# Patient Record
Sex: FEMALE | Race: BLACK OR AFRICAN AMERICAN | NOT HISPANIC OR LATINO | Employment: FULL TIME | URBAN - METROPOLITAN AREA
[De-identification: names, ages, dates, MRNs, and addresses within clinical notes are randomized per-mention and may not be internally consistent; named-entity substitution may affect disease eponyms.]

---

## 2017-01-06 ENCOUNTER — OFFICE VISIT (OUTPATIENT)
Dept: INTERNAL MEDICINE | Facility: CLINIC | Age: 47
End: 2017-01-06

## 2017-01-06 VITALS
OXYGEN SATURATION: 97 % | HEART RATE: 85 BPM | WEIGHT: 288.25 LBS | TEMPERATURE: 98.5 F | RESPIRATION RATE: 18 BRPM | BODY MASS INDEX: 42.57 KG/M2 | SYSTOLIC BLOOD PRESSURE: 162 MMHG | DIASTOLIC BLOOD PRESSURE: 100 MMHG

## 2017-01-06 DIAGNOSIS — I10 ESSENTIAL HYPERTENSION: ICD-10-CM

## 2017-01-06 DIAGNOSIS — R81 GLUCOSURIA: Primary | ICD-10-CM

## 2017-01-06 DIAGNOSIS — B37.31 VAGINA, CANDIDIASIS: ICD-10-CM

## 2017-01-06 DIAGNOSIS — E11.9 TYPE 2 DIABETES MELLITUS WITHOUT COMPLICATION, WITHOUT LONG-TERM CURRENT USE OF INSULIN (HCC): ICD-10-CM

## 2017-01-06 LAB
BILIRUB BLD-MCNC: NEGATIVE MG/DL
CLARITY, POC: ABNORMAL
COLOR UR: YELLOW
EXPIRATION DATE: ABNORMAL
EXPIRATION DATE: ABNORMAL
EXPIRATION DATE: NORMAL
GLUCOSE BLDC GLUCOMTR-MCNC: 261 MG/DL (ref 70–130)
GLUCOSE UR STRIP-MCNC: ABNORMAL MG/DL
HBA1C MFR BLD: 11.7 %
KETONES UR QL: NEGATIVE
LEUKOCYTE EST, POC: NEGATIVE
Lab: ABNORMAL
Lab: ABNORMAL
Lab: NORMAL
NITRITE UR-MCNC: NEGATIVE MG/ML
PH UR: 5 [PH] (ref 5–8)
PROT UR STRIP-MCNC: NEGATIVE MG/DL
RBC # UR STRIP: NEGATIVE /UL
SP GR UR: 1.02 (ref 1–1.03)
UROBILINOGEN UR QL: NORMAL

## 2017-01-06 PROCEDURE — 82962 GLUCOSE BLOOD TEST: CPT | Performed by: PHYSICIAN ASSISTANT

## 2017-01-06 PROCEDURE — 81003 URINALYSIS AUTO W/O SCOPE: CPT | Performed by: PHYSICIAN ASSISTANT

## 2017-01-06 PROCEDURE — 83036 HEMOGLOBIN GLYCOSYLATED A1C: CPT | Performed by: PHYSICIAN ASSISTANT

## 2017-01-06 PROCEDURE — 87086 URINE CULTURE/COLONY COUNT: CPT | Performed by: PHYSICIAN ASSISTANT

## 2017-01-06 PROCEDURE — 99214 OFFICE O/P EST MOD 30 MIN: CPT | Performed by: PHYSICIAN ASSISTANT

## 2017-01-06 RX ORDER — BLOOD-GLUCOSE METER
1 KIT MISCELLANEOUS DAILY
Qty: 1 EACH | Refills: 0 | Status: SHIPPED | OUTPATIENT
Start: 2017-01-06 | End: 2018-01-02

## 2017-01-06 RX ORDER — BLOOD SUGAR DIAGNOSTIC
1 STRIP MISCELLANEOUS DAILY
Qty: 50 EACH | Refills: 11 | Status: SHIPPED | OUTPATIENT
Start: 2017-01-06 | End: 2017-10-17

## 2017-01-06 RX ORDER — LISINOPRIL AND HYDROCHLOROTHIAZIDE 20; 12.5 MG/1; MG/1
1 TABLET ORAL DAILY
Qty: 30 TABLET | Refills: 5 | Status: SHIPPED | OUTPATIENT
Start: 2017-01-06 | End: 2017-05-22 | Stop reason: ALTCHOICE

## 2017-01-06 RX ORDER — FLUCONAZOLE 150 MG/1
150 TABLET ORAL ONCE
Qty: 2 TABLET | Refills: 1 | Status: SHIPPED | OUTPATIENT
Start: 2017-01-06 | End: 2017-01-06

## 2017-01-06 NOTE — PATIENT INSTRUCTIONS
Start walking 30 min most days of the week.    Make sure to take your med regularly.  Call us if you have any side effects.    Avoid a lot of grain, potato and corn products.    Bring logbook of glucose before next appt.

## 2017-01-06 NOTE — MR AVS SNAPSHOT
Ernestina Garrido   1/6/2017 1:45 PM   Office Visit    Provider:  YANG Trimble   Department:  [facility] INTERNAL MEDICINE AND PEDIATRICS   Dept Phone:  [phone]                Your Full Care Plan              Today's Medication Changes          These changes are accurate as of: 1/6/17  2:49 PM.  If you have any questions, ask your nurse or doctor.               New Medication(s)Ordered:     Alcohol Pads 70 % pads   1 Units Daily.       fluconazole 150 MG tablet   Commonly known as:  DIFLUCAN   Take 1 tablet by mouth 1 (One) Time for 1 dose.       glucose blood test strip   Test daily       glucose monitor monitoring kit   1 each Daily.       Lancets 30G misc   1 Units Daily.       lisinopril-hydrochlorothiazide 20-12.5 MG per tablet   Commonly known as:  ZESTORETIC   Take 1 tablet by mouth Daily.       metFORMIN 500 MG tablet   Commonly known as:  GLUCOPHAGE   Take 2 tablets by mouth 2 (Two) Times a Day With Meals.         Stop taking medication(s)listed here:     hydrochlorothiazide 25 MG tablet   Commonly known as:  HYDRODIURIL           lisinopril 10 MG tablet   Commonly known as:  PRINIVIL,ZESTRIL                Where to Get Your Medications      These medications were sent to [address] [place] [address] - [phone]  - [phone] [place] [address]     Phone:  [phone]     Alcohol Pads 70 % pads    fluconazole 150 MG tablet    glucose blood test strip    glucose monitor monitoring kit    Lancets 30G misc    lisinopril-hydrochlorothiazide 20-12.5 MG per tablet    metFORMIN 500 MG tablet                  Your Updated Medication List          This list is accurate as of: 1/6/17  2:49 PM.  Always use your most recent med list.                Alcohol Pads 70 % pads   1 Units Daily.       cyclobenzaprine 10 MG tablet   Commonly known as:  FLEXERIL       fluconazole 150 MG tablet      Commonly known as:  DIFLUCAN   Take 1 tablet by mouth 1 (One) Time for 1 dose.       glucose blood test strip   Test daily       glucose monitor monitoring kit   1 each Daily.       Lancets 30G misc   1 Units Daily.       levothyroxine 100 MCG tablet   Commonly known as:  SYNTHROID, LEVOTHROID   TAKE ONE TABLET BY MOUTH DAILY       lisinopril-hydrochlorothiazide 20-12.5 MG per tablet   Commonly known as:  ZESTORETIC   Take 1 tablet by mouth Daily.       metFORMIN 500 MG tablet   Commonly known as:  GLUCOPHAGE   Take 2 tablets by mouth 2 (Two) Times a Day With Meals.               We Performed the Following     POC Glucose Fingerstick     POC Glycosylated Hemoglobin (Hb A1C)     POC Urinalysis Dipstick, Automated     Urine Culture       You Were Diagnosed With        Codes Comments    Glucosuria    -  Primary ICD-10-CM: R81  ICD-9-CM: 791.5     Type 2 diabetes mellitus without complication, without long-term current use of insulin     ICD-10-CM: E11.9  ICD-9-CM: 250.00     Vagina, candidiasis     ICD-10-CM: B37.3  ICD-9-CM: 112.1     Essential hypertension     ICD-10-CM: I10  ICD-9-CM: 401.9       Instructions    Start walking 30 min most days of the week.    Make sure to take your med regularly.  Call us if you have any side effects.    Avoid a lot of grain, potato and corn products.       Patient Instructions History      MyChart Signup     Our records indicate that you have declined [facility] [employer] signup. If you would like to sign up for [employer], please email [email or web address] or call [phone] to obtain an activation code.             Other Info from Your Visit           Your Appointments     Jan 09, 2017  2:00 PM EST   Psychotherapy with Ivy Do, PhD   [facility] BEHAVIORAL HEALTH (--)    [facility], [address] [address]               Jan 11, 2017 11:45 AM EST   Outside Facility with Blayne Pandya MD   [facility] PAIN  MANAGEMENT (--)    [address]  [facility] [address]   [phone]            Feb 06, 2017  2:00 PM EST   Follow Up with Stephanie Tanner MD   [facility] INTERNAL MEDICINE AND PEDIATRICS (--)    [address]  [facility] [id number] [phone]           Arrive 15 minutes prior to appointment.              Allergies     Glimepiride  Diarrhea      Reason for Visit     Diabetes     Vaginal Itching           Vital Signs     Blood Pressure Pulse Temperature Respirations Weight Oxygen Saturation    162/100 (BP Location: Left arm, Patient Position: Sitting) 85 98.5 °F (36.9 °C) 18 288 lb 4 oz (131 kg) 97%    Body Mass Index Smoking Status                42.57 kg/m2 Former Smoker          Problems and Diagnoses Noted     High blood pressure    Glucosuria    -  Primary    Type 2 diabetes mellitus without complication, without long-term current use of insulin        Vaginal yeast infection

## 2017-01-06 NOTE — PROGRESS NOTES
Subjective   Ernestina Garrido is a 46 y.o. female.   Chief Complaint   Patient presents with   • Diabetes   • Vaginal Itching     History of Present Illness   PT says that about 1 month ago she had vaginal itching and was treated with diflucan and it resolved.  Vag itching and rawness has resolved.    Pt used to be on metformin and was tried on something else that insurance wouldn't cover.  She complains of weight gain, fatigue, polyuria and dipsia.  Continues to drink reg soda.  Doesn't test glucose at home.    She quit smoking 2 months agol.    Hx of noncompliance.    The following portions of the patient's history were reviewed and updated as appropriate: allergies, current medications, past family history and problem list.    Review of Systems   Constitutional: Positive for fatigue. Negative for fever.   Endocrine: Positive for polyuria.   Genitourinary: Positive for vaginal discharge.       Objective   Physical Exam   Constitutional: She appears well-developed and well-nourished.   HENT:   Head: Normocephalic and atraumatic.   Right Ear: External ear normal.   Left Ear: External ear normal.   Eyes: Conjunctivae are normal.   Cardiovascular: Normal rate, regular rhythm and normal heart sounds.  Exam reveals no gallop and no friction rub.    No murmur heard.  Pulmonary/Chest: Effort normal and breath sounds normal.   Musculoskeletal: She exhibits no edema.   Psychiatric: She has a normal mood and affect.   Vitals reviewed.      Assessment/Plan   Ernestina was seen today for diabetes and vaginal itching.    Diagnoses and all orders for this visit:    Glucosuria  -     POC Urinalysis Dipstick, Automated  -     Urine Culture    Type 2 diabetes mellitus without complication, without long-term current use of insulin  -     POC Glucose Fingerstick  -     POC Glycosylated Hemoglobin (Hb A1C)  -     metFORMIN (GLUCOPHAGE) 500 MG tablet; Take 2 tablets by mouth 2 (Two) Times a Day With Meals.  -     glucose blood test  strip; Test daily  -     glucose monitor monitoring kit; 1 each Daily.  -     Lancets 30G misc; 1 Units Daily.  -     Alcohol Swabs (ALCOHOL PADS) 70 % pads; 1 Units Daily.    Vagina, candidiasis  -     fluconazole (DIFLUCAN) 150 MG tablet; Take 1 tablet by mouth 1 (One) Time for 1 dose.    Essential hypertension  -     lisinopril-hydrochlorothiazide (ZESTORETIC) 20-12.5 MG per tablet; Take 1 tablet by mouth Daily.

## 2017-01-08 LAB — BACTERIA SPEC AEROBE CULT: NORMAL

## 2017-01-10 RX ORDER — GABAPENTIN 100 MG/1
100 CAPSULE ORAL 4 TIMES DAILY
Qty: 120 CAPSULE | Refills: 6 | Status: SHIPPED | OUTPATIENT
Start: 2017-01-10 | End: 2017-09-15

## 2017-01-12 PROBLEM — R14.0 ABDOMINAL BLOATING: Status: ACTIVE | Noted: 2017-01-12

## 2017-02-02 ENCOUNTER — PATIENT OUTREACH (OUTPATIENT)
Dept: INTERNAL MEDICINE | Facility: CLINIC | Age: 47
End: 2017-02-02

## 2017-02-06 ENCOUNTER — OFFICE VISIT (OUTPATIENT)
Dept: INTERNAL MEDICINE | Facility: CLINIC | Age: 47
End: 2017-02-06

## 2017-02-06 VITALS
WEIGHT: 281 LBS | BODY MASS INDEX: 41.5 KG/M2 | TEMPERATURE: 97.8 F | RESPIRATION RATE: 20 BRPM | DIASTOLIC BLOOD PRESSURE: 80 MMHG | HEART RATE: 80 BPM | SYSTOLIC BLOOD PRESSURE: 130 MMHG

## 2017-02-06 DIAGNOSIS — F32.89 OTHER DEPRESSION: ICD-10-CM

## 2017-02-06 DIAGNOSIS — Z12.31 ENCOUNTER FOR SCREENING MAMMOGRAM FOR BREAST CANCER: ICD-10-CM

## 2017-02-06 DIAGNOSIS — G89.4 CHRONIC PAIN SYNDROME: ICD-10-CM

## 2017-02-06 DIAGNOSIS — E11.43 TYPE 2 DIABETES MELLITUS WITH DIABETIC AUTONOMIC NEUROPATHY, WITHOUT LONG-TERM CURRENT USE OF INSULIN (HCC): Primary | ICD-10-CM

## 2017-02-06 DIAGNOSIS — E03.9 ACQUIRED HYPOTHYROIDISM: ICD-10-CM

## 2017-02-06 DIAGNOSIS — I10 ESSENTIAL HYPERTENSION: ICD-10-CM

## 2017-02-06 LAB
A/C: NORMAL
CLARITY, POC: CLEAR
COLOR UR: YELLOW
EXPIRATION DATE: NORMAL
EXPIRATION DATE: NORMAL
GLUCOSE UR STRIP-MCNC: NEGATIVE MG/DL
KETONES UR QL: NEGATIVE
LEUKOCYTE EST, POC: NEGATIVE
Lab: NORMAL
Lab: NORMAL
NITRITE UR-MCNC: NEGATIVE MG/ML
PH UR: 5.5 [PH] (ref 5–8)
POC CREATININE URINE: 200
POC MICROALBUMIN URINE: 30
PROT UR STRIP-MCNC: NEGATIVE MG/DL
PROT/CREAT UR: 200 MG/G CREA
RBC # UR STRIP: NEGATIVE /UL
SP GR UR: 1.02 (ref 1–1.03)

## 2017-02-06 PROCEDURE — 99214 OFFICE O/P EST MOD 30 MIN: CPT | Performed by: INTERNAL MEDICINE

## 2017-02-06 PROCEDURE — 82044 UR ALBUMIN SEMIQUANTITATIVE: CPT | Performed by: INTERNAL MEDICINE

## 2017-02-06 PROCEDURE — 81002 URINALYSIS NONAUTO W/O SCOPE: CPT | Performed by: INTERNAL MEDICINE

## 2017-02-06 NOTE — PROGRESS NOTES
Subjective       Ernestina Garrido is a 46 y.o. female.     Chief Complaint   Patient presents with   • Diabetes     6 month follow up  non fasting       History of Present Illness     History of Present Illness      HPI: The patient is here for a six-month follow-up, not fasting today.     Primary [facility]: The patient is here today for a follow-up visit, last seen by me 6/27/16.  She saw Cherri Ho 4 weeks ago.     Her Diabetes Mellitus Type 2 is unstable.  The patient is now  adherent with her medication regimen. She has mild diarrhea from the Metformin, but denies other medication side effects.   Medication(s): Metformin HCl.   Her Hypertension is primary, but stable. The patient is not adherent with her medication regimen. The patient complains of medication side effects. Medication(s): Lisinopril and HCTZ.      Interval Events: The patient states her blood sugar fasting has been in the 220-240 fasting and two hours post prandial.   Last HgA1C was 11.7 on 1/6/17 (off Metformin).  Metformin was re-started.  She denies episodes of hypoglycemia.   She states she checks her feet regularly and has had an ophthalmology appointment approxiimately Jan 2016, which did not show retinopathy.     Symptoms: Reports fatigue, Denies chest pain, denies intermittent leg claudication, denies dyspnea, denies lower extremity edema, denies exercise intolerance,, denies numbness of the feet, denies foot pain, denies a foot ulcer, denies visual impairment, denies muscle pain, and denies muscle weakness.   Associated symptoms include headache, polydipsia, polyuria,  and a recent 7 pound weight loss, but no memory issues, no palpitations, no syncope,  no orthopnea, no PND, and no focal neurologic deficits.      Lifestyle and Disease Management: Diet: She consumes a diverse and healthy diet.  She is eating more since she quit smoking.   Weight Issues: She has weight concerns. Exercise: She does exercise  regularly. Exercise includes going to the gym 2-3 times per week.     Smoking: She does not use tobacco. She quit smoking 11/6/16.     Chronic Pain (Brief): The patient is being seen for a routine clinic follow-up of chronic pain.   The patient presents with complaints of neck and back pain (stable).   She sees Dr. Pandya.  Current treatment includes Meloxicam and Flexeril. By report, there is fair symptom control.      Depression (Follow-Up): The patient states her Depression has been stable since the last visit.   She has no comorbid illnesses.   Interval Events: She has had no significant interval events.   Interval Symptoms:Stable depression,  denies loss of interest or pleasure in activities, denies insomnia, denies excessive sleepiness, denies inability to perform normal activities, denies loss of energy, denies feelings of worthlessness, denies feelings of guilt,  and denies trouble concentrating. The patient denies thoughts of suicide. She complains of stable Insomnia from her pain, no medication.   Social Support: the patient has good social support.   Medications: None.      Hypothyroidism (Follow-Up): The patient is being seen for follow-up of Hypothyroidism. The patient reports doing well.   Interval Events:  She has had no significant interval events.   Interval symptoms: has stable fatigu and cold intolerance, but  denies heat intolerance, denies weakness, denies constipation, denies dyspnea on exertion,  and denies dry skin.   Associated symptoms: no paresthesias, no myalgias, and no arthralgias.    Medications include levothyroxine (Synthroid).   Medications: the patient is adherent to her medication regimen, but she denies medication side effects.            Current Outpatient Prescriptions on File Prior to Visit   Medication Sig Dispense Refill   • Alcohol Swabs (ALCOHOL PADS) 70 % pads 1 Units Daily. 50 each 11   • cyclobenzaprine (FLEXERIL) 10 MG tablet Take 10 mg by mouth 3 (three) times a day  as needed for muscle spasms.     • gabapentin (NEURONTIN) 100 MG capsule Take 1 capsule by mouth 4 (Four) Times a Day. 120 capsule 6   • glucose blood test strip Test daily 50 each 12   • glucose monitor monitoring kit 1 each Daily. 1 each 0   • Lancets 30G misc 1 Units Daily. 50 each 11   • levothyroxine (SYNTHROID, LEVOTHROID) 100 MCG tablet TAKE ONE TABLET BY MOUTH DAILY 30 tablet 4   • lisinopril-hydrochlorothiazide (ZESTORETIC) 20-12.5 MG per tablet Take 1 tablet by mouth Daily. 30 tablet 5   • metFORMIN (GLUCOPHAGE) 500 MG tablet Take 2 tablets by mouth 2 (Two) Times a Day With Meals. 120 tablet 2     No current facility-administered medications on file prior to visit.          The following portions of the patient's history were reviewed and updated as appropriate: allergies, current medications, past family history, past medical history, past social history, past surgical history and problem list.    Review of Systems   Constitutional: Positive for fatigue. Negative for unexpected weight change.   Eyes: Negative for visual disturbance.   Respiratory: Negative for cough, shortness of breath and wheezing.    Cardiovascular: Negative for chest pain, palpitations and leg swelling.        No MAIN, orthopnea, or claudication.   Gastrointestinal: Positive for diarrhea (since starting Metformin). Negative for abdominal pain, blood in stool, constipation, nausea and vomiting.   Endocrine: Positive for cold intolerance. Negative for heat intolerance, polydipsia and polyuria.   Musculoskeletal: Positive for back pain and neck pain. Negative for arthralgias and myalgias.   Neurological: Positive for headaches. Negative for dizziness, syncope and light-headedness.        No memory issues. Denies paresthesias.   Psychiatric/Behavioral: Positive for sleep disturbance (stable). Negative for decreased concentration and suicidal ideas.        Stable depression.         Objective       Blood pressure 130/80, pulse 80,  temperature 97.8 °F (36.6 °C), temperature source Temporal Artery , resp. rate 20, weight 281 lb (127 kg), not currently breastfeeding.      Physical Exam   Constitutional:   Morbidly obese, exam very difficult.   Neck: Normal range of motion. Neck supple. Carotid bruit is not present. No thyromegaly present.   Cardiovascular: Normal rate, regular rhythm, normal heart sounds and intact distal pulses.  Exam reveals no gallop and no friction rub.    No murmur heard.  No peripheral edema.   Pulmonary/Chest: Effort normal and breath sounds normal.   Abdominal: Soft. Bowel sounds are normal. She exhibits no distension, no abdominal bruit and no mass. There is no hepatosplenomegaly. There is no tenderness.   Psychiatric: She has a normal mood and affect.   Nursing note and vitals reviewed.       Results for orders placed or performed in visit on 02/06/17   POC Microalbumin   Result Value Ref Range    Microalbumin, Urine 30     Creatinine, Urine 200     A/C <30mg/g NORMAL     Lot Number [id number]     Expiration Date 11-17    POC Urinalysis Dipstick, Multipro   Result Value Ref Range    Color Yellow Yellow, Straw, Dark Yellow, [place]    Clarity, UA Clear Clear    Glucose, UA Negative Negative, 1000 mg/dL (3+) mg/dL    Ketones, UA Negative Negative    Specific Gravity  1.020 1.005 - 1.030    Blood, UA Negative Negative    pH, Urine 5.5 5.0 - 8.0    Protein, POC Negative Negative mg/dL    Leukocytes Negative Negative    Nitrite, UA Negative Negative    Protein/Creatinine Ratio, Urine 200.0 mg/G Crea    Lot Number [id number]     Expiration Date 5-17        Assessment / Plan:    Diagnoses and all orders for this visit:    Type 2 diabetes mellitus with diabetic autonomic neuropathy, without long-term current use of insulin  -     POC Microalbumin  -     POC Urinalysis Dipstick, Multipro  -     Ambulatory Referral to Ophthalmology  -     Ambulatory Referral to Diabetic Education  -     Comprehensive Metabolic Panel; Future  -     Lipid  Panel; Future  -     CBC & Differential; Future    Essential hypertension    Acquired hypothyroidism  -     T4, Free; Future  -     TSH; Future    Chronic pain syndrome    Other depression    Encounter for screening mammogram for breast cancer  -     Mammo Screening Bilateral With CAD; Future      The patient agrees to return for fasting labs.    Ophthalmology form given to the patient today.    The patient agrees to schedule her annual exam with Dr. Garnica.    The patient declined an Influenza vaccine today, despite my recommendation and discussion on risks of Influenza including death.    Return in about 2 months (around 4/6/2017) for Recheck-Diabetes, fasting.

## 2017-02-06 NOTE — PATIENT INSTRUCTIONS
The patient agrees to return for fasting labs.    Ophthalmology form given to the patient today.    The patient agrees to schedule her annual exam with Dr. Garnica.    The patient declined an Influenza vaccine today, despite my recommendation and discussion on risks of Influenza including death.

## 2017-02-08 ENCOUNTER — LAB (OUTPATIENT)
Dept: INTERNAL MEDICINE | Facility: CLINIC | Age: 47
End: 2017-02-08

## 2017-02-08 ENCOUNTER — OUTSIDE FACILITY SERVICE (OUTPATIENT)
Dept: PAIN MEDICINE | Facility: CLINIC | Age: 47
End: 2017-02-08

## 2017-02-08 ENCOUNTER — TELEPHONE (OUTPATIENT)
Dept: INTERNAL MEDICINE | Facility: CLINIC | Age: 47
End: 2017-02-08

## 2017-02-08 DIAGNOSIS — E11.43 TYPE 2 DIABETES MELLITUS WITH DIABETIC AUTONOMIC NEUROPATHY, WITHOUT LONG-TERM CURRENT USE OF INSULIN (HCC): ICD-10-CM

## 2017-02-08 DIAGNOSIS — E03.9 ACQUIRED HYPOTHYROIDISM: ICD-10-CM

## 2017-02-08 DIAGNOSIS — Z12.31 ENCOUNTER FOR SCREENING MAMMOGRAM FOR MALIGNANT NEOPLASM OF BREAST: Primary | ICD-10-CM

## 2017-02-08 LAB
ALBUMIN SERPL-MCNC: 4.1 G/DL (ref 3.2–4.8)
ALBUMIN/GLOB SERPL: 1.3 G/DL (ref 1.5–2.5)
ALP SERPL-CCNC: 71 U/L (ref 25–100)
ALT SERPL W P-5'-P-CCNC: 17 U/L (ref 7–40)
ANION GAP SERPL CALCULATED.3IONS-SCNC: 3 MMOL/L (ref 3–11)
ARTICHOKE IGE QN: 116 MG/DL (ref 0–130)
AST SERPL-CCNC: 16 U/L (ref 0–33)
BASOPHILS # BLD AUTO: 0.01 10*3/MM3 (ref 0–0.2)
BASOPHILS NFR BLD AUTO: 0.1 % (ref 0–1)
BILIRUB SERPL-MCNC: 1.1 MG/DL (ref 0.3–1.2)
BUN BLD-MCNC: 9 MG/DL (ref 9–23)
BUN/CREAT SERPL: 12.9 (ref 7–25)
CALCIUM SPEC-SCNC: 9.7 MG/DL (ref 8.7–10.4)
CHLORIDE SERPL-SCNC: 100 MMOL/L (ref 99–109)
CHOLEST SERPL-MCNC: 196 MG/DL (ref 0–200)
CO2 SERPL-SCNC: 35 MMOL/L (ref 20–31)
CREAT BLD-MCNC: 0.7 MG/DL (ref 0.6–1.3)
DEPRECATED RDW RBC AUTO: 42.2 FL (ref 37–54)
EOSINOPHIL # BLD AUTO: 0.18 10*3/MM3 (ref 0.1–0.3)
EOSINOPHIL NFR BLD AUTO: 1.9 % (ref 0–3)
ERYTHROCYTE [DISTWIDTH] IN BLOOD BY AUTOMATED COUNT: 12.5 % (ref 11.3–14.5)
GFR SERPL CREATININE-BSD FRML MDRD: 110 ML/MIN/1.73
GLOBULIN UR ELPH-MCNC: 3.1 GM/DL
GLUCOSE BLD-MCNC: 144 MG/DL (ref 70–100)
HCT VFR BLD AUTO: 41.3 % (ref 34.5–44)
HDLC SERPL-MCNC: 66 MG/DL (ref 40–60)
HGB BLD-MCNC: 13.6 G/DL (ref 11.5–15.5)
IMM GRANULOCYTES # BLD: 0.03 10*3/MM3 (ref 0–0.03)
IMM GRANULOCYTES NFR BLD: 0.3 % (ref 0–0.6)
LYMPHOCYTES # BLD AUTO: 3.62 10*3/MM3 (ref 0.6–4.8)
LYMPHOCYTES NFR BLD AUTO: 38 % (ref 24–44)
MCH RBC QN AUTO: 30.2 PG (ref 27–31)
MCHC RBC AUTO-ENTMCNC: 32.9 G/DL (ref 32–36)
MCV RBC AUTO: 91.6 FL (ref 80–99)
MONOCYTES # BLD AUTO: 0.64 10*3/MM3 (ref 0–1)
MONOCYTES NFR BLD AUTO: 6.7 % (ref 0–12)
NEUTROPHILS # BLD AUTO: 5.05 10*3/MM3 (ref 1.5–8.3)
NEUTROPHILS NFR BLD AUTO: 53 % (ref 41–71)
PLATELET # BLD AUTO: 184 10*3/MM3 (ref 150–450)
PMV BLD AUTO: 12.3 FL (ref 6–12)
POTASSIUM BLD-SCNC: 3.6 MMOL/L (ref 3.5–5.5)
PROT SERPL-MCNC: 7.2 G/DL (ref 5.7–8.2)
RBC # BLD AUTO: 4.51 10*6/MM3 (ref 3.89–5.14)
SODIUM BLD-SCNC: 138 MMOL/L (ref 132–146)
T4 FREE SERPL-MCNC: 1 NG/DL (ref 0.89–1.76)
TRIGL SERPL-MCNC: 86 MG/DL (ref 0–150)
TSH SERPL DL<=0.05 MIU/L-ACNC: 2.09 MIU/ML (ref 0.35–5.35)
WBC NRBC COR # BLD: 9.53 10*3/MM3 (ref 3.5–10.8)

## 2017-02-08 PROCEDURE — 85025 COMPLETE CBC W/AUTO DIFF WBC: CPT | Performed by: INTERNAL MEDICINE

## 2017-02-08 PROCEDURE — 84439 ASSAY OF FREE THYROXINE: CPT | Performed by: INTERNAL MEDICINE

## 2017-02-08 PROCEDURE — 80053 COMPREHEN METABOLIC PANEL: CPT | Performed by: INTERNAL MEDICINE

## 2017-02-08 PROCEDURE — 20526 THER INJECTION CARP TUNNEL: CPT | Performed by: ANESTHESIOLOGY

## 2017-02-08 PROCEDURE — 99152 MOD SED SAME PHYS/QHP 5/>YRS: CPT | Performed by: ANESTHESIOLOGY

## 2017-02-08 PROCEDURE — 80061 LIPID PANEL: CPT | Performed by: INTERNAL MEDICINE

## 2017-02-08 PROCEDURE — 20550 NJX 1 TENDON SHEATH/LIGAMENT: CPT | Performed by: ANESTHESIOLOGY

## 2017-02-08 PROCEDURE — 76942 ECHO GUIDE FOR BIOPSY: CPT | Performed by: ANESTHESIOLOGY

## 2017-02-08 PROCEDURE — 36415 COLL VENOUS BLD VENIPUNCTURE: CPT | Performed by: INTERNAL MEDICINE

## 2017-02-08 PROCEDURE — 84443 ASSAY THYROID STIM HORMONE: CPT | Performed by: INTERNAL MEDICINE

## 2017-02-08 NOTE — TELEPHONE ENCOUNTER
"----- Message from Aurora Cage sent at 2/8/2017  8:31 AM EST -----  Concerning pts mammogram, they are needing the order to say \"screening mammogram with tomosynthesis with CAD. Can you put in new order  "

## 2017-02-13 DIAGNOSIS — Z12.31 ENCOUNTER FOR SCREENING MAMMOGRAM FOR BREAST CANCER: ICD-10-CM

## 2017-02-24 ENCOUNTER — TELEPHONE (OUTPATIENT)
Dept: INTERNAL MEDICINE | Facility: CLINIC | Age: 47
End: 2017-02-24
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
## 2017-02-27 ENCOUNTER — OFFICE VISIT (OUTPATIENT)
Dept: INTERNAL MEDICINE | Facility: CLINIC | Age: 47
End: 2017-02-27

## 2017-02-27 VITALS
SYSTOLIC BLOOD PRESSURE: 150 MMHG | HEART RATE: 100 BPM | DIASTOLIC BLOOD PRESSURE: 98 MMHG | RESPIRATION RATE: 20 BRPM | TEMPERATURE: 97.8 F

## 2017-02-27 DIAGNOSIS — M79.672 LEFT FOOT PAIN: Primary | ICD-10-CM

## 2017-02-27 PROCEDURE — 99213 OFFICE O/P EST LOW 20 MIN: CPT | Performed by: INTERNAL MEDICINE

## 2017-02-27 RX ORDER — HYDROCODONE BITARTRATE AND ACETAMINOPHEN 5; 325 MG/1; MG/1
1 TABLET ORAL EVERY 6 HOURS PRN
Qty: 15 TABLET | Refills: 0 | Status: SHIPPED | OUTPATIENT
Start: 2017-02-27 | End: 2017-03-21

## 2017-02-27 NOTE — PATIENT INSTRUCTIONS
Continue ice, elevation, rest and NSAIDS.  Wear boot.   Appointment scheduled with ortho on 3/6/17.
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
## 2017-03-01 ENCOUNTER — TELEPHONE (OUTPATIENT)
Dept: URGENT CARE | Facility: CLINIC | Age: 47
End: 2017-03-01

## 2017-03-01 NOTE — TELEPHONE ENCOUNTER
Attempted to call patient regarding referral.  Could not leave a message because the mailbox is full.

## 2017-03-21 ENCOUNTER — HOSPITAL ENCOUNTER (OUTPATIENT)
Dept: GENERAL RADIOLOGY | Facility: HOSPITAL | Age: 47
Discharge: HOME | End: 2017-03-21
Admitting: NURSE PRACTITIONER

## 2017-03-21 ENCOUNTER — TELEPHONE (OUTPATIENT)
Dept: INTERNAL MEDICINE | Facility: CLINIC | Age: 47
End: 2017-03-21

## 2017-03-21 ENCOUNTER — OFFICE VISIT (OUTPATIENT)
Dept: INTERNAL MEDICINE | Facility: CLINIC | Age: 47
End: 2017-03-21

## 2017-03-21 VITALS
BODY MASS INDEX: 41.05 KG/M2 | WEIGHT: 270 LBS | TEMPERATURE: 98.6 F | RESPIRATION RATE: 20 BRPM | SYSTOLIC BLOOD PRESSURE: 148 MMHG | DIASTOLIC BLOOD PRESSURE: 96 MMHG

## 2017-03-21 DIAGNOSIS — M25.561 ACUTE PAIN OF RIGHT KNEE: Primary | ICD-10-CM

## 2017-03-21 DIAGNOSIS — M25.561 ACUTE PAIN OF RIGHT KNEE: ICD-10-CM

## 2017-03-21 PROCEDURE — 73560 X-RAY EXAM OF KNEE 1 OR 2: CPT

## 2017-03-21 PROCEDURE — 99213 OFFICE O/P EST LOW 20 MIN: CPT | Performed by: NURSE PRACTITIONER

## 2017-03-21 NOTE — TELEPHONE ENCOUNTER
----- Message from Mimi Chauhan sent at 3/21/2017  9:51 AM EDT -----  [phone]    PTS LEG AND KNEE ARE PAINFUL AND SWOLLEN.  HAVE BEEN FOR ALMOST 2 WEEKS.  NEEDS APPT AT 1:30 OR LATER ANY DAY THIS WEEK.  TODAY PREFERABLY.  CAN YOU DO THAT OR IS A PA OK?

## 2017-03-21 NOTE — TELEPHONE ENCOUNTER
Tried contacting Pt however VM is full, unable to leave message. If Pt calls back, please schedule her with PA
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
## 2017-03-21 NOTE — TELEPHONE ENCOUNTER
Ok to work in with a PA if needs to be seen today.  I cannot see her until tomorrow and not after 1:30 PM.

## 2017-03-22 DIAGNOSIS — R10.2 PELVIC PAIN: ICD-10-CM

## 2017-03-23 RX ORDER — MELOXICAM 15 MG/1
TABLET ORAL
Qty: 30 TABLET | Refills: 2 | Status: SHIPPED | OUTPATIENT
Start: 2017-03-23 | End: 2017-04-19

## 2017-03-26 RX ORDER — METHYLPREDNISOLONE 4 MG/1
TABLET ORAL
Qty: 21 TABLET | Refills: 0 | Status: SHIPPED | OUTPATIENT
Start: 2017-03-26 | End: 2017-05-22

## 2017-03-31 ENCOUNTER — TELEPHONE (OUTPATIENT)
Dept: INTERNAL MEDICINE | Facility: CLINIC | Age: 47
End: 2017-03-31

## 2017-03-31 NOTE — TELEPHONE ENCOUNTER
Please call pt and see if she is taking any med/mobic? Did the medrol pack help?  When is she going to see PT?? This can be helpful to her.

## 2017-03-31 NOTE — TELEPHONE ENCOUNTER
Pt. informed to go to ER for the amount of pain she is in or she can try to take Tylenol 3 with Mobic. Verbal understanding and great appreciation received.

## 2017-03-31 NOTE — TELEPHONE ENCOUNTER
----- Message from Katharine Woods sent at 3/31/2017 11:52 AM EDT -----  PATIENT WAS SEEN RECENTLY FOR KNEE PAIN.     PATIENT IS REQUESTING SOME PAIN MEDICATION TO BE CALLED IN. OTC MEDS ARE NOT HELPING AND HASN'T STARTED PHYSICAL THERAPY    PLEASE SEND TO [facility] AT [facility]    IF ANY QUESTIONS CALL PATIENT -[phone]

## 2017-03-31 NOTE — TELEPHONE ENCOUNTER
"Spoke with pt. States she has been taking Mobic, Tylenol 3 and Flexiril. Medrol pack has \"never\" worked for pt. States nothing is working. Goes to PT on Monday 04/03/2017. States that she needs to have something for pain.  "

## 2017-04-19 ENCOUNTER — OFFICE VISIT (OUTPATIENT)
Dept: INTERNAL MEDICINE | Facility: CLINIC | Age: 47
End: 2017-04-19

## 2017-04-19 VITALS
BODY MASS INDEX: 42.22 KG/M2 | HEIGHT: 67 IN | WEIGHT: 269 LBS | HEART RATE: 74 BPM | DIASTOLIC BLOOD PRESSURE: 96 MMHG | TEMPERATURE: 97.4 F | RESPIRATION RATE: 21 BRPM | SYSTOLIC BLOOD PRESSURE: 146 MMHG

## 2017-04-19 DIAGNOSIS — M25.561 ACUTE PAIN OF RIGHT KNEE: ICD-10-CM

## 2017-04-19 DIAGNOSIS — I10 ESSENTIAL HYPERTENSION: ICD-10-CM

## 2017-04-19 DIAGNOSIS — Z23 NEED FOR PROPHYLACTIC VACCINATION AGAINST STREPTOCOCCUS PNEUMONIAE (PNEUMOCOCCUS): ICD-10-CM

## 2017-04-19 DIAGNOSIS — F32.89 OTHER DEPRESSION: ICD-10-CM

## 2017-04-19 DIAGNOSIS — E11.43 TYPE 2 DIABETES MELLITUS WITH DIABETIC AUTONOMIC NEUROPATHY, WITHOUT LONG-TERM CURRENT USE OF INSULIN (HCC): Primary | ICD-10-CM

## 2017-04-19 DIAGNOSIS — E11.9 TYPE 2 DIABETES MELLITUS WITHOUT COMPLICATION, WITHOUT LONG-TERM CURRENT USE OF INSULIN (HCC): ICD-10-CM

## 2017-04-19 LAB
ANION GAP SERPL CALCULATED.3IONS-SCNC: 13 MMOL/L (ref 3–11)
BUN BLD-MCNC: 15 MG/DL (ref 9–23)
BUN/CREAT SERPL: 18.8 (ref 7–25)
CALCIUM SPEC-SCNC: 9.9 MG/DL (ref 8.7–10.4)
CHLORIDE SERPL-SCNC: 100 MMOL/L (ref 99–109)
CO2 SERPL-SCNC: 24 MMOL/L (ref 20–31)
CREAT BLD-MCNC: 0.8 MG/DL (ref 0.6–1.3)
EXPIRATION DATE: NORMAL
GFR SERPL CREATININE-BSD FRML MDRD: 94 ML/MIN/1.73
GLUCOSE BLD-MCNC: 200 MG/DL (ref 70–100)
HBA1C MFR BLD: 11.1 %
Lab: NORMAL
POTASSIUM BLD-SCNC: 3.8 MMOL/L (ref 3.5–5.5)
SODIUM BLD-SCNC: 137 MMOL/L (ref 132–146)

## 2017-04-19 PROCEDURE — 90471 IMMUNIZATION ADMIN: CPT | Performed by: INTERNAL MEDICINE

## 2017-04-19 PROCEDURE — 90732 PPSV23 VACC 2 YRS+ SUBQ/IM: CPT | Performed by: INTERNAL MEDICINE

## 2017-04-19 PROCEDURE — 83036 HEMOGLOBIN GLYCOSYLATED A1C: CPT | Performed by: INTERNAL MEDICINE

## 2017-04-19 PROCEDURE — 80048 BASIC METABOLIC PNL TOTAL CA: CPT | Performed by: INTERNAL MEDICINE

## 2017-04-19 PROCEDURE — 99214 OFFICE O/P EST MOD 30 MIN: CPT | Performed by: INTERNAL MEDICINE

## 2017-04-19 RX ORDER — LEVOTHYROXINE SODIUM 0.1 MG/1
TABLET ORAL
Qty: 30 TABLET | Refills: 3 | Status: SHIPPED | OUTPATIENT
Start: 2017-04-19 | End: 2018-01-02

## 2017-04-19 RX ORDER — FLUOXETINE HYDROCHLORIDE 20 MG/1
20 CAPSULE ORAL DAILY
Qty: 30 CAPSULE | Refills: 5 | Status: SHIPPED | OUTPATIENT
Start: 2017-04-19 | End: 2017-10-17

## 2017-04-19 RX ORDER — LISINOPRIL 10 MG/1
TABLET ORAL
Qty: 30 TABLET | Refills: 3 | Status: SHIPPED | OUTPATIENT
Start: 2017-04-19 | End: 2017-10-31 | Stop reason: SDUPTHER

## 2017-04-19 NOTE — PROGRESS NOTES
Subjective       Ernestina Garrido is a 47 y.o. female.     Chief Complaint   Patient presents with   • Pain in both legs       History obtained from the patient.      History of Present Illness     Primary Care Cardiac Diagnostic Constellation: The patient is here today for a follow-up visit.      Her Diabetes Mellitus Type 2 is unstable.  The patient is adherent with her medication regimen. She has mild diarrhea from the Metformin, but denies other medication side effects.   Medication(s): Metformin HCl.   Her Hypertension is unstable. The patient is not adherent with her medication regimen. The patient complains of medication side effects. Medication(s): Lisinopril and HCTZ.       Interval Events: The patient states her blood sugar fasting has been in the <200 post prandial, 180's  fasting and two hours post prandial.  Last HgA1C was 11.7 on 1/6/17 (off Metformin). Metformin was re-started.  The patient was on glimepiride in the past, but that cause diarrhea.  She denies episodes of hypoglycemia. She states she checks her feet regularly and has had an ophthalmology appointment approxiimately Jan 2016, which did not show retinopathy.      Symptoms: Reports improved fatigue. Has left foot pain.   Denies chest pain, denies intermittent leg claudication, denies dyspnea, denies lower extremity edema, denies exercise intolerance, denies numbness of the feet,  denies a foot ulcer, denies visual impairment, denies muscle pain, and denies muscle weakness.   Associated symptoms include no weight changes, no headache, no polydipsia, no polyuria,  no memory issues, no palpitations, no syncope,  no orthopnea, no PND, and no focal neurologic deficits.       Lifestyle and Disease Management: Diet: She consumes a diverse and healthy diet.   Weight Issues: She has weight concerns. Exercise: She does exercise regularly. Exercise includes going to the gym 2-3 times per week.    Smoking: She does not use tobacco. She quit smoking  11/6/16.    Depression (Follow-Up): The patient states her Depression has been worse x 2 months.  She has no comorbid illnesses.   Interval Events: She is under a lot of stress.  She has a lot of chronic pain issues.  Interval Symptoms: worsened depression and insomnia,  denies loss of interest or pleasure in activities,  denies excessive sleepiness, denies inability to perform normal activities, denies loss of energy, denies feelings of worthlessness, denies feelings of guilt,  and denies trouble concentrating. The patient denies thoughts of suicide.   Social Support: the patient has good social support.   Medications: None.     Right Knee Pain Follow-up: The patient saw Obdulia Ly on 3/21/17 for right knee pain.  X-ray showed moderate degenerative changes, otherwise negative.  The patient is scheduled for PT.  She is still  experiencing swelling and pain, and would like a referral to orthopedics.        Current Outpatient Prescriptions on File Prior to Visit   Medication Sig Dispense Refill   • Alcohol Swabs (ALCOHOL PADS) 70 % pads 1 Units Daily. 50 each 11   • cyclobenzaprine (FLEXERIL) 10 MG tablet Take 10 mg by mouth 3 (three) times a day as needed for muscle spasms.     • gabapentin (NEURONTIN) 100 MG capsule Take 1 capsule by mouth 4 (Four) Times a Day. 120 capsule 6   • glucose blood test strip Test daily 50 each 12   • glucose monitor monitoring kit 1 each Daily. 1 each 0   • Lancets 30G misc 1 Units Daily. 50 each 11   • levothyroxine (SYNTHROID, LEVOTHROID) 100 MCG tablet TAKE ONE TABLET BY MOUTH DAILY 30 tablet 4   • lisinopril-hydrochlorothiazide (ZESTORETIC) 20-12.5 MG per tablet Take 1 tablet by mouth Daily. 30 tablet 5   • metFORMIN (GLUCOPHAGE) 500 MG tablet Take 2 tablets by mouth 2 (Two) Times a Day With Meals. 120 tablet 2   • meloxicam (MOBIC) 15 MG tablet TAKE ONE TABLET BY MOUTH DAILY AS NEEDED FOR MODERATE PAIN ( 4-6 ) 30 tablet 2   • MethylPREDNISolone (MEDROL, JUDAH,) 4 MG tablet Take as  directed on package instructions. 21 tablet 0     No current facility-administered medications on file prior to visit.          The following portions of the patient's history were reviewed and updated as appropriate: allergies, current medications, past family history, past medical history, past social history, past surgical history and problem list.    Review of Systems   Constitutional: Positive for fatigue. Negative for unexpected weight change.   Eyes: Negative for visual disturbance.   Respiratory: Negative for cough, shortness of breath and wheezing.    Cardiovascular: Negative for chest pain, palpitations and leg swelling.        No MAIN, orthopnea, or claudication.   Endocrine: Negative for polydipsia and polyuria.   Musculoskeletal: Negative for arthralgias and myalgias.        Has right knee pain as per history of present illness.   Neurological: Negative for dizziness, syncope, light-headedness and headaches.        No memory issues.   Psychiatric/Behavioral: Positive for sleep disturbance. Negative for decreased concentration and suicidal ideas. The patient is not nervous/anxious.         Worsened depression.         Objective       Blood pressure 146/96, pulse 74, temperature 97.4 °F (36.3 °C), temperature source Temporal Artery , resp. rate 21, weight 269 lb (122 kg), not currently breastfeeding.      Physical Exam   Constitutional:   Morbidly obese.   Neck: Normal range of motion. Neck supple. Carotid bruit is not present. No thyromegaly present.   Cardiovascular: Normal rate, regular rhythm, normal heart sounds and intact distal pulses.  Exam reveals no gallop and no friction rub.    No murmur heard.  No peripheral edema.   Pulmonary/Chest: Effort normal and breath sounds normal.   Abdominal: Soft. Bowel sounds are normal. She exhibits no distension, no abdominal bruit and no mass. There is no hepatosplenomegaly. There is no tenderness.   Musculoskeletal:   There is mild edema over the right anterior  knee, with diffuse tenderness to palpation anteriorly.  There is no erythema or warmth.  The patient has pain with flexion and extension of the right knee. There is no crepitus.  There is no instability.   Neurological: She has normal strength and normal reflexes.   Only LE examined.   Psychiatric: She has a normal mood and affect.   The patient is intermittently emotional.   Nursing note and vitals reviewed.     Counseling was given to patient for the following topics: appropriate exercise, discussed labs and diagnostic tests performed last visit or since last visit, discussed labs and diagnostic tests performed this visit, disease prevention, healthy eating habits, importance of daily blood sugar checks, to include fasting/2 hours postprandial/bedtime, importance of daily foot exam for patients with diabetes, importance of medication compliance, importance of yearly ophthalmology exam for patients with diabetes, side effects of medications, stress increase in the patient's life, symptoms of anxiety and symptoms of depression . Total time of the encounter was 25 minutes and 15 minutes was spent counseling.      Results for orders placed or performed in visit on 04/19/17   POC Glycosylated Hemoglobin (Hb A1C)   Result Value Ref Range    Hemoglobin A1C 11.1 %    Lot Number [id number]     Expiration Date 10-18          Assessment / Plan:    Ernestina was seen today for pain in both legs.    Diagnoses and all orders for this visit:    Type 2 diabetes mellitus with diabetic autonomic neuropathy, without long-term current use of insulin  -     Basic Metabolic Panel  -     POC Glycosylated Hemoglobin (Hb A1C)  -     Empagliflozin (JARDIANCE) 10 MG tablet; Take 10 mg by mouth Daily.    Essential hypertension    Acute pain of right knee  -     Ambulatory Referral to Orthopedic Surgery    Other depression  -     FLUoxetine (PROZAC) 20 MG capsule; Take 1 capsule by mouth Daily.    Need for prophylactic vaccination against  Streptococcus pneumoniae (pneumococcus)  -     Pneumococcal Polysaccharide Vaccine 23-Valent Greater Than or Equal To 3yo Subcutaneous / IM        Return in about 1 month (around 5/19/2017) for Recheck-Diabetes and Depression.

## 2017-04-24 ENCOUNTER — HOSPITAL ENCOUNTER (OUTPATIENT)
Dept: PHYSICAL THERAPY | Facility: HOSPITAL | Age: 47
Setting detail: THERAPIES SERIES
Discharge: HOME | End: 2017-04-24

## 2017-04-24 DIAGNOSIS — M25.561 ACUTE PAIN OF RIGHT KNEE: Primary | ICD-10-CM

## 2017-04-24 PROCEDURE — 97161 PT EVAL LOW COMPLEX 20 MIN: CPT
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
## 2017-05-15 ENCOUNTER — OFFICE VISIT (OUTPATIENT)
Dept: INTERNAL MEDICINE | Facility: CLINIC | Age: 47
End: 2017-05-15

## 2017-05-15 VITALS
HEART RATE: 68 BPM | DIASTOLIC BLOOD PRESSURE: 86 MMHG | TEMPERATURE: 97.6 F | BODY MASS INDEX: 41.5 KG/M2 | WEIGHT: 265 LBS | RESPIRATION RATE: 18 BRPM | SYSTOLIC BLOOD PRESSURE: 142 MMHG

## 2017-05-15 DIAGNOSIS — L73.9 FOLLICULITIS: ICD-10-CM

## 2017-05-15 DIAGNOSIS — Z20.2 POSSIBLE EXPOSURE TO STD: Primary | ICD-10-CM

## 2017-05-15 LAB
HAV IGM SERPL QL IA: NORMAL
HBV CORE IGM SERPL QL IA: NORMAL
HBV SURFACE AG SERPL QL IA: NORMAL
HCV AB SER DONR QL: NORMAL
HIV1+2 AB SER QL: NORMAL

## 2017-05-15 PROCEDURE — 80074 ACUTE HEPATITIS PANEL: CPT | Performed by: INTERNAL MEDICINE

## 2017-05-15 PROCEDURE — 87591 N.GONORRHOEAE DNA AMP PROB: CPT | Performed by: INTERNAL MEDICINE

## 2017-05-15 PROCEDURE — 99214 OFFICE O/P EST MOD 30 MIN: CPT | Performed by: INTERNAL MEDICINE

## 2017-05-15 PROCEDURE — 86592 SYPHILIS TEST NON-TREP QUAL: CPT | Performed by: INTERNAL MEDICINE

## 2017-05-15 PROCEDURE — G0432 EIA HIV-1/HIV-2 SCREEN: HCPCS | Performed by: INTERNAL MEDICINE

## 2017-05-15 PROCEDURE — 87491 CHLMYD TRACH DNA AMP PROBE: CPT | Performed by: INTERNAL MEDICINE

## 2017-05-15 RX ORDER — SULFAMETHOXAZOLE AND TRIMETHOPRIM 800; 160 MG/1; MG/1
1 TABLET ORAL 2 TIMES DAILY
Qty: 20 TABLET | Refills: 0 | Status: SHIPPED | OUTPATIENT
Start: 2017-05-15 | End: 2017-05-25

## 2017-05-17 LAB — RPR SER QL: NORMAL

## 2017-05-18 LAB
C TRACH RRNA SPEC DONR QL NAA+PROBE: NEGATIVE
N GONORRHOEA DNA SPEC QL NAA+PROBE: NEGATIVE

## 2017-05-19 ENCOUNTER — DOCUMENTATION (OUTPATIENT)
Dept: PHYSICAL THERAPY | Facility: HOSPITAL | Age: 47
End: 2017-05-19

## 2017-05-19 DIAGNOSIS — M25.561 ACUTE PAIN OF RIGHT KNEE: Primary | ICD-10-CM

## 2017-05-22 ENCOUNTER — OFFICE VISIT (OUTPATIENT)
Dept: INTERNAL MEDICINE | Facility: CLINIC | Age: 47
End: 2017-05-22

## 2017-05-22 VITALS
HEART RATE: 78 BPM | BODY MASS INDEX: 39.94 KG/M2 | SYSTOLIC BLOOD PRESSURE: 124 MMHG | DIASTOLIC BLOOD PRESSURE: 68 MMHG | WEIGHT: 255 LBS | TEMPERATURE: 97.9 F | RESPIRATION RATE: 16 BRPM

## 2017-05-22 DIAGNOSIS — R53.83 OTHER FATIGUE: ICD-10-CM

## 2017-05-22 DIAGNOSIS — R07.89 OTHER CHEST PAIN: ICD-10-CM

## 2017-05-22 DIAGNOSIS — F32.89 OTHER DEPRESSION: ICD-10-CM

## 2017-05-22 DIAGNOSIS — Z12.31 ENCOUNTER FOR SCREENING MAMMOGRAM FOR BREAST CANCER: ICD-10-CM

## 2017-05-22 DIAGNOSIS — R26.89 BALANCE PROBLEM: ICD-10-CM

## 2017-05-22 DIAGNOSIS — E11.43 TYPE 2 DIABETES MELLITUS WITH DIABETIC AUTONOMIC NEUROPATHY, WITHOUT LONG-TERM CURRENT USE OF INSULIN (HCC): Primary | ICD-10-CM

## 2017-05-22 LAB
ALBUMIN SERPL-MCNC: 4.6 G/DL (ref 3.2–4.8)
ALBUMIN/GLOB SERPL: 1.4 G/DL (ref 1.5–2.5)
ALP SERPL-CCNC: 73 U/L (ref 25–100)
ALT SERPL W P-5'-P-CCNC: 19 U/L (ref 7–40)
ANION GAP SERPL CALCULATED.3IONS-SCNC: 3 MMOL/L (ref 3–11)
ARTICHOKE IGE QN: 141 MG/DL (ref 0–130)
AST SERPL-CCNC: 18 U/L (ref 0–33)
BILIRUB SERPL-MCNC: 0.8 MG/DL (ref 0.3–1.2)
BUN BLD-MCNC: 16 MG/DL (ref 9–23)
BUN/CREAT SERPL: 14.5 (ref 7–25)
CALCIUM SPEC-SCNC: 10 MG/DL (ref 8.7–10.4)
CHLORIDE SERPL-SCNC: 97 MMOL/L (ref 99–109)
CHOLEST SERPL-MCNC: 223 MG/DL (ref 0–200)
CO2 SERPL-SCNC: 39 MMOL/L (ref 20–31)
CREAT BLD-MCNC: 1.1 MG/DL (ref 0.6–1.3)
GFR SERPL CREATININE-BSD FRML MDRD: 65 ML/MIN/1.73
GLOBULIN UR ELPH-MCNC: 3.2 GM/DL
GLUCOSE BLD-MCNC: 157 MG/DL (ref 70–100)
HDLC SERPL-MCNC: 66 MG/DL (ref 40–60)
POTASSIUM BLD-SCNC: 3.5 MMOL/L (ref 3.5–5.5)
PROT SERPL-MCNC: 7.8 G/DL (ref 5.7–8.2)
SODIUM BLD-SCNC: 139 MMOL/L (ref 132–146)
TRIGL SERPL-MCNC: 93 MG/DL (ref 0–150)

## 2017-05-22 PROCEDURE — 93000 ELECTROCARDIOGRAM COMPLETE: CPT | Performed by: INTERNAL MEDICINE

## 2017-05-22 PROCEDURE — 80061 LIPID PANEL: CPT | Performed by: INTERNAL MEDICINE

## 2017-05-22 PROCEDURE — 80053 COMPREHEN METABOLIC PANEL: CPT | Performed by: INTERNAL MEDICINE

## 2017-05-22 PROCEDURE — 99214 OFFICE O/P EST MOD 30 MIN: CPT | Performed by: INTERNAL MEDICINE

## 2017-05-30 ENCOUNTER — HOSPITAL ENCOUNTER (OUTPATIENT)
Dept: MAMMOGRAPHY | Facility: HOSPITAL | Age: 47
Discharge: HOME | End: 2017-05-30
Attending: INTERNAL MEDICINE | Admitting: INTERNAL MEDICINE

## 2017-05-30 DIAGNOSIS — Z12.31 ENCOUNTER FOR SCREENING MAMMOGRAM FOR BREAST CANCER: ICD-10-CM

## 2017-05-30 PROCEDURE — 77067 SCR MAMMO BI INCL CAD: CPT | Performed by: RADIOLOGY

## 2017-05-30 PROCEDURE — 77063 BREAST TOMOSYNTHESIS BI: CPT | Performed by: RADIOLOGY

## 2017-05-30 PROCEDURE — 77063 BREAST TOMOSYNTHESIS BI: CPT

## 2017-05-30 PROCEDURE — G0202 SCR MAMMO BI INCL CAD: HCPCS

## 2017-06-02 DIAGNOSIS — E78.49 OTHER HYPERLIPIDEMIA: Primary | ICD-10-CM

## 2017-06-02 RX ORDER — ATORVASTATIN CALCIUM 20 MG/1
20 TABLET, FILM COATED ORAL NIGHTLY
Qty: 30 TABLET | Refills: 5 | Status: SHIPPED | OUTPATIENT
Start: 2017-06-02 | End: 2017-10-17

## 2017-06-06 ENCOUNTER — APPOINTMENT (OUTPATIENT)
Dept: CARDIOLOGY | Facility: HOSPITAL | Age: 47
End: 2017-06-06
Attending: INTERNAL MEDICINE

## 2017-07-25 DIAGNOSIS — E11.9 TYPE 2 DIABETES MELLITUS WITHOUT COMPLICATION, WITHOUT LONG-TERM CURRENT USE OF INSULIN (HCC): ICD-10-CM

## 2017-07-25 DIAGNOSIS — G47.00 INSOMNIA: ICD-10-CM

## 2017-07-25 RX ORDER — TRAZODONE HYDROCHLORIDE 50 MG/1
TABLET ORAL
Qty: 60 TABLET | Refills: 4 | Status: SHIPPED | OUTPATIENT
Start: 2017-07-25 | End: 2017-09-15

## 2017-08-28 ENCOUNTER — TELEPHONE (OUTPATIENT)
Dept: INTERNAL MEDICINE | Facility: CLINIC | Age: 47
End: 2017-08-28

## 2017-08-28 ENCOUNTER — OFFICE VISIT (OUTPATIENT)
Dept: INTERNAL MEDICINE | Facility: CLINIC | Age: 47
End: 2017-08-28

## 2017-08-28 VITALS
DIASTOLIC BLOOD PRESSURE: 92 MMHG | WEIGHT: 261 LBS | SYSTOLIC BLOOD PRESSURE: 132 MMHG | RESPIRATION RATE: 20 BRPM | TEMPERATURE: 97 F | BODY MASS INDEX: 40.88 KG/M2 | HEART RATE: 72 BPM

## 2017-08-28 DIAGNOSIS — M25.561 ACUTE PAIN OF RIGHT KNEE: Primary | ICD-10-CM

## 2017-08-28 PROCEDURE — 99214 OFFICE O/P EST MOD 30 MIN: CPT | Performed by: INTERNAL MEDICINE

## 2017-08-28 RX ORDER — ETODOLAC 200 MG/1
200 CAPSULE ORAL EVERY 8 HOURS PRN
Qty: 50 CAPSULE | Refills: 1 | Status: SHIPPED | OUTPATIENT
Start: 2017-08-28 | End: 2017-10-31 | Stop reason: SDUPTHER

## 2017-08-28 NOTE — TELEPHONE ENCOUNTER
Pt called back and stated it was for her knee. She stated she is off work now and is in a lot of pain. She is asking to be seen today

## 2017-08-28 NOTE — TELEPHONE ENCOUNTER
Patient informed, verb good understanding. Patient states she will be here in 15-20 minutes, added onto schedule

## 2017-08-28 NOTE — PROGRESS NOTES
Subjective       Ernestina Garrido is a 47 y.o. female.     Chief Complaint   Patient presents with   • Knee Pain     x 4 months        History obtained from the patient.      Knee Pain    Incident onset: pain startred 4 months ago. There was no injury mechanism. The pain is present in the right knee. The quality of the pain is described as burning and stabbing. The pain is severe. The pain has been worsening since onset. Associated symptoms include a loss of motion and muscle weakness. Pertinent negatives include no inability to bear weight (limps and knee sometimes gives out), numbness or tingling. The symptoms are aggravated by movement, weight bearing and palpation. She has tried NSAIDs and ice (and PT and cortisone shots) for the symptoms. The treatment provided mild relief.      She had an x-ray done 3/21/17, which showed moderate degenerative arthritis, but no fracture or effusion.She has been seeing an orthopedic surgeon at , but would like a second opinion.  The patient has had no relief with Aleve, ibuprofen, and meloxicam.  She is also on Voltaren gel, which may help some.       The following portions of the patient's history were reviewed and updated as appropriate: allergies, current medications, past family history, past medical history, past social history, past surgical history and problem list.      Review of Systems   Constitutional: Negative for chills and fever.   Respiratory: Negative for shortness of breath.    Cardiovascular: Negative for chest pain.   Musculoskeletal: Positive for back pain (chronic stable) and joint swelling. Negative for arthralgias and myalgias (but has pain in right thigh).   Skin: Negative for rash.   Neurological: Positive for weakness. Negative for tingling and numbness.         Blood pressure 132/92, pulse 72, temperature 97 °F (36.1 °C), temperature source Temporal Artery , resp. rate 20, weight 261 lb (118 kg), not currently breastfeeding.      Objective      Physical Exam   Constitutional:   Morbidly obese.   Cardiovascular: Normal rate, regular rhythm and normal heart sounds.    No murmur heard.  Pulmonary/Chest: Effort normal and breath sounds normal.   Musculoskeletal:   There is diffuse tenderness to palpation over the right knee anteriorly and posteriorly.  There is mild edema, but no erythema or warmth.  There is pain with flexion and extension of the right knee.  Range of motion is decreased due to pain.  There is no instability of the knee.   Neurological: She has normal strength. She displays normal reflexes (only lower extremity examined). She exhibits normal muscle tone.   Skin: No rash noted.   Psychiatric: She has a normal mood and affect.   Nursing note and vitals reviewed.        Assessment/Plan   Ernestina was seen today for knee pain.    Diagnoses and all orders for this visit:    Acute pain of right knee  -     Ambulatory Referral to Orthopedic Surgery  -     etodolac (LODINE) 200 MG capsule; Take 1 capsule by mouth Every 8 (Eight) Hours As Needed (pain).         Discontinue Aleve.  Continue ice.      Return in about 2 weeks (around 9/11/2017) for Recheck-Diabetes fasting.

## 2017-08-28 NOTE — TELEPHONE ENCOUNTER
----- Message from Valencia Tang sent at 8/28/2017  9:21 AM EDT -----  PT IS NOT HAPPY WITH ORTHO  AT - WOULD LIKE TO HAVE A REFERRAL FOR ANOTHER PROVIDER- THEY ARE NOT HELPING HER AT ALL. [facility] . SHOTS AREN'T HELPING, CORTIZONE SHOT ISN'T HELPING, HER KNEE IS DOUBLE THE SIZED. SHE WANTS TO GO BACK TO DR. PRESTON.     PATIENT; [phone]

## 2017-09-15 ENCOUNTER — OFFICE VISIT (OUTPATIENT)
Dept: INTERNAL MEDICINE | Facility: CLINIC | Age: 47
End: 2017-09-15

## 2017-09-15 VITALS
RESPIRATION RATE: 22 BRPM | WEIGHT: 255 LBS | SYSTOLIC BLOOD PRESSURE: 130 MMHG | HEART RATE: 72 BPM | BODY MASS INDEX: 39.94 KG/M2 | TEMPERATURE: 97.7 F | DIASTOLIC BLOOD PRESSURE: 100 MMHG

## 2017-09-15 DIAGNOSIS — Z79.899 HIGH RISK MEDICATION USE: ICD-10-CM

## 2017-09-15 DIAGNOSIS — I10 ESSENTIAL HYPERTENSION: ICD-10-CM

## 2017-09-15 DIAGNOSIS — G89.4 CHRONIC PAIN SYNDROME: ICD-10-CM

## 2017-09-15 DIAGNOSIS — K64.8 OTHER HEMORRHOIDS: ICD-10-CM

## 2017-09-15 DIAGNOSIS — E03.9 ACQUIRED HYPOTHYROIDISM: ICD-10-CM

## 2017-09-15 DIAGNOSIS — F32.89 OTHER DEPRESSION: ICD-10-CM

## 2017-09-15 DIAGNOSIS — E11.43 TYPE 2 DIABETES MELLITUS WITH DIABETIC AUTONOMIC NEUROPATHY, WITHOUT LONG-TERM CURRENT USE OF INSULIN (HCC): Primary | ICD-10-CM

## 2017-09-15 DIAGNOSIS — Z00.00 ROUTINE ADULT HEALTH MAINTENANCE: ICD-10-CM

## 2017-09-15 LAB
ALBUMIN SERPL-MCNC: 3.9 G/DL (ref 3.2–4.8)
ALBUMIN/GLOB SERPL: 1.3 G/DL (ref 1.5–2.5)
ALP SERPL-CCNC: 73 U/L (ref 25–100)
ALT SERPL W P-5'-P-CCNC: 13 U/L (ref 7–40)
ANION GAP SERPL CALCULATED.3IONS-SCNC: 4 MMOL/L (ref 3–11)
ARTICHOKE IGE QN: 136 MG/DL (ref 0–130)
AST SERPL-CCNC: 12 U/L (ref 0–33)
BILIRUB SERPL-MCNC: 0.7 MG/DL (ref 0.3–1.2)
BUN BLD-MCNC: 11 MG/DL (ref 9–23)
BUN/CREAT SERPL: 18.3 (ref 7–25)
CALCIUM SPEC-SCNC: 9.4 MG/DL (ref 8.7–10.4)
CHLORIDE SERPL-SCNC: 109 MMOL/L (ref 99–109)
CHOLEST SERPL-MCNC: 200 MG/DL (ref 0–200)
CO2 SERPL-SCNC: 31 MMOL/L (ref 20–31)
CREAT BLD-MCNC: 0.6 MG/DL (ref 0.6–1.3)
DEPRECATED RDW RBC AUTO: 43 FL (ref 37–54)
ERYTHROCYTE [DISTWIDTH] IN BLOOD BY AUTOMATED COUNT: 12.7 % (ref 11.3–14.5)
EXPIRATION DATE: NORMAL
GFR SERPL CREATININE-BSD FRML MDRD: 130 ML/MIN/1.73
GLOBULIN UR ELPH-MCNC: 3.1 GM/DL
GLUCOSE BLD-MCNC: 143 MG/DL (ref 70–100)
HBA1C MFR BLD: 10 %
HCT VFR BLD AUTO: 41.4 % (ref 34.5–44)
HDLC SERPL-MCNC: 57 MG/DL (ref 40–60)
HGB BLD-MCNC: 13.5 G/DL (ref 11.5–15.5)
Lab: NORMAL
MCH RBC QN AUTO: 30.3 PG (ref 27–31)
MCHC RBC AUTO-ENTMCNC: 32.6 G/DL (ref 32–36)
MCV RBC AUTO: 92.8 FL (ref 80–99)
PLATELET # BLD AUTO: 171 10*3/MM3 (ref 150–450)
PMV BLD AUTO: 12.6 FL (ref 6–12)
POTASSIUM BLD-SCNC: 3.7 MMOL/L (ref 3.5–5.5)
PROT SERPL-MCNC: 7 G/DL (ref 5.7–8.2)
RBC # BLD AUTO: 4.46 10*6/MM3 (ref 3.89–5.14)
SODIUM BLD-SCNC: 144 MMOL/L (ref 132–146)
T4 FREE SERPL-MCNC: 1.15 NG/DL (ref 0.89–1.76)
TRIGL SERPL-MCNC: 82 MG/DL (ref 0–150)
TSH SERPL DL<=0.05 MIU/L-ACNC: 1.88 MIU/ML (ref 0.35–5.35)
WBC NRBC COR # BLD: 10.44 10*3/MM3 (ref 3.5–10.8)

## 2017-09-15 PROCEDURE — 80061 LIPID PANEL: CPT | Performed by: INTERNAL MEDICINE

## 2017-09-15 PROCEDURE — 99214 OFFICE O/P EST MOD 30 MIN: CPT | Performed by: INTERNAL MEDICINE

## 2017-09-15 PROCEDURE — 80053 COMPREHEN METABOLIC PANEL: CPT | Performed by: INTERNAL MEDICINE

## 2017-09-15 PROCEDURE — 84439 ASSAY OF FREE THYROXINE: CPT | Performed by: INTERNAL MEDICINE

## 2017-09-15 PROCEDURE — 84443 ASSAY THYROID STIM HORMONE: CPT | Performed by: INTERNAL MEDICINE

## 2017-09-15 PROCEDURE — 83036 HEMOGLOBIN GLYCOSYLATED A1C: CPT | Performed by: INTERNAL MEDICINE

## 2017-09-15 PROCEDURE — 85027 COMPLETE CBC AUTOMATED: CPT | Performed by: INTERNAL MEDICINE

## 2017-09-15 RX ORDER — GABAPENTIN 100 MG/1
300 CAPSULE ORAL 3 TIMES DAILY
Qty: 90 CAPSULE | Refills: 1 | Status: SHIPPED | OUTPATIENT
Start: 2017-09-15 | End: 2017-10-31

## 2017-09-15 NOTE — PROGRESS NOTES
Subjective       Ernestina Garrido is a 47 y.o. female.     Chief Complaint   Patient presents with   • Diabetes     4 month follow up  fasting        History obtained from the patient.      History of Present Illness     Primary Care Cardiac Diagnostic [facility]: The patient is here today for a follow-up visit.    The patient states she has an appointment with an orthopedist on 9/22/17 for her knee pain.  She states the Lodine that was prescribed has been helping.  She was on Neurontin for foot pain, but ran out of that and has not had it refilled.      Her Diabetes Mellitus Type 2 is unstable.  Medication: Jardiance, Lisinopril, and Atorvastatin  Her Hypertension is unstable.    Medication(s): Lisinopril.  The patient is adherent with her medication regimen. She denies other medication side effects.       Interval Events: The patient states her blood sugar fasting has been 160-225 and <200 post prandial.  Last HgA1C was 11.1 on 4/19/17.  She is off  Metformin.  Jardiance was added 4/17/17.  She denies episodes of hypoglycemia. She states she checks her feet regularly.  She states she  had an ophthalmology appointment approxiimately Jan 2016, which did not show retinopathy.  Referral to the ophthalmologist was placed after the visit on 5/22/17.  The patient states someone called her with an appointment, but she was unable to make it.  She has not re-scheduled.      Symptoms:  Stable fatigue. Has foot pain, but no ulcers or paresthesias.  It is improved with Neurontin.   She does have some numbness and tingling in her left middle finger.  Denies chest pain, denies shortness of breath, denies MAIN, denies intermittent leg claudication,  denies lower extremity edema, denies exercise intolerance,  denies visual impairment, denies muscle pain, and denies muscle weakness.   Associated symptoms include a 6 pound intentional weight loss, intentionally.  Has had occasional headaches.   No polydipsia, no polyuria, no  memory issues, no palpitations, no syncope,  no orthopnea, no PND, and no focal neurologic deficits.       Lifestyle and Disease Management: Diet: She consumes a diverse and healthy diet. Weight Issues: She has weight concerns. Exercise: She does exercise regularly. Exercise includes water aerobics 3 times per week.   Smoking: She does not use tobacco. She quit smoking 11/6/16.        Hypothyroidism (Follow-Up): The patient is being seen for follow-up of Hypothyroidism. The patient reports doing well.   Interval Events:  She has had no significant interval events.   Interval symptoms: has stable fatigu and cold intolerance, but  denies heat intolerance, denies weakness, denies constipation, denies dyspnea on exertion,  and denies dry skin.   Associated symptoms: has  Paresthesias left middle finger, no myalgias, and no arthralgias.    Medications include levothyroxine (Synthroid).    The patient is adherent to her medication regimen,  And she denies medication side effects.     Chronic Pain Follow-up: The patient is being seen for a routine clinic follow-up of Chronic Pain Syndrome   The patient presents with complaints of neck and back pain (stable).   Current treatment includes Lodine.  By report, there is fair symptom control.       Depression (Follow-Up): The patient states her Depression has been stable.  She has no comorbid illnesses.   Interval Events: She is under a lot of stress. She has a lot of chronic pain issues.  Prozac was re-started on 4/19/17.  Interval Symptoms: stable depression and insomnia.    Denies loss of interest or pleasure in activities,  denies excessive sleepiness, denies inability to perform normal activities, denies loss of energy, denies feelings of worthlessness, denies feelings of guilt,  and denies trouble concentrating.   The patient denies thoughts of suicide.   Social Support: the patient has good social support.   Medications: Prozac.       Current Outpatient Prescriptions on  File Prior to Visit   Medication Sig Dispense Refill   • Alcohol Swabs (ALCOHOL PADS) 70 % pads 1 Units Daily. 50 each 11   • atorvastatin (LIPITOR) 20 MG tablet Take 1 tablet by mouth Every Night. 30 tablet 5   • Empagliflozin (JARDIANCE) 10 MG tablet Take 10 mg by mouth Daily. 30 tablet 5   • etodolac (LODINE) 200 MG capsule Take 1 capsule by mouth Every 8 (Eight) Hours As Needed (pain). 50 capsule 1   • FLUoxetine (PROZAC) 20 MG capsule Take 1 capsule by mouth Daily. 30 capsule 5   • glucose blood test strip Test daily 50 each 12   • glucose monitor monitoring kit 1 each Daily. 1 each 0   • Lancets 30G misc 1 Units Daily. 50 each 11   • levothyroxine (SYNTHROID, LEVOTHROID) 100 MCG tablet TAKE ONE TABLET BY MOUTH DAILY 30 tablet 3   • lisinopril (PRINIVIL,ZESTRIL) 10 MG tablet TAKE ONE TABLET BY MOUTH EVERY NIGHT AT BEDTIME 30 tablet 3   • [DISCONTINUED] gabapentin (NEURONTIN) 100 MG capsule Take 1 capsule by mouth 4 (Four) Times a Day. 120 capsule 6   • [DISCONTINUED] traZODone (DESYREL) 50 MG tablet TAKE 1-2 TABLETS BY MOUTH EVERY NIGHT AT BEDTIME AS NEEDED FOR SLEEP 60 tablet 4     No current facility-administered medications on file prior to visit.          The following portions of the patient's history were reviewed and updated as appropriate: allergies, current medications, past family history, past medical history, past social history, past surgical history and problem list.    Review of Systems   Constitutional: Positive for fatigue. Negative for unexpected weight change.   Eyes: Negative for visual disturbance.   Respiratory: Negative for cough, shortness of breath and wheezing.    Cardiovascular: Negative for chest pain, palpitations and leg swelling.        No MAIN, orthopnea, or claudication.   Gastrointestinal: Positive for blood in stool (hemorrhoids sore and bleed occasionally). Negative for abdominal pain, constipation, diarrhea, nausea and vomiting.        Denies melena.   Endocrine: Positive for  cold intolerance. Negative for heat intolerance, polydipsia and polyuria.   Musculoskeletal: Positive for back pain and neck pain. Negative for arthralgias and myalgias.   Neurological: Positive for numbness (and tingling of left middle finger). Negative for dizziness, syncope, light-headedness and headaches.        No memory issues.   Psychiatric/Behavioral: Positive for sleep disturbance. Negative for decreased concentration and suicidal ideas. The patient is not nervous/anxious.         Stable depression.         Objective       Blood pressure 130/100, pulse 72, temperature 97.7 °F (36.5 °C), temperature source Temporal Artery , resp. rate 22, weight 255 lb (116 kg), not currently breastfeeding.      Physical Exam   Constitutional:   Obese.     Neck: Normal range of motion. Neck supple. Carotid bruit is not present. No thyromegaly present.   Cardiovascular: Normal rate, regular rhythm, normal heart sounds and intact distal pulses.  Exam reveals no gallop and no friction rub.    No murmur heard.  No peripheral edema.   Pulmonary/Chest: Effort normal and breath sounds normal.   Abdominal: Soft. Bowel sounds are normal. She exhibits no distension, no abdominal bruit and no mass. There is no hepatosplenomegaly. There is no tenderness.    Ernestina had a diabetic foot exam performed today.   During the foot exam she had a monofilament test performed (see form).   Skin Integrity  -  Her right foot skin is not intact (dry).   Ernestina's left foot skin is not intact (dry). .  Psychiatric: She has a normal mood and affect.   Nursing note and vitals reviewed.       Results for orders placed or performed in visit on 09/15/17   POC Glycosylated Hemoglobin (Hb A1C)   Result Value Ref Range    Hemoglobin A1C 10.0 %    Lot Number [id number]     Expiration Date 5-19          Assessment / Plan:      Ernestina was seen today for diabetes.    Diagnoses and all orders for this visit:    Type 2 diabetes mellitus with diabetic autonomic  neuropathy, without long-term current use of insulin  -     Lipid Panel  -     Comprehensive Metabolic Panel  -     POC Glycosylated Hemoglobin (Hb A1C)    Essential hypertension    Acquired hypothyroidism  -     TSH  -     T4, Free    Chronic pain syndrome  -     gabapentin (NEURONTIN) 100 MG capsule; Take 3 capsules by mouth 3 (Three) Times a Day.    Other hemorrhoids  -     hydrocortisone (PROCTOSOL HC) 2.5 % rectal cream; Insert  into the rectum 2 (Two) Times a Day for 7 days.    Other depression    High risk medication use  -     CBC (No Diff)    Routine adult health maintenance  -     Ambulatory Referral to Gynecology    The patient was told she can add Tylenol to use the Lodine and Neurontin for her chronic pain.    The patient agrees to reschedule her eye exam appointment with  Ophthalmology.She was given an ophthalmology form today.      The patient was instructed in the side effects of the medication.  Risks of the potential for tolerance, dependence, and addiction were discussed.  The patient was instructed to take the lowest dosage of the medication, at the lowest frequency, and for the shortest period of time possible.  The patient was instructed not to receive controlled substances or narcotics from other doctors, and not to giveaway or sell the medication.    The patient was instructed to abstain from illicit drug use.  The patient was instructed to avoid alcohol while taking these medications.      Narcotics/controlled substance agreement, Jose Guadalupe report, and Urine Drug Screen were updated today if needed.      Return in about 3 months (around 12/15/2017) for Recheck-Diabetes fasting.

## 2017-09-18 RX ORDER — HYDROCHLOROTHIAZIDE 25 MG/1
25 TABLET ORAL DAILY
Qty: 30 TABLET | Refills: 6 | Status: SHIPPED | OUTPATIENT
Start: 2017-09-18 | End: 2017-11-13 | Stop reason: SDUPTHER

## 2017-10-10 ENCOUNTER — TELEPHONE (OUTPATIENT)
Dept: INTERNAL MEDICINE | Facility: CLINIC | Age: 47
End: 2017-10-10

## 2017-10-10 NOTE — TELEPHONE ENCOUNTER
Spoke to Ernestina.  She states she does not need anything now as she got in touch with the Ortho pedic doctor to make an appt with them.

## 2017-10-10 NOTE — TELEPHONE ENCOUNTER
----- Message from Merly Moya sent at 10/10/2017  9:27 AM EDT -----  Contact: SELF  JAGJIT DEAL SAID HER ORTHOPEDIC DR WAS GOING TO REQUEST A REFERRAL FOR AN INCREASED DOSAGE FOR GABAPENTIN AND TO GO STRAIGHT TO THE SURGEON FOR INJECTIONS TO HELP WITH THE PAIN. SHE ALSO WANTS TO KNOW IF SHE CAN GET AN INCREASE FOR THE ANTI-INFLAMMATORY AT LEAST UNTIL SHE CAN GET TO THE SURGEON.   SHE USES THE [employer] [facility] KASYE CULPJAGJIT CAN BE REACHED [phone]

## 2017-10-17 ENCOUNTER — OFFICE VISIT (OUTPATIENT)
Dept: ORTHOPEDIC SURGERY | Facility: CLINIC | Age: 47
End: 2017-10-17

## 2017-10-17 VITALS
BODY MASS INDEX: 40.18 KG/M2 | DIASTOLIC BLOOD PRESSURE: 99 MMHG | HEART RATE: 68 BPM | SYSTOLIC BLOOD PRESSURE: 188 MMHG | WEIGHT: 256 LBS | HEIGHT: 67 IN

## 2017-10-17 DIAGNOSIS — M17.11 PRIMARY OSTEOARTHRITIS OF RIGHT KNEE: Primary | ICD-10-CM

## 2017-10-17 PROCEDURE — 99214 OFFICE O/P EST MOD 30 MIN: CPT | Performed by: PHYSICIAN ASSISTANT

## 2017-10-17 NOTE — PROGRESS NOTES
Patient: Ernestina Garrido  : 1970    Primary Care Provider: Stephanie Tanner MD    Requesting Provider: As above    Pain of the Right Knee      History    Chief Complaint: Right knee pain    History of Present Illness: This is a pleasant 47-year-old female presenting today discuss her 6 month history of right knee pain.  She reports that it all began after injuring her left foot and having to compensate was putting more weight on her right side.  There is no direct trauma or injury to the right knee.  She complains of generalized knee pain.  She complains of constant toothache-type pain with sharp pain with weightbearing and walking.  She rates it 8-10/10. She has throbbing pain at rest as well as night pain.  The only thing that makes it feel better is being off of it for a long period of time and when she falls asleep.  She denies any radiating pain or mechanical symptoms.  She feels that it stays swollen all the time.  She's been treated with multiple anti-inflammatories including Aleve, Mobic and most recently etodolac which seems to help her pain the most.  She has used a cane.  She did a few visits of physical therapy but discontinued because it made her pain worse.  She reports that she has lost weight secondary to the pain in the stress that the pain has caused.  She has had 2 steroid injections from  which is given her 3 weeks relief.  She also had an HA with increased pain.  She is here today for another opinion given that she is still having pain.        Allergies   Allergen Reactions   • Glimepiride Diarrhea     Current Outpatient Prescriptions on File Prior to Visit   Medication Sig Dispense Refill   • Empagliflozin (JARDIANCE) 10 MG tablet Take 10 mg by mouth Daily. 30 tablet 5   • etodolac (LODINE) 200 MG capsule Take 1 capsule by mouth Every 8 (Eight) Hours As Needed (pain). 50 capsule 1   • gabapentin (NEURONTIN) 100 MG capsule Take 3 capsules by mouth 3 (Three) Times a Day. 90 capsule 1    • glucose blood test strip Test daily 50 each 12   • glucose monitor monitoring kit 1 each Daily. 1 each 0   • hydrochlorothiazide (HYDRODIURIL) 25 MG tablet Take 1 tablet by mouth Daily. 30 tablet 6   • Lancets 30G misc 1 Units Daily. 50 each 11   • levothyroxine (SYNTHROID, LEVOTHROID) 100 MCG tablet TAKE ONE TABLET BY MOUTH DAILY 30 tablet 3   • lisinopril (PRINIVIL,ZESTRIL) 10 MG tablet TAKE ONE TABLET BY MOUTH EVERY NIGHT AT BEDTIME 30 tablet 3   • metFORMIN (GLUCOPHAGE) 500 MG tablet Take 1 tablet by mouth 2 (Two) Times a Day With Meals. 60 tablet 5   • [DISCONTINUED] Alcohol Swabs (ALCOHOL PADS) 70 % pads 1 Units Daily. 50 each 11   • [DISCONTINUED] atorvastatin (LIPITOR) 20 MG tablet Take 1 tablet by mouth Every Night. 30 tablet 5   • [DISCONTINUED] FLUoxetine (PROZAC) 20 MG capsule Take 1 capsule by mouth Daily. 30 capsule 5     No current facility-administered medications on file prior to visit.      Social History     Social History   • Marital status:      Spouse name: N/A   • Number of children: N/A   • Years of education: N/A     Occupational History   • Not on file.     Social History Main Topics   • Smoking status: Former Smoker     Packs/day: 0.50     Years: 28.00     Types: Cigarettes     Start date:      Quit date: 2016   • Smokeless tobacco: Never Used   • Alcohol use Yes      Comment: occasional 1 a week   • Drug use: No   • Sexual activity: Defer     Other Topics Concern   • Not on file     Social History Narrative     Past Surgical History:   Procedure Laterality Date   • ABDOMINAL SURGERY     • ANTERIOR CERVICAL DISCECTOMY  2016    C4-6 DISC (Dr. Pena)   • BACK SURGERY     •  SECTION      , ,    • HYSTERECTOMY  2015   • LUMBAR DISCECTOMY     • NECK SURGERY       Family History   Problem Relation Age of Onset   • Diabetes Mother    • Hypertension Mother    • Colon cancer Maternal Grandmother    • Diabetes Maternal Grandmother    •  "Hypertension Maternal Grandmother    • Diabetes Daughter    • Hypothyroidism Daughter    • Diabetes Maternal Uncle    • Hypertension Maternal Uncle    • Hypertension Other    • Breast cancer Neg Hx    • Ovarian cancer Neg Hx      Past Medical History:   Diagnosis Date   • Atypical mole    • Cervical disc disorder    • Diabetes mellitus    • Encounter for Papanicolaou smear of cervix 2013    normal per patient   • Extremity pain    • Headache    • History of colonoscopy 2013    normal per patient   • History of mammogram 2013    normal per patient   • History of uterine leiomyoma    • History of varicella    • Hypertension    • Hypothyroidism    • Joint pain    • Low back pain    • Lumbosacral disc disease    • Neck pain    • Osteoarthritis    • Tattoos     HIV neg 2012 per patient   • Tobacco abuse    • Uterine fibromyoma          Review of Systems   Constitutional: Positive for activity change, appetite change and chills.   HENT: Negative.    Eyes: Negative.    Respiratory: Negative.    Cardiovascular: Positive for palpitations.   Gastrointestinal: Negative.    Endocrine: Negative.    Genitourinary: Negative.    Musculoskeletal: Positive for arthralgias, back pain, neck pain and neck stiffness.   Skin: Negative.    Allergic/Immunologic: Negative.    Neurological: Positive for numbness.   Hematological: Negative.    Psychiatric/Behavioral: Negative.        The following portions of the patient's history were reviewed and updated as appropriate: allergies, current medications, past family history, past medical history, past social history, past surgical history and problem list.    Objective   BP (!) 188/99  Pulse 68  Ht 67\" (170.2 cm)  Wt 256 lb (116 kg)  BMI 40.1 kg/m2    Physical Exam:   GENERAL: Body habitus: morbidly obese    Lower extremity edema: Left: 1+ pitting; Right: 1+ pitting    Varicose veins:  Left: mild; Right: mild    Gait: antalgic     Mental Status:  awake and alert; oriented to person, place, " and time    Voice:  clear  SKIN:  Normal    Hair Growth:  Right:normal; Left:  normal  HEENT: Head: Normocephalic, no lesions, without obvious abnormality.     Eyes: sclera anicteric  PULM:  Repiratory effort normal    Ortho Exam  Peripheral Vascular:    Upper Extremity:   Inspection:  Left--no cyanotic nail beds Right--no cyanotic nail beds   Bilateral:  Pink nail beds with brisk capillary refill   Palpation:  Bilateral radial pulse normal    Musculoskeletal:  Global Assessment:  Overall assessment of Lower Extremity Muscle Strength and Tone:  Left quadriceps--5/5   Left hamstrings--5/5       Left tibialis anterior--5/5  Left gastroc-soleus--5/5  Left EHL--5/5    Right quadriceps--5/5  Right hamstrings--5/5  Right tibialis anterior--5/5  Right gastroc soleus--5/5  Right EHL--5/5    Lower Extremity:  Knee/Patella:  No digital clubbing or cyanosis.    Examination of left and right knees reveals:  Normal deep tendon reflexes, coordination, strength, tone, sensation.  No known fractures or deformities.    Inspection and Palpation:    Left knee:  Tenderness: none  Effusion:  none  Crepitus:  positive  Pulses:  2+  Ecchymosis:  None  Warmth:  None     Right knee:  Tenderness:  Diffuse generalized tenderness  Effusion:  none  Crepitus:  positive  Pulses:  2+  Ecchymosis:  None  Warmth:  None     ROM:  Right:  Extension:0    Flexion:120  Left:  Extension:5     Flexion:120    Instability:    Left:  Lachman Test:  Negative, Varus stress test negative, Valgus stress test negative   Anterior Drawer Test:  Negative, Posterior Drawer Test:  Negative    Right:  Lachman Test:  Negative, Varus stress test negative, Valgus stress test negative   Anterior Drawer Test:  Negative, Posterior Drawer Test:  Negative    Deformities/Malalignments/Discrepancies:    Left:  none  Right:  none    Functional Testing:  Right:  Karen's test:  Negative  Patella grind test:  Negative  Q-angle:  Normal  Apprehension Sign:   Negative      Left:  Karen's test:  Negative  Patella grind test:  Negative  Q-angle:  Normal  Apprehension Sign:  Negative    SENSATION TO LIGHT TOUCH:  DEEP PERONEAL/SUPERFICIAL PERONEAL/SURAL/SAPHENOUS/TIBIAL:   Left intact; Right intact          Medical Decision Making    Data Review:   ordered and reviewed x-rays today    Assessment and Plan/ Diagnosis/Treatment options:   Right knee arthritis.  I reviewed x-rays, clinical findings, past and current treatment with the patient.  On exam, she has diffuse tenderness about the right knee with no effusion, warmth or evidence of ligament or meniscal pathology.  X-rays show moderate valgus arthritis and patellofemoral arthritis.  I reassured her there is nothing more worrisome on x-ray with no fracture.  I think her pain and functional limitations are secondary to knee arthritis.  I explained to her that arthritis can cause it to slight constant aching pain, it is not uncommon.  The pain can wax and wane as well.  We discussed treatment options including good shoe wear, ice, NSAIDs, low impact exercise, knee/hip strengthening, weight loss and the 4 fold multiplier on the joint, steroid injection, viscosupplementation and eventually knee replacement.  She's been treated by  with both steroid injection and as well as HA injections.  She reports steroid injection only gave her about 3 weeks relief.  The last injection was 2 months ago.  She has used a cane as well as tried multiple anti-inflammatories and gabapentin.   also gave her brace which she did not think helped her pain.  I explained to her that it is too early for repeat steroid injection.  I encouraged her to continue her anti-inflammatory and weight loss.  I encouraged her to ice the knee and start using a cane.  We discussed that if her pain persists despite all these conservative treatments, she may do need to consider knee replacement which is what  had told her as well.  Recommendation today is  that she continue with the above regimen and I will see her back in approximately 1 month for repeat evaluation and repeat steroid injection if indicated.

## 2017-10-31 ENCOUNTER — OFFICE VISIT (OUTPATIENT)
Dept: INTERNAL MEDICINE | Facility: CLINIC | Age: 47
End: 2017-10-31

## 2017-10-31 VITALS
DIASTOLIC BLOOD PRESSURE: 110 MMHG | SYSTOLIC BLOOD PRESSURE: 158 MMHG | HEART RATE: 80 BPM | TEMPERATURE: 98.6 F | WEIGHT: 257 LBS | BODY MASS INDEX: 40.25 KG/M2 | RESPIRATION RATE: 20 BRPM

## 2017-10-31 DIAGNOSIS — F32.89 OTHER DEPRESSION: ICD-10-CM

## 2017-10-31 DIAGNOSIS — M25.561 ACUTE PAIN OF RIGHT KNEE: ICD-10-CM

## 2017-10-31 DIAGNOSIS — J06.9 UPPER RESPIRATORY TRACT INFECTION, UNSPECIFIED TYPE: ICD-10-CM

## 2017-10-31 DIAGNOSIS — I10 ESSENTIAL HYPERTENSION: ICD-10-CM

## 2017-10-31 DIAGNOSIS — G89.4 CHRONIC PAIN SYNDROME: Primary | ICD-10-CM

## 2017-10-31 PROCEDURE — 99214 OFFICE O/P EST MOD 30 MIN: CPT | Performed by: INTERNAL MEDICINE

## 2017-10-31 RX ORDER — DULOXETIN HYDROCHLORIDE 60 MG/1
60 CAPSULE, DELAYED RELEASE ORAL NIGHTLY
Qty: 30 CAPSULE | Refills: 5 | Status: SHIPPED | OUTPATIENT
Start: 2017-10-31 | End: 2018-10-15

## 2017-10-31 RX ORDER — AMOXICILLIN AND CLAVULANATE POTASSIUM 875; 125 MG/1; MG/1
1 TABLET, FILM COATED ORAL 2 TIMES DAILY
Qty: 20 TABLET | Refills: 0 | Status: SHIPPED | OUTPATIENT
Start: 2017-10-31 | End: 2017-11-10

## 2017-10-31 RX ORDER — LISINOPRIL 20 MG/1
20 TABLET ORAL NIGHTLY
Qty: 30 TABLET | Refills: 5 | Status: SHIPPED | OUTPATIENT
Start: 2017-10-31 | End: 2018-08-14 | Stop reason: SDUPTHER

## 2017-10-31 RX ORDER — GABAPENTIN 300 MG/1
300 CAPSULE ORAL 3 TIMES DAILY
COMMUNITY
End: 2017-11-13 | Stop reason: SDUPTHER

## 2017-10-31 RX ORDER — ETODOLAC 200 MG/1
200 CAPSULE ORAL EVERY 8 HOURS PRN
Qty: 50 CAPSULE | Refills: 1 | Status: SHIPPED | OUTPATIENT
Start: 2017-10-31 | End: 2017-12-16

## 2017-10-31 NOTE — PROGRESS NOTES
Subjective       Ernestina Garrido is a 47 y.o. female.     Chief Complaint   Patient presents with   • URI     X 5 DAYS    • Back Pain     LOW BACK  X 2 WEEKS        History obtained from the patient.    Her Hypertension is unstable.    Medication(s): Lisinopril.  The patient is adherent with her medication regimen. She denies other medication side effects.       Interval Events:   She is on Sudafed.      Symptoms:  Stable fatigue and lower extremity edema.   Has myalgias with her URI.    Denies chronic chest pain,  shortness of breath, and MAIN.  Denies intermittent leg claudication,  denies visual impairment, and denies muscle weakness.   Associated symptoms include no weight changes.    Has had occasional headaches.   No polydipsia, no polyuria, no memory issues, no palpitations, no syncope,  no orthopnea, no PND, and no focal neurologic deficits.       Lifestyle and Disease Management: Diet: She consumes a diverse and healthy diet. Weight Issues: She has weight concerns. Exercise: She has not been exercising regularly.  Smoking: She does not use tobacco. She quit smoking 11/6/16.         URI    This is a new problem. Episode onset: 5-6 days ago. Associated symptoms include congestion, coughing (wet, productive oy yellow sputum, no hemoptysis), diarrhea, headaches, rhinorrhea (clear) and sinus pain. Pertinent negatives include no abdominal pain, chest pain, ear pain, joint pain, joint swelling, nausea, rash, sneezing, sore throat, swollen glands, vomiting or wheezing. She has tried NSAIDs (Sudafed) for the symptoms. The treatment provided mild relief.   Back Pain   This is a chronic problem. Episode onset: worse x 2 weeks. The problem occurs constantly. The problem has been gradually worsening since onset. The pain is present in the lumbar spine. The quality of the pain is described as aching. Radiates to: to both legs. The pain is severe. The pain is worse during the night. The symptoms are aggravated by lying  down. Stiffness is present: No. Associated symptoms include headaches. Pertinent negatives include no abdominal pain, chest pain or fever. Risk factors include lack of exercise. Treatments tried: on Lodine and Neurontin. The treatment provided moderate relief.      She was seen at [facility] and her Neurontin dose was increased.      The following portions of the patient's history were reviewed and updated as appropriate: allergies, current medications, past family history, past medical history, past social history, past surgical history and problem list.      Review of Systems   Constitutional: Positive for chills and fatigue. Negative for fever and unexpected weight change.   HENT: Positive for congestion, rhinorrhea (clear), sinus pain and sinus pressure. Negative for ear pain, postnasal drip, sneezing, sore throat and voice change.    Eyes: Positive for discharge (watery). Negative for pain, redness, itching and visual disturbance.   Respiratory: Positive for cough (wet, productive oy yellow sputum, no hemoptysis) and chest tightness. Negative for shortness of breath and wheezing.    Cardiovascular: Positive for leg swelling. Negative for chest pain and palpitations.        No MAIN, orthopnea, or claudication.   Gastrointestinal: Positive for diarrhea. Negative for abdominal pain, nausea and vomiting.   Endocrine: Negative for polydipsia and polyuria.   Musculoskeletal: Positive for back pain. Negative for arthralgias, joint pain and myalgias.   Skin: Negative for rash.   Neurological: Positive for light-headedness and headaches. Negative for dizziness and syncope.        No memory issues.   Hematological: Negative for adenopathy.   Psychiatric/Behavioral: Positive for sleep disturbance. Negative for decreased concentration. The patient is nervous/anxious (and depression).          Blood pressure (!) 158/110, pulse 80, temperature 98.6 °F (37 °C), temperature source Temporal Artery , resp. rate 20, weight  257 lb (117 kg), not currently breastfeeding.      Objective     Physical Exam   Constitutional:   Morbidly obese.   HENT:   Head: Normocephalic and atraumatic.   Right Ear: Tympanic membrane, external ear and ear canal normal.   Left Ear: Tympanic membrane, external ear and ear canal normal.   Mouth/Throat: Oropharynx is clear and moist and mucous membranes are normal. No oral lesions.   Tonsils normal.  There is bilateral frontal and maxillary sinus tenderness to palpation.   Eyes: Conjunctivae are normal.   Neck: Normal range of motion. Neck supple.   Cardiovascular: Normal rate and regular rhythm.    No murmur heard.  Pulmonary/Chest: Effort normal and breath sounds normal.   Lymphadenopathy:     She has no cervical adenopathy (there is tenderness to palpation in the anterior cervical area).   Skin: No rash noted.   Psychiatric: She has a normal mood and affect.   Nursing note and vitals reviewed.    Counseling was given to patient for the following topics: appropriate exercise, discussed labs and diagnostic tests performed last visit or since last visit, healthy eating habits, importance of medication compliance, side effects of medications, stress increase in the patient's life, symptoms of anxiety and symptoms of depression . Total time of the encounter was 25 minutes and 20 minutes was spent counseling.      Assessment/Plan   Ernestina was seen today for uri and back pain.    Diagnoses and all orders for this visit:    Chronic pain syndrome  -     etodolac (LODINE) 200 MG capsule; Take 1 capsule by mouth Every 8 (Eight) Hours As Needed (pain).  -     DULoxetine (CYMBALTA) 60 MG capsule; Take 1 capsule by mouth Every Night.    Essential hypertension  -     lisinopril (PRINIVIL,ZESTRIL) 20 MG tablet; Take 1 tablet by mouth Every Night.    Upper respiratory tract infection, unspecified type  -     amoxicillin-clavulanate (AUGMENTIN) 875-125 MG per tablet; Take 1 tablet by mouth 2 (Two) Times a Day for 10  days.    Other depression  -     DULoxetine (CYMBALTA) 60 MG capsule; Take 1 capsule by mouth Every Night.    Acute pain of right knee  -     etodolac (LODINE) 200 MG capsule; Take 1 capsule by mouth Every 8 (Eight) Hours As Needed (pain).         No Aleve or Ibuprofen while on the Etodalac.  May take Tylenol.      Return in about 2 weeks (around 11/14/2017) for Recheck-BP and back pain, no-fasting.

## 2017-11-01 ENCOUNTER — APPOINTMENT (OUTPATIENT)
Dept: CT IMAGING | Facility: HOSPITAL | Age: 47
End: 2017-11-01

## 2017-11-01 ENCOUNTER — HOSPITAL ENCOUNTER (EMERGENCY)
Facility: HOSPITAL | Age: 47
Discharge: HOME | End: 2017-11-01
Attending: EMERGENCY MEDICINE | Admitting: EMERGENCY MEDICINE

## 2017-11-01 VITALS
WEIGHT: 257 LBS | OXYGEN SATURATION: 100 % | TEMPERATURE: 97.9 F | DIASTOLIC BLOOD PRESSURE: 112 MMHG | SYSTOLIC BLOOD PRESSURE: 182 MMHG | BODY MASS INDEX: 40.34 KG/M2 | HEART RATE: 59 BPM | HEIGHT: 67 IN | RESPIRATION RATE: 20 BRPM

## 2017-11-01 DIAGNOSIS — K52.9 GASTROENTERITIS: Primary | ICD-10-CM

## 2017-11-01 DIAGNOSIS — R11.2 NON-INTRACTABLE VOMITING WITH NAUSEA, UNSPECIFIED VOMITING TYPE: ICD-10-CM

## 2017-11-01 DIAGNOSIS — R19.7 DIARRHEA, UNSPECIFIED TYPE: ICD-10-CM

## 2017-11-01 DIAGNOSIS — K80.20 GALLSTONES: ICD-10-CM

## 2017-11-01 LAB
ALBUMIN SERPL-MCNC: 4.3 G/DL (ref 3.2–4.8)
ALBUMIN/GLOB SERPL: 1.2 G/DL (ref 1.5–2.5)
ALP SERPL-CCNC: 77 U/L (ref 25–100)
ALT SERPL W P-5'-P-CCNC: 16 U/L (ref 7–40)
ANION GAP SERPL CALCULATED.3IONS-SCNC: 6 MMOL/L (ref 3–11)
AST SERPL-CCNC: 15 U/L (ref 0–33)
BASOPHILS # BLD AUTO: 0.01 10*3/MM3 (ref 0–0.2)
BASOPHILS NFR BLD AUTO: 0.1 % (ref 0–1)
BILIRUB SERPL-MCNC: 0.8 MG/DL (ref 0.3–1.2)
BILIRUB UR QL STRIP: ABNORMAL
BUN BLD-MCNC: 9 MG/DL (ref 9–23)
BUN/CREAT SERPL: 12.9 (ref 7–25)
CALCIUM SPEC-SCNC: 9.2 MG/DL (ref 8.7–10.4)
CHLORIDE SERPL-SCNC: 106 MMOL/L (ref 99–109)
CLARITY UR: CLEAR
CO2 SERPL-SCNC: 28 MMOL/L (ref 20–31)
COLOR UR: YELLOW
CREAT BLD-MCNC: 0.7 MG/DL (ref 0.6–1.3)
DEPRECATED RDW RBC AUTO: 43.7 FL (ref 37–54)
EOSINOPHIL # BLD AUTO: 0.07 10*3/MM3 (ref 0–0.3)
EOSINOPHIL NFR BLD AUTO: 0.7 % (ref 0–3)
ERYTHROCYTE [DISTWIDTH] IN BLOOD BY AUTOMATED COUNT: 13 % (ref 11.3–14.5)
GFR SERPL CREATININE-BSD FRML MDRD: 109 ML/MIN/1.73
GLOBULIN UR ELPH-MCNC: 3.5 GM/DL
GLUCOSE BLD-MCNC: 216 MG/DL (ref 70–100)
GLUCOSE UR STRIP-MCNC: ABNORMAL MG/DL
HCT VFR BLD AUTO: 43.3 % (ref 34.5–44)
HGB BLD-MCNC: 14.4 G/DL (ref 11.5–15.5)
HGB UR QL STRIP.AUTO: NEGATIVE
IMM GRANULOCYTES # BLD: 0.02 10*3/MM3 (ref 0–0.03)
IMM GRANULOCYTES NFR BLD: 0.2 % (ref 0–0.6)
KETONES UR QL STRIP: ABNORMAL
LEUKOCYTE ESTERASE UR QL STRIP.AUTO: NEGATIVE
LIPASE SERPL-CCNC: 24 U/L (ref 6–51)
LYMPHOCYTES # BLD AUTO: 1.95 10*3/MM3 (ref 0.6–4.8)
LYMPHOCYTES NFR BLD AUTO: 19.7 % (ref 24–44)
MCH RBC QN AUTO: 30.6 PG (ref 27–31)
MCHC RBC AUTO-ENTMCNC: 33.3 G/DL (ref 32–36)
MCV RBC AUTO: 91.9 FL (ref 80–99)
MONOCYTES # BLD AUTO: 0.44 10*3/MM3 (ref 0–1)
MONOCYTES NFR BLD AUTO: 4.5 % (ref 0–12)
NEUTROPHILS # BLD AUTO: 7.39 10*3/MM3 (ref 1.5–8.3)
NEUTROPHILS NFR BLD AUTO: 74.8 % (ref 41–71)
NITRITE UR QL STRIP: NEGATIVE
PH UR STRIP.AUTO: 6.5 [PH] (ref 5–8)
PLATELET # BLD AUTO: 164 10*3/MM3 (ref 150–450)
PMV BLD AUTO: 11.2 FL (ref 6–12)
POTASSIUM BLD-SCNC: 3.6 MMOL/L (ref 3.5–5.5)
PROT SERPL-MCNC: 7.8 G/DL (ref 5.7–8.2)
PROT UR QL STRIP: NEGATIVE
RBC # BLD AUTO: 4.71 10*6/MM3 (ref 3.89–5.14)
SODIUM BLD-SCNC: 140 MMOL/L (ref 132–146)
SP GR UR STRIP: 1.01 (ref 1–1.03)
UROBILINOGEN UR QL STRIP: ABNORMAL
WBC NRBC COR # BLD: 9.88 10*3/MM3 (ref 3.5–10.8)

## 2017-11-01 PROCEDURE — 99284 EMERGENCY DEPT VISIT MOD MDM: CPT

## 2017-11-01 PROCEDURE — 25010000002 ONDANSETRON PER 1 MG: Performed by: EMERGENCY MEDICINE

## 2017-11-01 PROCEDURE — 96375 TX/PRO/DX INJ NEW DRUG ADDON: CPT

## 2017-11-01 PROCEDURE — 96374 THER/PROPH/DIAG INJ IV PUSH: CPT

## 2017-11-01 PROCEDURE — 25010000002 ONDANSETRON PER 1 MG

## 2017-11-01 PROCEDURE — 74176 CT ABD & PELVIS W/O CONTRAST: CPT

## 2017-11-01 PROCEDURE — 96361 HYDRATE IV INFUSION ADD-ON: CPT

## 2017-11-01 PROCEDURE — 81003 URINALYSIS AUTO W/O SCOPE: CPT | Performed by: EMERGENCY MEDICINE

## 2017-11-01 PROCEDURE — 80053 COMPREHEN METABOLIC PANEL: CPT | Performed by: EMERGENCY MEDICINE

## 2017-11-01 PROCEDURE — 83690 ASSAY OF LIPASE: CPT | Performed by: EMERGENCY MEDICINE

## 2017-11-01 PROCEDURE — 96376 TX/PRO/DX INJ SAME DRUG ADON: CPT

## 2017-11-01 PROCEDURE — 85025 COMPLETE CBC W/AUTO DIFF WBC: CPT | Performed by: EMERGENCY MEDICINE

## 2017-11-01 PROCEDURE — 25010000002 MORPHINE PER 10 MG: Performed by: EMERGENCY MEDICINE

## 2017-11-01 PROCEDURE — 93005 ELECTROCARDIOGRAM TRACING: CPT

## 2017-11-01 RX ORDER — ONDANSETRON 2 MG/ML
4 INJECTION INTRAMUSCULAR; INTRAVENOUS ONCE
Status: COMPLETED | OUTPATIENT
Start: 2017-11-01 | End: 2017-11-01

## 2017-11-01 RX ORDER — MORPHINE SULFATE 4 MG/ML
4 INJECTION, SOLUTION INTRAMUSCULAR; INTRAVENOUS ONCE
Status: COMPLETED | OUTPATIENT
Start: 2017-11-01 | End: 2017-11-01

## 2017-11-01 RX ORDER — SODIUM CHLORIDE 0.9 % (FLUSH) 0.9 %
10 SYRINGE (ML) INJECTION AS NEEDED
Status: DISCONTINUED | OUTPATIENT
Start: 2017-11-01 | End: 2017-11-01 | Stop reason: HOSPADM

## 2017-11-01 RX ORDER — HYDROCODONE BITARTRATE AND ACETAMINOPHEN 5; 325 MG/1; MG/1
1 TABLET ORAL EVERY 6 HOURS PRN
Qty: 12 TABLET | Refills: 0 | Status: SHIPPED | OUTPATIENT
Start: 2017-11-01 | End: 2017-11-13

## 2017-11-01 RX ORDER — ONDANSETRON 4 MG/1
4 TABLET, ORALLY DISINTEGRATING ORAL EVERY 6 HOURS PRN
Qty: 20 TABLET | Refills: 0 | Status: SHIPPED | OUTPATIENT
Start: 2017-11-01 | End: 2017-11-06

## 2017-11-01 RX ORDER — PROMETHAZINE HYDROCHLORIDE 25 MG/1
25 SUPPOSITORY RECTAL EVERY 6 HOURS PRN
Qty: 10 SUPPOSITORY | Refills: 0 | Status: SHIPPED | OUTPATIENT
Start: 2017-11-01 | End: 2017-12-28

## 2017-11-01 RX ADMIN — ONDANSETRON 4 MG: 2 INJECTION INTRAMUSCULAR; INTRAVENOUS at 08:38

## 2017-11-01 RX ADMIN — SODIUM CHLORIDE 1000 ML: 9 INJECTION, SOLUTION INTRAVENOUS at 08:50

## 2017-11-01 RX ADMIN — MORPHINE SULFATE 4 MG: 4 INJECTION, SOLUTION INTRAMUSCULAR; INTRAVENOUS at 12:37

## 2017-11-01 RX ADMIN — MORPHINE SULFATE 4 MG: 4 INJECTION, SOLUTION INTRAMUSCULAR; INTRAVENOUS at 08:49

## 2017-11-01 RX ADMIN — ONDANSETRON 4 MG: 2 INJECTION INTRAMUSCULAR; INTRAVENOUS at 07:05

## 2017-11-01 RX ADMIN — MORPHINE SULFATE 4 MG: 4 INJECTION, SOLUTION INTRAMUSCULAR; INTRAVENOUS at 10:25

## 2017-11-01 NOTE — ED PROVIDER NOTES
Subjective   HPI Comments: Ernestina Garrido is a 47 y.o.female with history of gallstones, hysterectomy, and  sections who presents to the ED with c/o abdominal pain which began at 0200. She reports being prescribed Augmentin yesterday for an URI. At 2230 last night she took the medication (without food) and went to sleep. She awakened at 0200 with severe epigastric pain radiating around to her mid back. Her pain worsens with deep breathing. She also c/o associated nausea, vomiting, and loose diarrhea but denies fevers or any other complaints at this time. She has no history of pancreatitis, cholecystectomy, appendectomy, alcohol use, or recent drug use. She does smoke marijuana occasionally but has not in several weeks.       Patient is a 47 y.o. female presenting with abdominal pain.   History provided by:  Patient  Abdominal Pain   Pain location:  Epigastric  Pain radiates to:  Back  Pain severity:  Severe  Onset quality:  Sudden  Timing:  Constant  Progression:  Unchanged  Chronicity:  New  Context: awakening from sleep and recent illness (Prescribed antibiotics for an URI yesterday )    Relieved by:  None tried  Worsened by:  Deep breathing  Ineffective treatments:  Movement  Associated symptoms: diarrhea, nausea and vomiting    Associated symptoms: no fever    Risk factors: obesity        Review of Systems   Constitutional: Negative for fever.   Gastrointestinal: Positive for abdominal pain, diarrhea, nausea and vomiting.   Musculoskeletal: Positive for back pain.   All other systems reviewed and are negative.      Past Medical History:   Diagnosis Date   • Atypical mole    • Cervical disc disorder    • Diabetes mellitus    • Encounter for Papanicolaou smear of cervix 2013    normal per patient   • Extremity pain    • Headache    • History of colonoscopy 2013    normal per patient   • History of mammogram 2013    normal per patient   • History of uterine leiomyoma    • History of varicella    •  Hypertension    • Hypothyroidism    • Joint pain    • Low back pain    • Lumbosacral disc disease    • Neck pain    • Osteoarthritis    • Tattoos     HIV neg  per patient   • Tobacco abuse    • Uterine fibromyoma        Allergies   Allergen Reactions   • Glimepiride Diarrhea       Past Surgical History:   Procedure Laterality Date   • ABDOMINAL SURGERY     • ANTERIOR CERVICAL DISCECTOMY  2016    C4-6 DISC (Dr. Pena)   • BACK SURGERY     •  SECTION      , ,    • HYSTERECTOMY  2015   • LUMBAR DISCECTOMY     • NECK SURGERY         Family History   Problem Relation Age of Onset   • Diabetes Mother    • Hypertension Mother    • Colon cancer Maternal Grandmother    • Diabetes Maternal Grandmother    • Hypertension Maternal Grandmother    • Diabetes Daughter    • Hypothyroidism Daughter    • Diabetes Maternal Uncle    • Hypertension Maternal Uncle    • Hypertension Other    • Breast cancer Neg Hx    • Ovarian cancer Neg Hx        Social History     Social History   • Marital status:      Spouse name: N/A   • Number of children: N/A   • Years of education: N/A     Social History Main Topics   • Smoking status: Former Smoker     Packs/day: 0.50     Years: 28.00     Types: Cigarettes     Start date:      Quit date: 2016   • Smokeless tobacco: Never Used   • Alcohol use Yes      Comment: occasional 1 a week   • Drug use: No   • Sexual activity: Defer     Other Topics Concern   • None     Social History Narrative         Objective   Physical Exam   Constitutional: She is oriented to person, place, and time. She appears well-developed and well-nourished. No distress.   Able to answer questions appropriately but is riving in pain and has difficulty answering questions due to moaning in pain.   HENT:   Head: Normocephalic and atraumatic.   Nose: Nose normal.   Eyes: Conjunctivae are normal. No scleral icterus.   Neck: Normal range of motion. Neck supple.   Cardiovascular:  Normal rate, regular rhythm and normal heart sounds.    No murmur heard.  Pulmonary/Chest: Effort normal and breath sounds normal. No respiratory distress.   Abdominal:   Due to patient riving in pain, unable to complete abdominal examination.    Musculoskeletal: Normal range of motion. She exhibits tenderness.   Mid back and bilateral flank tenderness to percussion.    Neurological: She is alert and oriented to person, place, and time.   Moves all four extremities normally and without difficulty.    Skin: Skin is warm and dry.   Nursing note and vitals reviewed.      Procedures         ED Course  ED Course   Comment By Time   Dr. Simpson updated the patient and her family on current findings and plan for discharge home. Discussed need of follow up with Gastroenterology for potential gallbladder removal once the patient is feeling well again. The patient and her family are understanding and agreeable with this plan. Cris Vasquez 11/01 1213     Recent Results (from the past 24 hour(s))   Comprehensive Metabolic Panel    Collection Time: 11/01/17  8:52 AM   Result Value Ref Range    Glucose 216 (H) 70 - 100 mg/dL    BUN 9 9 - 23 mg/dL    Creatinine 0.70 0.60 - 1.30 mg/dL    Sodium 140 132 - 146 mmol/L    Potassium 3.6 3.5 - 5.5 mmol/L    Chloride 106 99 - 109 mmol/L    CO2 28.0 20.0 - 31.0 mmol/L    Calcium 9.2 8.7 - 10.4 mg/dL    Total Protein 7.8 5.7 - 8.2 g/dL    Albumin 4.30 3.20 - 4.80 g/dL    ALT (SGPT) 16 7 - 40 U/L    AST (SGOT) 15 0 - 33 U/L    Alkaline Phosphatase 77 25 - 100 U/L    Total Bilirubin 0.8 0.3 - 1.2 mg/dL    eGFR  African Amer 109 >60 mL/min/1.73    Globulin 3.5 gm/dL    A/G Ratio 1.2 (L) 1.5 - 2.5 g/dL    BUN/Creatinine Ratio 12.9 7.0 - 25.0    Anion Gap 6.0 3.0 - 11.0 mmol/L   Lipase    Collection Time: 11/01/17  8:52 AM   Result Value Ref Range    Lipase 24 6 - 51 U/L   CBC Auto Differential    Collection Time: 11/01/17  8:52 AM   Result Value Ref Range    WBC 9.88 3.50 - 10.80 10*3/mm3     RBC 4.71 3.89 - 5.14 10*6/mm3    Hemoglobin 14.4 11.5 - 15.5 g/dL    Hematocrit 43.3 34.5 - 44.0 %    MCV 91.9 80.0 - 99.0 fL    MCH 30.6 27.0 - 31.0 pg    MCHC 33.3 32.0 - 36.0 g/dL    RDW 13.0 11.3 - 14.5 %    RDW-SD 43.7 37.0 - 54.0 fl    MPV 11.2 6.0 - 12.0 fL    Platelets 164 150 - 450 10*3/mm3    Neutrophil % 74.8 (H) 41.0 - 71.0 %    Lymphocyte % 19.7 (L) 24.0 - 44.0 %    Monocyte % 4.5 0.0 - 12.0 %    Eosinophil % 0.7 0.0 - 3.0 %    Basophil % 0.1 0.0 - 1.0 %    Immature Grans % 0.2 0.0 - 0.6 %    Neutrophils, Absolute 7.39 1.50 - 8.30 10*3/mm3    Lymphocytes, Absolute 1.95 0.60 - 4.80 10*3/mm3    Monocytes, Absolute 0.44 0.00 - 1.00 10*3/mm3    Eosinophils, Absolute 0.07 0.00 - 0.30 10*3/mm3    Basophils, Absolute 0.01 0.00 - 0.20 10*3/mm3    Immature Grans, Absolute 0.02 0.00 - 0.03 10*3/mm3   Urinalysis With / Culture If Indicated - Urine, Clean Catch    Collection Time: 11/01/17 10:03 AM   Result Value Ref Range    Color, UA Yellow Yellow, Straw    Appearance, UA Clear Clear    pH, UA 6.5 5.0 - 8.0    Specific Gravity, UA 1.014 1.001 - 1.030    Glucose,  mg/dL (1+) (A) Negative    Ketones, UA 15 mg/dL (1+) (A) Negative    Bilirubin, UA Small (1+) (A) Negative    Blood, UA Negative Negative    Protein, UA Negative Negative    Leuk Esterase, UA Negative Negative    Nitrite, UA Negative Negative    Urobilinogen, UA 0.2 E.U./dL 0.2 - 1.0 E.U./dL     Note: In addition to lab results from this visit, the labs listed above may include labs taken at another facility or during a different encounter within the last 24 hours. Please correlate lab times with ED admission and discharge times for further clarification of the services performed during this visit.    CT Abdomen Pelvis Without Contrast    (Results Pending)     Vitals:    11/01/17 0625   BP: (!) 182/112   BP Location: Right arm   Patient Position: Lying   Pulse: 59   Resp: 20   Temp: 97.9 °F (36.6 °C)   TempSrc: Oral   SpO2: 100%   Weight: 257 lb  "(117 kg)   Height: 67\" (170.2 cm)     Medications   sodium chloride 0.9 % flush 10 mL (not administered)   Morphine sulfate (PF) injection 4 mg (not administered)   ondansetron (ZOFRAN) injection 4 mg (4 mg Intravenous Given 11/1/17 0705)   sodium chloride 0.9 % bolus 1,000 mL (1,000 mL Intravenous New Bag 11/1/17 0850)   ondansetron (ZOFRAN) injection 4 mg (4 mg Intravenous Given 11/1/17 0838)   Morphine sulfate (PF) injection 4 mg (4 mg Intravenous Given 11/1/17 0849)   Morphine sulfate (PF) injection 4 mg (4 mg Intravenous Given 11/1/17 1025)     ECG/EMG Results (last 24 hours)     Procedure Component Value Units Date/Time    ECG 12 Lead [[id number]] Collected:  11/01/17 0629     Updated:  11/01/17 0739    Narrative:       Test Reason : epigastric pain  Blood Pressure : **/** mmHG  Vent. Rate : 058 BPM     Atrial Rate : 058 BPM     P-R Int : 196 ms          QRS Dur : 082 ms      QT Int : 434 ms       P-R-T Axes : 025 -03 040 degrees     QTc Int : 426 ms    Sinus bradycardia  Otherwise normal ECG  When compared with ECG of 21-JAN-2016 10:09,  No significant change was found  Confirmed by ADELINE HALL ([id number]) on 11/1/2017 7:38:50 AM    Referred By:  ISABEL           Confirmed By:ADELINE HALL                        MDM  Number of Diagnoses or Management Options  Diarrhea, unspecified type: new and requires workup  Gallstones: new and requires workup  Gastroenteritis: new and requires workup  Non-intractable vomiting with nausea, unspecified vomiting type: new and requires workup  Diagnosis management comments: CT scan shows the arty known gallstones, but there is no signs of acute cholecystitis.  Liver enzymes, and alkaline phosphatase are normal.  Patient's white blood cell count is normal.    There is no significant lab abnormalities.  Patient has a known history of diabetes, blood sugar is elevated consistent with patient's known baseline.    Patient's symptoms are felt to be secondary to a viral " gastroenteritis.  Her nausea and vomiting, and diarrhea were both improved while here in ER.    Patient was given IV fluids.     Will DC with advise to rest and drink plenty of fluids. Will give lortab for pain,  zofran PO and phenergan AL to help control nausea/vomiting.     Advised to stop recently prescribed augmentin.     Followup with PCP for repeat evaluation in 2-3 days.        Amount and/or Complexity of Data Reviewed  Clinical lab tests: ordered and reviewed  Tests in the radiology section of CPT®: ordered and reviewed  Decide to obtain previous medical records or to obtain history from someone other than the patient: yes  Obtain history from someone other than the patient: yes  Review and summarize past medical records: yes  Independent visualization of images, tracings, or specimens: yes    Patient Progress  Patient progress: stable      Final diagnoses:   Gastroenteritis   Non-intractable vomiting with nausea, unspecified vomiting type   Diarrhea, unspecified type   Gallstones       Documentation assistance provided by melodie Vasquez.  Information recorded by the scribe was done at my direction and has been verified and validated by me.     Cris Vasquez  11/01/17 0818       Cris Vasquez  11/01/17 1210       John Simpson MD  11/01/17 1221

## 2017-11-01 NOTE — DISCHARGE INSTRUCTIONS
Push fluids to stay hydrated.    Stop taking Augmentin.    Follow up with Dr. Chang general surgery once you are feeling well in order to evaluate getting your gallbladder removed. Call for appointment.     Follow up with Dr. Tanner next available. Call for appointment.     Immediately return to the ED for any concerns or worsening symptoms.

## 2017-11-13 ENCOUNTER — OFFICE VISIT (OUTPATIENT)
Dept: INTERNAL MEDICINE | Facility: CLINIC | Age: 47
End: 2017-11-13

## 2017-11-13 VITALS
DIASTOLIC BLOOD PRESSURE: 102 MMHG | RESPIRATION RATE: 20 BRPM | HEART RATE: 78 BPM | WEIGHT: 256.5 LBS | SYSTOLIC BLOOD PRESSURE: 150 MMHG | TEMPERATURE: 97.7 F | BODY MASS INDEX: 40.17 KG/M2

## 2017-11-13 DIAGNOSIS — G89.4 CHRONIC PAIN SYNDROME: ICD-10-CM

## 2017-11-13 DIAGNOSIS — K21.9 GASTROESOPHAGEAL REFLUX DISEASE WITHOUT ESOPHAGITIS: ICD-10-CM

## 2017-11-13 DIAGNOSIS — K80.20 GALLSTONES: ICD-10-CM

## 2017-11-13 DIAGNOSIS — I10 ESSENTIAL HYPERTENSION: Primary | ICD-10-CM

## 2017-11-13 DIAGNOSIS — R10.33 PERIUMBILICAL ABDOMINAL PAIN: ICD-10-CM

## 2017-11-13 PROCEDURE — 99214 OFFICE O/P EST MOD 30 MIN: CPT | Performed by: INTERNAL MEDICINE

## 2017-11-13 RX ORDER — HYDROCHLOROTHIAZIDE 50 MG/1
50 TABLET ORAL DAILY
Qty: 30 TABLET | Refills: 5 | Status: SHIPPED | OUTPATIENT
Start: 2017-11-13 | End: 2018-08-14 | Stop reason: SDUPTHER

## 2017-11-13 RX ORDER — GABAPENTIN 400 MG/1
400 CAPSULE ORAL 3 TIMES DAILY
Qty: 90 CAPSULE | Refills: 0 | Status: SHIPPED | OUTPATIENT
Start: 2017-11-13 | End: 2017-12-28

## 2017-11-13 RX ORDER — OMEPRAZOLE 20 MG/1
20 CAPSULE, DELAYED RELEASE ORAL DAILY
Qty: 30 CAPSULE | Refills: 2 | Status: SHIPPED | OUTPATIENT
Start: 2017-11-13 | End: 2017-11-20 | Stop reason: SDUPTHER

## 2017-11-13 NOTE — PROGRESS NOTES
Subjective       Ernestina Garrido is a 47 y.o. female.     Chief Complaint   Patient presents with   • Hypertension     2 week follow up    • Back Pain     2 week follow up    • GI Problem     [facility] follow up 11/1/2017       History obtained from the patient.      Her Hypertension is unstable.    Medication(s): Lisinopril.  The patient is adherent with her medication regimen. She denies other medication side effects.       Interval Events:   Lisinopril was increased to 20 mg daily on 11/1/17.    Symptoms:  Stable fatigue, lower extremity edema, myalgias.   Denies chronic chest pain,  shortness of breath, and MAIN.  Denies intermittent leg claudication,  denies visual impairment, and denies muscle weakness.   Associated symptoms include no weight changes.  Has had occasional headaches.   No polydipsia, no polyuria, no memory issues, no palpitations, no syncope,  no orthopnea, no PND, and no focal neurologic deficits.       Lifestyle and Disease Management: Diet: She consumes a diverse and healthy diet, but has not followed it for a few weeks due to illness.. Weight Issues: She has weight concerns. Exercise: She has not been exercising regularly.  Smoking: She does not use tobacco. She quit smoking 11/6/16.        Back Pain Follow-up:  This is a chronic problem, which is stable. The problem occurs constantly.  The pain is present in the lumbar spine. The quality of the pain is described as aching. Radiates to: to both legs. The pain is severe. The pain is worse during the night. The symptoms are aggravated by lying down. Stiffness is present: No. Associated symptoms include headaches. Pertinent negatives include no abdominal pain, chest pain,  or fever. Risk factors include lack of exercise.   Treatments tried: on Cymbalta, Lodine,  and Neurontin. The treatment provided moderate relief.      She sees Pain Management.    GI Problem   The primary symptoms include fatigue, abdominal pain (periumbilical),  "diarrhea (mild) and myalgias. Primary symptoms do not include fever, weight loss, nausea (resolved), vomiting (resolved), melena, hematemesis, hematochezia, dysuria, arthralgias or rash. Episode onset: 11/2/17. The onset was sudden. The problem has been gradually improving.   The illness is also significant for chills, bloating (\"gassy\") and back pain. The illness does not include dysphagia, odynophagia or constipation. Associated symptoms comments: Complains of heartburn.  . Significant associated medical issues include GERD and gallstones. Associated medical issues do not include inflammatory bowel disease, liver disease or irritable bowel syndrome. Associated medical issues comments: has been taking Gas-X and  leftover Omeprazole, which helped. Risk factors: no recent travel.      She states she did not finish the Augmentin due to the onset of vomiting and diarrhea.  She went to [facility] ER on 11/1/17.  Records have been received and reviewed.  She was diagnosed with a viral abdominal illness, and was put on Hydrocodone and Phenergan.  U/A was negative with the exception of glucose.  CMP , CBC, and Lipase were normal, with the exception of elevated glucose.    CT Abdomen / Pelvis (non-contrast):  IMPRESSION:  1. Completely decompressed appearance of the colon and decompressed  small bowel, consistent with history of diarrhea. No visible  inflammatory changes.  2. Numerous gallstones. No visible inflammatory change to suggest  cholecystitis.  3. Trace intrapelvic free fluid.    Current Outpatient Prescriptions on File Prior to Visit   Medication Sig Dispense Refill   • DULoxetine (CYMBALTA) 60 MG capsule Take 1 capsule by mouth Every Night. 30 capsule 5   • Empagliflozin (JARDIANCE) 10 MG tablet Take 10 mg by mouth Daily. 30 tablet 5   • etodolac (LODINE) 200 MG capsule Take 1 capsule by mouth Every 8 (Eight) Hours As Needed (pain). 50 capsule 1   • gabapentin (NEURONTIN) 300 MG capsule Take 300 mg by mouth 3 " "(Three) Times a Day.     • glucose blood test strip Test daily 50 each 12   • glucose monitor monitoring kit 1 each Daily. 1 each 0   • hydrochlorothiazide (HYDRODIURIL) 25 MG tablet Take 1 tablet by mouth Daily. 30 tablet 6   • Lancets 30G misc 1 Units Daily. 50 each 11   • levothyroxine (SYNTHROID, LEVOTHROID) 100 MCG tablet TAKE ONE TABLET BY MOUTH DAILY 30 tablet 3   • lisinopril (PRINIVIL,ZESTRIL) 20 MG tablet Take 1 tablet by mouth Every Night. 30 tablet 5   • metFORMIN (GLUCOPHAGE) 500 MG tablet Take 1 tablet by mouth 2 (Two) Times a Day With Meals. 60 tablet 5   • promethazine (PHENERGAN) 25 MG suppository Insert 1 suppository into the rectum Every 6 (Six) Hours As Needed for Nausea or Vomiting for up to 10 doses. 10 suppository 0   • [DISCONTINUED] HYDROcodone-acetaminophen (NORCO) 5-325 MG per tablet Take 1 tablet by mouth Every 6 (Six) Hours As Needed for Severe Pain  for up to 12 doses. 12 tablet 0     No current facility-administered medications on file prior to visit.          The following portions of the patient's history were reviewed and updated as appropriate: allergies, current medications, past family history, past medical history, past social history, past surgical history and problem list.    Review of Systems   Constitutional: Positive for appetite change (decreased), chills and fatigue. Negative for fever, unexpected weight change and weight loss.   Eyes: Negative for visual disturbance.   Respiratory: Positive for cough (residual from URI). Negative for shortness of breath and wheezing.    Cardiovascular: Positive for leg swelling. Negative for chest pain and palpitations.        No MAIN, orthopnea, or claudication.   Gastrointestinal: Positive for abdominal pain (periumbilical), bloating (\"gassy\") and diarrhea (mild). Negative for constipation, dysphagia, hematemesis, hematochezia, melena, nausea (resolved) and vomiting (resolved).   Endocrine: Negative for polydipsia and polyuria. "   Genitourinary: Negative for dysuria, frequency, hematuria, pelvic pain and urgency.   Musculoskeletal: Positive for back pain and myalgias. Negative for arthralgias.   Skin: Negative for rash.   Neurological: Positive for headaches. Negative for dizziness, syncope and light-headedness.        No memory issues.   Hematological: Negative for adenopathy.   Psychiatric/Behavioral: Negative for decreased concentration.         Objective       Blood pressure (!) 150/102, pulse 78, temperature 97.7 °F (36.5 °C), temperature source Temporal Artery , resp. rate 20, weight 256 lb 8 oz (116 kg), not currently breastfeeding.      Physical Exam   Constitutional:   Morbidly obese.   Neck: Normal range of motion. Neck supple. Carotid bruit is not present. No thyromegaly present.   Cardiovascular: Normal rate, regular rhythm, normal heart sounds and intact distal pulses.  Exam reveals no gallop and no friction rub.    No murmur heard.  No peripheral edema.   Pulmonary/Chest: Effort normal and breath sounds normal.   Abdominal: Soft. Bowel sounds are normal. She exhibits no distension, no abdominal bruit and no mass. There is no hepatosplenomegaly. There is tenderness (periumbilical). There is no rebound, no guarding and no CVA tenderness.   Psychiatric: She has a normal mood and affect.   Nursing note and vitals reviewed.      Assessment / Plan:    Ernestina was seen today for hypertension, back pain and gi problem.    Diagnoses and all orders for this visit:    Essential hypertension  -     hydrochlorothiazide (HYDRODIURIL) 50 MG tablet; Take 1 tablet by mouth Daily.    Chronic pain syndrome  -     gabapentin (NEURONTIN) 400 MG capsule; Take 1 capsule by mouth 3 (Three) Times a Day.    Periumbilical abdominal pain  -     US abdomen limited; Future    Gallstones  -     US abdomen limited; Future    Gastroesophageal reflux disease without esophagitis  -     omeprazole (PRILOSEC) 20 MG capsule; Take 1 capsule by mouth  Daily.      Return in about 2 weeks (around 11/27/2017) for Recheck-HTN fasting.

## 2017-11-20 ENCOUNTER — HOSPITAL ENCOUNTER (OUTPATIENT)
Dept: ULTRASOUND IMAGING | Facility: HOSPITAL | Age: 47
Discharge: HOME | End: 2017-11-20
Attending: INTERNAL MEDICINE | Admitting: INTERNAL MEDICINE

## 2017-11-20 ENCOUNTER — TELEPHONE (OUTPATIENT)
Dept: INTERNAL MEDICINE | Facility: CLINIC | Age: 47
End: 2017-11-20

## 2017-11-20 DIAGNOSIS — K80.20 GALLSTONES: ICD-10-CM

## 2017-11-20 DIAGNOSIS — R10.33 PERIUMBILICAL ABDOMINAL PAIN: ICD-10-CM

## 2017-11-20 DIAGNOSIS — K21.9 GASTROESOPHAGEAL REFLUX DISEASE WITHOUT ESOPHAGITIS: ICD-10-CM

## 2017-11-20 PROCEDURE — 76705 ECHO EXAM OF ABDOMEN: CPT

## 2017-11-20 RX ORDER — OMEPRAZOLE 20 MG/1
20 CAPSULE, DELAYED RELEASE ORAL 2 TIMES DAILY
Qty: 30 CAPSULE | Refills: 2
Start: 2017-11-20 | End: 2018-02-13 | Stop reason: SDUPTHER

## 2017-11-20 NOTE — TELEPHONE ENCOUNTER
----- Message from Merly Moya sent at 11/20/2017  2:16 PM EST -----  Contact: SELF  JAGJIT SAY CALLING, SHE SAID SHE IS STILL HAVING A LO OF PAIN IN HER CHEST AND STOMACH. SHE SAID SHE IS PASSING A LOT OF GAS AND BELCHING. SHE JUST HAD THE ULTRASOUND DONE BUT WANTS TO KNOW WHAT SHE SHOULD DO NOW, IF THERE IS SOMETHING SHE COULD TAKE OR IF SHE SHOULD GO TO THE ER. JAGJIT CAN BE REACHED [phone]

## 2017-11-24 ENCOUNTER — ANESTHESIA (OUTPATIENT)
Dept: PERIOP | Facility: HOSPITAL | Age: 47
End: 2017-11-24

## 2017-11-24 ENCOUNTER — ANESTHESIA EVENT (OUTPATIENT)
Dept: PERIOP | Facility: HOSPITAL | Age: 47
End: 2017-11-24

## 2017-11-24 ENCOUNTER — APPOINTMENT (OUTPATIENT)
Dept: GENERAL RADIOLOGY | Facility: HOSPITAL | Age: 47
End: 2017-11-24

## 2017-11-24 ENCOUNTER — HOSPITAL ENCOUNTER (OUTPATIENT)
Facility: HOSPITAL | Age: 47
Setting detail: OBSERVATION
Discharge: HOME | End: 2017-11-25
Attending: EMERGENCY MEDICINE | Admitting: SURGERY

## 2017-11-24 ENCOUNTER — APPOINTMENT (OUTPATIENT)
Dept: ULTRASOUND IMAGING | Facility: HOSPITAL | Age: 47
End: 2017-11-24

## 2017-11-24 DIAGNOSIS — K80.00 CALCULUS OF GALLBLADDER WITH ACUTE CHOLECYSTITIS WITHOUT OBSTRUCTION: ICD-10-CM

## 2017-11-24 DIAGNOSIS — K81.0 ACUTE CHOLECYSTITIS: Primary | ICD-10-CM

## 2017-11-24 LAB
ALBUMIN SERPL-MCNC: 4.6 G/DL (ref 3.2–4.8)
ALBUMIN/GLOB SERPL: 1.3 G/DL (ref 1.5–2.5)
ALP SERPL-CCNC: 78 U/L (ref 25–100)
ALT SERPL W P-5'-P-CCNC: 14 U/L (ref 7–40)
ANION GAP SERPL CALCULATED.3IONS-SCNC: 8 MMOL/L (ref 3–11)
AST SERPL-CCNC: 12 U/L (ref 0–33)
BASOPHILS # BLD AUTO: 0.01 10*3/MM3 (ref 0–0.2)
BASOPHILS NFR BLD AUTO: 0.1 % (ref 0–1)
BILIRUB SERPL-MCNC: 0.9 MG/DL (ref 0.3–1.2)
BNP SERPL-MCNC: 119 PG/ML (ref 0–100)
BUN BLD-MCNC: 9 MG/DL (ref 9–23)
BUN/CREAT SERPL: 12.9 (ref 7–25)
CALCIUM SPEC-SCNC: 9.3 MG/DL (ref 8.7–10.4)
CHLORIDE SERPL-SCNC: 97 MMOL/L (ref 99–109)
CO2 SERPL-SCNC: 30 MMOL/L (ref 20–31)
CREAT BLD-MCNC: 0.7 MG/DL (ref 0.6–1.3)
DEPRECATED RDW RBC AUTO: 43.9 FL (ref 37–54)
EOSINOPHIL # BLD AUTO: 0.05 10*3/MM3 (ref 0–0.3)
EOSINOPHIL NFR BLD AUTO: 0.4 % (ref 0–3)
ERYTHROCYTE [DISTWIDTH] IN BLOOD BY AUTOMATED COUNT: 13.1 % (ref 11.3–14.5)
GFR SERPL CREATININE-BSD FRML MDRD: 109 ML/MIN/1.73
GLOBULIN UR ELPH-MCNC: 3.6 GM/DL
GLUCOSE BLD-MCNC: 196 MG/DL (ref 70–100)
GLUCOSE BLDC GLUCOMTR-MCNC: 168 MG/DL (ref 70–130)
GLUCOSE BLDC GLUCOMTR-MCNC: 231 MG/DL (ref 70–130)
GLUCOSE BLDC GLUCOMTR-MCNC: 234 MG/DL (ref 70–130)
GLUCOSE BLDC GLUCOMTR-MCNC: 242 MG/DL (ref 70–130)
HCT VFR BLD AUTO: 46.4 % (ref 34.5–44)
HGB BLD-MCNC: 15.4 G/DL (ref 11.5–15.5)
HOLD SPECIMEN: NORMAL
HOLD SPECIMEN: NORMAL
IMM GRANULOCYTES # BLD: 0.03 10*3/MM3 (ref 0–0.03)
IMM GRANULOCYTES NFR BLD: 0.3 % (ref 0–0.6)
LIPASE SERPL-CCNC: 26 U/L (ref 6–51)
LYMPHOCYTES # BLD AUTO: 1.86 10*3/MM3 (ref 0.6–4.8)
LYMPHOCYTES NFR BLD AUTO: 16 % (ref 24–44)
MCH RBC QN AUTO: 30.4 PG (ref 27–31)
MCHC RBC AUTO-ENTMCNC: 33.2 G/DL (ref 32–36)
MCV RBC AUTO: 91.5 FL (ref 80–99)
MONOCYTES # BLD AUTO: 0.39 10*3/MM3 (ref 0–1)
MONOCYTES NFR BLD AUTO: 3.4 % (ref 0–12)
NEUTROPHILS # BLD AUTO: 9.25 10*3/MM3 (ref 1.5–8.3)
NEUTROPHILS NFR BLD AUTO: 79.8 % (ref 41–71)
PLATELET # BLD AUTO: 183 10*3/MM3 (ref 150–450)
PMV BLD AUTO: 11.2 FL (ref 6–12)
POTASSIUM BLD-SCNC: 3.5 MMOL/L (ref 3.5–5.5)
PROT SERPL-MCNC: 8.2 G/DL (ref 5.7–8.2)
RBC # BLD AUTO: 5.07 10*6/MM3 (ref 3.89–5.14)
SODIUM BLD-SCNC: 135 MMOL/L (ref 132–146)
TROPONIN I SERPL-MCNC: 0 NG/ML (ref 0–0.07)
WBC NRBC COR # BLD: 11.59 10*3/MM3 (ref 3.5–10.8)
WHOLE BLOOD HOLD SPECIMEN: NORMAL
WHOLE BLOOD HOLD SPECIMEN: NORMAL

## 2017-11-24 PROCEDURE — 84484 ASSAY OF TROPONIN QUANT: CPT

## 2017-11-24 PROCEDURE — 88304 TISSUE EXAM BY PATHOLOGIST: CPT | Performed by: SURGERY

## 2017-11-24 PROCEDURE — 83690 ASSAY OF LIPASE: CPT

## 2017-11-24 PROCEDURE — G0378 HOSPITAL OBSERVATION PER HR: HCPCS

## 2017-11-24 PROCEDURE — 25010000003 CEFAZOLIN IN DEXTROSE 2-4 GM/100ML-% SOLUTION: Performed by: SURGERY

## 2017-11-24 PROCEDURE — 82962 GLUCOSE BLOOD TEST: CPT

## 2017-11-24 PROCEDURE — 25010000002 HYDROMORPHONE PER 4 MG: Performed by: NURSE ANESTHETIST, CERTIFIED REGISTERED

## 2017-11-24 PROCEDURE — 25010000002 PROPOFOL 10 MG/ML EMULSION: Performed by: NURSE ANESTHETIST, CERTIFIED REGISTERED

## 2017-11-24 PROCEDURE — 76705 ECHO EXAM OF ABDOMEN: CPT

## 2017-11-24 PROCEDURE — 25010000002 ONDANSETRON PER 1 MG: Performed by: SURGERY

## 2017-11-24 PROCEDURE — 83880 ASSAY OF NATRIURETIC PEPTIDE: CPT

## 2017-11-24 PROCEDURE — 96374 THER/PROPH/DIAG INJ IV PUSH: CPT

## 2017-11-24 PROCEDURE — 80053 COMPREHEN METABOLIC PANEL: CPT

## 2017-11-24 PROCEDURE — 25010000002 FENTANYL CITRATE (PF) 100 MCG/2ML SOLUTION: Performed by: NURSE ANESTHETIST, CERTIFIED REGISTERED

## 2017-11-24 PROCEDURE — 63710000001 INSULIN LISPRO (HUMAN) PER 5 UNITS: Performed by: SURGERY

## 2017-11-24 PROCEDURE — 25010000002 ONDANSETRON PER 1 MG: Performed by: NURSE ANESTHETIST, CERTIFIED REGISTERED

## 2017-11-24 PROCEDURE — 25010000002 HYDROMORPHONE PER 4 MG: Performed by: EMERGENCY MEDICINE

## 2017-11-24 PROCEDURE — 96361 HYDRATE IV INFUSION ADD-ON: CPT

## 2017-11-24 PROCEDURE — 25010000002 HYDRALAZINE PER 20 MG: Performed by: NURSE ANESTHETIST, CERTIFIED REGISTERED

## 2017-11-24 PROCEDURE — 71010 HC CHEST PA OR AP: CPT

## 2017-11-24 PROCEDURE — 93005 ELECTROCARDIOGRAM TRACING: CPT | Performed by: EMERGENCY MEDICINE

## 2017-11-24 PROCEDURE — 25010000002 DEXAMETHASONE PER 1 MG: Performed by: NURSE ANESTHETIST, CERTIFIED REGISTERED

## 2017-11-24 PROCEDURE — 76000 FLUOROSCOPY <1 HR PHYS/QHP: CPT

## 2017-11-24 PROCEDURE — 0 IOPAMIDOL 61 % SOLUTION: Performed by: SURGERY

## 2017-11-24 PROCEDURE — 25010000002 NEOSTIGMINE 10 MG/10ML SOLUTION: Performed by: ANESTHESIOLOGY

## 2017-11-24 PROCEDURE — 85025 COMPLETE CBC W/AUTO DIFF WBC: CPT

## 2017-11-24 PROCEDURE — 25010000002 LORAZEPAM PER 2 MG: Performed by: EMERGENCY MEDICINE

## 2017-11-24 PROCEDURE — 25010000002 ONDANSETRON PER 1 MG: Performed by: EMERGENCY MEDICINE

## 2017-11-24 PROCEDURE — 99284 EMERGENCY DEPT VISIT MOD MDM: CPT

## 2017-11-24 PROCEDURE — 96372 THER/PROPH/DIAG INJ SC/IM: CPT

## 2017-11-24 RX ORDER — DEXAMETHASONE SODIUM PHOSPHATE 4 MG/ML
INJECTION, SOLUTION INTRA-ARTICULAR; INTRALESIONAL; INTRAMUSCULAR; INTRAVENOUS; SOFT TISSUE AS NEEDED
Status: DISCONTINUED | OUTPATIENT
Start: 2017-11-24 | End: 2017-11-24 | Stop reason: SURG

## 2017-11-24 RX ORDER — LORAZEPAM 2 MG/ML
0.5 INJECTION INTRAMUSCULAR ONCE
Status: COMPLETED | OUTPATIENT
Start: 2017-11-24 | End: 2017-11-24

## 2017-11-24 RX ORDER — LISINOPRIL 20 MG/1
20 TABLET ORAL NIGHTLY
Status: DISCONTINUED | OUTPATIENT
Start: 2017-11-24 | End: 2017-11-25 | Stop reason: HOSPADM

## 2017-11-24 RX ORDER — FAMOTIDINE 10 MG/ML
20 INJECTION, SOLUTION INTRAVENOUS ONCE
Status: COMPLETED | OUTPATIENT
Start: 2017-11-24 | End: 2017-11-24

## 2017-11-24 RX ORDER — ONDANSETRON 2 MG/ML
4 INJECTION INTRAMUSCULAR; INTRAVENOUS EVERY 6 HOURS PRN
Status: DISCONTINUED | OUTPATIENT
Start: 2017-11-24 | End: 2017-11-25 | Stop reason: HOSPADM

## 2017-11-24 RX ORDER — HYDROMORPHONE HYDROCHLORIDE 1 MG/ML
0.5 INJECTION, SOLUTION INTRAMUSCULAR; INTRAVENOUS; SUBCUTANEOUS ONCE
Status: DISCONTINUED | OUTPATIENT
Start: 2017-11-24 | End: 2017-11-24

## 2017-11-24 RX ORDER — SODIUM CHLORIDE 9 MG/ML
INJECTION, SOLUTION INTRAVENOUS AS NEEDED
Status: DISCONTINUED | OUTPATIENT
Start: 2017-11-24 | End: 2017-11-24 | Stop reason: HOSPADM

## 2017-11-24 RX ORDER — HYDROMORPHONE HYDROCHLORIDE 1 MG/ML
0.5 INJECTION, SOLUTION INTRAMUSCULAR; INTRAVENOUS; SUBCUTANEOUS ONCE
Status: COMPLETED | OUTPATIENT
Start: 2017-11-24 | End: 2017-11-24

## 2017-11-24 RX ORDER — ACETAMINOPHEN 325 MG/1
650 TABLET ORAL EVERY 4 HOURS PRN
Status: DISCONTINUED | OUTPATIENT
Start: 2017-11-24 | End: 2017-11-25 | Stop reason: HOSPADM

## 2017-11-24 RX ORDER — NICOTINE POLACRILEX 4 MG
15 LOZENGE BUCCAL
Status: DISCONTINUED | OUTPATIENT
Start: 2017-11-24 | End: 2017-11-25 | Stop reason: HOSPADM

## 2017-11-24 RX ORDER — SODIUM CHLORIDE 0.9 % (FLUSH) 0.9 %
1-10 SYRINGE (ML) INJECTION AS NEEDED
Status: DISCONTINUED | OUTPATIENT
Start: 2017-11-24 | End: 2017-11-24

## 2017-11-24 RX ORDER — PROMETHAZINE HYDROCHLORIDE 25 MG/1
25 SUPPOSITORY RECTAL ONCE AS NEEDED
Status: DISCONTINUED | OUTPATIENT
Start: 2017-11-24 | End: 2017-11-24

## 2017-11-24 RX ORDER — HYDRALAZINE HYDROCHLORIDE 20 MG/ML
5 INJECTION INTRAMUSCULAR; INTRAVENOUS
Status: DISCONTINUED | OUTPATIENT
Start: 2017-11-24 | End: 2017-11-24

## 2017-11-24 RX ORDER — OXYCODONE HYDROCHLORIDE AND ACETAMINOPHEN 5; 325 MG/1; MG/1
2 TABLET ORAL EVERY 4 HOURS PRN
Status: DISCONTINUED | OUTPATIENT
Start: 2017-11-24 | End: 2017-11-25 | Stop reason: HOSPADM

## 2017-11-24 RX ORDER — ONDANSETRON 2 MG/ML
4 INJECTION INTRAMUSCULAR; INTRAVENOUS ONCE AS NEEDED
Status: DISCONTINUED | OUTPATIENT
Start: 2017-11-24 | End: 2017-11-24

## 2017-11-24 RX ORDER — PROMETHAZINE HYDROCHLORIDE 25 MG/ML
6.25 INJECTION, SOLUTION INTRAMUSCULAR; INTRAVENOUS ONCE AS NEEDED
Status: DISCONTINUED | OUTPATIENT
Start: 2017-11-24 | End: 2017-11-24

## 2017-11-24 RX ORDER — CEFAZOLIN SODIUM 2 G/100ML
2 INJECTION, SOLUTION INTRAVENOUS ONCE
Status: COMPLETED | OUTPATIENT
Start: 2017-11-24 | End: 2017-11-24

## 2017-11-24 RX ORDER — ONDANSETRON 4 MG/1
4 TABLET, FILM COATED ORAL EVERY 6 HOURS PRN
Status: DISCONTINUED | OUTPATIENT
Start: 2017-11-24 | End: 2017-11-25 | Stop reason: HOSPADM

## 2017-11-24 RX ORDER — FENTANYL CITRATE 50 UG/ML
INJECTION, SOLUTION INTRAMUSCULAR; INTRAVENOUS AS NEEDED
Status: DISCONTINUED | OUTPATIENT
Start: 2017-11-24 | End: 2017-11-24 | Stop reason: SURG

## 2017-11-24 RX ORDER — ONDANSETRON 2 MG/ML
4 INJECTION INTRAMUSCULAR; INTRAVENOUS ONCE
Status: DISCONTINUED | OUTPATIENT
Start: 2017-11-24 | End: 2017-11-24

## 2017-11-24 RX ORDER — MORPHINE SULFATE 2 MG/ML
2 INJECTION, SOLUTION INTRAMUSCULAR; INTRAVENOUS
Status: DISCONTINUED | OUTPATIENT
Start: 2017-11-24 | End: 2017-11-25 | Stop reason: HOSPADM

## 2017-11-24 RX ORDER — HYDROMORPHONE HYDROCHLORIDE 1 MG/ML
0.5 INJECTION, SOLUTION INTRAMUSCULAR; INTRAVENOUS; SUBCUTANEOUS
Status: DISCONTINUED | OUTPATIENT
Start: 2017-11-24 | End: 2017-11-24

## 2017-11-24 RX ORDER — SODIUM CHLORIDE, SODIUM LACTATE, POTASSIUM CHLORIDE, CALCIUM CHLORIDE 600; 310; 30; 20 MG/100ML; MG/100ML; MG/100ML; MG/100ML
9 INJECTION, SOLUTION INTRAVENOUS CONTINUOUS
Status: DISCONTINUED | OUTPATIENT
Start: 2017-11-24 | End: 2017-11-24

## 2017-11-24 RX ORDER — FAMOTIDINE 20 MG/1
20 TABLET, FILM COATED ORAL ONCE
Status: DISCONTINUED | OUTPATIENT
Start: 2017-11-24 | End: 2017-11-24

## 2017-11-24 RX ORDER — LIDOCAINE HYDROCHLORIDE 10 MG/ML
INJECTION, SOLUTION EPIDURAL; INFILTRATION; INTRACAUDAL; PERINEURAL AS NEEDED
Status: DISCONTINUED | OUTPATIENT
Start: 2017-11-24 | End: 2017-11-24 | Stop reason: SURG

## 2017-11-24 RX ORDER — PROPOFOL 10 MG/ML
VIAL (ML) INTRAVENOUS AS NEEDED
Status: DISCONTINUED | OUTPATIENT
Start: 2017-11-24 | End: 2017-11-24 | Stop reason: SURG

## 2017-11-24 RX ORDER — ROCURONIUM BROMIDE 10 MG/ML
INJECTION, SOLUTION INTRAVENOUS AS NEEDED
Status: DISCONTINUED | OUTPATIENT
Start: 2017-11-24 | End: 2017-11-24 | Stop reason: SURG

## 2017-11-24 RX ORDER — ONDANSETRON 2 MG/ML
4 INJECTION INTRAMUSCULAR; INTRAVENOUS ONCE
Status: COMPLETED | OUTPATIENT
Start: 2017-11-24 | End: 2017-11-24

## 2017-11-24 RX ORDER — GABAPENTIN 400 MG/1
400 CAPSULE ORAL EVERY 8 HOURS SCHEDULED
Status: DISCONTINUED | OUTPATIENT
Start: 2017-11-24 | End: 2017-11-25 | Stop reason: HOSPADM

## 2017-11-24 RX ORDER — ASPIRIN 81 MG/1
324 TABLET, CHEWABLE ORAL ONCE
Status: DISCONTINUED | OUTPATIENT
Start: 2017-11-24 | End: 2017-11-24

## 2017-11-24 RX ORDER — MEPERIDINE HYDROCHLORIDE 25 MG/ML
12.5 INJECTION INTRAMUSCULAR; INTRAVENOUS; SUBCUTANEOUS
Status: COMPLETED | OUTPATIENT
Start: 2017-11-24 | End: 2017-11-24

## 2017-11-24 RX ORDER — HYDRALAZINE HYDROCHLORIDE 20 MG/ML
INJECTION INTRAMUSCULAR; INTRAVENOUS AS NEEDED
Status: DISCONTINUED | OUTPATIENT
Start: 2017-11-24 | End: 2017-11-24 | Stop reason: SURG

## 2017-11-24 RX ORDER — PROMETHAZINE HYDROCHLORIDE 25 MG/1
25 TABLET ORAL ONCE AS NEEDED
Status: DISCONTINUED | OUTPATIENT
Start: 2017-11-24 | End: 2017-11-24

## 2017-11-24 RX ORDER — HEPARIN SODIUM 5000 [USP'U]/ML
5000 INJECTION, SOLUTION INTRAVENOUS; SUBCUTANEOUS EVERY 8 HOURS SCHEDULED
Status: DISCONTINUED | OUTPATIENT
Start: 2017-11-25 | End: 2017-11-25 | Stop reason: HOSPADM

## 2017-11-24 RX ORDER — GLYCOPYRROLATE 0.2 MG/ML
INJECTION INTRAMUSCULAR; INTRAVENOUS AS NEEDED
Status: DISCONTINUED | OUTPATIENT
Start: 2017-11-24 | End: 2017-11-24 | Stop reason: SURG

## 2017-11-24 RX ORDER — SODIUM CHLORIDE, SODIUM LACTATE, POTASSIUM CHLORIDE, CALCIUM CHLORIDE 600; 310; 30; 20 MG/100ML; MG/100ML; MG/100ML; MG/100ML
150 INJECTION, SOLUTION INTRAVENOUS CONTINUOUS
Status: DISCONTINUED | OUTPATIENT
Start: 2017-11-24 | End: 2017-11-25 | Stop reason: HOSPADM

## 2017-11-24 RX ORDER — NEOSTIGMINE METHYLSULFATE 1 MG/ML
INJECTION, SOLUTION INTRAVENOUS AS NEEDED
Status: DISCONTINUED | OUTPATIENT
Start: 2017-11-24 | End: 2017-11-24 | Stop reason: SURG

## 2017-11-24 RX ORDER — LABETALOL HYDROCHLORIDE 5 MG/ML
5 INJECTION, SOLUTION INTRAVENOUS
Status: DISCONTINUED | OUTPATIENT
Start: 2017-11-24 | End: 2017-11-24

## 2017-11-24 RX ORDER — PANTOPRAZOLE SODIUM 40 MG/1
40 TABLET, DELAYED RELEASE ORAL EVERY MORNING
Status: DISCONTINUED | OUTPATIENT
Start: 2017-11-25 | End: 2017-11-25 | Stop reason: HOSPADM

## 2017-11-24 RX ORDER — LEVOTHYROXINE SODIUM 0.1 MG/1
100 TABLET ORAL
Status: DISCONTINUED | OUTPATIENT
Start: 2017-11-25 | End: 2017-11-25 | Stop reason: HOSPADM

## 2017-11-24 RX ORDER — IPRATROPIUM BROMIDE AND ALBUTEROL SULFATE 2.5; .5 MG/3ML; MG/3ML
3 SOLUTION RESPIRATORY (INHALATION) ONCE AS NEEDED
Status: DISCONTINUED | OUTPATIENT
Start: 2017-11-24 | End: 2017-11-24

## 2017-11-24 RX ORDER — DOCUSATE SODIUM 100 MG/1
100 CAPSULE, LIQUID FILLED ORAL 2 TIMES DAILY
Status: DISCONTINUED | OUTPATIENT
Start: 2017-11-24 | End: 2017-11-25 | Stop reason: HOSPADM

## 2017-11-24 RX ORDER — OXYCODONE AND ACETAMINOPHEN 7.5; 325 MG/1; MG/1
1 TABLET ORAL ONCE AS NEEDED
Status: DISCONTINUED | OUTPATIENT
Start: 2017-11-24 | End: 2017-11-24

## 2017-11-24 RX ORDER — MORPHINE SULFATE 10 MG/ML
2 INJECTION INTRAMUSCULAR; INTRAVENOUS; SUBCUTANEOUS
Status: DISCONTINUED | OUTPATIENT
Start: 2017-11-24 | End: 2017-11-24 | Stop reason: RX

## 2017-11-24 RX ORDER — HYDROCHLOROTHIAZIDE 25 MG/1
50 TABLET ORAL DAILY
Status: DISCONTINUED | OUTPATIENT
Start: 2017-11-24 | End: 2017-11-25 | Stop reason: HOSPADM

## 2017-11-24 RX ORDER — SODIUM CHLORIDE 0.9 % (FLUSH) 0.9 %
10 SYRINGE (ML) INJECTION AS NEEDED
Status: DISCONTINUED | OUTPATIENT
Start: 2017-11-24 | End: 2017-11-25 | Stop reason: HOSPADM

## 2017-11-24 RX ORDER — BUPIVACAINE HYDROCHLORIDE AND EPINEPHRINE 2.5; 5 MG/ML; UG/ML
INJECTION, SOLUTION EPIDURAL; INFILTRATION; INTRACAUDAL; PERINEURAL AS NEEDED
Status: DISCONTINUED | OUTPATIENT
Start: 2017-11-24 | End: 2017-11-24 | Stop reason: HOSPADM

## 2017-11-24 RX ORDER — DEXTROSE MONOHYDRATE 25 G/50ML
25 INJECTION, SOLUTION INTRAVENOUS
Status: DISCONTINUED | OUTPATIENT
Start: 2017-11-24 | End: 2017-11-25 | Stop reason: HOSPADM

## 2017-11-24 RX ORDER — NALOXONE HCL 0.4 MG/ML
0.4 VIAL (ML) INJECTION AS NEEDED
Status: DISCONTINUED | OUTPATIENT
Start: 2017-11-24 | End: 2017-11-24

## 2017-11-24 RX ORDER — FENTANYL CITRATE 50 UG/ML
50 INJECTION, SOLUTION INTRAMUSCULAR; INTRAVENOUS
Status: DISCONTINUED | OUTPATIENT
Start: 2017-11-24 | End: 2017-11-24

## 2017-11-24 RX ORDER — ONDANSETRON 2 MG/ML
INJECTION INTRAMUSCULAR; INTRAVENOUS AS NEEDED
Status: DISCONTINUED | OUTPATIENT
Start: 2017-11-24 | End: 2017-11-24 | Stop reason: SURG

## 2017-11-24 RX ORDER — LIDOCAINE HYDROCHLORIDE 10 MG/ML
0.5 INJECTION, SOLUTION EPIDURAL; INFILTRATION; INTRACAUDAL; PERINEURAL ONCE AS NEEDED
Status: DISCONTINUED | OUTPATIENT
Start: 2017-11-24 | End: 2017-11-24

## 2017-11-24 RX ORDER — OXYCODONE HYDROCHLORIDE AND ACETAMINOPHEN 5; 325 MG/1; MG/1
1 TABLET ORAL ONCE AS NEEDED
Status: DISCONTINUED | OUTPATIENT
Start: 2017-11-24 | End: 2017-11-24

## 2017-11-24 RX ORDER — NALOXONE HCL 0.4 MG/ML
0.4 VIAL (ML) INJECTION
Status: DISCONTINUED | OUTPATIENT
Start: 2017-11-24 | End: 2017-11-25 | Stop reason: HOSPADM

## 2017-11-24 RX ORDER — DULOXETIN HYDROCHLORIDE 60 MG/1
60 CAPSULE, DELAYED RELEASE ORAL NIGHTLY
Status: DISCONTINUED | OUTPATIENT
Start: 2017-11-24 | End: 2017-11-25 | Stop reason: HOSPADM

## 2017-11-24 RX ADMIN — NEOSTIGMINE METHYLSULFATE 2 MG: 1 INJECTION, SOLUTION INTRAVENOUS at 17:12

## 2017-11-24 RX ADMIN — FAMOTIDINE 20 MG: 10 INJECTION, SOLUTION INTRAVENOUS at 15:50

## 2017-11-24 RX ADMIN — METFORMIN HYDROCHLORIDE 500 MG: 500 TABLET, FILM COATED ORAL at 18:34

## 2017-11-24 RX ADMIN — ROCURONIUM BROMIDE 10 MG: 10 INJECTION INTRAVENOUS at 16:19

## 2017-11-24 RX ADMIN — SODIUM CHLORIDE, POTASSIUM CHLORIDE, SODIUM LACTATE AND CALCIUM CHLORIDE 150 ML/HR: 600; 310; 30; 20 INJECTION, SOLUTION INTRAVENOUS at 19:52

## 2017-11-24 RX ADMIN — SODIUM CHLORIDE, POTASSIUM CHLORIDE, SODIUM LACTATE AND CALCIUM CHLORIDE 9 ML/HR: 600; 310; 30; 20 INJECTION, SOLUTION INTRAVENOUS at 15:45

## 2017-11-24 RX ADMIN — FENTANYL CITRATE 50 MCG: 50 INJECTION, SOLUTION INTRAMUSCULAR; INTRAVENOUS at 16:31

## 2017-11-24 RX ADMIN — MEPERIDINE HYDROCHLORIDE 12.5 MG: 25 INJECTION, SOLUTION INTRAMUSCULAR; INTRAVENOUS; SUBCUTANEOUS at 17:36

## 2017-11-24 RX ADMIN — CEFAZOLIN SODIUM 2 G: 2 INJECTION, SOLUTION INTRAVENOUS at 16:09

## 2017-11-24 RX ADMIN — HYDROMORPHONE HYDROCHLORIDE 0.5 MG: 1 INJECTION, SOLUTION INTRAMUSCULAR; INTRAVENOUS; SUBCUTANEOUS at 17:29

## 2017-11-24 RX ADMIN — HYDRALAZINE HYDROCHLORIDE 10 MG: 20 INJECTION INTRAMUSCULAR; INTRAVENOUS at 16:52

## 2017-11-24 RX ADMIN — GLYCOPYRROLATE 0.4 MG: 0.2 INJECTION, SOLUTION INTRAMUSCULAR; INTRAVENOUS at 17:12

## 2017-11-24 RX ADMIN — ROCURONIUM BROMIDE 30 MG: 10 INJECTION INTRAVENOUS at 16:05

## 2017-11-24 RX ADMIN — HYDRALAZINE HYDROCHLORIDE 10 MG: 20 INJECTION INTRAMUSCULAR; INTRAVENOUS at 16:30

## 2017-11-24 RX ADMIN — LISINOPRIL 20 MG: 20 TABLET ORAL at 21:44

## 2017-11-24 RX ADMIN — LIDOCAINE HYDROCHLORIDE 50 MG: 10 INJECTION, SOLUTION EPIDURAL; INFILTRATION; INTRACAUDAL; PERINEURAL at 16:05

## 2017-11-24 RX ADMIN — FENTANYL CITRATE 50 MCG: 50 INJECTION, SOLUTION INTRAMUSCULAR; INTRAVENOUS at 16:25

## 2017-11-24 RX ADMIN — HYDROMORPHONE HYDROCHLORIDE 0.5 MG: 1 INJECTION, SOLUTION INTRAMUSCULAR; INTRAVENOUS; SUBCUTANEOUS at 10:39

## 2017-11-24 RX ADMIN — DOCUSATE SODIUM 100 MG: 100 CAPSULE, LIQUID FILLED ORAL at 18:34

## 2017-11-24 RX ADMIN — ONDANSETRON 4 MG: 2 INJECTION INTRAMUSCULAR; INTRAVENOUS at 10:39

## 2017-11-24 RX ADMIN — MEPERIDINE HYDROCHLORIDE 12.5 MG: 25 INJECTION, SOLUTION INTRAMUSCULAR; INTRAVENOUS; SUBCUTANEOUS at 17:30

## 2017-11-24 RX ADMIN — ONDANSETRON 4 MG: 2 INJECTION INTRAMUSCULAR; INTRAVENOUS at 17:09

## 2017-11-24 RX ADMIN — INSULIN LISPRO 4 UNITS: 100 INJECTION, SOLUTION INTRAVENOUS; SUBCUTANEOUS at 23:33

## 2017-11-24 RX ADMIN — HYDROCHLOROTHIAZIDE 50 MG: 25 TABLET ORAL at 18:33

## 2017-11-24 RX ADMIN — ONDANSETRON 4 MG: 2 INJECTION INTRAMUSCULAR; INTRAVENOUS at 22:42

## 2017-11-24 RX ADMIN — LORAZEPAM 0.5 MG: 2 INJECTION INTRAMUSCULAR; INTRAVENOUS at 12:11

## 2017-11-24 RX ADMIN — GABAPENTIN 400 MG: 400 CAPSULE ORAL at 21:43

## 2017-11-24 RX ADMIN — PROPOFOL 50 MG: 10 INJECTION, EMULSION INTRAVENOUS at 16:33

## 2017-11-24 RX ADMIN — LABETALOL HYDROCHLORIDE 5 MG: 5 INJECTION, SOLUTION INTRAVENOUS at 17:36

## 2017-11-24 RX ADMIN — SODIUM CHLORIDE 1000 ML: 9 INJECTION, SOLUTION INTRAVENOUS at 12:12

## 2017-11-24 RX ADMIN — PROPOFOL 200 MG: 10 INJECTION, EMULSION INTRAVENOUS at 16:05

## 2017-11-24 RX ADMIN — DEXAMETHASONE SODIUM PHOSPHATE 8 MG: 4 INJECTION, SOLUTION INTRAMUSCULAR; INTRAVENOUS at 16:12

## 2017-11-24 RX ADMIN — OXYCODONE AND ACETAMINOPHEN 2 TABLET: 5; 325 TABLET ORAL at 18:33

## 2017-11-24 RX ADMIN — FENTANYL CITRATE 100 MCG: 50 INJECTION, SOLUTION INTRAMUSCULAR; INTRAVENOUS at 16:05

## 2017-11-24 RX ADMIN — OXYCODONE AND ACETAMINOPHEN 2 TABLET: 5; 325 TABLET ORAL at 21:59

## 2017-11-24 NOTE — ANESTHESIA PROCEDURE NOTES
Airway  Urgency: elective    Date/Time: 11/24/2017 4:14 PM  End Time:11/24/2017 4:14 PM  Airway not difficult    General Information and Staff    Patient location during procedure: OR  CRNA: JESSICA SANTIAGO    Indications and Patient Condition  MILS maintained throughout  Mask difficulty assessment: 1 - vent by mask    Final Airway Details  Final airway type: endotracheal airway      Successful airway: ETT  Cuffed: yes   Successful intubation technique: direct laryngoscopy  Facilitating devices/methods: intubating stylet  Endotracheal tube insertion site: oral  Blade: Qian  Blade size: #3  ETT size: 7.0 mm  Cormack-Lehane Classification: grade I - full view of glottis  Placement verified by: chest auscultation and capnometry   Measured from: lips  ETT to lips (cm): 21  Number of attempts at approach: 1

## 2017-11-24 NOTE — BRIEF OP NOTE
CHOLECYSTECTOMY LAPAROSCOPIC INTRAOPERATIVE CHOLANGIOGRAM  Progress Note    Ernestina Garrido  11/24/2017    Pre-op Diagnosis:   Acute cholecystitis       Post-Op Diagnosis Codes:   same    Procedure/CPT® Codes:      Procedure(s):  CHOLECYSTECTOMY LAPAROSCOPIC INTRAOPERATIVE CHOLANGIOGRAM    Surgeon(s):  Clifford Freed MD    Anesthesia: General    Staff:   Circulator: Samantha Bowers RN  Scrub Person: Connie Mcfadden  Nursing Assistant: Emil Lyons    Estimated Blood Loss: minimal    Urine Voided: * No values recorded between 11/24/2017  3:59 PM and 11/24/2017  5:13 PM *    Specimens:                  ID Type Source Tests Collected by Time Destination   A :  Tissue Gallbladder TISSUE EXAM Clifford Freed MD 11/24/2017 1644          Drains:           Findings: Early acute cholecystitis. IOC (-)    Complications: None      Clifford Freed MD     Date: 11/24/2017  Time: 5:16 PM

## 2017-11-24 NOTE — ED PROVIDER NOTES
Subjective   HPI Comments: 47-year-old black female with history of gallstones complaining of severe and constant right upper quadrant abdominal pain for the last 3 weeks.  She was told that she had gallstones but did not have any other inflammatory changes.  She states that she is been administered for the last 3 weeks since her diagnosis.  She denies any documented fever but has had nausea and vomiting.  She has no other complaints.    Patient is a 47 y.o. female presenting with abdominal pain.   History provided by:  Patient and relative  Abdominal Pain   Pain location:  RUQ  Pain quality: cramping    Pain radiates to:  Back  Pain severity:  Moderate  Onset quality:  Gradual  Timing:  Constant  Progression:  Worsening  Chronicity:  New  Context: not awakening from sleep, not retching and not trauma    Relieved by:  Nothing  Worsened by:  Nothing  Ineffective treatments:  None tried  Associated symptoms: anorexia, nausea and vomiting    Associated symptoms: no chest pain, no cough, no fever and no shortness of breath        Review of Systems   Constitutional: Negative for fever.   Respiratory: Negative for cough and shortness of breath.    Cardiovascular: Negative for chest pain.   Gastrointestinal: Positive for abdominal pain, anorexia, nausea and vomiting.   All other systems reviewed and are negative.      Past Medical History:   Diagnosis Date   • Atypical mole    • Cervical disc disorder    • Diabetes mellitus    • Encounter for Papanicolaou smear of cervix 2013    normal per patient   • Extremity pain    • Headache    • History of colonoscopy 2013    normal per patient   • History of mammogram 2013    normal per patient   • History of uterine leiomyoma    • History of varicella    • Hypertension    • Hypothyroidism    • Joint pain    • Low back pain    • Lumbosacral disc disease    • Neck pain    • Osteoarthritis    • Tattoos     HIV neg 2012 per patient   • Tobacco abuse    • Uterine fibromyoma         Allergies   Allergen Reactions   • Glimepiride Diarrhea       Past Surgical History:   Procedure Laterality Date   • ABDOMINAL SURGERY     • ANTERIOR CERVICAL DISCECTOMY  2016    C4-6 DISC (Dr. Pena)   • BACK SURGERY     •  SECTION      , ,    • HYSTERECTOMY  2015   • LUMBAR DISCECTOMY     • NECK SURGERY         Family History   Problem Relation Age of Onset   • Diabetes Mother    • Hypertension Mother    • Colon cancer Maternal Grandmother    • Diabetes Maternal Grandmother    • Hypertension Maternal Grandmother    • Diabetes Daughter    • Hypothyroidism Daughter    • Diabetes Maternal Uncle    • Hypertension Maternal Uncle    • Hypertension Other    • Breast cancer Neg Hx    • Ovarian cancer Neg Hx        Social History     Social History   • Marital status:      Spouse name: N/A   • Number of children: N/A   • Years of education: N/A     Social History Main Topics   • Smoking status: Former Smoker     Packs/day: 0.50     Years: 28.00     Types: Cigarettes     Start date:      Quit date: 2016   • Smokeless tobacco: Never Used   • Alcohol use Yes      Comment: occasional 1 a week   • Drug use: No   • Sexual activity: Defer     Other Topics Concern   • None     Social History Narrative           Objective   Physical Exam   Constitutional: She appears well-developed and well-nourished.   HENT:   Head: Normocephalic and atraumatic.   Eyes: Conjunctivae are normal.   Neck: Normal range of motion. Neck supple.   Cardiovascular: Normal rate, regular rhythm and normal heart sounds.  Exam reveals no friction rub.    No murmur heard.  Pulmonary/Chest: Effort normal and breath sounds normal. No respiratory distress. She has no wheezes.   Abdominal: Soft. Bowel sounds are normal. She exhibits no distension. There is tenderness. There is guarding. There is no rebound.   Tender right upper quadrant.  Positive Zimmerman sign.   Musculoskeletal: Normal range of motion.    Neurological: She is alert.   Skin: Skin is warm and dry.   Psychiatric: She has a normal mood and affect. Her behavior is normal. Judgment and thought content normal.   Nursing note and vitals reviewed.      Procedures         ED Course  ED Course   Comment By Time   I spoke with Dr. Freed he states he will take the patient to the operating room. YANG Tilley 11/24 1522          Recent Results (from the past 24 hour(s))   Comprehensive Metabolic Panel    Collection Time: 11/24/17  9:34 AM   Result Value Ref Range    Glucose 196 (H) 70 - 100 mg/dL    BUN 9 9 - 23 mg/dL    Creatinine 0.70 0.60 - 1.30 mg/dL    Sodium 135 132 - 146 mmol/L    Potassium 3.5 3.5 - 5.5 mmol/L    Chloride 97 (L) 99 - 109 mmol/L    CO2 30.0 20.0 - 31.0 mmol/L    Calcium 9.3 8.7 - 10.4 mg/dL    Total Protein 8.2 5.7 - 8.2 g/dL    Albumin 4.60 3.20 - 4.80 g/dL    ALT (SGPT) 14 7 - 40 U/L    AST (SGOT) 12 0 - 33 U/L    Alkaline Phosphatase 78 25 - 100 U/L    Total Bilirubin 0.9 0.3 - 1.2 mg/dL    eGFR  African Amer 109 >60 mL/min/1.73    Globulin 3.6 gm/dL    A/G Ratio 1.3 (L) 1.5 - 2.5 g/dL    BUN/Creatinine Ratio 12.9 7.0 - 25.0    Anion Gap 8.0 3.0 - 11.0 mmol/L   Lipase    Collection Time: 11/24/17  9:34 AM   Result Value Ref Range    Lipase 26 6 - 51 U/L   BNP    Collection Time: 11/24/17  9:34 AM   Result Value Ref Range    .0 (H) 0.0 - 100.0 pg/mL   Light Blue Top    Collection Time: 11/24/17  9:34 AM   Result Value Ref Range    Extra Tube hold for add-on    Green Top (Gel)    Collection Time: 11/24/17  9:34 AM   Result Value Ref Range    Extra Tube Hold for add-ons.    Lavender Top    Collection Time: 11/24/17  9:34 AM   Result Value Ref Range    Extra Tube hold for add-on    Gold Top - SST    Collection Time: 11/24/17  9:34 AM   Result Value Ref Range    Extra Tube Hold for add-ons.    CBC Auto Differential    Collection Time: 11/24/17  9:34 AM   Result Value Ref Range    WBC 11.59 (H) 3.50 - 10.80 10*3/mm3    RBC 5.07 3.89  - 5.14 10*6/mm3    Hemoglobin 15.4 11.5 - 15.5 g/dL    Hematocrit 46.4 (H) 34.5 - 44.0 %    MCV 91.5 80.0 - 99.0 fL    MCH 30.4 27.0 - 31.0 pg    MCHC 33.2 32.0 - 36.0 g/dL    RDW 13.1 11.3 - 14.5 %    RDW-SD 43.9 37.0 - 54.0 fl    MPV 11.2 6.0 - 12.0 fL    Platelets 183 150 - 450 10*3/mm3    Neutrophil % 79.8 (H) 41.0 - 71.0 %    Lymphocyte % 16.0 (L) 24.0 - 44.0 %    Monocyte % 3.4 0.0 - 12.0 %    Eosinophil % 0.4 0.0 - 3.0 %    Basophil % 0.1 0.0 - 1.0 %    Immature Grans % 0.3 0.0 - 0.6 %    Neutrophils, Absolute 9.25 (H) 1.50 - 8.30 10*3/mm3    Lymphocytes, Absolute 1.86 0.60 - 4.80 10*3/mm3    Monocytes, Absolute 0.39 0.00 - 1.00 10*3/mm3    Eosinophils, Absolute 0.05 0.00 - 0.30 10*3/mm3    Basophils, Absolute 0.01 0.00 - 0.20 10*3/mm3    Immature Grans, Absolute 0.03 0.00 - 0.03 10*3/mm3   POC Troponin, Rapid    Collection Time: 11/24/17  9:36 AM   Result Value Ref Range    Troponin I 0.00 0.00 - 0.07 ng/mL     Note: In addition to lab results from this visit, the labs listed above may include labs taken at another facility or during a different encounter within the last 24 hours. Please correlate lab times with ED admission and discharge times for further clarification of the services performed during this visit.    US Gallbladder   Final Result   Cholelithiasis without discrete evidence of cholecystitis.   Gallbladder is partially contracted with wall thickness upper limits of   normal however no pericholecystic fluid.       D:  11/24/2017   E:  11/24/2017       This report was finalized on 11/24/2017 2:08 PM by Dr. Omar Cespedes.          XR Chest 1 View   Final Result   No acute cardiopulmonary findings. Lucency underneath the   left hemidiaphragm likely contained within the gastric lumen and/or   bowel however recommend dedicated follow-up abdominal radiographs to   exclude free air.       D:  11/24/2017   E:  11/24/2017       This report was finalized on 11/24/2017 11:58 AM by Dr. Omar Cespedes.         "    Vitals:    11/24/17 0930 11/24/17 0932 11/24/17 1209 11/24/17 1230   BP: (!) 198/111  167/100 169/90   BP Location: Right arm      Patient Position: Lying      Pulse: 61      Resp: 24      Temp:  98.1 °F (36.7 °C)     TempSrc:  Oral     SpO2: 100%  100% 96%   Weight: 250 lb (113 kg)      Height: 68\" (172.7 cm)        Medications   sodium chloride 0.9 % flush 10 mL (not administered)   sodium chloride 0.9 % bolus 1,000 mL (0 mL Intravenous Stopped 11/24/17 1407)   HYDROmorphone (DILAUDID) injection 0.5 mg (0.5 mg Intramuscular Given 11/24/17 1039)   ondansetron (ZOFRAN) injection 4 mg (4 mg Intramuscular Given 11/24/17 1039)   LORazepam (ATIVAN) injection 0.5 mg (0.5 mg Intravenous Given 11/24/17 1211)     ECG/EMG Results (last 24 hours)     Procedure Component Value Units Date/Time    ECG 12 Lead [[id number]] Collected:  11/24/17 0929     Updated:  11/24/17 1208    ECG 12 Lead [[id number]] Collected:  11/24/17 1206     Updated:  11/24/17 1211              MDM    Final diagnoses:   Acute cholecystitis   Calculus of gallbladder with acute cholecystitis without obstruction            YANG Tilley  11/24/17 1534    "

## 2017-11-24 NOTE — PLAN OF CARE
Problem: Perioperative Period (Adult)  Goal: Signs and Symptoms of Listed Potential Problems Will be Absent or Manageable (Perioperative Period)  Outcome: Ongoing (interventions implemented as appropriate)    11/24/17 0160   Perioperative Period   Problems Assessed (Perioperative Period) all   Problems Present (Perioperative Period) pain

## 2017-11-24 NOTE — ANESTHESIA PREPROCEDURE EVALUATION
Anesthesia Evaluation     Patient summary reviewed and Nursing notes reviewed   NPO Solid Status: > 8 hours  NPO Liquid Status: > 8 hours     Airway   Mallampati: II  TM distance: >3 FB  Neck ROM: full  no difficulty expected  Dental      Pulmonary     breath sounds clear to auscultation  Cardiovascular     ECG reviewed  Rhythm: regular  Rate: normal    (+) hypertension,       Neuro/Psych  (+) headaches,    GI/Hepatic/Renal/Endo    (+) obesity,  diabetes mellitus, hypothyroidism,     Musculoskeletal     (+) neck pain,   Abdominal    Substance History      OB/GYN          Other   (+) arthritis         Phys Exam Other: Extensive gold capping incisors                                Anesthesia Plan    ASA 3     general     intravenous induction   Anesthetic plan and risks discussed with patient and mother.    Plan discussed with CRNA.

## 2017-11-24 NOTE — CONSULTS
Patient Name:  Ernestina Garrido  YOB: 1970  [id number]       Patient Care Team:  Stephanie Tanner MD as PCP - General  Nicolás Pena MD as Surgeon (Neurosurgery)  Ivy Do, PhD as Consulting Physician (Psychology)  Blayne Pandya MD as Consulting Physician (Anesthesiology)      General Surgery Consult Note     Date of Consultation: 11/24/17    Consulting Physician - Erlin Veloz MD    Reason for Consult - Abdominal pain    Subjective     I have been asked to see  Ernestina Garrido , a 47 y.o. female in consultation for abdominal pain.  Ms. Garrido reports a 3 week history of abdominal pain.  This is primarily in the epigastrium and right upper quadrant.  It is worsened after meals.  The pain itself is described as sharp and crampy again radiating sometimes to the back.  Associated with nausea and vomiting of nonbilious nonbloody materials.  She reports no bright blood per rectum or melena, however does report a change in her stools being more light and loose.  She also reports some associated chills but no fevers.  Previous ultrasound showing gallstones.  She presented the ER with a more acute exacerbation of these complaints and subsequent evaluation was worrisome for possible acute cholecystitis.  We've been asked to see her for possible surgical management.      Allergy:   Allergies   Allergen Reactions   • Glimepiride Diarrhea       Medications:        No current facility-administered medications on file prior to encounter.      Current Outpatient Prescriptions on File Prior to Encounter   Medication Sig   • DULoxetine (CYMBALTA) 60 MG capsule Take 1 capsule by mouth Every Night.   • Empagliflozin (JARDIANCE) 10 MG tablet Take 10 mg by mouth Daily.   • etodolac (LODINE) 200 MG capsule Take 1 capsule by mouth Every 8 (Eight) Hours As Needed (pain).   • gabapentin (NEURONTIN) 400 MG capsule Take 1 capsule by mouth 3 (Three) Times a Day.   • glucose blood test strip Test daily   •  glucose monitor monitoring kit 1 each Daily.   • hydrochlorothiazide (HYDRODIURIL) 50 MG tablet Take 1 tablet by mouth Daily.   • Lancets 30G misc 1 Units Daily.   • levothyroxine (SYNTHROID, LEVOTHROID) 100 MCG tablet TAKE ONE TABLET BY MOUTH DAILY   • lisinopril (PRINIVIL,ZESTRIL) 20 MG tablet Take 1 tablet by mouth Every Night.   • metFORMIN (GLUCOPHAGE) 500 MG tablet Take 1 tablet by mouth 2 (Two) Times a Day With Meals.   • omeprazole (PRILOSEC) 20 MG capsule Take 1 capsule by mouth 2 (Two) Times a Day.   • promethazine (PHENERGAN) 25 MG suppository Insert 1 suppository into the rectum Every 6 (Six) Hours As Needed for Nausea or Vomiting for up to 10 doses.       PMHx:   Patient Active Problem List   Diagnosis   • Atypical mole   • Chronic pain syndrome   • Depression   • Hemorrhoid   • Essential hypertension   • Hypothyroidism   • Insomnia   • Arthralgia of hip   • Arthralgia of shoulder   • Degenerative disc disease, cervical   • Cervical facet arthropathy/cervical spondylosis without myelopathy   • Myofascial pain   • Diabetic autonomic neuropathy   • Left adductor tendonitis   • Hx of fusion of cervical spine   • Physical deconditioning   • Morbid obesity due to excess calories   • Neuroforaminal stenosis of spine   • Lateral epicondylitis, right elbow   • Carpal tunnel syndrome of right wrist   • Type 2 diabetes mellitus, without long-term current use of insulin   • Abdominal bloating       Past Surgical History:  1. Partial hysterectomy  2. C-sx x 3  3. Cervical spine operation  4. Back surgery      Family History: (+) DM, HTN    Social History: Pt lives in [place] .    Tobacco use: Quit 3 weeks ago    EtOH use : Occasional   Illicit drug use: None      Review of Systems        Constitutional: No fevers, (+) chills and malaise   Eyes: Denies visual changes    Cardiovascular: Denies chest pain, palpitations   Pulmonary: Denies cough or shortness of breath   Abdominal/ GI: See HPI    Genitourinary: Denies  "dysuria or hematuria   Musculoskeletal: Denies any but chronic joint aches, pains or deformities   Psychiatric: No recent mood changes   Neurologic: No paresthesias or loss of function          Objective     Physical Exam:      Vital Signs  /90  Pulse 61  Temp 98.1 °F (36.7 °C) (Oral)   Resp 24  Ht 68\" (172.7 cm)  Wt 250 lb (113 kg)  SpO2 96%  BMI 38.01 kg/m2  No intake or output data in the 24 hours ending 11/24/17 1536      Physical Exam:    Head: Normocephalic, atraumatic.   Eyes: Pupils equal, round, react to light and accomodation.   Mouth: Oral mucosa without lesions,   Neck: No masses, lymphadenopathy or carotid bruits bilaterally   CV: Rhythm  and rate regular , no  murmurs, rubs or gallops  Lungs: Clear  to auscultation bilaterally   Abdomen: Bowel sounds positive  , soft, tender RUQ  Groin : No obvious hernias bilaterally   Extremities:  No cyanosis, clubbing or edema bilaterally   Lymphatics: No abnormal lymphadenopathy appreciated   Neurologic: No gross deficits       Results Review: I have personally reviewed all of the lab and imaging results available at this time.     White count mildly elevated at 11.6 with a slight left shift.  Liver function tests are normal, however the glucose is 196.  Lipase normal.    Ultrasound personally reviewed by me as well as the final dictated and edited report.  The stem straits evidence of gallstones with questionable gallbladder wall thickening.  The gallbladder is contracted.  There is no pericholecystic fluid and the common bile duct appears normal caliber.  There does appear to be a large stone within the gallbladder and the gallbladder neck.     Assessment/Plan     Assessment and Plan:    Acute cholecystitis - I suspect given her recent worsening that she has early acute cholecystitis.  We'll proceed urgently to the operating room for laparoscopic cholecystectomy with intraoperative cholangiography.  I discussed the risks and benefits at length the " patient and her mother and they agreed to proceed.      HTN  DM  Obesity                I discussed the patients findings and my recommendations with the patient and/or family, as well as the primary team     Clifford Freed MD  11/24/17  3:36 PM        Please note that portions of this note were completed with a voice recognition program. Efforts were made to edit the dictations, but occasionally words are mistranscribed.

## 2017-11-24 NOTE — OP NOTE
Operative Report    Patient Name:  Ernestina Garrido  YOB: 1970  [id number]  11/24/2017      PREOPERATIVE DIAGNOSIS: Acute  cholecystitis       POSTOPERATIVE DIAGNOSIS: Same        PROCEDURE PERFORMED:     Laparoscopic cholecystectomy with intra-operative cholangiography        SURGEON: Clifford Freed MD      ASSISTANT: None        SPECIMENS: Gallbladder and contents        ANESTHESIA: General.        FINDINGS:     1) Gallbladder in standard positioning with early acute and chronic inflammation    2) Intra-operative cholangiogram demonstrated excellent filling of the cystic, common, right and left hepatic ducts with good flow of contrast into the duodenum without retained stone or filling defect        INDICATIONS:      The patient is a 47 y.o. female with a history of abdominal pain, concerning for acute cholecystitis. Pre-operative imaging including U/S confirmed the diagnosis. The risks and benefits of Laparoscopic cholecystectomy with cholangiography were discussed with the patient and their family and they agree to proceed.        DESCRIPTION OF PROCEDURE:     After obtaining informed consent, the patient was taken to the operating room and placed in the supine position. After appropriate DVT and antibiotic prophylaxis, general anesthesia was induced. The abdomen was prepped and draped in standard sterile fashion, and after infiltrating the skin with local anesthetic, a transverse 12mm skin incision was made superior to the umbilicus . Blunt dissection was carried down to the base of the umbilicus, which was grasped with a Kocher clamp and elevated anteriorly. A vertical midline incision was made in the fascia, and blunt dissection was carried down into the peritoneal cavity. A stay suture of 0 Vicryl was then placed in figure-of-eight fashion around the defect, and a blunt trocar advanced without difficulty into the abdominal cavity.  The abdomen was insufflated with carbon dioxide gas to a  "pressure of 15 mm Hg, and a laparoscope advanced through the trocar and the abdominal contents were inspected. There was no evidence of bowel, bladder, or visceral entry with entrance of the trocar . At this point, after infiltrating the skin with local anesthetic, a standard laparoscopic cholecystectomy trocar placement schema was followed.     The gallbladder was grasped and elevated superiorly. Using meticulous blunt dissection , the cystic duct and cystic artery were bluntly dissected free of other structures and clearly identified using the \"Critical View\" technique. The cystic artery was then clipped twice proximally and once distally, and divided between the clips.     The cystic duct was then clipped at its junction with the infundibulum of the gallbladder, and transected across 50% of its circumference. A cholangiogram catheter was then placed within the duct, and on-table cholangiography under fluoroscopy was obtained. There was excellent filling of the cystic, common, right and left hepatic ducts with good flow of contrast into the duodenum without retained stone or filling defect  . The cholangiogram catheter was then removed, and the cystic duct was ligated using a 2-0 silk suture tied laparoscopically,  reinforced with hemoclips, and divided.     The gallbladder was then dissected free of the gallbladder fossa using a combination of electrocautery and blunt dissection . There was a small posterior branch of the artery that was clipped and divided . The gallbladder was then placed in an Endocatch bag, and removed from the inferiormost trocar site. It was inspected on the back table, correlated with intra-operative findings, and passed off as specimen.     The right upper quadrant was then inspected. The cystic duct and cystic artery stumps were intact without bleeding or biliary leak. The right upper quadrant was irrigated with saline until clear. The abdomen was deflated and reinsufflated to make sure " pneumoperitoneum was not tamponading any bleeding and there was none. The abdomen was again irrigated with saline until clear, and all trocars removed under direct and laparoscopic visualization. The fascia at the inferiormost incision  was closed using the previously placed 0 Vicryl suture, as well as an additional 0 Vicryl suture placed in figure-of-eight fashion using a laparoscopic suture passer. The wounds were irrigated with normal saline, and closed in each area using absorbable subcuticular suture. The incisions were dressed in standard sterile fashion and covered with dry dressings. The patient recovered from anesthesia, was extubated in the operating room, and transferred to the PACU in stable condition.  All sponge and needle counts were correct times two at the completion of the procedure. There were no immediate complications.     Clifford Freed MD  11/24/2017  5:17 PM

## 2017-11-24 NOTE — ANESTHESIA POSTPROCEDURE EVALUATION
Patient: Ernestina Garrido    Procedure Summary     Date Anesthesia Start Anesthesia Stop Room / Location    11/24/17 1550 1727  TD OR 02 / [facility] OR       Procedure Diagnosis Surgeon Provider    CHOLECYSTECTOMY LAPAROSCOPIC INTRAOPERATIVE CHOLANGIOGRAM (N/A Abdomen) No diagnosis on file. MD Rishi Newell MD          Anesthesia Type: general  Last vitals  BP   142/76 (11/24/17 1545)   Temp   98 °F (36.7 °C) (11/24/17 1545)   Pulse   71 (11/24/17 1545)   Resp   18 (11/24/17 1545)     SpO2   100 % (11/24/17 1545)     Post Anesthesia Care and Evaluation    Patient location during evaluation: PACU  Patient participation: complete - patient participated  Level of consciousness: awake  Pain score: 2  Pain management: adequate  Airway patency: patent  Anesthetic complications: No anesthetic complications  PONV Status: none  Cardiovascular status: hemodynamically stable and acceptable  Respiratory status: nonlabored ventilation, acceptable and nasal cannula  Hydration status: acceptable

## 2017-11-25 VITALS
OXYGEN SATURATION: 97 % | DIASTOLIC BLOOD PRESSURE: 69 MMHG | RESPIRATION RATE: 20 BRPM | TEMPERATURE: 98.4 F | HEART RATE: 74 BPM | HEIGHT: 68 IN | SYSTOLIC BLOOD PRESSURE: 119 MMHG | BODY MASS INDEX: 37.89 KG/M2 | WEIGHT: 250 LBS

## 2017-11-25 LAB
ALBUMIN SERPL-MCNC: 3.7 G/DL (ref 3.2–4.8)
ALBUMIN/GLOB SERPL: 1.3 G/DL (ref 1.5–2.5)
ALP SERPL-CCNC: 63 U/L (ref 25–100)
ALT SERPL W P-5'-P-CCNC: 29 U/L (ref 7–40)
ANION GAP SERPL CALCULATED.3IONS-SCNC: 9 MMOL/L (ref 3–11)
AST SERPL-CCNC: 32 U/L (ref 0–33)
BASOPHILS # BLD AUTO: 0 10*3/MM3 (ref 0–0.2)
BASOPHILS NFR BLD AUTO: 0 % (ref 0–1)
BILIRUB SERPL-MCNC: 0.8 MG/DL (ref 0.3–1.2)
BUN BLD-MCNC: 8 MG/DL (ref 9–23)
BUN/CREAT SERPL: 11.4 (ref 7–25)
CALCIUM SPEC-SCNC: 8.5 MG/DL (ref 8.7–10.4)
CHLORIDE SERPL-SCNC: 98 MMOL/L (ref 99–109)
CO2 SERPL-SCNC: 25 MMOL/L (ref 20–31)
CREAT BLD-MCNC: 0.7 MG/DL (ref 0.6–1.3)
DEPRECATED RDW RBC AUTO: 43.4 FL (ref 37–54)
EOSINOPHIL # BLD AUTO: 0 10*3/MM3 (ref 0–0.3)
EOSINOPHIL NFR BLD AUTO: 0 % (ref 0–3)
ERYTHROCYTE [DISTWIDTH] IN BLOOD BY AUTOMATED COUNT: 13.1 % (ref 11.3–14.5)
GFR SERPL CREATININE-BSD FRML MDRD: 109 ML/MIN/1.73
GLOBULIN UR ELPH-MCNC: 2.8 GM/DL
GLUCOSE BLD-MCNC: 238 MG/DL (ref 70–100)
GLUCOSE BLDC GLUCOMTR-MCNC: 183 MG/DL (ref 70–130)
GLUCOSE BLDC GLUCOMTR-MCNC: 217 MG/DL (ref 70–130)
HCT VFR BLD AUTO: 40.2 % (ref 34.5–44)
HGB BLD-MCNC: 13 G/DL (ref 11.5–15.5)
IMM GRANULOCYTES # BLD: 0.04 10*3/MM3 (ref 0–0.03)
IMM GRANULOCYTES NFR BLD: 0.3 % (ref 0–0.6)
LIPASE SERPL-CCNC: 17 U/L (ref 6–51)
LYMPHOCYTES # BLD AUTO: 1.76 10*3/MM3 (ref 0.6–4.8)
LYMPHOCYTES NFR BLD AUTO: 13.4 % (ref 24–44)
MCH RBC QN AUTO: 29.3 PG (ref 27–31)
MCHC RBC AUTO-ENTMCNC: 32.3 G/DL (ref 32–36)
MCV RBC AUTO: 90.5 FL (ref 80–99)
MONOCYTES # BLD AUTO: 0.6 10*3/MM3 (ref 0–1)
MONOCYTES NFR BLD AUTO: 4.6 % (ref 0–12)
NEUTROPHILS # BLD AUTO: 10.72 10*3/MM3 (ref 1.5–8.3)
NEUTROPHILS NFR BLD AUTO: 81.7 % (ref 41–71)
PLATELET # BLD AUTO: 193 10*3/MM3 (ref 150–450)
PMV BLD AUTO: 11.5 FL (ref 6–12)
POTASSIUM BLD-SCNC: 3.1 MMOL/L (ref 3.5–5.5)
PROT SERPL-MCNC: 6.5 G/DL (ref 5.7–8.2)
RBC # BLD AUTO: 4.44 10*6/MM3 (ref 3.89–5.14)
SODIUM BLD-SCNC: 132 MMOL/L (ref 132–146)
WBC NRBC COR # BLD: 13.12 10*3/MM3 (ref 3.5–10.8)

## 2017-11-25 PROCEDURE — 82962 GLUCOSE BLOOD TEST: CPT

## 2017-11-25 PROCEDURE — 85025 COMPLETE CBC W/AUTO DIFF WBC: CPT | Performed by: SURGERY

## 2017-11-25 PROCEDURE — 80053 COMPREHEN METABOLIC PANEL: CPT | Performed by: SURGERY

## 2017-11-25 PROCEDURE — 83690 ASSAY OF LIPASE: CPT | Performed by: SURGERY

## 2017-11-25 PROCEDURE — 25010000002 PIPERACILLIN SOD-TAZOBACTAM PER 1 G: Performed by: SURGERY

## 2017-11-25 PROCEDURE — 25010000002 HEPARIN (PORCINE) PER 1000 UNITS: Performed by: SURGERY

## 2017-11-25 PROCEDURE — G0378 HOSPITAL OBSERVATION PER HR: HCPCS

## 2017-11-25 RX ORDER — OXYCODONE HYDROCHLORIDE AND ACETAMINOPHEN 5; 325 MG/1; MG/1
2 TABLET ORAL EVERY 4 HOURS PRN
Qty: 31 TABLET | Refills: 0 | Status: SHIPPED | OUTPATIENT
Start: 2017-11-25 | End: 2017-12-02

## 2017-11-25 RX ORDER — PSEUDOEPHEDRINE HCL 30 MG
100 TABLET ORAL 2 TIMES DAILY
Qty: 20 CAPSULE | Refills: 0 | OUTPATIENT
Start: 2017-11-25 | End: 2017-12-16

## 2017-11-25 RX ADMIN — OXYCODONE AND ACETAMINOPHEN 2 TABLET: 5; 325 TABLET ORAL at 11:59

## 2017-11-25 RX ADMIN — TAZOBACTAM SODIUM AND PIPERACILLIN SODIUM 4.5 G: 500; 4 INJECTION, SOLUTION INTRAVENOUS at 00:32

## 2017-11-25 RX ADMIN — DOCUSATE SODIUM 100 MG: 100 CAPSULE, LIQUID FILLED ORAL at 08:00

## 2017-11-25 RX ADMIN — METFORMIN HYDROCHLORIDE 500 MG: 500 TABLET, FILM COATED ORAL at 08:00

## 2017-11-25 RX ADMIN — OXYCODONE AND ACETAMINOPHEN 2 TABLET: 5; 325 TABLET ORAL at 04:05

## 2017-11-25 RX ADMIN — PANTOPRAZOLE SODIUM 40 MG: 40 TABLET, DELAYED RELEASE ORAL at 08:00

## 2017-11-25 RX ADMIN — HEPARIN SODIUM 5000 UNITS: 5000 INJECTION, SOLUTION INTRAVENOUS; SUBCUTANEOUS at 05:36

## 2017-11-25 RX ADMIN — INSULIN LISPRO 4 UNITS: 100 INJECTION, SOLUTION INTRAVENOUS; SUBCUTANEOUS at 08:00

## 2017-11-25 RX ADMIN — OXYCODONE AND ACETAMINOPHEN 2 TABLET: 5; 325 TABLET ORAL at 08:07

## 2017-11-25 RX ADMIN — TAZOBACTAM SODIUM AND PIPERACILLIN SODIUM 4.5 G: 500; 4 INJECTION, SOLUTION INTRAVENOUS at 08:00

## 2017-11-25 RX ADMIN — GABAPENTIN 400 MG: 400 CAPSULE ORAL at 05:36

## 2017-11-25 RX ADMIN — INSULIN LISPRO 2 UNITS: 100 INJECTION, SOLUTION INTRAVENOUS; SUBCUTANEOUS at 12:00

## 2017-11-25 RX ADMIN — HYDROCHLOROTHIAZIDE 50 MG: 25 TABLET ORAL at 08:00

## 2017-11-25 RX ADMIN — SODIUM CHLORIDE, POTASSIUM CHLORIDE, SODIUM LACTATE AND CALCIUM CHLORIDE 150 ML/HR: 600; 310; 30; 20 INJECTION, SOLUTION INTRAVENOUS at 03:26

## 2017-11-25 RX ADMIN — LEVOTHYROXINE SODIUM 100 MCG: 100 TABLET ORAL at 05:36

## 2017-11-25 NOTE — PROGRESS NOTES
"Patient Name:  Ernestina Garrido  YOB: 1970  [id number]    Surgery Progress Note    Date of visit: 11/25/2017    Subjective   Subjective: Feeling better this AM. Pain controlled. Wants to go home.         Objective     Objective:     /79  Pulse 86  Temp 98.9 °F (37.2 °C) (Oral)   Resp 20  Ht 68\" (172.7 cm)  Wt 250 lb (113 kg)  SpO2 97%  Breastfeeding? No  BMI 38.01 kg/m2    Intake/Output Summary (Last 24 hours) at 11/25/17 0650  Last data filed at 11/25/17 0326   Gross per 24 hour   Intake          2134.51 ml   Output                0 ml   Net          2134.51 ml       CV:  Rhythm  regular and rate regular   L:  Clear  to auscultation bilaterally   Abd:  Bowel sounds positive , soft, minimally tender. Incisions c/d/i  Ext:  No cyanosis, clubbing, edema    Labs that are back at this time have been reviewed.        Assessment/Plan     Assessment/ Plan:    Hospital Problem List     Acute cholecystitis - Doing well after Lap CCY. If tolerates PO, D/C home. RTC 4 weeks. May remove dressings in 24 hours, may shower at that time. No lifting > 30 lbs until RTC.              Clifford Freed MD  11/25/2017  6:50 AM      "

## 2017-11-27 ENCOUNTER — TRANSITIONAL CARE MANAGEMENT TELEPHONE ENCOUNTER (OUTPATIENT)
Dept: INTERNAL MEDICINE | Facility: CLINIC | Age: 47
End: 2017-11-27

## 2017-11-27 LAB
CYTO UR: NORMAL
LAB AP CASE REPORT: NORMAL
LAB AP CLINICAL INFORMATION: NORMAL
Lab: NORMAL
PATH REPORT.FINAL DX SPEC: NORMAL
PATH REPORT.GROSS SPEC: NORMAL

## 2017-11-27 NOTE — OUTREACH NOTE
I spoke directly to the patient to obtain the information for the [facility] discharge call note.The patient was admitted to [facility] 11/24/2017. Dx of acute cholecystitis. Dicharges to home. The patient was advised to notify PCP and seel UTC or ER eval if sx's reoccur, worsen, or condition changes unexpectedly. The patient verbalized understanding.

## 2017-12-02 ENCOUNTER — HOSPITAL ENCOUNTER (EMERGENCY)
Facility: HOSPITAL | Age: 47
Discharge: HOME | End: 2017-12-02
Attending: EMERGENCY MEDICINE | Admitting: EMERGENCY MEDICINE

## 2017-12-02 ENCOUNTER — APPOINTMENT (OUTPATIENT)
Dept: CT IMAGING | Facility: HOSPITAL | Age: 47
End: 2017-12-02

## 2017-12-02 VITALS
DIASTOLIC BLOOD PRESSURE: 90 MMHG | WEIGHT: 250 LBS | BODY MASS INDEX: 37.89 KG/M2 | RESPIRATION RATE: 22 BRPM | SYSTOLIC BLOOD PRESSURE: 134 MMHG | HEIGHT: 68 IN | HEART RATE: 67 BPM | TEMPERATURE: 98.7 F | OXYGEN SATURATION: 96 %

## 2017-12-02 DIAGNOSIS — Z90.49 S/P CHOLECYSTECTOMY: ICD-10-CM

## 2017-12-02 DIAGNOSIS — R07.89 CHEST WALL PAIN FOLLOWING SURGERY: Primary | ICD-10-CM

## 2017-12-02 DIAGNOSIS — G89.18 CHEST WALL PAIN FOLLOWING SURGERY: Primary | ICD-10-CM

## 2017-12-02 DIAGNOSIS — R10.10 UPPER ABDOMINAL PAIN: ICD-10-CM

## 2017-12-02 LAB
ALBUMIN SERPL-MCNC: 4.2 G/DL (ref 3.2–4.8)
ALBUMIN/GLOB SERPL: 1.3 G/DL (ref 1.5–2.5)
ALP SERPL-CCNC: 81 U/L (ref 25–100)
ALT SERPL W P-5'-P-CCNC: 16 U/L (ref 7–40)
ANION GAP SERPL CALCULATED.3IONS-SCNC: 7 MMOL/L (ref 3–11)
AST SERPL-CCNC: 13 U/L (ref 0–33)
BASOPHILS # BLD AUTO: 0.03 10*3/MM3 (ref 0–0.2)
BASOPHILS NFR BLD AUTO: 0.2 % (ref 0–1)
BILIRUB SERPL-MCNC: 0.5 MG/DL (ref 0.3–1.2)
BILIRUB UR QL STRIP: ABNORMAL
BNP SERPL-MCNC: 14 PG/ML (ref 0–100)
BUN BLD-MCNC: 20 MG/DL (ref 9–23)
BUN/CREAT SERPL: 20 (ref 7–25)
CALCIUM SPEC-SCNC: 9.6 MG/DL (ref 8.7–10.4)
CHLORIDE SERPL-SCNC: 98 MMOL/L (ref 99–109)
CLARITY UR: CLEAR
CO2 SERPL-SCNC: 34 MMOL/L (ref 20–31)
COLOR UR: YELLOW
CREAT BLD-MCNC: 1 MG/DL (ref 0.6–1.3)
DEPRECATED RDW RBC AUTO: 44.1 FL (ref 37–54)
EOSINOPHIL # BLD AUTO: 0.83 10*3/MM3 (ref 0–0.3)
EOSINOPHIL NFR BLD AUTO: 6.8 % (ref 0–3)
ERYTHROCYTE [DISTWIDTH] IN BLOOD BY AUTOMATED COUNT: 12.9 % (ref 11.3–14.5)
GFR SERPL CREATININE-BSD FRML MDRD: 72 ML/MIN/1.73
GLOBULIN UR ELPH-MCNC: 3.3 GM/DL
GLUCOSE BLD-MCNC: 234 MG/DL (ref 70–100)
GLUCOSE UR STRIP-MCNC: NEGATIVE MG/DL
HCT VFR BLD AUTO: 42.1 % (ref 34.5–44)
HGB BLD-MCNC: 13.9 G/DL (ref 11.5–15.5)
HGB UR QL STRIP.AUTO: NEGATIVE
HOLD SPECIMEN: NORMAL
HOLD SPECIMEN: NORMAL
IMM GRANULOCYTES # BLD: 0.06 10*3/MM3 (ref 0–0.03)
IMM GRANULOCYTES NFR BLD: 0.5 % (ref 0–0.6)
KETONES UR QL STRIP: NEGATIVE
LEUKOCYTE ESTERASE UR QL STRIP.AUTO: NEGATIVE
LIPASE SERPL-CCNC: 24 U/L (ref 6–51)
LYMPHOCYTES # BLD AUTO: 4.17 10*3/MM3 (ref 0.6–4.8)
LYMPHOCYTES NFR BLD AUTO: 34.1 % (ref 24–44)
MCH RBC QN AUTO: 30.7 PG (ref 27–31)
MCHC RBC AUTO-ENTMCNC: 33 G/DL (ref 32–36)
MCV RBC AUTO: 92.9 FL (ref 80–99)
MONOCYTES # BLD AUTO: 0.76 10*3/MM3 (ref 0–1)
MONOCYTES NFR BLD AUTO: 6.2 % (ref 0–12)
NEUTROPHILS # BLD AUTO: 6.37 10*3/MM3 (ref 1.5–8.3)
NEUTROPHILS NFR BLD AUTO: 52.2 % (ref 41–71)
NITRITE UR QL STRIP: NEGATIVE
PH UR STRIP.AUTO: 6 [PH] (ref 5–8)
PLATELET # BLD AUTO: 197 10*3/MM3 (ref 150–450)
PMV BLD AUTO: 11.4 FL (ref 6–12)
POTASSIUM BLD-SCNC: 3.2 MMOL/L (ref 3.5–5.5)
PROT SERPL-MCNC: 7.5 G/DL (ref 5.7–8.2)
PROT UR QL STRIP: NEGATIVE
RBC # BLD AUTO: 4.53 10*6/MM3 (ref 3.89–5.14)
SODIUM BLD-SCNC: 139 MMOL/L (ref 132–146)
SP GR UR STRIP: 1.02 (ref 1–1.03)
TROPONIN I SERPL-MCNC: 0 NG/ML (ref 0–0.07)
UROBILINOGEN UR QL STRIP: ABNORMAL
WBC NRBC COR # BLD: 12.22 10*3/MM3 (ref 3.5–10.8)
WHOLE BLOOD HOLD SPECIMEN: NORMAL
WHOLE BLOOD HOLD SPECIMEN: NORMAL

## 2017-12-02 PROCEDURE — 80053 COMPREHEN METABOLIC PANEL: CPT

## 2017-12-02 PROCEDURE — 25010000002 ONDANSETRON PER 1 MG: Performed by: EMERGENCY MEDICINE

## 2017-12-02 PROCEDURE — 93005 ELECTROCARDIOGRAM TRACING: CPT

## 2017-12-02 PROCEDURE — 99284 EMERGENCY DEPT VISIT MOD MDM: CPT

## 2017-12-02 PROCEDURE — 25010000002 FENTANYL CITRATE (PF) 100 MCG/2ML SOLUTION: Performed by: EMERGENCY MEDICINE

## 2017-12-02 PROCEDURE — 74177 CT ABD & PELVIS W/CONTRAST: CPT

## 2017-12-02 PROCEDURE — 81003 URINALYSIS AUTO W/O SCOPE: CPT | Performed by: EMERGENCY MEDICINE

## 2017-12-02 PROCEDURE — 0 IOPAMIDOL PER 1 ML: Performed by: EMERGENCY MEDICINE

## 2017-12-02 PROCEDURE — 83690 ASSAY OF LIPASE: CPT

## 2017-12-02 PROCEDURE — 96374 THER/PROPH/DIAG INJ IV PUSH: CPT

## 2017-12-02 PROCEDURE — 84484 ASSAY OF TROPONIN QUANT: CPT

## 2017-12-02 PROCEDURE — 71275 CT ANGIOGRAPHY CHEST: CPT

## 2017-12-02 PROCEDURE — 83880 ASSAY OF NATRIURETIC PEPTIDE: CPT

## 2017-12-02 PROCEDURE — 96375 TX/PRO/DX INJ NEW DRUG ADDON: CPT

## 2017-12-02 PROCEDURE — 85025 COMPLETE CBC W/AUTO DIFF WBC: CPT

## 2017-12-02 RX ORDER — SODIUM CHLORIDE 0.9 % (FLUSH) 0.9 %
10 SYRINGE (ML) INJECTION AS NEEDED
Status: DISCONTINUED | OUTPATIENT
Start: 2017-12-02 | End: 2017-12-02 | Stop reason: HOSPADM

## 2017-12-02 RX ORDER — FENTANYL CITRATE 50 UG/ML
50 INJECTION, SOLUTION INTRAMUSCULAR; INTRAVENOUS ONCE
Status: COMPLETED | OUTPATIENT
Start: 2017-12-02 | End: 2017-12-02

## 2017-12-02 RX ORDER — ONDANSETRON 2 MG/ML
4 INJECTION INTRAMUSCULAR; INTRAVENOUS ONCE
Status: COMPLETED | OUTPATIENT
Start: 2017-12-02 | End: 2017-12-02

## 2017-12-02 RX ORDER — ASPIRIN 81 MG/1
324 TABLET, CHEWABLE ORAL ONCE
Status: DISCONTINUED | OUTPATIENT
Start: 2017-12-02 | End: 2017-12-02

## 2017-12-02 RX ORDER — OXYCODONE HYDROCHLORIDE AND ACETAMINOPHEN 5; 325 MG/1; MG/1
2 TABLET ORAL EVERY 4 HOURS PRN
Qty: 12 TABLET | Refills: 0 | Status: SHIPPED | OUTPATIENT
Start: 2017-12-02 | End: 2017-12-11

## 2017-12-02 RX ADMIN — IOPAMIDOL 85 ML: 755 INJECTION, SOLUTION INTRAVENOUS at 17:25

## 2017-12-02 RX ADMIN — ONDANSETRON 4 MG: 2 INJECTION INTRAMUSCULAR; INTRAVENOUS at 16:20

## 2017-12-02 RX ADMIN — FENTANYL CITRATE 50 MCG: 50 INJECTION INTRAMUSCULAR; INTRAVENOUS at 16:21

## 2017-12-02 NOTE — ED PROVIDER NOTES
Subjective   HPI Comments: Ernestina Garrido is a 47 y.o.female who presents to the ED with complaints of left-sided chest pain with onset 4 days ago. The patient recently had abdominal surgery performed by Dr. Freed, and since then she began to experience bloating and a constant sharp pain in her chest, that worsens upon deep breaths. She also reports loss of appetite and leg pain but denies vomiting. There are no other known complaints at this time. She notes that she has been taking pain medicine for this pain, but she has run out of her medications.      Patient is a 47 y.o. female presenting with chest pain.   History provided by:  Patient  Chest Pain   Pain location:  L chest  Pain quality: sharp    Pain radiates to:  L arm and upper back  Pain severity:  Moderate  Onset quality:  Sudden  Duration:  4 days  Timing:  Constant  Progression:  Worsening  Chronicity:  Recurrent  Relieved by:  None tried  Worsened by:  Nothing  Ineffective treatments:  None tried  Associated symptoms: abdominal pain (Bloating)    Associated symptoms: no vomiting        Review of Systems   Constitutional: Positive for appetite change (Decreased).   Cardiovascular: Positive for chest pain.   Gastrointestinal: Positive for abdominal pain (Bloating). Negative for vomiting.   Musculoskeletal: Positive for arthralgias (leg pain).   All other systems reviewed and are negative.      Past Medical History:   Diagnosis Date   • Atypical mole    • Cervical disc disorder    • Diabetes mellitus    • Encounter for Papanicolaou smear of cervix 2013    normal per patient   • Extremity pain    • Headache    • History of colonoscopy 2013    normal per patient   • History of mammogram 2013    normal per patient   • History of uterine leiomyoma    • History of varicella    • Hypertension    • Hypothyroidism    • Joint pain    • Low back pain    • Lumbosacral disc disease    • Neck pain    • Osteoarthritis    • Tattoos     HIV neg 2012 per patient   •  Tobacco abuse    • Uterine fibromyoma        Allergies   Allergen Reactions   • Glimepiride Diarrhea       Past Surgical History:   Procedure Laterality Date   • ABDOMINAL SURGERY     • ANTERIOR CERVICAL DISCECTOMY  2016    C4-6 DISC (Dr. Pena)   • BACK SURGERY     •  SECTION      , ,    • CHOLECYSTECTOMY WITH INTRAOPERATIVE CHOLANGIOGRAM N/A 2017    Procedure: CHOLECYSTECTOMY LAPAROSCOPIC INTRAOPERATIVE CHOLANGIOGRAM;  Surgeon: Clifford Freed MD;  Location: [facility];  Service:    • HYSTERECTOMY  2015   • LUMBAR DISCECTOMY     • NECK SURGERY         Family History   Problem Relation Age of Onset   • Diabetes Mother    • Hypertension Mother    • Colon cancer Maternal Grandmother    • Diabetes Maternal Grandmother    • Hypertension Maternal Grandmother    • Diabetes Daughter    • Hypothyroidism Daughter    • Diabetes Maternal Uncle    • Hypertension Maternal Uncle    • Hypertension Other    • Breast cancer Neg Hx    • Ovarian cancer Neg Hx        Social History     Social History   • Marital status:      Spouse name: N/A   • Number of children: N/A   • Years of education: N/A     Social History Main Topics   • Smoking status: Former Smoker     Packs/day: 0.50     Years: 28.00     Types: Cigarettes     Start date:      Quit date: 2016   • Smokeless tobacco: Never Used   • Alcohol use Yes      Comment: occasional 1 a week   • Drug use: No   • Sexual activity: Defer     Other Topics Concern   • None     Social History Narrative         Objective   Physical Exam   Constitutional: She is oriented to person, place, and time. She appears well-developed and well-nourished.   HENT:   Head: Normocephalic and atraumatic.   Nose: Nose normal.   Eyes: Conjunctivae are normal. No scleral icterus.   Neck: Normal range of motion. Neck supple.   Cardiovascular: Normal rate and regular rhythm.    Pulmonary/Chest: Effort normal and breath sounds normal. No respiratory  distress. She has no wheezes. She has no rhonchi. She has no rales. She exhibits tenderness (Tenderness upon palpation along the left chest wall).   Abdominal: Soft. Bowel sounds are normal. There is tenderness (Mild tenderness upon palpation along the left abdomen).   Musculoskeletal: Normal range of motion. She exhibits tenderness (Tenderness upon palpation on her legs). She exhibits no edema.   Neurological: She is alert and oriented to person, place, and time.   Skin: Skin is warm and dry.   Psychiatric: She has a normal mood and affect. Her behavior is normal.   Nursing note and vitals reviewed.      Procedures         ED Course  ED Course   Comment By Time   Mrs. aGrrido was sleeping.  I woke her up and advised her of negative CT findings.  Her chest pain appears to be a chest wall process.  Her abdomen appears to be healing up well.  I have agreed to write a prescription for a few more of her pain pills.  She tells me she has someone to pick her up.  She is planning on seeing Dr. Tanner her primary care provider on Monday which is day after tomorrow Александр Fountain MD 12/02 6147                     MDM  Number of Diagnoses or Management Options  new and requires workup     Amount and/or Complexity of Data Reviewed  Clinical lab tests: ordered and reviewed  Tests in the radiology section of CPT®: ordered and reviewed  Review and summarize past medical records: yes  Independent visualization of images, tracings, or specimens: yes    Patient Progress  Patient progress: improved      Final diagnoses:   Chest wall pain following surgery   Upper abdominal pain   S/P cholecystectomy       Documentation assistance provided by melodie Best.  Information recorded by the melodie was done at my direction and has been verified and validated by me.     José Miguel Best  12/02/17 6286       Александр Fountain MD  12/05/17 4262

## 2017-12-02 NOTE — DISCHARGE INSTRUCTIONS
You may take ibuprofen for your pain.  Take that with food.  If you take the medication that I have prescribed you may not drive while doing so.  Call Dr. Tanner Monday and asked to be seen to follow-up your chest pain and your abdominal pain.  Return here if fevers, vomiting, or any other concerns.

## 2017-12-03 ENCOUNTER — APPOINTMENT (OUTPATIENT)
Dept: MRI IMAGING | Facility: HOSPITAL | Age: 47
End: 2017-12-03

## 2017-12-03 ENCOUNTER — HOSPITAL ENCOUNTER (EMERGENCY)
Facility: HOSPITAL | Age: 47
Discharge: HOME | End: 2017-12-03
Attending: EMERGENCY MEDICINE | Admitting: EMERGENCY MEDICINE

## 2017-12-03 VITALS
OXYGEN SATURATION: 99 % | HEIGHT: 68 IN | WEIGHT: 250 LBS | BODY MASS INDEX: 37.89 KG/M2 | RESPIRATION RATE: 18 BRPM | HEART RATE: 86 BPM | TEMPERATURE: 98.1 F | DIASTOLIC BLOOD PRESSURE: 75 MMHG | SYSTOLIC BLOOD PRESSURE: 150 MMHG

## 2017-12-03 DIAGNOSIS — M50.30 DDD (DEGENERATIVE DISC DISEASE), CERVICAL: ICD-10-CM

## 2017-12-03 DIAGNOSIS — E87.6 HYPOKALEMIA: ICD-10-CM

## 2017-12-03 DIAGNOSIS — G54.5 PARSONAGE-TURNER SYNDROME: ICD-10-CM

## 2017-12-03 DIAGNOSIS — M25.512 NONTRAUMATIC SHOULDER PAIN, LEFT: Primary | ICD-10-CM

## 2017-12-03 LAB
AMPHET+METHAMPHET UR QL: NEGATIVE
AMPHETAMINES UR QL: NEGATIVE
ANION GAP SERPL CALCULATED.3IONS-SCNC: 9 MMOL/L (ref 3–11)
BACTERIA UR QL AUTO: ABNORMAL /HPF
BARBITURATES UR QL SCN: NEGATIVE
BASOPHILS # BLD AUTO: 0.03 10*3/MM3 (ref 0–0.2)
BASOPHILS NFR BLD AUTO: 0.3 % (ref 0–1)
BENZODIAZ UR QL SCN: NEGATIVE
BILIRUB UR QL STRIP: ABNORMAL
BUN BLD-MCNC: 18 MG/DL (ref 9–23)
BUN/CREAT SERPL: 22.5 (ref 7–25)
BUPRENORPHINE SERPL-MCNC: NEGATIVE NG/ML
CALCIUM SPEC-SCNC: 10.6 MG/DL (ref 8.7–10.4)
CANNABINOIDS SERPL QL: POSITIVE
CHLORIDE SERPL-SCNC: 98 MMOL/L (ref 99–109)
CLARITY UR: ABNORMAL
CO2 SERPL-SCNC: 32 MMOL/L (ref 20–31)
COCAINE UR QL: NEGATIVE
COLOR UR: ABNORMAL
CREAT BLD-MCNC: 0.8 MG/DL (ref 0.6–1.3)
CRP SERPL-MCNC: 1.39 MG/DL (ref 0–1)
DEPRECATED RDW RBC AUTO: 42.5 FL (ref 37–54)
EOSINOPHIL # BLD AUTO: 0.7 10*3/MM3 (ref 0–0.3)
EOSINOPHIL NFR BLD AUTO: 6.2 % (ref 0–3)
ERYTHROCYTE [DISTWIDTH] IN BLOOD BY AUTOMATED COUNT: 12.6 % (ref 11.3–14.5)
ERYTHROCYTE [SEDIMENTATION RATE] IN BLOOD: 101 MM/HR (ref 0–20)
GFR SERPL CREATININE-BSD FRML MDRD: 94 ML/MIN/1.73
GLUCOSE BLD-MCNC: 201 MG/DL (ref 70–100)
GLUCOSE UR STRIP-MCNC: NEGATIVE MG/DL
HCT VFR BLD AUTO: 47.4 % (ref 34.5–44)
HGB BLD-MCNC: 15.5 G/DL (ref 11.5–15.5)
HGB UR QL STRIP.AUTO: NEGATIVE
HOLD SPECIMEN: NORMAL
HOLD SPECIMEN: NORMAL
HYALINE CASTS UR QL AUTO: ABNORMAL /LPF
IMM GRANULOCYTES # BLD: 0.05 10*3/MM3 (ref 0–0.03)
IMM GRANULOCYTES NFR BLD: 0.4 % (ref 0–0.6)
KETONES UR QL STRIP: ABNORMAL
LEUKOCYTE ESTERASE UR QL STRIP.AUTO: NEGATIVE
LYMPHOCYTES # BLD AUTO: 3.82 10*3/MM3 (ref 0.6–4.8)
LYMPHOCYTES NFR BLD AUTO: 34 % (ref 24–44)
MCH RBC QN AUTO: 29.9 PG (ref 27–31)
MCHC RBC AUTO-ENTMCNC: 32.7 G/DL (ref 32–36)
MCV RBC AUTO: 91.3 FL (ref 80–99)
METHADONE UR QL SCN: NEGATIVE
MONOCYTES # BLD AUTO: 0.69 10*3/MM3 (ref 0–1)
MONOCYTES NFR BLD AUTO: 6.1 % (ref 0–12)
MUCOUS THREADS URNS QL MICRO: ABNORMAL /HPF
NEUTROPHILS # BLD AUTO: 5.93 10*3/MM3 (ref 1.5–8.3)
NEUTROPHILS NFR BLD AUTO: 53 % (ref 41–71)
NITRITE UR QL STRIP: NEGATIVE
OPIATES UR QL: POSITIVE
OXYCODONE UR QL SCN: POSITIVE
PCP UR QL SCN: NEGATIVE
PH UR STRIP.AUTO: 6 [PH] (ref 5–8)
PLATELET # BLD AUTO: 215 10*3/MM3 (ref 150–450)
PMV BLD AUTO: 12.2 FL (ref 6–12)
POTASSIUM BLD-SCNC: 3.4 MMOL/L (ref 3.5–5.5)
PROPOXYPH UR QL: NEGATIVE
PROT UR QL STRIP: ABNORMAL
RBC # BLD AUTO: 5.19 10*6/MM3 (ref 3.89–5.14)
RBC # UR: ABNORMAL /HPF
REF LAB TEST METHOD: ABNORMAL
SODIUM BLD-SCNC: 139 MMOL/L (ref 132–146)
SP GR UR STRIP: 1.04 (ref 1–1.03)
SQUAMOUS #/AREA URNS HPF: ABNORMAL /HPF
TRICYCLICS UR QL SCN: NEGATIVE
UROBILINOGEN UR QL STRIP: ABNORMAL
WBC NRBC COR # BLD: 11.22 10*3/MM3 (ref 3.5–10.8)
WBC UR QL AUTO: ABNORMAL /HPF
WHOLE BLOOD HOLD SPECIMEN: NORMAL
WHOLE BLOOD HOLD SPECIMEN: NORMAL

## 2017-12-03 PROCEDURE — 25010000002 HYDROMORPHONE PER 4 MG: Performed by: EMERGENCY MEDICINE

## 2017-12-03 PROCEDURE — 25010000002 POTASSIUM CHLORIDE PER 2 MEQ OF POTASSIUM: Performed by: EMERGENCY MEDICINE

## 2017-12-03 PROCEDURE — 96366 THER/PROPH/DIAG IV INF ADDON: CPT

## 2017-12-03 PROCEDURE — 0 GADOBENATE DIMEGLUMINE 529 MG/ML SOLUTION: Performed by: EMERGENCY MEDICINE

## 2017-12-03 PROCEDURE — 80048 BASIC METABOLIC PNL TOTAL CA: CPT | Performed by: EMERGENCY MEDICINE

## 2017-12-03 PROCEDURE — 85652 RBC SED RATE AUTOMATED: CPT | Performed by: EMERGENCY MEDICINE

## 2017-12-03 PROCEDURE — 25010000002 KETOROLAC TROMETHAMINE PER 15 MG: Performed by: EMERGENCY MEDICINE

## 2017-12-03 PROCEDURE — 96365 THER/PROPH/DIAG IV INF INIT: CPT

## 2017-12-03 PROCEDURE — 85025 COMPLETE CBC W/AUTO DIFF WBC: CPT | Performed by: EMERGENCY MEDICINE

## 2017-12-03 PROCEDURE — 86140 C-REACTIVE PROTEIN: CPT | Performed by: EMERGENCY MEDICINE

## 2017-12-03 PROCEDURE — 25010000002 ONDANSETRON PER 1 MG

## 2017-12-03 PROCEDURE — 93005 ELECTROCARDIOGRAM TRACING: CPT | Performed by: EMERGENCY MEDICINE

## 2017-12-03 PROCEDURE — 99284 EMERGENCY DEPT VISIT MOD MDM: CPT

## 2017-12-03 PROCEDURE — 72156 MRI NECK SPINE W/O & W/DYE: CPT

## 2017-12-03 PROCEDURE — 25010000002 MAGNESIUM SULFATE PER 500 MG OF MAGNESIUM: Performed by: EMERGENCY MEDICINE

## 2017-12-03 PROCEDURE — A9577 INJ MULTIHANCE: HCPCS | Performed by: EMERGENCY MEDICINE

## 2017-12-03 PROCEDURE — 25010000002 DEXAMETHASONE PER 1 MG: Performed by: EMERGENCY MEDICINE

## 2017-12-03 PROCEDURE — 81001 URINALYSIS AUTO W/SCOPE: CPT | Performed by: EMERGENCY MEDICINE

## 2017-12-03 PROCEDURE — 96375 TX/PRO/DX INJ NEW DRUG ADDON: CPT

## 2017-12-03 PROCEDURE — 80306 DRUG TEST PRSMV INSTRMNT: CPT | Performed by: EMERGENCY MEDICINE

## 2017-12-03 PROCEDURE — 96376 TX/PRO/DX INJ SAME DRUG ADON: CPT

## 2017-12-03 PROCEDURE — 73223 MRI JOINT UPR EXTR W/O&W/DYE: CPT

## 2017-12-03 RX ORDER — SODIUM CHLORIDE 0.9 % (FLUSH) 0.9 %
10 SYRINGE (ML) INJECTION AS NEEDED
Status: DISCONTINUED | OUTPATIENT
Start: 2017-12-03 | End: 2017-12-03 | Stop reason: HOSPADM

## 2017-12-03 RX ORDER — HYDROMORPHONE HYDROCHLORIDE 1 MG/ML
0.5 INJECTION, SOLUTION INTRAMUSCULAR; INTRAVENOUS; SUBCUTANEOUS ONCE
Status: COMPLETED | OUTPATIENT
Start: 2017-12-03 | End: 2017-12-03

## 2017-12-03 RX ORDER — DEXAMETHASONE SODIUM PHOSPHATE 4 MG/ML
8 INJECTION, SOLUTION INTRA-ARTICULAR; INTRALESIONAL; INTRAMUSCULAR; INTRAVENOUS; SOFT TISSUE ONCE
Status: COMPLETED | OUTPATIENT
Start: 2017-12-03 | End: 2017-12-03

## 2017-12-03 RX ORDER — POTASSIUM CHLORIDE 750 MG/1
20 CAPSULE, EXTENDED RELEASE ORAL ONCE
Status: COMPLETED | OUTPATIENT
Start: 2017-12-03 | End: 2017-12-03

## 2017-12-03 RX ORDER — GABAPENTIN 400 MG/1
CAPSULE ORAL
Qty: 7 CAPSULE | Refills: 0 | Status: SHIPPED | OUTPATIENT
Start: 2017-12-03 | End: 2018-03-22 | Stop reason: SDUPTHER

## 2017-12-03 RX ORDER — ONDANSETRON 2 MG/ML
4 INJECTION INTRAMUSCULAR; INTRAVENOUS ONCE
Status: COMPLETED | OUTPATIENT
Start: 2017-12-03 | End: 2017-12-03

## 2017-12-03 RX ORDER — ONDANSETRON 2 MG/ML
INJECTION INTRAMUSCULAR; INTRAVENOUS
Status: COMPLETED
Start: 2017-12-03 | End: 2017-12-03

## 2017-12-03 RX ORDER — LIDOCAINE 50 MG/G
2 PATCH TOPICAL
Status: DISCONTINUED | OUTPATIENT
Start: 2017-12-03 | End: 2017-12-03 | Stop reason: HOSPADM

## 2017-12-03 RX ORDER — FENTANYL 25 UG/H
1 PATCH TRANSDERMAL ONCE
Status: DISCONTINUED | OUTPATIENT
Start: 2017-12-03 | End: 2017-12-03 | Stop reason: HOSPADM

## 2017-12-03 RX ORDER — KETOROLAC TROMETHAMINE 15 MG/ML
10 INJECTION, SOLUTION INTRAMUSCULAR; INTRAVENOUS ONCE
Status: COMPLETED | OUTPATIENT
Start: 2017-12-03 | End: 2017-12-03

## 2017-12-03 RX ORDER — METHYLPREDNISOLONE 4 MG/1
TABLET ORAL
Qty: 21 TABLET | Refills: 0 | Status: SHIPPED | OUTPATIENT
Start: 2017-12-03 | End: 2017-12-28

## 2017-12-03 RX ORDER — FENTANYL 25 UG/H
1 PATCH TRANSDERMAL
Qty: 1 PATCH | Refills: 0 | Status: SHIPPED | OUTPATIENT
Start: 2017-12-03 | End: 2017-12-04

## 2017-12-03 RX ORDER — DIAZEPAM 5 MG/1
10 TABLET ORAL ONCE
Status: COMPLETED | OUTPATIENT
Start: 2017-12-03 | End: 2017-12-03

## 2017-12-03 RX ADMIN — ONDANSETRON 4 MG: 2 INJECTION INTRAMUSCULAR; INTRAVENOUS at 07:08

## 2017-12-03 RX ADMIN — KETOROLAC TROMETHAMINE 10 MG: 15 INJECTION, SOLUTION INTRAMUSCULAR; INTRAVENOUS at 10:42

## 2017-12-03 RX ADMIN — HYDROMORPHONE HYDROCHLORIDE 0.5 MG: 1 INJECTION, SOLUTION INTRAMUSCULAR; INTRAVENOUS; SUBCUTANEOUS at 12:57

## 2017-12-03 RX ADMIN — HYDROMORPHONE HYDROCHLORIDE 1 MG: 1 INJECTION, SOLUTION INTRAMUSCULAR; INTRAVENOUS; SUBCUTANEOUS at 06:58

## 2017-12-03 RX ADMIN — GADOBENATE DIMEGLUMINE 18 ML: 529 INJECTION, SOLUTION INTRAVENOUS at 10:10

## 2017-12-03 RX ADMIN — DEXAMETHASONE SODIUM PHOSPHATE 8 MG: 4 INJECTION, SOLUTION INTRAMUSCULAR; INTRAVENOUS at 07:04

## 2017-12-03 RX ADMIN — POTASSIUM CHLORIDE 500 ML/HR: 2 INJECTION, SOLUTION, CONCENTRATE INTRAVENOUS at 07:45

## 2017-12-03 RX ADMIN — HYDROMORPHONE HYDROCHLORIDE 0.5 MG: 1 INJECTION, SOLUTION INTRAMUSCULAR; INTRAVENOUS; SUBCUTANEOUS at 12:58

## 2017-12-03 RX ADMIN — POTASSIUM CHLORIDE 20 MEQ: 750 CAPSULE, EXTENDED RELEASE ORAL at 08:26

## 2017-12-03 RX ADMIN — FENTANYL 1 PATCH: 25 PATCH, EXTENDED RELEASE TRANSDERMAL at 12:59

## 2017-12-03 RX ADMIN — KETOROLAC TROMETHAMINE 10 MG: 15 INJECTION, SOLUTION INTRAMUSCULAR; INTRAVENOUS at 07:03

## 2017-12-03 RX ADMIN — HYDROMORPHONE HYDROCHLORIDE 0.5 MG: 1 INJECTION, SOLUTION INTRAMUSCULAR; INTRAVENOUS; SUBCUTANEOUS at 08:22

## 2017-12-03 RX ADMIN — DIAZEPAM 10 MG: 5 TABLET ORAL at 07:51

## 2017-12-03 RX ADMIN — LIDOCAINE 2 PATCH: 50 PATCH CUTANEOUS at 13:02

## 2017-12-03 NOTE — ED PROVIDER NOTES
Subjective   HPI Comments: This is a pleasant, unfortunate 47-year-old right-handed female who sees Dr. Pena, Dr. Cooney, and Dr. Tanner.  She also is a patient of Dr. Freed.      She is postop day 9 cholecystectomy for acute cholecystitis by Dr. Freed as well but no knee chronic pain medicine she was on was Neurontin.  This pain has progressed and gotten much worse in any movement of her left arm causes lancing pain into her neck and shoulder area and she is just absolutely miserable with this and in tears and could not sleep.  She was seen yesterday for that pain here in our ER and had a complete workup including blood work which showed an elevated glucose.  Her potassium was slightly low.  A CTA of the chest and CT of the abdomen were unrevealing.  She continues to have pain today and is just miserable and beside herself.    She has had cervical fusion.  I've reviewed her MRIs of the cervical and thoracic spine.    She has had no fevers or chills, runny nose, sore throat, or cough.  Bowel movements and urine have been normal.  She has had no fevers or chills.  She has had no trauma.        All other systems reviewed and are negative except as noted above.        History provided by:  Patient and relative      Review of Systems   All other systems reviewed and are negative.      Past Medical History:   Diagnosis Date   • Atypical mole    • Cervical disc disorder    • Diabetes mellitus    • Encounter for Papanicolaou smear of cervix 2013    normal per patient   • Extremity pain    • Headache    • History of colonoscopy 2013    normal per patient   • History of mammogram 2013    normal per patient   • History of uterine leiomyoma    • History of varicella    • Hypertension    • Hypothyroidism    • Joint pain    • Low back pain    • Lumbosacral disc disease    • Neck pain    • Osteoarthritis    • Tattoos     HIV neg 2012 per patient   • Tobacco abuse    • Uterine fibromyoma        Allergies   Allergen Reactions    • Glimepiride Diarrhea       Past Surgical History:   Procedure Laterality Date   • ABDOMINAL SURGERY     • ANTERIOR CERVICAL DISCECTOMY  2016    C4-6 DISC (Dr. Pena)   • BACK SURGERY     •  SECTION      , ,    • CHOLECYSTECTOMY     • CHOLECYSTECTOMY WITH INTRAOPERATIVE CHOLANGIOGRAM N/A 2017    Procedure: CHOLECYSTECTOMY LAPAROSCOPIC INTRAOPERATIVE CHOLANGIOGRAM;  Surgeon: Clifford Freed MD;  Location: [facility];  Service:    • HYSTERECTOMY  2015   • LUMBAR DISCECTOMY     • NECK SURGERY         Family History   Problem Relation Age of Onset   • Diabetes Mother    • Hypertension Mother    • Colon cancer Maternal Grandmother    • Diabetes Maternal Grandmother    • Hypertension Maternal Grandmother    • Diabetes Daughter    • Hypothyroidism Daughter    • Diabetes Maternal Uncle    • Hypertension Maternal Uncle    • Hypertension Other    • Breast cancer Neg Hx    • Ovarian cancer Neg Hx        Social History     Social History   • Marital status:      Spouse name: N/A   • Number of children: N/A   • Years of education: N/A     Social History Main Topics   • Smoking status: Former Smoker     Packs/day: 0.50     Years: 28.00     Types: Cigarettes     Start date:      Quit date: 2016   • Smokeless tobacco: Never Used   • Alcohol use Yes      Comment: occasional 1 a week   • Drug use: No   • Sexual activity: Defer     Other Topics Concern   • None     Social History Narrative           Objective   Physical Exam   Constitutional: She is oriented to person, place, and time. She appears well-developed.   She is alert and oriented and crying and just seems to be in severe pain.   HENT:   Head: Normocephalic and atraumatic.   Right Ear: External ear normal.   Left Ear: External ear normal.   Nose: Nose normal.   Mouth/Throat: Oropharynx is clear and moist.   Eyes: Conjunctivae and EOM are normal. Pupils are equal, round, and reactive to light.   Neck:   Her neck is  normal to inspection but is exquisitely tender into the upper back area especially on the left side with her rhomboids and trapezius muscle that they appear normal are exceptionally tender to palpation and range of motion of her scapula and left shoulder is limited by pain.  I cannot feel any swelling in her shoulder and the joint is not hot.  The axilla is without mass.  Her left arm shows multiple tattoos but there is no inflammation the left elbow is nontender and without swelling left wrist and hand are nontender she has a 3 over 4 left radial pulse she can squeeze her left hand and all digits appear to move that she said her hand feels a bit weak to her compared to normal.   Cardiovascular: Normal rate, regular rhythm, normal heart sounds and intact distal pulses.    Pulmonary/Chest: Effort normal and breath sounds normal. No respiratory distress. She has no wheezes.   Abdominal:   Her port incisions are clean dry and intact she is soft with the appropriate amount of tenderness in the right upper quadrant considering her postoperative days.  There is no organomegaly or masses.   Musculoskeletal: She exhibits no edema.   Her left shoulder and arm exam noted above.  On the right she has good range of motion of her right arm without any impingements.  Her lower extremities are unremarkable with good pulses and no edema.   Neurological: She is alert and oriented to person, place, and time. No cranial nerve deficit. Coordination normal.   Skin: Skin is warm and dry.   Psychiatric: Judgment and thought content normal.   She is tearful and distraught   Nursing note and vitals reviewed.      Procedures         ED Course  ED Course   Comment By Time   Re-evaluated the pt and discussed results and findings thus far.-JETT Middleton 12/03 6003   Re-evaluated the pt-JETT Middleton 12/03 1214      Recent Results (from the past 24 hour(s))   Green Top (Gel)    Collection Time: 12/03/17  6:20 AM   Result Value  Ref Range    Extra Tube Hold for add-ons.    Lavender Top    Collection Time: 12/03/17  6:20 AM   Result Value Ref Range    Extra Tube hold for add-on    Gold Top - SST    Collection Time: 12/03/17  6:20 AM   Result Value Ref Range    Extra Tube Hold for add-ons.    Basic Metabolic Panel    Collection Time: 12/03/17  6:20 AM   Result Value Ref Range    Glucose 201 (H) 70 - 100 mg/dL    BUN 18 9 - 23 mg/dL    Creatinine 0.80 0.60 - 1.30 mg/dL    Sodium 139 132 - 146 mmol/L    Potassium 3.4 (L) 3.5 - 5.5 mmol/L    Chloride 98 (L) 99 - 109 mmol/L    CO2 32.0 (H) 20.0 - 31.0 mmol/L    Calcium 10.6 (H) 8.7 - 10.4 mg/dL    eGFR  African Amer 94 >60 mL/min/1.73    BUN/Creatinine Ratio 22.5 7.0 - 25.0    Anion Gap 9.0 3.0 - 11.0 mmol/L   Sedimentation Rate    Collection Time: 12/03/17  6:20 AM   Result Value Ref Range    Sed Rate 101 (H) 0 - 20 mm/hr   CBC Auto Differential    Collection Time: 12/03/17  6:20 AM   Result Value Ref Range    WBC 11.22 (H) 3.50 - 10.80 10*3/mm3    RBC 5.19 (H) 3.89 - 5.14 10*6/mm3    Hemoglobin 15.5 11.5 - 15.5 g/dL    Hematocrit 47.4 (H) 34.5 - 44.0 %    MCV 91.3 80.0 - 99.0 fL    MCH 29.9 27.0 - 31.0 pg    MCHC 32.7 32.0 - 36.0 g/dL    RDW 12.6 11.3 - 14.5 %    RDW-SD 42.5 37.0 - 54.0 fl    MPV 12.2 (H) 6.0 - 12.0 fL    Platelets 215 150 - 450 10*3/mm3    Neutrophil % 53.0 41.0 - 71.0 %    Lymphocyte % 34.0 24.0 - 44.0 %    Monocyte % 6.1 0.0 - 12.0 %    Eosinophil % 6.2 (H) 0.0 - 3.0 %    Basophil % 0.3 0.0 - 1.0 %    Immature Grans % 0.4 0.0 - 0.6 %    Neutrophils, Absolute 5.93 1.50 - 8.30 10*3/mm3    Lymphocytes, Absolute 3.82 0.60 - 4.80 10*3/mm3    Monocytes, Absolute 0.69 0.00 - 1.00 10*3/mm3    Eosinophils, Absolute 0.70 (H) 0.00 - 0.30 10*3/mm3    Basophils, Absolute 0.03 0.00 - 0.20 10*3/mm3    Immature Grans, Absolute 0.05 (H) 0.00 - 0.03 10*3/mm3   Light Blue Top    Collection Time: 12/03/17  6:21 AM   Result Value Ref Range    Extra Tube hold for add-on    C-reactive Protein     Collection Time: 12/03/17  7:51 AM   Result Value Ref Range    C-Reactive Protein 1.39 (H) 0.00 - 1.00 mg/dL   Urinalysis With / Culture If Indicated - Urine, Clean Catch    Collection Time: 12/03/17  8:09 AM   Result Value Ref Range    Color, UA Dark Yellow (A) Yellow, Straw    Appearance, UA Cloudy (A) Clear    pH, UA 6.0 5.0 - 8.0    Specific Gravity, UA 1.042 (H) 1.001 - 1.030    Glucose, UA Negative Negative    Ketones, UA Trace (A) Negative    Bilirubin, UA Large (3+) (A) Negative    Blood, UA Negative Negative    Protein, UA Trace (A) Negative    Leuk Esterase, UA Negative Negative    Nitrite, UA Negative Negative    Urobilinogen, UA 1.0 E.U./dL 0.2 - 1.0 E.U./dL   Urine Drug Screen - Urine, Clean Catch    Collection Time: 12/03/17  8:09 AM   Result Value Ref Range    THC, Screen, Urine Positive (A) Negative    Phencyclidine (PCP), Urine Negative Negative    Cocaine Screen, Urine Negative Negative    Methamphetamine, Urine Negative Negative    Opiate Screen Positive (A) Negative    Amphetamine Screen, Urine Negative Negative    Benzodiazepine Screen, Urine Negative Negative    Tricyclic Antidepressants Screen Negative Negative    Methadone Screen, Urine Negative Negative    Barbiturates Screen, Urine Negative Negative    Oxycodone Screen, Urine Positive (A) Negative    Propoxyphene Screen Negative Negative    Buprenorphine, Screen, Urine Negative Negative   Urinalysis, Microscopic Only - Urine, Clean Catch    Collection Time: 12/03/17  8:09 AM   Result Value Ref Range    RBC, UA 0-2 None Seen, 0-2 /HPF    WBC, UA 0-2 (A) None Seen /HPF    Bacteria, UA None Seen None Seen, Trace /HPF    Squamous Epithelial Cells, UA 3-6 (A) None Seen, 0-2 /HPF    Hyaline Casts, UA 0-6 0 - 6 /LPF    Mucus, UA Trace None Seen, Trace /HPF    Methodology Manual Light Microscopy      Note: In addition to lab results from this visit, the labs listed above may include labs taken at another facility or during a different encounter  within the last 24 hours. Please correlate lab times with ED admission and discharge times for further clarification of the services performed during this visit.    MRI Cervical Spine With & Without Contrast   Preliminary Result   Left posterior disc osteophyte complex at the C6/C7 level   creating mass effect on the leftward aspect of the spinal cord and   thecal sac and severe narrowing of the left neuroforamina and contact   and compromise of the left nerve root. [Fusion] is intact and   unremarkable with no significant abnormal contrast enhancement   identified.       DICTATED:     12/03/2017   EDITED:          12/03/2017                           MRI Shoulder Left With & Without Contrast   Preliminary Result   Severe motion artifact degrades image quality. There is no   large joint effusion. No definite abnormal contrast enhancement. Mild   tendinopathy of the supraspinatus and subscapularis tendons with   possible tear seen of the superior and inferior labrum. Anterior and   posterior labrum cannot be evaluated due to the motion artifact.       DICTATED:     12/03/2017   EDITED:          12/03/2017                             Vitals:    12/03/17 0615 12/03/17 0807 12/03/17 0809 12/03/17 1313   BP:  143/86  150/75   BP Location:       Patient Position:       Pulse:    86   Resp:    18   Temp: 98 °F (36.7 °C)   98.1 °F (36.7 °C)   TempSrc: Oral   Oral   SpO2:   100% 99%   Weight:       Height:         Medications   HYDROmorphone (DILAUDID) injection 1 mg (1 mg Intravenous Given 12/3/17 0658)   magnesium sulfate 1 g, potassium chloride 20 mEq in sodium chloride 0.9 % 1,000 mL infusion (0 mL/hr Intravenous Stopped 12/3/17 1207)   diazePAM (VALIUM) tablet 10 mg (10 mg Oral Given 12/3/17 0751)   dexamethasone (DECADRON) injection 8 mg (8 mg Intravenous Given 12/3/17 0704)   ketorolac (TORADOL) injection 10 mg (10 mg Intravenous Given 12/3/17 0703)   ondansetron (ZOFRAN) injection 4 mg (4 mg Intravenous Given 12/3/17  0708)   potassium chloride (MICRO-K) CR capsule 20 mEq (20 mEq Oral Given 12/3/17 0826)   HYDROmorphone (DILAUDID) injection 0.5 mg (0.5 mg Intravenous Given 12/3/17 0822)   ketorolac (TORADOL) injection 10 mg (10 mg Intravenous Given 12/3/17 1042)   gadobenate dimeglumine (MULTIHANCE) injection 18 mL (18 mL Intravenous Given 12/3/17 1010)   HYDROmorphone (DILAUDID) injection 0.5 mg (0.5 mg Intravenous Given 12/3/17 1257)   HYDROmorphone (DILAUDID) injection 0.5 mg (0.5 mg Intravenous Given 12/3/17 1258)     ECG/EMG Results (last 24 hours)     Procedure Component Value Units Date/Time    ECG 12 Lead [[id number]] Collected:  12/03/17 0613     Updated:  12/03/17 0639    Narrative:       Test Reason : SHOULDER PAIN  Blood Pressure : **/** mmHG  Vent. Rate : 081 BPM     Atrial Rate : 081 BPM     P-R Int : 178 ms          QRS Dur : 072 ms      QT Int : 382 ms       P-R-T Axes : 060 -13 065 degrees     QTc Int : 443 ms    Normal sinus rhythm  Possible Left atrial enlargement  Borderline ECG  When compared with ECG of 02-DEC-2017 15:38, (Unconfirmed)  No significant change was found  Confirmed by GERALD AMADOR MD ([id number]) on 12/3/2017 6:39:11 AM    Referred By:  edmd           Confirmed By:GERALD AMADOR MD                    MDM  Number of Diagnoses or Management Options  DDD (degenerative disc disease), cervical:   Hypokalemia:   Nontraumatic shoulder pain, left:   Parsonage-Chang syndrome:   Diagnosis management comments:         I reviewed all available studies at the bedside with the patient and her family.  Her pain is better now than when she came in but certainly she is not pain-free.    I undertook an extensive diagnostic evaluation of the patient I've reviewed her CTs from yesterday.  MRI of the left shoulder though somewhat limited due to motion artifact did not show an acute process such as a pyomyositis but there is some degenerative changes.    MRI of the cervical spine showed a left disc bulge and degenerative  changes and postsurgical changes.    I reviewed her MRIs personally with Dr. Pena, the patient's neurosurgeon.  He feels that her MRI done today and compared to her postoperative MRI from last year shows he stable with minimal disk changes.  He feels more likely the patient has something like Parsonage Chang syndrome which is quite reasonable given her dramatic presentation of this intermittent pain.  He would like to try a conservative approach with the use of steroids and pain medicine increased dose of Neurontin he will see the patient in the office this week for follow-up.  The patient is agreeable with this.  She has Percocet at home and since her pain is unremitting we'll apply a fentanyl patch year 25 µg per hour.  The patient is not opiate naïve is had opiates in the past for pain management and is been on opiates since her recent surgery.  She is agreeable with this.    Also use a Lidoderm patch on her back as well.  I'll increase her nightly Neurontin dose by 400 mg to see if this may offer some additional benefit to her.    She'll call Dr. Pena's office on Monday for follow-up.  Also put her in a sling to see if this will help take some attention off her brachial plexus.  Also encouraged follow-up with Dr. Tanner, her primary doctor to monitor her sugars and potassium and with her pain management doctor, Ya, you may be able to assist with some sort of nerve block.    All are agreeable with the plan       Amount and/or Complexity of Data Reviewed  Clinical lab tests: reviewed  Tests in the radiology section of CPT®: reviewed  Tests in the medicine section of CPT®: reviewed  Decide to obtain previous medical records or to obtain history from someone other than the patient: yes      EMR Dragon/Transcription disclaimer:  Much of this encounter note is an electronic transcription/translation of spoken language to printed text. The electronic translation of spoken language may permit erroneous, or at  times, nonsensical words or phrases to be inadvertently transcribed; Although I have reviewed the note for such errors, some may still exist.    Final diagnoses:   Nontraumatic shoulder pain, left   DDD (degenerative disc disease), cervical   Parsonage-Chang syndrome   Hypokalemia            Eagle Middleton  12/03/17 1255       Eagle Middleton  12/03/17 1255       Erlin Veloz MD  12/03/17 2511

## 2017-12-04 ENCOUNTER — OFFICE VISIT (OUTPATIENT)
Dept: INTERNAL MEDICINE | Facility: CLINIC | Age: 47
End: 2017-12-04

## 2017-12-04 VITALS
SYSTOLIC BLOOD PRESSURE: 136 MMHG | HEART RATE: 80 BPM | DIASTOLIC BLOOD PRESSURE: 82 MMHG | BODY MASS INDEX: 36.55 KG/M2 | TEMPERATURE: 97.1 F | WEIGHT: 240.4 LBS | RESPIRATION RATE: 18 BRPM

## 2017-12-04 DIAGNOSIS — E87.6 HYPOKALEMIA: ICD-10-CM

## 2017-12-04 DIAGNOSIS — M25.512 ACUTE PAIN OF LEFT SHOULDER: ICD-10-CM

## 2017-12-04 DIAGNOSIS — F51.01 PRIMARY INSOMNIA: ICD-10-CM

## 2017-12-04 DIAGNOSIS — K80.20 GALLSTONES: Primary | ICD-10-CM

## 2017-12-04 DIAGNOSIS — F32.89 OTHER DEPRESSION: ICD-10-CM

## 2017-12-04 DIAGNOSIS — D72.828 OTHER ELEVATED WHITE BLOOD CELL (WBC) COUNT: ICD-10-CM

## 2017-12-04 DIAGNOSIS — M54.2 NECK PAIN: ICD-10-CM

## 2017-12-04 DIAGNOSIS — R07.89 CHEST WALL PAIN: ICD-10-CM

## 2017-12-04 LAB
ANION GAP SERPL CALCULATED.3IONS-SCNC: 7 MMOL/L (ref 3–11)
BUN BLD-MCNC: 19 MG/DL (ref 9–23)
BUN/CREAT SERPL: 21.1 (ref 7–25)
CALCIUM SPEC-SCNC: 9.8 MG/DL (ref 8.7–10.4)
CHLORIDE SERPL-SCNC: 101 MMOL/L (ref 99–109)
CO2 SERPL-SCNC: 31 MMOL/L (ref 20–31)
CREAT BLD-MCNC: 0.9 MG/DL (ref 0.6–1.3)
DEPRECATED RDW RBC AUTO: 42.7 FL (ref 37–54)
ERYTHROCYTE [DISTWIDTH] IN BLOOD BY AUTOMATED COUNT: 12.8 % (ref 11.3–14.5)
GFR SERPL CREATININE-BSD FRML MDRD: 82 ML/MIN/1.73
GLUCOSE BLD-MCNC: 277 MG/DL (ref 70–100)
HCT VFR BLD AUTO: 42.2 % (ref 34.5–44)
HGB BLD-MCNC: 13.8 G/DL (ref 11.5–15.5)
MCH RBC QN AUTO: 29.8 PG (ref 27–31)
MCHC RBC AUTO-ENTMCNC: 32.7 G/DL (ref 32–36)
MCV RBC AUTO: 91.1 FL (ref 80–99)
PLATELET # BLD AUTO: 232 10*3/MM3 (ref 150–450)
PMV BLD AUTO: 12.7 FL (ref 6–12)
POTASSIUM BLD-SCNC: 3.4 MMOL/L (ref 3.5–5.5)
RBC # BLD AUTO: 4.63 10*6/MM3 (ref 3.89–5.14)
SODIUM BLD-SCNC: 139 MMOL/L (ref 132–146)
WBC NRBC COR # BLD: 15.53 10*3/MM3 (ref 3.5–10.8)

## 2017-12-04 PROCEDURE — 99495 TRANSJ CARE MGMT MOD F2F 14D: CPT | Performed by: INTERNAL MEDICINE

## 2017-12-04 PROCEDURE — 36415 COLL VENOUS BLD VENIPUNCTURE: CPT | Performed by: INTERNAL MEDICINE

## 2017-12-04 PROCEDURE — 80048 BASIC METABOLIC PNL TOTAL CA: CPT | Performed by: INTERNAL MEDICINE

## 2017-12-04 PROCEDURE — 85027 COMPLETE CBC AUTOMATED: CPT | Performed by: INTERNAL MEDICINE

## 2017-12-04 RX ORDER — TEMAZEPAM 30 MG/1
30 CAPSULE ORAL NIGHTLY PRN
Qty: 30 CAPSULE | Refills: 5 | Status: SHIPPED | OUTPATIENT
Start: 2017-12-04 | End: 2017-12-28

## 2017-12-04 RX ORDER — BUPROPION HYDROCHLORIDE 150 MG/1
150 TABLET ORAL DAILY
Qty: 30 TABLET | Refills: 5 | Status: SHIPPED | OUTPATIENT
Start: 2017-12-04 | End: 2017-12-28

## 2017-12-04 NOTE — PROGRESS NOTES
Transitional Care Follow Up Visit  Subjective     Ernestina Garrido is a 47 y.o. female who presents for a transitional care management visit.  Chief Complaint   Patient presents with   • Post-op Gallbladder     [facility] hospital follow up discharge 11/25/2017   • Shoulder Pain     left shoulder, ED-follow up 12/03/2017       Within 48 business hours after discharge our office contacted her via telephone to coordinate her care and needs.      I reviewed and discussed the details of that call along with the discharge summary, hospital problems, inpatient lab results, inpatient diagnostic studies, and consultation reports with Ernestina.     Current outpatient and discharge medications have been reconciled for the patient.    Date of TCM Phone Call 11/27/2017   Hospital [facility]LEX   Date of Admission 11/24/2017   Date of Discharge 11/25/2017   Discharge Disposition Home or Self Care     Risk for Readmission (LACE) Score: 5    History of Present Illness     Course During Hospital Stay:  Records have been received and reviewed.  The patient was admitted to [facility] on 11/24/17 with Acute Cholecystitis.  Labs were significant for an elevated white count (11.59), glucose 196, and elevated BNP (119).  Troponin and lipase were negative.  Ultrasound of the gallbladder showed cholelithiasis without discrete evidence of cholecystitis.  However the gallbladder was partially contracted with wall thickness at the upper limits of normal.  The patient was taken to the OR for emergent laparoscopic cholecystectomy with intraoperative cholangiography.  Pathology was consistent with chronic active cholecystitis with cholelithiasis.  White cell count went up to 13.12 on 11/25/17.  The patient did have Hypokalemia while hospitalized.  The patient was discharged on 11/25/17.  The patient denies right upper quadrant pain.  She was discharged on Percocet and Tramadol.    Since discharge, the patient has been to the ED on 12/2/17 for chest pain.   Labs were significant for blood glucose 234 and potassium 3.2.  Lipase was normal.  BNP and Troponin were normal.  Urinalysis was negative with the exception of large bilirubin.  CT of the chest, abdomen, pelvis was negative for PE or other intrathoracic abnormality.  There was a small amount of fluid in the gallbladder fossa suggesting possible postoperative hematoma, seroma, or biloma.  There was no loculated fluid and no evidence of abscess.  The patient was put on a Fentanyl patch in the ED, and sent home with a prescription for these, but the insurance does not cover it.    The patient also went to the ER on 12/3/17 with neck and left shoulder pain.  The patient stated this pain started after her surgery.  It travels down the left arm and is not relieved with pain medication.  White blood cell count was 11.22.  BMP was significant for potassium 3.4.  Sedimentation rate and CRP were elevated.  Urinalysis showed no sign of infection.  Of note, urine drug screen was positive for THC, Opiates, and Oxycodone.   MRI of the cervical spine showed left posterior disc osteophyte complex at the C6/C7 level creating a mass effect on leftward aspect of the spinal cord and thecal sac, and severe narrowing of the left neuroforamina and compromise of left nerve root.  Her hardware from previous cervical fusion was intact.  MRI of the left shoulder showed mild tendinopathy of the supraspinatus and subscapularis tendons with possible tear seen at the superior and inferior labrum.  There was motion artifact.  The anterior and posterior labrum could not be evaluated due to this.     The following portions of the patient's history were reviewed and updated as appropriate: allergies, current medications, past family history, past medical history, past social history, past surgical history and problem list.    Review of Systems   Constitutional: Negative for chills, fever and unexpected weight change.   Respiratory: Positive for  cough and shortness of breath (mild). Negative for wheezing.    Cardiovascular: Positive for chest pain. Negative for palpitations and leg swelling.   Gastrointestinal: Negative for abdominal pain, blood in stool, constipation, diarrhea, nausea and vomiting.        No melena.   Genitourinary: Negative for dysuria, frequency, hematuria and urgency.   Musculoskeletal: Positive for neck pain. Negative for back pain, joint swelling and neck stiffness.        On Cymbalta and Neurontin.   Skin: Negative for rash.   Neurological: Positive for numbness (left arm and fingers).   Psychiatric/Behavioral: Positive for sleep disturbance (tried otc sleep aids and Melatonin). Negative for suicidal ideas. The patient is nervous/anxious.         Stable depression.  On Cymbalta.       Objective   Physical Exam   Constitutional: She appears well-developed and well-nourished.   Obese.   Neck: Normal range of motion. Neck supple. Carotid bruit is not present. No thyromegaly present.   There is no tenderness to palpation over the cervical spine or paraspinal muscles.   Cardiovascular: Normal rate, regular rhythm, normal heart sounds and intact distal pulses.  Exam reveals no gallop and no friction rub.    No murmur heard.  No peripheral edema.   Pulmonary/Chest: Effort normal and breath sounds normal.   Abdominal: Soft. Bowel sounds are normal. She exhibits no distension, no abdominal bruit and no mass. There is no hepatosplenomegaly. There is tenderness (mild diffuse).   Musculoskeletal: Normal range of motion.   There is diffuse  tenderness to palpation of the affected shoulder.  There is no erythema, edema, or warmth.  There is pain with abduction and adduction of the left shoulder, but not with external rotation and internal rotation.  The patient is able to put both hands behind the head and do the crossover maneuver without pain. The patient can place  both hands behind the back, but has pain with that maneuver.     Neurological:  She has normal strength and normal reflexes.   Skin: No rash noted.   Psychiatric: She has a normal mood and affect.   Nursing note and vitals reviewed.      Assessment/Plan   Ernestina was seen today for post-op gallbladder and shoulder pain.    Diagnoses and all orders for this visit:    Gallstones    Chest wall pain  -     diclofenac (FLECTOR) 1.3 % patch patch; Apply 1 patch topically 2 (Two) Times a Day.    Neck pain  -     Ambulatory Referral to Neurosurgery    Acute pain of left shoulder  -     Ambulatory Referral to Orthopedic Surgery    Primary insomnia  -     temazepam (RESTORIL) 30 MG capsule; Take 1 capsule by mouth At Night As Needed for Sleep.    Other depression  -     buPROPion XL (WELLBUTRIN XL) 150 MG 24 hr tablet; Take 1 tablet by mouth Daily.    Hypokalemia  -     Basic Metabolic Panel    Other elevated white blood cell (WBC) count  -     CBC (No Diff)           Re-start Lodine (Etodolac), continue Tramdol,  and continue Cymbalta.    Transitional Care Management Certification  I certify that the following are true:  1. Communication was made within 2 business days of discharge.  2. Complexity of Medical Decision Making is high.  3. Face to face visit occurred within 8 days.    *Note: 15615 is for high complexity patients with a face to face visit within 7 days of discharge.  52112 is for high complexity patients with a face to face on days 8-14 post discharge or medium complexity with face to face visit within 14 days post discharge.           Return in about 1 month (around 1/4/2018) for Recheck-Depression.

## 2017-12-05 ENCOUNTER — OFFICE VISIT (OUTPATIENT)
Dept: NEUROSURGERY | Facility: CLINIC | Age: 47
End: 2017-12-05

## 2017-12-05 VITALS
WEIGHT: 239.8 LBS | SYSTOLIC BLOOD PRESSURE: 128 MMHG | DIASTOLIC BLOOD PRESSURE: 80 MMHG | TEMPERATURE: 99 F | HEIGHT: 68 IN | BODY MASS INDEX: 36.34 KG/M2

## 2017-12-05 DIAGNOSIS — M50.30 DEGENERATIVE DISC DISEASE, CERVICAL: ICD-10-CM

## 2017-12-05 DIAGNOSIS — F32.89 OTHER DEPRESSION: ICD-10-CM

## 2017-12-05 DIAGNOSIS — R53.81 PHYSICAL DECONDITIONING: ICD-10-CM

## 2017-12-05 DIAGNOSIS — I10 ESSENTIAL HYPERTENSION: ICD-10-CM

## 2017-12-05 DIAGNOSIS — M47.812 FACET ARTHROPATHY, CERVICAL: ICD-10-CM

## 2017-12-05 DIAGNOSIS — R14.0 ABDOMINAL BLOATING: ICD-10-CM

## 2017-12-05 DIAGNOSIS — E11.43 TYPE 2 DIABETES MELLITUS WITH DIABETIC AUTONOMIC NEUROPATHY, WITHOUT LONG-TERM CURRENT USE OF INSULIN (HCC): Primary | ICD-10-CM

## 2017-12-05 DIAGNOSIS — E66.01 MORBID OBESITY DUE TO EXCESS CALORIES (HCC): ICD-10-CM

## 2017-12-05 DIAGNOSIS — E11.43 DIABETIC AUTONOMIC NEUROPATHY ASSOCIATED WITH TYPE 2 DIABETES MELLITUS (HCC): ICD-10-CM

## 2017-12-05 DIAGNOSIS — G89.4 CHRONIC PAIN SYNDROME: ICD-10-CM

## 2017-12-05 DIAGNOSIS — Z98.1 HX OF FUSION OF CERVICAL SPINE: ICD-10-CM

## 2017-12-05 PROCEDURE — 99214 OFFICE O/P EST MOD 30 MIN: CPT | Performed by: PHYSICIAN ASSISTANT

## 2017-12-05 NOTE — PROGRESS NOTES
Subjective   Patient ID: Ernestina Garrido is a 47 y.o. female  [id number]    Chief Complaint   Patient presents with   • Neck Pain   left shoulder and arm pain    Patient Active Problem List   Diagnosis   • Atypical mole   • Chronic pain syndrome   • Depression   • Hemorrhoid   • Essential hypertension   • Hypothyroidism   • Insomnia   • Arthralgia of hip   • Arthralgia of shoulder   • Degenerative disc disease, cervical   • Cervical facet arthropathy/cervical spondylosis without myelopathy   • Myofascial pain   • Diabetic autonomic neuropathy   • Left adductor tendonitis   • Hx of fusion of cervical spine   • Physical deconditioning   • Morbid obesity due to excess calories   • Neuroforaminal stenosis of spine   • Lateral epicondylitis, right elbow   • Carpal tunnel syndrome of right wrist   • Type 2 diabetes mellitus, without long-term current use of insulin   • Abdominal bloating   • Acute cholecystitis       History of Present Illness  Patient presents today after being seen in the ED x2 for left shoulder (anterior and posterior) pain that she describes as stabbing, constant and with numbness, the pain runs down the back of the arm, she has numbness in the thumb, index finger and tip of the long finger. The pain and stabbing sensation is constant now for 8 days. Meds do take the edge off but not completely gone. She has severe pain with movement of the shoulder and left arm.     Past Medical History:   Diagnosis Date   • Atypical mole    • Cervical disc disorder    • Diabetes mellitus    • Encounter for Papanicolaou smear of cervix 2013    normal per patient   • Extremity pain    • Headache    • History of colonoscopy 2013    normal per patient   • History of mammogram 2013    normal per patient   • History of uterine leiomyoma    • History of varicella    • Hypertension    • Hypothyroidism    • Joint pain    • Low back pain    • Lumbosacral disc disease    • Neck pain    • Osteoarthritis    • Tattoos     HIV  neg 2012 per patient   • Tobacco abuse    • Uterine fibromyoma      Allergies   Allergen Reactions   • Glimepiride Diarrhea       Current Outpatient Prescriptions:   •  buPROPion XL (WELLBUTRIN XL) 150 MG 24 hr tablet, Take 1 tablet by mouth Daily., Disp: 30 tablet, Rfl: 5  •  diclofenac (FLECTOR) 1.3 % patch patch, Apply 1 patch topically 2 (Two) Times a Day., Disp: 60 patch, Rfl: 2  •  docusate sodium 100 MG capsule, Take 100 mg by mouth 2 (Two) Times a Day., Disp: 20 capsule, Rfl: 0  •  DULoxetine (CYMBALTA) 60 MG capsule, Take 1 capsule by mouth Every Night., Disp: 30 capsule, Rfl: 5  •  Empagliflozin (JARDIANCE) 10 MG tablet, Take 10 mg by mouth Daily., Disp: 30 tablet, Rfl: 5  •  etodolac (LODINE) 200 MG capsule, Take 1 capsule by mouth Every 8 (Eight) Hours As Needed (pain)., Disp: 50 capsule, Rfl: 1  •  gabapentin (NEURONTIN) 400 MG capsule, Take 1 capsule by mouth 3 (Three) Times a Day., Disp: 90 capsule, Rfl: 0  •  gabapentin (NEURONTIN) 400 MG capsule, Take this next her pill at night in addition to the medication that you're already taking, Disp: 7 capsule, Rfl: 0  •  glucose blood test strip, Test daily, Disp: 50 each, Rfl: 12  •  glucose monitor monitoring kit, 1 each Daily., Disp: 1 each, Rfl: 0  •  hydrochlorothiazide (HYDRODIURIL) 50 MG tablet, Take 1 tablet by mouth Daily., Disp: 30 tablet, Rfl: 5  •  Lancets 30G misc, 1 Units Daily., Disp: 50 each, Rfl: 11  •  levothyroxine (SYNTHROID, LEVOTHROID) 100 MCG tablet, TAKE ONE TABLET BY MOUTH DAILY, Disp: 30 tablet, Rfl: 3  •  lisinopril (PRINIVIL,ZESTRIL) 20 MG tablet, Take 1 tablet by mouth Every Night., Disp: 30 tablet, Rfl: 5  •  metFORMIN (GLUCOPHAGE) 500 MG tablet, Take 1 tablet by mouth 2 (Two) Times a Day With Meals., Disp: 60 tablet, Rfl: 5  •  MethylPREDNISolone (MEDROL, JUDAH,) 4 MG tablet, Take as directed on package instructions., Disp: 21 tablet, Rfl: 0  •  omeprazole (PRILOSEC) 20 MG capsule, Take 1 capsule by mouth 2 (Two) Times a Day.,  Disp: 30 capsule, Rfl: 2  •  oxyCODONE-acetaminophen (PERCOCET) 5-325 MG per tablet, Take 2 tablets by mouth Every 4 (Four) Hours As Needed for Moderate Pain  for up to 9 days., Disp: 12 tablet, Rfl: 0  •  promethazine (PHENERGAN) 25 MG suppository, Insert 1 suppository into the rectum Every 6 (Six) Hours As Needed for Nausea or Vomiting for up to 10 doses., Disp: 10 suppository, Rfl: 0  •  temazepam (RESTORIL) 30 MG capsule, Take 1 capsule by mouth At Night As Needed for Sleep., Disp: 30 capsule, Rfl: 5    Review of Systems   Constitutional: Positive for activity change. Negative for appetite change, chills, diaphoresis, fatigue, fever and unexpected weight change.   HENT: Negative for congestion, dental problem, drooling, ear discharge, ear pain, facial swelling, hearing loss, mouth sores, nosebleeds, postnasal drip, rhinorrhea, sinus pressure, sneezing, sore throat, tinnitus, trouble swallowing and voice change.    Eyes: Negative for photophobia, pain, discharge, redness, itching and visual disturbance.   Respiratory: Negative for apnea, cough, choking, chest tightness, shortness of breath, wheezing and stridor.    Cardiovascular: Positive for chest pain. Negative for palpitations and leg swelling.   Gastrointestinal: Negative for abdominal distention, abdominal pain, anal bleeding, blood in stool, constipation, diarrhea, nausea, rectal pain and vomiting.   Endocrine: Negative for cold intolerance, heat intolerance, polydipsia, polyphagia and polyuria.   Genitourinary: Negative for decreased urine volume, difficulty urinating, dysuria, enuresis, flank pain, frequency, genital sores, hematuria and urgency.   Musculoskeletal: Positive for arthralgias, myalgias and neck pain. Negative for back pain, gait problem, joint swelling and neck stiffness.   Skin: Negative for color change, pallor, rash and wound.   Allergic/Immunologic: Negative for environmental allergies, food allergies and immunocompromised state.  "  Neurological: Positive for weakness, numbness and headaches. Negative for dizziness, tremors, seizures, syncope, facial asymmetry, speech difficulty and light-headedness.   Hematological: Negative for adenopathy. Does not bruise/bleed easily.   Psychiatric/Behavioral: Positive for agitation, confusion, dysphoric mood and sleep disturbance. Negative for behavioral problems, decreased concentration, hallucinations, self-injury and suicidal ideas. The patient is not nervous/anxious and is not hyperactive.        Physical Exam  /80 (BP Location: Right arm, Patient Position: Sitting)  Temp 99 °F (37.2 °C) (Temporal Artery )   Ht 172.7 cm (68\")  Wt 109 kg (239 lb 12.8 oz)  BMI 36.46 kg/m2    Neurologic Exam  Severe pain to touch, very little ROM in the shoulder and arm due to pain. Cannot test reflexes.    Independent Review of Radiographic Studies:   I have reviewed the MRI of the cervical spine which shows a left disc complex at C6 7 level with severe narrowing of the left neuroforamen.  I have reviewed the new MRI scan with the previous scan of 7/7/2016 with Dr. Pena and it appears there has been some slight progression of the herniated disc and worsening nerve root compression.    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  Recently the patient has been having neck pain,  left shoulder pain, pain in the left shoulder blade and pain in the left arm.  Her pain has not been controlled with therapy or medications. Her symptoms are real and if she does not improve with PT then she will require surgery.  Dr. Pena has discussed all this with the patient and even though she is emotional she does understand.  She had significant improvement after her first surgery and we feel that she will do the same if she requires further " surgery.    Latia Fabian PA-C

## 2017-12-07 PROBLEM — K81.0 ACUTE CHOLECYSTITIS: Status: RESOLVED | Noted: 2017-11-24 | Resolved: 2017-12-07

## 2017-12-07 PROBLEM — R14.0 ABDOMINAL BLOATING: Status: RESOLVED | Noted: 2017-01-12 | Resolved: 2017-12-07

## 2017-12-08 ENCOUNTER — TELEPHONE (OUTPATIENT)
Dept: INTERNAL MEDICINE | Facility: CLINIC | Age: 47
End: 2017-12-08

## 2017-12-08 NOTE — TELEPHONE ENCOUNTER
Spoke with Ernestina and explained that I will do a PA for both Temazepam and Flector patch today.  She states she is still having a lot of pain   Spoke with Dr Tanner and she advised that she call the neurosurgeon to see if they can perscribe other medication.  Verb understanding given.     Told her I would contact her when I got a answer regarding the PA on medication

## 2017-12-08 NOTE — TELEPHONE ENCOUNTER
----- Message from Tarsha De sent at 12/8/2017 10:27 AM EST -----  Patient called to let Dr. Tanner that prescription she gave patient could not be filled yet because of needing approval from insurance.  She thinks it was Temazepam and some sort of patch.  Patient was moaning and crying on the phone stating she is in so much pain and has to have something.  She uses [facility] and can be reached at [phone].

## 2017-12-12 NOTE — TELEPHONE ENCOUNTER
.Incoming call:  CHECKING STATUS OF PA. NOTIFIED PT SHE WILL GET A CALL WHEN IT GOES THROUGH   Per Roxanna Moay     Notified  Ernestina the PA was faxed to insurance   Waiting on approval . Verb understanding given

## 2017-12-13 ENCOUNTER — HOSPITAL ENCOUNTER (OUTPATIENT)
Dept: CT IMAGING | Facility: HOSPITAL | Age: 47
End: 2017-12-13

## 2017-12-15 ENCOUNTER — HOSPITAL ENCOUNTER (OUTPATIENT)
Dept: PHYSICAL THERAPY | Facility: HOSPITAL | Age: 47
Setting detail: THERAPIES SERIES
Discharge: HOME | End: 2017-12-15

## 2017-12-15 DIAGNOSIS — M54.2 NECK PAIN ON LEFT SIDE: Primary | ICD-10-CM

## 2017-12-15 DIAGNOSIS — M79.2 RADICULAR PAIN IN LEFT ARM: ICD-10-CM

## 2017-12-15 PROCEDURE — 97161 PT EVAL LOW COMPLEX 20 MIN: CPT

## 2017-12-15 NOTE — THERAPY EVALUATION
Outpatient Physical Therapy Ortho Initial Evaluation   [place]     Patient Name: Ernestina Garrido  : 1970  MRN: [id number]  Today's Date: 12/15/2017      Visit Date: 12/15/2017    Patient Active Problem List   Diagnosis   • Chronic pain syndrome   • Depression   • Hemorrhoid   • Essential hypertension   • Hypothyroidism   • Insomnia   • Arthralgia of hip   • Arthralgia of shoulder   • Degenerative disc disease, cervical   • Cervical facet arthropathy/cervical spondylosis without myelopathy   • Diabetic autonomic neuropathy   • Left adductor tendonitis   • Hx of fusion of cervical spine   • Obesity   • Neuroforaminal stenosis of spine   • Lateral epicondylitis, right elbow   • Carpal tunnel syndrome of right wrist   • Type 2 diabetes mellitus, without long-term current use of insulin        Past Medical History:   Diagnosis Date   • Arthralgia of hip    • Cervical facet arthropathy/cervical spondylosis without myelopathy    • Chronic pain syndrome     Description: hx of epidural injections.   • Degenerative disc disease, cervical    • Depression     Description: dx .   • Diabetes mellitus    • Encounter for Papanicolaou smear of cervix 2013    normal per patient   • Essential hypertension     Description: dx .   • Hemorrhoid 2016   • History of colonoscopy 2013    normal per patient   • History of mammogram 2013    normal per patient   • History of uterine leiomyoma    • History of varicella    • Hypertension    • Hypothyroidism     Description: dx .   • Insomnia 2016   • Low back pain    • Lumbosacral disc disease    • Neck pain    • Obesity    • Osteoarthritis    • Tattoos     HIV neg  per patient   • Tobacco abuse         Past Surgical History:   Procedure Laterality Date   • ABDOMINAL SURGERY     • ANTERIOR CERVICAL DISCECTOMY  2016    C4-6 DISC (Dr. Pena)   • BACK SURGERY     •  SECTION      , ,    • CHOLECYSTECTOMY WITH INTRAOPERATIVE  CHOLANGIOGRAM N/A 11/24/2017    Procedure: CHOLECYSTECTOMY LAPAROSCOPIC INTRAOPERATIVE CHOLANGIOGRAM;  Surgeon: Clifford Freed MD;  Location: [facility];  Service:    • HYSTERECTOMY  05/2015   • LUMBAR DISCECTOMY  2000   • NECK SURGERY         Visit Dx:     ICD-10-CM ICD-9-CM   1. Neck pain on left side M54.2 723.1   2. Radicular pain in left arm M79.2 729.2             Patient History       12/15/17 1200          History    Chief Complaint Pain;Numbness  -GB      Type of Pain Neck pain;Upper Extremity / Arm  -GB      Date Current Problem(s) Began 11/25/17  -GB      Brief Description of Current Complaint Patient had a surgery for Gall Bladder on 11/25/17 and within a week after that surgery she began to have pain as is still present.  She has pain from neck down left arm into left 1st-3rd digits with numbness in same fingers.  Pain is constant with increasing pain with any movement of head/neck and with raising left arm.  -GB      Patient's Rating of General Health Good  -GB      Hand Dominance right-handed  -GB      Occupation/sports/leisure activities She had been working prior to surgery.   -GB      What clinical tests have you had for this problem? --   CT Scan is scheduled for 12/20/17.  -GB      Pain     Pain Location Neck;Arm  -GB      Pain at Present 8  -GB      Pain at Best 7  -GB      Pain at Worst 9  -GB      Fall Risk Assessment    Any falls in the past year: Yes  -GB      Number of falls reported in the last 12 months 1  -GB      Factors that contributed to the fall: --   Unsteadiness due to pain  -GB      Daily Activities    Primary Language English  -GB      Barriers to learning Visual  -GB      Pt Participated in POC and Goals Yes  -GB      Safety    Are you being hurt, hit, or frightened by anyone at home or in your life? No  -GB        User Key  (r) = Recorded By, (t) = Taken By, (c) = Cosigned By    Initials Name Provider Type    KYLE Boucher, PT Physical Therapist                PT  Ortho       12/15/17 1300    Precautions and Contraindications    Precautions Cervical fusion  -GB    Posture/Observations    Alignment Options Forward head;Rounded shoulders  -GB    Posture/Observations Comments Head leans to right  -GB    Quarter Clearing    Quarter Clearing Upper Quarter Clearing  -GB    Neural Tension Signs- Upper Quarter Clearing    ULNTT 1 Left:;Postive  -GB    Sensory Screen for Light Touch- Upper Quarter Clearing    C6 (tip of thumb) Left:;Absent  -GB    C7 (tip of 3rd finger) Left:;Diminished  -GB    Myotomal Screen- Upper Quarter Clearing    Shoulder flexion (C5) Right:;5 (Normal);Left:;4+ (Good +)   pain with left  -GB    Elbow flexion/wrist extension (C6) Bilateral:;5 (Normal)  -GB    Elbow extension/wrist flexion (C7) Right:;5 (Normal);Left:;4+ (Good +)   pain with resistance to left elbow extension  -GB    Finger flexion/ (C8) Right:;WNL;Left:;4- (Good -)  -GB    Special Tests/Palpation    Special Tests/Palpation Cervical/Thoracic  -GB    Cervical Palpation    Cervical Palpation- Location? Cervical facets;Upper traps  -GB    Cervical Facets Left:;Guarded/taut  -GB    Upper Traps Left:;Guarded/taut;Trigger point  -GB    ROM (Range of Motion)    General ROM head/neck/trunk range of motion deficits identified;upper extremity range of motion deficits identified  -GB    Additional Documentation --  -GB    General Head/Neck/Trunk Assessment    ROM neck ROM deficit  -GB    Neck    Flexion AROM Deficit 15  -GB    Extension AROM Deficit 35  -GB    Lt Lat Flexion AROM Deficit 22  -GB    Rt Lateral Flexion AROM Deficit 40  -GB    Lt Rotation AROM Deficit 45  -GB    Rt Rotation AROM Deficit 70  -GB    General UE Assessment    ROM Detail Left shoulder flexion to 105 with left UE pain  -GB    MMT (Manual Muscle Testing)    General MMT Assessment --  -GB      User Key  (r) = Recorded By, (t) = Taken By, (c) = Cosigned By    Initials Name Provider Type    KYLE Boucher, PT Physical  Therapist           Hand Therapy (last 24 hours)      Hand Eval       12/15/17 1300           Strength Right    # Reps 3  -GB      Right Rung 2  -GB      Right  Test 1 45  -GB      Right  Test 2 48  -GB      Right  Test 3 51  -GB       Strength Average Right 48  -GB       Strength Left    # Reps 3  -GB      Left Rung 2  -GB      Left  Test 1 24  -GB      Left  Test 2 26  -GB      Left  Test 3 26  -GB       Strength Average Left 25.33  -GB        User Key  (r) = Recorded By, (t) = Taken By, (c) = Cosigned By    Initials Name Provider Type    KYLE Boucher, PT Physical Therapist                    Therapy Education  Education Details: Use of ice, improved postural awareness  Given: Symptoms/condition management           PT OP Goals       12/15/17 1700       PT Short Term Goals    STG Date to Achieve 12/29/17  -GB     STG 1 Patient is independent with a HEP for ROM and postural awareness.  -GB     STG 1 Progress New  -GB     STG 2 Left arm symptoms dhave decreased by 25% with frequency or intensity of symptoms.  -GB     STG 2 Progress New  -GB     Long Term Goals    LTG Date to Achieve 03/12/18  -GB     LTG 1 Patient is independent with strengthening for stabilization.  -GB     LTG 1 Progress New  -GB     LTG 2 Cervical AROM is at least 80% WNL for improved tolerance to sleep postioning and driving.  -GB     LTG 2 Progress New  -GB     LTG 3 NDI score is improved by at least 40%.  -GB     LTG 3 Progress New  -GB     LTG 4 Patient pain is decreased by at least 50%.  -GB     LTG 4 Progress New  -GB     Time Calculation    PT Goal Re-Cert Due Date 03/12/18  -GB       User Key  (r) = Recorded By, (t) = Taken By, (c) = Cosigned By    Initials Name Provider Type    KYLE Boucher, PT Physical Therapist                PT Assessment/Plan       12/15/17 1705       PT Assessment    Functional Limitations Performance in leisure activities;Performance in self-care  ADL;Limitation in home management;Limitations in community activities;Performance in work activities;Limitations in functional capacity and performance;Other (comment)   ROM deficits  -GB     Impairments Posture;Poor body mechanics;Pain;Muscle strength;Sensation;Range of motion;Joint integrity;Joint mobility  -GB     Assessment Comments Patient presents with findings consistent with left C6,7 nerve irritation.  She has markedly limitied cervical AROM, with altered sensations and pain in left UE also following pattern that would suggest C6 involvement.  -GB     Please refer to paper survey for additional self-reported information Yes  -GB     Rehab Potential Fair  -GB     Patient/caregiver participated in establishment of treatment plan and goals Yes  -GB     Patient would benefit from skilled therapy intervention Yes  -GB     PT Plan    PT Frequency 2x/week  -GB     Predicted Duration of Therapy Intervention (days/wks) 8 weeks  -GB     Planned CPT's? PT EVAL LOW COMPLEXITY: 70627;PT RE-EVAL: 49469;PT THER PROC EA 15 MIN: 90314;PT NEUROMUSC RE-EDUCATION EA 15 MIN: 89168;PT MANUAL THERAPY EA 15 MIN: 77291;PT HOT/COLD PACK WC NONMCARE: 49563;PT ELECTRICAL STIM UNATTEND:   -GB     PT Plan Comments Progress with ther ex, incorporate TENS trial, KT and other manual therapies.  -GB       User Key  (r) = Recorded By, (t) = Taken By, (c) = Cosigned By    Initials Name Provider Type    KYLE Boucher, PT Physical Therapist                              Outcome Measure Options: Neck Disability Index (NDI)  Neck Disability Index  Section 1 - Pain Intensity: The pain is very severe at the moment.  Section 2 - Personal Care: I need some help but manage most of my personal care.  Section 3 - Lifting: I cannot lift or carry anything at all.  Section 4 - Work: I can't do any work at all.  Section 5 - Headaches: I have severe headaches that come frequently.  Section 6 - Concentration: I have a great deal of difficulty  concentrating  Section 7 - Sleeping: My sleep is completely disturbed for up to 5-7 hours.  Section 8 - Driving: I can hardly drive at all because of severe neck pain.  Section 9 - Reading: I can't read as much as I want because of severe neck pain.  Section 10 - Recreation: I can't do any recreational activities due to neck pain.  Neck Disability Index Score: 43  Neck Disability Index Comments: 86%      Time Calculation:   Start Time: 1245     Therapy Charges for Today     Code Description Service Date Service Provider Modifiers Qty    70259155730 HC PT EVAL LOW COMPLEXITY 3 12/15/2017 Terence Boucher, PT GP 1          PT G-Codes  Outcome Measure Options: Neck Disability Index (NDI)         Terence Boucher, PT  12/15/2017

## 2017-12-18 ENCOUNTER — HOSPITAL ENCOUNTER (OUTPATIENT)
Dept: PHYSICAL THERAPY | Facility: HOSPITAL | Age: 47
Setting detail: THERAPIES SERIES
Discharge: HOME | End: 2017-12-18

## 2017-12-18 DIAGNOSIS — M54.2 NECK PAIN ON LEFT SIDE: Primary | ICD-10-CM

## 2017-12-18 DIAGNOSIS — M79.2 RADICULAR PAIN IN LEFT ARM: ICD-10-CM

## 2017-12-18 PROCEDURE — G0283 ELEC STIM OTHER THAN WOUND: HCPCS

## 2017-12-18 PROCEDURE — 97110 THERAPEUTIC EXERCISES: CPT

## 2017-12-19 RX ORDER — OXYCODONE AND ACETAMINOPHEN 7.5; 325 MG/1; MG/1
1 TABLET ORAL EVERY 8 HOURS PRN
Qty: 60 TABLET | Refills: 0 | Status: SHIPPED | OUTPATIENT
Start: 2017-12-19 | End: 2018-01-15 | Stop reason: SDUPTHER

## 2017-12-19 NOTE — TELEPHONE ENCOUNTER
Provider: Jean   Caller: self  Time of call: 9:05    Phone #: [phone]   Surgery:  Anterior cervical discectomy C4-C5, C5-C6.       Anterior arthrodesis C4-C5, C5-C6.    Surgery Date: 1/20/16   Last visit: 12/5/17    Next visit: 1/4/18    YOUNG:   11/25/2017 Oxycodone/Acetaminophen 325MG/5MG 1970 31 2 Clifford Freed [facility] [id number] [place]  1  12/03/2017 Oxycodone/Acetaminophen 325MG/5MG 1970 12 2 Александр Fountain [facility] [address] [place] 1  12/06/2017 Oxycodone/Acetaminophen  325MG/7.5MG  1970 60 [address] Nicolás Pena [facility] [address] [place] 34 1       Reason for call:       Refill request

## 2017-12-20 ENCOUNTER — HOSPITAL ENCOUNTER (OUTPATIENT)
Dept: CT IMAGING | Facility: HOSPITAL | Age: 47
Discharge: HOME | End: 2017-12-20
Admitting: PHYSICIAN ASSISTANT

## 2017-12-20 PROCEDURE — 72125 CT NECK SPINE W/O DYE: CPT

## 2017-12-21 NOTE — TELEPHONE ENCOUNTER
Zeina from [facility] pharmacy called, states that there was an issue with the date on the pt Rx today, states that the pt was going to bring it back to our office to have it fixed, but returned to the pharmacy very quickly, rasing concern, wanted to verify that we did fix the date situation. They state that they believe the pt wrote in the written date herself. They want to know if they are to fill 5mg today, or wait until the 26th when she is at the end of the 7.5 mg Rx

## 2017-12-21 NOTE — TELEPHONE ENCOUNTER
Spoke with Dr. Pena.  He decreased pt's oxycodone to 5/325mg and states she needs to come in and see him next Thursday the 28th so they can discuss moving forward with surgery.  Pt states PT is not helping and she is ready for sx. Pt is aware that rx is ready and will  in office today.  Rx is up front.

## 2017-12-28 ENCOUNTER — DOCUMENTATION (OUTPATIENT)
Dept: NEUROSURGERY | Facility: CLINIC | Age: 47
End: 2017-12-28

## 2017-12-28 ENCOUNTER — PREP FOR SURGERY (OUTPATIENT)
Dept: OTHER | Facility: HOSPITAL | Age: 47
End: 2017-12-28

## 2017-12-28 ENCOUNTER — OFFICE VISIT (OUTPATIENT)
Dept: NEUROSURGERY | Facility: CLINIC | Age: 47
End: 2017-12-28

## 2017-12-28 VITALS — BODY MASS INDEX: 36.07 KG/M2 | TEMPERATURE: 97.8 F | HEIGHT: 68 IN | WEIGHT: 238 LBS

## 2017-12-28 DIAGNOSIS — M47.812 FACET ARTHROPATHY, CERVICAL: ICD-10-CM

## 2017-12-28 DIAGNOSIS — M50.00 INTERVERTEBRAL CERVICAL DISC DISORDER WITH MYELOPATHY, CERVICAL REGION: Primary | ICD-10-CM

## 2017-12-28 DIAGNOSIS — Z98.1 HX OF FUSION OF CERVICAL SPINE: Primary | ICD-10-CM

## 2017-12-28 PROCEDURE — 99214 OFFICE O/P EST MOD 30 MIN: CPT | Performed by: NEUROLOGICAL SURGERY

## 2017-12-28 RX ORDER — ACETAMINOPHEN 325 MG/1
650 TABLET ORAL ONCE
Status: CANCELLED | OUTPATIENT
Start: 2017-12-28 | End: 2017-12-28

## 2017-12-28 RX ORDER — SODIUM CHLORIDE 0.9 % (FLUSH) 0.9 %
1-10 SYRINGE (ML) INJECTION AS NEEDED
Status: CANCELLED | OUTPATIENT
Start: 2017-12-28

## 2017-12-28 RX ORDER — FAMOTIDINE 20 MG/1
20 TABLET, FILM COATED ORAL
Status: CANCELLED | OUTPATIENT
Start: 2017-12-28

## 2017-12-28 RX ORDER — HYDROCODONE BITARTRATE AND ACETAMINOPHEN 7.5; 325 MG/1; MG/1
1 TABLET ORAL ONCE
Status: CANCELLED | OUTPATIENT
Start: 2017-12-28 | End: 2017-12-28

## 2017-12-28 RX ORDER — SODIUM CHLORIDE AND POTASSIUM CHLORIDE 150; 900 MG/100ML; MG/100ML
100 INJECTION, SOLUTION INTRAVENOUS CONTINUOUS
Status: CANCELLED | OUTPATIENT
Start: 2017-12-28

## 2017-12-28 RX ORDER — CEFAZOLIN SODIUM 2 G/100ML
2 INJECTION, SOLUTION INTRAVENOUS ONCE
Status: CANCELLED | OUTPATIENT
Start: 2017-12-28 | End: 2017-12-28

## 2017-12-28 RX ORDER — CHLORHEXIDINE GLUCONATE 4 G/100ML
SOLUTION TOPICAL
Qty: 120 ML | Refills: 0 | Status: SHIPPED | OUTPATIENT
Start: 2017-12-28 | End: 2018-01-02

## 2017-12-28 RX ORDER — IBUPROFEN 800 MG/1
800 TABLET ORAL ONCE
Status: CANCELLED | OUTPATIENT
Start: 2017-12-28 | End: 2017-12-28

## 2017-12-28 NOTE — PROGRESS NOTES
NAME: JAGJIT DEAL   DOS: 2017  : 1970  PCP: Stephanie Tanner MD    Chief Complaint:  Follow-up neck and left arm pain  Chief Complaint   Patient presents with   • Neck Pain     f/u CT   • Left arm/shoulder/chest pain       History of Present Illness:  47 y.o. female   Is a 47-year-old with a history of a prior ACDF from C4 to C6.  She underwent an uncomplicated surgery for the presence of myelopathy and repeat presents with left-sided C7 radiculitis.  She is undergone appropriate workup with MRIs of her neck and shoulder that showed recurrent left C7 disc disease from a C6 7 disc herniation.  CT scan today demonstrates no evidence of changes he's here to discuss care plan  Allergies   Allergen Reactions   • Glimepiride Diarrhea     Medications    Current Outpatient Prescriptions:   •  DULoxetine (CYMBALTA) 60 MG capsule, Take 1 capsule by mouth Every Night., Disp: 30 capsule, Rfl: 5  •  gabapentin (NEURONTIN) 400 MG capsule, Take this next her pill at night in addition to the medication that you're already taking, Disp: 7 capsule, Rfl: 0  •  glucose blood test strip, Test daily, Disp: 50 each, Rfl: 12  •  glucose monitor monitoring kit, 1 each Daily., Disp: 1 each, Rfl: 0  •  hydrochlorothiazide (HYDRODIURIL) 50 MG tablet, Take 1 tablet by mouth Daily., Disp: 30 tablet, Rfl: 5  •  Lancets 30G misc, 1 Units Daily., Disp: 50 each, Rfl: 11  •  levothyroxine (SYNTHROID, LEVOTHROID) 100 MCG tablet, TAKE ONE TABLET BY MOUTH DAILY, Disp: 30 tablet, Rfl: 3  •  lisinopril (PRINIVIL,ZESTRIL) 20 MG tablet, Take 1 tablet by mouth Every Night., Disp: 30 tablet, Rfl: 5  •  metFORMIN (GLUCOPHAGE) 500 MG tablet, Take 1 tablet by mouth 2 (Two) Times a Day With Meals., Disp: 60 tablet, Rfl: 5  •  omeprazole (PRILOSEC) 20 MG capsule, Take 1 capsule by mouth 2 (Two) Times a Day., Disp: 30 capsule, Rfl: 2  •  oxyCODONE-acetaminophen (PERCOCET) 7.5-325 MG per tablet, Take 1 tablet by mouth Every 8 (Eight) Hours As  Needed for Moderate Pain ., Disp: 60 tablet, Rfl: 0  Past Medical History:  Past Medical History:   Diagnosis Date   • Arthralgia of hip    • Cervical facet arthropathy/cervical spondylosis without myelopathy    • Chronic pain syndrome     Description: hx of epidural injections.   • Degenerative disc disease, cervical    • Depression     Description: dx .   • Diabetes mellitus    • Encounter for Papanicolaou smear of cervix     normal per patient   • Essential hypertension     Description: dx .   • Hemorrhoid 2016   • History of colonoscopy 2013    normal per patient   • History of mammogram 2013    normal per patient   • History of uterine leiomyoma    • History of varicella    • Hypertension    • Hypothyroidism     Description: dx .   • Insomnia 2016   • Low back pain    • Lumbosacral disc disease    • Neck pain    • Obesity    • Osteoarthritis    • Tattoos     HIV neg  per patient   • Tobacco abuse      Past Surgical History:  Past Surgical History:   Procedure Laterality Date   • ABDOMINAL SURGERY     • ANTERIOR CERVICAL DISCECTOMY  2016    C4-6 DISC (Dr. Pena)   • BACK SURGERY     •  SECTION      , ,    • CHOLECYSTECTOMY WITH INTRAOPERATIVE CHOLANGIOGRAM N/A 2017    Procedure: CHOLECYSTECTOMY LAPAROSCOPIC INTRAOPERATIVE CHOLANGIOGRAM;  Surgeon: Clifford Freed MD;  Location: [facility];  Service:    • HYSTERECTOMY  2015   • LUMBAR DISCECTOMY     • NECK SURGERY       Social Hx:  Social History   Substance Use Topics   • Smoking status: Former Smoker     Packs/day: 0.50     Years: 28.00     Types: Cigarettes     Start date:      Quit date: 2016   • Smokeless tobacco: Never Used   • Alcohol use Yes      Comment: occasional 1 a week     Family Hx:  Family History   Problem Relation Age of Onset   • Diabetes Mother    • Hypertension Mother    • Colon cancer Maternal Grandmother    • Diabetes Maternal Grandmother    • Hypertension  Maternal Grandmother    • Diabetes Daughter    • Hypothyroidism Daughter    • Diabetes Maternal Uncle    • Hypertension Maternal Uncle    • Hypertension Other    • Breast cancer Neg Hx    • Ovarian cancer Neg Hx      Review of Systems:        Review of Systems   Constitutional: Positive for activity change and appetite change. Negative for chills, diaphoresis, fatigue, fever and unexpected weight change.   HENT: Negative for congestion, dental problem, drooling, ear discharge, ear pain, facial swelling, hearing loss, mouth sores, nosebleeds, postnasal drip, rhinorrhea, sinus pressure, sneezing, sore throat, tinnitus, trouble swallowing and voice change.    Eyes: Negative for photophobia, pain, discharge, redness, itching and visual disturbance.   Respiratory: Negative for apnea, cough, choking, chest tightness, shortness of breath, wheezing and stridor.    Cardiovascular: Positive for chest pain. Negative for palpitations and leg swelling.   Gastrointestinal: Negative for abdominal distention, abdominal pain, anal bleeding, blood in stool, constipation, diarrhea, nausea, rectal pain and vomiting.   Endocrine: Positive for polydipsia. Negative for cold intolerance, heat intolerance, polyphagia and polyuria.   Genitourinary: Negative for decreased urine volume, difficulty urinating, dysuria, enuresis, flank pain, frequency, genital sores, hematuria and urgency.   Musculoskeletal: Positive for back pain, myalgias, neck pain and neck stiffness. Negative for arthralgias, gait problem and joint swelling.   Skin: Negative for color change, pallor, rash and wound.   Allergic/Immunologic: Negative for environmental allergies, food allergies and immunocompromised state.   Neurological: Positive for weakness, light-headedness, numbness and headaches. Negative for dizziness, tremors, seizures, syncope, facial asymmetry and speech difficulty.   Hematological: Negative for adenopathy. Does not bruise/bleed easily.    Psychiatric/Behavioral: Positive for agitation, decreased concentration and sleep disturbance. Negative for behavioral problems, confusion, dysphoric mood, hallucinations, self-injury and suicidal ideas. The patient is not nervous/anxious and is not hyperactive.    All other systems reviewed and are negative.       I have reviewed this note template and all pertinent parts of the review of systems social, family history, surgical history and medication list she is tearful     Physical Examination:  Vitals:    12/28/17 1258   Temp: 97.8 °F (36.6 °C)      General Appearance:   Well developed, well nourished, well groomed, alert, and cooperative.  Neurological examination:  Neurologic Exam  She has no pain with range of motion in her left arm or at least mild there is no reproduction    She has an absent tricep reflex on the left effort dependent weakness and a very intact right tricep reflex for gait wide based but stable she has no Teena's  strength is 4-5     Review of Imaging/DATA:   her MRI demonstrates the presence of adjacent level disc disease at C6 7   Diagnoses/Plan:    Ms. Garrido is a 47 y.o. female   She has recurrence adjacent level CVI 7 disc herniation it's compressing the left intraforaminal area.  I spent 20-30 minutes again today in addition to a prior visit I was at with our PAs explaining the options she's clearly failed conservative management is taking high Dose opioid medicines I counseled her about the risk of that.  I explained the risk of the surgery issues with esophagus etc. and she needs a ACDF at the C6 7 level for LEFT arm pain we will likely use a Tay spacer but I may have to remove the plate depending on the anatomy and I explained that to her.  I explained the risks of recurrent surgery to her as well

## 2017-12-28 NOTE — PROGRESS NOTES
Pt seen in office today with .   RX hand written for Percocet 7.5mg # 45 NR 1-2 tabs QH6 PRN  Pt was advised to take this sparingly, as she will need to use it after surgery as well.  Pt verbalized understanding.  Yvonne Shaw CMA 1:46PM

## 2017-12-29 PROBLEM — M50.00 INTERVERTEBRAL CERVICAL DISC DISORDER WITH MYELOPATHY, CERVICAL REGION: Status: ACTIVE | Noted: 2017-12-29

## 2018-01-02 ENCOUNTER — APPOINTMENT (OUTPATIENT)
Dept: PREADMISSION TESTING | Facility: HOSPITAL | Age: 48
End: 2018-01-02

## 2018-01-02 ENCOUNTER — ANESTHESIA EVENT (OUTPATIENT)
Dept: PERIOP | Facility: HOSPITAL | Age: 48
End: 2018-01-02

## 2018-01-02 VITALS — BODY MASS INDEX: 35.22 KG/M2 | WEIGHT: 232.37 LBS | HEIGHT: 68 IN

## 2018-01-02 DIAGNOSIS — M50.00 INTERVERTEBRAL CERVICAL DISC DISORDER WITH MYELOPATHY, CERVICAL REGION: ICD-10-CM

## 2018-01-02 LAB
ANION GAP SERPL CALCULATED.3IONS-SCNC: 12 MMOL/L (ref 3–11)
BASOPHILS # BLD AUTO: 0.02 10*3/MM3 (ref 0–0.2)
BASOPHILS NFR BLD AUTO: 0.2 % (ref 0–1)
BUN BLD-MCNC: 17 MG/DL (ref 9–23)
BUN/CREAT SERPL: 17 (ref 7–25)
CALCIUM SPEC-SCNC: 9.3 MG/DL (ref 8.7–10.4)
CHLORIDE SERPL-SCNC: 101 MMOL/L (ref 99–109)
CO2 SERPL-SCNC: 27 MMOL/L (ref 20–31)
CREAT BLD-MCNC: 1 MG/DL (ref 0.6–1.3)
DEPRECATED RDW RBC AUTO: 43.6 FL (ref 37–54)
EOSINOPHIL # BLD AUTO: 0.58 10*3/MM3 (ref 0–0.3)
EOSINOPHIL NFR BLD AUTO: 6 % (ref 0–3)
ERYTHROCYTE [DISTWIDTH] IN BLOOD BY AUTOMATED COUNT: 12.9 % (ref 11.3–14.5)
GFR SERPL CREATININE-BSD FRML MDRD: 72 ML/MIN/1.73
GLUCOSE BLD-MCNC: 258 MG/DL (ref 70–100)
HBA1C MFR BLD: 10.4 % (ref 4.8–5.6)
HCT VFR BLD AUTO: 40.1 % (ref 34.5–44)
HGB BLD-MCNC: 12.9 G/DL (ref 11.5–15.5)
IMM GRANULOCYTES # BLD: 0.03 10*3/MM3 (ref 0–0.03)
IMM GRANULOCYTES NFR BLD: 0.3 % (ref 0–0.6)
LYMPHOCYTES # BLD AUTO: 4.02 10*3/MM3 (ref 0.6–4.8)
LYMPHOCYTES NFR BLD AUTO: 41.9 % (ref 24–44)
MCH RBC QN AUTO: 29.8 PG (ref 27–31)
MCHC RBC AUTO-ENTMCNC: 32.2 G/DL (ref 32–36)
MCV RBC AUTO: 92.6 FL (ref 80–99)
MONOCYTES # BLD AUTO: 0.57 10*3/MM3 (ref 0–1)
MONOCYTES NFR BLD AUTO: 5.9 % (ref 0–12)
MRSA DNA SPEC QL NAA+PROBE: NEGATIVE
NEUTROPHILS # BLD AUTO: 4.37 10*3/MM3 (ref 1.5–8.3)
NEUTROPHILS NFR BLD AUTO: 45.7 % (ref 41–71)
PLATELET # BLD AUTO: 203 10*3/MM3 (ref 150–450)
PMV BLD AUTO: 11.5 FL (ref 6–12)
POTASSIUM BLD-SCNC: 3.9 MMOL/L (ref 3.5–5.5)
RBC # BLD AUTO: 4.33 10*6/MM3 (ref 3.89–5.14)
SODIUM BLD-SCNC: 140 MMOL/L (ref 132–146)
WBC NRBC COR # BLD: 9.59 10*3/MM3 (ref 3.5–10.8)

## 2018-01-02 PROCEDURE — 87641 MR-STAPH DNA AMP PROBE: CPT | Performed by: ANESTHESIOLOGY

## 2018-01-02 PROCEDURE — 83036 HEMOGLOBIN GLYCOSYLATED A1C: CPT | Performed by: NEUROLOGICAL SURGERY

## 2018-01-02 PROCEDURE — 85025 COMPLETE CBC W/AUTO DIFF WBC: CPT | Performed by: NEUROLOGICAL SURGERY

## 2018-01-02 PROCEDURE — 80048 BASIC METABOLIC PNL TOTAL CA: CPT | Performed by: NEUROLOGICAL SURGERY

## 2018-01-02 PROCEDURE — 36415 COLL VENOUS BLD VENIPUNCTURE: CPT

## 2018-01-02 RX ORDER — FAMOTIDINE 10 MG/ML
20 INJECTION, SOLUTION INTRAVENOUS ONCE
Status: CANCELLED | OUTPATIENT
Start: 2018-01-02 | End: 2018-01-02

## 2018-01-02 RX ORDER — SODIUM CHLORIDE 0.9 % (FLUSH) 0.9 %
1-10 SYRINGE (ML) INJECTION AS NEEDED
Status: CANCELLED | OUTPATIENT
Start: 2018-01-02

## 2018-01-02 RX ORDER — LEVOTHYROXINE SODIUM 0.1 MG/1
100 TABLET ORAL DAILY
COMMUNITY
End: 2018-02-01 | Stop reason: SDUPTHER

## 2018-01-03 ENCOUNTER — ANESTHESIA (OUTPATIENT)
Dept: PERIOP | Facility: HOSPITAL | Age: 48
End: 2018-01-03

## 2018-01-03 ENCOUNTER — APPOINTMENT (OUTPATIENT)
Dept: GENERAL RADIOLOGY | Facility: HOSPITAL | Age: 48
End: 2018-01-03

## 2018-01-03 ENCOUNTER — HOSPITAL ENCOUNTER (OUTPATIENT)
Facility: HOSPITAL | Age: 48
Discharge: HOME | End: 2018-01-04
Attending: NEUROLOGICAL SURGERY | Admitting: NEUROLOGICAL SURGERY

## 2018-01-03 DIAGNOSIS — M50.00 INTERVERTEBRAL CERVICAL DISC DISORDER WITH MYELOPATHY, CERVICAL REGION: ICD-10-CM

## 2018-01-03 LAB
GLUCOSE BLDC GLUCOMTR-MCNC: 164 MG/DL (ref 70–130)
GLUCOSE BLDC GLUCOMTR-MCNC: 178 MG/DL (ref 70–130)
GLUCOSE BLDC GLUCOMTR-MCNC: 218 MG/DL (ref 70–130)
GLUCOSE BLDC GLUCOMTR-MCNC: 248 MG/DL (ref 70–130)

## 2018-01-03 PROCEDURE — 25010000002 PHENYLEPHRINE PER 1 ML: Performed by: NURSE ANESTHETIST, CERTIFIED REGISTERED

## 2018-01-03 PROCEDURE — 82962 GLUCOSE BLOOD TEST: CPT

## 2018-01-03 PROCEDURE — C1713 ANCHOR/SCREW BN/BN,TIS/BN: HCPCS | Performed by: NEUROLOGICAL SURGERY

## 2018-01-03 PROCEDURE — 22551 ARTHRD ANT NTRBDY CERVICAL: CPT | Performed by: NEUROLOGICAL SURGERY

## 2018-01-03 PROCEDURE — 25010000003 CEFAZOLIN IN DEXTROSE 2-4 GM/100ML-% SOLUTION: Performed by: NEUROLOGICAL SURGERY

## 2018-01-03 PROCEDURE — 25010000002 MIDAZOLAM PER 1 MG: Performed by: NURSE ANESTHETIST, CERTIFIED REGISTERED

## 2018-01-03 PROCEDURE — 22845 INSERT SPINE FIXATION DEVICE: CPT | Performed by: NEUROLOGICAL SURGERY

## 2018-01-03 PROCEDURE — 76000 FLUOROSCOPY <1 HR PHYS/QHP: CPT

## 2018-01-03 PROCEDURE — 25010000002 HYDROMORPHONE PER 4 MG: Performed by: NEUROLOGICAL SURGERY

## 2018-01-03 PROCEDURE — 25010000002 DEXAMETHASONE PER 1 MG: Performed by: NURSE ANESTHETIST, CERTIFIED REGISTERED

## 2018-01-03 PROCEDURE — 25010000002 PROPOFOL 1000 MG/ML EMULSION: Performed by: NURSE ANESTHETIST, CERTIFIED REGISTERED

## 2018-01-03 PROCEDURE — 20931 SP BONE ALGRFT STRUCT ADD-ON: CPT | Performed by: NEUROLOGICAL SURGERY

## 2018-01-03 PROCEDURE — 25010000002 NEOSTIGMINE PER 0.5 MG: Performed by: NURSE ANESTHETIST, CERTIFIED REGISTERED

## 2018-01-03 PROCEDURE — 25010000002 ONDANSETRON PER 1 MG: Performed by: NURSE ANESTHETIST, CERTIFIED REGISTERED

## 2018-01-03 PROCEDURE — 25010000002 FENTANYL CITRATE (PF) 100 MCG/2ML SOLUTION: Performed by: NURSE ANESTHETIST, CERTIFIED REGISTERED

## 2018-01-03 PROCEDURE — 25010000002 PROPOFOL 10 MG/ML EMULSION: Performed by: NURSE ANESTHETIST, CERTIFIED REGISTERED

## 2018-01-03 DEVICE — PLT SKYLINE 1LEVEL 16MM: Type: IMPLANTABLE DEVICE | Site: SPINE CERVICAL | Status: FUNCTIONAL

## 2018-01-03 DEVICE — SCRW SKYLINE VAR SD 16MM: Type: IMPLANTABLE DEVICE | Site: SPINE CERVICAL | Status: FUNCTIONAL

## 2018-01-03 DEVICE — SCRW SKYLINE VAR LG DIAM 16MM: Type: IMPLANTABLE DEVICE | Site: SPINE CERVICAL | Status: FUNCTIONAL

## 2018-01-03 DEVICE — IMPLANTABLE DEVICE: Type: IMPLANTABLE DEVICE | Site: SPINE CERVICAL | Status: FUNCTIONAL

## 2018-01-03 RX ORDER — PROMETHAZINE HYDROCHLORIDE 25 MG/1
25 SUPPOSITORY RECTAL ONCE AS NEEDED
Status: DISCONTINUED | OUTPATIENT
Start: 2018-01-03 | End: 2018-01-03 | Stop reason: HOSPADM

## 2018-01-03 RX ORDER — DEXTROSE MONOHYDRATE 25 G/50ML
25 INJECTION, SOLUTION INTRAVENOUS
Status: DISCONTINUED | OUTPATIENT
Start: 2018-01-03 | End: 2018-01-04 | Stop reason: HOSPADM

## 2018-01-03 RX ORDER — LIDOCAINE HYDROCHLORIDE 10 MG/ML
INJECTION, SOLUTION INFILTRATION; PERINEURAL AS NEEDED
Status: DISCONTINUED | OUTPATIENT
Start: 2018-01-03 | End: 2018-01-03 | Stop reason: SURG

## 2018-01-03 RX ORDER — ACETAMINOPHEN 325 MG/1
650 TABLET ORAL ONCE
Status: COMPLETED | OUTPATIENT
Start: 2018-01-03 | End: 2018-01-03

## 2018-01-03 RX ORDER — PROMETHAZINE HYDROCHLORIDE 25 MG/1
25 TABLET ORAL ONCE AS NEEDED
Status: DISCONTINUED | OUTPATIENT
Start: 2018-01-03 | End: 2018-01-03 | Stop reason: HOSPADM

## 2018-01-03 RX ORDER — FAMOTIDINE 20 MG/1
20 TABLET, FILM COATED ORAL ONCE
Status: COMPLETED | OUTPATIENT
Start: 2018-01-03 | End: 2018-01-03

## 2018-01-03 RX ORDER — DEXAMETHASONE SODIUM PHOSPHATE 4 MG/ML
INJECTION, SOLUTION INTRA-ARTICULAR; INTRALESIONAL; INTRAMUSCULAR; INTRAVENOUS; SOFT TISSUE AS NEEDED
Status: DISCONTINUED | OUTPATIENT
Start: 2018-01-03 | End: 2018-01-03 | Stop reason: SURG

## 2018-01-03 RX ORDER — MIDAZOLAM HYDROCHLORIDE 1 MG/ML
INJECTION INTRAMUSCULAR; INTRAVENOUS AS NEEDED
Status: DISCONTINUED | OUTPATIENT
Start: 2018-01-03 | End: 2018-01-03 | Stop reason: SURG

## 2018-01-03 RX ORDER — ACETAMINOPHEN 325 MG/1
650 TABLET ORAL EVERY 4 HOURS PRN
Status: DISCONTINUED | OUTPATIENT
Start: 2018-01-03 | End: 2018-01-04 | Stop reason: HOSPADM

## 2018-01-03 RX ORDER — CEFAZOLIN SODIUM 2 G/100ML
2 INJECTION, SOLUTION INTRAVENOUS ONCE
Status: COMPLETED | OUTPATIENT
Start: 2018-01-03 | End: 2018-01-03

## 2018-01-03 RX ORDER — PROMETHAZINE HYDROCHLORIDE 25 MG/ML
6.25 INJECTION, SOLUTION INTRAMUSCULAR; INTRAVENOUS ONCE AS NEEDED
Status: DISCONTINUED | OUTPATIENT
Start: 2018-01-03 | End: 2018-01-03 | Stop reason: HOSPADM

## 2018-01-03 RX ORDER — ONDANSETRON 2 MG/ML
INJECTION INTRAMUSCULAR; INTRAVENOUS AS NEEDED
Status: DISCONTINUED | OUTPATIENT
Start: 2018-01-03 | End: 2018-01-03 | Stop reason: SURG

## 2018-01-03 RX ORDER — ONDANSETRON 2 MG/ML
4 INJECTION INTRAMUSCULAR; INTRAVENOUS ONCE AS NEEDED
Status: DISCONTINUED | OUTPATIENT
Start: 2018-01-03 | End: 2018-01-03 | Stop reason: HOSPADM

## 2018-01-03 RX ORDER — OXYCODONE AND ACETAMINOPHEN 7.5; 325 MG/1; MG/1
1 TABLET ORAL EVERY 8 HOURS PRN
Status: DISCONTINUED | OUTPATIENT
Start: 2018-01-03 | End: 2018-01-04 | Stop reason: HOSPADM

## 2018-01-03 RX ORDER — MAGNESIUM HYDROXIDE 1200 MG/15ML
LIQUID ORAL AS NEEDED
Status: DISCONTINUED | OUTPATIENT
Start: 2018-01-03 | End: 2018-01-03 | Stop reason: HOSPADM

## 2018-01-03 RX ORDER — LIDOCAINE HYDROCHLORIDE AND EPINEPHRINE 5; 5 MG/ML; UG/ML
INJECTION, SOLUTION INFILTRATION; PERINEURAL AS NEEDED
Status: DISCONTINUED | OUTPATIENT
Start: 2018-01-03 | End: 2018-01-03 | Stop reason: HOSPADM

## 2018-01-03 RX ORDER — FENTANYL CITRATE 50 UG/ML
INJECTION, SOLUTION INTRAMUSCULAR; INTRAVENOUS AS NEEDED
Status: DISCONTINUED | OUTPATIENT
Start: 2018-01-03 | End: 2018-01-03 | Stop reason: SURG

## 2018-01-03 RX ORDER — FENTANYL CITRATE 50 UG/ML
50 INJECTION, SOLUTION INTRAMUSCULAR; INTRAVENOUS
Status: DISCONTINUED | OUTPATIENT
Start: 2018-01-03 | End: 2018-01-03 | Stop reason: HOSPADM

## 2018-01-03 RX ORDER — SODIUM CHLORIDE 0.9 % (FLUSH) 0.9 %
1-10 SYRINGE (ML) INJECTION AS NEEDED
Status: DISCONTINUED | OUTPATIENT
Start: 2018-01-03 | End: 2018-01-04 | Stop reason: HOSPADM

## 2018-01-03 RX ORDER — HYDROCHLOROTHIAZIDE 25 MG/1
50 TABLET ORAL DAILY
Status: DISCONTINUED | OUTPATIENT
Start: 2018-01-04 | End: 2018-01-04 | Stop reason: HOSPADM

## 2018-01-03 RX ORDER — LIDOCAINE HYDROCHLORIDE 10 MG/ML
0.5 INJECTION, SOLUTION EPIDURAL; INFILTRATION; INTRACAUDAL; PERINEURAL ONCE AS NEEDED
Status: COMPLETED | OUTPATIENT
Start: 2018-01-03 | End: 2018-01-03

## 2018-01-03 RX ORDER — LEVOTHYROXINE SODIUM 0.1 MG/1
100 TABLET ORAL NIGHTLY
Status: DISCONTINUED | OUTPATIENT
Start: 2018-01-03 | End: 2018-01-04 | Stop reason: HOSPADM

## 2018-01-03 RX ORDER — LISINOPRIL 20 MG/1
20 TABLET ORAL NIGHTLY
Status: DISCONTINUED | OUTPATIENT
Start: 2018-01-03 | End: 2018-01-04 | Stop reason: HOSPADM

## 2018-01-03 RX ORDER — DULOXETIN HYDROCHLORIDE 60 MG/1
60 CAPSULE, DELAYED RELEASE ORAL NIGHTLY
Status: DISCONTINUED | OUTPATIENT
Start: 2018-01-03 | End: 2018-01-04 | Stop reason: HOSPADM

## 2018-01-03 RX ORDER — SODIUM CHLORIDE, SODIUM LACTATE, POTASSIUM CHLORIDE, CALCIUM CHLORIDE 600; 310; 30; 20 MG/100ML; MG/100ML; MG/100ML; MG/100ML
9 INJECTION, SOLUTION INTRAVENOUS CONTINUOUS
Status: DISCONTINUED | OUTPATIENT
Start: 2018-01-03 | End: 2018-01-04 | Stop reason: HOSPADM

## 2018-01-03 RX ORDER — CEFAZOLIN SODIUM 2 G/100ML
2 INJECTION, SOLUTION INTRAVENOUS EVERY 8 HOURS
Status: COMPLETED | OUTPATIENT
Start: 2018-01-03 | End: 2018-01-04

## 2018-01-03 RX ORDER — ATRACURIUM BESYLATE 10 MG/ML
INJECTION, SOLUTION INTRAVENOUS AS NEEDED
Status: DISCONTINUED | OUTPATIENT
Start: 2018-01-03 | End: 2018-01-03 | Stop reason: SURG

## 2018-01-03 RX ORDER — IBUPROFEN 800 MG/1
800 TABLET ORAL ONCE
Status: COMPLETED | OUTPATIENT
Start: 2018-01-03 | End: 2018-01-03

## 2018-01-03 RX ORDER — GABAPENTIN 300 MG/1
300 CAPSULE ORAL EVERY 8 HOURS SCHEDULED
Status: DISCONTINUED | OUTPATIENT
Start: 2018-01-03 | End: 2018-01-04 | Stop reason: HOSPADM

## 2018-01-03 RX ORDER — METHOCARBAMOL 500 MG/1
1000 TABLET, FILM COATED ORAL 4 TIMES DAILY PRN
Status: DISCONTINUED | OUTPATIENT
Start: 2018-01-03 | End: 2018-01-04 | Stop reason: HOSPADM

## 2018-01-03 RX ORDER — PANTOPRAZOLE SODIUM 40 MG/1
40 TABLET, DELAYED RELEASE ORAL EVERY MORNING
Status: DISCONTINUED | OUTPATIENT
Start: 2018-01-04 | End: 2018-01-04 | Stop reason: HOSPADM

## 2018-01-03 RX ORDER — PROPOFOL 10 MG/ML
VIAL (ML) INTRAVENOUS AS NEEDED
Status: DISCONTINUED | OUTPATIENT
Start: 2018-01-03 | End: 2018-01-03 | Stop reason: SURG

## 2018-01-03 RX ORDER — HYDROCODONE BITARTRATE AND ACETAMINOPHEN 7.5; 325 MG/1; MG/1
1 TABLET ORAL ONCE
Status: COMPLETED | OUTPATIENT
Start: 2018-01-03 | End: 2018-01-03

## 2018-01-03 RX ORDER — HYDROMORPHONE HYDROCHLORIDE 1 MG/ML
0.5 INJECTION, SOLUTION INTRAMUSCULAR; INTRAVENOUS; SUBCUTANEOUS
Status: DISCONTINUED | OUTPATIENT
Start: 2018-01-03 | End: 2018-01-03 | Stop reason: HOSPADM

## 2018-01-03 RX ORDER — NICOTINE POLACRILEX 4 MG
15 LOZENGE BUCCAL
Status: DISCONTINUED | OUTPATIENT
Start: 2018-01-03 | End: 2018-01-04 | Stop reason: HOSPADM

## 2018-01-03 RX ORDER — GLYCOPYRROLATE 0.2 MG/ML
INJECTION INTRAMUSCULAR; INTRAVENOUS AS NEEDED
Status: DISCONTINUED | OUTPATIENT
Start: 2018-01-03 | End: 2018-01-03 | Stop reason: SURG

## 2018-01-03 RX ORDER — NALOXONE HCL 0.4 MG/ML
0.4 VIAL (ML) INJECTION
Status: DISCONTINUED | OUTPATIENT
Start: 2018-01-03 | End: 2018-01-04 | Stop reason: HOSPADM

## 2018-01-03 RX ORDER — HYDROMORPHONE HYDROCHLORIDE 1 MG/ML
0.5 INJECTION, SOLUTION INTRAMUSCULAR; INTRAVENOUS; SUBCUTANEOUS
Status: DISCONTINUED | OUTPATIENT
Start: 2018-01-03 | End: 2018-01-04 | Stop reason: HOSPADM

## 2018-01-03 RX ORDER — ONDANSETRON 2 MG/ML
4 INJECTION INTRAMUSCULAR; INTRAVENOUS EVERY 6 HOURS PRN
Status: DISCONTINUED | OUTPATIENT
Start: 2018-01-03 | End: 2018-01-04 | Stop reason: HOSPADM

## 2018-01-03 RX ADMIN — PHENYLEPHRINE HYDROCHLORIDE 100 MCG: 10 INJECTION INTRAVENOUS at 10:54

## 2018-01-03 RX ADMIN — ACETAMINOPHEN 650 MG: 325 TABLET, FILM COATED ORAL at 20:55

## 2018-01-03 RX ADMIN — DULOXETINE HYDROCHLORIDE 60 MG: 60 CAPSULE, DELAYED RELEASE ORAL at 21:55

## 2018-01-03 RX ADMIN — METHOCARBAMOL 1000 MG: 500 TABLET ORAL at 20:55

## 2018-01-03 RX ADMIN — HYDROMORPHONE HYDROCHLORIDE 0.5 MG: 1 INJECTION, SOLUTION INTRAMUSCULAR; INTRAVENOUS; SUBCUTANEOUS at 20:29

## 2018-01-03 RX ADMIN — SODIUM CHLORIDE, POTASSIUM CHLORIDE, SODIUM LACTATE AND CALCIUM CHLORIDE: 600; 310; 30; 20 INJECTION, SOLUTION INTRAVENOUS at 09:46

## 2018-01-03 RX ADMIN — SODIUM CHLORIDE, POTASSIUM CHLORIDE, SODIUM LACTATE AND CALCIUM CHLORIDE 9 ML/HR: 600; 310; 30; 20 INJECTION, SOLUTION INTRAVENOUS at 09:05

## 2018-01-03 RX ADMIN — PHENYLEPHRINE HYDROCHLORIDE 100 MCG: 10 INJECTION INTRAVENOUS at 10:57

## 2018-01-03 RX ADMIN — GABAPENTIN 300 MG: 300 CAPSULE ORAL at 16:40

## 2018-01-03 RX ADMIN — IBUPROFEN 800 MG: 800 TABLET ORAL at 09:21

## 2018-01-03 RX ADMIN — ATRACURIUM BESYLATE 10 MG: 10 INJECTION, SOLUTION INTRAVENOUS at 10:51

## 2018-01-03 RX ADMIN — PHENYLEPHRINE HYDROCHLORIDE 100 MCG: 10 INJECTION INTRAVENOUS at 11:12

## 2018-01-03 RX ADMIN — PROPOFOL 200 MG: 10 INJECTION, EMULSION INTRAVENOUS at 09:51

## 2018-01-03 RX ADMIN — METHOCARBAMOL 1000 MG: 500 TABLET ORAL at 16:40

## 2018-01-03 RX ADMIN — Medication 2 MG: at 11:30

## 2018-01-03 RX ADMIN — FENTANYL CITRATE 50 MCG: 50 INJECTION, SOLUTION INTRAMUSCULAR; INTRAVENOUS at 09:51

## 2018-01-03 RX ADMIN — LIDOCAINE HYDROCHLORIDE 3 ML: 20 JELLY TOPICAL at 09:53

## 2018-01-03 RX ADMIN — ATRACURIUM BESYLATE 10 MG: 10 INJECTION, SOLUTION INTRAVENOUS at 10:22

## 2018-01-03 RX ADMIN — MIDAZOLAM HYDROCHLORIDE 2 MG: 1 INJECTION, SOLUTION INTRAMUSCULAR; INTRAVENOUS at 09:46

## 2018-01-03 RX ADMIN — ATRACURIUM BESYLATE 50 MG: 10 INJECTION, SOLUTION INTRAVENOUS at 09:51

## 2018-01-03 RX ADMIN — CEFAZOLIN SODIUM 2 G: 2 INJECTION, SOLUTION INTRAVENOUS at 09:46

## 2018-01-03 RX ADMIN — FENTANYL CITRATE 50 MCG: 50 INJECTION, SOLUTION INTRAMUSCULAR; INTRAVENOUS at 10:28

## 2018-01-03 RX ADMIN — SODIUM CHLORIDE, POTASSIUM CHLORIDE, SODIUM LACTATE AND CALCIUM CHLORIDE 9 ML/HR: 600; 310; 30; 20 INJECTION, SOLUTION INTRAVENOUS at 16:40

## 2018-01-03 RX ADMIN — GLYCOPYRROLATE 0.4 MG: 0.2 INJECTION, SOLUTION INTRAMUSCULAR; INTRAVENOUS at 11:30

## 2018-01-03 RX ADMIN — PROPOFOL 25 MCG/KG/MIN: 10 INJECTION, EMULSION INTRAVENOUS at 09:56

## 2018-01-03 RX ADMIN — LISINOPRIL 20 MG: 20 TABLET ORAL at 21:55

## 2018-01-03 RX ADMIN — OXYCODONE HYDROCHLORIDE AND ACETAMINOPHEN 1 TABLET: 7.5; 325 TABLET ORAL at 18:03

## 2018-01-03 RX ADMIN — LEVOTHYROXINE SODIUM 100 MCG: 100 TABLET ORAL at 21:55

## 2018-01-03 RX ADMIN — FENTANYL CITRATE 50 MCG: 50 INJECTION, SOLUTION INTRAMUSCULAR; INTRAVENOUS at 10:31

## 2018-01-03 RX ADMIN — PHENYLEPHRINE HYDROCHLORIDE 50 MCG: 10 INJECTION INTRAVENOUS at 11:40

## 2018-01-03 RX ADMIN — FAMOTIDINE 20 MG: 20 TABLET, FILM COATED ORAL at 09:21

## 2018-01-03 RX ADMIN — EPHEDRINE SULFATE 5 MG: 50 INJECTION INTRAMUSCULAR; INTRAVENOUS; SUBCUTANEOUS at 09:58

## 2018-01-03 RX ADMIN — GABAPENTIN 300 MG: 300 CAPSULE ORAL at 20:55

## 2018-01-03 RX ADMIN — HYDROCODONE BITARTRATE AND ACETAMINOPHEN 1 TABLET: 7.5; 325 TABLET ORAL at 09:21

## 2018-01-03 RX ADMIN — ONDANSETRON 4 MG: 2 INJECTION INTRAMUSCULAR; INTRAVENOUS at 11:21

## 2018-01-03 RX ADMIN — HYDROMORPHONE HYDROCHLORIDE 0.5 MG: 1 INJECTION, SOLUTION INTRAMUSCULAR; INTRAVENOUS; SUBCUTANEOUS at 22:50

## 2018-01-03 RX ADMIN — LIDOCAINE HYDROCHLORIDE 0.2 ML: 10 INJECTION, SOLUTION EPIDURAL; INFILTRATION; INTRACAUDAL; PERINEURAL at 09:05

## 2018-01-03 RX ADMIN — PHENYLEPHRINE HYDROCHLORIDE 100 MCG: 10 INJECTION INTRAVENOUS at 10:39

## 2018-01-03 RX ADMIN — LIDOCAINE HYDROCHLORIDE 50 MG: 10 INJECTION, SOLUTION INFILTRATION; PERINEURAL at 09:51

## 2018-01-03 RX ADMIN — EPHEDRINE SULFATE 10 MG: 50 INJECTION INTRAMUSCULAR; INTRAVENOUS; SUBCUTANEOUS at 10:12

## 2018-01-03 RX ADMIN — PHENYLEPHRINE HYDROCHLORIDE 100 MCG: 10 INJECTION INTRAVENOUS at 10:20

## 2018-01-03 RX ADMIN — EPHEDRINE SULFATE 10 MG: 50 INJECTION INTRAMUSCULAR; INTRAVENOUS; SUBCUTANEOUS at 10:16

## 2018-01-03 RX ADMIN — CEFAZOLIN SODIUM 2 G: 2 INJECTION, SOLUTION INTRAVENOUS at 18:04

## 2018-01-03 RX ADMIN — DEXAMETHASONE SODIUM PHOSPHATE 8 MG: 4 INJECTION, SOLUTION INTRAMUSCULAR; INTRAVENOUS at 10:03

## 2018-01-03 RX ADMIN — ACETAMINOPHEN 650 MG: 325 TABLET ORAL at 09:21

## 2018-01-03 NOTE — NURSING NOTE
Neck symmetric, no edema noted. Denies numbness or tingling in bella arms.  Denies pain , sore throat.

## 2018-01-03 NOTE — H&P (VIEW-ONLY)
NAME: JAGJIT DEAL   DOS: 2017  : 1970  PCP: Stephanie Tanner MD    Chief Complaint:  Follow-up neck and left arm pain  Chief Complaint   Patient presents with   • Neck Pain     f/u CT   • Left arm/shoulder/chest pain       History of Present Illness:  47 y.o. female   Is a 47-year-old with a history of a prior ACDF from C4 to C6.  She underwent an uncomplicated surgery for the presence of myelopathy and repeat presents with left-sided C7 radiculitis.  She is undergone appropriate workup with MRIs of her neck and shoulder that showed recurrent left C7 disc disease from a C6 7 disc herniation.  CT scan today demonstrates no evidence of changes he's here to discuss care plan  Allergies   Allergen Reactions   • Glimepiride Diarrhea     Medications    Current Outpatient Prescriptions:   •  DULoxetine (CYMBALTA) 60 MG capsule, Take 1 capsule by mouth Every Night., Disp: 30 capsule, Rfl: 5  •  gabapentin (NEURONTIN) 400 MG capsule, Take this next her pill at night in addition to the medication that you're already taking, Disp: 7 capsule, Rfl: 0  •  glucose blood test strip, Test daily, Disp: 50 each, Rfl: 12  •  glucose monitor monitoring kit, 1 each Daily., Disp: 1 each, Rfl: 0  •  hydrochlorothiazide (HYDRODIURIL) 50 MG tablet, Take 1 tablet by mouth Daily., Disp: 30 tablet, Rfl: 5  •  Lancets 30G misc, 1 Units Daily., Disp: 50 each, Rfl: 11  •  levothyroxine (SYNTHROID, LEVOTHROID) 100 MCG tablet, TAKE ONE TABLET BY MOUTH DAILY, Disp: 30 tablet, Rfl: 3  •  lisinopril (PRINIVIL,ZESTRIL) 20 MG tablet, Take 1 tablet by mouth Every Night., Disp: 30 tablet, Rfl: 5  •  metFORMIN (GLUCOPHAGE) 500 MG tablet, Take 1 tablet by mouth 2 (Two) Times a Day With Meals., Disp: 60 tablet, Rfl: 5  •  omeprazole (PRILOSEC) 20 MG capsule, Take 1 capsule by mouth 2 (Two) Times a Day., Disp: 30 capsule, Rfl: 2  •  oxyCODONE-acetaminophen (PERCOCET) 7.5-325 MG per tablet, Take 1 tablet by mouth Every 8 (Eight) Hours As  Needed for Moderate Pain ., Disp: 60 tablet, Rfl: 0  Past Medical History:  Past Medical History:   Diagnosis Date   • Arthralgia of hip    • Cervical facet arthropathy/cervical spondylosis without myelopathy    • Chronic pain syndrome     Description: hx of epidural injections.   • Degenerative disc disease, cervical    • Depression     Description: dx .   • Diabetes mellitus    • Encounter for Papanicolaou smear of cervix     normal per patient   • Essential hypertension     Description: dx .   • Hemorrhoid 2016   • History of colonoscopy 2013    normal per patient   • History of mammogram 2013    normal per patient   • History of uterine leiomyoma    • History of varicella    • Hypertension    • Hypothyroidism     Description: dx .   • Insomnia 2016   • Low back pain    • Lumbosacral disc disease    • Neck pain    • Obesity    • Osteoarthritis    • Tattoos     HIV neg  per patient   • Tobacco abuse      Past Surgical History:  Past Surgical History:   Procedure Laterality Date   • ABDOMINAL SURGERY     • ANTERIOR CERVICAL DISCECTOMY  2016    C4-6 DISC (Dr. Pena)   • BACK SURGERY     •  SECTION      , ,    • CHOLECYSTECTOMY WITH INTRAOPERATIVE CHOLANGIOGRAM N/A 2017    Procedure: CHOLECYSTECTOMY LAPAROSCOPIC INTRAOPERATIVE CHOLANGIOGRAM;  Surgeon: Clifford Freed MD;  Location: [facility];  Service:    • HYSTERECTOMY  2015   • LUMBAR DISCECTOMY     • NECK SURGERY       Social Hx:  Social History   Substance Use Topics   • Smoking status: Former Smoker     Packs/day: 0.50     Years: 28.00     Types: Cigarettes     Start date:      Quit date: 2016   • Smokeless tobacco: Never Used   • Alcohol use Yes      Comment: occasional 1 a week     Family Hx:  Family History   Problem Relation Age of Onset   • Diabetes Mother    • Hypertension Mother    • Colon cancer Maternal Grandmother    • Diabetes Maternal Grandmother    • Hypertension  Maternal Grandmother    • Diabetes Daughter    • Hypothyroidism Daughter    • Diabetes Maternal Uncle    • Hypertension Maternal Uncle    • Hypertension Other    • Breast cancer Neg Hx    • Ovarian cancer Neg Hx      Review of Systems:        Review of Systems   Constitutional: Positive for activity change and appetite change. Negative for chills, diaphoresis, fatigue, fever and unexpected weight change.   HENT: Negative for congestion, dental problem, drooling, ear discharge, ear pain, facial swelling, hearing loss, mouth sores, nosebleeds, postnasal drip, rhinorrhea, sinus pressure, sneezing, sore throat, tinnitus, trouble swallowing and voice change.    Eyes: Negative for photophobia, pain, discharge, redness, itching and visual disturbance.   Respiratory: Negative for apnea, cough, choking, chest tightness, shortness of breath, wheezing and stridor.    Cardiovascular: Positive for chest pain. Negative for palpitations and leg swelling.   Gastrointestinal: Negative for abdominal distention, abdominal pain, anal bleeding, blood in stool, constipation, diarrhea, nausea, rectal pain and vomiting.   Endocrine: Positive for polydipsia. Negative for cold intolerance, heat intolerance, polyphagia and polyuria.   Genitourinary: Negative for decreased urine volume, difficulty urinating, dysuria, enuresis, flank pain, frequency, genital sores, hematuria and urgency.   Musculoskeletal: Positive for back pain, myalgias, neck pain and neck stiffness. Negative for arthralgias, gait problem and joint swelling.   Skin: Negative for color change, pallor, rash and wound.   Allergic/Immunologic: Negative for environmental allergies, food allergies and immunocompromised state.   Neurological: Positive for weakness, light-headedness, numbness and headaches. Negative for dizziness, tremors, seizures, syncope, facial asymmetry and speech difficulty.   Hematological: Negative for adenopathy. Does not bruise/bleed easily.    Psychiatric/Behavioral: Positive for agitation, decreased concentration and sleep disturbance. Negative for behavioral problems, confusion, dysphoric mood, hallucinations, self-injury and suicidal ideas. The patient is not nervous/anxious and is not hyperactive.    All other systems reviewed and are negative.       I have reviewed this note template and all pertinent parts of the review of systems social, family history, surgical history and medication list she is tearful     Physical Examination:  Vitals:    12/28/17 1258   Temp: 97.8 °F (36.6 °C)      General Appearance:   Well developed, well nourished, well groomed, alert, and cooperative.  Neurological examination:  Neurologic Exam  She has no pain with range of motion in her left arm or at least mild there is no reproduction    She has an absent tricep reflex on the left effort dependent weakness and a very intact right tricep reflex for gait wide based but stable she has no Teena's  strength is 4-5     Review of Imaging/DATA:   her MRI demonstrates the presence of adjacent level disc disease at C6 7   Diagnoses/Plan:    Ms. Garrido is a 47 y.o. female   She has recurrence adjacent level CVI 7 disc herniation it's compressing the left intraforaminal area.  I spent 20-30 minutes again today in addition to a prior visit I was at with our PAs explaining the options she's clearly failed conservative management is taking high Dose opioid medicines I counseled her about the risk of that.  I explained the risk of the surgery issues with esophagus etc. and she needs a ACDF at the C6 7 level for LEFT arm pain we will likely use a Tay spacer but I may have to remove the plate depending on the anatomy and I explained that to her.  I explained the risks of recurrent surgery to her as well

## 2018-01-03 NOTE — ANESTHESIA POSTPROCEDURE EVALUATION
Patient: Ernestina Garrido    Procedure Summary     Date Anesthesia Start Anesthesia Stop Room / Location    01/03/18 0946 1159  TD OR 11 / [facility] OR       Procedure Diagnosis Surgeon Provider    CERVICAL DISCECTOMY ANTERIOR WITH FUSION C6-7 (Left Spine Cervical) Intervertebral cervical disc disorder with myelopathy, cervical region  (Intervertebral cervical disc disorder with myelopathy, cervical region [M50.00]) MD Christopher Burns MD          Anesthesia Type: general  Last vitals  BP   126/77 (01/03/18 1155)   Temp   97 °F (36.1 °C) (01/03/18 1155)   Pulse   89 (01/03/18 1155)   Resp   16 (01/03/18 1155)     SpO2   100 % (01/03/18 1155)     Post Anesthesia Care and Evaluation    Patient location during evaluation: PACU  Patient participation: complete - patient participated  Level of consciousness: awake and alert  Pain score: 0  Pain management: adequate  Airway patency: patent  Anesthetic complications: No anesthetic complications  PONV Status: none  Cardiovascular status: hemodynamically stable and acceptable  Respiratory status: nonlabored ventilation, acceptable and nasal cannula  Hydration status: acceptable

## 2018-01-03 NOTE — INTERVAL H&P NOTE
"Pre-Op H&P (See Recent Office Note Attached for Full H&P)    Chief complaint: Neck pain into LUE    Review of Systems:  General ROS:  no fever, chills, rashes, No change since last office visit  Cardiovascular ROS: no chest pain or dyspnea on exertion  Respiratory ROS: no cough, shortness of breath, or wheezing  Endo:  Bs in 180's at home.  Steroid lucila approx 3 weeks ago.  BS in preop 164.  Advised to follow up with PCP post operatively for A1C recheck    Immunization History:   Influenza:  Yes 2017  Pneumococcal:  no  Tetanus:  unknown    Meds:    No current facility-administered medications on file prior to encounter.      Current Outpatient Prescriptions on File Prior to Encounter   Medication Sig Dispense Refill   • gabapentin (NEURONTIN) 400 MG capsule Take this next her pill at night in addition to the medication that you're already taking (Patient taking differently: TAKES 800MG FOR THIRD DOSE) 7 capsule 0   • hydrochlorothiazide (HYDRODIURIL) 50 MG tablet Take 1 tablet by mouth Daily. 30 tablet 5   • lisinopril (PRINIVIL,ZESTRIL) 20 MG tablet Take 1 tablet by mouth Every Night. 30 tablet 5   • oxyCODONE-acetaminophen (PERCOCET) 7.5-325 MG per tablet Take 1 tablet by mouth Every 8 (Eight) Hours As Needed for Moderate Pain . 60 tablet 0   • DULoxetine (CYMBALTA) 60 MG capsule Take 1 capsule by mouth Every Night. 30 capsule 5   • omeprazole (PRILOSEC) 20 MG capsule Take 1 capsule by mouth 2 (Two) Times a Day. 30 capsule 2       Vital Signs:  /71 (BP Location: Right arm, Patient Position: Sitting)  Pulse 81  Temp 97.2 °F (36.2 °C) (Temporal Artery )   Resp 18  Ht 172.7 cm (68\")  Wt 105 kg (232 lb)  SpO2 99%  BMI 35.28 kg/m2    Physical Exam:    CV:  S1S2 regular rate and rhythm, no murmur               Resp:  Clear to auscultation; respirations regular, even and unlabored    Results Review:    I reviewed the patient's new clinical results.    Cancer Staging (if applicable)  Cancer Patient: __ yes " _x_no __unknown; If yes, clinical stage T:__ N:__M:__, stage group or __N/A    Assessment/Plan: [facility] C6-7 - ACDF C6-7, left    Rach Manning, APRN  1/3/2018   9:33 AM

## 2018-01-03 NOTE — ANESTHESIA PROCEDURE NOTES
Airway  Urgency: elective    Airway not difficult    General Information and Staff    Patient location during procedure: OR  CRNA: LUCIEN DAMICO    Indications and Patient Condition  Indications for airway management: airway protection    Preoxygenated: yes  MILS not maintained throughout  Mask difficulty assessment: 1 - vent by mask    Final Airway Details  Final airway type: endotracheal airway      Successful airway: ETT and reinforced tube  Cuffed: yes   Successful intubation technique: video laryngoscopy  Facilitating devices/methods: intubating stylet  Endotracheal tube insertion site: oral  Blade: Qian  Blade size: #3  ETT size: 7.0 mm  Cormack-Lehane Classification: grade I - full view of glottis  Placement verified by: chest auscultation and capnometry   Measured from: lips  ETT to lips (cm): 21  Number of attempts at approach: 1    Additional Comments  Glidescope used r/t previous ACDF and limited neck extension without pain.   Negative epigastric sounds, Breath sound equal bilaterally with symmetric chest rise and fall

## 2018-01-03 NOTE — PLAN OF CARE
Problem: Patient Care Overview (Adult)  Goal: Plan of Care Review  Outcome: Ongoing (interventions implemented as appropriate)   01/03/18 8562   Coping/Psychosocial Response Interventions   Plan Of Care Reviewed With patient   Patient Care Overview   Progress improving   Outcome Evaluation   Outcome Summary/Follow up Plan Pt had C6-C7 discectomy with fusion done today. She has not felt like eating yet but is tolerating ice chips. Ice applied to incision site. Will continue to monitor.

## 2018-01-03 NOTE — ANESTHESIA PREPROCEDURE EVALUATION
Anesthesia Evaluation            Airway   Mallampati: II  Dental      Pulmonary    Cardiovascular     (+) hypertension,       Neuro/Psych  GI/Hepatic/Renal/Endo    (+) obesity,  hypothyroidism,     Musculoskeletal     (+) neck pain,   Abdominal    Substance History      OB/GYN          Other                                              Anesthesia Plan    ASA 3     general     intravenous induction   Anesthetic plan and risks discussed with patient.    Plan discussed with CRNA.

## 2018-01-03 NOTE — OP NOTE
Preoperative diagnosis cervical disc disease degenerative disc disease history of cervical myelopathy C4 5 C5 6, new disc herniation C6 7 prior ACDF C4 to 6    Postoperative diagnosis same    Procedures  1.  Anterior cervical discectomy C67  2.  Anterior arthrodesis C6 7  3.  Placement of the vG2 bone graft 7 mm  4.  Removal of prior hardware C4 5 C5 6, placement of new Anterior segmental instrumentation using Depuy Slimline plate and screws C6 7  5.  Fluoroscopy to confirm level intraoperative    Gen. endotracheal anesthesia    Surgeon Nicolás Pena M.D.  Assistant Jorge Kraus    Minimal blood loss  Applications none  Procedure in detail    After formal written consent was obtained explaining risks and benefits of procedure patient was taken to operating room.  All bony prominences and genitalia were padded to prevent neurologic injury.  Preoperative antimicrobial prophylaxis was given per protocol.  Patient was placed in neutral C-spine and prepped and draped in usual sterile manner.  Anterior incision was made after local infiltration per record.  This was made below the previous incision in the neck crease    Dissection was carried down to skin and subcutaneous tissues.  The carotid artery and tracheoesophageal bundle were gently dissected and mobilized respectively to allow access to the anterior longitudinal spine and ligament.  Meticulous attention was made to the esophageal tracheal bundle this was easily dissected bluntly off of the anterior scar tissue adjacent to the carotid and anterior plating system.  At this point in time soft tissues were cleared using blunt dissection fluoroscopic guidance identified the correct levels.  The the prior screws were removed and the plate was easily removed there is a bit of scar tissue but it was pried off the anterior spine and the fusion appeared solid.  The screw holes were waxed and plugged with FloSeal and then self retraining retractors and distraction pins  were placed and attention was turned to the C6 7 disc space.     At this point in time a thorough discectomy was performed after placement of distraction pins.  The posterior ligament was identified after thorough discectomy and drilled down using high-speed bur the posterior longitudinal ligament was removed and the disc space was removed and explored to ensure adequate decompression of the nerves and nerve roots respectively.  A very large disc herniation was noted subligamentous and this was removed and careful probing of the nerve demonstrated that it was decompressed there was ventral displacement of the nerve root which was interesting but it was adequately decompressed well Fluoroscopic guidance confirmed correct levels and decompression    At this point time the anterior vg2 bone graft was placed after being milled to the appropriate size, and the anterior plating system was placed and torqued to appropriate tightness.  Fluoroscopic imaging identified again the correct level meticulous hemostasis maintained and the skin was closed in layers.  A TREVOR drain was placed I performed the entire surgery till the closure of the skin.

## 2018-01-04 VITALS
DIASTOLIC BLOOD PRESSURE: 68 MMHG | WEIGHT: 232 LBS | SYSTOLIC BLOOD PRESSURE: 114 MMHG | HEART RATE: 75 BPM | RESPIRATION RATE: 16 BRPM | OXYGEN SATURATION: 97 % | BODY MASS INDEX: 35.16 KG/M2 | HEIGHT: 68 IN | TEMPERATURE: 97.8 F

## 2018-01-04 LAB
GLUCOSE BLDC GLUCOMTR-MCNC: 158 MG/DL (ref 70–130)
GLUCOSE BLDC GLUCOMTR-MCNC: 195 MG/DL (ref 70–130)

## 2018-01-04 PROCEDURE — 25010000003 CEFAZOLIN IN DEXTROSE 2-4 GM/100ML-% SOLUTION: Performed by: NEUROLOGICAL SURGERY

## 2018-01-04 PROCEDURE — 99024 POSTOP FOLLOW-UP VISIT: CPT | Performed by: PHYSICIAN ASSISTANT

## 2018-01-04 PROCEDURE — 82962 GLUCOSE BLOOD TEST: CPT

## 2018-01-04 PROCEDURE — 25010000002 HYDROMORPHONE PER 4 MG: Performed by: NEUROLOGICAL SURGERY

## 2018-01-04 RX ORDER — METHOCARBAMOL 750 MG/1
750 TABLET, FILM COATED ORAL 4 TIMES DAILY PRN
Qty: 60 TABLET | Refills: 1 | Status: SHIPPED | OUTPATIENT
Start: 2018-01-04 | End: 2018-01-22

## 2018-01-04 RX ADMIN — OXYCODONE HYDROCHLORIDE AND ACETAMINOPHEN 1 TABLET: 7.5; 325 TABLET ORAL at 02:04

## 2018-01-04 RX ADMIN — GABAPENTIN 300 MG: 300 CAPSULE ORAL at 07:10

## 2018-01-04 RX ADMIN — HYDROMORPHONE HYDROCHLORIDE 0.5 MG: 1 INJECTION, SOLUTION INTRAMUSCULAR; INTRAVENOUS; SUBCUTANEOUS at 12:24

## 2018-01-04 RX ADMIN — OXYCODONE HYDROCHLORIDE AND ACETAMINOPHEN 1 TABLET: 7.5; 325 TABLET ORAL at 09:52

## 2018-01-04 RX ADMIN — METHOCARBAMOL 1000 MG: 500 TABLET ORAL at 12:31

## 2018-01-04 RX ADMIN — HYDROCHLOROTHIAZIDE 50 MG: 25 TABLET ORAL at 09:51

## 2018-01-04 RX ADMIN — PANTOPRAZOLE SODIUM 40 MG: 40 TABLET, DELAYED RELEASE ORAL at 07:10

## 2018-01-04 RX ADMIN — CEFAZOLIN SODIUM 2 G: 2 INJECTION, SOLUTION INTRAVENOUS at 02:04

## 2018-01-04 RX ADMIN — HYDROMORPHONE HYDROCHLORIDE 0.5 MG: 1 INJECTION, SOLUTION INTRAMUSCULAR; INTRAVENOUS; SUBCUTANEOUS at 00:47

## 2018-01-04 NOTE — NURSING NOTE
IV removed. Patient received medications from in-house retail pharmacy. She was transported by wheelchair with belongings to personal vehicle to be driven home by mother.

## 2018-01-04 NOTE — DISCHARGE SUMMARY
Date of Discharge:  1/4/2018    Discharge Diagnosis: Adjacent level disc herniation/status post C6 7 anterior cervical discectomy and fusion    Procedures Performed  Procedure(s):  CERVICAL DISCECTOMY ANTERIOR WITH FUSION C6-7       Consults:   Consults     No orders found for last 30 day(s).          Presenting Problem/History of Present Illness  Intervertebral cervical disc disorder with myelopathy, cervical region [M50.00]  Intervertebral cervical disc disorder with myelopathy, cervical region [M50.00]     Hospital Course  Patient is a 47 y.o. female presented with Patient is well-known to the neurosurgical practice for undergoing a C4 to C6 anterior cervical discectomy and fusion a few years back.  Patient presented with no pain that was in her left arm and was deemed surgical candidate by Dr. Nicolás Pena.  Patient had that cervical disc herniation at C6 7 leftward.  Patient underwent an anterior cervical discectomy and fusion on 1/3/2018.  Patient is admitted to the hospital for glucose management and pain control.  Patient has a history of diabetes and last A1c check was over 10.  We have strictly told patient that she needs to get her sugars under control and she is going to follow up with primary care physician after her discharge.  We will see the patient in 10 days ensure wound is doing well.    Following the procedure.  Patient noted that she is much better with the left arm pain and is very pleased with postsurgical outcome.  Patient is ambulating, taking food by mouth, voiding probably.      Condition on Discharge:  Patient is in stable and satisfactory condition at discharge.  Patient is ambulating, taking food by mouth, voiding appropriately.  Patient's incision is clean, dry.  No signs of infection, bleeding, or erythema.  Drain was discharged with minimal output.    Discharge Disposition  Home or Self Care   Patient is to be discharged home today  Discharge Medications   Ernestina Garrido    Home Medication Instructions MELLISA:[id number]    Printed on:01/04/18 6841   Medication Information                      DULoxetine (CYMBALTA) 60 MG capsule  Take 1 capsule by mouth Every Night.             Empagliflozin (JARDIANCE PO)  Take 10 mg by mouth Daily.             gabapentin (NEURONTIN) 400 MG capsule  Take this next her pill at night in addition to the medication that you're already taking             hydrochlorothiazide (HYDRODIURIL) 50 MG tablet  Take 1 tablet by mouth Daily.             levothyroxine (SYNTHROID, LEVOTHROID) 100 MCG tablet  Take 100 mcg by mouth Daily.             lisinopril (PRINIVIL,ZESTRIL) 20 MG tablet  Take 1 tablet by mouth Every Night.             methocarbamol (ROBAXIN) 750 MG tablet  Take 1 tablet by mouth 4 (Four) Times a Day As Needed for Muscle Spasms.             omeprazole (PRILOSEC) 20 MG capsule  Take 1 capsule by mouth 2 (Two) Times a Day.             oxyCODONE-acetaminophen (PERCOCET) 7.5-325 MG per tablet  Take 1 tablet by mouth Every 8 (Eight) Hours As Needed for Moderate Pain .             oxyCODONE-acetaminophen (PERCOCET) 7.5-325 MG per tablet  Take 1 tablet by mouth Every 8 (Eight) Hours.                 Activity at Discharge:   Patient is avoid heavy lifting, bending and twisting  Follow-up Appointments  Future Appointments  Date Time Provider Department Center   1/8/2018 2:40 PM Jagdish Casanova MD [facility] None   1/15/2018 11:30 AM Jorge Kraus PA-C [facility] None     Additional Instructions for the Follow-ups that You Need to Schedule     XR Spine Cervical 2 or 3 View    Jan 09, 2018    Exam reason:  ACDF    Pregnant?:  No                     Test Results Pending at Discharge       Jorge Kraus PA-C  01/04/18  4:09 PM

## 2018-01-08 ENCOUNTER — TELEPHONE (OUTPATIENT)
Dept: NEUROSURGERY | Facility: CLINIC | Age: 48
End: 2018-01-08

## 2018-01-15 ENCOUNTER — OFFICE VISIT (OUTPATIENT)
Dept: NEUROSURGERY | Facility: CLINIC | Age: 48
End: 2018-01-15

## 2018-01-15 ENCOUNTER — APPOINTMENT (OUTPATIENT)
Dept: GENERAL RADIOLOGY | Facility: HOSPITAL | Age: 48
End: 2018-01-15

## 2018-01-15 ENCOUNTER — HOSPITAL ENCOUNTER (OUTPATIENT)
Dept: GENERAL RADIOLOGY | Facility: HOSPITAL | Age: 48
Discharge: HOME | End: 2018-01-15
Admitting: PHYSICIAN ASSISTANT

## 2018-01-15 ENCOUNTER — TELEPHONE (OUTPATIENT)
Dept: INTERNAL MEDICINE | Facility: CLINIC | Age: 48
End: 2018-01-15

## 2018-01-15 ENCOUNTER — HOSPITAL ENCOUNTER (OUTPATIENT)
Facility: HOSPITAL | Age: 48
Setting detail: OBSERVATION
Discharge: HOME | End: 2018-01-16
Attending: EMERGENCY MEDICINE | Admitting: INTERNAL MEDICINE

## 2018-01-15 VITALS — WEIGHT: 232 LBS | HEIGHT: 68 IN | BODY MASS INDEX: 35.16 KG/M2 | TEMPERATURE: 98 F

## 2018-01-15 DIAGNOSIS — K92.1 HEMATOCHEZIA: ICD-10-CM

## 2018-01-15 DIAGNOSIS — Z98.1 S/P CERVICAL SPINAL FUSION: Primary | ICD-10-CM

## 2018-01-15 DIAGNOSIS — Z74.09 IMPAIRED FUNCTIONAL MOBILITY, BALANCE, GAIT, AND ENDURANCE: ICD-10-CM

## 2018-01-15 DIAGNOSIS — M50.00 INTERVERTEBRAL CERVICAL DISC DISORDER WITH MYELOPATHY, CERVICAL REGION: ICD-10-CM

## 2018-01-15 DIAGNOSIS — E86.9 VOLUME DEPLETION: ICD-10-CM

## 2018-01-15 DIAGNOSIS — I95.9 HYPOTENSION, UNSPECIFIED HYPOTENSION TYPE: ICD-10-CM

## 2018-01-15 DIAGNOSIS — R07.89 CHEST WALL PAIN: Primary | ICD-10-CM

## 2018-01-15 PROBLEM — N17.9 AKI (ACUTE KIDNEY INJURY): Status: ACTIVE | Noted: 2018-01-15

## 2018-01-15 PROBLEM — K21.9 GERD (GASTROESOPHAGEAL REFLUX DISEASE): Status: ACTIVE | Noted: 2018-01-15

## 2018-01-15 LAB
ALBUMIN SERPL-MCNC: 4.3 G/DL (ref 3.2–4.8)
ALBUMIN/GLOB SERPL: 1.2 G/DL (ref 1.5–2.5)
ALP SERPL-CCNC: 85 U/L (ref 25–100)
ALT SERPL W P-5'-P-CCNC: 17 U/L (ref 7–40)
ANION GAP SERPL CALCULATED.3IONS-SCNC: 8 MMOL/L (ref 3–11)
AST SERPL-CCNC: 16 U/L (ref 0–33)
BASOPHILS # BLD AUTO: 0.02 10*3/MM3 (ref 0–0.2)
BASOPHILS NFR BLD AUTO: 0.2 % (ref 0–1)
BILIRUB SERPL-MCNC: 0.4 MG/DL (ref 0.3–1.2)
BILIRUB UR QL STRIP: NEGATIVE
BNP SERPL-MCNC: 8 PG/ML (ref 0–100)
BUN BLD-MCNC: 29 MG/DL (ref 9–23)
BUN/CREAT SERPL: 20.7 (ref 7–25)
CALCIUM SPEC-SCNC: 9.5 MG/DL (ref 8.7–10.4)
CHLORIDE SERPL-SCNC: 98 MMOL/L (ref 99–109)
CLARITY UR: CLEAR
CO2 SERPL-SCNC: 30 MMOL/L (ref 20–31)
COLOR UR: YELLOW
CREAT BLD-MCNC: 1.4 MG/DL (ref 0.6–1.3)
D-LACTATE SERPL-SCNC: 1.2 MMOL/L (ref 0.5–2)
DEPRECATED RDW RBC AUTO: 43.1 FL (ref 37–54)
EOSINOPHIL # BLD AUTO: 0.4 10*3/MM3 (ref 0–0.3)
EOSINOPHIL NFR BLD AUTO: 3.7 % (ref 0–3)
ERYTHROCYTE [DISTWIDTH] IN BLOOD BY AUTOMATED COUNT: 12.8 % (ref 11.3–14.5)
GFR SERPL CREATININE-BSD FRML MDRD: 49 ML/MIN/1.73
GLOBULIN UR ELPH-MCNC: 3.5 GM/DL
GLUCOSE BLD-MCNC: 317 MG/DL (ref 70–100)
GLUCOSE UR STRIP-MCNC: ABNORMAL MG/DL
HCT VFR BLD AUTO: 41.7 % (ref 34.5–44)
HGB BLD-MCNC: 13.3 G/DL (ref 11.5–15.5)
HGB UR QL STRIP.AUTO: NEGATIVE
HOLD SPECIMEN: NORMAL
HOLD SPECIMEN: NORMAL
IMM GRANULOCYTES # BLD: 0.05 10*3/MM3 (ref 0–0.03)
IMM GRANULOCYTES NFR BLD: 0.5 % (ref 0–0.6)
KETONES UR QL STRIP: NEGATIVE
LEUKOCYTE ESTERASE UR QL STRIP.AUTO: NEGATIVE
LIPASE SERPL-CCNC: 35 U/L (ref 6–51)
LYMPHOCYTES # BLD AUTO: 3.9 10*3/MM3 (ref 0.6–4.8)
LYMPHOCYTES NFR BLD AUTO: 36.1 % (ref 24–44)
MCH RBC QN AUTO: 29.4 PG (ref 27–31)
MCHC RBC AUTO-ENTMCNC: 31.9 G/DL (ref 32–36)
MCV RBC AUTO: 92.3 FL (ref 80–99)
MONOCYTES # BLD AUTO: 0.71 10*3/MM3 (ref 0–1)
MONOCYTES NFR BLD AUTO: 6.6 % (ref 0–12)
NEUTROPHILS # BLD AUTO: 5.73 10*3/MM3 (ref 1.5–8.3)
NEUTROPHILS NFR BLD AUTO: 52.9 % (ref 41–71)
NITRITE UR QL STRIP: NEGATIVE
PH UR STRIP.AUTO: <=5 [PH] (ref 5–8)
PLATELET # BLD AUTO: 199 10*3/MM3 (ref 150–450)
PMV BLD AUTO: 12.2 FL (ref 6–12)
POTASSIUM BLD-SCNC: 3.8 MMOL/L (ref 3.5–5.5)
PROCALCITONIN SERPL-MCNC: <0.05 NG/ML
PROT SERPL-MCNC: 7.8 G/DL (ref 5.7–8.2)
PROT UR QL STRIP: NEGATIVE
RBC # BLD AUTO: 4.52 10*6/MM3 (ref 3.89–5.14)
SODIUM BLD-SCNC: 136 MMOL/L (ref 132–146)
SP GR UR STRIP: >=1.03 (ref 1–1.03)
TROPONIN I SERPL-MCNC: 0 NG/ML (ref 0–0.07)
TROPONIN I SERPL-MCNC: 0 NG/ML (ref 0–0.07)
UROBILINOGEN UR QL STRIP: ABNORMAL
WBC NRBC COR # BLD: 10.81 10*3/MM3 (ref 3.5–10.8)
WHOLE BLOOD HOLD SPECIMEN: NORMAL
WHOLE BLOOD HOLD SPECIMEN: NORMAL

## 2018-01-15 PROCEDURE — 84484 ASSAY OF TROPONIN QUANT: CPT

## 2018-01-15 PROCEDURE — 85025 COMPLETE CBC W/AUTO DIFF WBC: CPT | Performed by: EMERGENCY MEDICINE

## 2018-01-15 PROCEDURE — 81003 URINALYSIS AUTO W/O SCOPE: CPT | Performed by: EMERGENCY MEDICINE

## 2018-01-15 PROCEDURE — 96361 HYDRATE IV INFUSION ADD-ON: CPT

## 2018-01-15 PROCEDURE — 99285 EMERGENCY DEPT VISIT HI MDM: CPT

## 2018-01-15 PROCEDURE — 93005 ELECTROCARDIOGRAM TRACING: CPT

## 2018-01-15 PROCEDURE — 99024 POSTOP FOLLOW-UP VISIT: CPT | Performed by: PHYSICIAN ASSISTANT

## 2018-01-15 PROCEDURE — 80053 COMPREHEN METABOLIC PANEL: CPT | Performed by: EMERGENCY MEDICINE

## 2018-01-15 PROCEDURE — 83690 ASSAY OF LIPASE: CPT | Performed by: EMERGENCY MEDICINE

## 2018-01-15 PROCEDURE — 83880 ASSAY OF NATRIURETIC PEPTIDE: CPT | Performed by: EMERGENCY MEDICINE

## 2018-01-15 PROCEDURE — 84145 PROCALCITONIN (PCT): CPT | Performed by: EMERGENCY MEDICINE

## 2018-01-15 PROCEDURE — 87040 BLOOD CULTURE FOR BACTERIA: CPT | Performed by: EMERGENCY MEDICINE

## 2018-01-15 PROCEDURE — 72040 X-RAY EXAM NECK SPINE 2-3 VW: CPT

## 2018-01-15 PROCEDURE — 83605 ASSAY OF LACTIC ACID: CPT | Performed by: EMERGENCY MEDICINE

## 2018-01-15 PROCEDURE — 99220 PR INITIAL OBSERVATION CARE/DAY 70 MINUTES: CPT | Performed by: HOSPITALIST

## 2018-01-15 PROCEDURE — 96360 HYDRATION IV INFUSION INIT: CPT

## 2018-01-15 PROCEDURE — 71045 X-RAY EXAM CHEST 1 VIEW: CPT

## 2018-01-15 RX ORDER — OXYCODONE AND ACETAMINOPHEN 7.5; 325 MG/1; MG/1
1 TABLET ORAL ONCE
Status: COMPLETED | OUTPATIENT
Start: 2018-01-15 | End: 2018-01-15

## 2018-01-15 RX ORDER — ASPIRIN 81 MG/1
324 TABLET, CHEWABLE ORAL ONCE
Status: COMPLETED | OUTPATIENT
Start: 2018-01-15 | End: 2018-01-15

## 2018-01-15 RX ORDER — SODIUM CHLORIDE 0.9 % (FLUSH) 0.9 %
10 SYRINGE (ML) INJECTION AS NEEDED
Status: DISCONTINUED | OUTPATIENT
Start: 2018-01-15 | End: 2018-01-16 | Stop reason: HOSPADM

## 2018-01-15 RX ADMIN — SODIUM CHLORIDE 1000 ML: 9 INJECTION, SOLUTION INTRAVENOUS at 20:21

## 2018-01-15 RX ADMIN — SODIUM CHLORIDE 1000 ML: 9 INJECTION, SOLUTION INTRAVENOUS at 22:33

## 2018-01-15 RX ADMIN — ASPIRIN 81 MG 324 MG: 81 TABLET ORAL at 17:54

## 2018-01-15 RX ADMIN — SODIUM CHLORIDE 1000 ML: 9 INJECTION, SOLUTION INTRAVENOUS at 18:11

## 2018-01-15 RX ADMIN — OXYCODONE HYDROCHLORIDE AND ACETAMINOPHEN 1 TABLET: 7.5; 325 TABLET ORAL at 18:36

## 2018-01-15 NOTE — TELEPHONE ENCOUNTER
----- Message from Candace Lynch sent at 1/15/2018  3:39 PM EST -----  [phone]  PT CALLED WANTING TO MAKE APPT AFTER I ASKED WHY SHE NEEDED AAPPT , SHE SAID FOR CHEST PAINS SO I TRANSFERRED CALL TO LAUR[facility]

## 2018-01-15 NOTE — TELEPHONE ENCOUNTER
Spoke with Ernestina  She is c/o of stabbing chest pain and back pain today   She states she called her surgeon and he told her to call her PCP   . Spoke with Dr Tanner and she advised she go directly to [facility] ER  Ernestina gave verb understanding and said she would go to the ER now.

## 2018-01-15 NOTE — PROGRESS NOTES
Subjective   Patient ID: Ernesitna Garrido is a 47 y.o. female is here today for follow-up for wound check.    HPI:      Patient is here for a 10 day wound check.  Patient is well-known to neurosurgery department for undergoing a revision C6 7 anterior cervical discectomy and fusion.  Patient had a C4 to 6 ACDF done a few years back and has adjacent level disease that required discectomy.  Patient was noted to have an A1c of 10.3.  Preop and thus, we are keeping a close eye on the incision.  Incision looks to be well-healed, well approximated.  No sign of infection, bleeding, or erythema.    Patient does have similar complaint has to prior to surgery.  Patient is having a little bit of chest pain and stabbing pain in the rib area about T4-T5 dermatome.  Patient states that her left upper extremity pain that she had prior surgery is 100% better.  She says that she has intermittent numbness in her left hand, but is very pleased with outcome.  When looking further into the stabbing pain, I checked the patient's troponin that she had drawn and it was within normal limits.  Patient had amylase and lipase drawn which were within normal limits.  The only thing of significant benefit against that I can correlate with that type of pain is the fact that she had a laparoscopic procedure within the last few months and she could have retained an air pocket from the procedure that could be causing some of this pain.  I do not think that this pain is related to the cervical spine.  However, we're going to look into this and further depth with the surgeon's downstairs.    Patient is doing very well from a cervical spine standpoint.  The x-rays were reviewed and showed good placement of the plate and screws.    The following portions of the patient's history were reviewed and updated as appropriate: allergies, current medications, past family history, past medical history, past social history, past surgical history and problem  list.    Review of Systems   Constitutional: Positive for activity change, appetite change and chills. Negative for diaphoresis, fatigue, fever and unexpected weight change.   HENT: Positive for sinus pain and voice change. Negative for congestion, dental problem, drooling, ear discharge, ear pain, facial swelling, hearing loss, mouth sores, nosebleeds, postnasal drip, rhinorrhea, sinus pressure, sneezing, sore throat, tinnitus and trouble swallowing.    Eyes: Negative for photophobia, pain, discharge, redness, itching and visual disturbance.   Respiratory: Positive for chest tightness. Negative for apnea, cough, choking, shortness of breath, wheezing and stridor.    Cardiovascular: Positive for chest pain. Negative for palpitations and leg swelling.   Gastrointestinal: Positive for anal bleeding, blood in stool and diarrhea. Negative for abdominal distention, constipation, nausea, rectal pain and vomiting.   Endocrine: Negative for cold intolerance, heat intolerance, polydipsia, polyphagia and polyuria.   Genitourinary: Negative for decreased urine volume, difficulty urinating, dysuria, enuresis, flank pain, frequency, genital sores, hematuria and urgency.   Musculoskeletal: Positive for arthralgias, back pain and myalgias. Negative for gait problem, joint swelling, neck pain and neck stiffness.   Skin: Negative for color change, pallor, rash and wound.   Allergic/Immunologic: Negative for environmental allergies, food allergies and immunocompromised state.   Neurological: Positive for weakness, light-headedness and numbness. Negative for dizziness, tremors, seizures, syncope, facial asymmetry, speech difficulty and headaches.   Hematological: Negative for adenopathy. Does not bruise/bleed easily.   Psychiatric/Behavioral: Positive for dysphoric mood and sleep disturbance. Negative for agitation, behavioral problems, confusion, decreased concentration, hallucinations, self-injury and suicidal ideas. The patient is  "not nervous/anxious and is not hyperactive.    All other systems reviewed and are negative.        Objective    reports that she quit smoking about 2 months ago. Her smoking use included Cigarettes. She started smoking about 30 years ago. She has a 14.00 pack-year smoking history. She has never used smokeless tobacco. She reports that she drinks alcohol. She reports that she does not use illicit drugs.   SMOKING STATUS: I reiterated the necessity of her not smoking and the risk of pseudoarthrosis if she continues to smoke or restart smoking.  I spent 10 minutes discussing this    Physical Exam:   Vitals:Temp 98 °F (36.7 °C) (Temporal Artery )   Ht 172.7 cm (67.99\")  Wt 105 kg (232 lb)  BMI 35.28 kg/m2   BMI: Body mass index is 35.28 kg/(m^2).     GENEREAL:   The patient is in no acute distress, and is able to answer all questions appropriately.  Skin:  The incision is well-healed and well approximated.  No signs of infection, bleeding, or erythema.  Musculoskeletal: The patient’s strength is intact in upper and lower extremities upon direct testing.  Strength is 5 out of 5.  Shoulder abduction is 5 out of 5.  Finger extension is 5 out of 5.  Dorsiflexion and plantarflexion are equal bilaterally @ 5/5. Hip flexion against resistance.  The patient’s gait is normal without antalgia.  Neurologic: The patient is alert and oriented by 3.  Recent memory, language, attention span, and fund of knowledge are all with within normal limits.   Sensation is equal bilaterally with no deficit.    Reflexes are 2+ at the biceps, triceps, and brachioradialis as well as the patellar and Achilles tendon bilaterally.  There is no sign of Sheehan’s, clonus, or long track signs.      Assessment/Plan   Independent Review of Radiographic Studies:    X-rays of the cervical spine reveal good placement of plate, screws and graft  Medical Decision Making:    Patient will follow up with us in 2 weeks to ensure that the wound continues to well. "  I reiterated the need for her to keep her sugars under control.  She reports that her sugars been running in the 180s.  Patient is going to follow up with the surgeon's office to ensure that there is no evidence of pneumoperitoneum.     It has been a pleasure providing neurosurgical care.    MELODY Walton was seen today for post-op.    Diagnoses and all orders for this visit:    S/P cervical spinal fusion      No Follow-up on file.
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
## 2018-01-16 VITALS
BODY MASS INDEX: 34.89 KG/M2 | RESPIRATION RATE: 20 BRPM | HEIGHT: 68 IN | WEIGHT: 230.2 LBS | OXYGEN SATURATION: 100 % | TEMPERATURE: 98 F | SYSTOLIC BLOOD PRESSURE: 123 MMHG | DIASTOLIC BLOOD PRESSURE: 79 MMHG | HEART RATE: 76 BPM

## 2018-01-16 PROBLEM — R07.89 COSTOCHONDRAL CHEST PAIN: Status: ACTIVE | Noted: 2018-01-16

## 2018-01-16 LAB
ANION GAP SERPL CALCULATED.3IONS-SCNC: 6 MMOL/L (ref 3–11)
BUN BLD-MCNC: 24 MG/DL (ref 9–23)
BUN/CREAT SERPL: 30 (ref 7–25)
CALCIUM SPEC-SCNC: 8.7 MG/DL (ref 8.7–10.4)
CHLORIDE SERPL-SCNC: 103 MMOL/L (ref 99–109)
CO2 SERPL-SCNC: 27 MMOL/L (ref 20–31)
CREAT BLD-MCNC: 0.8 MG/DL (ref 0.6–1.3)
DEPRECATED RDW RBC AUTO: 43.2 FL (ref 37–54)
ERYTHROCYTE [DISTWIDTH] IN BLOOD BY AUTOMATED COUNT: 12.8 % (ref 11.3–14.5)
FLUAV AG NPH QL: NEGATIVE
FLUAV SUBTYP SPEC NAA+PROBE: NOT DETECTED
FLUBV AG NPH QL IA: NEGATIVE
FLUBV RNA ISLT QL NAA+PROBE: NOT DETECTED
GFR SERPL CREATININE-BSD FRML MDRD: 94 ML/MIN/1.73
GLUCOSE BLD-MCNC: 157 MG/DL (ref 70–100)
GLUCOSE BLDC GLUCOMTR-MCNC: 131 MG/DL (ref 70–130)
GLUCOSE BLDC GLUCOMTR-MCNC: 189 MG/DL (ref 70–130)
GLUCOSE BLDC GLUCOMTR-MCNC: 199 MG/DL (ref 70–130)
HCT VFR BLD AUTO: 34.6 % (ref 34.5–44)
HEMOCCULT STL QL: NEGATIVE
HGB BLD-MCNC: 11 G/DL (ref 11.5–15.5)
MCH RBC QN AUTO: 29.4 PG (ref 27–31)
MCHC RBC AUTO-ENTMCNC: 31.8 G/DL (ref 32–36)
MCV RBC AUTO: 92.5 FL (ref 80–99)
PLATELET # BLD AUTO: 162 10*3/MM3 (ref 150–450)
PMV BLD AUTO: 12.3 FL (ref 6–12)
POTASSIUM BLD-SCNC: 3.9 MMOL/L (ref 3.5–5.5)
RBC # BLD AUTO: 3.74 10*6/MM3 (ref 3.89–5.14)
SODIUM BLD-SCNC: 136 MMOL/L (ref 132–146)
TROPONIN I SERPL-MCNC: <0.006 NG/ML
WBC NRBC COR # BLD: 8.78 10*3/MM3 (ref 3.5–10.8)

## 2018-01-16 PROCEDURE — 87804 INFLUENZA ASSAY W/OPTIC: CPT | Performed by: PHYSICIAN ASSISTANT

## 2018-01-16 PROCEDURE — 82272 OCCULT BLD FECES 1-3 TESTS: CPT | Performed by: PHYSICIAN ASSISTANT

## 2018-01-16 PROCEDURE — 80048 BASIC METABOLIC PNL TOTAL CA: CPT | Performed by: PHYSICIAN ASSISTANT

## 2018-01-16 PROCEDURE — 63710000001 INSULIN LISPRO (HUMAN) PER 5 UNITS: Performed by: PHYSICIAN ASSISTANT

## 2018-01-16 PROCEDURE — 85027 COMPLETE CBC AUTOMATED: CPT | Performed by: PHYSICIAN ASSISTANT

## 2018-01-16 PROCEDURE — G0378 HOSPITAL OBSERVATION PER HR: HCPCS

## 2018-01-16 PROCEDURE — 96361 HYDRATE IV INFUSION ADD-ON: CPT

## 2018-01-16 PROCEDURE — 82962 GLUCOSE BLOOD TEST: CPT

## 2018-01-16 PROCEDURE — 97161 PT EVAL LOW COMPLEX 20 MIN: CPT

## 2018-01-16 PROCEDURE — 87502 INFLUENZA DNA AMP PROBE: CPT | Performed by: PHYSICIAN ASSISTANT

## 2018-01-16 PROCEDURE — 93005 ELECTROCARDIOGRAM TRACING: CPT | Performed by: PHYSICIAN ASSISTANT

## 2018-01-16 PROCEDURE — 97110 THERAPEUTIC EXERCISES: CPT

## 2018-01-16 PROCEDURE — 87046 STOOL CULTR AEROBIC BACT EA: CPT | Performed by: EMERGENCY MEDICINE

## 2018-01-16 PROCEDURE — 84484 ASSAY OF TROPONIN QUANT: CPT | Performed by: PHYSICIAN ASSISTANT

## 2018-01-16 PROCEDURE — 99217 PR OBSERVATION CARE DISCHARGE MANAGEMENT: CPT | Performed by: INTERNAL MEDICINE

## 2018-01-16 PROCEDURE — 87045 FECES CULTURE AEROBIC BACT: CPT | Performed by: EMERGENCY MEDICINE

## 2018-01-16 PROCEDURE — G8979 MOBILITY GOAL STATUS: HCPCS

## 2018-01-16 PROCEDURE — G8978 MOBILITY CURRENT STATUS: HCPCS

## 2018-01-16 PROCEDURE — 93010 ELECTROCARDIOGRAM REPORT: CPT | Performed by: INTERNAL MEDICINE

## 2018-01-16 RX ORDER — ACETAMINOPHEN 325 MG/1
650 TABLET ORAL EVERY 4 HOURS PRN
Status: DISCONTINUED | OUTPATIENT
Start: 2018-01-16 | End: 2018-01-16 | Stop reason: HOSPADM

## 2018-01-16 RX ORDER — NAPROXEN 500 MG/1
500 TABLET ORAL 2 TIMES DAILY WITH MEALS
Status: DISCONTINUED | OUTPATIENT
Start: 2018-01-16 | End: 2018-01-16 | Stop reason: HOSPADM

## 2018-01-16 RX ORDER — CYCLOBENZAPRINE HCL 5 MG
5 TABLET ORAL 3 TIMES DAILY PRN
Qty: 20 TABLET | Refills: 0 | Status: SHIPPED | OUTPATIENT
Start: 2018-01-16 | End: 2018-01-16

## 2018-01-16 RX ORDER — CYCLOBENZAPRINE HCL 10 MG
5 TABLET ORAL 3 TIMES DAILY PRN
Status: DISCONTINUED | OUTPATIENT
Start: 2018-01-16 | End: 2018-01-16 | Stop reason: HOSPADM

## 2018-01-16 RX ORDER — NAPROXEN 500 MG/1
500 TABLET ORAL 2 TIMES DAILY WITH MEALS
Qty: 14 TABLET | Refills: 0 | Status: SHIPPED | OUTPATIENT
Start: 2018-01-16 | End: 2018-01-16

## 2018-01-16 RX ORDER — PANTOPRAZOLE SODIUM 40 MG/1
40 TABLET, DELAYED RELEASE ORAL
Status: DISCONTINUED | OUTPATIENT
Start: 2018-01-16 | End: 2018-01-16 | Stop reason: HOSPADM

## 2018-01-16 RX ORDER — NICOTINE POLACRILEX 4 MG
15 LOZENGE BUCCAL
Status: DISCONTINUED | OUTPATIENT
Start: 2018-01-16 | End: 2018-01-16 | Stop reason: HOSPADM

## 2018-01-16 RX ORDER — LEVOTHYROXINE SODIUM 0.1 MG/1
100 TABLET ORAL DAILY
Status: DISCONTINUED | OUTPATIENT
Start: 2018-01-16 | End: 2018-01-16 | Stop reason: HOSPADM

## 2018-01-16 RX ORDER — SODIUM CHLORIDE 9 MG/ML
100 INJECTION, SOLUTION INTRAVENOUS CONTINUOUS
Status: DISCONTINUED | OUTPATIENT
Start: 2018-01-16 | End: 2018-01-16

## 2018-01-16 RX ORDER — SODIUM CHLORIDE 0.9 % (FLUSH) 0.9 %
1-10 SYRINGE (ML) INJECTION AS NEEDED
Status: DISCONTINUED | OUTPATIENT
Start: 2018-01-16 | End: 2018-01-16 | Stop reason: HOSPADM

## 2018-01-16 RX ORDER — DEXTROSE MONOHYDRATE 25 G/50ML
25 INJECTION, SOLUTION INTRAVENOUS
Status: DISCONTINUED | OUTPATIENT
Start: 2018-01-16 | End: 2018-01-16 | Stop reason: HOSPADM

## 2018-01-16 RX ORDER — NAPROXEN 500 MG/1
500 TABLET ORAL 2 TIMES DAILY WITH MEALS
Qty: 14 TABLET | Refills: 0 | Status: SHIPPED | OUTPATIENT
Start: 2018-01-16 | End: 2018-01-23

## 2018-01-16 RX ORDER — CYCLOBENZAPRINE HCL 5 MG
5 TABLET ORAL 3 TIMES DAILY PRN
Qty: 20 TABLET | Refills: 0 | Status: SHIPPED | OUTPATIENT
Start: 2018-01-16 | End: 2018-02-13

## 2018-01-16 RX ADMIN — NAPROXEN 500 MG: 500 TABLET ORAL at 17:09

## 2018-01-16 RX ADMIN — SODIUM CHLORIDE 100 ML/HR: 9 INJECTION, SOLUTION INTRAVENOUS at 08:19

## 2018-01-16 RX ADMIN — ACETAMINOPHEN 650 MG: 325 TABLET, FILM COATED ORAL at 02:27

## 2018-01-16 RX ADMIN — INSULIN LISPRO 2 UNITS: 100 INJECTION, SOLUTION INTRAVENOUS; SUBCUTANEOUS at 17:09

## 2018-01-16 RX ADMIN — LEVOTHYROXINE SODIUM 100 MCG: 100 TABLET ORAL at 05:39

## 2018-01-16 RX ADMIN — INSULIN LISPRO 2 UNITS: 100 INJECTION, SOLUTION INTRAVENOUS; SUBCUTANEOUS at 12:30

## 2018-01-16 RX ADMIN — NAPROXEN 500 MG: 500 TABLET ORAL at 08:19

## 2018-01-16 RX ADMIN — SODIUM CHLORIDE 100 ML/HR: 9 INJECTION, SOLUTION INTRAVENOUS at 03:00

## 2018-01-16 RX ADMIN — CYCLOBENZAPRINE HYDROCHLORIDE 5 MG: 10 TABLET, FILM COATED ORAL at 14:18

## 2018-01-16 RX ADMIN — PANTOPRAZOLE SODIUM 40 MG: 40 TABLET, DELAYED RELEASE ORAL at 05:38
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
## 2018-01-16 NOTE — PROGRESS NOTES
Discharge Planning Assessment  [facility]     Patient Name: Ernestina Garrido  MRN: [id number]  Today's Date: 1/16/2018    Admit Date: 1/15/2018          Discharge Needs Assessment       01/16/18 1324    Living Environment    Lives With child(radha), dependent    Living Arrangements apartment    Home Accessibility stairs to enter home;no concerns    Stair Railings at Home outside, present on right side    Type of Financial/Environmental Concern none    Transportation Available car;family or friend will provide    Living Environment    Provides Primary Care For no one    Quality Of Family Relationships supportive    Able to Return to Prior Living Arrangements yes    Discharge Needs Assessment    Concerns To Be Addressed no discharge needs identified;denies needs/concerns at this time    Readmission Within The Last 30 Days no previous admission in last 30 days    Anticipated Changes Related to Illness none    Equipment Currently Used at Home other (see comments)   Brace     Equipment Needed After Discharge none    Discharge Disposition home or self-care            Discharge Plan       01/16/18 1328    Case Management/Social Work Plan    Plan home at discharge     Patient/Family In Agreement With Plan yes    Additional Comments Patient lives with her children in [facility]. She is independent with her ADL's and has no current needs for DME or home health - Her goal is to return home when medically ready and she denies any concerns regarding discharge at this time -  following - aw [phone]         Discharge Placement     No information found                Demographic Summary       01/16/18 1323    Referral Information    Admission Type observation    Arrived From still a patient    Referral Source admission list    Reason For Consult discharge planning    Record Reviewed history and physical;medical record    Contact Information    Permission Granted to Share Information With     Primary Care  Physician Information    Name Stephanie Tanner             Functional Status       01/16/18 1323    Functional Status Current    Current Functional Level Comment Please see nursing notes     Functional Status Prior    Ambulation 0-->independent    Transferring 0-->independent    Toileting 0-->independent    Bathing 0-->independent    Dressing 0-->independent    Eating 0-->independent    Communication 0-->understands/communicates without difficulty    Swallowing 0-->swallows foods/liquids without difficulty    IADL    Medications independent    Meal Preparation independent    Housekeeping independent    Laundry independent    Shopping independent    Oral Care independent    Activity Tolerance    Current Activity Limitations none    Usual Activity Tolerance good    Current Activity Tolerance moderate    Cognitive/Perceptual/Developmental    Current Mental Status/Cognitive Functioning no deficits noted    Employment/Financial    Employment/Finance Comments [employer]              Psychosocial     None            Abuse/Neglect     None            Legal     None            Substance Abuse     None            Patient Forms     None          April Lucio RN

## 2018-01-16 NOTE — PLAN OF CARE
Problem: Patient Care Overview (Adult)  Goal: Plan of Care Review  Outcome: Ongoing (interventions implemented as appropriate)   01/16/18 1501   Coping/Psychosocial Response Interventions   Plan Of Care Reviewed With patient   Outcome Evaluation   Outcome Summary/Follow up Plan patient able to ambulate 350 ft but has increasing LOB with fatigue patient tending to veer to the left. patient had stable BP with activity       Problem: Inpatient Physical Therapy  Goal: Bed Mobility Goal LTG- PT  Outcome: Ongoing (interventions implemented as appropriate)   01/16/18 1501   Bed Mobility PT LTG   Bed Mobility PT LTG, Date Established 01/16/18   Bed Mobility PT LTG, Time to Achieve 2 wks   Bed Mobility PT LTG, Activity Type supine to sit/sit to supine   Bed Mobility PT LTG, [place] Level independent   Bed Mobility PT LTG, Outcome goal ongoing     Goal: Transfer Training Goal 1 LTG- PT  Outcome: Ongoing (interventions implemented as appropriate)   01/16/18 1501   Transfer Training PT LTG   Transfer Training PT LTG, Date Established 01/16/18   Transfer Training PT LTG, Time to Achieve 2 wks   Transfer Training PT LTG, Activity Type all transfers   Transfer Training PT LTG, [place] Level independent   Transfer Training PT LTG, Outcome goal ongoing     Goal: Gait Training Goal LTG- PT  Outcome: Ongoing (interventions implemented as appropriate)   01/16/18 1501   Gait Training PT LTG   Gait Training Goal PT LTG, Date Established 01/16/18   Gait Training Goal PT LTG, Time to Achieve 2 wks   Gait Training Goal PT LTG, [place] Level independent   Gait Training Goal PT LTG, Distance to Achieve 400   Gait Training Goal PT LTG, Outcome goal ongoing     Goal: Stair Training Goal LTG- PT  Outcome: Ongoing (interventions implemented as appropriate)   01/16/18 1501   Stair Training PT LTG   Stair Training Goal PT LTG, Date Established 01/16/18   Stair Training Goal PT LTG, Time to Achieve 2 wks   Stair Training Goal PT LTG,  Number of Steps 10   Stair Training Goal PT LTG, [place] Level independent   Stair Training Goal PT LTG, Outcome goal ongoing

## 2018-01-16 NOTE — H&P
[facility] Medicine Services  HISTORY AND PHYSICAL    Patient Name: Ernestina Garrido  : 1970  MRN: [id number]  Primary Care Physician: Stephanie Tanner MD    Subjective   Subjective     Chief Complaint:  Chest wall pain    HPI:  Ernestina Garrido is a 47 y.o. female with a past medical history significant for chronic pain syndrome, DM2, hypertension, hypothyroidism, and intervertebral cervical disc disorder who presents with multiple complaints. States she was concerned about some chest wall pain which has been going for several weeks, acutely worsened today. Localized to left upper chest wall and left shoulder. No associated symptoms. Pain characterized as a sharp stabbing sensation. She endorses reproduction of pain with palpation, positional changes, and left arm movement. No history of CAD or cardiac issues. She has never had a stress test. Patient also noting abdominal cramping with loose stool and rectal bleeding. Notes onset of about 1 week ago. She is not on blood thinners and denies history of C. Diff or recent ABX therapy. Reports similar symptoms 6 months ago and underwent subsequent colonoscopy which was reportedly normal. She endorses associated nausea with non bloody vomiting and generalized fatigue. No fever, cough, congestion, or SOB. No peripheral edema. Patient has been evaluated in ED multiple times with similar complaints and recently undergone imaging. Had unremarkable CT of abdomen and CTA of chest.    Records reviewed indicate patient underwent laparoscopic cholecystectomy 2017 without complications. In 2017 patient complained of constant left shoulder and chest wall pain. Was found to have cervical disc herniation at C6- 7. Underwent  anterior cervical discectomy and fusion on 1/3/2018 per Dr. Pena. She followed up with neurosurgery today and reported overall improvement in arm and shoulder pain, but did note unchanged left chest wall pain. Was  subsequently directed to ED for further evaluation and treatment.    Emergency Department Evaluation; hypotensive to 72/61. HR stable at 85. Blood glucose increased to 317. BUN/cr elevated to 29 and 1.4 respectively. Chemistry and hematology otherwise favorable. UA, CXR negative for acute process.  ===================  48 YO F WHO RECENTLY HAD CCY BY DR ERDMOND, HAD BEEN C/O REPRODUCIBLE CHEST PAIN AND L ARM PAIN SINCE CCY. SHE RECENTLY UNDERWENT CERVICAL FUSION BY DR HUSSEIN AND L ARM PAIN HAS RESOLVED. SHE CONT TO C/O CHEST PAIN, SO SHE PRESENTED TO THE ED. SHE WAS GIVEN NARCOTICS FOR PAIN, WHICH ADEQUATELY RELIEVED HER PAIN. SHE WAS GOING TO BE DISCHARGED HOME, BUT WHEN SHE GOT UP, SBP DROPPED INTO THE 70S. PT WAS ASYMPTOMATIC. SHE RECEIVED 3 L NS IN THE ED ALREADY, BUT BP IS STILL LOW. SHE ALSO HAD ELEVATED CREATININE, SO WILL ADMIT FOR OBS. TROP AND EKG NEG SO FAR. NO HISTORY OF CAD. PT TRIED MOBIC FOR PAIN BEFORE, BUT CAUSED DIARRHEA.      Review of Systems   Constitutional: Positive for fatigue. Negative for chills.   HENT: Negative for congestion and trouble swallowing.    Eyes: Negative for photophobia and visual disturbance.   Respiratory: Negative for cough and shortness of breath.    Cardiovascular: Positive for chest pain. Negative for palpitations and leg swelling.   Gastrointestinal: Positive for abdominal pain, blood in stool, diarrhea, nausea and vomiting.   Endocrine: Negative for cold intolerance and heat intolerance.   Genitourinary: Negative for dysuria and flank pain.   Musculoskeletal: Negative for back pain and gait problem.   Skin: Negative for pallor and rash.   Allergic/Immunologic: Negative for immunocompromised state.   Neurological: Negative for dizziness.   Hematological: Negative for adenopathy.   Psychiatric/Behavioral: Negative for agitation and confusion.          Otherwise 10-system ROS reviewed and is negative except as mentioned in the HPI.    Personal History     Past Medical  History:   Diagnosis Date   • Arthralgia of hip    • Cervical facet arthropathy/cervical spondylosis without myelopathy    • Chronic pain syndrome     Description: hx of epidural injections.   • Degenerative disc disease, cervical    • Depression     Description: dx .   • Diabetes mellitus     TYPE 2   • Essential hypertension     Description: dx .   • Hemorrhoid 2016   • History of colonoscopy 2013    normal per patient   • History of uterine leiomyoma    • History of varicella    • Hypertension    • Hypothyroidism     Description: dx .   • Insomnia 2016   • Low back pain    • Lumbosacral disc disease    • Neck pain    • Obesity    • Osteoarthritis    • Tattoos     HIV neg 2012 per patient   • Tobacco abuse        Past Surgical History:   Procedure Laterality Date   • ABDOMINAL SURGERY      cholecystectomy   • ANTERIOR CERVICAL DISCECTOMY  2016    C4-6 DISC (Dr. Pena)   • ANTERIOR CERVICAL DISCECTOMY W/ FUSION Left 1/3/2018    Procedure: CERVICAL DISCECTOMY ANTERIOR WITH FUSION C6-7;  Surgeon: Nicolás Pena MD;  Location:  [facility];  Service:    • BACK SURGERY      lumbar discectomy   •  SECTION      , ,    • CHOLECYSTECTOMY WITH INTRAOPERATIVE CHOLANGIOGRAM N/A 2017    Procedure: CHOLECYSTECTOMY LAPAROSCOPIC INTRAOPERATIVE CHOLANGIOGRAM;  Surgeon: Clifford Freed MD;  Location:  [facility];  Service:    • HYSTERECTOMY  2015       Family History: family history includes Colon cancer in her maternal grandmother; Diabetes in her daughter, maternal grandmother, maternal uncle, and mother; Hypertension in her maternal grandmother, maternal uncle, mother, and other; Hypothyroidism in her daughter. There is no history of Breast cancer or Ovarian cancer.     Social History:  reports that she quit smoking about 2 months ago. Her smoking use included Cigarettes. She started smoking about 30 years ago. She has a 14.00 pack-year smoking history. She has never  used smokeless tobacco. She reports that she drinks alcohol. She reports that she does not use illicit drugs.  Social History     Social History Narrative       Medications:    (Not in a hospital admission)    Allergies   Allergen Reactions   • Glimepiride Diarrhea       Objective   Objective     Vital Signs:   Temp:  [97.5 °F (36.4 °C)-98 °F (36.7 °C)] 97.5 °F (36.4 °C)  Heart Rate:  [60-85] 66  Resp:  [16-18] 16  BP: ()/(32-72) 98/71        Physical Exam   Constitutional: No acute distress, awake, alert  Eyes: PERRLA, sclerae anicteric, no conjunctival injection  HENT: NCAT, mucous membranes moist  Neck: Supple, no thyromegaly, no lymphadenopathy, trachea midline  Respiratory: Clear to auscultation bilaterally, nonlabored respirations   Cardiovascular: RRR, no murmurs, rubs, or gallops, palpable pedal pulses bilaterally  Pain reproducible upon palpation of chest wall  Gastrointestinal: Positive bowel sounds, soft, nontender, nondistended  Tenderness at laparoscopic incisions, no sign of infection at these sites  Musculoskeletal: No bilateral ankle edema, no clubbing or cyanosis to extremities  Psychiatric: Appropriate affect, cooperative  Neurologic: Oriented x 3, strength symmetric in all extremities, Cranial Nerves grossly intact to confrontation, speech clear  Skin: No rashes      Results Reviewed:  I have personally reviewed current lab, radiology, and data and agree.      Results from last 7 days  Lab Units 01/15/18  1728   WBC 10*3/mm3 10.81*   HEMOGLOBIN g/dL 13.3   HEMATOCRIT % 41.7   PLATELETS 10*3/mm3 199       Results from last 7 days  Lab Units 01/15/18  1728   SODIUM mmol/L 136   POTASSIUM mmol/L 3.8   CHLORIDE mmol/L 98*   CO2 mmol/L 30.0   BUN mg/dL 29*   CREATININE mg/dL 1.40*   GLUCOSE mg/dL 317*   CALCIUM mg/dL 9.5   ALT (SGPT) U/L 17   AST (SGOT) U/L 16     Brief Urine Lab Results  (Last result in the past 365 days)      Color   Clarity   Blood   Leuk Est   Nitrite   Protein   CREAT    Urine HCG        01/15/18 2143 Yellow Clear Negative Negative Negative Negative             BNP   Date Value Ref Range Status   01/15/2018 8.0 0.0 - 100.0 pg/mL Final     No results found for: PHART  Imaging Results (last 24 hours)     Procedure Component Value Units Date/Time    XR Chest 1 View [[id number]] Updated:  01/15/18 1730             Assessment/Plan   Assessment / Plan     Hospital Problem List     * (Principal)CARLOS ENRIQUE (acute kidney injury)    Hematochezia    Hypotension    Atypical chest pain    Essential hypertension    Overview Signed 6/13/2016  9:15 AM by Stephanie Tanner MD     Description: dx 2005.         Type 2 diabetes mellitus, without long-term current use of insulin    Overview Signed 1/12/2017  8:20 PM by Jocelyn Spicer     Dx 2005         Intervertebral cervical disc disorder with myelopathy, cervical region    Overview Signed 12/29/2017 10:45 AM by Nicolás Pena MD     Added automatically from request for surgery [id number]         Hypothyroidism    Overview Signed 6/13/2016  9:15 AM by Stephanie Tanner MD     Description: dx 2005.         GERD (gastroesophageal reflux disease)            Assessment & Plan:  1. CARLOS ENRIQUE: BASELINE CR 0.9-1  - no history of renal failure. Suspect pre-renal secondary to hypoperfusion (BP meds and narcotics)  - hold nephrotoxins  - last creatine 1.0 1/2/2018. Increased to 1.4 today  - administered 3L in ED. Continue with saline 100 cc/hr  - am labs    2. Hypotension:  - related to antihypertensives plus narcotics (post-procedural)  - hold lisinopril and narcotics. Monitor  - continue with IVF as above    3. Hematochezia:  - H/H stable and at baseline.   - start with occult blood and monitor hemogram  - initiate PPI and GI consult pending clinical progression. Will monitor for now and confirm actual bleed first.  - on clear liquid diet until bleeding confirmed. Progress diet or implement NPO status pending diagnostic studies    4. Atypical chest wall pain:  - suspect  musculoskeletal/cervical radiculopathy etiology. EKG normal sinus with troponin negative X2  - continue to trend cardiac enzymes with EKG  - administered ASA in ED  - PT/OT treat and eval for chronic pain  WILL START NAPROXEN BID X 5-7 DAYS    5. DM2:  - poorly controlled on PO meds. Hold for now and initiate scheduled accu checks with sliding scale insulin  - last A1c 10. Has been counseled extensively on blood sugar control    DVT prophylaxis: mechanical    CODE STATUS:  Full     HOPEFULLY HOME IN AM      Admission Status:  I believe this patient meets INPATIENT status due to the need for care which can only be reasonably provided in an hospital setting such as aggressive/expedited ancillary services and/or consultation services, the necessity for IV medications, close physician monitoring and/or the possible need for procedures.  In such, I feel patient’s risk for adverse outcomes and need for care warrant INPATIENT evaluation and predict the patient’s care encounter to likely last beyond 2 midnights.    Samara Armas PA-C   01/16/18   12:00 AM

## 2018-01-16 NOTE — DISCHARGE SUMMARY
[facility] Medicine Services  DISCHARGE SUMMARY    Patient Name: Ernestina Garrido  : 1970  MRN: [id number]    Date of Admission: 1/15/2018  Date of Discharge:  2018  Primary Care Physician: Stephanie Tanner MD    Consults     No orders found for last 30 day(s).        Hospital Course     Presenting Problem:   Chest wall pain [R07.89]    Active Hospital Problems (** Indicates Principal Problem)    Diagnosis Date Noted   • **CARLOS ENRIQUE (acute kidney injury) [N17.9] 01/15/2018   • Costochondral chest pain [R07.1] 2018   • GERD (gastroesophageal reflux disease) [K21.9] 01/15/2018   • Hematochezia [K92.1] 01/15/2018   • Hypotension [I95.9] 01/15/2018   • Atypical chest pain [R07.89] 01/15/2018   • Intervertebral cervical disc disorder with myelopathy, cervical region [M50.00] 2017   • Type 2 diabetes mellitus, without long-term current use of insulin [E11.9] 12/15/2016   • Essential hypertension [I10] 2016   • Hypothyroidism [E03.9] 2016      Resolved Hospital Problems    Diagnosis Date Noted Date Resolved   No resolved problems to display.          Hospital Course:  Ernestina Garrido is a 47 y.o. female with PMH significant for chronic pain syndrome, DM2, hypertension, hypothyroidism, and intervertebral cervical disc disorder s/p anterior cervical discectomy and fusion on 1/3/2018 with Dr. Pena. She came in with complaints of pain to left upper chest wall and left shoulder. No associated symptoms. Pain was characterized as a sharp stabbing sensation. She endorses reproduction of pain with palpation, positional changes, and left arm movement. No history of CAD or cardiac issues.  Patient also noting abdominal cramping with loose stool and rectal bleeding. Had unremarkable CT of abdomen and CTA of chest recently. She followed up with neurosurgery and reported overall improvement in arm and shoulder pain, but did note unchanged left chest wall pain. Was  subsequently directed to ED for further evaluation and treatment. In the ED patient received pain medication and subsequently became hypotensive. Other vitals remained stable. Chemistry and hematology favorable other than mild CARLOS ENRIQUE. UA, CXR negative for acute process. Patient was admitted for observation overnight. Her EKG and troponins remained negative. Her CARLOS ENRIQUE resolved with IVF and her blood pressure improved. Regarding reported bloody stools, patient had no episodes here, her hemoglobin did drop, but suspect this was dilutional as she received 3L of IVF and maintenance fluids. Her chest wall pain was easily reproducible with palpation. She may have some costochondritis given the nature of the pain and the failure to improve (pain can last up to 6-12 months). Encouraged her to use scheduled NSAIDs, will d/c with prescription for PRN muscle relaxers and encouraged warm compress to the area. If no improvement in future could consider glucocorticoid injections. She can f/u with her PCP in 1 week. She should also consider course of outpatient physical therapy for pain.        Day of Discharge     HPI:   Patient still having chest wall pain. It worsens with deep breathing and any type of movement. She denies SOB, any hematochezia. Vital signs are stable.    Review of Systems  Gen- No fevers, chills  CV- No chest pain, palpitations  Resp- No cough, dyspnea  GI- No N/V/D, abd pain    Otherwise ROS is negative except as mentioned in the HPI.    Vital Signs:   Temp:  [97.5 °F (36.4 °C)-98.1 °F (36.7 °C)] 98.1 °F (36.7 °C)  Heart Rate:  [60-94] 69  Resp:  [16-20] 20  BP: ()/(32-84) 125/80     Physical Exam:  Constitutional: No acute distress, awake, alert  HENT: NCAT, mucous membranes moist  Respiratory: Clear to auscultation bilaterally, respiratory effort normal   Cardiovascular: RRR, no murmurs, rubs, or gallops, palpable pedal pulses bilaterally. Chest wall pain is reproducible with palpation.  Gastrointestinal:  Positive bowel sounds, soft, nontender, nondistended  Musculoskeletal: No bilateral ankle edema  Psychiatric: Appropriate affect, cooperative  Neurologic: Oriented x 3, strength symmetric in all extremities, Cranial Nerves grossly intact to confrontation, speech clear  Skin: No rashes      Pertinent  and/or Most Recent Results       Results from last 7 days  Lab Units 01/16/18  0716 01/15/18  1728   WBC 10*3/mm3 8.78 10.81*   HEMOGLOBIN g/dL 11.0* 13.3   HEMATOCRIT % 34.6 41.7   PLATELETS 10*3/mm3 162 199   SODIUM mmol/L 136 136   POTASSIUM mmol/L 3.9 3.8   CHLORIDE mmol/L 103 98*   CO2 mmol/L 27.0 30.0   BUN mg/dL 24* 29*   CREATININE mg/dL 0.80 1.40*   GLUCOSE mg/dL 157* 317*   CALCIUM mg/dL 8.7 9.5       Results from last 7 days  Lab Units 01/15/18  1728   BILIRUBIN mg/dL 0.4   ALK PHOS U/L 85   ALT (SGPT) U/L 17   AST (SGOT) U/L 16           Invalid input(s): TG, LDLREALC    Results from last 7 days  Lab Units 01/16/18  0716 01/15/18  1728   BNP pg/mL  --  8.0   TROPONIN I ng/mL <0.006  --      Brief Urine Lab Results  (Last result in the past 365 days)      Color   Clarity   Blood   Leuk Est   Nitrite   Protein   CREAT   Urine HCG        01/15/18 2143 Yellow Clear Negative Negative Negative Negative               Microbiology Results Abnormal     Procedure Component Value - Date/Time    Blood Culture - Blood, [[id number]]  (Normal) Collected:  01/15/18 2135    Lab Status:  Preliminary result Specimen:  Blood from Hand, Left Updated:  01/16/18 1001     Blood Culture No growth at less than 24 hours    Blood Culture - Blood, [[id number]]  (Normal) Collected:  01/15/18 2145    Lab Status:  Preliminary result Specimen:  Blood from Arm, Right Updated:  01/16/18 1001     Blood Culture No growth at less than 24 hours    Influenza A & B, RT PCR - Swab, Nasopharynx [[id number]]  (Normal) Collected:  01/16/18 0234    Lab Status:  Final result Specimen:  Swab from Nasopharynx Updated:  01/16/18 0912     Influenza A PCR Not  Detected     Influenza B PCR Not Detected    Influenza Antigen, Rapid - Swab, Nasopharynx [[id number]]  (Normal) Collected:  01/16/18 0234    Lab Status:  Final result Specimen:  Swab from Nasopharynx Updated:  01/16/18 0253     Influenza A Ag, EIA Negative     Influenza B Ag, EIA Negative          Imaging Results (all)     Procedure Component Value Units Date/Time    XR Chest 1 View [[id number]] Collected:  01/16/18 0842     Updated:  01/16/18 0842    Narrative:       EXAMINATION: XR CHEST 1 VW-01/15/2018:      INDICATION: Chest pain, triage protocol.      COMPARISON: 11/24/2017.     FINDINGS: Portable chest reveals cardiac and mediastinal silhouettes to  be within normal limits. The lung fields are grossly clear. No focal  parenchymal opacification present.  No pleural effusion or pneumothorax.  Degenerative changes seen within the spine. Pulmonary vascularity is  within normal limits.           Impression:       No acute cardiopulmonary disease.     D:  01/15/2018  E:  01/16/2018                         Order Current Status    Occult Blood X 1, Stool - Stool, Per Rectum In process    Stool Culture - Stool, Per Rectum In process    Blood Culture - Blood, Preliminary result    Blood Culture - Blood, Preliminary result        Discharge Details      Ernestina Garrido   Home Medication Instructions MELLISA:[id number]    Printed on:01/16/18 1449   Medication Information                      cyclobenzaprine (FLEXERIL) 5 MG tablet  Take 1 tablet by mouth 3 (Three) Times a Day As Needed for Muscle Spasms (chest pain).             Empagliflozin (JARDIANCE PO)  Take 10 mg by mouth Daily.             gabapentin (NEURONTIN) 400 MG capsule  Take this next her pill at night in addition to the medication that you're already taking             hydrochlorothiazide (HYDRODIURIL) 50 MG tablet  Take 1 tablet by mouth Daily.             levothyroxine (SYNTHROID, LEVOTHROID) 100 MCG tablet  Take 100 mcg by mouth Daily.              lisinopril (PRINIVIL,ZESTRIL) 20 MG tablet  Take 1 tablet by mouth Every Night.             naproxen (NAPROSYN) 500 MG tablet  Take 1 tablet by mouth 2 (Two) Times a Day With Meals for 7 days.             oxyCODONE-acetaminophen (PERCOCET) 7.5-325 MG per tablet  Take 1 tablet by mouth Every 8 (Eight) Hours.                   Discharge Disposition:  Home or Self Care    Discharge Diet:  Diet Instructions     Diet as tolerated               Discharge Activity:   Activity Instructions     Activity as tolerated               Special Instructions:  Warm compress to chest wall    Future Appointments  Date Time Provider Department Center   1/22/2018 8:45 AM Stephanie Tanner MD [facility] None   2/1/2018 10:00 AM Jorge Kraus PA-C [facility] None       Additional Instructions for the Follow-ups that You Need to Schedule     Clostridium Difficile Toxin, PCR - Stool, Per Rectum    Jan 16, 2018 (Approximate)        Stool Culture - Stool, Per Rectum    Jan 16, 2018 (Approximate)    Bloody diarrhea   Order Comments:  Bloody diarrhea                      Time Spent on Discharge: 35 minutes    Susan Cotter DO  01/16/18  2:47 PM

## 2018-01-16 NOTE — PLAN OF CARE
Problem: Patient Care Overview (Adult)  Goal: Plan of Care Review  Outcome: Ongoing (interventions implemented as appropriate)   01/16/18 0553   Coping/Psychosocial Response Interventions   Plan Of Care Reviewed With patient   Patient Care Overview   Progress no change     Goal: Adult Individualization and Mutuality  Outcome: Ongoing (interventions implemented as appropriate)    Goal: Discharge Needs Assessment  Outcome: Ongoing (interventions implemented as appropriate)      Problem: Fluid Volume Deficit (Adult)  Goal: Identify Related Risk Factors and Signs and Symptoms  Outcome: Ongoing (interventions implemented as appropriate)    Goal: Fluid/Electrolyte Balance  Outcome: Ongoing (interventions implemented as appropriate)    Goal: Comfort/Well Being  Outcome: Ongoing (interventions implemented as appropriate)      Problem: Pain, Acute (Adult)  Goal: Identify Related Risk Factors and Signs and Symptoms  Outcome: Ongoing (interventions implemented as appropriate)    Goal: Acceptable Pain Control/Comfort Level  Outcome: Ongoing (interventions implemented as appropriate)

## 2018-01-16 NOTE — THERAPY EVALUATION
Acute Care - Physical Therapy Initial Evaluation  [facility]     Patient Name: Ernestina Garrido  : 1970  MRN: [id number]  Today's Date: 2018   Onset of Illness/Injury or Date of Surgery Date: 01/15/18  Date of Referral to PT: 18  Referring Physician: MD Cotter      Admit Date: 1/15/2018     Visit Dx:    ICD-10-CM ICD-9-CM   1. Chest wall pain R07.89 786.52   2. Volume depletion E86.9 276.50   3. Hematochezia K92.1 578.1   4. Hypotension, unspecified hypotension type I95.9 458.9   5. Impaired functional mobility, balance, gait, and endurance Z74.09 V49.89     Patient Active Problem List   Diagnosis   • Chronic pain syndrome   • Depression   • Hemorrhoid   • Essential hypertension   • Hypothyroidism   • Insomnia   • Arthralgia of hip   • Arthralgia of shoulder   • Degenerative disc disease, cervical   • Cervical facet arthropathy/cervical spondylosis without myelopathy   • Diabetic autonomic neuropathy   • Left adductor tendonitis   • Hx of fusion of cervical spine   • Obesity   • Neuroforaminal stenosis of spine   • Lateral epicondylitis, right elbow   • Carpal tunnel syndrome of right wrist   • Type 2 diabetes mellitus, without long-term current use of insulin   • Intervertebral cervical disc disorder with myelopathy, cervical region   • CARLOS ENRIQUE (acute kidney injury)   • GERD (gastroesophageal reflux disease)   • Hematochezia   • Hypotension   • Atypical chest pain   • Costochondral chest pain     Past Medical History:   Diagnosis Date   • Arthralgia of hip    • Cervical facet arthropathy/cervical spondylosis without myelopathy    • Chronic pain syndrome     Description: hx of epidural injections.   • Degenerative disc disease, cervical    • Depression     Description: dx .   • Diabetes mellitus     TYPE 2   • Essential hypertension     Description: dx .   • Hemorrhoid 2016   • History of colonoscopy 2013    normal per patient   • History of uterine leiomyoma    • History of  varicella    • Hypertension    • Hypothyroidism     Description: dx 2005.   • Insomnia 2016   • Low back pain    • Lumbosacral disc disease    • Neck pain    • Obesity    • Osteoarthritis    • Tattoos     HIV neg  per patient   • Tobacco abuse      Past Surgical History:   Procedure Laterality Date   • ABDOMINAL SURGERY      cholecystectomy   • ANTERIOR CERVICAL DISCECTOMY  2016    C4-6 DISC (Dr. Pena)   • ANTERIOR CERVICAL DISCECTOMY W/ FUSION Left 1/3/2018    Procedure: CERVICAL DISCECTOMY ANTERIOR WITH FUSION C6-7;  Surgeon: Nicolás Pena MD;  Location:  [facility];  Service:    • BACK SURGERY      lumbar discectomy   •  SECTION      , ,    • CHOLECYSTECTOMY WITH INTRAOPERATIVE CHOLANGIOGRAM N/A 2017    Procedure: CHOLECYSTECTOMY LAPAROSCOPIC INTRAOPERATIVE CHOLANGIOGRAM;  Surgeon: Clifford Freed MD;  Location:  [facility];  Service:    • HYSTERECTOMY  2015          PT ASSESSMENT (last 72 hours)      PT Evaluation       18 1400 18 1324    Rehab Evaluation    Document Type evaluation  -PHANI     Subjective Information agree to therapy;complains of;pain  -PHANI     Patient Effort, Rehab Treatment good  -PHANI     Symptoms Noted During/After Treatment fatigue  -PHANI     General Information    Patient Profile Review yes  -PHANI     Onset of Illness/Injury or Date of Surgery Date 01/15/18  -PHANI     Referring Physician MD Cotter  -PHANI     Pertinent History Of Current Problem patient with multiple trips to ED with increased left chest wall and shoulder pain recent CT of abdomen and chest have been unremarkable. patient became hypotensive in ED   -PHANI     Precautions/Limitations no known precautions/limitations  -PHANI     Prior Level of Function independent:;gait;transfer;bed mobility;ADL's  -PHANI     Equipment Currently Used at Home  other (see comments)   Brace   -AW    Plans/Goals Discussed With patient;agreed upon  -PHANI     Risks Reviewed patient:;LOB;increased discomfort   -PHANI     Benefits Reviewed patient:;improve function;increase strength;decrease risk of DVT;increase balance  -PHANI     Barriers to Rehab medically complex  -PHANI     Living Environment    Lives With child(radha), dependent  -PHANI child(radha), dependent  -AW    Living Arrangements apartment  -PHANI apartment  -AW    Home Accessibility stairs to enter home  -PHANI stairs to enter home;no concerns  -AW    Number of Stairs to Enter Home 8  -PHANI     Stair Railings at Home  outside, present on right side  -AW    Type of Financial/Environmental Concern  none  -AW    Transportation Available  car;family or friend will provide  -AW    Clinical Impression    Date of Referral to PT 01/16/18  -PHANI     PT Diagnosis impaired bed mobility transfer and gait impaired balance  -PHANI     Patient/Family Goals Statement patient to go home with family assist  -PHANI     Rehab Potential good, to achieve stated therapy goals  -PHANI     Vital Signs    Pre Systolic BP Rehab 127  -PHANI     Pre Treatment Diastolic BP 70  -PHANI     Pain Assessment    Pain Assessment 0-10  -PHANI     Pain Score 5  -PHANI     Post Pain Score 6  -PHANI     Pain Type Chronic pain  -PHANI     Pain Location Chest  -PHANI     Pain Orientation Left  -PHANI     Pain Intervention(s) Repositioned;Ambulation/increased activity  -PHANI     Cognitive Assessment/Intervention    Current Cognitive/Communication Assessment functional  -PHANI     Orientation Status oriented x 4  -PHANI     Follows Commands/Answers Questions 100% of the time  -PHANI     Personal Safety WNL/WFL  -PHANI     Personal Safety Interventions gait belt;nonskid shoes/slippers when out of bed  -PHANI     Bed Mobility, Assessment/Treatment    Bed Mobility, Roll Left, [place] independent  -PHANI     Bed Mobility, Roll Right, [place] independent  -PHANI     Bed Mobility, Scoot/Bridge, [place] independent  -PHANI     Bed Mob, Supine to Sit, [place] supervision required  -PHANI     Transfer Assessment/Treatment    Transfers, Bed-Chair [place] contact guard  assist  -PHANI     Transfers, Sit-Stand [place] contact guard assist  -PHANI     Transfers, Stand-Sit [place] contact guard assist  -PHANI     Transfer, Safety Issues balance decreased during turns;step length decreased  -PHANI     Gait Assessment/Treatment    Gait, [place] Level contact guard assist  -PHANI     Gait, Distance (Feet) 350  -PHANI     Gait, Gait Pattern Analysis swing-through gait  -PHANI     Gait, Gait Deviations narrow base;step length decreased  -PHANI     Gait, Safety Issues balance decreased during turns;step length decreased  -PHANI     Gait, Impairments strength decreased;impaired balance  -PHANI     Gait, Comment patient veers to the left and has two LOB with ambulation requiring assist to correct  -PHANI     Positioning and Restraints    Pre-Treatment Position in bed  -PHANI     Post Treatment Position chair  -PHANI     In Chair notified nsg;reclined;sitting;call light within reach  -       01/16/18 0200       General Information    Equipment Currently Used at Home other (see comments)   brace  -     Living Environment    Lives With child(radha), dependent  -     Living Arrangements apartment  -     Home Accessibility stairs to enter home  -     Stair Railings at Home outside, present on right side  -     Transportation Available car;family or friend will provide  -       User Key  (r) = Recorded By, (t) = Taken By, (c) = Cosigned By    Initials Name Provider Type    PHANI French, PT Physical Therapist    VALENTÍN Lucio, RN Registered Nurse     Joyce Murguia, RN Registered Nurse          Physical Therapy Education     Title: PT OT SLP Therapies (Active)     Topic: Physical Therapy (Active)     Point: Mobility training (Active)    Learning Progress Summary    Learner Readiness Method Response Comment Documented by Status   Patient Acceptance E NR  PHANI 01/16/18 1500 Active               Point: Home exercise program (Active)    Learning Progress Summary    Learner Readiness Method  Response Comment Documented by Status   Patient Acceptance E NR  PHANI 01/16/18 1500 Active               Point: Body mechanics (Active)    Learning Progress Summary    Learner Readiness Method Response Comment Documented by Status   Patient Acceptance E NR   01/16/18 1500 Active               Point: Precautions (Active)    Learning Progress Summary    Learner Readiness Method Response Comment Documented by Status   Patient Acceptance E NR  PHANI 01/16/18 1500 Active                      User Key     Initials Effective Dates Name Provider Type Discipline     06/19/15 -  Reema French, PT Physical Therapist PT                PT Recommendation and Plan  Anticipated Discharge Disposition: home with assist  PT Frequency: daily, per priority policy  Plan of Care Review  Plan Of Care Reviewed With: patient  Outcome Summary/Follow up Plan: patient able to ambulate 350 ft but has increasing LOB with fatigue patient tending to veer to the left. patient had stable BP with activity          IP PT Goals       01/16/18 1501          Bed Mobility PT LTG    Bed Mobility PT LTG, Date Established 01/16/18  -PHANI      Bed Mobility PT LTG, Time to Achieve 2 wks  -PHANI      Bed Mobility PT LTG, Activity Type supine to sit/sit to supine  -PHANI      Bed Mobility PT LTG, Longs Level independent  -PHANI      Bed Mobility PT LTG, Outcome goal ongoing  -PHANI      Transfer Training PT LTG    Transfer Training PT LTG, Date Established 01/16/18  -PHANI      Transfer Training PT LTG, Time to Achieve 2 wks  -PHANI      Transfer Training PT LTG, Activity Type all transfers  -PHANI      Transfer Training PT LTG, Longs Level independent  -PHANI      Transfer Training PT LTG, Outcome goal ongoing  -PHANI      Gait Training PT LTG    Gait Training Goal PT LTG, Date Established 01/16/18  -PHANI      Gait Training Goal PT LTG, Time to Achieve 2 wks  -PHANI      Gait Training Goal PT LTG, Longs Level independent  -PHANI      Gait Training Goal PT LTG, Distance to  Achieve 400  -PHANI      Gait Training Goal PT LTG, Outcome goal ongoing  -PHANI      Stair Training PT LTG    Stair Training Goal PT LTG, Date Established 01/16/18  -PHANI      Stair Training Goal PT LTG, Time to Achieve 2 wks  -PHANI      Stair Training Goal PT LTG, Number of Steps 10  -PHANI      Stair Training Goal PT LTG, [place] Level independent  -PHANI      Stair Training Goal PT LTG, Outcome goal ongoing  -PHANI        User Key  (r) = Recorded By, (t) = Taken By, (c) = Cosigned By    Initials Name Provider Type    PHANI French PT Physical Therapist                Outcome Measures       01/16/18 1400          How much help from another person do you currently need...    Turning from your back to your side while in flat bed without using bedrails? 4  -PHANI      Moving from lying on back to sitting on the side of a flat bed without bedrails? 4  -PHANI      Moving to and from a bed to a chair (including a wheelchair)? 3  -PHANI      Standing up from a chair using your arms (e.g., wheelchair, bedside chair)? 3  -PHANI      Climbing 3-5 steps with a railing? 3  -PHANI      To walk in hospital room? 3  -PHANI      AM-PAC 6 Clicks Score 20  -PHANI      Functional Assessment    Outcome Measure Options AM-PAC 6 Clicks Basic Mobility (PT)  -PHANI        User Key  (r) = Recorded By, (t) = Taken By, (c) = Cosigned By    Initials Name Provider Type    PHANI French PT Physical Therapist           Time Calculation:         PT Charges       01/16/18 1503          Time Calculation    Start Time 1400  -PHANI      PT Received On 01/16/18  -PHANI      PT Goal Re-Cert Due Date 01/26/18  -PHANI      Time Calculation- PT    Total Timed Code Minutes- PT 15 minute(s)  -PHANI        User Key  (r) = Recorded By, (t) = Taken By, (c) = Cosigned By    Initials Name Provider Type    PHANI French PT Physical Therapist          Therapy Charges for Today     Code Description Service Date Service Provider Modifiers Qty    15179472999 HC PT MOBILITY CURRENT 1/16/2018  Reema French, PT GP, CJ 1    44220184410 HC PT MOBILITY PROJECTED 1/16/2018 Reema French, PT GP, CI 1    61567239170 HC PT EVAL LOW COMPLEXITY 3 1/16/2018 Reema French, PT GP 1    35361675658 HC PT THER PROC EA 15 MIN 1/16/2018 Reema French, PT GP 1    51396583706 HC PT THER SUPP EA 15 MIN 1/16/2018 Reema French, PT GP 1          PT G-Codes  Outcome Measure Options: AM-PAC 6 Clicks Basic Mobility (PT)  Score: 20  Functional Limitation: Mobility: Walking and moving around  Mobility: Walking and Moving Around Current Status (): At least 20 percent but less than 40 percent impaired, limited or restricted  Mobility: Walking and Moving Around Goal Status (): At least 1 percent but less than 20 percent impaired, limited or restricted      Reema French, PT  1/16/2018

## 2018-01-16 NOTE — DISCHARGE INSTRUCTIONS
Don't drive while taking the pain medication.  Return if fever, worsening pain, or any concerns.  If you have further diarrhea then bring a sample back in the kit we have provided return if you feel worse or have any other concerns.  Call Dr Tanner in the morning to follow up w her

## 2018-01-17 ENCOUNTER — TRANSITIONAL CARE MANAGEMENT TELEPHONE ENCOUNTER (OUTPATIENT)
Dept: INTERNAL MEDICINE | Facility: CLINIC | Age: 48
End: 2018-01-17

## 2018-01-17 NOTE — OUTREACH NOTE
MIKE call completed.  Please refer to TCM call flowsheet for call documentation.  Patient reports that her chest pain has not improved with use of compresses or medication.  Her pain is an 8 on the pain scale.

## 2018-01-18 LAB — BACTERIA SPEC AEROBE CULT: NORMAL

## 2018-01-20 LAB
BACTERIA SPEC AEROBE CULT: NORMAL
BACTERIA SPEC AEROBE CULT: NORMAL

## 2018-01-22 ENCOUNTER — OFFICE VISIT (OUTPATIENT)
Dept: INTERNAL MEDICINE | Facility: CLINIC | Age: 48
End: 2018-01-22

## 2018-01-22 VITALS
DIASTOLIC BLOOD PRESSURE: 80 MMHG | BODY MASS INDEX: 35.43 KG/M2 | TEMPERATURE: 97.3 F | WEIGHT: 233 LBS | HEART RATE: 76 BPM | SYSTOLIC BLOOD PRESSURE: 130 MMHG | RESPIRATION RATE: 20 BRPM

## 2018-01-22 DIAGNOSIS — R07.89 ATYPICAL CHEST PAIN: Primary | ICD-10-CM

## 2018-01-22 PROCEDURE — 99495 TRANSJ CARE MGMT MOD F2F 14D: CPT | Performed by: INTERNAL MEDICINE

## 2018-01-22 NOTE — PROGRESS NOTES
Transitional Care Follow Up Visit  Subjective   Chief Complaint   Patient presents with   • Chest Pain     [facility] follow up  discharged 1/18/2018      • Hypotension     Ernestina Garrido is a 47 y.o. female who presents for a transitional care management visit.    Within 48 business hours after discharge our office contacted her via telephone to coordinate her care and needs.      I reviewed and discussed the details of that call along with the discharge summary, hospital problems, inpatient lab results, inpatient diagnostic studies, and consultation reports with Ernestina.     Current outpatient and discharge medications have been reconciled for the patient.    Date of TCM Phone Call 11/27/2017 1/17/2018   Hospital [facility]   Date of Admission 11/24/2017 1/15/2018   Date of Discharge 11/25/2017 1/16/2018   Discharge Disposition Home or Self Care Home or Self Care     Risk for Readmission (LACE) Score: 8    History of Present Illness   Course During Hospital Stay:     Records have been received and reviewed.  The patient was admitted to [facility] on 1/15/18 and discharged on 1/16/18.  She was admitted for costochondral chest wall pain.  She also had some loose stool and rectal bleeding.  She became hypotensive in the ER after  pain medication was given.  Serial Troponin was negative. Initial CBC showed a white blood cell count of 10.81, with a normal hemoglobin and hematocrit.  CMP was significant for creatinine of 1.4 and a glucose of 317.  Lipase was negative.  BNP was normal.  Blood culture was negative.  Influenza swab was negative. Stool culture was negative.  Stool for occult blood was negative.  EKG was normal.  Chest x-ray showed no acute disease.  CBC on 1/16/18 showed hemoglobin of 11.0 and hematocrit 34.6,   BMP showed creatinine of 0.8.   She had mild acute renal insufficiency.  This resolved with IV fluids.  She had no rectal bleeding while in the hospital.  Hemoglobin dropped  slightly after the IV fluids were given.  She was discharged on NSAIDs for her chest wall pain.  She is currently taking Flexeril and Naprosyn with some relief.    Of note, the patient was also admitted to [facility] on 1/3/18 and discharged on 1/4/18.  She had an Anterior Cervical Discectomy with Fusion of C6-7.     The following portions of the patient's history were reviewed and updated as appropriate: allergies, current medications, past family history, past medical history, past social history, past surgical history and problem list.    Review of Systems   Constitutional: Negative for fatigue and unexpected weight change.   Eyes: Negative for visual disturbance.   Respiratory: Negative for cough, shortness of breath and wheezing.    Cardiovascular: Negative for chest pain, palpitations and leg swelling.        No MAIN, orthopnea, or claudication.   Gastrointestinal: Negative for abdominal pain, blood in stool, constipation, diarrhea, nausea and vomiting.        Denies melena.   Endocrine: Negative for polydipsia and polyuria.   Genitourinary: Negative for dysuria, flank pain, frequency, hematuria and urgency.   Musculoskeletal: Positive for arthralgias, myalgias and neck pain (improved).   Neurological: Negative for dizziness, syncope, light-headedness and headaches.        No memory issues.   Psychiatric/Behavioral: Negative for decreased concentration.       Objective   Physical Exam   Constitutional:   Obese.   Neck: Normal range of motion. Neck supple. Carotid bruit is not present. No thyromegaly present.   Cardiovascular: Normal rate, regular rhythm, normal heart sounds and intact distal pulses.  Exam reveals no gallop and no friction rub.    No murmur heard.  No peripheral edema.   Pulmonary/Chest: Effort normal and breath sounds normal.   There is left chest wall pain to palpation.   Abdominal: Soft. Bowel sounds are normal. She exhibits no distension, no abdominal bruit and no mass. There is no  hepatosplenomegaly. There is no tenderness.   Psychiatric: She has a normal mood and affect.   Nursing note and vitals reviewed.      Assessment/Plan   Ernestina was seen today for chest pain and hypotension.    Diagnoses and all orders for this visit:    Atypical chest pain     Continue current medications.      Transitional Care Management Certification  I certify that the following are true:  1. Communication was made within 2 business days of discharge.  2. Complexity of Medical Decision Making is moderate.  3. Face to face visit occurred within 7 days.    *Note: 76296 is for high complexity patients with a face to face visit within 7 days of discharge.  64152 is for high complexity patients with a face to face on days 8-14 post discharge or medium complexity with face to face visit within 14 days post discharge.           Return in about 2 weeks (around 2/5/2018) for Recheck- Diabetes fasting.

## 2018-02-01 ENCOUNTER — OFFICE VISIT (OUTPATIENT)
Dept: NEUROSURGERY | Facility: CLINIC | Age: 48
End: 2018-02-01

## 2018-02-01 VITALS
TEMPERATURE: 97.9 F | WEIGHT: 233 LBS | DIASTOLIC BLOOD PRESSURE: 88 MMHG | SYSTOLIC BLOOD PRESSURE: 144 MMHG | HEIGHT: 68 IN | BODY MASS INDEX: 35.31 KG/M2

## 2018-02-01 DIAGNOSIS — Z98.1 S/P CERVICAL SPINAL FUSION: Primary | ICD-10-CM

## 2018-02-01 PROCEDURE — 99024 POSTOP FOLLOW-UP VISIT: CPT | Performed by: PHYSICIAN ASSISTANT

## 2018-02-01 NOTE — PROGRESS NOTES
Patient: Ernestina Garrido  : 1970    Primary Care Provider: Stephanie Tanner MD      Chief Complaint: Chest pain/residual numbness and first second and third digit of left hand    History of Present Illness:       A she is well-known to the neurosurgical practice for undergoing 2.  Anterior cervical discectomy and fusions.  Most recent of which was on 1/3/2018.  Patient is doing well and recovering from the anterior cervical discectomy and fusion.  Her incision is clean and well healed.  No signs of infection, bleeding, or erythema.  Reiterated the necessity for sugar management.  Patient is still having a left-sided chest pain.  She is seen multiple doctors and they've stated that this is inflammation.  I pulled her shirt off, and she has a noticeable swelling on her left side of her sternum.  This is about at the third or fourth intercostal space.  This is where believed to be costochondritis.  Patient has also some radiating pain in and around her armpit to her back.  I have discussed the case with Dr. Pandya.  He has agreed to take a look at it and it did do intercostal block or a injection for the costochondritis.    Patient is doing very well from the anterior cervical discectomy and fusion.  Patient symptoms are resolving and she is doing very well.    Review of Systems   Constitutional: Negative for activity change, appetite change, chills, diaphoresis, fatigue, fever and unexpected weight change.   HENT: Negative for congestion, dental problem, drooling, ear discharge, ear pain, facial swelling, hearing loss, mouth sores, nosebleeds, postnasal drip, rhinorrhea, sinus pressure, sneezing, sore throat, tinnitus, trouble swallowing and voice change.    Eyes: Negative for photophobia, pain, discharge, redness, itching and visual disturbance.   Respiratory: Positive for chest tightness. Negative for apnea, cough, choking, shortness of breath, wheezing and stridor.    Cardiovascular: Positive for chest  pain. Negative for palpitations and leg swelling.   Gastrointestinal: Negative for abdominal distention, abdominal pain, anal bleeding, blood in stool, constipation, diarrhea, nausea, rectal pain and vomiting.   Endocrine: Negative for cold intolerance, heat intolerance, polydipsia, polyphagia and polyuria.   Genitourinary: Negative for decreased urine volume, difficulty urinating, dysuria, enuresis, flank pain, frequency, genital sores, hematuria and urgency.   Musculoskeletal: Positive for neck pain and neck stiffness. Negative for arthralgias, back pain, gait problem, joint swelling and myalgias.   Skin: Negative for color change, pallor, rash and wound.   Allergic/Immunologic: Negative for environmental allergies, food allergies and immunocompromised state.   Neurological: Positive for weakness and numbness. Negative for dizziness, tremors, seizures, syncope, facial asymmetry, speech difficulty, light-headedness and headaches.   Hematological: Negative for adenopathy. Does not bruise/bleed easily.   Psychiatric/Behavioral: Positive for agitation, dysphoric mood and sleep disturbance. Negative for behavioral problems, confusion, decreased concentration, hallucinations, self-injury and suicidal ideas. The patient is not nervous/anxious and is not hyperactive.        Past Medical History:     Past Medical History:   Diagnosis Date   • Arthralgia of hip    • Cervical facet arthropathy/cervical spondylosis without myelopathy    • Chronic pain syndrome     Description: hx of epidural injections.   • Degenerative disc disease, cervical    • Depression     Description: dx 2012.   • Diabetes mellitus     TYPE 2   • Essential hypertension     Description: dx 2005.   • Hemorrhoid 6/13/2016   • History of colonoscopy 2013    normal per patient   • History of uterine leiomyoma    • History of varicella    • Hypertension    • Hypothyroidism     Description: dx 2005.   • Insomnia 6/13/2016   • Low back pain    • Lumbosacral  "disc disease    • Neck pain    • Obesity    • Osteoarthritis    • Tattoos     HIV neg  per patient   • Tobacco abuse        Family History:     Family History   Problem Relation Age of Onset   • Diabetes Mother    • Hypertension Mother    • Colon cancer Maternal Grandmother    • Diabetes Maternal Grandmother    • Hypertension Maternal Grandmother    • Diabetes Daughter    • Hypothyroidism Daughter    • Diabetes Maternal Uncle    • Hypertension Maternal Uncle    • Hypertension Other    • Breast cancer Neg Hx    • Ovarian cancer Neg Hx        Social History:    reports that she quit smoking about 2 months ago. Her smoking use included Cigarettes. She started smoking about 30 years ago. She has a 14.00 pack-year smoking history. She has never used smokeless tobacco. She reports that she drinks alcohol. She reports that she does not use illicit drugs.   SMOKING STATUS: Nonsmoker    Surgical History:     Past Surgical History:   Procedure Laterality Date   • ABDOMINAL SURGERY      cholecystectomy   • ANTERIOR CERVICAL DISCECTOMY  2016    C4-6 DISC (Dr. Pena)   • ANTERIOR CERVICAL DISCECTOMY W/ FUSION Left 1/3/2018    Procedure: CERVICAL DISCECTOMY ANTERIOR WITH FUSION C6-7;  Surgeon: Nicolás Pena MD;  Location: [facility] OR;  Service:    • BACK SURGERY      lumbar discectomy   •  SECTION      , ,    • CHOLECYSTECTOMY WITH INTRAOPERATIVE CHOLANGIOGRAM N/A 2017    Procedure: CHOLECYSTECTOMY LAPAROSCOPIC INTRAOPERATIVE CHOLANGIOGRAM;  Surgeon: Clifford Freed MD;  Location: [facility] OR;  Service:    • HYSTERECTOMY  2015       Allergies:   Glimepiride    Physical Exam:    Vital Signs:Ht 172.7 cm (68\")   BMI: There is no height or weight on file to calculate BMI.    GENEREAL:   The patient is in no acute distress, and is able to answer all questions appropriately.  Skin:  The incision is well-healed and well approximated.  No signs of infection, bleeding, or " erythema.  Musculoskeletal: The patient’s strength is intact in upper and lower extremities upon direct testing.  Strength is 5 out of 5.  Shoulder abduction is 5 out of 5.  Finger extension is 5 out of 5.  Dorsiflexion and plantarflexion are equal bilaterally @ 5/5. Hip flexion against resistance.  The patient’s gait is normal without antalgia.  Neurologic: The patient is alert and oriented by 3.  Recent memory, language, attention span, and fund of knowledge are all with within normal limits.   Sensation is equal bilaterally with no deficit.    Reflexes are 2+ at the biceps, triceps, and brachioradialis as well as the patellar and Achilles tendon bilaterally.  There is no sign of Sheehan’s, clonus, or long track signs.      Medical Decision Making    Data Review:   No films were reviewed at this visit    Diagnosis:   Costochondritis  Status post anterior cervical discectomy and fusion X 2    Treatment Options:   Patient will follow up with me in 4 weeks following her visit with Dr. Pandya.    Patient will follow-up with AP and lateral x-rays the cervical spine to check on the fusion.    It has been a pleasure providing neurosurgical care.    Jorge Kraus PA-C       Diagnosis Plan   1. S/P cervical spinal fusion

## 2018-02-02 RX ORDER — LEVOTHYROXINE SODIUM 0.1 MG/1
TABLET ORAL
Qty: 30 TABLET | Refills: 2 | Status: SHIPPED | OUTPATIENT
Start: 2018-02-02 | End: 2018-06-20 | Stop reason: SDUPTHER

## 2018-02-02 NOTE — TELEPHONE ENCOUNTER
It looks like we have filled this in the past, but still have other providers.  Please do a Jose Guadalupe.

## 2018-02-02 NOTE — TELEPHONE ENCOUNTER
Dimitri reviewed.  The patient has been getting this refilled by multiple providers.  Since Dr. Pena is treating her back pain, I recommend she get the prescription from him.  I will not be refilling it.

## 2018-02-02 NOTE — TELEPHONE ENCOUNTER
States in the med list that she received  # 7  0 refills from Dr Magdiel Tanner   Are you filling her Gabapentin ?

## 2018-02-06 ENCOUNTER — TELEPHONE (OUTPATIENT)
Dept: PAIN MEDICINE | Facility: CLINIC | Age: 48
End: 2018-02-06

## 2018-02-08 RX ORDER — GABAPENTIN 400 MG/1
CAPSULE ORAL
Qty: 90 CAPSULE | Refills: 0 | OUTPATIENT
Start: 2018-02-08

## 2018-02-13 ENCOUNTER — TELEPHONE (OUTPATIENT)
Dept: INTERNAL MEDICINE | Facility: CLINIC | Age: 48
End: 2018-02-13

## 2018-02-13 ENCOUNTER — OFFICE VISIT (OUTPATIENT)
Dept: INTERNAL MEDICINE | Facility: CLINIC | Age: 48
End: 2018-02-13

## 2018-02-13 VITALS
DIASTOLIC BLOOD PRESSURE: 74 MMHG | HEART RATE: 72 BPM | WEIGHT: 228.5 LBS | RESPIRATION RATE: 20 BRPM | BODY MASS INDEX: 34.74 KG/M2 | SYSTOLIC BLOOD PRESSURE: 104 MMHG | TEMPERATURE: 97 F

## 2018-02-13 DIAGNOSIS — E11.43 TYPE 2 DIABETES MELLITUS WITH DIABETIC AUTONOMIC NEUROPATHY, WITHOUT LONG-TERM CURRENT USE OF INSULIN (HCC): Primary | ICD-10-CM

## 2018-02-13 DIAGNOSIS — F32.89 OTHER DEPRESSION: ICD-10-CM

## 2018-02-13 DIAGNOSIS — Z79.899 HIGH RISK MEDICATION USE: ICD-10-CM

## 2018-02-13 DIAGNOSIS — K21.9 GASTROESOPHAGEAL REFLUX DISEASE WITHOUT ESOPHAGITIS: ICD-10-CM

## 2018-02-13 DIAGNOSIS — E03.9 ACQUIRED HYPOTHYROIDISM: ICD-10-CM

## 2018-02-13 DIAGNOSIS — E11.43 DIABETIC AUTONOMIC NEUROPATHY ASSOCIATED WITH TYPE 2 DIABETES MELLITUS (HCC): ICD-10-CM

## 2018-02-13 DIAGNOSIS — I10 ESSENTIAL HYPERTENSION: ICD-10-CM

## 2018-02-13 DIAGNOSIS — G89.4 CHRONIC PAIN SYNDROME: ICD-10-CM

## 2018-02-13 DIAGNOSIS — N76.0 ACUTE VAGINITIS: ICD-10-CM

## 2018-02-13 DIAGNOSIS — G89.29 CHRONIC PAIN OF RIGHT KNEE: ICD-10-CM

## 2018-02-13 DIAGNOSIS — M25.561 CHRONIC PAIN OF RIGHT KNEE: ICD-10-CM

## 2018-02-13 LAB
A/C: NORMAL
ALBUMIN SERPL-MCNC: 4.2 G/DL (ref 3.2–4.8)
ALBUMIN/GLOB SERPL: 1.4 G/DL (ref 1.5–2.5)
ALP SERPL-CCNC: 104 U/L (ref 25–100)
ALT SERPL W P-5'-P-CCNC: 16 U/L (ref 7–40)
ANION GAP SERPL CALCULATED.3IONS-SCNC: 8 MMOL/L (ref 3–11)
ARTICHOKE IGE QN: 165 MG/DL (ref 0–130)
AST SERPL-CCNC: 12 U/L (ref 0–33)
BILIRUB SERPL-MCNC: 0.8 MG/DL (ref 0.3–1.2)
BUN BLD-MCNC: 14 MG/DL (ref 9–23)
BUN/CREAT SERPL: 15.6 (ref 7–25)
CALCIUM SPEC-SCNC: 10.2 MG/DL (ref 8.7–10.4)
CHLORIDE SERPL-SCNC: 98 MMOL/L (ref 99–109)
CHOLEST SERPL-MCNC: 236 MG/DL (ref 0–200)
CLARITY, POC: CLEAR
CO2 SERPL-SCNC: 32 MMOL/L (ref 20–31)
COLOR UR: YELLOW
CREAT BLD-MCNC: 0.9 MG/DL (ref 0.6–1.3)
EXPIRATION DATE: NORMAL
EXPIRATION DATE: NORMAL
GFR SERPL CREATININE-BSD FRML MDRD: 82 ML/MIN/1.73
GLOBULIN UR ELPH-MCNC: 3.1 GM/DL
GLUCOSE BLD-MCNC: 224 MG/DL (ref 70–100)
GLUCOSE UR STRIP-MCNC: NEGATIVE MG/DL
HDLC SERPL-MCNC: 73 MG/DL (ref 40–60)
KETONES UR QL: NEGATIVE
LEUKOCYTE EST, POC: NEGATIVE
Lab: NORMAL
Lab: NORMAL
MAGNESIUM SERPL-MCNC: 1.7 MG/DL (ref 1.3–2.7)
NITRITE UR-MCNC: NEGATIVE MG/ML
PH UR: 5.5 [PH] (ref 5–8)
POC CREATININE URINE: 50
POC MICROALBUMIN URINE: 10
POTASSIUM BLD-SCNC: 3.8 MMOL/L (ref 3.5–5.5)
PROT SERPL-MCNC: 7.3 G/DL (ref 5.7–8.2)
PROT UR STRIP-MCNC: NEGATIVE MG/DL
PROT/CREAT UR: 50 MG/G CREA
RBC # UR STRIP: NEGATIVE /UL
SODIUM BLD-SCNC: 138 MMOL/L (ref 132–146)
SP GR UR: 1 (ref 1–1.03)
TRIGL SERPL-MCNC: 94 MG/DL (ref 0–150)

## 2018-02-13 PROCEDURE — 99215 OFFICE O/P EST HI 40 MIN: CPT | Performed by: INTERNAL MEDICINE

## 2018-02-13 PROCEDURE — 80053 COMPREHEN METABOLIC PANEL: CPT | Performed by: INTERNAL MEDICINE

## 2018-02-13 PROCEDURE — 80061 LIPID PANEL: CPT | Performed by: INTERNAL MEDICINE

## 2018-02-13 PROCEDURE — 83735 ASSAY OF MAGNESIUM: CPT | Performed by: INTERNAL MEDICINE

## 2018-02-13 PROCEDURE — 82044 UR ALBUMIN SEMIQUANTITATIVE: CPT | Performed by: INTERNAL MEDICINE

## 2018-02-13 RX ORDER — FLUCONAZOLE 150 MG/1
150 TABLET ORAL ONCE
Qty: 1 TABLET | Refills: 0 | Status: SHIPPED | OUTPATIENT
Start: 2018-02-13 | End: 2018-02-13

## 2018-02-13 RX ORDER — BLOOD-GLUCOSE METER
1 KIT MISCELLANEOUS ONCE
Qty: 1 EACH | Refills: 0 | Status: SHIPPED | OUTPATIENT
Start: 2018-02-13 | End: 2018-02-13

## 2018-02-13 RX ORDER — OMEPRAZOLE 40 MG/1
40 CAPSULE, DELAYED RELEASE ORAL DAILY
Qty: 30 CAPSULE | Refills: 5 | Status: SHIPPED | OUTPATIENT
Start: 2018-02-13 | End: 2018-10-15 | Stop reason: SDUPTHER

## 2018-02-13 RX ORDER — LANCETS 28 GAUGE
EACH MISCELLANEOUS
Qty: 100 EACH | Refills: 3 | Status: SHIPPED | OUTPATIENT
Start: 2018-02-13 | End: 2019-03-06 | Stop reason: SDUPTHER

## 2018-02-13 RX ORDER — ETODOLAC 200 MG/1
200 CAPSULE ORAL EVERY 8 HOURS PRN
Qty: 50 CAPSULE | Refills: 1 | Status: SHIPPED | OUTPATIENT
Start: 2018-02-13 | End: 2018-09-26

## 2018-02-13 NOTE — PROGRESS NOTES
Subjective       Ernestina Garrido is a 47 y.o. female.     Chief Complaint   Patient presents with   • Diabetes     follow up         History obtained from the patient.      The patient is here for a three-month follow-up.      She was seen in the ER on 1/16/18.  CBC was normal with the exception of a slightly low hemoglobin (11.0) with a normal hematocrit (34.6).  BMP was normal with the exception of an elevated glucose of 157.    Her Hypertension is unstable.   Medication(s): Lisinopril.  Her Diabetes Mellitus Type 2 is unstable.  Medication: Jardiance and Lisinopril,   The patient is adherent with her medication regimen. She denies  medication side effects.       Interval Events:   Lisinopril was increased to 20 mg daily on 11/1/17.  Blood pressure has improved since then.  Last Hemoglobin A1c on 1/2/18 was 10.4.  On 9/15/17, Hemoglobin A1c was 10.0, LDL cholesterol was 136, and Triglycerides were 82.  The patient is off her Janumet and Atorvastatin, not sure the reason.  She is taking Jardiance, but it will not be covered by her insurance.  She did re-start Metformin 500 mg twice a day 2 months ago.  The patient states her blood sugar at home has been 140-150 fasting and less than 200 postprandial.  She denies episodes of low blood sugar.  The patient's last ophthalmology appointment was 12/18/17.  She does check her feet daily.     Symptoms:  Stable fatigue.  Denies chronic chest pain (other than the chest wall pain),  shortness of breath, lower extremity edema,  and MAIN.  Denies intermittent leg claudication, visual impairment, myalgias, and muscle weakness.   Associated symptoms:  5 pound intentional weight loss.  Has polyuria, but no polydipsia.  No headaches, no memory issues, no palpitations, no syncope,  no orthopnea, no PND, and no focal neurologic deficits.  Has stable numbness of the feet, but no pain or ulcers.      Lifestyle and Disease Management: Diet: She consumes a diverse and healthy diet,  overall.  Weight Issues: She has weight concerns. Exercise: She has not been exercising regularly.  Smoking: She does not use tobacco. She quit smoking 11/6/16    Hypothyroidism Follow-Up: The patient is being seen for follow-up of Hypothyroidism, which is stable.    Interval events: T4 and TSH were normal on 9/15/17.   Symptoms: has stable fatigue and cold intolerance, but denies heat intolerance,  weakness,  constipation, and dry skin.   Associated symptoms: has paresthesias left hand, but no myalgias or arthralgias.    Medications:  Levothyroxine (Synthroid).    The patient is adherent to her medication regimen, and she denies medication side effects.     Epigastric Pain Follow-up: The patient is here for follow-up of Epigastric Pain, which has worsened.    Interval events: None.    Symptoms: Complains of intermittent epigastric abdominal pain and belching, for the past one month.  Denies heartburn, dysphagia, odynophagia, melena, hematochezia, nausea, vomiting, diarrhea, sore throat, cough, and hoarseness.    Medications: Omeprazole..     Chronic Pain Follow-up: The patient is being seen for a routine clinic follow-up of Chronic Pain Syndrome   Interval events: The patient is seen by Pain Management.  She is seeing Dr. Pena for her neck pain and the Orthopedic PA for her right knee pain.  She is in a brace.  She is still having chest wall pain, diagnosed with costochondritis, and is scheduled for an injection by Dr. Lopez on 2/15/18.    Symptoms: neck pain, right knee pain, and back pain (stable).   Current treatment includes Lodine, Cymbalta, and Gabapentin.      Depression Follow-Up: The patient states her Depression has been stable.  Comorbid Illnesses:  Insomnia.   Interval Events: None.  Symptoms: stable depression and insomnia.    Denies loss of interest or pleasure in activities,   excessive sleepiness,  inability to perform normal activities, loss of energy,  feelings of worthlessness,  feelings of  guilt,  and trouble concentrating.   The patient denies thoughts of suicide.   Social Support: the patient has good social support.   Medications: Cymbalta..            Vaginitis   This is a new problem. Episode onset: 5 days ago. The problem occurs constantly (white vaginal discharge and itching). The problem has been unchanged. Associated symptoms include abdominal pain, fatigue, a fever, neck pain and numbness. Pertinent negatives include no arthralgias, chest pain, chills, coughing, headaches, myalgias, nausea, rash, sore throat, urinary symptoms (except external burning with urination and usual frequency) or vomiting. Exacerbated by: No recent antibiotics.  Not sexually active. She has tried nothing for the symptoms.           Current Outpatient Prescriptions on File Prior to Visit   Medication Sig Dispense Refill   • DULoxetine (CYMBALTA) 60 MG capsule Take 1 capsule by mouth Every Night. 30 capsule 5   • gabapentin (NEURONTIN) 400 MG capsule Take this next her pill at night in addition to the medication that you're already taking (Patient taking differently: Take 400 mg by mouth 3 (Three) Times a Day. TAKES 800MG FOR THIRD DOSE) 7 capsule 0   • hydrochlorothiazide (HYDRODIURIL) 50 MG tablet Take 1 tablet by mouth Daily. 30 tablet 5   • levothyroxine (SYNTHROID, LEVOTHROID) 100 MCG tablet TAKE ONE TABLET BY MOUTH DAILY 30 tablet 2   • lisinopril (PRINIVIL,ZESTRIL) 20 MG tablet Take 1 tablet by mouth Every Night. 30 tablet 5   • omeprazole (PRILOSEC) 20 MG capsule Take 1 capsule by mouth 2 (Two) Times a Day. 30 capsule 2   • [DISCONTINUED] Empagliflozin (JARDIANCE PO) Take 10 mg by mouth Daily.     • [DISCONTINUED] cyclobenzaprine (FLEXERIL) 5 MG tablet Take 1 tablet by mouth 3 (Three) Times a Day As Needed for Muscle Spasms (chest pain). 20 tablet 0   • [DISCONTINUED] oxyCODONE-acetaminophen (PERCOCET) 7.5-325 MG per tablet Take 1 tablet by mouth Every 8 (Eight) Hours. 40 tablet 0     No current  facility-administered medications on file prior to visit.          The following portions of the patient's history were reviewed and updated as appropriate: allergies, current medications, past family history, past medical history, past social history, past surgical history and problem list.    Review of Systems   Constitutional: Positive for fatigue and fever. Negative for chills and unexpected weight change.   HENT: Negative for sore throat and voice change.    Eyes: Negative for visual disturbance.   Respiratory: Negative for cough, shortness of breath and wheezing.    Cardiovascular: Negative for chest pain, palpitations and leg swelling.        No MAIN, orthopnea, or claudication.   Gastrointestinal: Positive for abdominal pain. Negative for abdominal distention, blood in stool, constipation, diarrhea, nausea and vomiting.        Denies melena.   Endocrine: Positive for cold intolerance and polyuria. Negative for heat intolerance and polydipsia.   Genitourinary: Positive for frequency and vaginal discharge. Negative for dysuria, flank pain, hematuria, pelvic pain, urgency and vaginal bleeding.   Musculoskeletal: Positive for back pain and neck pain. Negative for arthralgias and myalgias.   Skin: Negative for rash.   Neurological: Positive for numbness. Negative for dizziness, syncope, light-headedness and headaches.        No memory issues.   Psychiatric/Behavioral: Positive for sleep disturbance. Negative for decreased concentration and suicidal ideas. The patient is not nervous/anxious.         Stable depression.         Objective       Blood pressure 104/74, pulse 72, temperature 97 °F (36.1 °C), temperature source Temporal Artery , resp. rate 20, weight 104 kg (228 lb 8 oz), not currently breastfeeding.      Physical Exam   Constitutional:   Obese.   Neck: Normal range of motion. Neck supple. Carotid bruit is not present. No thyromegaly present.   Cardiovascular: Normal rate, regular rhythm, normal heart  sounds and intact distal pulses.  Exam reveals no gallop and no friction rub.    No murmur heard.  No peripheral edema.   Pulmonary/Chest: Effort normal and breath sounds normal.   Abdominal: Soft. Bowel sounds are normal. She exhibits no distension, no abdominal bruit and no mass. There is no hepatosplenomegaly. There is tenderness (mild epigastric). There is no rebound, no guarding and no CVA tenderness.   Exam difficult.   Neurological: She is alert.   Skin: No rash noted.   Psychiatric: She has a normal mood and affect.   Nursing note and vitals reviewed.      Results for orders placed or performed in visit on 02/13/18   POC Microalbumin   Result Value Ref Range    Microalbumin, Urine 10     Creatinine, Urine 50     A/C <30mg/g NORMAL     Lot Number [id number]     Expiration Date 12-31-18    POC Urinalysis Dipstick, Multipro   Result Value Ref Range    Color Yellow Yellow, Straw, Dark Yellow, [place]    Clarity, UA Clear Clear    Glucose, UA Negative Negative, 1000 mg/dL (3+) mg/dL    Ketones, UA Negative Negative    Specific Gravity  1.005 1.005 - 1.030    Blood, UA Negative Negative    pH, Urine 5.5 5.0 - 8.0    Protein, POC Negative Negative mg/dL    Leukocytes Negative Negative    Nitrite, UA Negative Negative    Protein/Creatinine Ratio, Urine 50.0 mg/G Crea    Lot Number [id number]     Expiration Date 8-31-18        Assessment / Plan:    Ernestina was seen today for diabetes.    Diagnoses and all orders for this visit:    Type 2 diabetes mellitus with diabetic autonomic neuropathy, without long-term current use of insulin  -     POC Microalbumin  -     POC Urinalysis Dipstick, Multipro  -     Lipid Panel  -     Comprehensive Metabolic Panel  -     sitaGLIPtin-metFORMIN (JANUMET)  MG per tablet; Take 1 tablet by mouth 2 (Two) Times a Day With Meals.  -     Discontinue: Empagliflozin (JARDIANCE) 10 MG tablet; Take 10 mg by mouth Daily.  -     Discontinue Metformin.  -     Blood Glucose Monitoring Suppl  (FREESTYLE FREEDOM LITE) w/Device kit; 1 each 1 (One) Time for 1 dose.    Essential hypertension    Acquired hypothyroidism    Diabetic autonomic neuropathy associated with type 2 diabetes mellitus    Chronic pain syndrome    Gastroesophageal reflux disease without esophagitis  -     omeprazole (priLOSEC) 40 MG capsule; Take 1 capsule by mouth Daily.    Chronic pain of right knee  -     etodolac (LODINE) 200 MG capsule; Take 1 capsule by mouth Every 8 (Eight) Hours As Needed (pain).    Other depression    Acute vaginitis  -     fluconazole (DIFLUCAN) 150 MG tablet; Take 1 tablet by mouth 1 (One) Time for 1 dose.    High risk medication use  -     Magnesium    Informed the patient she needs to get her Gabapentin refills from Dr. Pena.      Return in about 6 weeks (around 3/27/2018) for Recheck-Diabetes, fasting. schedule after 4/2/18..

## 2018-02-13 NOTE — TELEPHONE ENCOUNTER
----- Message from Mimi Gonzalez sent at 2/13/2018 10:04 AM EST -----  [facility] [phone]    THEY GOT RX FOR FREESTYLE FREEDOM LITE DEVICE KIT.  DO YOU WANT LANCETS AND TEST STRIPS ALSO?  NEEDS MORE INFO IF SO(DOSAGE AND FREQUENCY)

## 2018-02-14 PROBLEM — G58.8 INTERCOSTAL NEURALGIA: Status: ACTIVE | Noted: 2018-02-14

## 2018-02-14 PROBLEM — M94.0 COSTOCHONDRITIS: Status: ACTIVE | Noted: 2018-02-14

## 2018-02-14 RX ORDER — ATORVASTATIN CALCIUM 20 MG/1
20 TABLET, FILM COATED ORAL DAILY
Start: 2018-02-14 | End: 2018-04-09 | Stop reason: SDUPTHER

## 2018-02-14 NOTE — PROGRESS NOTES
"Chief Complaint: \"I am here for pain in the left side of my chest that goes into the back.\"    History of Present Illness:   Patient: Ms. Ernestina Garrido, 47 y.o. female   Referring physician: Jorge kam PA-C  Reason for referral: Consultation for intractable chestwall pain.   Pain history: Patient reports a 4-month history of pain, which began without incident. Pain has progressed in intensity over the past 1 month. Patient underwent lap anisa in November 2017. One week later, she developed anterior chest wall pain. She went to the ER and cardiac work up was negative. Later on, she underwent ACDF C6-C7, with resolution of her neck and left arm pain. She continued experiencing chest wall pain and numbness in the thumb, index and middle finger of her left hand. Patient has a history of left CTS.  Pain description: constant pain, described as sharp, shooting and stabbing sensation.   Radiation of pain: The pain is localized in the center of her chest and radiates along the 3rd, 4th and 5th ribs into her thoracic region.   Pain intensity today: 9/10  Average pain intensity last week: 6/10  Pain intensity ranges from: 6/10 to 10/10  Aggravating factors: Pain increases with coughing, lying down and taking a deep breathe.   Alleviating factors: None  Associated symptoms:   Patient denies pain, numbness or weakness in the upper extremities, except for left CTS.   Patient denies any new bladder or bowel problems.   Patient reports difficulties with her balance or recent falls.      Review of previous therapies and additional medical records:  Ernestina Garrido has already failed the following measures, including:   Conservative measures: oral analgesics, topical analgesics, physical therapy, ice and heat   Interventional measures: None for this condition  Surgical measures: Surgical: ACDF C4-C6 by Dr. Pena on 01/20/2016, with resolution of upper extremity symptoms. ACDF C6-C7 on 01/03/2018  Ernestina Garrido " underwent neurosurgical  consultation with Jorge kam PA-C on 02/01/2018, and was found not to be a surgical candidate.  Ernestina Garrido presents with significant comorbidities including, hypertension and non-insulin dependent diabetes engaged in treatment.  In terms of current analgesics, Ernestina Garrido takes: gabapentin, etodolac, duloxetine  I have reviewed [facility] Report #[id number] consistent to medication reconciliation.    Review of New Diagnostic Studies:    XR CHEST 1 VW-01/15/2018: cardiac and mediastinal silhouettes to be within normal limits. The lung fields are grossly clear. No focal parenchymal opacification present.  No pleural effusion or pneumothorax. Degenerative changes seen within the spine. Pulmonary vascularity is within normal limits.      IMPRESSION: No acute cardiopulmonary disease.   XR SPINE CERVICAL 2 OR 3 VW- 01/15/2018. Previously identified anterior cervical fusion C4-C6 with interbody fusion device has been revised. The hardware has been removed from C4 and C5 and currently, there is an anterior cervical fusion at C6-C7 with interbody fusion device. Previously placed interbody fusion devices from C4 through C6 remain in good position. There is a small osteophyte projecting into the prevertebral soft tissues which can be followed. It is likely a sequela of the previous hardware. The alignment is within normal limits.      IMPRESSION: Interval revision of the anterior cervical fusion is noted converting the fusion from C4 through C6 fusion to a C6-C7 anterior fusion. The alignment remains excellent. Other findings as noted above. There are some arthropathic hypertrophic changes associated with the previous procedure from C4 through C6.  CT CERVICAL SPINE WO CONTRAST-12/20/2017: patient is status post anterior cervical disc fusion at the C4-C6 levels without evidence of hardware loosening or failure. Flattening of the normal lordotic curvature noted. Facets are well aligned. No  evidence for discrete fracture or focal subluxation with preservation of vertebral body heights. Intervertebral disc height space is lost at the disc fusion site as well as in the lower segments consistent with degenerative disease and minimal osteophyte formation. No significant prevertebral soft tissue swelling or paraspinal hematoma  on axial soft tissue evaluation. No critical spinal canal narrowing. Lung apices partially visualized and grossly unremarkable. Lateral masses of C1 are well seated on C2. Dens is normal in appearance.  IMPRESSION:  1. Stable C4-C6 ACDF without evidence of hardware loosening or failure.  2. No evidence for acute fracture, subluxation or dislocation of the cervical spine.  MRI SHOULDER LEFT W WO CONTRAST - 12/03/2017. There is no evidence of joint effusion. There is some mild increased signal seen in the supraspinatus tendon suggesting a mild tendinopathy. Mild increased signal seen within the subscapularis tendon suggesting mild tendinopathy. The infraspinatus and teres minor are normal in signal intensity. Slight elevation identified of the humeral head with respect to the glenoid fossa. There are degenerative changes seen within the humeral head. There is extensive motion artifact degrading image quality on several of the images. No significant joint effusion is identified. There is no definite abnormal contrast enhancement present. The darni are not well evaluated on this study due to the extensive motion artifact however there does appear to be some irregularity of the superior labrum suggesting a tear. Inferior labrum may also be injured with thickening of the capsule. Anterior and posterior labrum are not well visualized due to the motion artifact.  IMPRESSION: Severe motion artifact degrades image quality. There is no large joint effusion. No definite abnormal contrast enhancement. Mild tendinopathy of the supraspinatus and subscapularis tendons with possible tear seen of the  superior and inferior labrum. Anterior and posterior labrum cannot be evaluated due to the motion artifact.  MRI CERVICAL SPINE W WO CONTRAST - 12/03/2017. There is fusion identified of the cervical spine from C4 through C6. The facets are well-aligned. Narrowing identified of the cervical spinal canal which is unchanged when compared to the prior study. No abnormal mass or fluid collection is seen within the paraspinal muscles. Normal signal intensity seen within the spinal cord. No cord edema or contusion. Axial imaging   C4/C5: posterior disc osteophyte complex on the right creating narrowing of the right  neuroforamina and rightward aspect of the thecal sac.  C5/C6: Posterior disc osteophyte complex on the right creating narrowing of the right neuroforamina and rightward aspect of the central spinal canal.  C6/C7: Posterior disc osteophyte complex on the left creating severe narrowing of the left neuroforamina and mass effect on the spinal cord and thecal sac. Compromise of the left nerve root is likely. Degenerative changes with posterior facet hypertrophy at the C6/C7 level creating mass effect anteriorly on the thecal sac and narrowing of the right neuroforamina. No definite nerve root  compromise identified. There is no definite abnormal contrast enhancement identified.   IMPRESSION: Left posterior disc osteophyte complex at the C6/C7 level creating mass effect on the leftward aspect of the spinal cord and thecal sac and severe narrowing of the left neuroforamina and contact and compromise of the left nerve root. Patient's hardware and fusion intact and unremarkable with no significant abnormal contrast  enhancement identified.        Review of Previous Diagnostic Studies:  XR RIGHT ELBOW, 2 VIEWS - 08/12/2016; The osseous structures of the right elbow are intact and well aligned. Fat pad distention or hemarthrosis is not identified. The radial head aligns with the capitulum on all views. Negative right  elbow series. No evidence of acute fracture or hemarthrosis.  MRI CERVICAL SPINE WITHOUT CONTRAST 07/07/2016: Hardware identified anteriorly fusing the C4, C5 and C6 levels. There is metal susceptibility artifact. There is slight abnormal signal intensity identified within the cervical cord posterior to the C4 level, unchanged in appearance when compared to 10/17/2015. No evidence of spinal cord edema. No underlying lesion. There is slight mass effect on the spinal cord at the C3/C4 and C4/C5 levels.  Degenerative changes seen within the posterior facets. Craniovertebral junction is preserved. C3/C4, small posterior disc osteophyte complex creating mild anterior mass effect on the thecal sac. Mild narrowing of the central spinal canal. C3-C4; large right paracentral disc osteophyte complex creating mass effect anteriorly on the thecal sac and narrowing of the right neural foramina. No definite nerve root compromise. C5-C6 posterior disc osteophyte complex creating narrowing of the right neural foramina. No definite nerve root compromise. C6-C7 posterior disc osteophyte complex with slight lateralization to the left creating narrowing of the left neural foramina and mass effect anteriorly on the leftward aspect of the spinal cord. No evidence of cord edema or contusion. Bilateral narrowing of the neural foramina. No significant central spinal canal stenosis. The remainder of the vertebral body levels are unremarkable.  CT ABDOMEN AND PELVIS W WO CONTRAST-06/17/2016: The left ovary is identified in the left upper adnexal region and appears normal. The right ovary is only tentatively identified as an area of thickening along the broad ligament and is not enlarged. The uterus is surgically absent. No intrapelvic inflammatory change, mass, or adenopathy. No hernia. Delayed images show contrast opacification of the normal appearing bladder. The bony structures appear grossly intact. No evidence of left colon  "diverticulitis or even significant diverticulosis is seen. Coronal images reveal a relatively mild levoconvex lumbar scoliosis.  XR SPINE CERVICAL MIN 2 VIEWS, 05/02/2016: Anterior cervical fusion from C4 through C6 with interbody fusion devices. Alignment is excellent in AP and lateral projection. Hardware is intact without evidence of displacement or fracture. Right carotid calcification.    Review of Systems   Respiratory: Positive for chest tightness.    Cardiovascular: Positive for chest pain.   Musculoskeletal: Positive for arthralgias and back pain.   Psychiatric/Behavioral: Positive for sleep disturbance. The patient is nervous/anxious (depression).    All other systems reviewed and are negative.     The following portions of the patient's history were reviewed and updated as appropriate: problem list, past medical history, past surgery history, social history, family history, medications, and allergies     /72 (BP Location: Left arm, Patient Position: Sitting)  Pulse 85  Temp 97 °F (36.1 °C) (Temporal Artery )   Resp 16  Ht 172.7 cm (68\")  Wt 104 kg (229 lb)  SpO2 96%  BMI 34.82 kg/m2     Physical Exam   Neurologic Exam  Constitutional: Patient is oriented to person, place, and time. Vital signs are normal. Patient appears well-developed and well-nourished.   HENT: Head: Normocephalic and atraumatic. Eyes: Conjunctivae and lids are normal. Pupils: Equal, round, reactive to light.   Neck: Trachea normal. Neck supple. No JVD present. Lymphatic: No cervical adenopathy  Pulmonary Respiratory effort: No increased work of breathing or signs of respiratory distress. Auscultation of lungs: Clear to auscultation.   Cardiovascular Auscultation of heart: Normal rate and rhythm, normal S1 and S2, no murmurs. Peripheral vascular exam: Normal. Carotid: right 2+, left 2+, no bruit on the right and no bruit on the left. Femoral: right 2+, left 2+, no bruit on the right and no bruit on the left. Posterior " tibialis: right 2+ and left 2+. Dorsalis pedis: right 2+ and left 2+. No edema.   Abdomen: The abdomen was soft and nontender. Bowel sounds were normal.   Musculoskeletal   Gait and station: Gait evaluation demonstrated a normal gait.   Cervical spine: Active and passive range of motion are improved. Cervical facet joint loading maneuvers are negative at this time.  Shoulders: The range of motion of the glenohumeral joints is full and without pain. Rotator cuff strength is 5/5.   Palpation of the left third, fourth, fifth ribs reproduces pain.  Compression of the chest wall on the left side reproduces pain.  Palpation of the sternal chondral joints reproduces pain.   Thoracic spine: Thoracic facet joint loading maneuvers are negative.   Lumbar spine: The range of motion of the lumbar spine is full and without pain. Lumbar facet joint loading maneuvers are negative.   Jake and Gaenslen's tests are negative.   Piriformis maneuvers are negative.   Palpation of the bilateral greater trochanter and ischial tuberosities, unrevealing.   Examination of the Iliotibial band: unrevealing.  Hip joints: The range of motion of the hip joints is full and without pain. Palpation and provocative maneuvers to the left adductor muscles reproduces pain.  Neurological: Patient is alert and oriented to person, place, and time. Speech: speech is normal. Cortical function: Normal mental status.   Cranial nerves: Cranial nerves 2-12 intact.   Reflex Scores: Trace; Right brachioradialis, Left brachioradialis, Right biceps, Left biceps, Right triceps, Left triceps, Right patellar, Left patellar, Right achilles: Left achilles.  Motor strength: 5/5  Motor Tone: normal tone.   Involuntary movements: none.   Superficial/Primitive Reflexes: primitive reflexes were absent.   Right Sheehan: absent  Left Sheehan: absent  Right ankle clonus: absent  Left ankle clonus: absent   Spurling sign is negative. Lhermitte sign is negative. Negative long  tract signs. Straight leg raising test is negative. Femoral stretch sign is negative.   Sensation: No sensory loss. Sensory exam: intact to light touch, intact pain and temperature sensation, intact vibration sensation and normal proprioception.   Coordination: Normal finger to nose and heel to shin. Normal balance and negative. Romberg's sign negative.   Skin and subcutaneous tissue: Skin is warm and intact. No rash noted. No cyanosis.   Psychiatric: Judgment and insight: Normal. Orientation to person, place and time: Normal. Recent and remote memory: Intact. Mood and affect: Normal.     ASSESSMENT:   1. Intercostal neuralgia    2. Costochondritis    3. Hx of fusion of cervical spine    4. Diabetic autonomic neuropathy associated with type 2 diabetes mellitus    5. Type 2 diabetes mellitus with diabetic autonomic neuropathy, without long-term current use of insulin    6. Class 1 obesity due to excess calories without serious comorbidity with body mass index (BMI) of 34.0 to 34.9 in adult    7. Gastroesophageal reflux disease without esophagitis       PLAN: Patient presents with a history of unrelenting chronic chest wall pain. Patient underwent lap anisa in November 2017. One week later, she developed her current pain.  Pain is consistent with intercostal neuralgia and costochondritis, as revealed by her physical examination.  Cardiac work up has been negative. Patient underwent ACDF C6-C7, with excellent outcome and complete resolution of her previously severe neck and left arm pain. She continued experiencing severe chest wall pain refractory to medical treatment. I have reviewed all available patient's medical records as well as previous therapies as referenced above under history of present illness. Patient has failed to obtain relief with conservative measures. I had a lengthy conversation with Ms. Ernestina Garrido regarding her chronic pain condition and potential therapeutic options. Therefore, I have  proposed the following plan:  1. Interventional pain management measures: Patient will be scheduled for left 3rd, 4th, and 5th intercostal nerve blocks by anterior approach under ultrasound and C-arm fluoroscopic guidance secondary to body habitus. The plan is to start physical therapy immediately after for progressive desensitization.  If necessary, we may repeat the blocks depending on outcome.    2. Long-term rehabilitation efforts:  A. Start a comprehensive physical therapy program for upper body strengthening, posture correction, neurodynamics, ultrasound, ASTYM, E-STIM, myofascial release, desensitization techniques, cupping and dry needling  B. Start an exercise program such as yoga, water therapy and daily walks for fitness  C. Follow-up with Dr. Ivy Do for cognitive behavioral therapy, biofeedback, assistance in the development of effective coping skills and sleep hygiene.  D. Referral to [facility]  E. Start contrast therapy  F. Trial with TENs unit  3.  Pharmacological measures:   A. Continue gabapentin, etodolac, duloxetine, as currently prescribed  B. Trial with lidocaine 10%, prilocaine 2%, capsaicin 0.001%, mannitol 20%, imipramine 3% cream, apply cream to affected areas  4.  The patient has been instructed to contact my office with any questions or difficulties. The patient understands the plan and agrees to proceed accordingly.         Patient Care Team:  Stephanie Tanner MD as PCP - General  Nicolás Pena MD as Consulting Physician (Neurosurgery)  Ivy Do, PhD as Consulting Physician (Psychology)  Blayne Pandya MD as Consulting Physician (Anesthesiology)  Jorge Kraus PA-C as Physician Assistant (Neurosurgery)     No orders of the defined types were placed in this encounter.        Future Appointments  Date Time Provider Department Center   3/1/2018 10:30 AM MELODY [facility] None   4/9/2018 2:00 PM MD THUY [facility] None          MD KISHA Pablo Dragon/Transcription disclaimer:  Much of this encounter note is an electronic transcription of spoken language to printed text. Electronic transcription of spoken language may permit erroneous, or at times, nonsensical words or phrases to be inadvertently transcribed. Although I have reviewed the note for such errors, some may still exist.

## 2018-02-15 ENCOUNTER — OFFICE VISIT (OUTPATIENT)
Dept: PAIN MEDICINE | Facility: CLINIC | Age: 48
End: 2018-02-15

## 2018-02-15 VITALS
SYSTOLIC BLOOD PRESSURE: 110 MMHG | RESPIRATION RATE: 16 BRPM | OXYGEN SATURATION: 96 % | WEIGHT: 229 LBS | HEIGHT: 68 IN | TEMPERATURE: 97 F | HEART RATE: 85 BPM | BODY MASS INDEX: 34.71 KG/M2 | DIASTOLIC BLOOD PRESSURE: 72 MMHG

## 2018-02-15 DIAGNOSIS — E11.43 TYPE 2 DIABETES MELLITUS WITH DIABETIC AUTONOMIC NEUROPATHY, WITHOUT LONG-TERM CURRENT USE OF INSULIN (HCC): Primary | ICD-10-CM

## 2018-02-15 DIAGNOSIS — G58.8 INTERCOSTAL NEURALGIA: ICD-10-CM

## 2018-02-15 DIAGNOSIS — E11.43 DIABETIC AUTONOMIC NEUROPATHY ASSOCIATED WITH TYPE 2 DIABETES MELLITUS (HCC): ICD-10-CM

## 2018-02-15 DIAGNOSIS — Z98.1 HX OF FUSION OF CERVICAL SPINE: ICD-10-CM

## 2018-02-15 DIAGNOSIS — E11.43 TYPE 2 DIABETES MELLITUS WITH DIABETIC AUTONOMIC NEUROPATHY, WITHOUT LONG-TERM CURRENT USE OF INSULIN (HCC): ICD-10-CM

## 2018-02-15 DIAGNOSIS — M94.0 COSTOCHONDRITIS: ICD-10-CM

## 2018-02-15 DIAGNOSIS — E66.09 CLASS 1 OBESITY DUE TO EXCESS CALORIES WITHOUT SERIOUS COMORBIDITY WITH BODY MASS INDEX (BMI) OF 34.0 TO 34.9 IN ADULT: ICD-10-CM

## 2018-02-15 DIAGNOSIS — K21.9 GASTROESOPHAGEAL REFLUX DISEASE WITHOUT ESOPHAGITIS: ICD-10-CM

## 2018-02-15 PROCEDURE — 99214 OFFICE O/P EST MOD 30 MIN: CPT | Performed by: ANESTHESIOLOGY

## 2018-02-15 RX ORDER — EMOLLIENT BASE
CREAM (GRAM) TOPICAL
Qty: 60 G | Status: SHIPPED | OUTPATIENT
Start: 2018-02-15 | End: 2020-01-15

## 2018-02-15 RX ORDER — EMOLLIENT BASE
CREAM (GRAM) TOPICAL
Qty: 35.44 G | Status: SHIPPED | OUTPATIENT
Start: 2018-02-15 | End: 2020-01-15

## 2018-02-21 ENCOUNTER — HOSPITAL ENCOUNTER (OUTPATIENT)
Dept: PHYSICAL THERAPY | Facility: HOSPITAL | Age: 48
Setting detail: THERAPIES SERIES
Discharge: HOME | End: 2018-02-21

## 2018-02-21 DIAGNOSIS — R29.898 SHOULDER WEAKNESS: ICD-10-CM

## 2018-02-21 DIAGNOSIS — M54.2 NECK PAIN: Primary | ICD-10-CM

## 2018-02-21 DIAGNOSIS — M54.12 LEFT CERVICAL RADICULOPATHY: ICD-10-CM

## 2018-02-21 PROCEDURE — 97161 PT EVAL LOW COMPLEX 20 MIN: CPT | Performed by: PHYSICAL THERAPIST

## 2018-02-21 NOTE — THERAPY EVALUATION
Outpatient Physical Therapy Ortho Initial Evaluation   [place]     Patient Name: Ernestina Garrido  : 1970  MRN: [id number]  Today's Date: 2018      Visit Date: 2018    Patient Active Problem List   Diagnosis   • Chronic pain syndrome   • Depression   • Hemorrhoid   • Essential hypertension   • Hypothyroidism   • Insomnia   • Arthralgia of hip   • Arthralgia of shoulder   • Degenerative disc disease, cervical   • Cervical facet arthropathy/cervical spondylosis without myelopathy   • Diabetic autonomic neuropathy   • Left adductor tendonitis   • Hx of fusion of cervical spine   • Obesity   • Neuroforaminal stenosis of spine   • Lateral epicondylitis, right elbow   • Carpal tunnel syndrome of right wrist   • Type 2 diabetes mellitus, without long-term current use of insulin   • Intervertebral cervical disc disorder with myelopathy, cervical region   • CARLOS ENRIQUE (acute kidney injury)   • GERD (gastroesophageal reflux disease)   • Hematochezia   • Hypotension   • Atypical chest pain   • Costochondral chest pain   • Intercostal neuralgia   • Costochondritis        Past Medical History:   Diagnosis Date   • Arthralgia of hip    • Cervical facet arthropathy/cervical spondylosis without myelopathy    • Chronic pain syndrome     Description: hx of epidural injections.   • Degenerative disc disease, cervical    • Depression     Description: dx .   • Diabetes mellitus     TYPE 2   • Essential hypertension     Description: dx .   • Hemorrhoid 2016   • History of colonoscopy 2013    normal per patient   • History of uterine leiomyoma    • History of varicella    • Hypertension    • Hypothyroidism     Description: dx .   • Insomnia 2016   • Low back pain    • Lumbosacral disc disease    • Neck pain    • Obesity    • Osteoarthritis    • Tattoos     HIV neg  per patient   • Tobacco abuse         Past Surgical History:   Procedure Laterality Date   • ABDOMINAL SURGERY       cholecystectomy   • ANTERIOR CERVICAL DISCECTOMY  2016    C4-6 DISC (Dr. Pena)   • ANTERIOR CERVICAL DISCECTOMY W/ FUSION Left 1/3/2018    Procedure: CERVICAL DISCECTOMY ANTERIOR WITH FUSION C6-7;  Surgeon: Nicolás Pena MD;  Location:  [facility] OR;  Service:    • BACK SURGERY      lumbar discectomy   •  SECTION      , ,    • CHOLECYSTECTOMY WITH INTRAOPERATIVE CHOLANGIOGRAM N/A 2017    Procedure: CHOLECYSTECTOMY LAPAROSCOPIC INTRAOPERATIVE CHOLANGIOGRAM;  Surgeon: Clifford Freed MD;  Location:  [facility] OR;  Service:    • HYSTERECTOMY  2015       Visit Dx:     ICD-10-CM ICD-9-CM   1. Neck pain M54.2 723.1   2. Shoulder weakness R29.898 719.61   3. Left cervical radiculopathy M54.12 723.4             Patient History       18 0920          History    Chief Complaint Pain;Tingling;Numbness  -LM      Type of Pain Chest pain;Neck pain  -LM      Date Current Problem(s) Began 17  -LM      Brief Description of Current Complaint Patient had gallbladder removed on 2017.  Right after, pt began having neck pain - states it feels like someone is stabbing her.  Pt had initial cervical fusion in  - second surgery required 2018.  Pt is now having constant pain that radiates down her L arm and first 3 fingers.   -LM      Hand Dominance right-handed  -LM      Occupation/sports/leisure activities Prior to gallbladder sx, pt was a  - has not worked since then.  -LM      History of Previous Related Injuries Had initial cervical fusion in   -LM      Pain     Pain Location Neck;Arm  -LM      Pain at Present 6  -LM      Pain at Best 5  -LM      Pain at Worst 8  -LM      Pain Frequency Constant/continuous  -LM      Pain Description Throbbing;Radiating;Aching  -LM      Is your sleep disturbed? Yes   Thinks this is more due to chest pain  -LM      Fall Risk Assessment    Any falls in the past year: Yes  -LM      Number of falls reported in the last 12 months 2   -LM      Factors that contributed to the fall: Lost balance  -LM      Daily Activities    Primary Language English  -LM      How does patient learn best? Listening;Reading;Demonstration  -LM      Pt Participated in POC and Goals Yes  -LM      Safety    Are you being hurt, hit, or frightened by anyone at home or in your life? No  -LM      Are you being neglected by a caregiver No  -LM        User Key  (r) = Recorded By, (t) = Taken By, (c) = Cosigned By    Initials Name Provider Type    LM Julia Gooden, KANDI Physical Therapist                PT Ortho       02/21/18 0920    Posture/Observations    Alignment Options Forward head;Rounded shoulders  -LM    Posture/Observations Comments Head leans to the right  -LM    Sensory Screen for Light Touch- Upper Quarter Clearing    C6 (tip of thumb) Left:;Diminished  -LM    C7 (tip of 3rd finger) Left:;Diminished  -LM    Myotomal Screen- Upper Quarter Clearing    Shoulder flexion (C5) Right:;5 (Normal);Left:;3+ (Fair +)  -LM    Elbow flexion/wrist extension (C6) Right:;5 (Normal);Left:;3+ (Fair +)  -LM    Elbow extension/wrist flexion (C7) Right:;5 (Normal);Left:;3+ (Fair +)  -LM    Special Tests/Palpation    Special Tests/Palpation Cervical/Thoracic  -LM    Cervical Palpation    Upper Traps Left:;Guarded/taut  -LM    Cervical/Thoracic Special Tests    Spurlings (Foraminal Compression) Left:;Positive  -LM    Cervical Compression (Forarminal Compression vs. Facet Pain) Left:;Positive  -LM    Cervical Distraction (Foraminal Compression vs. Facet Pain) Left:;Negative  -LM    General Head/Neck/Trunk Assessment    ROM neck ROM deficit  -LM    Neck    Flexion AROM Deficit 42  -LM    Extension AROM Deficit 28  -LM    Lt Lat Flexion AROM Deficit 16  -LM    Rt Lateral Flexion AROM Deficit 18  -LM    Lt Rotation AROM Deficit 40  -LM    Rt Rotation AROM Deficit 45  -LM    General UE Assessment    ROM Detail L UE AROM WFL (Shldr WFL but required increased time)  -LM      User Key  (r) =  Recorded By, (t) = Taken By, (c) = Cosigned By    Initials Name Provider Type    JIM Gooden PT Physical Therapist        Therapy Education  Education Details: HEP given - exercises include: Doorway Stretch; Shldr Rolls/Shrugs; Gentle AROM cervical Rot/SB  Given: HEP, Pain management, Posture/body mechanics  Program: New  How Provided: Verbal, Demonstration, Written  Provided to: Patient  Level of Understanding: Teach back education performed, Verbalized, Demonstrated           PT OP Goals       02/21/18 0920       PT Short Term Goals    STG Date to Achieve 03/14/18  -LM     STG 1 Independent with HEP for ROM and postural awareness.  -LM     STG 1 Progress New  -LM     STG 2 Left arm symptoms have decreased by 25% with frequency or intensity of symptoms.  -LM     STG 2 Progress New  -LM     Long Term Goals    LTG Date to Achieve 04/04/18  -LM     LTG 1 L Shldr MMT grossly 4+/5 throughout  -LM     LTG 1 Progress New  -LM     LTG 2 Cervical SB AROM improve to 25 degrees bilaterally  -LM     LTG 2 Progress New  -LM     LTG 3 Client reports 2-3/10 neck pain.  -LM     LTG 3 Progress New  -LM     Time Calculation    PT Goal Re-Cert Due Date 05/22/18  -LM       User Key  (r) = Recorded By, (t) = Taken By, (c) = Cosigned By    Initials Name Provider Type    JIM Gooden PT Physical Therapist                PT Assessment/Plan       02/21/18 0920       PT Assessment    Functional Limitations Performance in self-care ADL;Performance in leisure activities;Limitations in community activities  -LM     Impairments Posture;Pain;Muscle strength;Sensation;Joint mobility;Poor body mechanics  -LM     Assessment Comments Client presents with evolving symptoms of low complexity.  Signs and symptoms consistent with L cervical radiculopathy.  Skilled PT services warranted to improve functional mobility and return pt to PLOF.  -LM     Please refer to paper survey for additional self-reported information Yes  -LM     Rehab Potential  Fair  -     Patient/caregiver participated in establishment of treatment plan and goals Yes  -     Patient would benefit from skilled therapy intervention Yes  -LM     PT Plan    PT Frequency 2x/week  -     Predicted Duration of Therapy Intervention (days/wks) 12 visits  -     Planned CPT's? PT EVAL LOW COMPLEXITY: 61089;PT RE-EVAL: 06128;PT THER PROC EA 15 MIN: 22605;PT MANUAL THERAPY EA 15 MIN: 14999;PT NEUROMUSC RE-EDUCATION EA 15 MIN: 56531;PT HOT/COLD PACK WC NONMCARE: 63199;PT TRACTION CERVICAL: 97350;PT ELECTRICAL STIM UNATTEND: ;PT ULTRASOUND EA 15 MIN: 71292  -     Physical Therapy Interventions (Optional Details) ROM (Range of Motion);strengthening;stretching;modalities;manual therapy techniques;postural re-education;patient/family education;neuromuscular re-education;dry needling;home exercise program;joint mobilization  -     PT Plan Comments Continue with gentle AROM/stretching of the cervical spine; Re-eval for pain in chest - pt scheduled to have intercostal nerve block on 2/28  -       User Key  (r) = Recorded By, (t) = Taken By, (c) = Cosigned By    Initials Name Provider Type     Julia Gooden PT Physical Therapist          Time Calculation:   Start Time: 0920     Therapy Charges for Today     Code Description Service Date Service Provider Modifiers Qty    48076247474 HC PT EVAL LOW COMPLEXITY 4 2/21/2018 Julia Gooden PT GP 1                    Julia Gooden PT  2/21/2018

## 2018-02-22 ENCOUNTER — TELEPHONE (OUTPATIENT)
Dept: INTERNAL MEDICINE | Facility: CLINIC | Age: 48
End: 2018-02-22

## 2018-02-22 NOTE — TELEPHONE ENCOUNTER
Dr Tanner    Do you still want Ernestina on Jardiance   I received a request for this and would need to do a PA

## 2018-02-28 ENCOUNTER — OUTSIDE FACILITY SERVICE (OUTPATIENT)
Dept: PAIN MEDICINE | Facility: CLINIC | Age: 48
End: 2018-02-28

## 2018-02-28 PROCEDURE — 99152 MOD SED SAME PHYS/QHP 5/>YRS: CPT | Performed by: ANESTHESIOLOGY

## 2018-02-28 PROCEDURE — 64421 NJX AA&/STRD NTRCOST NRV EA: CPT | Performed by: ANESTHESIOLOGY

## 2018-02-28 PROCEDURE — 76942 ECHO GUIDE FOR BIOPSY: CPT | Performed by: ANESTHESIOLOGY

## 2018-03-01 ENCOUNTER — PRIOR AUTHORIZATION (OUTPATIENT)
Dept: INTERNAL MEDICINE | Facility: CLINIC | Age: 48
End: 2018-03-01

## 2018-03-01 NOTE — TELEPHONE ENCOUNTER
PA submitted via CM, Key: [id number]. Up to 72 hours for a response. Pt has tried and failed Metformin, Jardiance fell off formulary and was to expensive, and Humalog was d/c'd by hospital upon discharge.

## 2018-03-02 ENCOUNTER — OFFICE VISIT (OUTPATIENT)
Dept: NEUROSURGERY | Facility: CLINIC | Age: 48
End: 2018-03-02

## 2018-03-02 ENCOUNTER — HOSPITAL ENCOUNTER (OUTPATIENT)
Dept: GENERAL RADIOLOGY | Facility: HOSPITAL | Age: 48
Discharge: HOME | End: 2018-03-02
Admitting: PHYSICIAN ASSISTANT

## 2018-03-02 VITALS
DIASTOLIC BLOOD PRESSURE: 60 MMHG | WEIGHT: 226.8 LBS | HEIGHT: 68 IN | TEMPERATURE: 97.8 F | SYSTOLIC BLOOD PRESSURE: 90 MMHG | BODY MASS INDEX: 34.37 KG/M2

## 2018-03-02 DIAGNOSIS — G58.8 INTERCOSTAL NEURALGIA: ICD-10-CM

## 2018-03-02 DIAGNOSIS — R07.89 COSTOCHONDRAL CHEST PAIN: Primary | ICD-10-CM

## 2018-03-02 DIAGNOSIS — Z98.1 S/P CERVICAL SPINAL FUSION: ICD-10-CM

## 2018-03-02 PROCEDURE — 99024 POSTOP FOLLOW-UP VISIT: CPT | Performed by: PHYSICIAN ASSISTANT

## 2018-03-02 PROCEDURE — 72040 X-RAY EXAM NECK SPINE 2-3 VW: CPT

## 2018-03-02 NOTE — PROGRESS NOTES
Subjective   Patient ID: Ernestina Garrido is a 47 y.o. female is here today for follow-up for x-ray review.    HPI:      A she is well-known to the neurosurgical practice for undergoing 2 Anterior cervical discectomy and fusions.  Most recent of which was on 1/3/2018.  Patient is doing well and recovering from the anterior cervical discectomy and fusion.  Her incision is clean and well healed.  No signs of infection, bleeding, or erythema.      Patient is still having a left-sided chest pain, but is improving since she had injections yesterday with Dr. Pandya.  Patient states that it is much more dorsal and seems to be improving.  Dr. Pandya expects for this to really give good efficacy in the next week or so.  At last visit, we determined that she likely has some costochondritis and I'm glad that this seems to be helping her.    Patient is been wearing bone stimulator, not smoking.  Has been having tight control her blood sugars.  Patient states that she is having some pain in her left forearm as well as numbness and tingling in her first second and third digits.    After doing her exam.  She does have carpal tunnel with Tinel's over the left median nerve, but is not wanting to pursue any surgeries.  I have recommended she get wrist splints to wear at night and even at work.    I have sent her back to work with a lifting restriction of no more than 5 pounds.  This is to allow the costochondritis to settle down and hopefully for the efficacy the shots to gain full effect.    The following portions of the patient's history were reviewed and updated as appropriate: allergies, current medications, past family history, past medical history, past social history, past surgical history and problem list.    Review of Systems   Constitutional: Negative for activity change, appetite change, chills, diaphoresis, fatigue, fever and unexpected weight change.   HENT: Positive for voice change. Negative for congestion,  dental problem, drooling, ear discharge, ear pain, facial swelling, hearing loss, mouth sores, nosebleeds, postnasal drip, rhinorrhea, sinus pressure, sneezing, sore throat, tinnitus and trouble swallowing.    Eyes: Negative for photophobia, pain, discharge, redness, itching and visual disturbance.   Respiratory: Positive for shortness of breath. Negative for apnea, cough, choking, chest tightness, wheezing and stridor.    Cardiovascular: Positive for chest pain. Negative for palpitations and leg swelling.   Gastrointestinal: Negative for abdominal distention, abdominal pain, anal bleeding, blood in stool, constipation, diarrhea, nausea, rectal pain and vomiting.   Endocrine: Negative for cold intolerance, heat intolerance, polydipsia, polyphagia and polyuria.   Genitourinary: Negative for decreased urine volume, difficulty urinating, dysuria, enuresis, flank pain, frequency, genital sores, hematuria and urgency.   Musculoskeletal: Positive for back pain and neck stiffness. Negative for arthralgias, gait problem, joint swelling, myalgias and neck pain.   Skin: Negative for color change, pallor, rash and wound.   Allergic/Immunologic: Negative for environmental allergies, food allergies and immunocompromised state.   Neurological: Positive for weakness and numbness. Negative for dizziness, tremors, seizures, syncope, facial asymmetry, speech difficulty, light-headedness and headaches.   Hematological: Negative for adenopathy. Does not bruise/bleed easily.   Psychiatric/Behavioral: Positive for dysphoric mood. Negative for agitation, behavioral problems, confusion, decreased concentration, hallucinations, self-injury, sleep disturbance and suicidal ideas. The patient is not nervous/anxious and is not hyperactive.          Objective    reports that she quit smoking about 3 months ago. Her smoking use included Cigarettes. She started smoking about 30 years ago. She has a 14.00 pack-year smoking history. She has never  "used smokeless tobacco. She reports that she drinks alcohol. She reports that she does not use illicit drugs.   SMOKING STATUS: Nonsmoker    Physical Exam:   Vitals:BP 90/60  Temp 97.8 °F (36.6 °C)  Ht 172.7 cm (67.99\")  Wt 103 kg (226 lb 12.8 oz)  BMI 34.49 kg/m2   BMI: Body mass index is 34.49 kg/(m^2).     GENEREAL:   The patient is in no acute distress, and is able to answer all questions appropriately.  Skin:  The incision is well-healed and well approximated.  No signs of infection, bleeding, or erythema.  Musculoskeletal: The patient’s strength is intact in upper and lower extremities upon direct testing.  Strength is 5 out of 5.  Shoulder abduction is 5 out of 5.  Finger extension is 5 out of 5.  Dorsiflexion and plantarflexion are equal bilaterally @ 5/5. Hip flexion against resistance.  The patient’s gait is normal without antalgia.  Neurologic: The patient is alert and oriented by 3.  Recent memory, language, attention span, and fund of knowledge are all with within normal limits.   Sensation is equal bilaterally with no deficit.    Reflexes are 2+ at the biceps, triceps, and brachioradialis as well as the patellar and Achilles tendon bilaterally.  There is no sign of Sheehan’s, clonus, or long track signs.      Assessment/Plan   Independent Review of Radiographic Studies:    AP and lateral x-ray of the cervical spine reviewed and showed good placement of plate, screws and bone graft.  This shows a solid fusion The 2 levels above as well.  Medical Decision Making:    Patient is doing very well and I have discussed with her what she would need to do to get carpal tunnel addressed.  She is going to go back to work and wearing her wrist splints.  If this left-sided carpal tunnels continues to give her fits.  She can be seen back with the EMG nerve conduction study to address it.    It has been a pleasure providing neurosurgical care.    Jorge Kraus PA-C  There are no diagnoses linked to this " encounter.  No Follow-up on file.

## 2018-03-05 ENCOUNTER — HOSPITAL ENCOUNTER (OUTPATIENT)
Dept: PHYSICAL THERAPY | Facility: HOSPITAL | Age: 48
Setting detail: THERAPIES SERIES
Discharge: HOME | End: 2018-03-05

## 2018-03-05 DIAGNOSIS — R29.898 SHOULDER WEAKNESS: ICD-10-CM

## 2018-03-05 DIAGNOSIS — M54.2 NECK PAIN: Primary | ICD-10-CM

## 2018-03-05 DIAGNOSIS — M54.12 LEFT CERVICAL RADICULOPATHY: ICD-10-CM

## 2018-03-05 PROCEDURE — 97110 THERAPEUTIC EXERCISES: CPT

## 2018-03-05 NOTE — THERAPY TREATMENT NOTE
Outpatient Physical Therapy Ortho Treatment Note   [facility]     Patient Name: Ernestina Garrido  : 1970  MRN: [id number]  Today's Date: 3/5/2018      Visit Date: 2018    Visit Dx:    ICD-10-CM ICD-9-CM   1. Neck pain M54.2 723.1   2. Left cervical radiculopathy M54.12 723.4   3. Shoulder weakness R29.898 719.61       Patient Active Problem List   Diagnosis   • Chronic pain syndrome   • Depression   • Hemorrhoid   • Essential hypertension   • Hypothyroidism   • Insomnia   • Arthralgia of hip   • Arthralgia of shoulder   • Degenerative disc disease, cervical   • Cervical facet arthropathy/cervical spondylosis without myelopathy   • Diabetic autonomic neuropathy   • Left adductor tendonitis   • Hx of fusion of cervical spine   • Obesity   • Neuroforaminal stenosis of spine   • Lateral epicondylitis, right elbow   • Carpal tunnel syndrome of right wrist   • Type 2 diabetes mellitus, without long-term current use of insulin   • Intervertebral cervical disc disorder with myelopathy, cervical region   • CARLOS ENRIQUE (acute kidney injury)   • GERD (gastroesophageal reflux disease)   • Hematochezia   • Hypotension   • Atypical chest pain   • Costochondral chest pain   • Intercostal neuralgia   • Costochondritis        Past Medical History:   Diagnosis Date   • Arthralgia of hip    • Cervical facet arthropathy/cervical spondylosis without myelopathy    • Chronic pain syndrome     Description: hx of epidural injections.   • Degenerative disc disease, cervical    • Depression     Description: dx .   • Diabetes mellitus     TYPE 2   • Essential hypertension     Description: dx .   • Hemorrhoid 2016   • History of colonoscopy 2013    normal per patient   • History of uterine leiomyoma    • History of varicella    • Hypertension    • Hypothyroidism     Description: dx .   • Insomnia 2016   • Low back pain    • Lumbosacral disc disease    • Neck pain    • Obesity    • Osteoarthritis    • Tattoos      HIV neg  per patient   • Tobacco abuse         Past Surgical History:   Procedure Laterality Date   • ABDOMINAL SURGERY      cholecystectomy   • ANTERIOR CERVICAL DISCECTOMY  2016    C4-6 DISC (Dr. Pena)   • ANTERIOR CERVICAL DISCECTOMY W/ FUSION Left 1/3/2018    Procedure: CERVICAL DISCECTOMY ANTERIOR WITH FUSION C6-7;  Surgeon: Nicolás Pena MD;  Location: [facility] OR;  Service:    • BACK SURGERY      lumbar discectomy   •  SECTION      , ,    • CHOLECYSTECTOMY WITH INTRAOPERATIVE CHOLANGIOGRAM N/A 2017    Procedure: CHOLECYSTECTOMY LAPAROSCOPIC INTRAOPERATIVE CHOLANGIOGRAM;  Surgeon: Clifford Freed MD;  Location:  [facility] OR;  Service:    • HYSTERECTOMY  2015             PT Assessment/Plan       18 0945       PT Assessment    Assessment Comments Client continues with moderate neck pain. Exercises were updated today. No complaints with manual therapy. She reported some relief with hot pack.  -MM     PT Plan    PT Plan Comments Continue per plan of treatment with exercises and grade 1/2 joint mobilizations.  -MM       User Key  (r) = Recorded By, (t) = Taken By, (c) = Cosigned By    Initials Name Provider Type    RUSLAN Alejandro, PT Physical Therapist                Modalities       18 0945          Moist Heat    MH Applied Yes  -MM      Location neck  -MM      Rx Minutes 10 mins  -MM      MH Prior to Rx No  -MM      MH S/P Rx Yes  -MM        User Key  (r) = Recorded By, (t) = Taken By, (c) = Cosigned By    Initials Name Provider Type    RUSLAN Alejandro, PT Physical Therapist                Exercises       18 0945          Subjective Comments    Subjective Comments Client reports moderate neck pain today at 6/10. She also reports continued intercostal pain left intercostal and thoracic back region. She did have an intercostal nerve block Thursday of last week.   -MM      Subjective Pain    Able to rate subjective pain? yes  -MM       Pre-Treatment Pain Level 6  -MM      Post-Treatment Pain Level 6  -MM      Exercise 1    Exercise Name 1 nu-step crosstrainer L4  -MM      Time (Minutes) 1 5  -MM      Exercise 2    Exercise Name 2 Shoulder extension isometrics  -MM      Reps 2 10  -MM      Time (Seconds) 2 2  -MM      Exercise 3    Exercise Name 3 Nerve glides standing: median, ulnar, radial  -MM      Reps 3 10  -MM      Time (Minutes) 3 5  -MM      Exercise 4    Exercise Name 4 Neck isometrics 4-way  -MM      Reps 4 10  -MM      Time (Minutes) 4 4  -MM      Exercise 5    Exercise Name 5 Cervical AROM flexion  -MM      Reps 5 10  -MM      Time (Minutes) 5 2  -MM      Exercise 6    Exercise Name 6 Cervical AROM seated rotation left and right  -MM      Reps 6 10  -MM      Time (Minutes) 6 2  -MM      Exercise 7    Exercise Name 7 cat/camel  -MM      Reps 7 10  -MM      Time (Minutes) 7 2  -MM      Exercise 8    Exercise Name 8 seated thoracic extension stretch  -MM      Time (Minutes) 8 1  -MM      Exercise 9    Exercise Name 9 chin tucks with towel roll  -MM      Time (Minutes) 9 3  -MM      Exercise 10    Exercise Name 10 arm raises scaption  -MM      Reps 10 10  -MM      Time (Minutes) 10 2  -MM      Exercise 11    Exercise Name 11 shoulder shrugs and rolls  -MM      Reps 11 10  -MM      Time (Minutes) 11 2  -MM        User Key  (r) = Recorded By, (t) = Taken By, (c) = Cosigned By    Initials Name Provider Type    RUSLAN Alejandro, PT Physical Therapist             Manual Rx (last 36 hours)      Manual Treatments       03/05/18 0945          Manual Rx 1    Manual Rx 1 Location Cervical spine  -MM      Manual Rx 1 Type Gentle grade 1/2 PA and lateral cervical glides with client supine  -MM      Manual Rx 1 Duration 4 min  -MM        User Key  (r) = Recorded By, (t) = Taken By, (c) = Cosigned By    Initials Name Provider Type    RUSLAN Alejandro, KANDI Physical Therapist        Time Calculation:   Start Time: 0945  Total Timed Code Minutes- PT:  30 minute(s)    Therapy Charges for Today     Code Description Service Date Service Provider Modifiers Qty    09231411776 HC PT THER PROC EA 15 MIN 3/5/2018 Clifford Alejandro, PT GP 2                    Clifford Alejandro, PT  3/5/2018

## 2018-03-07 ENCOUNTER — APPOINTMENT (OUTPATIENT)
Dept: DIABETES SERVICES | Facility: HOSPITAL | Age: 48
End: 2018-03-07

## 2018-03-21 ENCOUNTER — TELEPHONE (OUTPATIENT)
Dept: PAIN MEDICINE | Facility: CLINIC | Age: 48
End: 2018-03-21

## 2018-03-21 DIAGNOSIS — G56.02 CARPAL TUNNEL SYNDROME OF LEFT WRIST: ICD-10-CM

## 2018-03-21 DIAGNOSIS — Z98.1 HISTORY OF FUSION OF CERVICAL SPINE: Primary | ICD-10-CM

## 2018-03-21 NOTE — TELEPHONE ENCOUNTER
WILL ORDER EMG/NCV bilateral upper extremities to determine if this is related to her cervical issues vs carpal tunnel syndrome   Patient called on 03/21/2018, to make an appointment for evaluation of persistent chest wall pain. She was seen on 03/01/2018, when she underwent left intercoastal nerve blocks   Patient followed- up with Jorge Kraus PA-C after her blocks, and reported modest pain relief. Patient reports that she experienced very little to no relief at all from this procedure. Patient has been released back to work by Jorge with weight lifting restrictions of 5 pounds.   Current pain is in the front of her chest on top of her breast, between her shoulder blades, also in her armpit area traveling down her left arm. She also reports tingling and numbness in her left hand, particularly in her thumb, 2nd and 3rd finger.   She continues doing PT   She is not taking gabapentin because her RX ran out an her PCP told her to get it from you.   She is using the pain cream that you RX an it does help a little.     FROM JORGE:   Patient is still having a left-sided chest pain, but is improving since she had injections with Dr. Pandya.     CTS: EMG/NCV   Wear wrist splints

## 2018-03-22 DIAGNOSIS — Z98.1 HISTORY OF LUMBAR FUSION: Primary | ICD-10-CM

## 2018-03-22 DIAGNOSIS — G56.00 CARPAL TUNNEL SYNDROME, UNSPECIFIED LATERALITY: ICD-10-CM

## 2018-03-22 RX ORDER — GABAPENTIN 400 MG/1
400 CAPSULE ORAL 3 TIMES DAILY
Qty: 90 CAPSULE | Refills: 2 | OUTPATIENT
Start: 2018-03-22 | End: 2018-04-09 | Stop reason: SDUPTHER

## 2018-03-22 NOTE — TELEPHONE ENCOUNTER
What dose is she on?  How often?    Ok to refill 1 month with 2 refill.  Please do a Jose Guadalupe and inform the patient she needs to get this filled by one provider since it is controlled.

## 2018-03-22 NOTE — TELEPHONE ENCOUNTER
She states she is not taking Gabapentin . She ran out of it in Janurary     Taking 400 mg TID    Advised Ernestina Rx will be called into pharmacy and that she will need to only request refills from Dr Tanner if she is writing rx now.      Verb understanding given

## 2018-03-22 NOTE — TELEPHONE ENCOUNTER
----- Message from Marlee Barillas V sent at 3/22/2018  1:32 PM EDT -----  PT ASKING FOR GABAPENTIN FOR KNEE    SHE CAN BE REACHED [phone]

## 2018-04-09 ENCOUNTER — OFFICE VISIT (OUTPATIENT)
Dept: INTERNAL MEDICINE | Facility: CLINIC | Age: 48
End: 2018-04-09

## 2018-04-09 VITALS
TEMPERATURE: 98.3 F | SYSTOLIC BLOOD PRESSURE: 130 MMHG | RESPIRATION RATE: 20 BRPM | WEIGHT: 239.5 LBS | HEART RATE: 80 BPM | DIASTOLIC BLOOD PRESSURE: 80 MMHG | BODY MASS INDEX: 36.42 KG/M2

## 2018-04-09 DIAGNOSIS — E78.49 OTHER HYPERLIPIDEMIA: ICD-10-CM

## 2018-04-09 DIAGNOSIS — M79.10 MYALGIA: ICD-10-CM

## 2018-04-09 DIAGNOSIS — E03.9 ACQUIRED HYPOTHYROIDISM: ICD-10-CM

## 2018-04-09 DIAGNOSIS — G89.4 CHRONIC PAIN SYNDROME: ICD-10-CM

## 2018-04-09 DIAGNOSIS — I10 ESSENTIAL HYPERTENSION: ICD-10-CM

## 2018-04-09 DIAGNOSIS — E11.43 TYPE 2 DIABETES MELLITUS WITH DIABETIC AUTONOMIC NEUROPATHY, WITHOUT LONG-TERM CURRENT USE OF INSULIN (HCC): Primary | ICD-10-CM

## 2018-04-09 LAB
ALBUMIN SERPL-MCNC: 4.2 G/DL (ref 3.2–4.8)
ALBUMIN/GLOB SERPL: 1.4 G/DL (ref 1.5–2.5)
ALP SERPL-CCNC: 86 U/L (ref 25–100)
ALT SERPL W P-5'-P-CCNC: 18 U/L (ref 7–40)
ANION GAP SERPL CALCULATED.3IONS-SCNC: 6 MMOL/L (ref 3–11)
ARTICHOKE IGE QN: 128 MG/DL (ref 0–130)
AST SERPL-CCNC: 13 U/L (ref 0–33)
BILIRUB SERPL-MCNC: 0.7 MG/DL (ref 0.3–1.2)
BUN BLD-MCNC: 14 MG/DL (ref 9–23)
BUN/CREAT SERPL: 17.5 (ref 7–25)
CALCIUM SPEC-SCNC: 9.4 MG/DL (ref 8.7–10.4)
CHLORIDE SERPL-SCNC: 101 MMOL/L (ref 99–109)
CHOLEST SERPL-MCNC: 212 MG/DL (ref 0–200)
CO2 SERPL-SCNC: 29 MMOL/L (ref 20–31)
CREAT BLD-MCNC: 0.8 MG/DL (ref 0.6–1.3)
EXPIRATION DATE: NORMAL
GFR SERPL CREATININE-BSD FRML MDRD: 93 ML/MIN/1.73
GLOBULIN UR ELPH-MCNC: 3 GM/DL
GLUCOSE BLD-MCNC: 170 MG/DL (ref 70–100)
HBA1C MFR BLD: 9.9 %
HDLC SERPL-MCNC: 74 MG/DL (ref 40–60)
Lab: NORMAL
POTASSIUM BLD-SCNC: 4 MMOL/L (ref 3.5–5.5)
PROT SERPL-MCNC: 7.2 G/DL (ref 5.7–8.2)
SODIUM BLD-SCNC: 136 MMOL/L (ref 132–146)
T4 FREE SERPL-MCNC: 1.21 NG/DL (ref 0.89–1.76)
TRIGL SERPL-MCNC: 88 MG/DL (ref 0–150)
TSH SERPL DL<=0.05 MIU/L-ACNC: 2.23 MIU/ML (ref 0.35–5.35)

## 2018-04-09 PROCEDURE — 84439 ASSAY OF FREE THYROXINE: CPT | Performed by: INTERNAL MEDICINE

## 2018-04-09 PROCEDURE — 83036 HEMOGLOBIN GLYCOSYLATED A1C: CPT | Performed by: INTERNAL MEDICINE

## 2018-04-09 PROCEDURE — 80061 LIPID PANEL: CPT | Performed by: INTERNAL MEDICINE

## 2018-04-09 PROCEDURE — 84443 ASSAY THYROID STIM HORMONE: CPT | Performed by: INTERNAL MEDICINE

## 2018-04-09 PROCEDURE — 80053 COMPREHEN METABOLIC PANEL: CPT | Performed by: INTERNAL MEDICINE

## 2018-04-09 PROCEDURE — 99214 OFFICE O/P EST MOD 30 MIN: CPT | Performed by: INTERNAL MEDICINE

## 2018-04-09 RX ORDER — GABAPENTIN 300 MG/1
600 CAPSULE ORAL 3 TIMES DAILY
Qty: 180 CAPSULE | Refills: 5 | Status: SHIPPED | OUTPATIENT
Start: 2018-04-09 | End: 2018-09-26

## 2018-04-09 RX ORDER — PIOGLITAZONEHYDROCHLORIDE 30 MG/1
30 TABLET ORAL DAILY
Qty: 30 TABLET | Refills: 5 | Status: SHIPPED | OUTPATIENT
Start: 2018-04-09 | End: 2018-10-15 | Stop reason: SDUPTHER

## 2018-04-09 RX ORDER — ATORVASTATIN CALCIUM 20 MG/1
20 TABLET, FILM COATED ORAL DAILY
Qty: 30 TABLET | Refills: 5 | Status: SHIPPED | OUTPATIENT
Start: 2018-04-09 | End: 2019-03-20

## 2018-04-09 NOTE — PROGRESS NOTES
Subjective       Ernestina Garrido is a 48 y.o. female.     Chief Complaint   Patient presents with   • Diabetes     6 week follow up   fasting        History obtained from the patient.      History of Present Illness     The patient is still having chest pressure.  She states it is a dull pain, and hurts to take a deep breath.  She had a nerve block done by Dr. Pandya, which did not work.  He has her scheduled for an EMG in May.  She is taking the Etodolac.  She is also on Neurontin 400 mg,  3 times a day, but states it does not help.  She states Dr. Pena has released her from care of her neck pain.  He was previously prescribing her Neurontin.  She had a Jose Guadalupe done on 3/27/18.    Primary Care Cardiac Diagnostic Constellation: The patient is here today for a follow-up visit.    Her Hypertension is stable.   Medication(s): Lisinopril.  Her Diabetes Mellitus Type 2 is unstable.  Medication: Janumet, Actos, and Lisinopril.   Her Hyperlipidemia is unstable.    LDL goal is <70.  Her last LDL was 165.   The patient is adherent with her medication regimen. She denies medication side effects.       Interval Events:     Last Hemoglobin A1c on 1/2/18 was 10.4.  On 9/15/17, Hemoglobin A1c was 10.0.   The states the rx for Atorvastatin was not sent last visit, so she is not on it.  Her Jardiance was switched to Janumet due to insurance.  The patient states her blood sugar at home has been 140-150 fasting and less than 200 postprandial.  She denies episodes of low blood sugar.  The patient's last ophthalmology appointment was 12/18/17.  She does check her feet daily.     Symptoms:  Stable fatigue. Has stable MAIN and orthopnea.   Denies chronic chest pain (other than the chest wall pain),  shortness of breath, and lower extremity edema.   Denies intermittent leg claudication, visual impairment, myalgias, and muscle weakness.   Associated symptoms:  13 pound weight gain.   Denies polyuria and polydipsia.  No headaches, no  memory issues, no palpitations, no syncope,  no PND, and no focal neurologic deficits.  Has stable numbness of the feet and the left 3 fingers, but no pain or ulcers.      Lifestyle and Disease Management: Diet: She consumes a diverse and healthy diet, overall.  Weight Issues: She has weight concerns. Exercise: She has not been exercising regularly.  Smoking: She does not use tobacco. She quit smoking 11/6/16     Hypothyroidism Follow-Up: The patient is being seen for follow-up of Hypothyroidism, which is stable.    Interval events: None.  Symptoms: has stable fatigue and cold intolerance, but denies heat intolerance,  weakness,  constipation, and dry skin.   Associated symptoms: has paresthesias left hand and feet,  but no myalgias or arthralgias.    Medications:  Levothyroxine (Synthroid).   The patient is adherent to her medication regimen, and she denies medication side effects.     Current Outpatient Prescriptions on File Prior to Visit   Medication Sig Dispense Refill   • Cream Base cream LAMOTRIGINE 2.5%, LIDOCAINE 2.2%, PRILOCAINE 2.2% B APPLY 1 GRAM 3-4 TIMES DAILY 60 g PRN   • Cream Base cream LIDOCAINE 5% OINTMENT APPLY  1 GRAM 3-4 TIMES DAILY 35.44 g PRN   • DULoxetine (CYMBALTA) 60 MG capsule Take 1 capsule by mouth Every Night. 30 capsule 5   • etodolac (LODINE) 200 MG capsule Take 1 capsule by mouth Every 8 (Eight) Hours As Needed (pain). 50 capsule 1   • gabapentin (NEURONTIN) 400 MG capsule Take 1 capsule by mouth 3 (Three) Times a Day. TAKES 800MG FOR THIRD DOSE 90 capsule 2   • glucose blood (FREESTYLE LITE) test strip Test once daily 100 each 3   • hydrochlorothiazide (HYDRODIURIL) 50 MG tablet Take 1 tablet by mouth Daily. 30 tablet 5   • Lancets (FREESTYLE) lancets Test once daily 100 each 3   • levothyroxine (SYNTHROID, LEVOTHROID) 100 MCG tablet TAKE ONE TABLET BY MOUTH DAILY 30 tablet 2   • lisinopril (PRINIVIL,ZESTRIL) 20 MG tablet Take 1 tablet by mouth Every Night. 30 tablet 5   •  omeprazole (priLOSEC) 40 MG capsule Take 1 capsule by mouth Daily. 30 capsule 5   • sitaGLIPtin-metFORMIN (JANUMET)  MG per tablet Take 1 tablet by mouth 2 (Two) Times a Day With Meals. 60 tablet 5   • [DISCONTINUED] atorvastatin (LIPITOR) 20 MG tablet Take 1 tablet by mouth Daily.       No current facility-administered medications on file prior to visit.        Current outpatient and discharge medications have been reconciled for the patient.  Stephanie Tanner MD        The following portions of the patient's history were reviewed and updated as appropriate: allergies, current medications, past family history, past medical history, past social history, past surgical history and problem list.    Review of Systems   Constitutional: Positive for fatigue and unexpected weight change.   Eyes: Negative for visual disturbance.   Respiratory: Negative for cough, shortness of breath and wheezing.    Cardiovascular: Negative for chest pain, palpitations and leg swelling.        Stable MAIN and orthopnea, but no claudication.   Gastrointestinal: Negative for abdominal pain, blood in stool, constipation, diarrhea, nausea and vomiting.        Denies melena.   Endocrine: Positive for cold intolerance. Negative for polydipsia and polyuria.   Musculoskeletal: Negative for arthralgias and myalgias.   Neurological: Positive for numbness. Negative for dizziness, syncope, light-headedness and headaches.        No memory issues.   Psychiatric/Behavioral: Negative for decreased concentration.         Objective       Blood pressure 130/80, pulse 80, temperature 98.3 °F (36.8 °C), temperature source Temporal Artery , resp. rate 20, weight 109 kg (239 lb 8 oz), not currently breastfeeding.      Physical Exam   Constitutional:   Obese.   Neck: Normal range of motion. Neck supple. Carotid bruit is not present. No thyromegaly present.   Cardiovascular: Normal rate, regular rhythm, normal heart sounds and intact distal pulses.  Exam  reveals no gallop and no friction rub.    No murmur heard.  No peripheral edema.   Pulmonary/Chest: Effort normal and breath sounds normal.   Abdominal: Soft. Bowel sounds are normal. She exhibits no distension, no abdominal bruit and no mass. There is no hepatosplenomegaly. There is no tenderness.   Psychiatric: She has a normal mood and affect.   Nursing note and vitals reviewed.       Results for orders placed or performed in visit on 04/09/18   POC Glycosylated Hemoglobin (Hb A1C)   Result Value Ref Range    Hemoglobin A1C 9.9 %    Lot Number [id number]     Expiration Date 10-19          Assessment / Plan:    Ernestina was seen today for diabetes.    Diagnoses and all orders for this visit:    Type 2 diabetes mellitus with diabetic autonomic neuropathy, without long-term current use of insulin  -     Comprehensive Metabolic Panel  -     POC Glycosylated Hemoglobin (Hb A1C)  -     pioglitazone (ACTOS) 30 MG tablet; Take 1 tablet by mouth Daily.    Essential hypertension    Other hyperlipidemia  -     Lipid Panel        -     atorvastatin (LIPITOR) 20 MG tablet; Take 1 tablet by mouth Daily.    Acquired hypothyroidism  -     TSH  -     T4, Free    Chronic pain syndrome  -     gabapentin (NEURONTIN) 300 MG capsule; Take 2 capsules by mouth 3 (Three) Times a Day. TAKES 800MG FOR THIRD DOSE    Myalgia  -     Ambulatory Referral to Rheumatology      Return in about 3 months (around 7/9/2018) for Recheck-Diabetes fasting.

## 2018-04-11 PROBLEM — E78.49 OTHER HYPERLIPIDEMIA: Status: ACTIVE | Noted: 2018-04-11

## 2018-04-16 ENCOUNTER — TELEPHONE (OUTPATIENT)
Dept: NEUROSURGERY | Facility: CLINIC | Age: 48
End: 2018-04-16

## 2018-04-16 DIAGNOSIS — G89.4 CHRONIC PAIN SYNDROME: ICD-10-CM

## 2018-04-16 RX ORDER — GABAPENTIN 600 MG/1
600 TABLET ORAL 3 TIMES DAILY
Qty: 90 TABLET | Refills: 3 | Status: SHIPPED | OUTPATIENT
Start: 2018-04-16 | End: 2018-10-15 | Stop reason: SDUPTHER

## 2018-04-16 NOTE — TELEPHONE ENCOUNTER
Last OV stated the following under orders:      gabapentin (NEURONTIN) 300 MG capsule; Take 2 capsules by mouth 3 (Three) Times a Day. TAKES 800MG FOR THIRD DOSE    Please advise correct dosage to call in?

## 2018-04-16 NOTE — TELEPHONE ENCOUNTER
Rx called into pharmacist Fer .   Med list updated    Ernestina notified   Verb understanding given     Please sign order  .

## 2018-04-16 NOTE — TELEPHONE ENCOUNTER
Provider:  Jean  Caller: Ernestina Garrido  Time of call:   08:53 AM  Phone #:  [phone]  Surgery:  ACDF C6/7  Surgery Date:  01/03/18  Last visit:   03/02/18  Next visit: none scheduled    Reason for call:       Pt called in, asked to speak to Jorge. She said that that pain management isn't helping, she is still having pain in her chest that goes into her back/neck and shoulder pain. Said that it is not any better since she had her neck surgery, wants to know if she should come in for an appt with Dr. Pena.

## 2018-04-16 NOTE — TELEPHONE ENCOUNTER
----- Message from Concepcion Bond sent at 4/16/2018 10:29 AM EDT -----  PATIENT CALLED AND STATED THAT SHE WENT TO  RX FOR gabapentin (NEURONTIN) 300 MG capsule. STATED SHE USUALLY GETS 400 MG AND THOUGHT IT WAS SUPPOSED TO BE GOING UP. PATIENT IS STILL IN A LOT OF PAIN AND WANTS TO REVIEW THIS BEFORE SHE PICKS UP MEDICATION    PLEASE CALL PATIENT : [phone]    THANK YOU

## 2018-04-16 NOTE — TELEPHONE ENCOUNTER
I would have her follow up with Dr. Pandya again to see if repeat nerve blocks for her costrochondral pain would be recommended. She had improvement with them the last time she had them done.

## 2018-04-17 NOTE — TELEPHONE ENCOUNTER
I spoke with patient and she said that she had no relief with the nerve block.  However, she complains of left shoulder pain, chest pain and axillary pain.  She states that the last time she saw Dr. Pandya he told her her chest was full of inflammation.  I transferred her to Dr. Pandya's office to schedule an appointment.

## 2018-04-19 ENCOUNTER — TELEPHONE (OUTPATIENT)
Dept: PAIN MEDICINE | Facility: CLINIC | Age: 48
End: 2018-04-19

## 2018-04-19 ENCOUNTER — HOSPITAL ENCOUNTER (OUTPATIENT)
Dept: NEUROLOGY | Facility: HOSPITAL | Age: 48
Discharge: HOME | End: 2018-04-19
Attending: ANESTHESIOLOGY | Admitting: ANESTHESIOLOGY

## 2018-04-19 DIAGNOSIS — Z98.1 HISTORY OF LUMBAR FUSION: ICD-10-CM

## 2018-04-19 DIAGNOSIS — G56.00 CARPAL TUNNEL SYNDROME, UNSPECIFIED LATERALITY: ICD-10-CM

## 2018-04-19 PROCEDURE — 95910 NRV CNDJ TEST 7-8 STUDIES: CPT

## 2018-04-19 PROCEDURE — 95886 MUSC TEST DONE W/N TEST COMP: CPT

## 2018-04-23 ENCOUNTER — TELEPHONE (OUTPATIENT)
Dept: PAIN MEDICINE | Facility: CLINIC | Age: 48
End: 2018-04-23

## 2018-04-25 NOTE — PROGRESS NOTES
"Chief Complaint: \"Pain in my neck, left arm and the left side of my upper chest and back.\"    History of Present Illness: Ms. Ernestina Garrido, 48 y.o. female, originally referred by Jorge kam PA-C in consultation for intractable chest wall pain.   Pain history: Patient reports a 6-month history of pain, which began without incident. Patient underwent a lap anisa in November 2017. One week later, she developed anterior chest wall pain. She went to the ER and cardiac work up was negative. Later on, she underwent ACDF C6-C7, with resolution of her neck and left arm pain. She continued experiencing chest wall pain and numbness in the thumb, index and middle finger of her left hand. Patient has a history of left CTS.  Patient was seen on 03/01/2018, when she underwent left intercostal nerve blocks. Patient followed-up with Jorge Kam PA-C after her blocks, and reported modest pain relief. However, she reports that she experienced very little to no relief from this procedure. Patient has been released back to work by Jorge with weight lifting restrictions of 5 pounds. Patient called on 03/21/2018, to make an appointment for evaluation of persistent neck, left arm and chest wall pain.   Current pain is in the front of her chest on top of her breast, between her shoulder blades, also in her armpit area traveling down her left arm. She also reports tingling and numbness in her left hand, particularly in her thumb, 2nd and 3rd finger.   Pain description: constant pain, described as sharp, shooting and stabbing sensation.   Radiation of pain: The pain is localized in the center of her chest and radiates along the 3rd, 4th and 5th ribs into her thoracic region and her left arm.   Pain intensity today: 9/10  Average pain intensity last week: 6/10  Pain intensity ranges from: 9/10 to 10/10  Aggravating factors: Pain increases with coughing, lying down and taking a deep breathe.   Alleviating factors: None  Associated " symptoms:   Patient reports pain, numbness and weakness in the left upper extremity  Patient denies any new bladder or bowel problems.   Patient reports difficulties with her balance or recent falls.      Review of previous therapies and additional medical records:  Ernestina Garrido has already failed the following measures, including:   Conservative measures: oral analgesics, topical analgesics, physical therapy, ice and heat   Interventional measures: As referenced above  Surgical measures: Surgical: ACDF C4-C6 by Dr. Pena on 01/20/2016, with resolution of upper extremity symptoms. ACDF C6-C7 on 01/03/2018  Ernestina Garrido underwent neurosurgical consultation with Jorge kam PA-C on 02/01/2018, and was found not to be a surgical candidate.  Ernestina Garrido presents with significant comorbidities including, hypertension and non-insulin dependent diabetes engaged in treatment.  In terms of current analgesics, Ernestina Garrido takes: gabapentin, etodolac, duloxetine  I have reviewed [facility] Report #[id number] consistent to medication reconciliation.    Review of New Diagnostic Studies:  EMG/NCV RESULTS: Moderate median neuropathy at the wrist bilaterally (carpal tunnel). Chronic left C5/C6 radiculopathy, subacute/chronic C7 radiculopathy    Review of Previous Diagnostic Studies:    XR CHEST 1 VW-01/15/2018: cardiac and mediastinal silhouettes to be within normal limits. The lung fields are grossly clear. No focal parenchymal opacification present.  No pleural effusion or pneumothorax. Degenerative changes seen within the spine. Pulmonary vascularity is within normal limits.      XR SPINE CERVICAL 2 OR 3 VW- 01/15/2018. Anterior cervical fusion C4-C6 with interbody fusion device. The hardware has been removed from C4 and C5 and currently, there is an anterior cervical fusion at C6-C7 with interbody fusion device. Previously placed interbody fusion devices from C4 through C6 remain in good position. There  is a small osteophyte projecting into the prevertebral soft tissues which can be followed. It is likely a sequela of the previous hardware. The alignment is within normal limits.      CT CERVICAL SPINE WO CONTRAST-12/20/2017: status post anterior cervical disc fusion at the C4-C6 levels without evidence of hardware loosening or failure. Flattening of the normal lordotic curvature noted. Facets are well aligned. No evidence for discrete fracture or focal subluxation with preservation of vertebral body heights. Intervertebral disc height space is lost at the disc fusion site as well as in the lower segments consistent with degenerative disease and minimal osteophyte formation. No significant prevertebral soft tissue swelling or paraspinal hematoma on axial soft tissue evaluation. No critical spinal canal narrowing. Lung apices partially visualized and grossly unremarkable. Lateral masses of C1 are well seated on C2. Dens is normal in appearance.  MRI SHOULDER LEFT W WO CONTRAST - 12/03/2017. There is no evidence of joint effusion. There is some mild increased signal seen in the supraspinatus tendon suggesting a mild tendinopathy. Mild increased signal seen within the subscapularis tendon suggesting mild tendinopathy. The infraspinatus and teres minor are normal in signal intensity. Slight elevation identified of the humeral head with respect to the glenoid fossa. There are degenerative changes seen within the humeral head. There is extensive motion artifact degrading image quality on several of the images. No significant joint effusion is identified. There is no definite abnormal contrast enhancement present. The darin are not well evaluated on this study due to the extensive motion artifact however there does appear to be some irregularity of the superior labrum suggesting a tear. Inferior labrum may also be injured with thickening of the capsule. Anterior and posterior labrum are not well visualized due to the  motion artifact.  MRI CERVICAL SPINE W WO CONTRAST - 12/03/2017. There is fusion identified of the cervical spine from C4 through C6. The facets are well-aligned. Narrowing identified of the cervical spinal canal which is unchanged when compared to the prior study. No abnormal mass or fluid collection is seen within the paraspinal muscles. Normal signal intensity seen within the spinal cord. No cord edema or contusion. Axial imaging   C4/C5: posterior disc osteophyte complex on the right creating narrowing of the right neuroforamina and rightward aspect of the thecal sac.  C5/C6: Posterior disc osteophyte complex on the right creating narrowing of the right neuroforamina and rightward aspect of the central spinal canal.  C6/C7: Posterior disc osteophyte complex on the left creating severe narrowing of the left neuroforamina and mass effect on the spinal cord and thecal sac. Compromise of the left nerve root is likely. Degenerative changes with posterior facet hypertrophy at the C6/C7 level creating mass effect anteriorly on the thecal sac and narrowing of the right neuroforamina. No definite nerve root  compromise identified. There is no definite abnormal contrast enhancement identified.   XR RIGHT ELBOW, 2 VIEWS - 08/12/2016; The osseous structures of the right elbow are intact and well aligned. Fat pad distention or hemarthrosis is not identified. The radial head aligns with the capitulum on all views. Negative right elbow series. No evidence of acute fracture or hemarthrosis.  MRI CERVICAL SPINE WITHOUT CONTRAST 07/07/2016: Hardware identified anteriorly fusing the C4, C5 and C6 levels. There is metal susceptibility artifact. There is slight abnormal signal intensity identified within the cervical cord posterior to the C4 level, unchanged in appearance when compared to 10/17/2015. No evidence of spinal cord edema. No underlying lesion. There is slight mass effect on the spinal cord at the C3/C4 and C4/C5  levels.  Degenerative changes seen within the posterior facets. Craniovertebral junction is preserved. C3/C4, small posterior disc osteophyte complex creating mild anterior mass effect on the thecal sac. Mild narrowing of the central spinal canal. C3-C4; large right paracentral disc osteophyte complex creating mass effect anteriorly on the thecal sac and narrowing of the right neural foramina. No definite nerve root compromise. C5-C6 posterior disc osteophyte complex creating narrowing of the right neural foramina. No definite nerve root compromise. C6-C7 posterior disc osteophyte complex with slight lateralization to the left creating narrowing of the left neural foramina and mass effect anteriorly on the leftward aspect of the spinal cord. No evidence of cord edema or contusion. Bilateral narrowing of the neural foramina. No significant central spinal canal stenosis. The remainder of the vertebral body levels are unremarkable.  CT ABDOMEN AND PELVIS W WO CONTRAST-06/17/2016: The left ovary is identified in the left upper adnexal region and appears normal. The right ovary is only tentatively identified as an area of thickening along the broad ligament and is not enlarged. The uterus is surgically absent. No intrapelvic inflammatory change, mass, or adenopathy. No hernia. Delayed images show contrast opacification of the normal appearing bladder. The bony structures appear grossly intact. No evidence of left colon diverticulitis or even significant diverticulosis is seen. Coronal images reveal a relatively mild levoconvex lumbar scoliosis.  XR SPINE CERVICAL MIN 2 VIEWS, 05/02/2016: Anterior cervical fusion from C4 through C6 with interbody fusion devices. Alignment is excellent in AP and lateral projection. Hardware is intact without evidence of displacement or fracture. Right carotid calcification.    Review of Systems   Cardiovascular: Positive for chest pain.   Musculoskeletal: Positive for arthralgias, back  "pain, neck pain and neck stiffness.   Neurological: Positive for weakness and numbness.   Psychiatric/Behavioral: Positive for sleep disturbance. The patient is nervous/anxious (depression).    All other systems reviewed and are negative.     The following portions of the patient's history were reviewed and updated as appropriate: problem list, past medical history, past surgery history, social history, family history, medications, and allergies     /84 (BP Location: Right arm)   Temp 96.7 °F (35.9 °C) (Temporal Artery )   Resp 18   Ht 172.7 cm (68\")   Wt 112 kg (246 lb)   BMI 37.40 kg/m²      Physical Exam   Neurologic Exam  Constitutional: Patient is oriented to person, place, and time. Vital signs are normal. Patient appears well-developed and well-nourished.   HENT: Head: Normocephalic and atraumatic. Eyes: Conjunctivae and lids are normal. Pupils: Equal, round, reactive to light.   Neck: Trachea normal. Neck supple. No JVD present. Lymphatic: No cervical adenopathy  Pulmonary Respiratory effort: No increased work of breathing or signs of respiratory distress. Auscultation of lungs: Clear to auscultation.   Cardiovascular Auscultation of heart: Normal rate and rhythm, normal S1 and S2, no murmurs. Peripheral vascular exam: Normal. Carotid: right 2+, left 2+, no bruit on the right and no bruit on the left. Femoral: right 2+, left 2+, no bruit on the right and no bruit on the left. Posterior tibialis: right 2+ and left 2+. Dorsalis pedis: right 2+ and left 2+. No edema.   Abdomen: The abdomen was soft and nontender. Bowel sounds were normal.   Musculoskeletal   Gait and station: Gait evaluation demonstrated a normal gait.   Cervical spine: Active and passive range of motion are improved. Cervical facet joint loading maneuvers are negative at this time.  Shoulders: The range of motion of the glenohumeral joints is full and without pain. Rotator cuff strength is 5/5.   Palpation of the left third, fourth, " fifth ribs reproduces pain.  Compression of the chest wall on the left side reproduces pain.  Palpation of the sternal chondral joints reproduces pain.   Thoracic spine: Thoracic facet joint loading maneuvers are negative.   Neurological: Patient is alert and oriented to person, place, and time. Speech: speech is normal. Cortical function: Normal mental status.   Cranial nerves: Cranial nerves 2-12 intact.   Reflex Scores: Right brachioradialis 2+, Left brachioradialis trace, Right biceps 2+, Left biceps trace, Right triceps 2+, Left triceps trace   Motor strength: 5/5, except left upper extremity 4/5 biceps/triceps  Motor Tone: normal tone.   Involuntary movements: none.   Spurling sign is positive. Lhermitte sign is negative. Negative long tract signs.   Sensation: No sensory loss. Sensory exam: intact to light touch, intact pain and temperature sensation, intact vibration sensation and normal proprioception.   Coordination: Normal finger to nose and heel to shin. Normal balance and negative. Romberg's sign negative.   Skin and subcutaneous tissue: Skin is warm and intact. No rash noted. No cyanosis.   Psychiatric: Judgment and insight: Normal. Orientation to person, place and time: Normal. Recent and remote memory: Intact. Mood and affect: Normal.     ASSESSMENT:   1. Hx of fusion of cervical spine    2. Neuroforaminal stenosis of spine    3. Degenerative disc disease, cervical    4. Cervical facet arthropathy/cervical spondylosis without myelopathy    5. Type 2 diabetes mellitus with diabetic autonomic neuropathy, without long-term current use of insulin    6. Diabetic autonomic neuropathy associated with type 2 diabetes mellitus    7. Moderate obesity    8. Primary insomnia    9. Other depression       PLAN: I had a lengthy conversation with Ms. CastroErnestina MELISSA Garrido regarding her chronic pain condition and potential therapeutic options. Patient presents with a history of unrelenting chronic chest wall pain, neck  pain and left arm pain. Patient underwent lap anisa in November 2017. One week later, she developed her current pain. Patient is status post ACDF C4-C6 by on 01/20/2016, with resolution of upper extremity symptoms. She underwent ACDF C6-C7 on 01/03/2018, with complete resolution of her previously severe neck and left arm pain. Unfortunately, she continued experiencing severe chest wall pain refractory to medical treatment, and recurrent neck and left arm pain that started one month after her last surgery. Patient failed to obtain sustained pain relief from intercostal nerve blocks. Recent EMG /NCV revealed chronic cervical radiculopathy.  Patient has continued experiencing cervical radiculopathy with correlation as revealed on recent electrodiagnostic studies.  I have reviewed all available patient's medical records as well as previous therapies as referenced above under history of present illness. Patient has failed to obtain relief with conservative measures. Therefore, I have proposed the following plan:  1. Diagnostics:  A. MRI of the cervical spine with and without contrast  B. Creatinine level  2. Interventional pain management measures: I have discussed with the patient, in anticipation of MRI findings, the possibility of cervical epidural steroid injection by interlaminar approach versus transforaminal approach depending on MRI findings  3. Long-term rehabilitation efforts:  A. Start a comprehensive physical therapy program for upper body strengthening, posture correction, neurodynamics, ultrasound, ASTYM, E-STIM, myofascial release, desensitization techniques, cupping and dry needling once her pain is under control  B. Start an exercise program such as yoga, water therapy and daily walks for fitness  C. Referral to Dr. Loera for cognitive behavioral therapy, biofeedback, and psychological clearance for a potential spinal cord stimulator trial   D. Referral to [facility]  E. Start contrast therapy  F. Trial with TENs unit  4.  Pharmacological measures:   A.  Continue gabapentin and Cymbalta, as currently prescribed  B.  Trial with Rheumate  C. Continue lidocaine 10%, prilocaine 2%, capsaicin 0.001%, mannitol 20%, imipramine 3% cream, apply cream to affected areas  5.  The patient has been instructed to contact my office with any questions or difficulties. The patient understands the plan and agrees to proceed accordingly.       Patient Care Team:  Stephanie Tanner MD as PCP - General  Nicolás Pena MD as Consulting Physician (Neurosurgery)  Ivy Do, PhD as Consulting Physician (Psychology)  Blayne Pandya MD as Consulting Physician (Anesthesiology)  Jorge Kraus PA-C as Physician Assistant (Neurosurgery)     No orders of the defined types were placed in this encounter.        Future Appointments  Date Time Provider Department Center   7/9/2018 2:00 PM Stephanie Tanner MD [facility] None         Blayne Pandya MD       EMR Dragon/Transcription disclaimer:  Much of this encounter note is an electronic transcription of spoken language to printed text. Electronic transcription of spoken language may permit erroneous, or at times, nonsensical words or phrases to be inadvertently transcribed. Although I have reviewed the note for such errors, some may still exist.

## 2018-04-26 ENCOUNTER — OFFICE VISIT (OUTPATIENT)
Dept: PAIN MEDICINE | Facility: CLINIC | Age: 48
End: 2018-04-26

## 2018-04-26 ENCOUNTER — APPOINTMENT (OUTPATIENT)
Dept: LAB | Facility: HOSPITAL | Age: 48
End: 2018-04-26

## 2018-04-26 VITALS
HEIGHT: 68 IN | WEIGHT: 246 LBS | TEMPERATURE: 96.7 F | RESPIRATION RATE: 18 BRPM | SYSTOLIC BLOOD PRESSURE: 122 MMHG | DIASTOLIC BLOOD PRESSURE: 84 MMHG | BODY MASS INDEX: 37.28 KG/M2

## 2018-04-26 DIAGNOSIS — M54.12 CERVICAL RADICULOPATHY: ICD-10-CM

## 2018-04-26 DIAGNOSIS — F32.89 OTHER DEPRESSION: ICD-10-CM

## 2018-04-26 DIAGNOSIS — M48.00 NEUROFORAMINAL STENOSIS OF SPINE: ICD-10-CM

## 2018-04-26 DIAGNOSIS — E66.8 MODERATE OBESITY: ICD-10-CM

## 2018-04-26 DIAGNOSIS — E11.43 DIABETIC AUTONOMIC NEUROPATHY ASSOCIATED WITH TYPE 2 DIABETES MELLITUS (HCC): ICD-10-CM

## 2018-04-26 DIAGNOSIS — F51.01 PRIMARY INSOMNIA: ICD-10-CM

## 2018-04-26 DIAGNOSIS — M50.30 DEGENERATIVE DISC DISEASE, CERVICAL: ICD-10-CM

## 2018-04-26 DIAGNOSIS — M47.812 FACET ARTHROPATHY, CERVICAL: ICD-10-CM

## 2018-04-26 DIAGNOSIS — M47.812 ARTHROPATHY OF CERVICAL FACET JOINT: ICD-10-CM

## 2018-04-26 DIAGNOSIS — E11.43 TYPE 2 DIABETES MELLITUS WITH DIABETIC AUTONOMIC NEUROPATHY, WITHOUT LONG-TERM CURRENT USE OF INSULIN (HCC): ICD-10-CM

## 2018-04-26 DIAGNOSIS — Z98.1 HX OF FUSION OF CERVICAL SPINE: ICD-10-CM

## 2018-04-26 DIAGNOSIS — M50.30 DDD (DEGENERATIVE DISC DISEASE), CERVICAL: ICD-10-CM

## 2018-04-26 LAB
CREAT BLD-MCNC: 1 MG/DL (ref 0.6–1.3)
GFR SERPL CREATININE-BSD FRML MDRD: 72 ML/MIN/1.73

## 2018-04-26 PROCEDURE — 99214 OFFICE O/P EST MOD 30 MIN: CPT | Performed by: ANESTHESIOLOGY

## 2018-04-26 PROCEDURE — 36415 COLL VENOUS BLD VENIPUNCTURE: CPT

## 2018-04-26 PROCEDURE — 82565 ASSAY OF CREATININE: CPT | Performed by: ANESTHESIOLOGY

## 2018-04-26 RX ORDER — ME-TETRAHYDROFOLATE/B12/HRB236 1-1-500 MG
CAPSULE ORAL
Qty: 30 CAPSULE | Refills: 5 | Status: SHIPPED | OUTPATIENT
Start: 2018-04-26 | End: 2018-10-15

## 2018-05-07 ENCOUNTER — HOSPITAL ENCOUNTER (OUTPATIENT)
Dept: MRI IMAGING | Facility: HOSPITAL | Age: 48
Discharge: HOME | End: 2018-05-07
Attending: ANESTHESIOLOGY | Admitting: ANESTHESIOLOGY

## 2018-05-07 PROCEDURE — 0 GADOBENATE DIMEGLUMINE 529 MG/ML SOLUTION: Performed by: ANESTHESIOLOGY

## 2018-05-07 PROCEDURE — 72156 MRI NECK SPINE W/O & W/DYE: CPT

## 2018-05-07 PROCEDURE — A9577 INJ MULTIHANCE: HCPCS | Performed by: ANESTHESIOLOGY

## 2018-05-07 RX ADMIN — GADOBENATE DIMEGLUMINE 20 ML: 529 INJECTION, SOLUTION INTRAVENOUS at 15:40

## 2018-05-10 ENCOUNTER — TELEPHONE (OUTPATIENT)
Dept: NEUROSURGERY | Facility: CLINIC | Age: 48
End: 2018-05-10

## 2018-05-14 ENCOUNTER — OFFICE VISIT (OUTPATIENT)
Dept: NEUROSURGERY | Facility: CLINIC | Age: 48
End: 2018-05-14

## 2018-05-14 VITALS
DIASTOLIC BLOOD PRESSURE: 60 MMHG | WEIGHT: 265 LBS | TEMPERATURE: 98.1 F | BODY MASS INDEX: 40.16 KG/M2 | SYSTOLIC BLOOD PRESSURE: 106 MMHG | HEIGHT: 68 IN

## 2018-05-14 DIAGNOSIS — Z98.1 HX OF FUSION OF CERVICAL SPINE: Primary | ICD-10-CM

## 2018-05-14 PROCEDURE — 99213 OFFICE O/P EST LOW 20 MIN: CPT | Performed by: PHYSICIAN ASSISTANT

## 2018-05-14 NOTE — PROGRESS NOTES
Patient: Ernestina Garrido  : 1970    Primary Care Provider: Stephanie Tanner MD      Chief Complaint: Left chest pain    History of Present Illness:       Patient is well-known to the neurosurgical practice for undergoing a C4 to C6 anterior cervical discectomy and fusion for myelomalacia and myelopathy and critical cervical stenosis.  Procedure was on 2016.  Patient then came back in with recalcitrant C6 7 radiculopathy required a revision.  Patient then had anterior plate removed at C4 to 6 and then was changed with a C6 7 ACDF.     Patient has most recently been seen by Dr. Pandya who had a discussion with me about her off hand about the myelomalacia seen oriented we did discuss that this was above the surgical site.  By viewing this MRI.  Patient said myelomalacia is stable with no signs of nerve root or central cord compression.    Unfortunately have very little on the patient at this point, however, she has been approved for spinal cord stimulator and I do not think this is a out idea.  I think that she should proceed with the fullest measures of pain management.  I had a very azalia conversation with the patient about myelopathic patients and their recoveries.  Patient understood only and that we're doing the procedure to ensure that she remained stable and not worse.  I told her the myelopathic patients are typically a little underwhelmed with the outcome of surgery.  Due to the bruise on spinal cord.    Dr. Pena looked at the MRI and stated that everything looked about as it should with no correlation with her symptoms.    Review of Systems   Constitutional: Negative for activity change, appetite change, chills, diaphoresis, fatigue, fever and unexpected weight change.   HENT: Negative for congestion, dental problem, drooling, ear discharge, ear pain, facial swelling, hearing loss, mouth sores, nosebleeds, postnasal drip, rhinorrhea, sinus pressure, sneezing, sore throat, tinnitus, trouble  swallowing and voice change.    Eyes: Negative for photophobia, pain, discharge, redness, itching and visual disturbance.   Respiratory: Negative for apnea, cough, choking, chest tightness, shortness of breath, wheezing and stridor.    Cardiovascular: Negative for chest pain, palpitations and leg swelling.   Gastrointestinal: Negative for abdominal distention, abdominal pain, anal bleeding, blood in stool, constipation, diarrhea, nausea, rectal pain and vomiting.   Endocrine: Negative for cold intolerance, heat intolerance, polydipsia, polyphagia and polyuria.   Genitourinary: Negative for decreased urine volume, difficulty urinating, dysuria, enuresis, flank pain, frequency, genital sores, hematuria and urgency.   Musculoskeletal: Positive for back pain. Negative for arthralgias, gait problem, joint swelling, myalgias, neck pain and neck stiffness.   Skin: Negative for color change, pallor, rash and wound.   Allergic/Immunologic: Negative for environmental allergies, food allergies and immunocompromised state.   Neurological: Negative for dizziness, tremors, seizures, syncope, facial asymmetry, speech difficulty, weakness, light-headedness, numbness and headaches.   Hematological: Negative for adenopathy. Does not bruise/bleed easily.   Psychiatric/Behavioral: Negative for agitation, behavioral problems, confusion, decreased concentration, dysphoric mood, hallucinations, self-injury, sleep disturbance and suicidal ideas. The patient is not nervous/anxious and is not hyperactive.    All other systems reviewed and are negative.      Past Medical History:     Past Medical History:   Diagnosis Date   • Arthralgia of hip    • Cervical facet arthropathy/cervical spondylosis without myelopathy    • Chronic pain syndrome     Description: hx of epidural injections.   • Degenerative disc disease, cervical    • Depression     Description: dx 2012.   • Diabetes mellitus     TYPE 2   • Essential hypertension     Description: dx  .   • Hemorrhoid 2016   • History of colonoscopy 2013    normal per patient   • History of uterine leiomyoma    • History of varicella    • Hypertension    • Hypothyroidism     Description: dx 2005.   • Insomnia 2016   • Low back pain    • Lumbosacral disc disease    • Neck pain    • Obesity    • Osteoarthritis    • Tattoos     HIV neg  per patient   • Tobacco abuse        Family History:     Family History   Problem Relation Age of Onset   • Diabetes Mother    • Hypertension Mother    • Colon cancer Maternal Grandmother    • Diabetes Maternal Grandmother    • Hypertension Maternal Grandmother    • Diabetes Daughter    • Hypothyroidism Daughter    • Diabetes Maternal Uncle    • Hypertension Maternal Uncle    • Hypertension Other    • Breast cancer Neg Hx    • Ovarian cancer Neg Hx        Social History:    reports that she quit smoking about 6 months ago. Her smoking use included Cigarettes. She started smoking about 30 years ago. She has a 14.00 pack-year smoking history. She has never used smokeless tobacco. She reports that she drinks alcohol. She reports that she does not use drugs.   SMOKING STATUS: Nonsmoker    Surgical History:     Past Surgical History:   Procedure Laterality Date   • ABDOMINAL SURGERY      cholecystectomy   • ANTERIOR CERVICAL DISCECTOMY  2016    C4-6 DISC (Dr. Pena)   • ANTERIOR CERVICAL DISCECTOMY W/ FUSION Left 1/3/2018    Procedure: CERVICAL DISCECTOMY ANTERIOR WITH FUSION C6-7;  Surgeon: Nicolás Pena MD;  Location:  [facility];  Service:    • BACK SURGERY      lumbar discectomy   •  SECTION      , ,    • CHOLECYSTECTOMY WITH INTRAOPERATIVE CHOLANGIOGRAM N/A 2017    Procedure: CHOLECYSTECTOMY LAPAROSCOPIC INTRAOPERATIVE CHOLANGIOGRAM;  Surgeon: Clifford Freed MD;  Location:  [facility];  Service:    • HYSTERECTOMY  2015       Allergies:   Glimepiride    Physical Exam:    Vital Signs:/60 (BP Location: Right arm, Patient  "Position: Sitting, Cuff Size: Adult)   Temp 98.1 °F (36.7 °C) (Temporal Artery )   Ht 172.7 cm (68\")   Wt 120 kg (265 lb)   BMI 40.29 kg/m²    BMI: Body mass index is 40.29 kg/m².    GENEREAL:   The patient is in no acute distress, and is able to answer all questions appropriately.  Skin:  The incision is well-healed and well approximated.  No signs of infection, bleeding, or erythema.  Musculoskeletal: The patient’s strength is intact in upper and lower extremities upon direct testing.  Strength is 5 out of 5.  Shoulder abduction is 5 out of 5.  Finger extension is 5 out of 5.  Dorsiflexion and plantarflexion are equal bilaterally @ 5/5. Hip flexion against resistance.  The patient’s gait is normal without antalgia.    Weakness coming from guarding in the left upper extremity.  No Tenea's or clonus noted  Neurologic: The patient is alert and oriented by 3.  Recent memory, language, attention span, and fund of knowledge are all with within normal limits.   Sensation is equal bilaterally with no deficit.    Reflexes are 2+ at the biceps, triceps, and brachioradialis as well as the patellar and Achilles tendon bilaterally.  There is no sign of Sheehan’s, clonus, or long track signs.      Medical Decision Making    Data Review:   MRI of the cervical spine was reviewed and compared with prior to MRIs that she has had.  The myelomalacia behind C4 5 is a stable finding.  Unfortunately, little to offer this patient    Diagnosis:   Cervical myelopathy  Cervical cord contusion at C4 5    Treatment Options:   Patient should pursue a spinal cord stimulator with Dr. Pandya.  Apologize patient that we could not do more.  Unfortunately, we will have to transition her care to more palliative approach.    It has been a pleasure providing neurosurgical care.    Jorge Kraus PA-C      No diagnosis found.  "

## 2018-05-16 ENCOUNTER — APPOINTMENT (OUTPATIENT)
Dept: NEUROLOGY | Facility: HOSPITAL | Age: 48
End: 2018-05-16
Attending: ANESTHESIOLOGY

## 2018-05-22 ENCOUNTER — DOCUMENTATION (OUTPATIENT)
Dept: PHYSICAL THERAPY | Facility: HOSPITAL | Age: 48
End: 2018-05-22

## 2018-05-22 DIAGNOSIS — M54.2 NECK PAIN: Primary | ICD-10-CM

## 2018-05-22 DIAGNOSIS — M54.12 LEFT CERVICAL RADICULOPATHY: ICD-10-CM

## 2018-05-22 NOTE — THERAPY DISCHARGE NOTE
Outpatient Physical Therapy Discharge Summary         Patient Name: Ernestina Garrido  : 1970  MRN: [id number]    Today's Date: 2018    Visit Dx:    ICD-10-CM ICD-9-CM   1. Neck pain M54.2 723.1   2. Left cervical radiculopathy M54.12 723.4           OP PT Discharge Summary  Date of Discharge: 18  Reason for Discharge: other (comment) (Client was treated for 2 visits to minimize neck pain.  She did not return for further treatment.  Prognosis is fair.  She continued with moderate pain.)  Outcomes Achieved: Unable to make functional progress toward goals at this time  Discharge Destination: Home with home program        Clifford Alejandro, PT  2018

## 2018-06-20 RX ORDER — LEVOTHYROXINE SODIUM 0.1 MG/1
TABLET ORAL
Qty: 30 TABLET | Refills: 4 | Status: SHIPPED | OUTPATIENT
Start: 2018-06-20 | End: 2018-10-15 | Stop reason: SDUPTHER

## 2018-08-14 DIAGNOSIS — I10 ESSENTIAL HYPERTENSION: ICD-10-CM

## 2018-08-15 RX ORDER — HYDROCHLOROTHIAZIDE 50 MG/1
TABLET ORAL
Qty: 30 TABLET | Refills: 0 | Status: SHIPPED | OUTPATIENT
Start: 2018-08-15 | End: 2018-10-15 | Stop reason: SDUPTHER

## 2018-08-15 RX ORDER — LISINOPRIL 20 MG/1
TABLET ORAL
Qty: 30 TABLET | Refills: 0 | Status: SHIPPED | OUTPATIENT
Start: 2018-08-15 | End: 2018-10-15 | Stop reason: SDUPTHER

## 2018-09-26 ENCOUNTER — APPOINTMENT (OUTPATIENT)
Dept: GENERAL RADIOLOGY | Facility: HOSPITAL | Age: 48
End: 2018-09-26

## 2018-09-26 ENCOUNTER — TELEPHONE (OUTPATIENT)
Dept: PAIN MEDICINE | Facility: CLINIC | Age: 48
End: 2018-09-26

## 2018-09-26 ENCOUNTER — HOSPITAL ENCOUNTER (EMERGENCY)
Facility: HOSPITAL | Age: 48
Discharge: HOME | End: 2018-09-26
Attending: EMERGENCY MEDICINE | Admitting: EMERGENCY MEDICINE

## 2018-09-26 ENCOUNTER — APPOINTMENT (OUTPATIENT)
Dept: CT IMAGING | Facility: HOSPITAL | Age: 48
End: 2018-09-26

## 2018-09-26 VITALS
SYSTOLIC BLOOD PRESSURE: 138 MMHG | RESPIRATION RATE: 20 BRPM | OXYGEN SATURATION: 95 % | HEIGHT: 68 IN | BODY MASS INDEX: 39.4 KG/M2 | DIASTOLIC BLOOD PRESSURE: 82 MMHG | TEMPERATURE: 98.7 F | WEIGHT: 260 LBS | HEART RATE: 77 BPM

## 2018-09-26 DIAGNOSIS — R07.89 CHEST WALL PAIN: Primary | ICD-10-CM

## 2018-09-26 DIAGNOSIS — G89.29 CHRONIC PAIN OF RIGHT KNEE: ICD-10-CM

## 2018-09-26 DIAGNOSIS — M25.561 CHRONIC PAIN OF RIGHT KNEE: ICD-10-CM

## 2018-09-26 DIAGNOSIS — E87.6 HYPOKALEMIA: ICD-10-CM

## 2018-09-26 LAB
ALBUMIN SERPL-MCNC: 3.94 G/DL (ref 3.2–4.8)
ALBUMIN/GLOB SERPL: 1.5 G/DL (ref 1.5–2.5)
ALP SERPL-CCNC: 76 U/L (ref 25–100)
ALT SERPL W P-5'-P-CCNC: 11 U/L (ref 7–40)
ANION GAP SERPL CALCULATED.3IONS-SCNC: 1 MMOL/L (ref 3–11)
APTT PPP: 29.5 SECONDS (ref 24–31)
AST SERPL-CCNC: 11 U/L (ref 0–33)
BASOPHILS # BLD AUTO: 0.02 10*3/MM3 (ref 0–0.2)
BASOPHILS NFR BLD AUTO: 0.2 % (ref 0–1)
BILIRUB SERPL-MCNC: 0.8 MG/DL (ref 0.3–1.2)
BNP SERPL-MCNC: 92 PG/ML (ref 0–100)
BUN BLD-MCNC: 10 MG/DL (ref 9–23)
BUN/CREAT SERPL: 11.6 (ref 7–25)
CALCIUM SPEC-SCNC: 9.1 MG/DL (ref 8.7–10.4)
CHLORIDE SERPL-SCNC: 106 MMOL/L (ref 99–109)
CO2 SERPL-SCNC: 32 MMOL/L (ref 20–31)
CREAT BLD-MCNC: 0.86 MG/DL (ref 0.6–1.3)
DEPRECATED RDW RBC AUTO: 44.7 FL (ref 37–54)
EOSINOPHIL # BLD AUTO: 0.17 10*3/MM3 (ref 0–0.3)
EOSINOPHIL NFR BLD AUTO: 1.7 % (ref 0–3)
ERYTHROCYTE [DISTWIDTH] IN BLOOD BY AUTOMATED COUNT: 13.1 % (ref 11.3–14.5)
GFR SERPL CREATININE-BSD FRML MDRD: 86 ML/MIN/1.73
GLOBULIN UR ELPH-MCNC: 2.7 GM/DL
GLUCOSE BLD-MCNC: 235 MG/DL (ref 70–100)
HCT VFR BLD AUTO: 40.2 % (ref 34.5–44)
HGB BLD-MCNC: 13.1 G/DL (ref 11.5–15.5)
HOLD SPECIMEN: NORMAL
HOLD SPECIMEN: NORMAL
IMM GRANULOCYTES # BLD: 0.04 10*3/MM3 (ref 0–0.03)
IMM GRANULOCYTES NFR BLD: 0.4 % (ref 0–0.6)
INR PPP: 0.96 (ref 0.91–1.09)
LIPASE SERPL-CCNC: 28 U/L (ref 6–51)
LYMPHOCYTES # BLD AUTO: 3.97 10*3/MM3 (ref 0.6–4.8)
LYMPHOCYTES NFR BLD AUTO: 40.2 % (ref 24–44)
MAGNESIUM SERPL-MCNC: 1.4 MG/DL (ref 1.3–2.7)
MCH RBC QN AUTO: 30.3 PG (ref 27–31)
MCHC RBC AUTO-ENTMCNC: 32.6 G/DL (ref 32–36)
MCV RBC AUTO: 93.1 FL (ref 80–99)
MONOCYTES # BLD AUTO: 0.65 10*3/MM3 (ref 0–1)
MONOCYTES NFR BLD AUTO: 6.6 % (ref 0–12)
NEUTROPHILS # BLD AUTO: 5.03 10*3/MM3 (ref 1.5–8.3)
NEUTROPHILS NFR BLD AUTO: 50.9 % (ref 41–71)
PLATELET # BLD AUTO: 193 10*3/MM3 (ref 150–450)
PMV BLD AUTO: 10.8 FL (ref 6–12)
POTASSIUM BLD-SCNC: 3.3 MMOL/L (ref 3.5–5.5)
PROT SERPL-MCNC: 6.6 G/DL (ref 5.7–8.2)
PROTHROMBIN TIME: 10.1 SECONDS (ref 9.6–11.5)
RBC # BLD AUTO: 4.32 10*6/MM3 (ref 3.89–5.14)
SODIUM BLD-SCNC: 139 MMOL/L (ref 132–146)
TROPONIN I SERPL-MCNC: 0 NG/ML (ref 0–0.07)
TROPONIN I SERPL-MCNC: 0 NG/ML (ref 0–0.07)
WBC NRBC COR # BLD: 9.88 10*3/MM3 (ref 3.5–10.8)
WHOLE BLOOD HOLD SPECIMEN: NORMAL
WHOLE BLOOD HOLD SPECIMEN: NORMAL

## 2018-09-26 PROCEDURE — 96374 THER/PROPH/DIAG INJ IV PUSH: CPT

## 2018-09-26 PROCEDURE — 83880 ASSAY OF NATRIURETIC PEPTIDE: CPT

## 2018-09-26 PROCEDURE — 71275 CT ANGIOGRAPHY CHEST: CPT

## 2018-09-26 PROCEDURE — 85025 COMPLETE CBC W/AUTO DIFF WBC: CPT

## 2018-09-26 PROCEDURE — 85730 THROMBOPLASTIN TIME PARTIAL: CPT | Performed by: EMERGENCY MEDICINE

## 2018-09-26 PROCEDURE — 84484 ASSAY OF TROPONIN QUANT: CPT

## 2018-09-26 PROCEDURE — 93005 ELECTROCARDIOGRAM TRACING: CPT

## 2018-09-26 PROCEDURE — 83735 ASSAY OF MAGNESIUM: CPT | Performed by: EMERGENCY MEDICINE

## 2018-09-26 PROCEDURE — 25010000002 KETOROLAC TROMETHAMINE PER 15 MG: Performed by: EMERGENCY MEDICINE

## 2018-09-26 PROCEDURE — 0 IOPAMIDOL PER 1 ML: Performed by: EMERGENCY MEDICINE

## 2018-09-26 PROCEDURE — 96361 HYDRATE IV INFUSION ADD-ON: CPT

## 2018-09-26 PROCEDURE — 83690 ASSAY OF LIPASE: CPT

## 2018-09-26 PROCEDURE — 85610 PROTHROMBIN TIME: CPT | Performed by: EMERGENCY MEDICINE

## 2018-09-26 PROCEDURE — 99284 EMERGENCY DEPT VISIT MOD MDM: CPT

## 2018-09-26 PROCEDURE — 93005 ELECTROCARDIOGRAM TRACING: CPT | Performed by: EMERGENCY MEDICINE

## 2018-09-26 PROCEDURE — 80053 COMPREHEN METABOLIC PANEL: CPT

## 2018-09-26 PROCEDURE — 71045 X-RAY EXAM CHEST 1 VIEW: CPT

## 2018-09-26 RX ORDER — KETOROLAC TROMETHAMINE 15 MG/ML
10 INJECTION, SOLUTION INTRAMUSCULAR; INTRAVENOUS ONCE
Status: COMPLETED | OUTPATIENT
Start: 2018-09-26 | End: 2018-09-26

## 2018-09-26 RX ORDER — SODIUM CHLORIDE 9 MG/ML
125 INJECTION, SOLUTION INTRAVENOUS CONTINUOUS
Status: DISCONTINUED | OUTPATIENT
Start: 2018-09-26 | End: 2018-09-27 | Stop reason: HOSPADM

## 2018-09-26 RX ORDER — SODIUM CHLORIDE 0.9 % (FLUSH) 0.9 %
10 SYRINGE (ML) INJECTION AS NEEDED
Status: DISCONTINUED | OUTPATIENT
Start: 2018-09-26 | End: 2018-09-27 | Stop reason: HOSPADM

## 2018-09-26 RX ORDER — ETODOLAC 200 MG/1
200 CAPSULE ORAL EVERY 8 HOURS PRN
Qty: 30 CAPSULE | Refills: 0 | Status: SHIPPED | OUTPATIENT
Start: 2018-09-26 | End: 2018-10-15 | Stop reason: SDUPTHER

## 2018-09-26 RX ORDER — POTASSIUM CHLORIDE 750 MG/1
20 CAPSULE, EXTENDED RELEASE ORAL ONCE
Status: COMPLETED | OUTPATIENT
Start: 2018-09-26 | End: 2018-09-26

## 2018-09-26 RX ORDER — ASPIRIN 81 MG/1
324 TABLET, CHEWABLE ORAL ONCE
Status: COMPLETED | OUTPATIENT
Start: 2018-09-26 | End: 2018-09-26

## 2018-09-26 RX ADMIN — IOPAMIDOL 95 ML: 755 INJECTION, SOLUTION INTRAVENOUS at 20:00

## 2018-09-26 RX ADMIN — ASPIRIN 81 MG 324 MG: 81 TABLET ORAL at 18:27

## 2018-09-26 RX ADMIN — SODIUM CHLORIDE 1000 ML: 9 INJECTION, SOLUTION INTRAVENOUS at 18:43

## 2018-09-26 RX ADMIN — POTASSIUM CHLORIDE 20 MEQ: 750 CAPSULE, EXTENDED RELEASE ORAL at 18:28

## 2018-09-26 RX ADMIN — KETOROLAC TROMETHAMINE 10 MG: 15 INJECTION, SOLUTION INTRAMUSCULAR; INTRAVENOUS at 18:42

## 2018-09-26 RX ADMIN — SODIUM CHLORIDE 125 ML/HR: 9 INJECTION, SOLUTION INTRAVENOUS at 18:43

## 2018-09-26 NOTE — ED PROVIDER NOTES
Subjective   Ernestina Garrido is a 48 y.o. female who presents to the ED with c/o chest pain. The patient states that although she has suffered from chronic chest pain for an extensive amount of time, her symptoms have been significantly improved over the course of the past 6 months following a nerve block. However, within the past few weeks the patient reports that her discomfort has been gradually returning back to its severe state, thus prompting her visit to the ED today. She currently localizes her pain to her left sided-chest, noting that it radiates heavily into her LUE and left-sided neck. She describes the discomfort as a severe aching sensation which is exacerbated greatly with any deep inspiration, palpation of the chest wall, or ROM of the LUE. (Noted that her chronic pain has always been pleuritic in nature.)     The patient denies nausea, vomiting, diarrhea, urinary symptoms, or any other acute complaints at this time. She does mention being on 600 mg Gabapentin daily but is not currently utilizing any anti-inflammatories. Additionally the patient denies PMHx of PE/DVT, cardiac issues, or respiratory conditions.     For control of her pain the patient was consulting with a specialist who referred her to the ED today after a phone call regarding her presenting symptoms. She states that she does not have an appointment scheduled with this physician yet as her symptoms had been well-controlled until just recently.         History provided by:  Patient  Chest Pain   Pain location:  L chest  Pain quality: aching    Pain radiates to:  Neck, L shoulder and L arm  Pain severity:  Severe  Onset quality:  Gradual  Timing:  Constant  Progression:  Worsening  Chronicity:  Chronic  Exacerbated by: Palpation, deep inspiration, ROM of LUE.  Associated symptoms: no nausea and no vomiting    Risk factors: not male        Review of Systems   Cardiovascular: Positive for chest pain.   Gastrointestinal: Negative for  diarrhea, nausea and vomiting.   Genitourinary: Negative for dysuria, frequency, hematuria and urgency.   Musculoskeletal: Positive for myalgias (LUE pain, radiating from chest) and neck pain (Left-sided, radiating from chest).   All other systems reviewed and are negative.      Past Medical History:   Diagnosis Date   • Arthralgia of hip    • Cervical facet arthropathy/cervical spondylosis without myelopathy    • Chronic pain syndrome     Description: hx of epidural injections.   • Degenerative disc disease, cervical    • Depression     Description: dx .   • Diabetes mellitus (CMS/HCC)     TYPE 2   • Essential hypertension     Description: dx .   • Hemorrhoid 2016   • History of colonoscopy 2013    normal per patient   • History of uterine leiomyoma    • History of varicella    • Hypertension    • Hypothyroidism     Description: dx .   • Insomnia 2016   • Low back pain    • Lumbosacral disc disease    • Neck pain    • Obesity    • Osteoarthritis    • Tattoos     HIV neg  per patient   • Tobacco abuse        Allergies   Allergen Reactions   • Glimepiride Diarrhea       Past Surgical History:   Procedure Laterality Date   • ABDOMINAL SURGERY      cholecystectomy   • ANTERIOR CERVICAL DISCECTOMY  2016    C4-6 DISC (Dr. Pena)   • ANTERIOR CERVICAL DISCECTOMY W/ FUSION Left 1/3/2018    Procedure: CERVICAL DISCECTOMY ANTERIOR WITH FUSION C6-7;  Surgeon: Nicolás Pena MD;  Location:  [facility];  Service:    • BACK SURGERY      lumbar discectomy   •  SECTION      , ,    • CHOLECYSTECTOMY WITH INTRAOPERATIVE CHOLANGIOGRAM N/A 2017    Procedure: CHOLECYSTECTOMY LAPAROSCOPIC INTRAOPERATIVE CHOLANGIOGRAM;  Surgeon: Clifford Freed MD;  Location:  [facility];  Service:    • HYSTERECTOMY  2015       Family History   Problem Relation Age of Onset   • Diabetes Mother    • Hypertension Mother    • Colon cancer Maternal Grandmother    • Diabetes Maternal Grandmother     • Hypertension Maternal Grandmother    • Diabetes Daughter    • Hypothyroidism Daughter    • Diabetes Maternal Uncle    • Hypertension Maternal Uncle    • Hypertension Other    • Breast cancer Neg Hx    • Ovarian cancer Neg Hx        Social History     Social History   • Marital status:      Social History Main Topics   • Smoking status: Former Smoker     Packs/day: 0.50     Years: 28.00     Types: Cigarettes     Start date: 1988     Quit date: 11/6/2017   • Smokeless tobacco: Never Used   • Alcohol use Yes      Comment: occasional 1 a week   • Drug use: No   • Sexual activity: Defer     Other Topics Concern   • Not on file         Objective   Physical Exam   Constitutional: She is oriented to person, place, and time. She appears well-developed and well-nourished. No distress.   HENT:   Head: Normocephalic and atraumatic.   Right Ear: External ear normal.   Left Ear: External ear normal.   Nose: Nose normal.   Eyes: Pupils are equal, round, and reactive to light. Conjunctivae and EOM are normal. No scleral icterus.   Neck: Normal range of motion. Neck supple.   Cardiovascular: Normal rate, regular rhythm and normal heart sounds.    No murmur heard.  Pulmonary/Chest: Effort normal and breath sounds normal. No respiratory distress. She exhibits tenderness (Exquisite left anterior chest wall tenderness to palpation).   Abdominal: Soft. Bowel sounds are normal. She exhibits no distension and no mass. There is no tenderness. There is no rebound and no guarding.   Musculoskeletal: Normal range of motion. She exhibits tenderness (Left trapezius TTP with spasm). She exhibits no edema or deformity.   Extremities with no C,C, or E's.    Neurological: She is alert and oriented to person, place, and time.   Skin: Skin is warm and dry.   Psychiatric: She has a normal mood and affect. Her behavior is normal.   Nursing note and vitals reviewed.      Procedures         ED Course  ED Course as of Sep 26 2138   Wed Sep 26,  2018 2041 Patient has had moderate relief after the Toradol.  Initial evaluation is unremarkable.  CTA has been done and is pending.  Patient is hydrated in view of the IV contrast for her CTA.  I discussed the findings to date and updated the patient with the plan of care.  Patient agrees with the current plan, awaiting CTA results.  [HH]   2131 Patient is again reevaluated and reexamined with johnson Napoles, as chaperone.  She continues have significant chest wall tenderness in the left parasternal and left anterior chest wall area exactly reproducing her symptoms.  EKG and troponin are negative.  She has no history of coronary disease.  CTA is negative for PE.  She is treated with oral potassium for repletion.I discussed findings at length with the patient.  She continues to feel mildly improved after the Toradol.  I discussed treatment with anti-inflammatory medications and she has had one anti-inflammatory that has worked for her.I discussed follow-up with pain management.  I'll have her seen by Dr. Pena again for reassessment though his last note indicated there were no more surgical procedures that would be of benefit to her at that time which was recent.  I'll have her follow-up with her PCP to coordinate her careDiscussed indications for immediate returnVery pleasant and reliable patient verbalizes understanding and agreement with the plan of care, need for follow-up, and indications for immediate return.  [HH]      ED Course User Index  [HH] Rubén Laughlin MD       Recent Results (from the past 24 hour(s))   Comprehensive Metabolic Panel    Collection Time: 09/26/18  4:05 PM   Result Value Ref Range    Glucose 235 (H) 70 - 100 mg/dL    BUN 10 9 - 23 mg/dL    Creatinine 0.86 0.60 - 1.30 mg/dL    Sodium 139 132 - 146 mmol/L    Potassium 3.3 (L) 3.5 - 5.5 mmol/L    Chloride 106 99 - 109 mmol/L    CO2 32.0 (H) 20.0 - 31.0 mmol/L    Calcium 9.1 8.7 - 10.4 mg/dL    Total Protein 6.6 5.7 - 8.2 g/dL    Albumin  3.94 3.20 - 4.80 g/dL    ALT (SGPT) 11 7 - 40 U/L    AST (SGOT) 11 0 - 33 U/L    Alkaline Phosphatase 76 25 - 100 U/L    Total Bilirubin 0.8 0.3 - 1.2 mg/dL    eGFR  African Amer 86 >60 mL/min/1.73    Globulin 2.7 gm/dL    A/G Ratio 1.5 1.5 - 2.5 g/dL    BUN/Creatinine Ratio 11.6 7.0 - 25.0    Anion Gap 1.0 (L) 3.0 - 11.0 mmol/L   Lipase    Collection Time: 09/26/18  4:05 PM   Result Value Ref Range    Lipase 28 6 - 51 U/L   BNP    Collection Time: 09/26/18  4:05 PM   Result Value Ref Range    BNP 92.0 0.0 - 100.0 pg/mL   Light Blue Top    Collection Time: 09/26/18  4:05 PM   Result Value Ref Range    Extra Tube hold for add-on    Green Top (Gel)    Collection Time: 09/26/18  4:05 PM   Result Value Ref Range    Extra Tube Hold for add-ons.    Lavender Top    Collection Time: 09/26/18  4:05 PM   Result Value Ref Range    Extra Tube hold for add-on    Gold Top - SST    Collection Time: 09/26/18  4:05 PM   Result Value Ref Range    Extra Tube Hold for add-ons.    CBC Auto Differential    Collection Time: 09/26/18  4:05 PM   Result Value Ref Range    WBC 9.88 3.50 - 10.80 10*3/mm3    RBC 4.32 3.89 - 5.14 10*6/mm3    Hemoglobin 13.1 11.5 - 15.5 g/dL    Hematocrit 40.2 34.5 - 44.0 %    MCV 93.1 80.0 - 99.0 fL    MCH 30.3 27.0 - 31.0 pg    MCHC 32.6 32.0 - 36.0 g/dL    RDW 13.1 11.3 - 14.5 %    RDW-SD 44.7 37.0 - 54.0 fl    MPV 10.8 6.0 - 12.0 fL    Platelets 193 150 - 450 10*3/mm3    Neutrophil % 50.9 41.0 - 71.0 %    Lymphocyte % 40.2 24.0 - 44.0 %    Monocyte % 6.6 0.0 - 12.0 %    Eosinophil % 1.7 0.0 - 3.0 %    Basophil % 0.2 0.0 - 1.0 %    Immature Grans % 0.4 0.0 - 0.6 %    Neutrophils, Absolute 5.03 1.50 - 8.30 10*3/mm3    Lymphocytes, Absolute 3.97 0.60 - 4.80 10*3/mm3    Monocytes, Absolute 0.65 0.00 - 1.00 10*3/mm3    Eosinophils, Absolute 0.17 0.00 - 0.30 10*3/mm3    Basophils, Absolute 0.02 0.00 - 0.20 10*3/mm3    Immature Grans, Absolute 0.04 (H) 0.00 - 0.03 10*3/mm3   Protime-INR    Collection Time: 09/26/18   4:05 PM   Result Value Ref Range    Protime 10.1 9.6 - 11.5 Seconds    INR 0.96 0.91 - 1.09   aPTT    Collection Time: 09/26/18  4:05 PM   Result Value Ref Range    PTT 29.5 24.0 - 31.0 seconds   Magnesium    Collection Time: 09/26/18  4:05 PM   Result Value Ref Range    Magnesium 1.4 1.3 - 2.7 mg/dL   POC Troponin, Rapid    Collection Time: 09/26/18  4:14 PM   Result Value Ref Range    Troponin I 0.00 0.00 - 0.07 ng/mL   POC Troponin, Rapid    Collection Time: 09/26/18  6:47 PM   Result Value Ref Range    Troponin I 0.00 0.00 - 0.07 ng/mL     Note: In addition to lab results from this visit, the labs listed above may include labs taken at another facility or during a different encounter within the last 24 hours. Please correlate lab times with ED admission and discharge times for further clarification of the services performed during this visit.    CT Angiogram Chest With Contrast   Final Result      1.  No pulmonary embolus.      2.  Incidental/non-acute findings are described above.      THIS DOCUMENT HAS BEEN ELECTRONICALLY SIGNED BY ELLIOTT MIN MD      XR Chest 1 View   Final Result   Normal single view frontal chest.       DICTATED:   9/26/2018   EDITED/ls :   9/26/2018        This report was finalized on 9/26/2018 6:15 PM by Roshan Mendoza.            Vitals:    09/26/18 1830 09/26/18 1900 09/26/18 1918 09/26/18 2030   BP: (!) 173/110 138/82     BP Location:       Patient Position:       Pulse: 80 61  77   Resp:   20    Temp:       TempSrc:       SpO2: 100% 100%  95%   Weight:       Height:         Medications   sodium chloride 0.9 % flush 10 mL (not administered)   sodium chloride 0.9 % infusion (125 mL/hr Intravenous New Bag 9/26/18 1843)   aspirin chewable tablet 324 mg (324 mg Oral Given 9/26/18 1827)   potassium chloride (MICRO-K) CR capsule 20 mEq (20 mEq Oral Given 9/26/18 1828)   ketorolac (TORADOL) injection 10 mg (10 mg Intravenous Given 9/26/18 1842)   sodium chloride 0.9 % bolus 1,000 mL (0 mL  Intravenous Stopped 9/26/18 2032)   iopamidol (ISOVUE-370) 76 % injection 100 mL (95 mL Intravenous Given 9/26/18 2000)     ECG/EMG Results (last 24 hours)     Procedure Component Value Units Date/Time    ECG 12 Lead [[id number]] Collected:  09/26/18 1543     Updated:  09/26/18 1738                      MDM    Final diagnoses:   Chest wall pain   Hypokalemia       Documentation assistance provided by melodie Payne.  Information recorded by the scribveronica was done at my direction and has been verified and validated by me.     Dieter Payne  09/26/18 1732       Dieter Payne  09/26/18 1826       Rubén Laughlin MD  09/26/18 2138       Rubén Laughlin MD  09/26/18 2138

## 2018-09-26 NOTE — TELEPHONE ENCOUNTER
Notified patient and she said she was going to the ER      ER or she can go back to rheumatology at [facility]. I saw one note from  where they ordered labs    Previous Messages      ----- Message -----   From: Ilsa Landaverde RegSched Rep   Sent: 9/26/2018  12:53 PM   To: Blayne Pandya MD     So this lady called back today. She says she is having the same problems as before but it has gotten worse. She wasn't sure whether she should go to ER or try to see you. Current pain is in her chest and radiates to her left shoulder. She was last seen 4/26/18 for OV. Current pain level is 9   Deep breaths and bending down make the pain worse and laying still makes it better   Reports numbness/tingling in her fingers of her left hand     ----- Message -----   From: Lisa Dumotn RN   Sent: 9/26/2018  12:52 PM   To: Paresh Daily         ----- Message -----   From: Blayne Pandya MD   Sent: 5/30/2018   2:18 PM   To: Alexandrea Urias MA, Carmita Sandhu, *     Can somebody please compile a tel note with all the staff messages?   When we schedule her, please end of PM   ----- Message -----   From: Carmita Sandhu   Sent: 5/30/2018   1:28 PM   To: Blayne Pandya MD     Just spoke with patient, she can not come in tomorrow. Will call us back to schedule.   ----- Message -----   From: Alexandrea Urias MA   Sent: 5/30/2018   6:29 AM   To: Carmita Sandhu     See below.     ----- Message -----   From: Blayne Pandya MD   Sent: 5/29/2018   5:21 PM   To: Alexandrea Urias MA, *     SHE CAN COME IN ON Thursday AT 11:50 FOR QUICK OV   ----- Message -----   From: Alexandrea Urias MA   Sent: 5/29/2018   6:58 AM   To: Blayne Pandya MD, *     She has Passport. She needs an office visit before scheduling a procedure.     ----- Message -----   From: Blayne Pandya MD   Sent: 5/24/2018   4:49 PM   To: Alexandrea Urias MA, *     We could go for BETHANIE first     ----- Message -----   From: Lisa  NILES Dumont   Sent: 5/24/2018  11:25 AM   To: Blayne Pandya MD     Have you talked to Dr. Palumbo about this lady or do you just want me to see if she is interested in a BETHANIE and if she is on blood thinners ?     ----- Message -----   From: Blayne Pandya MD   Sent: 5/14/2018   4:22 PM   To: Alexandrea Urias MA, *     Not sure about a SCS trial due to stenosis. I'd have to talk to Dr. 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   ----- Message -----   From: Lisa Dumont RN   Sent: 5/14/2018   4:13 PM   To: MD Jorge Whitfield saw this patient today. His note is complete. He does not think she is surgical.     Medical Decision Making       Data Review:   MRI of the cervical spine was reviewed and compared with prior to MRIs that she has had.  The myelomalacia behind C4 5 is a stable finding.  Unfortunately, little to offer this patient       Diagnosis:   Cervical myelopathy   Cervical cord contusion at C4 5       Treatment Options:   Patient should pursue a spinal cord stimulator with Dr. Pandya.  Apologize patient that we could not do more.  Unfortunately, we will have to transition her care to more palliative approach.       It has been a pleasure providing neurosurgical care.       Jorge Kraus PA-C

## 2018-09-27 NOTE — DISCHARGE INSTRUCTIONS
Follow up with Dr. Pandya for further pain management.  Call tomorrow morning for prompt reevaluation in view of your resurgence of your chest wall pain.    Follow-up with Dr. Pena for repeat neurosurgery evaluation.     Finally, follow-up with Dr. Tanner within the week for re-check and coordination of care. Call tomorrow morning for appointment.     Take Lodine as prescribed.  This was the anti-inflammatory medication that you felt has helped her in the past.  Continue your other medications as previously prescribed    Have Dr. Tanner recheck your potassium in the office on reevaluation please    Immediately return to the ED for worsening symptoms, including any acute, urgent, emergent, or progressive symptoms or sudden change of symptoms as discussed.

## 2018-10-15 ENCOUNTER — OFFICE VISIT (OUTPATIENT)
Dept: INTERNAL MEDICINE | Facility: CLINIC | Age: 48
End: 2018-10-15

## 2018-10-15 VITALS
RESPIRATION RATE: 20 BRPM | BODY MASS INDEX: 38.66 KG/M2 | HEART RATE: 76 BPM | SYSTOLIC BLOOD PRESSURE: 118 MMHG | DIASTOLIC BLOOD PRESSURE: 80 MMHG | WEIGHT: 254.25 LBS | TEMPERATURE: 97 F

## 2018-10-15 DIAGNOSIS — N76.0 ACUTE VAGINITIS: Primary | ICD-10-CM

## 2018-10-15 DIAGNOSIS — R82.998 LEUKOCYTES IN URINE: ICD-10-CM

## 2018-10-15 DIAGNOSIS — G89.4 CHRONIC PAIN SYNDROME: ICD-10-CM

## 2018-10-15 DIAGNOSIS — E03.9 ACQUIRED HYPOTHYROIDISM: ICD-10-CM

## 2018-10-15 DIAGNOSIS — M25.561 CHRONIC PAIN OF RIGHT KNEE: ICD-10-CM

## 2018-10-15 DIAGNOSIS — E11.43 TYPE 2 DIABETES MELLITUS WITH DIABETIC AUTONOMIC NEUROPATHY, WITHOUT LONG-TERM CURRENT USE OF INSULIN (HCC): ICD-10-CM

## 2018-10-15 DIAGNOSIS — G89.29 CHRONIC PAIN OF RIGHT KNEE: ICD-10-CM

## 2018-10-15 DIAGNOSIS — I10 ESSENTIAL HYPERTENSION: ICD-10-CM

## 2018-10-15 DIAGNOSIS — K21.9 GASTROESOPHAGEAL REFLUX DISEASE WITHOUT ESOPHAGITIS: ICD-10-CM

## 2018-10-15 LAB
BILIRUB BLD-MCNC: NEGATIVE MG/DL
CLARITY, POC: ABNORMAL
COLOR UR: YELLOW
EXPIRATION DATE: ABNORMAL
GLUCOSE UR STRIP-MCNC: ABNORMAL MG/DL
KETONES UR QL: NEGATIVE
LEUKOCYTE EST, POC: ABNORMAL
Lab: ABNORMAL
NITRITE UR-MCNC: NEGATIVE MG/ML
PH UR: 5 [PH] (ref 5–8)
PROT UR STRIP-MCNC: NEGATIVE MG/DL
RBC # UR STRIP: NEGATIVE /UL
SP GR UR: 1.01 (ref 1–1.03)
UROBILINOGEN UR QL: NORMAL

## 2018-10-15 PROCEDURE — 99214 OFFICE O/P EST MOD 30 MIN: CPT | Performed by: INTERNAL MEDICINE

## 2018-10-15 PROCEDURE — 87798 DETECT AGENT NOS DNA AMP: CPT | Performed by: INTERNAL MEDICINE

## 2018-10-15 PROCEDURE — 87591 N.GONORRHOEAE DNA AMP PROB: CPT | Performed by: INTERNAL MEDICINE

## 2018-10-15 PROCEDURE — 87801 DETECT AGNT MULT DNA AMPLI: CPT | Performed by: INTERNAL MEDICINE

## 2018-10-15 PROCEDURE — 87086 URINE CULTURE/COLONY COUNT: CPT | Performed by: INTERNAL MEDICINE

## 2018-10-15 PROCEDURE — 87661 TRICHOMONAS VAGINALIS AMPLIF: CPT | Performed by: INTERNAL MEDICINE

## 2018-10-15 PROCEDURE — 87491 CHLMYD TRACH DNA AMP PROBE: CPT | Performed by: INTERNAL MEDICINE

## 2018-10-15 RX ORDER — FLUCONAZOLE 150 MG/1
150 TABLET ORAL ONCE
Qty: 2 TABLET | Refills: 0 | Status: SHIPPED | OUTPATIENT
Start: 2018-10-15 | End: 2018-10-15

## 2018-10-15 RX ORDER — OMEPRAZOLE 40 MG/1
40 CAPSULE, DELAYED RELEASE ORAL DAILY
Qty: 30 CAPSULE | Refills: 5 | Status: SHIPPED | OUTPATIENT
Start: 2018-10-15 | End: 2021-06-08 | Stop reason: ALTCHOICE

## 2018-10-15 RX ORDER — LEVOTHYROXINE SODIUM 0.1 MG/1
100 TABLET ORAL DAILY
Qty: 30 TABLET | Refills: 5 | Status: SHIPPED | OUTPATIENT
Start: 2018-10-15 | End: 2019-11-15 | Stop reason: SDUPTHER

## 2018-10-15 RX ORDER — ETODOLAC 200 MG/1
200 CAPSULE ORAL EVERY 8 HOURS PRN
Qty: 30 CAPSULE | Refills: 0 | Status: SHIPPED | OUTPATIENT
Start: 2018-10-15 | End: 2018-12-26 | Stop reason: SDUPTHER

## 2018-10-15 RX ORDER — HYDROCHLOROTHIAZIDE 50 MG/1
50 TABLET ORAL DAILY
Qty: 30 TABLET | Refills: 5 | Status: SHIPPED | OUTPATIENT
Start: 2018-10-15 | End: 2019-07-29 | Stop reason: SDUPTHER

## 2018-10-15 RX ORDER — PIOGLITAZONEHYDROCHLORIDE 30 MG/1
30 TABLET ORAL DAILY
Qty: 30 TABLET | Refills: 5 | Status: SHIPPED | OUTPATIENT
Start: 2018-10-15 | End: 2019-03-18 | Stop reason: SDUPTHER

## 2018-10-15 RX ORDER — LISINOPRIL 20 MG/1
20 TABLET ORAL
Qty: 30 TABLET | Refills: 5 | Status: SHIPPED | OUTPATIENT
Start: 2018-10-15 | End: 2019-07-29 | Stop reason: SDUPTHER

## 2018-10-15 RX ORDER — GABAPENTIN 600 MG/1
600 TABLET ORAL 3 TIMES DAILY
Qty: 90 TABLET | Refills: 3 | Status: SHIPPED | OUTPATIENT
Start: 2018-10-15 | End: 2019-05-02 | Stop reason: SDUPTHER

## 2018-10-15 NOTE — PROGRESS NOTES
Subjective       Ernestina Garrido is a 48 y.o. female.     Chief Complaint   Patient presents with   • Vaginal Pain     x 2 weeks        History obtained from the patient.    The patient is here for a sick visit, but is due for a follow up appointment.  Med refills done per her request,  with the patient's agreement to schedule a follow up appointment fasting.      Vaginal Pain   The patient's primary symptoms include genital lesions (boils) and vaginal discharge. The patient's pertinent negatives include no genital itching, genital odor, genital rash or pelvic pain. This is a new problem. Episode onset: 2 weeks ago. The problem occurs constantly. The problem has been unchanged. The pain is moderate. She is not pregnant. Associated symptoms include dysuria. Pertinent negatives include no abdominal pain, back pain, chills, constipation, diarrhea, discolored urine, fever, flank pain, frequency, headaches, hematuria, joint pain, joint swelling, nausea, rash, sore throat, urgency or vomiting. The vaginal discharge was white and thick. There has been no bleeding. The symptoms are aggravated by tactile pressure (and sitting). She has tried nothing for the symptoms. She is not sexually active (last sexual activity 2 years ago). She is postmenopausal (DEYSI). Her past medical history is significant for an abdominal surgery (cholecystectomy) and a  section. There is no history of ovarian cysts, PID or an STD.        The following portions of the patient's history were reviewed and updated as appropriate: allergies, current medications, past family history, past medical history, past social history, past surgical history and problem list.      Review of Systems   Constitutional: Negative for chills, fever and unexpected weight change.   HENT: Negative for sore throat.    Respiratory: Negative for shortness of breath.    Cardiovascular: Negative for chest pain.   Gastrointestinal: Negative for abdominal pain, blood in  stool, constipation, diarrhea, nausea and vomiting.        Denies melena.   Genitourinary: Positive for dysuria, vaginal discharge and vaginal pain. Negative for flank pain, frequency, hematuria, pelvic pain, urgency and vaginal bleeding.   Musculoskeletal: Negative for arthralgias, back pain, joint pain, joint swelling and myalgias.   Skin: Negative for rash.   Neurological: Negative for headaches.   Hematological: Negative for adenopathy.           Objective     Blood pressure 118/80, pulse 76, temperature 97 °F (36.1 °C), temperature source Temporal Artery , resp. rate 20, weight 115 kg (254 lb 4 oz), not currently breastfeeding.    Physical Exam   Constitutional:   Obese, exam difficult.   Cardiovascular: Normal rate, regular rhythm and normal heart sounds.    No murmur heard.  Pulmonary/Chest: Effort normal and breath sounds normal.   Abdominal: Soft. Bowel sounds are normal. She exhibits no distension and no mass. There is no hepatosplenomegaly. There is no tenderness. There is no CVA tenderness.   Genitourinary: There is no rash or lesion on the right labia. There is no rash or lesion on the left labia. Right adnexum displays tenderness. Right adnexum displays no mass. Left adnexum displays tenderness. Left adnexum displays no mass. There is erythema in the vagina. Vaginal discharge (white, thin) found.   Genitourinary Comments: Uterus, including cervix, absent.   Lymphadenopathy:        Right: No inguinal adenopathy present.        Left: No inguinal adenopathy present.   Neurological: She is alert.   Skin: No rash noted.   Psychiatric: She has a normal mood and affect.   Nursing note and vitals reviewed.    Results for orders placed or performed in visit on 10/15/18   POC Urinalysis Dipstick, Automated   Result Value Ref Range    Color Yellow Yellow, Straw, Dark Yellow, [place]    Clarity, UA Hazy (A) Clear    Specific Gravity  1.015 1.005 - 1.030    pH, Urine 5.0 5.0 - 8.0    Leukocytes 75 Elina/ul (A) Negative     Nitrite, UA Negative Negative    Protein, POC Negative Negative mg/dL    Glucose, UA >=1000 mg/dL (3+) (A) Negative, 1000 mg/dL (3+) mg/dL    Ketones, UA Negative Negative    Urobilinogen, UA Normal Normal    Bilirubin Negative Negative    Blood, UA Negative Negative    Lot Number [id number]     Expiration Date 11-30-18          Assessment/Plan   Ernestina was seen today for vaginal pain.    Diagnoses and all orders for this visit:    Acute vaginitis  -     POC Urinalysis Dipstick, Automated  -     Urine Culture - Urine, Urine, Clean Catch  -     fluconazole (DIFLUCAN) 150 MG tablet; Take 1 tablet by mouth 1 (One) Time for 1 dose. May repeat after 4 days.  -     NuSwab VG+ - Swab, Vagina    Leukocytes in urine  -     Urine Culture - Urine, Urine, Clean Catch    Type 2 diabetes mellitus with diabetic autonomic neuropathy, without long-term current use of insulin (CMS/[facility])  -     pioglitazone (ACTOS) 30 MG tablet; Take 1 tablet by mouth Daily.  -     sitaGLIPtin-metFORMIN (JANUMET)  MG per tablet; Take 1 tablet by mouth 2 (Two) Times a Day With Meals.    Essential hypertension  -     lisinopril (PRINIVIL,ZESTRIL) 20 MG tablet; Take 1 tablet by mouth every night at bedtime.  -     hydrochlorothiazide (HYDRODIURIL) 50 MG tablet; Take 1 tablet by mouth Daily.    Chronic pain syndrome  -     gabapentin (NEURONTIN) 600 MG tablet; Take 1 tablet by mouth 3 (Three) Times a Day.    Acquired hypothyroidism  -     levothyroxine (SYNTHROID, LEVOTHROID) 100 MCG tablet; Take 1 tablet by mouth Daily.    Gastroesophageal reflux disease without esophagitis  -     omeprazole (priLOSEC) 40 MG capsule; Take 1 capsule by mouth Daily.    Chronic pain of right knee  -     etodolac (LODINE) 200 MG capsule; Take 1 capsule by mouth Every 8 (Eight) Hours As Needed (pain).            Return in about 2 weeks (around 10/29/2018) for Recheck Diabetes, fasting.

## 2018-10-17 LAB — BACTERIA SPEC AEROBE CULT: NORMAL

## 2018-10-20 LAB
A VAGINAE DNA VAG QL NAA+PROBE: ABNORMAL SCORE
BVAB2 DNA VAG QL NAA+PROBE: ABNORMAL SCORE
C ALBICANS DNA VAG QL NAA+PROBE: NEGATIVE
C GLABRATA DNA VAG QL NAA+PROBE: POSITIVE
C TRACH RRNA SPEC DONR QL NAA+PROBE: NEGATIVE
MEGASPHAERA 1: ABNORMAL SCORE
N GONORRHOEA DNA SPEC QL NAA+PROBE: NEGATIVE
T VAGINALIS RRNA GENITAL QL PROBE: POSITIVE

## 2018-10-22 RX ORDER — METRONIDAZOLE 500 MG/1
500 TABLET ORAL 2 TIMES DAILY
Qty: 14 TABLET | Refills: 0 | Status: SHIPPED | OUTPATIENT
Start: 2018-10-22 | End: 2019-01-10

## 2018-10-22 RX ORDER — FLUCONAZOLE 150 MG/1
TABLET ORAL
Qty: 2 TABLET | Refills: 0 | Status: SHIPPED | OUTPATIENT
Start: 2018-10-22 | End: 2019-01-10

## 2018-12-03 NOTE — PROGRESS NOTES
"Chief Complaint: \"I have ain in my neck, left arm, and the left side of my upper chest and back.\"    History of Present Illness: Ms. Ernestina Garrido, 48 y.o. female, with a 12-month history of anterior chest wall pain, which began without incident.  Patient underwent left 3rd, 4th, and 5th intercostal nerve blocks on 02/28/2018 and experienced no relief.  She was seen for neurosurgical consultation by Jorge Kraus PA-C on 05/14/2018, and was referred back to this practice for a trial of spinal cord stimulation.  Patient has already received psychological clearance for the procedure.   Current pain is in the front of her chest on top of her breast, between her shoulder blades, also in her armpit area traveling down her left arm. She also reports tingling and numbness in her left hand, particularly in her thumb, 2nd and 3rd finger.   Pain description: constant pain, described as sharp, shooting and stabbing sensation.   Radiation of pain: The pain is localized in the center of her chest and radiates along the 3rd, 4th and 5th ribs into her thoracic region and her left arm.   Pain intensity today: 8/10  Average pain intensity last week: 6/10  Pain intensity ranges from: 6/10 to 8/10  Aggravating factors: Pain increases with coughing, lying down and taking a deep breathe.   Alleviating factors: None  Associated symptoms:   Patient reports pain, numbness, and weakness in the left upper extremity.  Patient denies any new bladder or bowel problems.   Patient reports difficulties with her balance but denies recent falls.      Review of previous therapies and additional medical records:  Ernestina Garrido has already failed the following measures, including:   Conservative measures: oral analgesics, topical analgesics, physical therapy, ice and heat   Interventional measures: As referenced above.  Surgical measures: ACDF C4-C6 by Dr. Pena on 01/20/2016, with resolution of upper extremity symptoms. ACDF C6-C7 on " 01/03/2018.  Ernestina Garrido underwent neurosurgical consultation with Jorge kam PA-C on 05/14/2018, and was found not to be a surgical candidate.  Ernestina Garrido presents with significant comorbidities including, hypertension and non-insulin dependent diabetes engaged in treatment.  In terms of current analgesics, Ernestina Garrido takes: gabapentin, etodolac  I have reviewed [facility] Report #[id number] consistent to medication reconciliation.    Pre-surgical Psychological Pain Evaluation by Dr. Loera on 05/02/2018:  1.  Patient meets the criteria for major depression indicating she is significantly balance at the majority of the time.  2.  Patient meets criteria for pain disorder associated with both psychological factors in her general medical condition.  This indicates her psychological factors exacerbating patient's physical pain.  It is recommended the patient be seen for psychological treatment of her pain condition and sleep.  In return to begin treatment and 1-2 weeks.  Should not delay her stimulator.  4.  From a psychological perspective patient is considered to be an appropriate candidate for spinal cord stimulator at this time.    Review of Diagnostic Studies:  EMG/NCV - 04/19/2018: Moderate median neuropathy at the wrist bilaterally (carpal tunnel). Chronic left C5/C6 radiculopathy, subacute/chronic C7 radiculopathy  MRI CERVICAL SPINE W WO CONTRAST-05/07/2018:Postsurgical changes with revision of anterior cervical disc  fusion and improvement in disc osteophyte complex and overall neuroforaminal involvement at the C6-C7 level right. Multilevel spondylitic changes otherwise similar to prior without new focal severe left neuroforaminal narrowing identified. Continued development of myelomalacia involving the left posterior hemicord at the C3-C4 level with persistent moderate-to-severe spinal canal stenosis at this level.  XR SPINE, CERVICAL, 2 OR 3 VW-03/02/2018: Postoperative changes. There are  "no acute findings. There  has been no change since 01/15/2018.  XR SPINE CERVICAL 2 OR 3 VW- 01/15/2018: Interval revision of the anterior cervical fusion is noted converting the fusion from C4 through C6 fusion to a C6-C7 anterior fusion. The alignment remains excellent. Other findings as noted above.  There are some arthropathic hypertrophic changes associated with the  previous procedure from C4 through C6.    Review of Systems   Respiratory: Positive for chest tightness.    Cardiovascular: Positive for chest pain.   Musculoskeletal: Positive for back pain, myalgias, neck pain and neck stiffness.   Neurological: Positive for weakness, numbness and headaches.   Psychiatric/Behavioral: Positive for sleep disturbance. The patient is nervous/anxious (depression).    All other systems reviewed and are negative.     The following portions of the patient's history were reviewed and updated as appropriate: problem list, past medical history, past surgery history, social history, family history, medications, and allergies     /82   Temp 96.9 °F (36.1 °C) (Temporal)   Resp 18   Ht 172.7 cm (68\")   Wt 121 kg (266 lb)   BMI 40.45 kg/m²      Physical Exam   Neurologic Exam  Constitutional: Patient is oriented to person, place, and time.  Patient appears well-developed and well-nourished.  Obese.  HENT: Head: Normocephalic and atraumatic. Eyes: Conjunctivae and lids are normal. Pupils: Equal, round and reactive to light.   Neck: Trachea normal. Neck supple. No JVD present. Lymphatic: No cervical adenopathy  Pulmonary: No increased work of breathing or signs of respiratory distress.  Clear to auscultation bilaterally.   Cardiovascular: Normal rate and rhythm, normal S1 and S2, no murmurs. Peripheral vascular exam: Normal.  Musculoskeletal   Gait and station: Normal   Cervical spine: Active and passive range of motion are limited. Cervical facet joint loading maneuvers are negative.  Shoulders: The range of motion of " the glenohumeral joints is full and without pain. Rotator cuff strength is 5/5.   Palpation of the left third, fourth, fifth ribs reproduces pain.  Compression of the chest wall on the left side reproduces pain.  Palpation of the sternal chondral joints reproduces pain.   Thoracic spine: Thoracic facet joint loading maneuvers are negative.   Neurological: Patient is alert and oriented to person, place, and time. Speech: speech is normal. Cortical function: Normal mental status.   Cranial nerves: Cranial nerves 2-12 intact.   Reflex Scores: Brachioradialis 2+ bilaterally.  Biceps 2+ bilaterally.  Triceps 2+ bilaterally.   Motor strength: 5/5, except left upper extremity 4/5  Motor Tone: normal tone.   Involuntary movements: none.   Spurling sign is positive. Lhermitte sign is negative. Negative long tract signs.   Sensation: No sensory loss. Sensory exam: intact to light touch, intact pain and temperature sensation, intact vibration sensation and normal proprioception.   Coordination: Normal finger to nose and heel to shin. Normal balance and negative. Romberg's sign negative.   Skin and subcutaneous tissue: Skin is warm and intact. No rash noted. No cyanosis.   Psychiatric: Judgment and insight: Normal. Orientation to person, place and time: Normal. Recent and remote memory: Intact. Mood and affect: Normal.     ASSESSMENT:   1. Postlaminectomy syndrome, cervical region    2. Hx of fusion of cervical spine    3. Neuroforaminal stenosis of spine    4. Degenerative disc disease, cervical    5. Cervical facet arthropathy/cervical spondylosis without myelopathy    6. Type 2 diabetes mellitus with diabetic autonomic neuropathy, without long-term current use of insulin (CMS/[facility])    7. Diabetic autonomic neuropathy associated with type 2 diabetes mellitus (CMS/HCC)    8. Class 3 severe obesity with body mass index (BMI) of 40.0 to 44.9 in adult, unspecified obesity type, unspecified whether serious comorbidity present  (CMS/[facility])    9. Primary insomnia    10. Other depression       PLAN:   Patient failed to obtain sustained pain relief from conservative measures and intercostal nerve blocks, as referenced above.  Patient has been referred back to this practice by Jorge Kraus PA-C and Dr. Pena for a spinal cord stimulator trial.  I have reviewed all available medical records as well as previous therapies as referenced above under history of present illness, and discussed the patient with Dr. Pandya.  1. Interventional pain management measures: Discussed SCS trial with patient, and she verbalized understanding and wishes to proceed.  She has already received psychological clearance for the trial.  Patient advised that we will need to obtain CBC, PT/INR, and aPTT prior to trial.  She will be referred back to Dr. Pena for device implant following trial, if patient experiences significant relief with spinal cord stimulation.  Patient was given educational materials regarding spinal cord stimulation therapies.  2. Long-term rehabilitation efforts:  A. Start an exercise program such as yoga, water therapy and daily walks for fitness  B. Follow-up with Dr. Loera for cognitive behavioral therapy and biofeedback.  C. Contrast therapy, as previously discussed.  D. Use TENs unit  3.  Pharmacological measures: Reviewed and discussed.  4.  The patient has been instructed to contact my office with any questions or difficulties. The patient understands the plan and agrees to proceed accordingly.       Patient Care Team:  Stephanie Tanner MD as PCP - Nicolás Aleman MD as Consulting Physician (Neurosurgery)  Ivy Do, PhD as Consulting Physician (Psychology)  Blayne Pandya MD as Consulting Physician (Anesthesiology)  Jorge Kraus PA-C as Physician Assistant (Neurosurgery)     No orders of the defined types were placed in this encounter.        No future appointments.      Cipriano Coles PA-C       EMR  Dragon/Transcription disclaimer:  Much of this encounter note is an electronic transcription of spoken language to printed text. Electronic transcription of spoken language may permit erroneous, or at times, nonsensical words or phrases to be inadvertently transcribed. Although I have reviewed the note for such errors, some may still exist.

## 2018-12-06 ENCOUNTER — OFFICE VISIT (OUTPATIENT)
Dept: PAIN MEDICINE | Facility: CLINIC | Age: 48
End: 2018-12-06

## 2018-12-06 VITALS
RESPIRATION RATE: 18 BRPM | WEIGHT: 266 LBS | HEIGHT: 68 IN | SYSTOLIC BLOOD PRESSURE: 138 MMHG | BODY MASS INDEX: 40.32 KG/M2 | TEMPERATURE: 96.9 F | DIASTOLIC BLOOD PRESSURE: 82 MMHG

## 2018-12-06 DIAGNOSIS — E11.43 TYPE 2 DIABETES MELLITUS WITH DIABETIC AUTONOMIC NEUROPATHY, WITHOUT LONG-TERM CURRENT USE OF INSULIN (HCC): ICD-10-CM

## 2018-12-06 DIAGNOSIS — Z98.1 HX OF FUSION OF CERVICAL SPINE: ICD-10-CM

## 2018-12-06 DIAGNOSIS — M50.30 DEGENERATIVE DISC DISEASE, CERVICAL: ICD-10-CM

## 2018-12-06 DIAGNOSIS — M47.812 FACET ARTHROPATHY, CERVICAL: ICD-10-CM

## 2018-12-06 DIAGNOSIS — F32.89 OTHER DEPRESSION: ICD-10-CM

## 2018-12-06 DIAGNOSIS — F51.01 PRIMARY INSOMNIA: ICD-10-CM

## 2018-12-06 DIAGNOSIS — E66.01 CLASS 3 SEVERE OBESITY WITH BODY MASS INDEX (BMI) OF 40.0 TO 44.9 IN ADULT, UNSPECIFIED OBESITY TYPE, UNSPECIFIED WHETHER SERIOUS COMORBIDITY PRESENT (HCC): ICD-10-CM

## 2018-12-06 DIAGNOSIS — E11.43 DIABETIC AUTONOMIC NEUROPATHY ASSOCIATED WITH TYPE 2 DIABETES MELLITUS (HCC): ICD-10-CM

## 2018-12-06 DIAGNOSIS — M96.1 POSTLAMINECTOMY SYNDROME, CERVICAL REGION: Primary | ICD-10-CM

## 2018-12-06 DIAGNOSIS — M48.00 NEUROFORAMINAL STENOSIS OF SPINE: ICD-10-CM

## 2018-12-06 PROCEDURE — 99214 OFFICE O/P EST MOD 30 MIN: CPT | Performed by: PHYSICIAN ASSISTANT

## 2018-12-13 ENCOUNTER — PREP FOR SURGERY (OUTPATIENT)
Dept: PAIN MEDICINE | Facility: CLINIC | Age: 48
End: 2018-12-13

## 2018-12-13 DIAGNOSIS — M48.00 NEUROFORAMINAL STENOSIS OF SPINE: ICD-10-CM

## 2018-12-13 DIAGNOSIS — Z98.1 HX OF FUSION OF CERVICAL SPINE: ICD-10-CM

## 2018-12-13 DIAGNOSIS — F51.01 PRIMARY INSOMNIA: ICD-10-CM

## 2018-12-13 DIAGNOSIS — E11.43 TYPE 2 DIABETES MELLITUS WITH DIABETIC AUTONOMIC NEUROPATHY, WITHOUT LONG-TERM CURRENT USE OF INSULIN (HCC): ICD-10-CM

## 2018-12-13 DIAGNOSIS — E11.43 DIABETIC AUTONOMIC NEUROPATHY ASSOCIATED WITH TYPE 2 DIABETES MELLITUS (HCC): ICD-10-CM

## 2018-12-13 DIAGNOSIS — F32.89 OTHER DEPRESSION: ICD-10-CM

## 2018-12-13 DIAGNOSIS — M47.812 FACET ARTHROPATHY, CERVICAL: ICD-10-CM

## 2018-12-13 DIAGNOSIS — M50.30 DEGENERATIVE DISC DISEASE, CERVICAL: ICD-10-CM

## 2018-12-13 DIAGNOSIS — M96.1 CERVICAL POST-LAMINECTOMY SYNDROME: Primary | ICD-10-CM

## 2018-12-13 DIAGNOSIS — E66.01 CLASS 3 SEVERE OBESITY WITH BODY MASS INDEX (BMI) OF 40.0 TO 44.9 IN ADULT, UNSPECIFIED OBESITY TYPE, UNSPECIFIED WHETHER SERIOUS COMORBIDITY PRESENT (HCC): ICD-10-CM

## 2018-12-17 PROBLEM — M96.1 CERVICAL POST-LAMINECTOMY SYNDROME: Status: ACTIVE | Noted: 2018-12-17

## 2018-12-26 DIAGNOSIS — G89.29 CHRONIC PAIN OF RIGHT KNEE: ICD-10-CM

## 2018-12-26 DIAGNOSIS — M25.561 CHRONIC PAIN OF RIGHT KNEE: ICD-10-CM

## 2018-12-26 RX ORDER — ETODOLAC 200 MG/1
CAPSULE ORAL
Qty: 30 CAPSULE | Refills: 0 | Status: SHIPPED | OUTPATIENT
Start: 2018-12-26 | End: 2019-01-25 | Stop reason: SDUPTHER

## 2019-01-09 NOTE — PROGRESS NOTES
"Chief Complaint: \"I have pain in my neck, left arm, and the left side of my upper chest and back.\"    History of Present Illness: Ms. Ernestina Garrido, 48 y.o. female, originally referred by Jorge kam PA-C in consultation for intractable chest wall pain, neck pain and arm pain. She was seen for neurosurgical consultation by Jorge Kam PA-C on 05/14/2018, and was referred back to this practice for a trial of spinal cord stimulation.  Patient has already received psychological clearance for the procedure.   Pain history: Patient underwent a lap anisa in November 2017. One week later, she developed anterior chest wall pain. She went to the ER and cardiac work up was negative. Later on, she underwent ACDF C6-C7, with resolution of her neck and left arm pain. She has continued experiencing chest wall pain and numbness in the thumb, index and middle finger of her left hand. Patient has also a history of left CTS.  Current pain is in the front of her chest on top of her breast, between her shoulder blades, also in her armpit area traveling down her left arm. She also reports tingling and numbness in her left hand, particularly in her thumb, 2nd and 3rd finger.   Pain description: constant pain, described as sharp, shooting and stabbing sensation.   Radiation of pain: The pain is localized in the center of her chest and radiates along the 3rd, 4th and 5th ribs into her thoracic region and her left arm.   Pain intensity today: 8/10  Average pain intensity last week: 8/10  Pain intensity ranges from: 5/10 to 10/10  Aggravating factors: Pain increases with coughing, lying down and taking a deep breathe.   Alleviating factors: None  Associated symptoms:   Patient reports pain, numbness, and weakness in the left upper extremity.  Patient denies any new bladder or bowel problems.   Patient reports difficulties with her balance but denies recent falls.      Review of previous therapies and additional medical " "records:  Ernestina Garrido has already failed the following measures, including:   Conservative measures: oral analgesics, topical analgesics, physical therapy, ice and heat   Interventional measures: Patient underwent left 3rd, 4th, and 5th intercostal nerve blocks on 02/28/2018 and experienced no relief. Surgical measures: ACDF C4-C6 by Dr. Pena on 01/20/2016, with resolution of upper extremity symptoms. ACDF C6-C7 on 01/03/2018.  Ernestina Garrido underwent neurosurgical consultation with Jorge kam PA-C on 05/14/2018, and was found not to be a surgical candidate.  Pre-surgical Psychological Pain Evaluation by Dr. Loera on 05/02/2018: \"From a psychological perspective patient is considered to be an appropriate candidate for spinal cord stimulator at this time.\"  Ernestina Garrido presents with significant comorbidities including, hypertension and non-insulin dependent diabetes engaged in treatment.  In terms of current analgesics, Ernestina Garrido takes: gabapentin, etodolac  I have reviewed [facility] Report #[id number] consistent to medication reconciliation.    Global Pain Scale 01-10  2019          Pain 22          Feelings 12          Clinical outcomes 20          Activities 10          GPS Total: 64            Review of Diagnostic Studies:  MRI CERVICAL SPINE W WO CONTRAST-05/07/2018:Postsurgical changes with revision of anterior cervical disc fusion and improvement in disc osteophyte complex and overall neuroforaminal involvement at the C6-C7 level right. Multilevel spondylitic changes otherwise similar to prior without new focal severe left neuroforaminal narrowing identified. Continued development of myelomalacia involving the left posterior hemicord at the C3-C4 level with persistent moderate-to-severe spinal canal stenosis at this level.  EMG/NCV RESULTS: Moderate median neuropathy at the wrist bilaterally (carpal tunnel). Chronic left C5/C6 radiculopathy, subacute/chronic C7 radiculopathy  XR " CHEST 1 VW-01/15/2018: cardiac and mediastinal silhouettes to be within normal limits. The lung fields are grossly clear. No focal parenchymal opacification present.  No pleural effusion or pneumothorax. Degenerative changes seen within the spine. Pulmonary vascularity is within normal limits.      XR SPINE CERVICAL 2 OR 3 VW- 01/15/2018. Anterior cervical fusion C4-C6 with interbody fusion device. The hardware has been removed from C4 and C5 and currently, there is an anterior cervical fusion at C6-C7 with interbody fusion device. Previously placed interbody fusion devices from C4 through C6 remain in good position. There is a small osteophyte projecting into the prevertebral soft tissues which can be followed. It is likely a sequela of the previous hardware. The alignment is within normal limits.      CT CERVICAL SPINE WO CONTRAST-12/20/2017: status post anterior cervical disc fusion at the C4-C6 levels without evidence of hardware loosening or failure. Flattening of the normal lordotic curvature noted. Facets are well aligned. No evidence for discrete fracture or focal subluxation with preservation of vertebral body heights. Intervertebral disc height space is lost at the disc fusion site as well as in the lower segments consistent with degenerative disease and minimal osteophyte formation. No significant prevertebral soft tissue swelling or paraspinal hematoma on axial soft tissue evaluation. No critical spinal canal narrowing. Lung apices partially visualized and grossly unremarkable. Lateral masses of C1 are well seated on C2. Dens is normal in appearance.  MRI SHOULDER LEFT W WO CONTRAST - 12/03/2017. There is no evidence of joint effusion. There is some mild increased signal seen in the supraspinatus tendon suggesting a mild tendinopathy. Mild increased signal seen within the subscapularis tendon suggesting mild tendinopathy. The infraspinatus and teres minor are normal in signal intensity. Slight elevation  identified of the humeral head with respect to the glenoid fossa. There are degenerative changes seen within the humeral head. There is extensive motion artifact degrading image quality on several of the images. No significant joint effusion is identified. There is no definite abnormal contrast enhancement present. The darin are not well evaluated on this study due to the extensive motion artifact however there does appear to be some irregularity of the superior labrum suggesting a tear. Inferior labrum may also be injured with thickening of the capsule. Anterior and posterior labrum are not well visualized due to the motion artifact.  MRI CERVICAL SPINE W WO CONTRAST - 12/03/2017. There is fusion identified of the cervical spine from C4 through C6. The facets are well-aligned. Narrowing identified of the cervical spinal canal which is unchanged when compared to the prior study. No abnormal mass or fluid collection is seen within the paraspinal muscles. Normal signal intensity seen within the spinal cord. No cord edema or contusion. Axial imaging   C4/C5: posterior disc osteophyte complex on the right creating narrowing of the right neuroforamina and rightward aspect of the thecal sac.  C5/C6: Posterior disc osteophyte complex on the right creating narrowing of the right neuroforamina and rightward aspect of the central spinal canal.  C6/C7: Posterior disc osteophyte complex on the left creating severe narrowing of the left neuroforamina and mass effect on the spinal cord and thecal sac. Compromise of the left nerve root is likely. Degenerative changes with posterior facet hypertrophy at the C6/C7 level creating mass effect anteriorly on the thecal sac and narrowing of the right neuroforamina. No definite nerve root  compromise identified. There is no definite abnormal contrast enhancement identified.   XR RIGHT ELBOW, 2 VIEWS - 08/12/2016; The osseous structures of the right elbow are intact and well aligned. Fat  pad distention or hemarthrosis is not identified. The radial head aligns with the capitulum on all views. Negative right elbow series. No evidence of acute fracture or hemarthrosis.  MRI CERVICAL SPINE WITHOUT CONTRAST 07/07/2016: Hardware identified anteriorly fusing the C4, C5 and C6 levels. There is metal susceptibility artifact. There is slight abnormal signal intensity identified within the cervical cord posterior to the C4 level, unchanged in appearance when compared to 10/17/2015. No evidence of spinal cord edema. No underlying lesion. There is slight mass effect on the spinal cord at the C3/C4 and C4/C5 levels.  Degenerative changes seen within the posterior facets. Craniovertebral junction is preserved. C3/C4, small posterior disc osteophyte complex creating mild anterior mass effect on the thecal sac. Mild narrowing of the central spinal canal. C3-C4; large right paracentral disc osteophyte complex creating mass effect anteriorly on the thecal sac and narrowing of the right neural foramina. No definite nerve root compromise. C5-C6 posterior disc osteophyte complex creating narrowing of the right neural foramina. No definite nerve root compromise. C6-C7 posterior disc osteophyte complex with slight lateralization to the left creating narrowing of the left neural foramina and mass effect anteriorly on the leftward aspect of the spinal cord. No evidence of cord edema or contusion. Bilateral narrowing of the neural foramina. No significant central spinal canal stenosis. The remainder of the vertebral body levels are unremarkable.  CT ABDOMEN AND PELVIS W WO CONTRAST-06/17/2016: The left ovary is identified in the left upper adnexal region and appears normal. The right ovary is only tentatively identified as an area of thickening along the broad ligament and is not enlarged. The uterus is surgically absent. No intrapelvic inflammatory change, mass, or adenopathy. No hernia. Delayed images show contrast  "opacification of the normal appearing bladder. The bony structures appear grossly intact. No evidence of left colon diverticulitis or even significant diverticulosis is seen. Coronal images reveal a relatively mild levoconvex lumbar scoliosis.  XR SPINE CERVICAL MIN 2 VIEWS, 05/02/2016: Anterior cervical fusion from C4 through C6 with interbody fusion devices. Alignment is excellent in AP and lateral projection. Hardware is intact without evidence of displacement or fracture. Right carotid calcification.    Review of Systems   Cardiovascular: Positive for chest pain.   Musculoskeletal: Positive for arthralgias, back pain, neck pain and neck stiffness.   Neurological: Positive for weakness and numbness.   Psychiatric/Behavioral: Positive for agitation and sleep disturbance. The patient is nervous/anxious (depression).    All other systems reviewed and are negative.     The following portions of the patient's history were reviewed and updated as appropriate: problem list, past medical history, past surgery history, social history, family history, medications, and allergies     /88   Pulse 85   Temp 96.7 °F (35.9 °C) (Temporal)   Resp 18   Ht 172.7 cm (67.99\")   Wt 122 kg (269 lb)   SpO2 96%   BMI 40.91 kg/m²      Physical Exam   Neurologic Exam  Constitutional: Patient is oriented to person, place, and time. Patient appears well-developed and well-nourished.   HENT: Head: Normocephalic and atraumatic. Eyes: Conjunctivae and lids are normal. Pupils: Equal, round, reactive to light.   Neck: Trachea normal. Neck supple. No JVD present. Lymphatic: No cervical adenopathy  Pulmonary Respiratory effort: No increased work of breathing or signs of respiratory distress. Auscultation of lungs: Clear to auscultation.   Cardiovascular Auscultation of heart: Normal rate and rhythm, normal S1 and S2, no murmurs. Peripheral vascular exam: Normal. Carotid: right 2+, left 2+, no bruit on the right and no bruit on the left. " Femoral: right 2+, left 2+, no bruit on the right and no bruit on the left. Posterior tibialis: right 2+ and left 2+. Dorsalis pedis: right 2+ and left 2+. No edema.   Abdomen: The abdomen was soft and nontender. Bowel sounds were normal.   Musculoskeletal   Gait and station: Gait evaluation demonstrated a normal gait.   Cervical spine: Active and passive range of motion are improved. Cervical facet joint loading maneuvers are negative at this time.  Shoulders: The range of motion of the glenohumeral joints is full and without pain. Rotator cuff strength is 5/5.   Palpation of the left third, fourth, fifth ribs reproduces pain.  Compression of the chest wall on the left side reproduces pain.  Palpation of the sternal chondral joints reproduces pain.   Thoracic spine: Thoracic facet joint loading maneuvers are negative.   Neurological: Patient is alert and oriented to person, place, and time. Speech: speech is normal. Cortical function: Normal mental status.   Cranial nerves: Cranial nerves 2-12 intact.   Reflex Scores: Right brachioradialis 2+, Left brachioradialis trace, Right biceps 2+, Left biceps trace, Right triceps 2+, Left triceps trace   Motor strength: 5/5, except left upper extremity 4/5 biceps/triceps  Motor Tone: normal tone.   Involuntary movements: none.   Spurling sign is positive. Lhermitte sign is negative. Negative long tract signs.   Sensation: No sensory loss. Sensory exam: intact to light touch, intact pain and temperature sensation, intact vibration sensation and normal proprioception.   Coordination: Normal finger to nose and heel to shin. Normal balance and negative. Romberg's sign negative.   Skin and subcutaneous tissue: Skin is warm and intact. No rash noted. No cyanosis.   Psychiatric: Judgment and insight: Normal. Orientation to person, place and time: Normal. Recent and remote memory: Intact. Mood and affect: Normal.     ASSESSMENT:   1. Cervical post-laminectomy syndrome    2. Hx of  fusion of cervical spine    3. Neuroforaminal stenosis of spine    4. Degenerative disc disease, cervical    5. Cervical facet arthropathy/cervical spondylosis without myelopathy    6. Type 2 diabetes mellitus with diabetic autonomic neuropathy, without long-term current use of insulin (CMS/HCC)    7. Diabetic autonomic neuropathy associated with type 2 diabetes mellitus (CMS/HCC)    8. Class 3 severe obesity with body mass index (BMI) of 40.0 to 44.9 in adult, unspecified obesity type, unspecified whether serious comorbidity present (CMS/HCC)    9. Primary insomnia    10. Other depression       PLAN: I had a lengthy conversation with Ms. Ernestina Garrido regarding her chronic pain condition and potential therapeutic options. Patient presents with a history of unrelenting chronic chest wall pain, neck pain and left arm pain. Patient underwent lap anisa in November 2017. One week later, she developed her current pain. Patient is status post ACDF C4-C6 by on 01/20/2016, with resolution of upper extremity symptoms. She underwent ACDF C6-C7 on 01/03/2018, with complete resolution of her previously severe neck and left arm pain. Unfortunately, she continued experiencing severe chest wall pain refractory to medical treatment, and recurrent neck and left arm pain that started one month after her last surgery. Patient failed to obtain sustained pain relief from intercostal nerve blocks. Recent EMG /NCV revealed chronic cervical radiculopathy.  Patient has continued experiencing cervical radiculopathy with correlation as revealed on recent electrodiagnostic studies. Patient has failed to obtain relief with conservative measures. Patient has been referred back to this practice by Jorge Kraus PA-C and Dr. Pena for a spinal cord stimulator trial.  I have reviewed all available patient's medical records as well as previous therapies as referenced above under history of present illness. Therefore, I have proposed the  following plan:  1. Interventional pain management measures: Patient has been referred for a SCS trial.  She has already received psychological clearance for the trial. Patient will need CBC, PT/INR, and aPTT at [facility] prior to the SCS trial.  She will be referred back to Dr. Pena for device implant following trial, if patient experiences significant relief with spinal cord stimulation.  Patient was given educational materials regarding spinal cord stimulation therapies.  2. Long-term rehabilitation efforts:  A. Start a comprehensive physical therapy program for upper body strengthening, posture correction, neurodynamics, ultrasound, ASTYM, E-STIM, myofascial release, desensitization techniques, cupping and dry needling once her pain is under control  B. Start an exercise program such as yoga, water therapy and daily walks for fitness  C. Follow-up with Dr. Loera for cognitive behavioral therapy, biofeedback  D. Referral to [facility]  E. Start contrast therapy  3.  Pharmacological measures:   A.  Continue gabapentin and Cymbalta, as currently prescribed  B.  Continue lidocaine 10%, prilocaine 2%, capsaicin 0.001%, mannitol 20%, imipramine 3% cream, apply cream to affected areas  4.  The patient has been instructed to contact my office with any questions or difficulties. The patient understands the plan and agrees to proceed accordingly.       Patient Care Team:  Stephanie Tanner MD as PCP - Nicolás Aleman MD as Consulting Physician (Neurosurgery)  Ivy Do, PhD as Consulting Physician (Psychology)  Blayne Pandya MD as Consulting Physician (Anesthesiology)  Jorge Kraus PA-C as Physician Assistant (Neurosurgery)     No orders of the defined types were placed in this encounter.        Future Appointments   Date Time Provider Department Center   1/13/2019 12:30 PM PAT 1 TD BH TD PAT TD         Blayne Pandya MD       EMR Dragon/Transcription  disclaimer:  Much of this encounter note is an electronic transcription of spoken language to printed text. Electronic transcription of spoken language may permit erroneous, or at times, nonsensical words or phrases to be inadvertently transcribed. Although I have reviewed the note for such errors, some may still exist.

## 2019-01-09 NOTE — H&P (VIEW-ONLY)
"Chief Complaint: \"I have pain in my neck, left arm, and the left side of my upper chest and back.\"    History of Present Illness: Ms. Ernestina Garrido, 48 y.o. female, originally referred by Jorge kam PA-C in consultation for intractable chest wall pain, neck pain and arm pain. She was seen for neurosurgical consultation by Jorge Kam PA-C on 05/14/2018, and was referred back to this practice for a trial of spinal cord stimulation.  Patient has already received psychological clearance for the procedure.   Pain history: Patient underwent a lap anisa in November 2017. One week later, she developed anterior chest wall pain. She went to the ER and cardiac work up was negative. Later on, she underwent ACDF C6-C7, with resolution of her neck and left arm pain. She has continued experiencing chest wall pain and numbness in the thumb, index and middle finger of her left hand. Patient has also a history of left CTS.  Current pain is in the front of her chest on top of her breast, between her shoulder blades, also in her armpit area traveling down her left arm. She also reports tingling and numbness in her left hand, particularly in her thumb, 2nd and 3rd finger.   Pain description: constant pain, described as sharp, shooting and stabbing sensation.   Radiation of pain: The pain is localized in the center of her chest and radiates along the 3rd, 4th and 5th ribs into her thoracic region and her left arm.   Pain intensity today: 8/10  Average pain intensity last week: 8/10  Pain intensity ranges from: 5/10 to 10/10  Aggravating factors: Pain increases with coughing, lying down and taking a deep breathe.   Alleviating factors: None  Associated symptoms:   Patient reports pain, numbness, and weakness in the left upper extremity.  Patient denies any new bladder or bowel problems.   Patient reports difficulties with her balance but denies recent falls.      Review of previous therapies and additional medical " "records:  Ernestina Garrido has already failed the following measures, including:   Conservative measures: oral analgesics, topical analgesics, physical therapy, ice and heat   Interventional measures: Patient underwent left 3rd, 4th, and 5th intercostal nerve blocks on 02/28/2018 and experienced no relief. Surgical measures: ACDF C4-C6 by Dr. Pena on 01/20/2016, with resolution of upper extremity symptoms. ACDF C6-C7 on 01/03/2018.  Ernestina Garrido underwent neurosurgical consultation with Jorge kam PA-C on 05/14/2018, and was found not to be a surgical candidate.  Pre-surgical Psychological Pain Evaluation by Dr. Loera on 05/02/2018: \"From a psychological perspective patient is considered to be an appropriate candidate for spinal cord stimulator at this time.\"  Ernestina Garrido presents with significant comorbidities including, hypertension and non-insulin dependent diabetes engaged in treatment.  In terms of current analgesics, Ernestina Garrido takes: gabapentin, etodolac  I have reviewed [facility] Report #[id number] consistent to medication reconciliation.    Global Pain Scale 01-10  2019          Pain 22          Feelings 12          Clinical outcomes 20          Activities 10          GPS Total: 64            Review of Diagnostic Studies:  MRI CERVICAL SPINE W WO CONTRAST-05/07/2018:Postsurgical changes with revision of anterior cervical disc fusion and improvement in disc osteophyte complex and overall neuroforaminal involvement at the C6-C7 level right. Multilevel spondylitic changes otherwise similar to prior without new focal severe left neuroforaminal narrowing identified. Continued development of myelomalacia involving the left posterior hemicord at the C3-C4 level with persistent moderate-to-severe spinal canal stenosis at this level.  EMG/NCV RESULTS: Moderate median neuropathy at the wrist bilaterally (carpal tunnel). Chronic left C5/C6 radiculopathy, subacute/chronic C7 radiculopathy  XR " CHEST 1 VW-01/15/2018: cardiac and mediastinal silhouettes to be within normal limits. The lung fields are grossly clear. No focal parenchymal opacification present.  No pleural effusion or pneumothorax. Degenerative changes seen within the spine. Pulmonary vascularity is within normal limits.      XR SPINE CERVICAL 2 OR 3 VW- 01/15/2018. Anterior cervical fusion C4-C6 with interbody fusion device. The hardware has been removed from C4 and C5 and currently, there is an anterior cervical fusion at C6-C7 with interbody fusion device. Previously placed interbody fusion devices from C4 through C6 remain in good position. There is a small osteophyte projecting into the prevertebral soft tissues which can be followed. It is likely a sequela of the previous hardware. The alignment is within normal limits.      CT CERVICAL SPINE WO CONTRAST-12/20/2017: status post anterior cervical disc fusion at the C4-C6 levels without evidence of hardware loosening or failure. Flattening of the normal lordotic curvature noted. Facets are well aligned. No evidence for discrete fracture or focal subluxation with preservation of vertebral body heights. Intervertebral disc height space is lost at the disc fusion site as well as in the lower segments consistent with degenerative disease and minimal osteophyte formation. No significant prevertebral soft tissue swelling or paraspinal hematoma on axial soft tissue evaluation. No critical spinal canal narrowing. Lung apices partially visualized and grossly unremarkable. Lateral masses of C1 are well seated on C2. Dens is normal in appearance.  MRI SHOULDER LEFT W WO CONTRAST - 12/03/2017. There is no evidence of joint effusion. There is some mild increased signal seen in the supraspinatus tendon suggesting a mild tendinopathy. Mild increased signal seen within the subscapularis tendon suggesting mild tendinopathy. The infraspinatus and teres minor are normal in signal intensity. Slight elevation  identified of the humeral head with respect to the glenoid fossa. There are degenerative changes seen within the humeral head. There is extensive motion artifact degrading image quality on several of the images. No significant joint effusion is identified. There is no definite abnormal contrast enhancement present. The darin are not well evaluated on this study due to the extensive motion artifact however there does appear to be some irregularity of the superior labrum suggesting a tear. Inferior labrum may also be injured with thickening of the capsule. Anterior and posterior labrum are not well visualized due to the motion artifact.  MRI CERVICAL SPINE W WO CONTRAST - 12/03/2017. There is fusion identified of the cervical spine from C4 through C6. The facets are well-aligned. Narrowing identified of the cervical spinal canal which is unchanged when compared to the prior study. No abnormal mass or fluid collection is seen within the paraspinal muscles. Normal signal intensity seen within the spinal cord. No cord edema or contusion. Axial imaging   C4/C5: posterior disc osteophyte complex on the right creating narrowing of the right neuroforamina and rightward aspect of the thecal sac.  C5/C6: Posterior disc osteophyte complex on the right creating narrowing of the right neuroforamina and rightward aspect of the central spinal canal.  C6/C7: Posterior disc osteophyte complex on the left creating severe narrowing of the left neuroforamina and mass effect on the spinal cord and thecal sac. Compromise of the left nerve root is likely. Degenerative changes with posterior facet hypertrophy at the C6/C7 level creating mass effect anteriorly on the thecal sac and narrowing of the right neuroforamina. No definite nerve root  compromise identified. There is no definite abnormal contrast enhancement identified.   XR RIGHT ELBOW, 2 VIEWS - 08/12/2016; The osseous structures of the right elbow are intact and well aligned. Fat  pad distention or hemarthrosis is not identified. The radial head aligns with the capitulum on all views. Negative right elbow series. No evidence of acute fracture or hemarthrosis.  MRI CERVICAL SPINE WITHOUT CONTRAST 07/07/2016: Hardware identified anteriorly fusing the C4, C5 and C6 levels. There is metal susceptibility artifact. There is slight abnormal signal intensity identified within the cervical cord posterior to the C4 level, unchanged in appearance when compared to 10/17/2015. No evidence of spinal cord edema. No underlying lesion. There is slight mass effect on the spinal cord at the C3/C4 and C4/C5 levels.  Degenerative changes seen within the posterior facets. Craniovertebral junction is preserved. C3/C4, small posterior disc osteophyte complex creating mild anterior mass effect on the thecal sac. Mild narrowing of the central spinal canal. C3-C4; large right paracentral disc osteophyte complex creating mass effect anteriorly on the thecal sac and narrowing of the right neural foramina. No definite nerve root compromise. C5-C6 posterior disc osteophyte complex creating narrowing of the right neural foramina. No definite nerve root compromise. C6-C7 posterior disc osteophyte complex with slight lateralization to the left creating narrowing of the left neural foramina and mass effect anteriorly on the leftward aspect of the spinal cord. No evidence of cord edema or contusion. Bilateral narrowing of the neural foramina. No significant central spinal canal stenosis. The remainder of the vertebral body levels are unremarkable.  CT ABDOMEN AND PELVIS W WO CONTRAST-06/17/2016: The left ovary is identified in the left upper adnexal region and appears normal. The right ovary is only tentatively identified as an area of thickening along the broad ligament and is not enlarged. The uterus is surgically absent. No intrapelvic inflammatory change, mass, or adenopathy. No hernia. Delayed images show contrast  "opacification of the normal appearing bladder. The bony structures appear grossly intact. No evidence of left colon diverticulitis or even significant diverticulosis is seen. Coronal images reveal a relatively mild levoconvex lumbar scoliosis.  XR SPINE CERVICAL MIN 2 VIEWS, 05/02/2016: Anterior cervical fusion from C4 through C6 with interbody fusion devices. Alignment is excellent in AP and lateral projection. Hardware is intact without evidence of displacement or fracture. Right carotid calcification.    Review of Systems   Cardiovascular: Positive for chest pain.   Musculoskeletal: Positive for arthralgias, back pain, neck pain and neck stiffness.   Neurological: Positive for weakness and numbness.   Psychiatric/Behavioral: Positive for agitation and sleep disturbance. The patient is nervous/anxious (depression).    All other systems reviewed and are negative.     The following portions of the patient's history were reviewed and updated as appropriate: problem list, past medical history, past surgery history, social history, family history, medications, and allergies     /88   Pulse 85   Temp 96.7 °F (35.9 °C) (Temporal)   Resp 18   Ht 172.7 cm (67.99\")   Wt 122 kg (269 lb)   SpO2 96%   BMI 40.91 kg/m²      Physical Exam   Neurologic Exam  Constitutional: Patient is oriented to person, place, and time. Patient appears well-developed and well-nourished.   HENT: Head: Normocephalic and atraumatic. Eyes: Conjunctivae and lids are normal. Pupils: Equal, round, reactive to light.   Neck: Trachea normal. Neck supple. No JVD present. Lymphatic: No cervical adenopathy  Pulmonary Respiratory effort: No increased work of breathing or signs of respiratory distress. Auscultation of lungs: Clear to auscultation.   Cardiovascular Auscultation of heart: Normal rate and rhythm, normal S1 and S2, no murmurs. Peripheral vascular exam: Normal. Carotid: right 2+, left 2+, no bruit on the right and no bruit on the left. " Femoral: right 2+, left 2+, no bruit on the right and no bruit on the left. Posterior tibialis: right 2+ and left 2+. Dorsalis pedis: right 2+ and left 2+. No edema.   Abdomen: The abdomen was soft and nontender. Bowel sounds were normal.   Musculoskeletal   Gait and station: Gait evaluation demonstrated a normal gait.   Cervical spine: Active and passive range of motion are improved. Cervical facet joint loading maneuvers are negative at this time.  Shoulders: The range of motion of the glenohumeral joints is full and without pain. Rotator cuff strength is 5/5.   Palpation of the left third, fourth, fifth ribs reproduces pain.  Compression of the chest wall on the left side reproduces pain.  Palpation of the sternal chondral joints reproduces pain.   Thoracic spine: Thoracic facet joint loading maneuvers are negative.   Neurological: Patient is alert and oriented to person, place, and time. Speech: speech is normal. Cortical function: Normal mental status.   Cranial nerves: Cranial nerves 2-12 intact.   Reflex Scores: Right brachioradialis 2+, Left brachioradialis trace, Right biceps 2+, Left biceps trace, Right triceps 2+, Left triceps trace   Motor strength: 5/5, except left upper extremity 4/5 biceps/triceps  Motor Tone: normal tone.   Involuntary movements: none.   Spurling sign is positive. Lhermitte sign is negative. Negative long tract signs.   Sensation: No sensory loss. Sensory exam: intact to light touch, intact pain and temperature sensation, intact vibration sensation and normal proprioception.   Coordination: Normal finger to nose and heel to shin. Normal balance and negative. Romberg's sign negative.   Skin and subcutaneous tissue: Skin is warm and intact. No rash noted. No cyanosis.   Psychiatric: Judgment and insight: Normal. Orientation to person, place and time: Normal. Recent and remote memory: Intact. Mood and affect: Normal.     ASSESSMENT:   1. Cervical post-laminectomy syndrome    2. Hx of  fusion of cervical spine    3. Neuroforaminal stenosis of spine    4. Degenerative disc disease, cervical    5. Cervical facet arthropathy/cervical spondylosis without myelopathy    6. Type 2 diabetes mellitus with diabetic autonomic neuropathy, without long-term current use of insulin (CMS/HCC)    7. Diabetic autonomic neuropathy associated with type 2 diabetes mellitus (CMS/HCC)    8. Class 3 severe obesity with body mass index (BMI) of 40.0 to 44.9 in adult, unspecified obesity type, unspecified whether serious comorbidity present (CMS/HCC)    9. Primary insomnia    10. Other depression       PLAN: I had a lengthy conversation with Ms. Ernestina Garrido regarding her chronic pain condition and potential therapeutic options. Patient presents with a history of unrelenting chronic chest wall pain, neck pain and left arm pain. Patient underwent lap anisa in November 2017. One week later, she developed her current pain. Patient is status post ACDF C4-C6 by on 01/20/2016, with resolution of upper extremity symptoms. She underwent ACDF C6-C7 on 01/03/2018, with complete resolution of her previously severe neck and left arm pain. Unfortunately, she continued experiencing severe chest wall pain refractory to medical treatment, and recurrent neck and left arm pain that started one month after her last surgery. Patient failed to obtain sustained pain relief from intercostal nerve blocks. Recent EMG /NCV revealed chronic cervical radiculopathy.  Patient has continued experiencing cervical radiculopathy with correlation as revealed on recent electrodiagnostic studies. Patient has failed to obtain relief with conservative measures. Patient has been referred back to this practice by Jorge Kraus PA-C and Dr. Pena for a spinal cord stimulator trial.  I have reviewed all available patient's medical records as well as previous therapies as referenced above under history of present illness. Therefore, I have proposed the  following plan:  1. Interventional pain management measures: Patient has been referred for a SCS trial.  She has already received psychological clearance for the trial. Patient will need CBC, PT/INR, and aPTT at [facility] prior to the SCS trial.  She will be referred back to Dr. Pena for device implant following trial, if patient experiences significant relief with spinal cord stimulation.  Patient was given educational materials regarding spinal cord stimulation therapies.  2. Long-term rehabilitation efforts:  A. Start a comprehensive physical therapy program for upper body strengthening, posture correction, neurodynamics, ultrasound, ASTYM, E-STIM, myofascial release, desensitization techniques, cupping and dry needling once her pain is under control  B. Start an exercise program such as yoga, water therapy and daily walks for fitness  C. Follow-up with Dr. Loera for cognitive behavioral therapy, biofeedback  D. Referral to [facility]  E. Start contrast therapy  3.  Pharmacological measures:   A.  Continue gabapentin and Cymbalta, as currently prescribed  B.  Continue lidocaine 10%, prilocaine 2%, capsaicin 0.001%, mannitol 20%, imipramine 3% cream, apply cream to affected areas  4.  The patient has been instructed to contact my office with any questions or difficulties. The patient understands the plan and agrees to proceed accordingly.       Patient Care Team:  Stephanie Tanner MD as PCP - Nicolás Aleman MD as Consulting Physician (Neurosurgery)  Ivy Do, PhD as Consulting Physician (Psychology)  Blayne Pandya MD as Consulting Physician (Anesthesiology)  Jorge Kraus PA-C as Physician Assistant (Neurosurgery)     No orders of the defined types were placed in this encounter.        Future Appointments   Date Time Provider Department Center   1/13/2019 12:30 PM PAT 1 TD BH TD PAT TD         Blayne Pandya MD       EMR Dragon/Transcription  disclaimer:  Much of this encounter note is an electronic transcription of spoken language to printed text. Electronic transcription of spoken language may permit erroneous, or at times, nonsensical words or phrases to be inadvertently transcribed. Although I have reviewed the note for such errors, some may still exist.

## 2019-01-10 ENCOUNTER — OFFICE VISIT (OUTPATIENT)
Dept: PAIN MEDICINE | Facility: CLINIC | Age: 49
End: 2019-01-10

## 2019-01-10 VITALS
SYSTOLIC BLOOD PRESSURE: 128 MMHG | DIASTOLIC BLOOD PRESSURE: 88 MMHG | BODY MASS INDEX: 40.77 KG/M2 | TEMPERATURE: 96.7 F | OXYGEN SATURATION: 96 % | HEIGHT: 68 IN | HEART RATE: 85 BPM | RESPIRATION RATE: 18 BRPM | WEIGHT: 269 LBS

## 2019-01-10 DIAGNOSIS — M48.00 NEUROFORAMINAL STENOSIS OF SPINE: ICD-10-CM

## 2019-01-10 DIAGNOSIS — Z98.1 HX OF FUSION OF CERVICAL SPINE: ICD-10-CM

## 2019-01-10 DIAGNOSIS — F51.01 PRIMARY INSOMNIA: ICD-10-CM

## 2019-01-10 DIAGNOSIS — M47.812 FACET ARTHROPATHY, CERVICAL: ICD-10-CM

## 2019-01-10 DIAGNOSIS — E66.01 CLASS 3 SEVERE OBESITY WITH BODY MASS INDEX (BMI) OF 40.0 TO 44.9 IN ADULT, UNSPECIFIED OBESITY TYPE, UNSPECIFIED WHETHER SERIOUS COMORBIDITY PRESENT (HCC): ICD-10-CM

## 2019-01-10 DIAGNOSIS — M50.30 DEGENERATIVE DISC DISEASE, CERVICAL: ICD-10-CM

## 2019-01-10 DIAGNOSIS — E11.43 TYPE 2 DIABETES MELLITUS WITH DIABETIC AUTONOMIC NEUROPATHY, WITHOUT LONG-TERM CURRENT USE OF INSULIN (HCC): Primary | ICD-10-CM

## 2019-01-10 DIAGNOSIS — E11.43 DIABETIC AUTONOMIC NEUROPATHY ASSOCIATED WITH TYPE 2 DIABETES MELLITUS (HCC): ICD-10-CM

## 2019-01-10 DIAGNOSIS — E11.43 TYPE 2 DIABETES MELLITUS WITH DIABETIC AUTONOMIC NEUROPATHY, WITHOUT LONG-TERM CURRENT USE OF INSULIN (HCC): ICD-10-CM

## 2019-01-10 DIAGNOSIS — M96.1 CERVICAL POST-LAMINECTOMY SYNDROME: ICD-10-CM

## 2019-01-10 DIAGNOSIS — F32.89 OTHER DEPRESSION: ICD-10-CM

## 2019-01-10 PROCEDURE — 99213 OFFICE O/P EST LOW 20 MIN: CPT | Performed by: ANESTHESIOLOGY

## 2019-01-13 ENCOUNTER — APPOINTMENT (OUTPATIENT)
Dept: LAB | Facility: HOSPITAL | Age: 49
End: 2019-01-13

## 2019-01-13 ENCOUNTER — APPOINTMENT (OUTPATIENT)
Dept: PREADMISSION TESTING | Facility: HOSPITAL | Age: 49
End: 2019-01-13

## 2019-01-13 VITALS — HEIGHT: 68 IN | BODY MASS INDEX: 40.5 KG/M2 | WEIGHT: 267.2 LBS

## 2019-01-13 DIAGNOSIS — M96.1 CERVICAL POST-LAMINECTOMY SYNDROME: ICD-10-CM

## 2019-01-13 LAB
ANION GAP SERPL CALCULATED.3IONS-SCNC: 4 MMOL/L (ref 3–11)
APTT PPP: 28.5 SECONDS (ref 24–31)
BUN BLD-MCNC: 16 MG/DL (ref 9–23)
BUN/CREAT SERPL: 17.8 (ref 7–25)
CALCIUM SPEC-SCNC: 9.2 MG/DL (ref 8.7–10.4)
CHLORIDE SERPL-SCNC: 101 MMOL/L (ref 99–109)
CO2 SERPL-SCNC: 32 MMOL/L (ref 20–31)
CREAT BLD-MCNC: 0.9 MG/DL (ref 0.6–1.3)
DEPRECATED RDW RBC AUTO: 43 FL (ref 37–54)
ERYTHROCYTE [DISTWIDTH] IN BLOOD BY AUTOMATED COUNT: 12.7 % (ref 11.3–14.5)
GFR SERPL CREATININE-BSD FRML MDRD: 81 ML/MIN/1.73
GLUCOSE BLD-MCNC: 147 MG/DL (ref 70–100)
HCT VFR BLD AUTO: 43.6 % (ref 34.5–44)
HGB BLD-MCNC: 14.1 G/DL (ref 11.5–15.5)
INR PPP: 0.92 (ref 0.91–1.09)
MCH RBC QN AUTO: 30.2 PG (ref 27–31)
MCHC RBC AUTO-ENTMCNC: 32.3 G/DL (ref 32–36)
MCV RBC AUTO: 93.4 FL (ref 80–99)
PLATELET # BLD AUTO: 204 10*3/MM3 (ref 150–450)
PMV BLD AUTO: 11.2 FL (ref 6–12)
POTASSIUM BLD-SCNC: 4.5 MMOL/L (ref 3.5–5.5)
PROTHROMBIN TIME: 9.7 SECONDS (ref 9.6–11.5)
RBC # BLD AUTO: 4.67 10*6/MM3 (ref 3.89–5.14)
SODIUM BLD-SCNC: 137 MMOL/L (ref 132–146)
WBC NRBC COR # BLD: 10.41 10*3/MM3 (ref 3.5–10.8)

## 2019-01-13 PROCEDURE — 85730 THROMBOPLASTIN TIME PARTIAL: CPT | Performed by: ANESTHESIOLOGY

## 2019-01-13 PROCEDURE — 80048 BASIC METABOLIC PNL TOTAL CA: CPT | Performed by: ANESTHESIOLOGY

## 2019-01-13 PROCEDURE — 85027 COMPLETE CBC AUTOMATED: CPT | Performed by: ANESTHESIOLOGY

## 2019-01-13 PROCEDURE — 85610 PROTHROMBIN TIME: CPT | Performed by: ANESTHESIOLOGY

## 2019-01-13 PROCEDURE — 36415 COLL VENOUS BLD VENIPUNCTURE: CPT

## 2019-01-13 NOTE — DISCHARGE INSTRUCTIONS
The following information and instructions were given:    Nothing to eat or drink after midnight except sips of water with routine prescribed medication (except blood thinners, certain blood pressure medications, diabetes medications, or weight reducing medications) unless otherwise instructed by your physician.  Do not eat, drink, smoke or chew gum after midnight the night before surgery. This also includes no mints.    DO NOT shave for two days before your procedure.  Do not wear makeup.      DO NOT wear fingernail polish (gel/regular) and/or acrylic/artificial nails on the day of surgery.   If a patient had recent manicure and would rather not remove polish or artificial nails, then the minimum requirement is that the polish/artificial nails must be removed from the middle finger on each hand.      If patient is having surgery/procedure on an upper extremity, then the patient was instructed that fingernail polish/artificial fingernails must be removed for surgery.  NO EXCEPTIONS.      If patient is having surgery/procedure on a lower extremity, then the patient was instructed that toenail polish on both extremities must be removed for surgery.  NO EXCEPTIONS.    Remove all jewelry (advised to go to jeweler if unable to remove).  Jewelry, especially rings, can no longer be taped for surgery.    Leave anything you consider valuable at home.    Leave your suitcase in the car until after your surgery.    Bring the following with you (if applicable)       -picture ID and insurance cards       -Co-pay/deductible required by insurance       -Medications in the original bottles (not a list) including all over-the-counter  medications if not brought to PAT       -Copy of advance directive, living will or power of  documents if not  brought to PAT       -CPAP or BIPAP mask and tubing (do not bring machine)       -Skin prep instructions sheet       -PAT Pass    Education booklet, brochure, handout or binder given to  patient.      When applicable, an ERAS booklet was given to patient.    Pain Control After Surgery handout given to patient.    Respirex use (handout given to patient) and pneumonia prevention.    Signs and Symptoms of infection discussed.    DVT Prevention education given.  Stressing the importance of ambulation.    Patient to apply Chlorhexadine wipes to surgical area (as instructed) the night before procedure and the AM of procedure.    When applicable patients with ERAS orders were instructed to drink 20 ounces of Gatorade or G2 for diabetics (or until full) the morning of surgery.  The Gatorade or G2 must be consumed at least 1 hour before arrival time on the day of surgery .  No RED Gatorade or G2.  Appropriate ERAS handout and/or booklet given to patient during PAT visit.

## 2019-01-14 ENCOUNTER — APPOINTMENT (OUTPATIENT)
Dept: GENERAL RADIOLOGY | Facility: HOSPITAL | Age: 49
End: 2019-01-14

## 2019-01-14 ENCOUNTER — ANESTHESIA EVENT (OUTPATIENT)
Dept: PERIOP | Facility: HOSPITAL | Age: 49
End: 2019-01-14

## 2019-01-14 ENCOUNTER — HOSPITAL ENCOUNTER (OUTPATIENT)
Facility: HOSPITAL | Age: 49
Setting detail: HOSPITAL OUTPATIENT SURGERY
Discharge: HOME | End: 2019-01-14
Attending: ANESTHESIOLOGY | Admitting: ANESTHESIOLOGY

## 2019-01-14 ENCOUNTER — ANESTHESIA (OUTPATIENT)
Dept: PERIOP | Facility: HOSPITAL | Age: 49
End: 2019-01-14

## 2019-01-14 VITALS
RESPIRATION RATE: 16 BRPM | OXYGEN SATURATION: 94 % | HEIGHT: 68 IN | SYSTOLIC BLOOD PRESSURE: 112 MMHG | WEIGHT: 268 LBS | DIASTOLIC BLOOD PRESSURE: 62 MMHG | BODY MASS INDEX: 40.62 KG/M2 | TEMPERATURE: 97 F | HEART RATE: 79 BPM

## 2019-01-14 DIAGNOSIS — M96.1 CERVICAL POST-LAMINECTOMY SYNDROME: ICD-10-CM

## 2019-01-14 LAB — GLUCOSE BLDC GLUCOMTR-MCNC: 152 MG/DL (ref 70–130)

## 2019-01-14 PROCEDURE — L0120 CERV FLEX N/ADJ FOAM PRE OTS: HCPCS | Performed by: ANESTHESIOLOGY

## 2019-01-14 PROCEDURE — 82962 GLUCOSE BLOOD TEST: CPT

## 2019-01-14 PROCEDURE — 25010000002 FENTANYL CITRATE (PF) 100 MCG/2ML SOLUTION: Performed by: NURSE ANESTHETIST, CERTIFIED REGISTERED

## 2019-01-14 PROCEDURE — S0260 H&P FOR SURGERY: HCPCS | Performed by: ANESTHESIOLOGY

## 2019-01-14 PROCEDURE — C1897 LEAD, NEUROSTIM TEST KIT: HCPCS | Performed by: ANESTHESIOLOGY

## 2019-01-14 PROCEDURE — 76000 FLUOROSCOPY <1 HR PHYS/QHP: CPT

## 2019-01-14 PROCEDURE — 25010000002 MIDAZOLAM PER 1 MG: Performed by: NURSE ANESTHETIST, CERTIFIED REGISTERED

## 2019-01-14 PROCEDURE — 63650 IMPLANT NEUROELECTRODES: CPT | Performed by: ANESTHESIOLOGY

## 2019-01-14 DEVICE — KT LD TRIAL STIM OCTRODE IMPULSE 8CH 60CM: Type: IMPLANTABLE DEVICE | Status: FUNCTIONAL

## 2019-01-14 RX ORDER — SODIUM CHLORIDE 0.9 % (FLUSH) 0.9 %
3 SYRINGE (ML) INJECTION EVERY 12 HOURS SCHEDULED
Status: DISCONTINUED | OUTPATIENT
Start: 2019-01-14 | End: 2019-01-14 | Stop reason: HOSPADM

## 2019-01-14 RX ORDER — SODIUM CHLORIDE, SODIUM LACTATE, POTASSIUM CHLORIDE, CALCIUM CHLORIDE 600; 310; 30; 20 MG/100ML; MG/100ML; MG/100ML; MG/100ML
9 INJECTION, SOLUTION INTRAVENOUS CONTINUOUS
Status: DISCONTINUED | OUTPATIENT
Start: 2019-01-14 | End: 2019-01-14 | Stop reason: HOSPADM

## 2019-01-14 RX ORDER — MIDAZOLAM HYDROCHLORIDE 1 MG/ML
INJECTION INTRAMUSCULAR; INTRAVENOUS AS NEEDED
Status: DISCONTINUED | OUTPATIENT
Start: 2019-01-14 | End: 2019-01-14 | Stop reason: SURG

## 2019-01-14 RX ORDER — FENTANYL CITRATE 50 UG/ML
INJECTION, SOLUTION INTRAMUSCULAR; INTRAVENOUS AS NEEDED
Status: DISCONTINUED | OUTPATIENT
Start: 2019-01-14 | End: 2019-01-14 | Stop reason: SURG

## 2019-01-14 RX ORDER — PROMETHAZINE HYDROCHLORIDE 25 MG/ML
6.25 INJECTION, SOLUTION INTRAMUSCULAR; INTRAVENOUS ONCE AS NEEDED
Status: DISCONTINUED | OUTPATIENT
Start: 2019-01-14 | End: 2019-01-14 | Stop reason: HOSPADM

## 2019-01-14 RX ORDER — FENTANYL CITRATE 50 UG/ML
50 INJECTION, SOLUTION INTRAMUSCULAR; INTRAVENOUS
Status: DISCONTINUED | OUTPATIENT
Start: 2019-01-14 | End: 2019-01-14 | Stop reason: HOSPADM

## 2019-01-14 RX ORDER — PROMETHAZINE HYDROCHLORIDE 25 MG/1
25 TABLET ORAL ONCE AS NEEDED
Status: DISCONTINUED | OUTPATIENT
Start: 2019-01-14 | End: 2019-01-14 | Stop reason: HOSPADM

## 2019-01-14 RX ORDER — LIDOCAINE HYDROCHLORIDE 10 MG/ML
0.5 INJECTION, SOLUTION EPIDURAL; INFILTRATION; INTRACAUDAL; PERINEURAL ONCE AS NEEDED
Status: COMPLETED | OUTPATIENT
Start: 2019-01-14 | End: 2019-01-14

## 2019-01-14 RX ORDER — LIDOCAINE HYDROCHLORIDE 10 MG/ML
INJECTION, SOLUTION INFILTRATION; PERINEURAL AS NEEDED
Status: DISCONTINUED | OUTPATIENT
Start: 2019-01-14 | End: 2019-01-14 | Stop reason: HOSPADM

## 2019-01-14 RX ORDER — BUPIVACAINE HYDROCHLORIDE AND EPINEPHRINE 2.5; 5 MG/ML; UG/ML
INJECTION, SOLUTION EPIDURAL; INFILTRATION; INTRACAUDAL; PERINEURAL AS NEEDED
Status: DISCONTINUED | OUTPATIENT
Start: 2019-01-14 | End: 2019-01-14 | Stop reason: HOSPADM

## 2019-01-14 RX ORDER — LABETALOL HYDROCHLORIDE 5 MG/ML
5 INJECTION, SOLUTION INTRAVENOUS
Status: DISCONTINUED | OUTPATIENT
Start: 2019-01-14 | End: 2019-01-14 | Stop reason: HOSPADM

## 2019-01-14 RX ORDER — PROMETHAZINE HYDROCHLORIDE 25 MG/1
25 SUPPOSITORY RECTAL ONCE AS NEEDED
Status: DISCONTINUED | OUTPATIENT
Start: 2019-01-14 | End: 2019-01-14 | Stop reason: HOSPADM

## 2019-01-14 RX ORDER — SODIUM CHLORIDE 0.9 % (FLUSH) 0.9 %
3-10 SYRINGE (ML) INJECTION AS NEEDED
Status: DISCONTINUED | OUTPATIENT
Start: 2019-01-14 | End: 2019-01-14 | Stop reason: HOSPADM

## 2019-01-14 RX ORDER — FAMOTIDINE 10 MG/ML
20 INJECTION, SOLUTION INTRAVENOUS ONCE
Status: CANCELLED | OUTPATIENT
Start: 2019-01-14 | End: 2019-01-14

## 2019-01-14 RX ORDER — CEFAZOLIN SODIUM IN 0.9 % NACL 3 G/100 ML
3 INTRAVENOUS SOLUTION, PIGGYBACK (ML) INTRAVENOUS ONCE
Status: COMPLETED | OUTPATIENT
Start: 2019-01-14 | End: 2019-01-14

## 2019-01-14 RX ORDER — FAMOTIDINE 20 MG/1
20 TABLET, FILM COATED ORAL ONCE
Status: COMPLETED | OUTPATIENT
Start: 2019-01-14 | End: 2019-01-14

## 2019-01-14 RX ORDER — HYDROCODONE BITARTRATE AND ACETAMINOPHEN 5; 325 MG/1; MG/1
1 TABLET ORAL ONCE AS NEEDED
Status: DISCONTINUED | OUTPATIENT
Start: 2019-01-14 | End: 2019-01-14 | Stop reason: HOSPADM

## 2019-01-14 RX ORDER — ONDANSETRON 2 MG/ML
4 INJECTION INTRAMUSCULAR; INTRAVENOUS ONCE AS NEEDED
Status: DISCONTINUED | OUTPATIENT
Start: 2019-01-14 | End: 2019-01-14 | Stop reason: HOSPADM

## 2019-01-14 RX ADMIN — FENTANYL CITRATE 25 MCG: 50 INJECTION, SOLUTION INTRAMUSCULAR; INTRAVENOUS at 13:35

## 2019-01-14 RX ADMIN — FENTANYL CITRATE 25 MCG: 50 INJECTION, SOLUTION INTRAMUSCULAR; INTRAVENOUS at 13:30

## 2019-01-14 RX ADMIN — Medication 3 G: at 13:25

## 2019-01-14 RX ADMIN — SODIUM CHLORIDE, POTASSIUM CHLORIDE, SODIUM LACTATE AND CALCIUM CHLORIDE 9 ML/HR: 600; 310; 30; 20 INJECTION, SOLUTION INTRAVENOUS at 11:44

## 2019-01-14 RX ADMIN — FAMOTIDINE 20 MG: 20 TABLET, FILM COATED ORAL at 11:49

## 2019-01-14 RX ADMIN — FENTANYL CITRATE 25 MCG: 50 INJECTION, SOLUTION INTRAMUSCULAR; INTRAVENOUS at 13:40

## 2019-01-14 RX ADMIN — MIDAZOLAM HYDROCHLORIDE 2 MG: 1 INJECTION, SOLUTION INTRAMUSCULAR; INTRAVENOUS at 13:30

## 2019-01-14 RX ADMIN — LIDOCAINE HYDROCHLORIDE 0.5 ML: 10 INJECTION, SOLUTION EPIDURAL; INFILTRATION; INTRACAUDAL; PERINEURAL at 11:44

## 2019-01-14 RX ADMIN — FENTANYL CITRATE 25 MCG: 50 INJECTION, SOLUTION INTRAMUSCULAR; INTRAVENOUS at 13:33

## 2019-01-14 NOTE — OP NOTE
PREOPERATIVE DIAGNOSES:   Postlaminectomy syndrome, cervical region   Hx of fusion of cervical spine   Neuroforaminal stenosis of spine   Degenerative disc disease, cervical   Cervical facet arthropathy/cervical spondylosis without myelopathy   Type 2 diabetes mellitus with diabetic autonomic neuropathy, without long-term current use of insulin (CMS/HCC)   Diabetic autonomic neuropathy associated with type 2 diabetes mellitus (CMS/HCC)   Class 3 severe obesity with body mass index (BMI) of 40.0 to 44.9 in adult, unspecified obesity type, unspecified whether serious comorbidity present (CMS/HCC)   Primary insomnia   Other depression       POSTOPERATIVE DIAGNOSES:   Postlaminectomy syndrome, cervical region   Hx of fusion of cervical spine   Neuroforaminal stenosis of spine   Degenerative disc disease, cervical   Cervical facet arthropathy/cervical spondylosis without myelopathy   Type 2 diabetes mellitus with diabetic autonomic neuropathy, without long-term current use of insulin (CMS/HCC)   Diabetic autonomic neuropathy associated with type 2 diabetes mellitus (CMS/HCC)   Class 3 severe obesity with body mass index (BMI) of 40.0 to 44.9 in adult, unspecified obesity type, unspecified whether serious comorbidity present (CMS/HCC)   Primary insomnia   Other depression     PROCEDURES PERFORMED:   Spinal cord stimulator trial; percutaneously placed trial leads (two octopolar leads = 16 electrodes)   Complex spinal cord stimulator programming     ANESTHESIA: Local anesthesia plus MAC     COMPLICATIONS: None     PROCEDURE SUMMARY: After explaining all the risks and benefits of the procedure, an informed consent was obtained. History and physical examination were updated, and the patient's surgical site confirmed with the patient and marked in the holding room accordingly. Patient received antibiotic prophylaxis as per standard protocol. The patient was transferred to the operating room and placed on the operating room  table in prone position. Time out was completed. The thoracic and lumbar regions were prepped with ChloraPrep followed by DuraPrep, and draped with sterile towels, Ioban and universal drapes. Using C-arm fluoroscopic guidance, the interlaminar space at T3-T4 (target) was identified.  The skin and subcutaneous tissues overlying the target were anesthetized with five ml of 1% lidocaine followed by twenty ml of 0.25% bupivacaine with epinephrine 1:200,000. Using [facility] Octrode Trial Lead Kit 60 cm Length, Ref #3086, Serial #[id number], Expiration 12/10/2020, and Ref #3086, Serial #[id number], Expiration 12/18/2020, two Tuohy needles were advanced from about two vertebral levels below target, with a 10-degree needle angle from a bilateral paramedian approach into the posterior epidural space at T3-T4 without difficulties.  Aspiration was negative for blood and/or CSF. AP and lateral X-ray view were obtained confirming appropriate needle placement.  Two spinal cord stimulator trial leads were advanced through the needles into the posterior epidural space up to the level of the superior endplate of the C6 vertebral body at first attempt. The leads were placed 1 mm lateral to the anatomical midline on both sides of the posterior epidural space. AP and lateral x-ray views were obtained and scanned in the patient's chart.  A spinal cord stimulator trial was performed on the procedure table and pleasant stimulation was perceived by the patient in all of the areas of her chronic pain. Subsequently, the styletts were removed from the leads, and then, the needles were removed uneventfully. The leads were secured to the skin with Steri-Strips and Tegaderms. Fluoroscopy was utilized using low-dose of radiation applying collimation, pulsed mode, and shielding following ALARA recommendations. Fluoroscopy time: 260 seconds.   The patient tolerated the procedure well and without incident.  Upon completion of the procedure,   the  patient was transferred to the recovery room in stable condition. The patient was discharged neurologically intact and with appropriate discharge instructions.   Complex spinal cord stimulator programming took place in the recovery room.   Patient experienced significant pain relief with coverage in all areas of chronic pain with pleasant paresthesia. Patient was able to ambulate without experiencing significant pain.   Two programs were created:     PROGRAM ONE (best program):   Electrode polarities: 4+, 5-, 12+, 13-  Amplitudes: 0.6-1.5 mA (0-25 mA)   Pulse width: 1000 mcs   Rate: 40 Hz   IntraBurst rate: 500 Hz   Microdosing ratio: 30:90  A copy of the telemetry report will be scanned in the patient's chart.     PLAN:   Follow-up on 01/17/2019 at 09:45 AM  Patient will be discharged home. Appropriate discharge instructions have been provided.   The patient has been instructed to contact my office with any questions or difficulties. The patient understands the plan and agrees to proceed accordingly.           Patient Care Team:   Stephanie Tanner MD as PCP - General   Nicolás Pena MD as Consulting Physician (Neurosurgery)   Ivy Do, PhD as Consulting Physician (Psychology)   Blayne Pandya MD as Consulting Physician (Anesthesiology)   Jorge Kraus PA-C as Physician Assistant (Neurosurgery)         Blayne Pandya MD       EMR Dragon/Transcription disclaimer:   Much of this encounter note is an electronic transcription of spoken language to printed text. Electronic transcription of spoken language may permit erroneous, or at times, nonsensical words or phrases to be inadvertently transcribed. Although I have reviewed the note for such errors, some may still exist.

## 2019-01-14 NOTE — ANESTHESIA POSTPROCEDURE EVALUATION
Patient: Ernestina Garrido    Procedure Summary     Date:  01/14/19 Room / Location:  [facility] OR [address] [place] [address] TD OR    Anesthesia Start:  1325 Anesthesia Stop:      Procedure:  SPINAL CORD STIMULATOR INSERTION PHASE 1 (N/A Back) Diagnosis:       Cervical post-laminectomy syndrome      (Cervical post-laminectomy syndrome [M96.1])    Surgeon:  Blayne Pandya MD Provider:  Rito Purvis MD    Anesthesia Type:  general ASA Status:  3          Anesthesia Type: general  Last vitals  BP   145/91 (01/14/19 1426)   Temp   97 °F (36.1 °C) (01/14/19 1426)   Pulse   78 (01/14/19 1426)   Resp   20 (01/14/19 1141)     SpO2   98 % (01/14/19 1426)     Post Anesthesia Care and Evaluation    Patient location during evaluation: PACU  Patient participation: complete - patient participated  Level of consciousness: awake and alert  Pain score: 0  Pain management: adequate  Airway patency: patent  Anesthetic complications: No anesthetic complications  PONV Status: none  Cardiovascular status: hemodynamically stable and acceptable  Respiratory status: nonlabored ventilation, acceptable and nasal cannula  Hydration status: acceptable

## 2019-01-14 NOTE — ANESTHESIA PREPROCEDURE EVALUATION
Anesthesia Evaluation                  Airway   Mallampati: I  TM distance: >3 FB  Neck ROM: full  Dental      Pulmonary    Cardiovascular     (+) hypertension,       Neuro/Psych  (+) numbness, psychiatric history,     GI/Hepatic/Renal/Endo    (+) morbid obesity, GERD,  diabetes mellitus, hypothyroidism,     Musculoskeletal     (+) neck pain,   Abdominal    Substance History      OB/GYN          Other                        Anesthesia Plan    ASA 3     general     intravenous induction   Anesthetic plan, all risks, benefits, and alternatives have been provided, discussed and informed consent has been obtained with: patient.    Plan discussed with CRNA.

## 2019-01-15 ENCOUNTER — TELEPHONE (OUTPATIENT)
Dept: PAIN MEDICINE | Facility: CLINIC | Age: 49
End: 2019-01-15

## 2019-01-15 PROBLEM — Z46.2 ENCOUNTER FOR FITTING AND ADJUSTMENT OF NEUROPACEMAKER OF SPINAL CORD: Status: ACTIVE | Noted: 2019-01-15

## 2019-01-15 NOTE — PROGRESS NOTES
"Chief Complaint: \"I did well during the stimulator trial. I need more stimulation when I move my neck.\"      Brief History: Mrs. Ernestina Garrido is a 48 y.o. female, who returns to the clinic for possible spinal cord stimulator reprogramming and removal of spinal cord stimulator trial leads placed on 01/14/2019.  Ms. Ernestina Garrido reports 50% relief of her chronic neck, chest wall, and upper extremity pain along with functional improvement with the use of her stimulator device. In addition, patient reports improvement of her nocturnal pain with the use of the SCS device.  Pain level is rated as 3/10 with the stimulator \"turned on.”   Patient level ranges from 3/10 to 5/10 with the use of the spinal cord stimulator.    Patient did not take additional analgesics throughout her spinal cord stimulator trial. She has remained afebrile throughout the trial. Dressings are dry and intact. Patient denies any complications related to the procedure.   Patient denies any new bladder or bowel problems.   Patient is satisfied with the trial and would like to move forward with implantation of a spinal cord stimulator device.     Pain History: Patient underwent a lap anisa in November 2017. One week later, she developed anterior chest wall pain. She went to the ER and cardiac work up was negative. Later on, she underwent ACDF C6-C7, with resolution of her neck and left arm pain. She has continued experiencing chest wall pain and numbness in the thumb, index and middle finger of her left hand. Patient has also a history of left CTS.  Current pain is in the front of her chest on top of her breast, between her shoulder blades, also in her armpit area traveling down her left arm. She also reports tingling and numbness in her left hand, particularly in her thumb, 2nd and 3rd finger.   Pain description: constant pain, described as sharp, shooting and stabbing sensation.   Radiation of pain: The pain is localized in the center of " "her chest and radiates along the 3rd, 4th and 5th ribs into her thoracic region and her left arm.   Pain intensity today: 8/10  Average pain intensity last week: 8/10  Pain intensity ranges from: 5/10 to 10/10  Aggravating factors: Pain increases with coughing, lying down and taking a deep breathe.   Alleviating factors: None  Associated symptoms:   Patient reports pain, numbness, and weakness in the left upper extremity.  Patient denies any new bladder or bowel problems.   Patient reports difficulties with her balance but denies recent falls.      Review of previous therapies and additional medical records:  Ernestina Garrido has already failed the following measures, including:   Conservative measures: oral analgesics, topical analgesics, physical therapy, ice and heat   Interventional measures: Patient underwent left 3rd, 4th, and 5th intercostal nerve blocks on 02/28/2018 and experienced no relief. Surgical measures: ACDF C4-C6 by Dr. Pena on 01/20/2016, with resolution of upper extremity symptoms. ACDF C6-C7 on 01/03/2018.  Ernestina Garrido underwent neurosurgical consultation with Jorge kam PA-C on 05/14/2018, and was found not to be a surgical candidate.  Pre-surgical Psychological Pain Evaluation by Dr. Loera on 05/02/2018: \"From a psychological perspective patient is considered to be an appropriate candidate for spinal cord stimulator at this time.\"  Ernestina Garrido presents with significant comorbidities including, hypertension and non-insulin dependent diabetes engaged in treatment.  In terms of current analgesics, Ernestina Garrido takes: gabapentin, etodolac  I have reviewed [facility] Report #[id number] consistent to medication reconciliation.     Global Pain Scale 01-10  2019 01-17  2019               Pain 22  7               Feelings 12  20               Clinical outcomes 20  18               Activities 10  17               GPS Total: 64  62                  Diagnostic Studies:  MRI CERVICAL " SPINE W WO CONTRAST-05/07/2018:Postsurgical changes with revision of anterior cervical disc fusion and improvement in disc osteophyte complex and overall neuroforaminal involvement at the C6-C7 level right. Multilevel spondylitic changes otherwise similar to prior without new focal severe left neuroforaminal narrowing identified. Continued development of myelomalacia involving the left posterior hemicord at the C3-C4 level with persistent moderate-to-severe spinal canal stenosis at this level.  EMG/NCV RESULTS: Moderate median neuropathy at the wrist bilaterally (carpal tunnel). Chronic left C5/C6 radiculopathy, subacute/chronic C7 radiculopathy  XR CHEST 1 VW-01/15/2018: cardiac and mediastinal silhouettes to be within normal limits. The lung fields are grossly clear. No focal parenchymal opacification present.  No pleural effusion or pneumothorax. Degenerative changes seen within the spine. Pulmonary vascularity is within normal limits.      XR SPINE CERVICAL 2 OR 3 VW- 01/15/2018. Anterior cervical fusion C4-C6 with interbody fusion device. The hardware has been removed from C4 and C5 and currently, there is an anterior cervical fusion at C6-C7 with interbody fusion device. Previously placed interbody fusion devices from C4 through C6 remain in good position. There is a small osteophyte projecting into the prevertebral soft tissues which can be followed. It is likely a sequela of the previous hardware. The alignment is within normal limits.      CT CERVICAL SPINE WO CONTRAST-12/20/2017: status post anterior cervical disc fusion at the C4-C6 levels without evidence of hardware loosening or failure. Flattening of the normal lordotic curvature noted. Facets are well aligned. No evidence for discrete fracture or focal subluxation with preservation of vertebral body heights. Intervertebral disc height space is lost at the disc fusion site as well as in the lower segments consistent with degenerative disease and  minimal osteophyte formation. No significant prevertebral soft tissue swelling or paraspinal hematoma on axial soft tissue evaluation. No critical spinal canal narrowing. Lung apices partially visualized and grossly unremarkable. Lateral masses of C1 are well seated on C2. Dens is normal in appearance.  MRI SHOULDER LEFT W WO CONTRAST - 12/03/2017. There is no evidence of joint effusion. There is some mild increased signal seen in the supraspinatus tendon suggesting a mild tendinopathy. Mild increased signal seen within the subscapularis tendon suggesting mild tendinopathy. The infraspinatus and teres minor are normal in signal intensity. Slight elevation identified of the humeral head with respect to the glenoid fossa. There are degenerative changes seen within the humeral head. There is extensive motion artifact degrading image quality on several of the images. No significant joint effusion is identified. There is no definite abnormal contrast enhancement present. The darin are not well evaluated on this study due to the extensive motion artifact however there does appear to be some irregularity of the superior labrum suggesting a tear. Inferior labrum may also be injured with thickening of the capsule. Anterior and posterior labrum are not well visualized due to the motion artifact.  MRI CERVICAL SPINE W WO CONTRAST - 12/03/2017. There is fusion identified of the cervical spine from C4 through C6. The facets are well-aligned. Narrowing identified of the cervical spinal canal which is unchanged when compared to the prior study. No abnormal mass or fluid collection is seen within the paraspinal muscles. Normal signal intensity seen within the spinal cord. No cord edema or contusion. Axial imaging   C4/C5: posterior disc osteophyte complex on the right creating narrowing of the right neuroforamina and rightward aspect of the thecal sac.  C5/C6: Posterior disc osteophyte complex on the right creating narrowing of the  right neuroforamina and rightward aspect of the central spinal canal.  C6/C7: Posterior disc osteophyte complex on the left creating severe narrowing of the left neuroforamina and mass effect on the spinal cord and thecal sac. Compromise of the left nerve root is likely. Degenerative changes with posterior facet hypertrophy at the C6/C7 level creating mass effect anteriorly on the thecal sac and narrowing of the right neuroforamina. No definite nerve root  compromise identified. There is no definite abnormal contrast enhancement identified.   XR RIGHT ELBOW, 2 VIEWS - 08/12/2016; The osseous structures of the right elbow are intact and well aligned. Fat pad distention or hemarthrosis is not identified. The radial head aligns with the capitulum on all views. Negative right elbow series. No evidence of acute fracture or hemarthrosis.  MRI CERVICAL SPINE WITHOUT CONTRAST 07/07/2016: Hardware identified anteriorly fusing the C4, C5 and C6 levels. There is metal susceptibility artifact. There is slight abnormal signal intensity identified within the cervical cord posterior to the C4 level, unchanged in appearance when compared to 10/17/2015. No evidence of spinal cord edema. No underlying lesion. There is slight mass effect on the spinal cord at the C3/C4 and C4/C5 levels.  Degenerative changes seen within the posterior facets. Craniovertebral junction is preserved. C3/C4, small posterior disc osteophyte complex creating mild anterior mass effect on the thecal sac. Mild narrowing of the central spinal canal. C3-C4; large right paracentral disc osteophyte complex creating mass effect anteriorly on the thecal sac and narrowing of the right neural foramina. No definite nerve root compromise. C5-C6 posterior disc osteophyte complex creating narrowing of the right neural foramina. No definite nerve root compromise. C6-C7 posterior disc osteophyte complex with slight lateralization to the left creating narrowing of the left  "neural foramina and mass effect anteriorly on the leftward aspect of the spinal cord. No evidence of cord edema or contusion. Bilateral narrowing of the neural foramina. No significant central spinal canal stenosis. The remainder of the vertebral body levels are unremarkable.  CT ABDOMEN AND PELVIS W WO CONTRAST-06/17/2016: The left ovary is identified in the left upper adnexal region and appears normal. The right ovary is only tentatively identified as an area of thickening along the broad ligament and is not enlarged. The uterus is surgically absent. No intrapelvic inflammatory change, mass, or adenopathy. No hernia. Delayed images show contrast opacification of the normal appearing bladder. The bony structures appear grossly intact. No evidence of left colon diverticulitis or even significant diverticulosis is seen. Coronal images reveal a relatively mild levoconvex lumbar scoliosis.  XR SPINE CERVICAL MIN 2 VIEWS, 05/02/2016: Anterior cervical fusion from C4 through C6 with interbody fusion devices. Alignment is excellent in AP and lateral projection. Hardware is intact without evidence of displacement or fracture. Right carotid calcification.    The following portions of the patient's history were reviewed and updated as appropriate: problem list, past medical history, past surgery history, social history, family history, medications, and allergies    Review of Systems   Cardiovascular: Positive for chest pain.   Musculoskeletal: Positive for neck pain.   Psychiatric/Behavioral: The patient is nervous/anxious.    All other systems reviewed and are negative.    /98   Pulse 92   Temp 97.5 °F (36.4 °C) (Temporal)   Resp 18   Ht 172.7 cm (68\")   Wt 122 kg (268 lb)   SpO2 98%   BMI 40.75 kg/m²       Physical Exam   Neurologic Exam  Constitutional: Patient is oriented to person, place, and time. Patient appears well-developed and well-nourished.   HENT: Head: Normocephalic and atraumatic. Eyes: " Conjunctivae and lids are normal. Pupils: Equal, round, reactive to light.   Neck: Trachea normal. Neck supple. No JVD present. Lymphatic: No cervical adenopathy  Pulmonary Respiratory effort: No increased work of breathing or signs of respiratory distress. Auscultation of lungs: Clear to auscultation.   Cardiovascular Auscultation of heart: Normal rate and rhythm, normal S1 and S2, no murmurs. Peripheral vascular exam: Normal. Carotid: right 2+, left 2+, no bruit on the right and no bruit on the left. Femoral: right 2+, left 2+, no bruit on the right and no bruit on the left. Posterior tibialis: right 2+ and left 2+. Dorsalis pedis: right 2+ and left 2+. No edema.   Abdomen: The abdomen was soft and nontender. Bowel sounds were normal.   Musculoskeletal   Gait and station: Gait evaluation demonstrated a normal gait.   Cervical spine: Active and passive range of motion are improved. Cervical facet joint loading maneuvers are negative at this time.  Shoulders: The range of motion of the glenohumeral joints is full and without pain. Rotator cuff strength is 5/5.   Palpation of the left third, fourth, fifth ribs reproduces pain.  Compression of the chest wall on the left side reproduces pain.  Palpation of the sternal chondral joints reproduces pain.   Thoracic spine: Thoracic facet joint loading maneuvers are negative.   Neurological: Patient is alert and oriented to person, place, and time. Speech: speech is normal. Cortical function: Normal mental status.   Cranial nerves: Cranial nerves 2-12 intact.   Reflex Scores: Right brachioradialis 2+, Left brachioradialis trace, Right biceps 2+, Left biceps trace, Right triceps 2+, Left triceps trace   Motor strength: 5/5, except left upper extremity 4/5 biceps/triceps  Motor Tone: normal tone.   Involuntary movements: none.   Spurling sign is positive. Lhermitte sign is negative. Negative long tract signs.   Sensation: No sensory loss. Sensory exam: intact to light touch,  intact pain and temperature sensation, intact vibration sensation and normal proprioception.   Coordination: Normal finger to nose and heel to shin. Normal balance and negative. Romberg's sign negative.   Skin and subcutaneous tissue: Skin is warm and intact. No rash noted. No cyanosis.   Psychiatric: Judgment and insight: Normal. Orientation to person, place and time: Normal. Recent and remote memory: Intact. Mood and affect: Normal.     PROCEDURES:   Procedure #1: Analysis of the spinal cord stimulator device with complex spinal cord stimulator reprogramming   Patient used the [employer] spinal cord stimulator device 100% of the time since initiation of the trial phase. The spinal cord stimulator device was reprogrammed under my direct supervision and two programs were created by adjusting electrode polarities, amplitude, pulse width, pulse rate, BurstDR, micro-dosing, in the following fashion;    Program ONE (Best Program):    Electrode polarities: 1-, 3+, 9-, 11+  Amplitude: 0.4-0.8 mA     Pulse width: 1000 mcs  Rate: 40 Hz  IntraBurst rate: 500 Hz  Micro-dosing ratio: 30:90    Patient experienced significant pain relief with coverage with pleasant paresthesia in all areas of chronic pain.  Time spent reprogrammin minutes  A copy of the telemetry report will be scanned in the patient's chart.    Procedure #2: Removal of spinal cord stimulator trial leads.   Two leads were removed with tips intact. There is no erythema, drainage, or fluid accumulation at the site. The area was cleansed with chlorhexidine.  A small Covaderm was applied.     ASSESSMENT:   1. Cervical post-laminectomy syndrome    2. Hx of fusion of cervical spine    3. Neuroforaminal stenosis of spine    4. Degenerative disc disease, cervical    5. Cervical facet arthropathy/cervical spondylosis without myelopathy    6. Type 2 diabetes mellitus with diabetic autonomic neuropathy, without long-term current use of insulin (CMS/[facility])    7. Diabetic  autonomic neuropathy associated with type 2 diabetes mellitus (CMS/HCC)    8. Class 3 severe obesity with body mass index (BMI) of 40.0 to 44.9 in adult, unspecified obesity type, unspecified whether serious comorbidity present (CMS/HCC)    9. Primary insomnia    10. Other depression    11. Encounter for fitting and adjustment of neuropacemaker of spinal cord        PLAN: Patient's chronic pain condition has been significantly improved during her SCS trial. I had a lengthy conversation with Ms. Ernestina Garrido regarding her chronic pain condition and potential therapeutic options. Patient presents with a history of unrelenting chronic chest wall pain, neck pain and left arm pain. Patient underwent lap anisa in November 2017. One week later, she developed her current pain. Patient is status post ACDF C4-C6 by on 01/20/2016, with resolution of upper extremity symptoms. She underwent ACDF C6-C7 on 01/03/2018, with complete resolution of her previously severe neck and left arm pain. Unfortunately, she continued experiencing severe chest wall pain refractory to medical treatment, and recurrent neck and left arm pain that started one month after her last surgery. Patient failed to obtain sustained pain relief from intercostal nerve blocks. Recent EMG /NCV revealed chronic cervical radiculopathy.  Patient has continued experiencing cervical radiculopathy with correlation as revealed on recent electrodiagnostic studies. Patient has failed to obtain relief with conservative measures. Patient has been referred back to this practice by Jorge Kraus PA-C and Dr. Pena for a spinal cord stimulator trial.  I have reviewed all available patient's medical records as well as previous therapies as referenced above under history of present illness. Patient is satisfied with the trial and would like to move forward with implantation of a spinal cord stimulator device. Therefore, I have proposed the following plan:  1. Referral to  Dr. Nicolás Pena in consultation for implantation of a spinal cord stimulator device. I have recommended [facility] Tripole paddle lead (No MRI Approval)  with the top electrodes projecting at the level of the superior endplate of the C6 vertebral level. I have recommended [facility] Proclaim 7 IPG Non-Rechargeable (Non-MRI compatible system). Patient will follow-up with me theremarleeter.   2. Recommendations for infection control prior to surgery, as follows:  · Start over-the-counter Hibiclens showers daily 5 days before surgery  · Chlorhexidine towelettes (given at time of Pre-Admission Testing appointment) night before and day of surgery  3. Long-term rehabilitation efforts:  A. Start a comprehensive physical therapy program for upper body strengthening, posture correction, neurodynamics, ultrasound, ASTYM, E-STIM, myofascial release, desensitization techniques, cupping and dry needling once her pain is under control  B. Start an exercise program such as yoga, water therapy and daily walks for fitness  C. Follow-up with Dr. Loera for cognitive behavioral therapy, biofeedback  D. Referral to [facility]  E. Start contrast therapy  4.  Pharmacological measures:   A.  Continue gabapentin and Cymbalta, as currently prescribed  B.  Continue lidocaine 10%, prilocaine 2%, capsaicin 0.001%, mannitol 20%, imipramine 3% cream, apply cream to affected areas  5.  The patient has been instructed to contact my office with any questions or difficulties. The patient understands the plan and agrees to proceed accordingly.4. The patient has been instructed to contact my office with any questions or difficulties. The patient understands the plan and agrees to proceed accordingly.      Patient Care Team:  Stephanie Tanner MD as PCP - General  Nicolás Pena MD as Consulting Physician (Neurosurgery)  Ivy Do, PhD as Consulting Physician (Psychology)  Blayne Pandya MD as Consulting  Physician (Anesthesiology)  Jorge Kraus PA-C as Physician Assistant (Neurosurgery)  Cipriano Coles PA-C as Physician Assistant (Physician Assistant)     No orders of the defined types were placed in this encounter.        No future appointments.      Blayne Pandya MD      EMR Dragon/Transcription disclaimer:  Much of this encounter note is an electronic transcription of spoken language to printed text. Electronic transcription of spoken language may permit erroneous, or at times, nonsensical words or phrases to be inadvertently transcribed. Although I have reviewed the note for such errors, some may still exist.

## 2019-01-15 NOTE — TELEPHONE ENCOUNTER
Patient had SCS trial with [facility] on 01/14/19. Called patient to f/u post procedure. Reached voicemail. Unable to leave message due to full mailbox

## 2019-01-17 ENCOUNTER — OFFICE VISIT (OUTPATIENT)
Dept: PAIN MEDICINE | Facility: CLINIC | Age: 49
End: 2019-01-17

## 2019-01-17 VITALS
SYSTOLIC BLOOD PRESSURE: 160 MMHG | WEIGHT: 268 LBS | HEART RATE: 92 BPM | RESPIRATION RATE: 18 BRPM | TEMPERATURE: 97.5 F | DIASTOLIC BLOOD PRESSURE: 98 MMHG | OXYGEN SATURATION: 98 % | BODY MASS INDEX: 40.62 KG/M2 | HEIGHT: 68 IN

## 2019-01-17 DIAGNOSIS — Z98.1 HX OF FUSION OF CERVICAL SPINE: ICD-10-CM

## 2019-01-17 DIAGNOSIS — Z46.2 ENCOUNTER FOR FITTING AND ADJUSTMENT OF NEUROPACEMAKER OF SPINAL CORD: ICD-10-CM

## 2019-01-17 DIAGNOSIS — M47.812 FACET ARTHROPATHY, CERVICAL: ICD-10-CM

## 2019-01-17 DIAGNOSIS — M47.812 ARTHROPATHY OF CERVICAL FACET JOINT: ICD-10-CM

## 2019-01-17 DIAGNOSIS — M50.30 DDD (DEGENERATIVE DISC DISEASE), CERVICAL: ICD-10-CM

## 2019-01-17 DIAGNOSIS — M50.30 DEGENERATIVE DISC DISEASE, CERVICAL: ICD-10-CM

## 2019-01-17 DIAGNOSIS — E66.01 CLASS 3 SEVERE OBESITY WITH BODY MASS INDEX (BMI) OF 40.0 TO 44.9 IN ADULT, UNSPECIFIED OBESITY TYPE, UNSPECIFIED WHETHER SERIOUS COMORBIDITY PRESENT (HCC): ICD-10-CM

## 2019-01-17 DIAGNOSIS — M96.1 CERVICAL POST-LAMINECTOMY SYNDROME: ICD-10-CM

## 2019-01-17 DIAGNOSIS — E11.43 TYPE 2 DIABETES MELLITUS WITH DIABETIC AUTONOMIC NEUROPATHY, WITHOUT LONG-TERM CURRENT USE OF INSULIN (HCC): ICD-10-CM

## 2019-01-17 DIAGNOSIS — M47.812 CERVICAL SPONDYLOSIS WITHOUT MYELOPATHY: ICD-10-CM

## 2019-01-17 DIAGNOSIS — F51.01 PRIMARY INSOMNIA: ICD-10-CM

## 2019-01-17 DIAGNOSIS — F32.89 OTHER DEPRESSION: ICD-10-CM

## 2019-01-17 DIAGNOSIS — E11.43 DIABETIC AUTONOMIC NEUROPATHY ASSOCIATED WITH TYPE 2 DIABETES MELLITUS (HCC): ICD-10-CM

## 2019-01-17 DIAGNOSIS — M48.00 NEUROFORAMINAL STENOSIS OF SPINE: ICD-10-CM

## 2019-01-17 DIAGNOSIS — M96.1 CERVICAL POST-LAMINECTOMY SYNDROME: Primary | ICD-10-CM

## 2019-01-17 PROCEDURE — 95972 ALYS CPLX SP/PN NPGT W/PRGRM: CPT | Performed by: ANESTHESIOLOGY

## 2019-01-17 PROCEDURE — 99024 POSTOP FOLLOW-UP VISIT: CPT | Performed by: ANESTHESIOLOGY

## 2019-01-25 DIAGNOSIS — G89.29 CHRONIC PAIN OF RIGHT KNEE: ICD-10-CM

## 2019-01-25 DIAGNOSIS — M25.561 CHRONIC PAIN OF RIGHT KNEE: ICD-10-CM

## 2019-01-25 RX ORDER — ETODOLAC 200 MG/1
CAPSULE ORAL
Qty: 30 CAPSULE | Refills: 0 | Status: SHIPPED | OUTPATIENT
Start: 2019-01-25 | End: 2019-03-04 | Stop reason: SDUPTHER

## 2019-01-28 ENCOUNTER — OFFICE VISIT (OUTPATIENT)
Dept: NEUROSURGERY | Facility: CLINIC | Age: 49
End: 2019-01-28

## 2019-01-28 VITALS — WEIGHT: 267.4 LBS | HEIGHT: 68 IN | TEMPERATURE: 98.4 F | BODY MASS INDEX: 40.53 KG/M2 | RESPIRATION RATE: 18 BRPM

## 2019-01-28 DIAGNOSIS — M54.2 CHRONIC NECK PAIN: Primary | ICD-10-CM

## 2019-01-28 DIAGNOSIS — M50.30 DDD (DEGENERATIVE DISC DISEASE), CERVICAL: ICD-10-CM

## 2019-01-28 DIAGNOSIS — Z98.1 HX OF FUSION OF CERVICAL SPINE: ICD-10-CM

## 2019-01-28 DIAGNOSIS — G89.29 CHRONIC NECK PAIN: Primary | ICD-10-CM

## 2019-01-28 DIAGNOSIS — M47.812 CERVICAL SPONDYLOSIS WITHOUT MYELOPATHY: ICD-10-CM

## 2019-01-28 PROCEDURE — 99213 OFFICE O/P EST LOW 20 MIN: CPT | Performed by: NEUROLOGICAL SURGERY

## 2019-01-28 NOTE — PROGRESS NOTES
NAME: JAGJIT DEAL   DOS: 2019  : 1970  PCP: Stephanie Tanner MD    Chief Complaint:  Neck and left arm pain  Chief Complaint   Patient presents with   • Neck & L shoulder pain   • SCS evaluation   • Hx of ACDF C6-7     2018       History of Present Illness:  48 y.o. female   This is a 48-year-old female with a complex history of neck and arm pain.  I previously did performed before 56 ACDF and she represented with adjacent level disease.  She reported improvement in her left arm pain after surgery however still had had symptoms of myelopathy.  Her x-rays looked good and we discharged her to pain management she's undergone therapy with a spinal cord stimulator implantation she reports some improvement in her left pectoral and left C7 radicular components of her pain she's here for evaluation for placement of a permanent lead    PMHX  Allergies:  Allergies   Allergen Reactions   • Glimepiride Diarrhea     Medications    Current Outpatient Medications:   •  atorvastatin (LIPITOR) 20 MG tablet, Take 1 tablet by mouth Daily., Disp: 30 tablet, Rfl: 5  •  Cream Base cream, LAMOTRIGINE 2.5%, LIDOCAINE 2.2%, PRILOCAINE 2.2% B APPLY 1 GRAM 3-4 TIMES DAILY, Disp: 60 g, Rfl: PRN  •  Cream Base cream, LIDOCAINE 5% OINTMENT APPLY  1 GRAM 3-4 TIMES DAILY, Disp: 35.44 g, Rfl: PRN  •  etodolac (LODINE) 200 MG capsule, TAKE ONE CAPSULE BY MOUTH EVERY 8 HOURS AS NEEDED FOR PAIN, Disp: 30 capsule, Rfl: 0  •  gabapentin (NEURONTIN) 600 MG tablet, Take 1 tablet by mouth 3 (Three) Times a Day., Disp: 90 tablet, Rfl: 3  •  Gel Base gel, LAMOTRIGINE 2%, LIDOCAINE 2.2%, PRILOCAINE 2.2%, BASE B. APPLY 1 G TO AFFECTED AREA QD, Disp: 30 g, Rfl: 5  •  glucose blood (FREESTYLE LITE) test strip, Test once daily, Disp: 100 each, Rfl: 3  •  hydrochlorothiazide (HYDRODIURIL) 50 MG tablet, Take 1 tablet by mouth Daily., Disp: 30 tablet, Rfl: 5  •  Lancets (FREESTYLE) lancets, Test once daily, Disp: 100 each, Rfl: 3  •   levothyroxine (SYNTHROID, LEVOTHROID) 100 MCG tablet, Take 1 tablet by mouth Daily., Disp: 30 tablet, Rfl: 5  •  lisinopril (PRINIVIL,ZESTRIL) 20 MG tablet, Take 1 tablet by mouth every night at bedtime., Disp: 30 tablet, Rfl: 5  •  omeprazole (priLOSEC) 40 MG capsule, Take 1 capsule by mouth Daily., Disp: 30 capsule, Rfl: 5  •  pioglitazone (ACTOS) 30 MG tablet, Take 1 tablet by mouth Daily., Disp: 30 tablet, Rfl: 5  •  sitaGLIPtin-metFORMIN (JANUMET)  MG per tablet, Take 1 tablet by mouth 2 (Two) Times a Day With Meals., Disp: 60 tablet, Rfl: 5  Past Medical History:  Past Medical History:   Diagnosis Date   • Arthralgia of hip    • Cervical facet arthropathy/cervical spondylosis without myelopathy    • Chronic pain syndrome     Description: hx of epidural injections.   • Degenerative disc disease, cervical    • Depression     Description: dx .   • Diabetes mellitus (CMS/HCC)     TYPE 2   • Essential hypertension     Description: dx .   • Hemorrhoid 2016   • History of colonoscopy 2013    normal per patient   • History of uterine leiomyoma    • History of varicella    • Hypertension    • Hypothyroidism     Description: dx .   • Insomnia 2016   • Low back pain    • Lumbosacral disc disease    • Neck pain    • Obesity    • Osteoarthritis    • Tattoos     HIV neg  per patient   • Tobacco abuse    • Wears partial dentures     upper     Past Surgical History:  Past Surgical History:   Procedure Laterality Date   • ANTERIOR CERVICAL DISCECTOMY  2016    C4-6 DISC (Dr. Pena)   • ANTERIOR CERVICAL DISCECTOMY W/ FUSION Left 1/3/2018    Procedure: CERVICAL DISCECTOMY ANTERIOR WITH FUSION C6-7;  Surgeon: Nicolás Pena MD;  Location: [facility];  Service:    • BACK SURGERY      lumbar discectomy   •  SECTION      , ,    • CHOLECYSTECTOMY WITH INTRAOPERATIVE CHOLANGIOGRAM N/A 2017    Procedure: CHOLECYSTECTOMY LAPAROSCOPIC INTRAOPERATIVE CHOLANGIOGRAM;   Surgeon: Clifford Freed MD;  Location:  [facility];  Service:    • COLONOSCOPY  2016   • HYSTERECTOMY  2015   • SPINAL CORD STIMULATOR IMPLANT N/A 2019    Procedure: SPINAL CORD STIMULATOR INSERTION PHASE 1;  Surgeon: Blayne Pandya MD;  Location:  [facility];  Service: Pain Management     Social Hx:  Social History     Tobacco Use   • Smoking status: Former Smoker     Packs/day: 0.50     Years: 28.00     Pack years: 14.00     Types: Cigarettes     Start date:      Last attempt to quit: 2017     Years since quittin.2   • Smokeless tobacco: Never Used   Substance Use Topics   • Alcohol use: Yes     Comment: occasional 1 a week   • Drug use: No     Family Hx:  Family History   Problem Relation Age of Onset   • Diabetes Mother    • Hypertension Mother    • Colon cancer Maternal Grandmother    • Diabetes Maternal Grandmother    • Hypertension Maternal Grandmother    • Diabetes Daughter    • Hypothyroidism Daughter    • Diabetes Maternal Uncle    • Hypertension Maternal Uncle    • Hypertension Other    • Breast cancer Neg Hx    • Ovarian cancer Neg Hx      Review of Systems:        Review of Systems   Constitutional: Negative for activity change, appetite change, chills, diaphoresis, fatigue, fever and unexpected weight change.   HENT: Negative for congestion, dental problem, drooling, ear discharge, ear pain, facial swelling, hearing loss, mouth sores, nosebleeds, postnasal drip, rhinorrhea, sinus pressure, sneezing, sore throat, tinnitus, trouble swallowing and voice change.    Eyes: Negative for photophobia, pain, discharge, redness, itching and visual disturbance.   Respiratory: Negative for apnea, cough, choking, chest tightness, shortness of breath, wheezing and stridor.    Cardiovascular: Negative for chest pain, palpitations and leg swelling.   Gastrointestinal: Negative for abdominal distention, abdominal pain, anal bleeding, blood in stool, constipation, diarrhea, nausea, rectal pain and  vomiting.   Endocrine: Negative for cold intolerance, heat intolerance, polydipsia, polyphagia and polyuria.   Genitourinary: Negative for decreased urine volume, difficulty urinating, dysuria, enuresis, flank pain, frequency, genital sores, hematuria and urgency.   Musculoskeletal: Positive for arthralgias, back pain, neck pain and neck stiffness. Negative for gait problem, joint swelling and myalgias.   Skin: Negative for color change, pallor, rash and wound.   Allergic/Immunologic: Negative for environmental allergies, food allergies and immunocompromised state.   Neurological: Positive for weakness and numbness. Negative for dizziness, tremors, seizures, syncope, facial asymmetry, speech difficulty, light-headedness and headaches.   Hematological: Negative for adenopathy. Does not bruise/bleed easily.   Psychiatric/Behavioral: Positive for agitation, dysphoric mood and sleep disturbance. Negative for behavioral problems, confusion, decreased concentration, hallucinations, self-injury and suicidal ideas. The patient is not nervous/anxious and is not hyperactive.    All other systems reviewed and are negative.     I have reviewed this note template and all pertinent parts of the review of systems social, family history, surgical history and medication list      Physical Examination:  Vitals:    01/28/19 1427   Resp: 18   Temp: 98.4 °F (36.9 °C)      General Appearance:   Well developed, well nourished, well groomed, alert, and cooperative.  Neurological examination:  Neurologic Exam  She is awake alert and follows commands    She has good strength in her upper extremities it's effort dependent and her left upper she has good  strength with no Teena's    She is hyperreflexic mildly throughout    She has good strength in her gait is normal    Review of Imaging/DATA:  Reviewed her scans her last MRI was from May  Diagnoses/Plan:    Ms. Garrido is a 48 y.o. female   For my standpoint I appreciate the  recommendation for spinal cord stimulation placement I do think it will assist.  Given her congenital canal stenosis of cervical implantation of a large paddle lead would likely necessitate a cervical laminectomy.  The problem this is by doing this there may be some increasing inherent instability generated at the inferior margin of her fusion.  I also have concerns that placing the the lead with its thickness under the lamina and the degree of spinal stenosis that she has may be an issue.  From my standpoint if a percutaneously can be placed like to 1 during her trial I would recommend having that permanently implanted and unexplained that to her I talked to Dr. DR PRESTON office HIM evaluate her if for some reason that is not a possibility I'll have to discuss with him options regarding laminectomy with fusion and implantation of stimulator to obviously a higher risk surgery and relatively young patient.

## 2019-02-07 ENCOUNTER — TELEPHONE (OUTPATIENT)
Dept: PAIN MEDICINE | Facility: CLINIC | Age: 49
End: 2019-02-07

## 2019-03-04 ENCOUNTER — OFFICE VISIT (OUTPATIENT)
Dept: INTERNAL MEDICINE | Facility: CLINIC | Age: 49
End: 2019-03-04

## 2019-03-04 VITALS
SYSTOLIC BLOOD PRESSURE: 134 MMHG | DIASTOLIC BLOOD PRESSURE: 80 MMHG | WEIGHT: 276 LBS | RESPIRATION RATE: 18 BRPM | TEMPERATURE: 98.5 F | HEART RATE: 76 BPM | BODY MASS INDEX: 41.97 KG/M2

## 2019-03-04 DIAGNOSIS — G89.29 CHRONIC PAIN OF RIGHT KNEE: ICD-10-CM

## 2019-03-04 DIAGNOSIS — M25.561 CHRONIC PAIN OF RIGHT KNEE: ICD-10-CM

## 2019-03-04 DIAGNOSIS — E11.43 TYPE 2 DIABETES MELLITUS WITH DIABETIC AUTONOMIC NEUROPATHY, WITHOUT LONG-TERM CURRENT USE OF INSULIN (HCC): ICD-10-CM

## 2019-03-04 DIAGNOSIS — M96.1 CERVICAL POST-LAMINECTOMY SYNDROME: ICD-10-CM

## 2019-03-04 DIAGNOSIS — M47.812 FACET ARTHROPATHY, CERVICAL: ICD-10-CM

## 2019-03-04 DIAGNOSIS — M50.30 DEGENERATIVE DISC DISEASE, CERVICAL: Primary | ICD-10-CM

## 2019-03-04 PROCEDURE — 99212 OFFICE O/P EST SF 10 MIN: CPT | Performed by: INTERNAL MEDICINE

## 2019-03-04 RX ORDER — ETODOLAC 200 MG/1
200 CAPSULE ORAL 3 TIMES DAILY PRN
Qty: 30 CAPSULE | Refills: 5 | Status: SHIPPED | OUTPATIENT
Start: 2019-03-04 | End: 2019-05-21 | Stop reason: ALTCHOICE

## 2019-03-04 NOTE — PROGRESS NOTES
Subjective       Ernestina Garrido is a 48 y.o. female.     Chief Complaint   Patient presents with   • Follow-up     REQUESTING REFERRAL FOR OPTHALMOLOGY AND A NEW NEUROLOGIST       History obtained from the patient.      History of Present Illness     The patient is here for follow-up of her Cervical Degenerative Disc Disease.  The patient has been seeing Dr. Pena in Neurosurgery and Dr. Pandya in Pain Management.  She had a trial of a spinal stimulator, and felt that it helped.  She states there is a disagreement as to whether to do a permanent one.  Dr. Pena does not think it will help.  She would like a second opinion.    The patient would also like an appointment with an ophthalmologist for her diabetic eye exam.  She denies any eye symptoms.    The following portions of the patient's history were reviewed and updated as appropriate: allergies, current medications, past family history, past medical history, past social history, past surgical history and problem list.      Review of Systems   Constitutional: Negative for unexpected weight change.   Eyes: Negative for pain, discharge, redness, itching and visual disturbance.   Musculoskeletal: Positive for neck pain and neck stiffness.           Objective     Blood pressure 134/80, pulse 76, temperature 98.5 °F (36.9 °C), temperature source Temporal, resp. rate 18, weight 125 kg (276 lb), not currently breastfeeding.    Physical Exam   Constitutional:   Morbidly obese.   Neurological: She is alert.   Psychiatric: She has a normal mood and affect.   Nursing note and vitals reviewed.    Counseling was given to patient for the following topics: discussed labs and diagnostic tests performed last visit or since last visit, importance of yearly ophthalmology exam for patients with diabetes and risks and benefits of treament options . Total time of the encounter was 10 minutes and 10 minutes was spent counseling.        Assessment/Plan   Ernestina was seen today  for follow-up.    Diagnoses and all orders for this visit:    Degenerative disc disease, cervical  -     Ambulatory Referral to Neurosurgery    Cervical facet arthropathy/cervical spondylosis without myelopathy  -     Ambulatory Referral to Neurosurgery    Cervical post-laminectomy syndrome  -     Ambulatory Referral to Neurosurgery    Type 2 diabetes mellitus with diabetic autonomic neuropathy, without long-term current use of insulin (CMS/[facility])  -     Ambulatory Referral to Ophthalmology    Chronic pain of right knee  -     etodolac (LODINE) 200 MG capsule; Take 1 capsule by mouth 3 (Three) Times a Day As Needed (neck pain).            Return in about 2 weeks (around 3/18/2019) for  Annual physical, fasting.

## 2019-03-06 RX ORDER — LANCETS 28 GAUGE
EACH MISCELLANEOUS
Qty: 100 EACH | Refills: 3 | Status: SHIPPED | OUTPATIENT
Start: 2019-03-06 | End: 2019-08-27 | Stop reason: SDUPTHER

## 2019-03-07 PROBLEM — M94.0 COSTOCHONDRITIS: Status: RESOLVED | Noted: 2018-02-14 | Resolved: 2019-03-07

## 2019-03-07 PROBLEM — R07.89 ATYPICAL CHEST PAIN: Status: RESOLVED | Noted: 2018-01-15 | Resolved: 2019-03-07

## 2019-03-07 PROBLEM — I95.9 HYPOTENSION: Status: RESOLVED | Noted: 2018-01-15 | Resolved: 2019-03-07

## 2019-03-07 PROBLEM — K92.1 HEMATOCHEZIA: Status: RESOLVED | Noted: 2018-01-15 | Resolved: 2019-03-07

## 2019-03-07 PROBLEM — N17.9 AKI (ACUTE KIDNEY INJURY) (HCC): Status: RESOLVED | Noted: 2018-01-15 | Resolved: 2019-03-07

## 2019-03-13 ENCOUNTER — TELEPHONE (OUTPATIENT)
Dept: INTERNAL MEDICINE | Facility: CLINIC | Age: 49
End: 2019-03-13

## 2019-03-13 DIAGNOSIS — M47.812 FACET ARTHROPATHY, CERVICAL: Primary | ICD-10-CM

## 2019-03-13 DIAGNOSIS — G89.4 CHRONIC PAIN SYNDROME: ICD-10-CM

## 2019-03-13 DIAGNOSIS — M96.1 CERVICAL POST-LAMINECTOMY SYNDROME: ICD-10-CM

## 2019-03-13 DIAGNOSIS — M50.30 DEGENERATIVE DISC DISEASE, CERVICAL: ICD-10-CM

## 2019-03-13 DIAGNOSIS — Z98.1 HX OF FUSION OF CERVICAL SPINE: ICD-10-CM

## 2019-03-13 NOTE — TELEPHONE ENCOUNTER
----- Message from Mimi Gonzalez sent at 3/13/2019 12:51 PM EDT -----  Regarding AMB REFERRAL TO NEUROSURGERY placed for a second opinion on 3/4/19-to go to [facility] she has to have an MRI within the past 6 months

## 2019-03-18 ENCOUNTER — OFFICE VISIT (OUTPATIENT)
Dept: INTERNAL MEDICINE | Facility: CLINIC | Age: 49
End: 2019-03-18

## 2019-03-18 VITALS
WEIGHT: 266 LBS | DIASTOLIC BLOOD PRESSURE: 80 MMHG | SYSTOLIC BLOOD PRESSURE: 120 MMHG | BODY MASS INDEX: 41.75 KG/M2 | HEIGHT: 67 IN | RESPIRATION RATE: 20 BRPM | TEMPERATURE: 97.2 F | HEART RATE: 72 BPM

## 2019-03-18 DIAGNOSIS — G89.4 CHRONIC PAIN SYNDROME: ICD-10-CM

## 2019-03-18 DIAGNOSIS — E03.9 ACQUIRED HYPOTHYROIDISM: ICD-10-CM

## 2019-03-18 DIAGNOSIS — D22.9 ATYPICAL NEVI: ICD-10-CM

## 2019-03-18 DIAGNOSIS — Z23 NEED FOR HEPATITIS A IMMUNIZATION: ICD-10-CM

## 2019-03-18 DIAGNOSIS — F51.01 PRIMARY INSOMNIA: ICD-10-CM

## 2019-03-18 DIAGNOSIS — F32.89 OTHER DEPRESSION: ICD-10-CM

## 2019-03-18 DIAGNOSIS — K21.9 GASTROESOPHAGEAL REFLUX DISEASE WITHOUT ESOPHAGITIS: ICD-10-CM

## 2019-03-18 DIAGNOSIS — I10 ESSENTIAL HYPERTENSION: ICD-10-CM

## 2019-03-18 DIAGNOSIS — M50.30 DEGENERATIVE DISC DISEASE, CERVICAL: ICD-10-CM

## 2019-03-18 DIAGNOSIS — E78.49 OTHER HYPERLIPIDEMIA: ICD-10-CM

## 2019-03-18 DIAGNOSIS — Z79.899 HIGH RISK MEDICATION USE: ICD-10-CM

## 2019-03-18 DIAGNOSIS — E11.43 TYPE 2 DIABETES MELLITUS WITH DIABETIC AUTONOMIC NEUROPATHY, WITHOUT LONG-TERM CURRENT USE OF INSULIN (HCC): ICD-10-CM

## 2019-03-18 DIAGNOSIS — Z00.00 ENCOUNTER FOR HEALTH MAINTENANCE EXAMINATION IN ADULT: Primary | ICD-10-CM

## 2019-03-18 DIAGNOSIS — L02.214 ABSCESS OF RIGHT GROIN: ICD-10-CM

## 2019-03-18 DIAGNOSIS — E11.43 DIABETIC AUTONOMIC NEUROPATHY ASSOCIATED WITH TYPE 2 DIABETES MELLITUS (HCC): ICD-10-CM

## 2019-03-18 DIAGNOSIS — Z12.31 ENCOUNTER FOR SCREENING MAMMOGRAM FOR BREAST CANCER: ICD-10-CM

## 2019-03-18 LAB
25(OH)D3 SERPL-MCNC: 11 NG/ML
A/C: NORMAL
ALBUMIN SERPL-MCNC: 4.37 G/DL (ref 3.2–4.8)
ALBUMIN/GLOB SERPL: 1.5 G/DL (ref 1.5–2.5)
ALP SERPL-CCNC: 81 U/L (ref 25–100)
ALT SERPL W P-5'-P-CCNC: 14 U/L (ref 7–40)
ANION GAP SERPL CALCULATED.3IONS-SCNC: 7 MMOL/L (ref 3–11)
ARTICHOKE IGE QN: 135 MG/DL (ref 0–130)
AST SERPL-CCNC: 14 U/L (ref 0–33)
BASOPHILS # BLD AUTO: 0.02 10*3/MM3 (ref 0–0.2)
BASOPHILS NFR BLD AUTO: 0.2 % (ref 0–1)
BILIRUB SERPL-MCNC: 1 MG/DL (ref 0.3–1.2)
BUN BLD-MCNC: 19 MG/DL (ref 9–23)
BUN/CREAT SERPL: 21.8 (ref 7–25)
CALCIUM SPEC-SCNC: 9.5 MG/DL (ref 8.7–10.4)
CHLORIDE SERPL-SCNC: 100 MMOL/L (ref 99–109)
CHOLEST SERPL-MCNC: 209 MG/DL (ref 0–200)
CLARITY, POC: CLEAR
CO2 SERPL-SCNC: 30 MMOL/L (ref 20–31)
COLOR UR: YELLOW
CREAT BLD-MCNC: 0.87 MG/DL (ref 0.6–1.3)
DEPRECATED RDW RBC AUTO: 43.4 FL (ref 37–54)
EOSINOPHIL # BLD AUTO: 0.25 10*3/MM3 (ref 0–0.3)
EOSINOPHIL NFR BLD AUTO: 2.3 % (ref 0–3)
ERYTHROCYTE [DISTWIDTH] IN BLOOD BY AUTOMATED COUNT: 12.6 % (ref 11.3–14.5)
EXPIRATION DATE: ABNORMAL
EXPIRATION DATE: NORMAL
EXPIRATION DATE: NORMAL
GFR SERPL CREATININE-BSD FRML MDRD: 84 ML/MIN/1.73
GLOBULIN UR ELPH-MCNC: 2.8 GM/DL
GLUCOSE BLD-MCNC: 140 MG/DL (ref 70–100)
GLUCOSE UR STRIP-MCNC: NEGATIVE MG/DL
HBA1C MFR BLD: 10 %
HCT VFR BLD AUTO: 40.9 % (ref 34.5–44)
HDLC SERPL-MCNC: 70 MG/DL (ref 40–60)
HGB BLD-MCNC: 13.4 G/DL (ref 11.5–15.5)
IMM GRANULOCYTES # BLD AUTO: 0.04 10*3/MM3 (ref 0–0.05)
IMM GRANULOCYTES NFR BLD AUTO: 0.4 % (ref 0–0.6)
KETONES UR QL: ABNORMAL
LEUKOCYTE EST, POC: NEGATIVE
LYMPHOCYTES # BLD AUTO: 4.72 10*3/MM3 (ref 0.6–4.8)
LYMPHOCYTES NFR BLD AUTO: 43.3 % (ref 24–44)
Lab: ABNORMAL
Lab: NORMAL
Lab: NORMAL
MAGNESIUM SERPL-MCNC: 1.7 MG/DL (ref 1.3–2.7)
MCH RBC QN AUTO: 30.6 PG (ref 27–31)
MCHC RBC AUTO-ENTMCNC: 32.8 G/DL (ref 32–36)
MCV RBC AUTO: 93.4 FL (ref 80–99)
MONOCYTES # BLD AUTO: 0.71 10*3/MM3 (ref 0–1)
MONOCYTES NFR BLD AUTO: 6.5 % (ref 0–12)
NEUTROPHILS # BLD AUTO: 5.15 10*3/MM3 (ref 1.5–8.3)
NEUTROPHILS NFR BLD AUTO: 47.3 % (ref 41–71)
NITRITE UR-MCNC: NEGATIVE MG/ML
PH UR: 5.5 [PH] (ref 5–8)
PLATELET # BLD AUTO: 201 10*3/MM3 (ref 150–450)
PMV BLD AUTO: 11.8 FL (ref 6–12)
POC CREATININE URINE: 300
POC MICROALBUMIN URINE: 30
POTASSIUM BLD-SCNC: 3.6 MMOL/L (ref 3.5–5.5)
PROT SERPL-MCNC: 7.2 G/DL (ref 5.7–8.2)
PROT UR STRIP-MCNC: NEGATIVE MG/DL
PROT/CREAT UR: 300 MG/G CREA
RBC # BLD AUTO: 4.38 10*6/MM3 (ref 3.89–5.14)
RBC # UR STRIP: NEGATIVE /UL
SODIUM BLD-SCNC: 137 MMOL/L (ref 132–146)
SP GR UR: 1.02 (ref 1–1.03)
T4 FREE SERPL-MCNC: 1.32 NG/DL (ref 0.89–1.76)
TRIGL SERPL-MCNC: 78 MG/DL (ref 0–150)
TSH SERPL DL<=0.05 MIU/L-ACNC: 1.29 MIU/ML (ref 0.35–5.35)
WBC NRBC COR # BLD: 10.89 10*3/MM3 (ref 3.5–10.8)

## 2019-03-18 PROCEDURE — 85025 COMPLETE CBC W/AUTO DIFF WBC: CPT | Performed by: INTERNAL MEDICINE

## 2019-03-18 PROCEDURE — 90632 HEPA VACCINE ADULT IM: CPT | Performed by: INTERNAL MEDICINE

## 2019-03-18 PROCEDURE — 84439 ASSAY OF FREE THYROXINE: CPT | Performed by: INTERNAL MEDICINE

## 2019-03-18 PROCEDURE — 83735 ASSAY OF MAGNESIUM: CPT | Performed by: INTERNAL MEDICINE

## 2019-03-18 PROCEDURE — 90471 IMMUNIZATION ADMIN: CPT | Performed by: INTERNAL MEDICINE

## 2019-03-18 PROCEDURE — 83036 HEMOGLOBIN GLYCOSYLATED A1C: CPT | Performed by: INTERNAL MEDICINE

## 2019-03-18 PROCEDURE — 82044 UR ALBUMIN SEMIQUANTITATIVE: CPT | Performed by: INTERNAL MEDICINE

## 2019-03-18 PROCEDURE — 80061 LIPID PANEL: CPT | Performed by: INTERNAL MEDICINE

## 2019-03-18 PROCEDURE — 99396 PREV VISIT EST AGE 40-64: CPT | Performed by: INTERNAL MEDICINE

## 2019-03-18 PROCEDURE — 80053 COMPREHEN METABOLIC PANEL: CPT | Performed by: INTERNAL MEDICINE

## 2019-03-18 PROCEDURE — 82306 VITAMIN D 25 HYDROXY: CPT | Performed by: INTERNAL MEDICINE

## 2019-03-18 PROCEDURE — 99213 OFFICE O/P EST LOW 20 MIN: CPT | Performed by: INTERNAL MEDICINE

## 2019-03-18 PROCEDURE — 84443 ASSAY THYROID STIM HORMONE: CPT | Performed by: INTERNAL MEDICINE

## 2019-03-18 RX ORDER — SULFAMETHOXAZOLE AND TRIMETHOPRIM 800; 160 MG/1; MG/1
1 TABLET ORAL 2 TIMES DAILY
Qty: 20 TABLET | Refills: 0 | Status: SHIPPED | OUTPATIENT
Start: 2019-03-18 | End: 2019-03-28

## 2019-03-18 RX ORDER — PIOGLITAZONEHYDROCHLORIDE 45 MG/1
45 TABLET ORAL DAILY
Qty: 30 TABLET | Refills: 5 | Status: SHIPPED | OUTPATIENT
Start: 2019-03-18 | End: 2021-06-08 | Stop reason: SDUPTHER

## 2019-03-18 NOTE — PATIENT INSTRUCTIONS
Recommend an Influenza vaccine.    Health Maintenance, Female  Adopting a healthy lifestyle and getting preventive care can go a long way to promote health and wellness. Talk with your health care provider about what schedule of regular examinations is right for you. This is a good chance for you to check in with your provider about disease prevention and staying healthy.  In between checkups, there are plenty of things you can do on your own. Experts have done a lot of research about which lifestyle changes and preventive measures are most likely to keep you healthy. Ask your health care provider for more information.  Weight and diet  Eat a healthy diet  · Be sure to include plenty of vegetables, fruits, low-fat dairy products, and lean protein.  · Do not eat a lot of foods high in solid fats, added sugars, or salt.  · Get regular exercise. This is one of the most important things you can do for your health.  ? Most adults should exercise for at least 150 minutes each week. The exercise should increase your heart rate and make you sweat (moderate-intensity exercise).  ? Most adults should also do strengthening exercises at least twice a week. This is in addition to the moderate-intensity exercise.    Maintain a healthy weight  · Body mass index (BMI) is a measurement that can be used to identify possible weight problems. It estimates body fat based on height and weight. Your health care provider can help determine your BMI and help you achieve or maintain a healthy weight.  · For females 20 years of age and older:  ? A BMI below 18.5 is considered underweight.  ? A BMI of 18.5 to 24.9 is normal.  ? A BMI of 25 to 29.9 is considered overweight.  ? A BMI of 30 and above is considered obese.    Watch levels of cholesterol and blood lipids  · You should start having your blood tested for lipids and cholesterol at 20 years of age, then have this test every 5 years.  · You may need to have your cholesterol levels  checked more often if:  ? Your lipid or cholesterol levels are high.  ? You are older than 50 years of age.  ? You are at high risk for heart disease.    Cancer screening  Lung Cancer  · Lung cancer screening is recommended for adults 55-80 years old who are at high risk for lung cancer because of a history of smoking.  · A yearly low-dose CT scan of the lungs is recommended for people who:  ? Currently smoke.  ? Have quit within the past 15 years.  ? Have at least a 30-pack-year history of smoking. A pack year is smoking an average of one pack of cigarettes a day for 1 year.  · Yearly screening should continue until it has been 15 years since you quit.  · Yearly screening should stop if you develop a health problem that would prevent you from having lung cancer treatment.    Breast Cancer  · Practice breast self-awareness. This means understanding how your breasts normally appear and feel.  · It also means doing regular breast self-exams. Let your health care provider know about any changes, no matter how small.  · If you are in your 20s or 30s, you should have a clinical breast exam (CBE) by a health care provider every 1-3 years as part of a regular health exam.  · If you are 40 or older, have a CBE every year. Also consider having a breast X-ray (mammogram) every year.  · If you have a family history of breast cancer, talk to your health care provider about genetic screening.  · If you are at high risk for breast cancer, talk to your health care provider about having an MRI and a mammogram every year.  · Breast cancer gene (BRCA) assessment is recommended for women who have family members with BRCA-related cancers. BRCA-related cancers include:  ? Breast.  ? Ovarian.  ? Tubal.  ? Peritoneal cancers.  · Results of the assessment will determine the need for genetic counseling and BRCA1 and BRCA2 testing.    Cervical Cancer  Your health care provider may recommend that you be screened regularly for cancer of the  pelvic organs (ovaries, uterus, and vagina). This screening involves a pelvic examination, including checking for microscopic changes to the surface of your cervix (Pap test). You may be encouraged to have this screening done every 3 years, beginning at age 21.  · For women ages 30-65, health care providers may recommend pelvic exams and Pap testing every 3 years, or they may recommend the Pap and pelvic exam, combined with testing for human papilloma virus (HPV), every 5 years. Some types of HPV increase your risk of cervical cancer. Testing for HPV may also be done on women of any age with unclear Pap test results.  · Other health care providers may not recommend any screening for nonpregnant women who are considered low risk for pelvic cancer and who do not have symptoms. Ask your health care provider if a screening pelvic exam is right for you.  · If you have had past treatment for cervical cancer or a condition that could lead to cancer, you need Pap tests and screening for cancer for at least 20 years after your treatment. If Pap tests have been discontinued, your risk factors (such as having a new sexual partner) need to be reassessed to determine if screening should resume. Some women have medical problems that increase the chance of getting cervical cancer. In these cases, your health care provider may recommend more frequent screening and Pap tests.    Colorectal Cancer  · This type of cancer can be detected and often prevented.  · Routine colorectal cancer screening usually begins at 50 years of age and continues through 75 years of age.  · Your health care provider may recommend screening at an earlier age if you have risk factors for colon cancer.  · Your health care provider may also recommend using home test kits to check for hidden blood in the stool.  · A small camera at the end of a tube can be used to examine your colon directly (sigmoidoscopy or colonoscopy). This is done to check for the  earliest forms of colorectal cancer.  · Routine screening usually begins at age 50.  · Direct examination of the colon should be repeated every 5-10 years through 75 years of age. However, you may need to be screened more often if early forms of precancerous polyps or small growths are found.    Skin Cancer  · Check your skin from head to toe regularly.  · Tell your health care provider about any new moles or changes in moles, especially if there is a change in a mole's shape or color.  · Also tell your health care provider if you have a mole that is larger than the size of a pencil eraser.  · Always use sunscreen. Apply sunscreen liberally and repeatedly throughout the day.  · Protect yourself by wearing long sleeves, pants, a wide-brimmed hat, and sunglasses whenever you are outside.    Heart disease, diabetes, and high blood pressure  · High blood pressure causes heart disease and increases the risk of stroke. High blood pressure is more likely to develop in:  ? People who have blood pressure in the high end of the normal range (130-139/85-89 mm Hg).  ? People who are overweight or obese.  ? People who are .  · If you are 18-39 years of age, have your blood pressure checked every 3-5 years. If you are 40 years of age or older, have your blood pressure checked every year. You should have your blood pressure measured twice--once when you are at a hospital or clinic, and once when you are not at a hospital or clinic. Record the average of the two measurements. To check your blood pressure when you are not at a hospital or clinic, you can use:  ? An automated blood pressure machine at a pharmacy.  ? A home blood pressure monitor.  · If you are between 55 years and 79 years old, ask your health care provider if you should take aspirin to prevent strokes.  · Have regular diabetes screenings. This involves taking a blood sample to check your fasting blood sugar level.  ? If you are at a normal weight and  have a low risk for diabetes, have this test once every three years after 45 years of age.  ? If you are overweight and have a high risk for diabetes, consider being tested at a younger age or more often.  Preventing infection  Hepatitis B  · If you have a higher risk for hepatitis B, you should be screened for this virus. You are considered at high risk for hepatitis B if:  ? You were born in a country where hepatitis B is common. Ask your health care provider which countries are considered high risk.  ? Your parents were born in a high-risk country, and you have not been immunized against hepatitis B (hepatitis B vaccine).  ? You have HIV or AIDS.  ? You use needles to inject street drugs.  ? You live with someone who has hepatitis B.  ? You have had sex with someone who has hepatitis B.  ? You get hemodialysis treatment.  ? You take certain medicines for conditions, including cancer, organ transplantation, and autoimmune conditions.    Hepatitis C  · Blood testing is recommended for:  ? Everyone born from 1945 through 1965.  ? Anyone with known risk factors for hepatitis C.    Sexually transmitted infections (STIs)  · You should be screened for sexually transmitted infections (STIs) including gonorrhea and chlamydia if:  ? You are sexually active and are younger than 24 years of age.  ? You are older than 24 years of age and your health care provider tells you that you are at risk for this type of infection.  ? Your sexual activity has changed since you were last screened and you are at an increased risk for chlamydia or gonorrhea. Ask your health care provider if you are at risk.  · If you do not have HIV, but are at risk, it may be recommended that you take a prescription medicine daily to prevent HIV infection. This is called pre-exposure prophylaxis (PrEP). You are considered at risk if:  ? You are sexually active and do not regularly use condoms or know the HIV status of your partner(s).  ? You take drugs by  injection.  ? You are sexually active with a partner who has HIV.    Talk with your health care provider about whether you are at high risk of being infected with HIV. If you choose to begin PrEP, you should first be tested for HIV. You should then be tested every 3 months for as long as you are taking PrEP.  Pregnancy  · If you are premenopausal and you may become pregnant, ask your health care provider about preconception counseling.  · If you may become pregnant, take 400 to 800 micrograms (mcg) of folic acid every day.  · If you want to prevent pregnancy, talk to your health care provider about birth control (contraception).  Osteoporosis and menopause  · Osteoporosis is a disease in which the bones lose minerals and strength with aging. This can result in serious bone fractures. Your risk for osteoporosis can be identified using a bone density scan.  · If you are 65 years of age or older, or if you are at risk for osteoporosis and fractures, ask your health care provider if you should be screened.  · Ask your health care provider whether you should take a calcium or vitamin D supplement to lower your risk for osteoporosis.  · Menopause may have certain physical symptoms and risks.  · Hormone replacement therapy may reduce some of these symptoms and risks.  Talk to your health care provider about whether hormone replacement therapy is right for you.  Follow these instructions at home:  · Schedule regular health, dental, and eye exams.  · Stay current with your immunizations.  · Do not use any tobacco products including cigarettes, chewing tobacco, or electronic cigarettes.  · If you are pregnant, do not drink alcohol.  · If you are breastfeeding, limit how much and how often you drink alcohol.  · Limit alcohol intake to no more than 1 drink per day for nonpregnant women. One drink equals 12 ounces of beer, 5 ounces of wine, or 1½ ounces of hard liquor.  · Do not use street drugs.  · Do not share needles.  · Ask  your health care provider for help if you need support or information about quitting drugs.  · Tell your health care provider if you often feel depressed.  · Tell your health care provider if you have ever been abused or do not feel safe at home.  This information is not intended to replace advice given to you by your health care provider. Make sure you discuss any questions you have with your health care provider.  Document Released: 07/02/2012 Document Revised: 05/25/2017 Document Reviewed: 09/20/2016  [email or web address] Interactive Patient Education © 2019 [email or web address]      Heart-Healthy Eating Plan  Many factors influence your heart health, including eating and exercise habits. Heart (coronary) risk increases with abnormal blood fat (lipid) levels. Heart-healthy meal planning includes limiting unhealthy fats, increasing healthy fats, and making other small dietary changes. This includes maintaining a healthy body weight to help keep lipid levels within a normal range.  What is my plan?  Your health care provider recommends that you:  · Get no more than _________% of the total calories in your daily diet from fat.  · Limit your intake of saturated fat to less than _________% of your total calories each day.  · Limit the amount of cholesterol in your diet to less than _________ mg per day.    What types of fat should I choose?  · Choose healthy fats more often. Choose monounsaturated and polyunsaturated fats, such as olive oil and canola oil, flaxseeds, walnuts, almonds, and seeds.  · Eat more omega-3 fats. Good choices include salmon, mackerel, sardines, tuna, flaxseed oil, and ground flaxseeds. Aim to eat fish at least two times each week.  · Limit saturated fats. Saturated fats are primarily found in animal products, such as meats, butter, and cream. Plant sources of saturated fats include palm oil, palm kernel oil, and coconut oil.  · Avoid foods with partially hydrogenated oils in them. These contain trans fats.  "Examples of foods that contain trans fats are stick margarine, some tub margarines, cookies, crackers, and other baked goods.  What general guidelines do I need to follow?  · Check food labels carefully to identify foods with trans fats or high amounts of saturated fat.  · Fill one half of your plate with vegetables and green salads. Eat 4-5 servings of vegetables per day. A serving of vegetables equals 1 cup of raw leafy vegetables, ½ cup of raw or cooked cut-up vegetables, or ½ cup of vegetable juice.  · Fill one fourth of your plate with whole grains. Look for the word \"whole\" as the first word in the ingredient list.  · Fill one fourth of your plate with lean protein foods.  · Eat 4-5 servings of fruit per day. A serving of fruit equals one medium whole fruit, ¼ cup of dried fruit, ½ cup of fresh, frozen, or canned fruit, or ½ cup of 100% fruit juice.  · Eat more foods that contain soluble fiber. Examples of foods that contain this type of fiber are apples, broccoli, carrots, beans, peas, and barley. Aim to get 20-30 g of fiber per day.  · Eat more home-cooked food and less restaurant, buffet, and fast food.  · Limit or avoid alcohol.  · Limit foods that are high in starch and sugar.  · Avoid fried foods.  · Cook foods by using methods other than frying. Baking, boiling, grilling, and broiling are all great options. Other fat-reducing suggestions include:  ? Removing the skin from poultry.  ? Removing all visible fats from meats.  ? Skimming the fat off of stews, soups, and gravies before serving them.  ? Steaming vegetables in water or broth.  · Lose weight if you are overweight. Losing just 5-10% of your initial body weight can help your overall health and prevent diseases such as diabetes and heart disease.  · Increase your consumption of nuts, legumes, and seeds to 4-5 servings per week. One serving of dried beans or legumes equals ½ cup after being cooked, one serving of nuts equals 1½ ounces, and one " serving of seeds equals ½ ounce or 1 tablespoon.  · You may need to monitor your salt (sodium) intake, especially if you have high blood pressure. Talk with your health care provider or dietitian to get more information about reducing sodium.  What foods can I eat?  Grains    Breads, including [employer], white, maksim, wheat, raisin, rye, oatmeal, and Italian. Tortillas that are neither fried nor made with lard or trans fat. Low-fat rolls, including hotdog and hamburger buns and English muffins. Biscuits. Muffins. Waffles. Pancakes. Light popcorn. Whole-grain cereals. Flatbread. Chloe toast. Pretzels. Breadsticks. Rusks. Low-fat snacks and crackers, including oyster, saltine, matzo, rae, animal, and rye. Rice and pasta, including brown rice and those that are made with whole wheat.  Vegetables  All vegetables.  Fruits  All fruits, but limit coconut.  Meats and Other Protein Sources  Lean, well-trimmed beef, veal, pork, and lamb. Chicken and turkey without skin. All fish and shellfish. Wild duck, rabbit, pheasant, and venison. Egg whites or low-cholesterol egg substitutes. Dried beans, peas, lentils, and tofu. Seeds and most nuts.  Dairy  Low-fat or nonfat cheeses, including ricotta, string, and mozzarella. Skim or 1% milk that is liquid, powdered, or evaporated. Buttermilk that is made with low-fat milk. Nonfat or low-fat yogurt.  Beverages  Mineral water. Diet carbonated beverages.  Sweets and Desserts  Sherbets and fruit ices. Honey, jam, marmalade, jelly, and syrups. Meringues and gelatins. Pure sugar candy, such as hard candy, jelly beans, gumdrops, mints, marshmallows, and small amounts of dark chocolate. Diogo food cake.  Eat all sweets and desserts in moderation.  Fats and Oils  Nonhydrogenated (trans-free) margarines. Vegetable oils, including soybean, sesame, sunflower, olive, peanut, safflower, corn, canola, and cottonseed. Salad dressings or mayonnaise that are made with a vegetable oil. Limit added fats  and oils that you use for cooking, baking, salads, and as spreads.  Other  Cocoa powder. Coffee and tea. All seasonings and condiments.  The items listed above may not be a complete list of recommended foods or beverages. Contact your dietitian for more options.  What foods are not recommended?  Grains  Breads that are made with saturated or trans fats, oils, or whole milk. Croissants. Butter rolls. Cheese breads. Sweet rolls. Donuts. Buttered popcorn. Chow mein noodles. High-fat crackers, such as cheese or butter crackers.  Meats and Other Protein Sources  Fatty meats, such as hotdogs, short ribs, sausage, spareribs, martines, ribeye roast or steak, and mutton. High-fat deli meats, such as salami and bologna. Caviar. Domestic duck and goose. Organ meats, such as kidney, liver, sweetbreads, brains, gizzard, chitterlings, and heart.  Dairy  Cream, sour cream, cream cheese, and creamed cottage cheese. Whole milk cheeses, including blue (oleg), [place] Cas, Brie, Yosef, American, Havarti, Swiss, cheddar, Camembert, and Wayan. Whole or 2% milk that is liquid, evaporated, or condensed. Whole buttermilk. Cream sauce or high-fat cheese sauce. Yogurt that is made from whole milk.  Beverages  Regular sodas and drinks with added sugar.  Sweets and Desserts  Frosting. Pudding. Cookies. Cakes other than amada food cake. Candy that has milk chocolate or white chocolate, hydrogenated fat, butter, coconut, or unknown ingredients. Buttered syrups. Full-fat ice cream or ice cream drinks.  Fats and Oils  Gravy that has suet, meat fat, or shortening. Cocoa butter, hydrogenated oils, palm oil, coconut oil, palm kernel oil. These can often be found in baked products, candy, fried foods, nondairy creamers, and whipped toppings. Solid fats and shortenings, including martines fat, salt pork, lard, and butter. Nondairy cream substitutes, such as coffee creamers and sour cream substitutes. Salad dressings that are made of unknown oils,  cheese, or sour cream.  The items listed above may not be a complete list of foods and beverages to avoid. Contact your dietitian for more information.  This information is not intended to replace advice given to you by your health care provider. Make sure you discuss any questions you have with your health care provider.  Document Released: 09/26/2009 Document Revised: 07/07/2017 Document Reviewed: 06/11/2015  [employer] Interactive Patient Education © 2019 [employer]      Exercising to Lose Weight  Exercising can help you to lose weight. In order to lose weight through exercise, you need to do vigorous-intensity exercise. You can tell that you are exercising with vigorous intensity if you are breathing very hard and fast and cannot hold a conversation while exercising.  Moderate-intensity exercise helps to maintain your current weight. You can tell that you are exercising at a moderate level if you have a higher heart rate and faster breathing, but you are still able to hold a conversation.  How often should I exercise?  Choose an activity that you enjoy and set realistic goals. Your health care provider can help you to make an activity plan that works for you. Exercise regularly as directed by your health care provider. This may include:  · Doing resistance training twice each week, such as:  ? Push-ups.  ? Sit-ups.  ? Lifting weights.  ? Using resistance bands.  · Doing a given intensity of exercise for a given amount of time. Choose from these options:  ? 150 minutes of moderate-intensity exercise every week.  ? 75 minutes of vigorous-intensity exercise every week.  ? A mix of moderate-intensity and vigorous-intensity exercise every week.    Children, pregnant women, people who are out of shape, people who are overweight, and older adults may need to consult a health care provider for individual recommendations. If you have any sort of medical condition, be sure to consult your health care provider before  starting a new exercise program.  What are some activities that can help me to lose weight?  · Walking at a rate of at least 4.5 miles an hour.  · Jogging or running at a rate of 5 miles per hour.  · Biking at a rate of at least 10 miles per hour.  · Lap swimming.  · Roller-skating or in-line skating.  · Cross-country skiing.  · Vigorous competitive sports, such as football, basketball, and soccer.  · Jumping rope.  · Aerobic dancing.  How can I be more active in my day-to-day activities?  · Use the stairs instead of the elevator.  · Take a walk during your lunch break.  · If you drive, park your car farther away from work or school.  · If you take public transportation, get off one stop early and walk the rest of the way.  · Make all of your phone calls while standing up and walking around.  · Get up, stretch, and walk around every 30 minutes throughout the day.  What guidelines should I follow while exercising?  · Do not exercise so much that you hurt yourself, feel dizzy, or get very short of breath.  · Consult your health care provider prior to starting a new exercise program.  · Wear comfortable clothes and shoes with good support.  · Drink plenty of water while you exercise to prevent dehydration or heat stroke. Body water is lost during exercise and must be replaced.  · Work out until you breathe faster and your heart beats faster.  This information is not intended to replace advice given to you by your health care provider. Make sure you discuss any questions you have with your health care provider.  Document Released: 01/20/2012 Document Revised: 05/25/2017 Document Reviewed: 05/21/2015  [employer] Interactive Patient Education © 2019 [employer]      Exercising to Lose Weight  Exercising can help you to lose weight. In order to lose weight through exercise, you need to do vigorous-intensity exercise. You can tell that you are exercising with vigorous intensity if you are breathing very hard and fast and  cannot hold a conversation while exercising.  Moderate-intensity exercise helps to maintain your current weight. You can tell that you are exercising at a moderate level if you have a higher heart rate and faster breathing, but you are still able to hold a conversation.  How often should I exercise?  Choose an activity that you enjoy and set realistic goals. Your health care provider can help you to make an activity plan that works for you. Exercise regularly as directed by your health care provider. This may include:  · Doing resistance training twice each week, such as:  ? Push-ups.  ? Sit-ups.  ? Lifting weights.  ? Using resistance bands.  · Doing a given intensity of exercise for a given amount of time. Choose from these options:  ? 150 minutes of moderate-intensity exercise every week.  ? 75 minutes of vigorous-intensity exercise every week.  ? A mix of moderate-intensity and vigorous-intensity exercise every week.    Children, pregnant women, people who are out of shape, people who are overweight, and older adults may need to consult a health care provider for individual recommendations. If you have any sort of medical condition, be sure to consult your health care provider before starting a new exercise program.  What are some activities that can help me to lose weight?  · Walking at a rate of at least 4.5 miles an hour.  · Jogging or running at a rate of 5 miles per hour.  · Biking at a rate of at least 10 miles per hour.  · Lap swimming.  · Roller-skating or in-line skating.  · Cross-country skiing.  · Vigorous competitive sports, such as football, basketball, and soccer.  · Jumping rope.  · Aerobic dancing.  How can I be more active in my day-to-day activities?  · Use the stairs instead of the elevator.  · Take a walk during your lunch break.  · If you drive, park your car farther away from work or school.  · If you take public transportation, get off one stop early and walk the rest of the way.  · Make  all of your phone calls while standing up and walking around.  · Get up, stretch, and walk around every 30 minutes throughout the day.  What guidelines should I follow while exercising?  · Do not exercise so much that you hurt yourself, feel dizzy, or get very short of breath.  · Consult your health care provider prior to starting a new exercise program.  · Wear comfortable clothes and shoes with good support.  · Drink plenty of water while you exercise to prevent dehydration or heat stroke. Body water is lost during exercise and must be replaced.  · Work out until you breathe faster and your heart beats faster.  This information is not intended to replace advice given to you by your health care provider. Make sure you discuss any questions you have with your health care provider.  Document Released: 01/20/2012 Document Revised: 05/25/2017 Document Reviewed: 05/21/2015  [employer] Patient Education © 2019 [employer]

## 2019-03-18 NOTE — PROGRESS NOTES
Subjective     Chief Complaint:  Physical Exam.    History of Present Illness    History obtained from the patient.       Primary Care Cardiac Diagnostic Constellation: The patient is here today for a follow-up visit.     Her Hypertension is stable.   Medication(s): Lisinopril and HCTZ.  Her Diabetes Mellitus Type 2 is unstable.  Medication: Janumet, Actos, and Lisinopril.   Her Hyperlipidemia is unstable.    LDL goal is <70.  Her last LDL was 128.   The patient is adherent with her medication regimen. She denies medication side effects.       Interval Events:     Last Hemoglobin A1c on 4/9/18 was 9.9.  The patient states her blood sugar at home has been around 200  Fasting, but she does not check postprandial.  She denies episodes of low blood sugar. The patient's last ophthalmology appointment was 12/18/17, no retinopathy.  she has an appointment scheduled for 5/2/19.  She does check her feet daily.     Symptoms:  Denies chest pain (other than the chest wall pain), shortness of breath, MAIN, orthopnea, PND, palpitations, syncope, lower extremity edema, intermittent leg claudication, lightheadness, and dizziness.  visual impairment, myalgias, and muscle weakness.   Associated symptoms:  10 pound intentional weight loss since last visit. Stable fatigue. No headaches, polyuria, polydipsia, myalgias, arthralgias, memory issues, concentration issues, or  focal neurologic deficits.  Has stable numbness and tinglingof the feet and the fingers, but no pain or ulcers.      Lifestyle and Disease Management: Diet: She consumes a diverse and healthy diet, overall.  Weight Issues: She has weight concerns. Exercise: She has not been exercising regularly.  Smoking: She does not use tobacco. She quit smoking 11/6/16     Hypothyroidism Follow-Up: The patient is being seen for follow-up of Hypothyroidism, which is stable.    Interval events: None.  Symptoms: has stable fatigue and cold intolerance, but denies heat  intolerance, weakness, constipation, and dry skin.   Associated symptoms: has paresthesias hands and feet, but no myalgias or arthralgias.    Medications:  Levothyroxine (Synthroid).   The patient is adherent to her medication regimen, and she denies medication side effects.     GERD Follow-up: The patient is here for follow-up of Epigastric Pain, which is stable.   Interval events: None.    Symptoms: Denies abdominal pain, heartburn, dysphagia, odynophagia, melena, hematochezia, nausea, vomiting, early satiety, belching, and bloating.  Associated Symptoms:  No chronic sore throat, cough, wheezing, orhoarseness.    Medications: Omeprazole prn, usually 3 times per week.     Chronic Pain Follow-up: The patient is being seen for a routine clinic follow-up of Chronic Pain Syndrome, which is worse   Interval events: The patient has a Neurosurgery appointment scheduled for a second opinion.      Symptoms: worsened neck pain, but stable low back pain, bilateral knee pain, and bilateral shoulder pain.   Medication:  Lodine and Gabapentin.      Depression Follow-Up: The patient is being seen for a routine clinic follow-up of Depression, which  has been stable.  Comorbid Illnesses:  Insomnia.   Interval Events: None.  Symptoms: stable depression and insomnia.  Has mild anxiety. Denies loss of interest or pleasure in activities, inability to perform normal activities,  feelings of worthlessness, feelings of hopelessness, feelings of guilt, memory loss, and trouble concentrating.   The patient denies thoughts of suicide.   Social Support: the patient has good social support.   Medications: None.                Ernestina Garrido is a 49 y.o. female who presents for an Annual Physical.      PMH, PSH, SocHx, FamHx, Allergies, and Medications: Reviewed and updated.    Outpatient Medications Prior to Visit   Medication Sig Dispense Refill   • Cream Base cream LAMOTRIGINE 2.5%, LIDOCAINE 2.2%, PRILOCAINE 2.2% B APPLY 1 GRAM 3-4  TIMES DAILY 60 g PRN   • Cream Base cream LIDOCAINE 5% OINTMENT APPLY  1 GRAM 3-4 TIMES DAILY 35.44 g PRN   • etodolac (LODINE) 200 MG capsule Take 1 capsule by mouth 3 (Three) Times a Day As Needed (neck pain). 30 capsule 5   • gabapentin (NEURONTIN) 600 MG tablet Take 1 tablet by mouth 3 (Three) Times a Day. 90 tablet 3   • glucose blood (FREESTYLE LITE) test strip USE FOR TESTING BLOOD SUGAR ONCE DAILY AS DIRECTED . 100 each 3   • hydrochlorothiazide (HYDRODIURIL) 50 MG tablet Take 1 tablet by mouth Daily. 30 tablet 5   • Lancets (FREESTYLE) lancets TEST ONCE DAILY 100 each 3   • levothyroxine (SYNTHROID, LEVOTHROID) 100 MCG tablet Take 1 tablet by mouth Daily. 30 tablet 5   • lisinopril (PRINIVIL,ZESTRIL) 20 MG tablet Take 1 tablet by mouth every night at bedtime. 30 tablet 5   • omeprazole (priLOSEC) 40 MG capsule Take 1 capsule by mouth Daily. 30 capsule 5   • sitaGLIPtin-metFORMIN (JANUMET)  MG per tablet Take 1 tablet by mouth 2 (Two) Times a Day With Meals. 60 tablet 5   • atorvastatin (LIPITOR) 20 MG tablet Take 1 tablet by mouth Daily. 30 tablet 5   • pioglitazone (ACTOS) 30 MG tablet Take 1 tablet by mouth Daily. 30 tablet 5   • Gel Base gel LAMOTRIGINE 2%, LIDOCAINE 2.2%, PRILOCAINE 2.2%, BASE B. APPLY 1 G TO AFFECTED AREA QD 30 g 5     No facility-administered medications prior to visit.        Immunization History   Administered Date(s) Administered   • Pneumococcal Polysaccharide (PPSV23) 04/19/2017   • Tdap 02/25/2017         Patient Active Problem List   Diagnosis   • Chronic pain syndrome   • Depression   • Hemorrhoid   • Essential hypertension   • Hypothyroidism   • Insomnia   • Arthralgia of hip   • Arthralgia of shoulder   • Degenerative disc disease, cervical   • Cervical facet arthropathy/cervical spondylosis without myelopathy   • Diabetic autonomic neuropathy (CMS/HCC)   • Hx of fusion of cervical spine   • Morbid obesity with BMI of 40.0-44.9, adult (CMS/HCC)   • Neuroforaminal  stenosis of spine   • Carpal tunnel syndrome of right wrist   • Type 2 diabetes mellitus, without long-term current use of insulin (CMS/[facility])   • GERD (gastroesophageal reflux disease)   • Costochondral chest pain   • Intercostal neuralgia   • Other hyperlipidemia   • Cervical post-laminectomy syndrome   • Encounter for fitting and adjustment of neuropacemaker of spinal cord       Health Habits:  Dental Exam. up to date  Eye Exam. not up to date - last visit 17  Hearing Loss:  No  Exercise: 0 times/week.  Current exercise activities include: none  Diet: Unhealthy overall  Multivitamin: No    Safe Driving:  Yes  Seat Belt:  Yes  Bike Helmet:  N/A  Skin Screening:  Yes  Sunscreen: Yes  SBE / CARMITA: Yes  Sexual Activity:  No  Birth Control:  N/A  STD Prevention:  N/A    Last Pap: 17, normal per patient (GYN)  Last Mammogram:  17, cat 1  Last DEXA Scan: N/A  Last Colonoscopy: , normal per patient  Last PSA: N/A    Social:    Social History     Socioeconomic History   • Marital status: Legally      Spouse name: Not on file   • Number of children: 3   • Years of education: Not on file   • Highest education level: Not on file   Occupational History   • Occupation: [employer]     Comment: full time   Tobacco Use   • Smoking status: Former Smoker     Packs/day: 0.50     Years: 28.00     Pack years: 14.00     Types: Cigarettes     Start date:      Last attempt to quit: 2017     Years since quittin.3   • Smokeless tobacco: Never Used   Substance and Sexual Activity   • Alcohol use: Yes     Comment: 2-3 cocktains, 3-4 times per year   • Drug use: No   • Sexual activity: Not Currently     Partners: Male         Current Medical Providers:    Stephanie Tanner MD (Internal Medicine / Pediatrics)    The [facility] providers who are involved in the care of this patient are listed above.         Review of Systems   Constitutional: Positive for fatigue. Negative for chills, fever and unexpected  weight change.        No night sweats.  No generalized pain.   HENT: Negative for congestion, ear pain, hearing loss, nosebleeds, postnasal drip, rhinorrhea, sinus pressure, sneezing, sore throat, tinnitus and voice change.         Denies snoring.   Eyes: Positive for photophobia. Negative for pain, discharge, redness, itching and visual disturbance.   Respiratory: Negative for cough, chest tightness, shortness of breath, wheezing and stridor.         No orthopnea, dyspnea on exertion, or PND.  No chest congestion.   No  hemoptysis.   Cardiovascular: Negative for chest pain, palpitations and leg swelling.        No claudication or syncope.   Gastrointestinal: Negative for abdominal pain, blood in stool, constipation, diarrhea, nausea, rectal pain and vomiting.        No hematemesis.  No heartburn, dysphagia or odynophagia.  No belching or bloating.  No melena.   Endocrine: Positive for cold intolerance. Negative for heat intolerance, polydipsia, polyphagia and polyuria.        No hair loss or dry skin.  No generalized weakness.  No hot flashes.   Genitourinary: Negative for difficulty urinating, dysuria, flank pain, frequency, hematuria, pelvic pain, urgency, vaginal bleeding and vaginal discharge.        No nocturia or  incomplete emptying, but has stress incontinence.   Musculoskeletal: Positive for back pain, gait problem (off balance some) and neck pain. Negative for arthralgias, joint swelling, myalgias and neck stiffness.        No joint stiffness.   Skin: Negative for rash.        Has a boil in the right groin area x 1 week, which she has treated with sitz baths and warm compresses.  No other new skin lesions or changes in skin lesions. No breast pain or masses.  No nipple discharge or nipple inversion.   Neurological: Positive for numbness. Negative for dizziness, tremors, syncope, speech difficulty, weakness, light-headedness and headaches.        Has tingling.  No memory loss.  No decreased concentration.  "  Hematological: Negative for adenopathy. Does not bruise/bleed easily.   Psychiatric/Behavioral: Positive for sleep disturbance. Negative for confusion and suicidal ideas. The patient is nervous/anxious.         Stable depression.           Objective     Vitals:    03/18/19 1410   BP: 120/80   BP Location: Right arm   Pulse: 72   Resp: 20   Temp: 97.2 °F (36.2 °C)   TempSrc: Temporal   Weight: 121 kg (266 lb)   Height: 168.9 cm (66.5\")       Body mass index is 42.29 kg/m².    Physical Exam   Constitutional:   Morbidly obese. Exam difficult.   HENT:   Head: Normocephalic and atraumatic.   Right Ear: Tympanic membrane, external ear and ear canal normal.   Left Ear: Tympanic membrane, external ear and ear canal normal.   Mouth/Throat: Oropharynx is clear and moist.   Eyes: Conjunctivae and EOM are normal. Pupils are equal, round, and reactive to light.   Fundi not clearly visualized bilaterally.   Neck: Normal range of motion. Neck supple. Carotid bruit is not present. No thyromegaly present.   Cardiovascular: Normal rate, regular rhythm, normal heart sounds and intact distal pulses. Exam reveals no gallop and no friction rub.   No murmur heard.  Pulmonary/Chest: Effort normal and breath sounds normal. Right breast exhibits no inverted nipple, no mass, no nipple discharge, no skin change and no tenderness. Left breast exhibits no inverted nipple, no mass, no nipple discharge, no skin change and no tenderness.   Abdominal: Soft. Bowel sounds are normal. She exhibits no distension, no abdominal bruit and no mass. There is no hepatosplenomegaly. There is no tenderness.   Genitourinary:   Genitourinary Comments:  and rectal exam deferred.   Musculoskeletal: Normal range of motion.    Ernestina had a diabetic foot exam performed today.   During the foot exam she had a monofilament test performed (see form).  Vascular Status -  Her right foot exhibits normal foot vasculature . Her left foot exhibits normal foot vasculature " .  Skin Integrity  -  Her right foot skin is intact (dry).Her left foot skin is intact (dry)..  Lymphadenopathy:     She has no cervical adenopathy.     She has no axillary adenopathy.        Right: No inguinal and no supraclavicular adenopathy present.        Left: No inguinal and no supraclavicular adenopathy present.   Neurological: She is alert. She has normal strength and normal reflexes. No cranial nerve deficit.   Skin: No rash noted.   Small abscess right inguinal area, slightly tender.  no erythem, warmth, or drainage.  Dark, slightly raise nevi on left middle back and left medial calf.  No other atypical skin lesions.   Psychiatric: She has a normal mood and affect.   Nursing note and vitals reviewed.    Counseling was given to patient for the following topics: appropriate exercise, healthy eating habits, disease prevention, risk factors for cancer, importance of self breast exam and breast health, importance of immunizations, including risks and benefits, bone health, sun safety, seatbelt use, safe driving and safe sex.  Written information provided to patient on these topics and other health maintenance issues.    Results for orders placed or performed in visit on 03/18/19   POC Glycosylated Hemoglobin (Hb A1C)   Result Value Ref Range    Hemoglobin A1C 10.0 %    Lot Number [id number]     Expiration Date 12-4-20    POC Urinalysis Dipstick, Multipro   Result Value Ref Range    Color Yellow Yellow, Straw, Dark Yellow, [place]    Clarity, UA Clear Clear    Glucose, UA Negative Negative, 1000 mg/dL (3+) mg/dL    Ketones, UA Trace (A) Negative    Specific Gravity  1.025 1.005 - 1.030    Blood, UA Negative Negative    pH, Urine 5.5 5.0 - 8.0    Protein, POC Negative Negative mg/dL    Nitrite, UA Negative Negative    Leukocytes Negative Negative    Protein/Creatinine Ratio, Urine 300.0 mg/G Crea    Lot Number [id number]     Expiration Date 11-30-19    POC Microalbumin   Result Value Ref Range    Microalbumin, Urine  30     Creatinine, Urine 300     A/C <30mg/g NORMAL     Lot Number [id number]     Expiration Date 2-29-20          Assessment/Plan       Ernestina was seen today for annual exam.    Diagnoses and all orders for this visit:    Encounter for health maintenance examination in adult  -     Lipid Panel  -     Magnesium  -     Comprehensive Metabolic Panel  -     CBC & Differential  -     TSH  -     T4, Free  -     Vitamin D 25 Hydroxy  -     CBC Auto Differential    Type 2 diabetes mellitus with diabetic autonomic neuropathy, without long-term current use of insulin (CMS/HCC)  -     POC Glycosylated Hemoglobin (Hb A1C)  -     POC Urinalysis Dipstick, Multipro  -     POC Microalbumin  -     Comprehensive Metabolic Panel  -     pioglitazone (ACTOS) 45 MG tablet; Take 1 tablet by mouth Daily.    Essential hypertension    Other hyperlipidemia  -     Lipid Panel  -     CBC & Differential  -     CBC Auto Differential    Acquired hypothyroidism  -     TSH  -     T4, Free    Chronic pain syndrome    Gastroesophageal reflux disease without esophagitis    Degenerative disc disease, cervical    Diabetic autonomic neuropathy associated with type 2 diabetes mellitus (CMS/HCC)    Abscess of right groin  -     sulfamethoxazole-trimethoprim (BACTRIM DS) 800-160 MG per tablet; Take 1 tablet by mouth 2 (Two) Times a Day for 10 days.    Primary insomnia    Other depression    Atypical nevi  -     Ambulatory Referral to Dermatology    High risk medication use  -     Magnesium    Need for hepatitis A immunization  -     Hepatitis A Vaccine Adult IM    Encounter for screening mammogram for breast cancer  -     Mammo Screening Digital Tomosynthesis Bilateral With CAD; Future    The patient declined an Influenza vaccine today.  She was informed she can die from Influenza infection.        Return in about 1 month (around 4/18/2019) for Recheck Diabetes,  fasting.

## 2019-03-20 PROBLEM — E55.9 VITAMIN D DEFICIENCY: Status: ACTIVE | Noted: 2019-03-20

## 2019-03-20 RX ORDER — ERGOCALCIFEROL 1.25 MG/1
50000 CAPSULE ORAL WEEKLY
Qty: 4 CAPSULE | Refills: 3 | Status: SHIPPED | OUTPATIENT
Start: 2019-03-20 | End: 2019-06-26

## 2019-03-20 RX ORDER — ATORVASTATIN CALCIUM 20 MG/1
TABLET, FILM COATED ORAL
Qty: 30 TABLET | Refills: 5 | Status: SHIPPED | OUTPATIENT
Start: 2019-03-20 | End: 2019-03-28

## 2019-03-28 RX ORDER — ATORVASTATIN CALCIUM 40 MG/1
TABLET, FILM COATED ORAL
Qty: 30 TABLET | Refills: 5 | Status: SHIPPED | OUTPATIENT
Start: 2019-03-28 | End: 2020-03-17

## 2019-04-10 ENCOUNTER — HOSPITAL ENCOUNTER (OUTPATIENT)
Dept: MRI IMAGING | Facility: HOSPITAL | Age: 49
Discharge: HOME | End: 2019-04-10
Admitting: INTERNAL MEDICINE

## 2019-04-10 DIAGNOSIS — M50.30 DEGENERATIVE DISC DISEASE, CERVICAL: ICD-10-CM

## 2019-04-10 DIAGNOSIS — Z98.1 HX OF FUSION OF CERVICAL SPINE: ICD-10-CM

## 2019-04-10 DIAGNOSIS — M47.812 FACET ARTHROPATHY, CERVICAL: ICD-10-CM

## 2019-04-10 DIAGNOSIS — G89.4 CHRONIC PAIN SYNDROME: ICD-10-CM

## 2019-04-10 DIAGNOSIS — M96.1 CERVICAL POST-LAMINECTOMY SYNDROME: ICD-10-CM

## 2019-04-10 PROCEDURE — 72141 MRI NECK SPINE W/O DYE: CPT

## 2019-05-02 DIAGNOSIS — G89.4 CHRONIC PAIN SYNDROME: ICD-10-CM

## 2019-05-02 RX ORDER — GABAPENTIN 600 MG/1
TABLET ORAL
Qty: 90 TABLET | Refills: 2 | Status: SHIPPED | OUTPATIENT
Start: 2019-05-02 | End: 2019-08-29 | Stop reason: SDUPTHER

## 2019-05-06 ENCOUNTER — APPOINTMENT (OUTPATIENT)
Dept: MAMMOGRAPHY | Facility: HOSPITAL | Age: 49
End: 2019-05-06

## 2019-05-15 ENCOUNTER — OFFICE VISIT (OUTPATIENT)
Dept: INTERNAL MEDICINE | Facility: CLINIC | Age: 49
End: 2019-05-15

## 2019-05-15 VITALS
WEIGHT: 258 LBS | RESPIRATION RATE: 19 BRPM | HEART RATE: 70 BPM | SYSTOLIC BLOOD PRESSURE: 134 MMHG | DIASTOLIC BLOOD PRESSURE: 74 MMHG | TEMPERATURE: 97.4 F | BODY MASS INDEX: 41.02 KG/M2

## 2019-05-15 DIAGNOSIS — R07.89 OTHER CHEST PAIN: Primary | ICD-10-CM

## 2019-05-15 DIAGNOSIS — E11.43 TYPE 2 DIABETES MELLITUS WITH DIABETIC AUTONOMIC NEUROPATHY, WITHOUT LONG-TERM CURRENT USE OF INSULIN (HCC): ICD-10-CM

## 2019-05-15 PROCEDURE — 80061 LIPID PANEL: CPT | Performed by: INTERNAL MEDICINE

## 2019-05-15 PROCEDURE — 80053 COMPREHEN METABOLIC PANEL: CPT | Performed by: INTERNAL MEDICINE

## 2019-05-15 PROCEDURE — 99214 OFFICE O/P EST MOD 30 MIN: CPT | Performed by: INTERNAL MEDICINE

## 2019-05-15 RX ORDER — CYCLOBENZAPRINE HCL 10 MG
5 TABLET ORAL 3 TIMES DAILY PRN
COMMUNITY
End: 2019-06-26

## 2019-05-15 NOTE — PROGRESS NOTES
Subjective       Ernestina Garrido is a 49 y.o. female.     Chief Complaint   Patient presents with   •  Hospital Follow up (chest pain)       History obtained from the patient.      History of Present Illness     The patient is here for hospital follow-up.  She was admitted to  on 5/13/2019 and discharged on 5/14/2019.  Records have been received and reviewed.  The patient presented to the ER with acute left-sided chest pain that woke her up at 2:00 in the morning.  She also had a episode of non-bilious non-bloody emesis associated with the chest pain.  The pain radiated to her left axillary area and left scapula.  EKG showed normal sinus rhythm with sinus arrhythmia,  but no acute changes.  Troponin was borderline elevated at 16.  The patient had an elevated D-dimer, but due to an elevated creatinine she was unable to have a CT to rule out a pulmonary embolus.  VQ scan was ordered.  Her creatinine improved with IV fluids, so the VQ scan order was changed to a CT PE protocol.  This test was negative.  The patient had mild leukocytosis (15.0) on admission, which decreased to 12.9.  Discharge creatinine was 1.13.    On note, per discharge summary, the patient had a renal ultrasound done due to her elevated creatinine.  This showed an indeterminant echogenic lesion within the left renal midpole which might represent a focus of pyelonephritis.  There was a probable 3 cm left superior pole or suprarenal lesion, likely corresponding to the left suprarenal lesion seen on a prior L-spine CT from 2014.    The patient does have a history of chronic left sided chest pain, persisting after a cervical spine fusion.  She did have a stress test ordered in May 2017, but did not go for that.  She has an appointment at  Neurosurgery to have a second opinion regarding this pain.  She is on Neurontin 600 mg 3 times daily.    With regard to Diabetes, her Hemoglobin A1c was 8.4.  The patient's Lisinopril, Hydrochlorthiazide, and  Janumet were held during the hospitalization due to her elevated creatinine.  The Lisinopril and Janumet were resumed at discharge, but she is still off the Hydrochlorothiazide.     Primary Care Cardiac Diagnostic Constellation: The patient is here today for a follow-up visit.     Her Diabetes Mellitus Type 2 is unstable.  Medication: Janumet, Actos, and Lisinopril.   Her Hyperlipidemia is unstable.    LDL goal is <70.  Her last LDL was 135  Medication: Atorvastatin.  The patient is adherent with her medication regimen. She denies medication side effects.       Interval Events: The patient was seen on 3/18/19.  Hemoglobin A1c was 10.0.  Her Actos was increased to 45 mg daily.  Janumet was continued at the current dose.  In addition, her Lipitor was increased to 40 mg daily.  The patient states her blood sugar at home has been around 200 fasting, but she does not check postprandial.  She denies episodes of low blood sugar. The patient's last ophthalmology appointment was 12/18/17, no retinopathy. She had an appointment scheduled for 5/2/19.  She does check her feet daily.     Symptoms: Has occasional lightheadedness and dizziness.  Denies chest pain (other than the chest wall pain), shortness of breath, MAIN, orthopnea, PND, palpitations, syncope, lower extremity edema, and intermittent leg claudication.    Associated symptoms:  8 pound intentional weight loss since last visit.  No fatigue, headaches, polyuria, polydipsia, myalgias, arthralgias, memory issues, concentration issues, or  focal neurologic deficits.  Has stable numbness and tingling of the feet and the fingers, but no pain or ulcers.      Lifestyle and Disease Management: Diet: She consumes a diverse and healthy diet, overall.  Weight Issues: She has weight concerns. Exercise: She has not been exercising regularly.  Smoking: She does not use tobacco. She quit smoking 11/6/16    Current Outpatient Medications on File Prior to Visit   Medication Sig  Dispense Refill   • atorvastatin (LIPITOR) 40 MG tablet Take one tablet daily 30 tablet 5   • Cream Base cream LAMOTRIGINE 2.5%, LIDOCAINE 2.2%, PRILOCAINE 2.2% B APPLY 1 GRAM 3-4 TIMES DAILY 60 g PRN   • Cream Base cream LIDOCAINE 5% OINTMENT APPLY  1 GRAM 3-4 TIMES DAILY 35.44 g PRN   • cyclobenzaprine (FLEXERIL) 10 MG tablet Take 5 mg by mouth 3 (Three) Times a Day As Needed for Muscle Spasms.     • etodolac (LODINE) 200 MG capsule Take 1 capsule by mouth 3 (Three) Times a Day As Needed (neck pain). 30 capsule 5   • gabapentin (NEURONTIN) 600 MG tablet TAKE ONE TABLET BY MOUTH THREE TIMES A DAY 90 tablet 2   • glucose blood (FREESTYLE LITE) test strip USE FOR TESTING BLOOD SUGAR ONCE DAILY AS DIRECTED . 100 each 3   • hydrochlorothiazide (HYDRODIURIL) 50 MG tablet Take 1 tablet by mouth Daily. 30 tablet 5   • Lancets (FREESTYLE) lancets TEST ONCE DAILY 100 each 3   • levothyroxine (SYNTHROID, LEVOTHROID) 100 MCG tablet Take 1 tablet by mouth Daily. 30 tablet 5   • lisinopril (PRINIVIL,ZESTRIL) 20 MG tablet Take 1 tablet by mouth every night at bedtime. 30 tablet 5   • omeprazole (priLOSEC) 40 MG capsule Take 1 capsule by mouth Daily. 30 capsule 5   • pioglitazone (ACTOS) 45 MG tablet Take 1 tablet by mouth Daily. 30 tablet 5   • sitaGLIPtin-metFORMIN (JANUMET)  MG per tablet Take 1 tablet by mouth 2 (Two) Times a Day With Meals. 60 tablet 5   • vitamin D (ERGOCALCIFEROL) 68381 units capsule capsule Take 1 capsule by mouth 1 (One) Time Per Week. 4 capsule 3     No current facility-administered medications on file prior to visit.        Current outpatient and discharge medications have been reconciled for the patient.  Reviewed by: Stephanie Tanner MD        The following portions of the patient's history were reviewed and updated as appropriate: allergies, current medications, past family history, past medical history, past social history, past surgical history and problem list.    Review of Systems    Constitutional: Negative for chills, fatigue, fever and unexpected weight change.   Eyes: Negative for visual disturbance.   Respiratory: Negative for cough, shortness of breath and wheezing.    Cardiovascular: Negative for chest pain, palpitations and leg swelling.        No MAIN, orthopnea, or claudication.   Gastrointestinal: Negative for abdominal pain, blood in stool, constipation, diarrhea, nausea and vomiting.        Denies melena.   Endocrine: Negative for polydipsia and polyuria.   Musculoskeletal: Negative for arthralgias and myalgias.   Skin: Negative for rash.   Neurological: Positive for dizziness and light-headedness. Negative for syncope and headaches.        No memory issues.   Psychiatric/Behavioral: Negative for decreased concentration.         Objective       Blood pressure 134/74, pulse 70, temperature 97.4 °F (36.3 °C), temperature source Temporal, resp. rate 19, weight 117 kg (258 lb), not currently breastfeeding.      Physical Exam   Constitutional:   Morbidly obese.   Neck: Normal range of motion. Neck supple. Carotid bruit is not present. No thyromegaly present.   Cardiovascular: Normal rate, regular rhythm, normal heart sounds and intact distal pulses. Exam reveals no gallop and no friction rub.   No murmur heard.  No peripheral edema.   Pulmonary/Chest: Effort normal and breath sounds normal.   Abdominal: Soft. Bowel sounds are normal. She exhibits no distension, no abdominal bruit and no mass. There is no hepatosplenomegaly. There is no tenderness.   Psychiatric: She has a normal mood and affect.   Nursing note and vitals reviewed.      Assessment / Plan:  Ernestina was seen today for transitional care management.    Diagnoses and all orders for this visit:    Other chest pain  -     Stress Test With Myocardial Perfusion One Day; Future   F/U per Neurosurgery.   Continue Neurontin.    Type 2 diabetes mellitus with diabetic autonomic neuropathy, without long-term current use of insulin  (CMS/[facility])  -     Lipid Panel  -     Comprehensive Metabolic Panel   Continue current medication(s) as noted in history of present illness.    MICHELLE sent to obtain renal u/s report.    The patient agrees to re-schedule her Mammogram.      Return in about 6 weeks (around 6/26/2019) for Recheck Diabetes, non-fasting.

## 2019-05-16 LAB
ALBUMIN SERPL-MCNC: 4 G/DL (ref 3.5–5.2)
ALBUMIN/GLOB SERPL: 1.3 G/DL
ALP SERPL-CCNC: 62 U/L (ref 39–117)
ALT SERPL W P-5'-P-CCNC: 11 U/L (ref 1–33)
ANION GAP SERPL CALCULATED.3IONS-SCNC: 10.3 MMOL/L
AST SERPL-CCNC: 10 U/L (ref 1–32)
BILIRUB SERPL-MCNC: 0.5 MG/DL (ref 0.2–1.2)
BUN BLD-MCNC: 17 MG/DL (ref 6–20)
BUN/CREAT SERPL: 19.8 (ref 7–25)
CALCIUM SPEC-SCNC: 9.5 MG/DL (ref 8.6–10.5)
CHLORIDE SERPL-SCNC: 99 MMOL/L (ref 98–107)
CHOLEST SERPL-MCNC: 127 MG/DL (ref 0–200)
CO2 SERPL-SCNC: 28.7 MMOL/L (ref 22–29)
CREAT BLD-MCNC: 0.86 MG/DL (ref 0.57–1)
GFR SERPL CREATININE-BSD FRML MDRD: 85 ML/MIN/1.73
GLOBULIN UR ELPH-MCNC: 3.2 GM/DL
GLUCOSE BLD-MCNC: 123 MG/DL (ref 65–99)
HDLC SERPL-MCNC: 60 MG/DL (ref 40–60)
LDLC SERPL CALC-MCNC: 52 MG/DL (ref 0–100)
LDLC/HDLC SERPL: 0.87 {RATIO}
POTASSIUM BLD-SCNC: 4.3 MMOL/L (ref 3.5–5.2)
PROT SERPL-MCNC: 7.2 G/DL (ref 6–8.5)
SODIUM BLD-SCNC: 138 MMOL/L (ref 136–145)
TRIGL SERPL-MCNC: 73 MG/DL (ref 0–150)
VLDLC SERPL-MCNC: 14.6 MG/DL (ref 5–40)

## 2019-05-20 ENCOUNTER — TELEPHONE (OUTPATIENT)
Dept: INTERNAL MEDICINE | Facility: CLINIC | Age: 49
End: 2019-05-20

## 2019-05-20 DIAGNOSIS — N28.89 LEFT RENAL MASS: Primary | ICD-10-CM

## 2019-05-20 DIAGNOSIS — G89.4 CHRONIC PAIN SYNDROME: ICD-10-CM

## 2019-05-20 NOTE — TELEPHONE ENCOUNTER
----- Message from Reema Choi sent at 5/20/2019  1:11 PM EDT -----  Patient states she needs something for chest pain. She states she has been to the ER numerous times and it's not her heart. She states the flexeril isn't working and it's so bad she had to leave work. [place] [place] She can be reached at [phone]

## 2019-05-20 NOTE — TELEPHONE ENCOUNTER
Call patient please.  I have received her kidney ultrasound report from [facility].  It does show a lesion in her left kidney area.  I recommend a CT of her abdomen to further evaluate this lesion.  If she is agreeable, I will enter an order

## 2019-05-21 RX ORDER — TRAMADOL HYDROCHLORIDE 50 MG/1
50 TABLET ORAL 3 TIMES DAILY PRN
Qty: 90 TABLET | Refills: 2 | Status: SHIPPED | OUTPATIENT
Start: 2019-05-21 | End: 2020-07-17

## 2019-05-21 NOTE — TELEPHONE ENCOUNTER
Order entered for CT abdomen.    I have sent a prescription for tramadol to the pharmacy.  She should discontinue her Lodine (etodolac).    She should keep her follow-up appointment with me in August.

## 2019-05-21 NOTE — TELEPHONE ENCOUNTER
Ernestina notified of the US results.  She is agreeable to the CT Abdomen     She states she has been taking Lodine TID for 1 year and it does not help her Lt sided chest pain . She states the pain radiated under the  Left arm and upper back . She states it is a sharp pain . And when she breaths and talks she feels the pain and it is difficult to take a deep breath.  She feels the pain into her Lt neck also.

## 2019-05-22 ENCOUNTER — PRIOR AUTHORIZATION (OUTPATIENT)
Dept: INTERNAL MEDICINE | Facility: CLINIC | Age: 49
End: 2019-05-22

## 2019-05-22 NOTE — TELEPHONE ENCOUNTER
PA sent to Cover my meds for Tramadol 50 mg TID   Tried and failed etdolac    Dx chest wall pain  ( Chronic  Pain )

## 2019-05-23 NOTE — TELEPHONE ENCOUNTER
PA denied.  Paper placed on Dr Ciro callaway   Denied as she is new to opioid therapy and rx not to exceed 7 days

## 2019-05-24 ENCOUNTER — HOSPITAL ENCOUNTER (OUTPATIENT)
Dept: CT IMAGING | Facility: HOSPITAL | Age: 49
Discharge: HOME | End: 2019-05-24
Admitting: INTERNAL MEDICINE

## 2019-05-24 DIAGNOSIS — N28.89 LEFT RENAL MASS: ICD-10-CM

## 2019-05-24 PROCEDURE — 74170 CT ABD WO CNTRST FLWD CNTRST: CPT

## 2019-05-24 PROCEDURE — 0 IOPAMIDOL PER 1 ML: Performed by: INTERNAL MEDICINE

## 2019-05-24 RX ADMIN — IOPAMIDOL 95 ML: 755 INJECTION, SOLUTION INTRAVENOUS at 14:50

## 2019-05-24 RX ADMIN — BARIUM SULFATE 450 ML: 21 SUSPENSION ORAL at 13:40

## 2019-05-28 DIAGNOSIS — D17.71 RENAL ANGIOMYOLIPOMA: Primary | ICD-10-CM

## 2019-05-28 DIAGNOSIS — Q62.5 DUPLICATED LEFT RENAL COLLECTING SYSTEM: ICD-10-CM

## 2019-05-28 NOTE — TELEPHONE ENCOUNTER
Dr Tanner spoke to Insurance and appealed the tramadol denial   She has tried pain management injections in the past with no relief.  Chronic pain x 7 years .   Waiting on approval from insurance.

## 2019-06-07 ENCOUNTER — HOSPITAL ENCOUNTER (OUTPATIENT)
Dept: CARDIOLOGY | Facility: HOSPITAL | Age: 49
Discharge: HOME | End: 2019-06-07

## 2019-06-07 ENCOUNTER — HOSPITAL ENCOUNTER (OUTPATIENT)
Dept: CARDIOLOGY | Facility: HOSPITAL | Age: 49
Discharge: HOME | End: 2019-06-07
Admitting: INTERNAL MEDICINE

## 2019-06-07 DIAGNOSIS — R07.89 OTHER CHEST PAIN: ICD-10-CM

## 2019-06-07 LAB
BH CV STRESS BP STAGE 1: NORMAL
BH CV STRESS BP STAGE 2: NORMAL
BH CV STRESS BP STAGE 3: NORMAL
BH CV STRESS DURATION MIN STAGE 1: 3
BH CV STRESS DURATION MIN STAGE 2: 3
BH CV STRESS DURATION MIN STAGE 3: 1
BH CV STRESS DURATION SEC STAGE 1: 0
BH CV STRESS DURATION SEC STAGE 2: 0
BH CV STRESS DURATION SEC STAGE 3: 31
BH CV STRESS GRADE STAGE 1: 10
BH CV STRESS GRADE STAGE 2: 12
BH CV STRESS GRADE STAGE 3: 14
BH CV STRESS HR STAGE 1: 109
BH CV STRESS HR STAGE 2: 1363
BH CV STRESS HR STAGE 3: 148
BH CV STRESS METS STAGE 1: 5
BH CV STRESS METS STAGE 2: 7.5
BH CV STRESS METS STAGE 3: 10
BH CV STRESS PROTOCOL 1: NORMAL
BH CV STRESS RECOVERY BP: NORMAL MMHG
BH CV STRESS RECOVERY HR: 86 BPM
BH CV STRESS SPEED STAGE 1: 1.7
BH CV STRESS SPEED STAGE 2: 2.5
BH CV STRESS SPEED STAGE 3: 3.4
BH CV STRESS STAGE 1: 1
BH CV STRESS STAGE 2: 2
BH CV STRESS STAGE 3: 3
LV EF NUC BP: 57 %
MAXIMAL PREDICTED HEART RATE: 172 BPM
PERCENT MAX PREDICTED HR: 87.79 %
STRESS BASELINE BP: NORMAL MMHG
STRESS BASELINE HR: 66 BPM
STRESS PERCENT HR: 103 %
STRESS POST ESTIMATED WORKLOAD: 7.8 METS
STRESS POST EXERCISE DUR MIN: 7 MIN
STRESS POST EXERCISE DUR SEC: 31 SEC
STRESS POST PEAK BP: NORMAL MMHG
STRESS POST PEAK HR: 151 BPM
STRESS TARGET HR: 146 BPM

## 2019-06-07 PROCEDURE — A9500 TC99M SESTAMIBI: HCPCS | Performed by: INTERNAL MEDICINE

## 2019-06-07 PROCEDURE — 78452 HT MUSCLE IMAGE SPECT MULT: CPT | Performed by: INTERNAL MEDICINE

## 2019-06-07 PROCEDURE — 0 TECHNETIUM SESTAMIBI: Performed by: INTERNAL MEDICINE

## 2019-06-07 PROCEDURE — 93018 CV STRESS TEST I&R ONLY: CPT | Performed by: INTERNAL MEDICINE

## 2019-06-07 PROCEDURE — 93017 CV STRESS TEST TRACING ONLY: CPT

## 2019-06-07 PROCEDURE — 78452 HT MUSCLE IMAGE SPECT MULT: CPT

## 2019-06-07 RX ADMIN — TECHNETIUM TC 99M SESTAMIBI 1 DOSE: 1 INJECTION INTRAVENOUS at 09:55

## 2019-06-07 RX ADMIN — TECHNETIUM TC 99M SESTAMIBI 1 DOSE: 1 INJECTION INTRAVENOUS at 08:00

## 2019-06-24 ENCOUNTER — TELEPHONE (OUTPATIENT)
Dept: INTERNAL MEDICINE | Facility: CLINIC | Age: 49
End: 2019-06-24

## 2019-06-24 DIAGNOSIS — L73.9 FOLLICULITIS: Primary | ICD-10-CM

## 2019-06-24 RX ORDER — SULFAMETHOXAZOLE AND TRIMETHOPRIM 800; 160 MG/1; MG/1
1 TABLET ORAL 2 TIMES DAILY
Qty: 20 TABLET | Refills: 0 | Status: SHIPPED | OUTPATIENT
Start: 2019-06-24 | End: 2019-06-26

## 2019-06-24 NOTE — TELEPHONE ENCOUNTER
Patient stated that its on her labial area, she said it's been an ongoing issues for several years. She stated she shaves and wasn't sure if that could be the cause of this being a continuous issue, and patient denies having a temperature. She is scheduled to come in on Wednesday but stated she normally calls in and get's a antibiotic sent in so she can go ahead and get started on it before she comes in.

## 2019-06-24 NOTE — TELEPHONE ENCOUNTER
----- Message from Paresh Higgins sent at 6/24/2019 10:48 AM EDT -----  Contact: PATIENT  PATIENT CALLED IN NEED OF MEDICATION FOR FOLLICULITIS. [facility] [employer]. CALL BACK NUMBER [phone] THANK YOU

## 2019-06-26 ENCOUNTER — OFFICE VISIT (OUTPATIENT)
Dept: INTERNAL MEDICINE | Facility: CLINIC | Age: 49
End: 2019-06-26

## 2019-06-26 VITALS
TEMPERATURE: 98.2 F | HEART RATE: 78 BPM | SYSTOLIC BLOOD PRESSURE: 124 MMHG | BODY MASS INDEX: 40.94 KG/M2 | WEIGHT: 257.5 LBS | DIASTOLIC BLOOD PRESSURE: 68 MMHG | RESPIRATION RATE: 20 BRPM

## 2019-06-26 DIAGNOSIS — E55.9 VITAMIN D DEFICIENCY: ICD-10-CM

## 2019-06-26 DIAGNOSIS — E11.43 TYPE 2 DIABETES MELLITUS WITH DIABETIC AUTONOMIC NEUROPATHY, WITHOUT LONG-TERM CURRENT USE OF INSULIN (HCC): Primary | ICD-10-CM

## 2019-06-26 DIAGNOSIS — I10 ESSENTIAL HYPERTENSION: ICD-10-CM

## 2019-06-26 DIAGNOSIS — D72.828 OTHER ELEVATED WHITE BLOOD CELL (WBC) COUNT: ICD-10-CM

## 2019-06-26 DIAGNOSIS — E78.49 OTHER HYPERLIPIDEMIA: ICD-10-CM

## 2019-06-26 LAB
25(OH)D3 SERPL-MCNC: 13 NG/ML (ref 30–100)
ALBUMIN SERPL-MCNC: 4.3 G/DL (ref 3.5–5.2)
ALBUMIN/GLOB SERPL: 1.2 G/DL
ALP SERPL-CCNC: 78 U/L (ref 39–117)
ALT SERPL W P-5'-P-CCNC: 7 U/L (ref 1–33)
ANION GAP SERPL CALCULATED.3IONS-SCNC: 13.5 MMOL/L (ref 5–15)
AST SERPL-CCNC: 11 U/L (ref 1–32)
BASOPHILS # BLD AUTO: 0.02 10*3/MM3 (ref 0–0.2)
BASOPHILS NFR BLD AUTO: 0.2 % (ref 0–1.5)
BILIRUB SERPL-MCNC: 0.7 MG/DL (ref 0.2–1.2)
BUN BLD-MCNC: 28 MG/DL (ref 6–20)
BUN/CREAT SERPL: 16.6 (ref 7–25)
CALCIUM SPEC-SCNC: 10 MG/DL (ref 8.6–10.5)
CHLORIDE SERPL-SCNC: 100 MMOL/L (ref 98–107)
CHOLEST SERPL-MCNC: 128 MG/DL (ref 0–200)
CO2 SERPL-SCNC: 26.5 MMOL/L (ref 22–29)
CREAT BLD-MCNC: 1.69 MG/DL (ref 0.57–1)
DEPRECATED RDW RBC AUTO: 47 FL (ref 37–54)
EOSINOPHIL # BLD AUTO: 0.26 10*3/MM3 (ref 0–0.4)
EOSINOPHIL NFR BLD AUTO: 2 % (ref 0.3–6.2)
ERYTHROCYTE [DISTWIDTH] IN BLOOD BY AUTOMATED COUNT: 13.2 % (ref 12.3–15.4)
EXPIRATION DATE: NORMAL
GFR SERPL CREATININE-BSD FRML MDRD: 39 ML/MIN/1.73
GLOBULIN UR ELPH-MCNC: 3.5 GM/DL
GLUCOSE BLD-MCNC: 108 MG/DL (ref 65–99)
HBA1C MFR BLD: 9.1 %
HCT VFR BLD AUTO: 40.1 % (ref 34–46.6)
HDLC SERPL-MCNC: 58 MG/DL (ref 40–60)
HGB BLD-MCNC: 12.6 G/DL (ref 12–15.9)
IMM GRANULOCYTES # BLD AUTO: 0.06 10*3/MM3 (ref 0–0.05)
IMM GRANULOCYTES NFR BLD AUTO: 0.5 % (ref 0–0.5)
LDLC SERPL CALC-MCNC: 54 MG/DL (ref 0–100)
LDLC/HDLC SERPL: 0.93 {RATIO}
LYMPHOCYTES # BLD AUTO: 3.95 10*3/MM3 (ref 0.7–3.1)
LYMPHOCYTES NFR BLD AUTO: 31.1 % (ref 19.6–45.3)
Lab: NORMAL
MCH RBC QN AUTO: 30.8 PG (ref 26.6–33)
MCHC RBC AUTO-ENTMCNC: 31.4 G/DL (ref 31.5–35.7)
MCV RBC AUTO: 98 FL (ref 79–97)
MONOCYTES # BLD AUTO: 0.94 10*3/MM3 (ref 0.1–0.9)
MONOCYTES NFR BLD AUTO: 7.4 % (ref 5–12)
NEUTROPHILS # BLD AUTO: 7.46 10*3/MM3 (ref 1.7–7)
NEUTROPHILS NFR BLD AUTO: 58.8 % (ref 42.7–76)
PLATELET # BLD AUTO: 230 10*3/MM3 (ref 140–450)
PMV BLD AUTO: 11.5 FL (ref 6–12)
POTASSIUM BLD-SCNC: 4.1 MMOL/L (ref 3.5–5.2)
PROT SERPL-MCNC: 7.8 G/DL (ref 6–8.5)
RBC # BLD AUTO: 4.09 10*6/MM3 (ref 3.77–5.28)
SODIUM BLD-SCNC: 140 MMOL/L (ref 136–145)
TRIGL SERPL-MCNC: 79 MG/DL (ref 0–150)
VLDLC SERPL-MCNC: 15.8 MG/DL (ref 5–40)
WBC NRBC COR # BLD: 12.69 10*3/MM3 (ref 3.4–10.8)

## 2019-06-26 PROCEDURE — 80061 LIPID PANEL: CPT | Performed by: INTERNAL MEDICINE

## 2019-06-26 PROCEDURE — 80053 COMPREHEN METABOLIC PANEL: CPT | Performed by: INTERNAL MEDICINE

## 2019-06-26 PROCEDURE — 85025 COMPLETE CBC W/AUTO DIFF WBC: CPT | Performed by: INTERNAL MEDICINE

## 2019-06-26 PROCEDURE — 83036 HEMOGLOBIN GLYCOSYLATED A1C: CPT | Performed by: INTERNAL MEDICINE

## 2019-06-26 PROCEDURE — 82306 VITAMIN D 25 HYDROXY: CPT | Performed by: INTERNAL MEDICINE

## 2019-06-26 PROCEDURE — 99214 OFFICE O/P EST MOD 30 MIN: CPT | Performed by: INTERNAL MEDICINE

## 2019-06-26 NOTE — PROGRESS NOTES
Subjective       Ernestina Garrido is a 49 y.o. female.     Chief Complaint   Patient presents with   • Follow-up     Recheck Diabetes       History obtained from the patient.      History of Present Illness     Of note, the patient's white blood cell count on 3/18/2019 elevated (10.9).       Primary Care Cardiac Diagnostic Constellation: The patient is here today for a follow-up visit.     Her Hypertension is stable.   Medication(s): Lisinopril and HCTZ.  Her Diabetes Mellitus Type 2 is unstable.  Medication: Janumet, Actos, and Lisinopril.   Her Hyperlipidemia is stable.    LDL goal is <70.  Her last LDL was 52   The patient is adherent with her medication regimen. She denies medication side effects.       Interval Events:   The patient had a negative cardiac stress test on 6/7/2019.  The patient's Atorvastatin was increased to 40 mg daily on 3/19/2019 ().  Last Hemoglobin A1c on  3/18/2019 was 10.0.  Actos was increased to 45 mg daily.  The patient states her blood sugar at home has been around 180-190 fasting, and 200-210 postprandial.  She denies episodes of low blood sugar. The patient states her last ophthalmology appointment was 5/2/2019, no retinopathy.  She does check her feet daily.  The patient states she has an appointment for a second opinion on her chronic chest wall pain.     Symptoms: Has occasional lightheadedness and dizziness.  Denies chest pain (other than the chest wall pain), shortness of breath, MANI, orthopnea, PND, palpitations, syncope, lower extremity edema, and intermittent leg claudication.   Associated symptoms: No significant weight changes since last visit. Stable fatigue and occasional headaches.  No polyuria, polydipsia, myalgias, arthralgias, memory issues, concentration issues, or focal neurologic deficits.  Has stable numbness and tingling of the feet and the fingers, but no pain or ulcers.      Lifestyle and Disease Management: Diet: She consumes a diverse and  healthy diet, overall.  Weight Issues: She has weight concerns. Exercise: She has not been exercising regularly.  Smoking: She does not use tobacco. She quit smoking 11/6/16    Vitamin D Deficiency Follow-Up: The patient is here for follow-up of Vitamin D Deficiency, newly diagnosed in March 2019.    Interval events: Vitamin D level on 3/18/2019 was 11.0.  The patient was started on supplements.    Symptoms: Stable fatigue and paresthesias.    Denies myalgias, arthralgias, loss of balance, and gait instability.    Medication: Vitamin D2, 50,000 IU weekly.      Current Outpatient Medications on File Prior to Visit   Medication Sig Dispense Refill   • atorvastatin (LIPITOR) 40 MG tablet Take one tablet daily 30 tablet 5   • Cream Base cream LAMOTRIGINE 2.5%, LIDOCAINE 2.2%, PRILOCAINE 2.2% B APPLY 1 GRAM 3-4 TIMES DAILY 60 g PRN   • Cream Base cream LIDOCAINE 5% OINTMENT APPLY  1 GRAM 3-4 TIMES DAILY 35.44 g PRN   • gabapentin (NEURONTIN) 600 MG tablet TAKE ONE TABLET BY MOUTH THREE TIMES A DAY 90 tablet 2   • glucose blood (FREESTYLE LITE) test strip USE FOR TESTING BLOOD SUGAR ONCE DAILY AS DIRECTED . 100 each 3   • hydrochlorothiazide (HYDRODIURIL) 50 MG tablet Take 1 tablet by mouth Daily. 30 tablet 5   • Lancets (FREESTYLE) lancets TEST ONCE DAILY 100 each 3   • levothyroxine (SYNTHROID, LEVOTHROID) 100 MCG tablet Take 1 tablet by mouth Daily. 30 tablet 5   • lisinopril (PRINIVIL,ZESTRIL) 20 MG tablet Take 1 tablet by mouth every night at bedtime. 30 tablet 5   • omeprazole (priLOSEC) 40 MG capsule Take 1 capsule by mouth Daily. 30 capsule 5   • pioglitazone (ACTOS) 45 MG tablet Take 1 tablet by mouth Daily. 30 tablet 5   • sitaGLIPtin-metFORMIN (JANUMET)  MG per tablet Take 1 tablet by mouth 2 (Two) Times a Day With Meals. 60 tablet 5   • traMADol (ULTRAM) 50 MG tablet Take 1 tablet by mouth 3 (Three) Times a Day As Needed for Moderate Pain . 90 tablet 2   • cyclobenzaprine (FLEXERIL) 10 MG tablet Take 5  mg by mouth 3 (Three) Times a Day As Needed for Muscle Spasms.     • sulfamethoxazole-trimethoprim (BACTRIM DS) 800-160 MG per tablet Take 1 tablet by mouth 2 (Two) Times a Day for 10 days. 20 tablet 0   • vitamin D (ERGOCALCIFEROL) 74977 units capsule capsule Take 1 capsule by mouth 1 (One) Time Per Week. 4 capsule 3     No current facility-administered medications on file prior to visit.        Current outpatient and discharge medications have been reconciled for the patient.  Reviewed by: Stephanie Tanner MD        The following portions of the patient's history were reviewed and updated as appropriate: allergies, current medications, past family history, past medical history, past social history, past surgical history and problem list.    Review of Systems   Constitutional: Positive for fatigue. Negative for chills, fever and unexpected weight change.   Eyes: Negative for visual disturbance.   Respiratory: Negative for cough, shortness of breath and wheezing.    Cardiovascular: Negative for chest pain, palpitations and leg swelling.        No MAIN, orthopnea, or claudication.   Gastrointestinal: Negative for blood in stool and constipation.        Has had nausea, vomiting, diarrhea, abdominal pain for 2 weeks, slowly resolving.  Denies melena.   Endocrine: Negative for polydipsia and polyuria.   Musculoskeletal: Positive for back pain. Negative for arthralgias and myalgias.   Neurological: Positive for dizziness, light-headedness, numbness and headaches. Negative for syncope.        No memory issues.   Psychiatric/Behavioral: Negative for decreased concentration.         Objective       Blood pressure 124/68, pulse 78, temperature 98.2 °F (36.8 °C), temperature source Temporal, resp. rate 20, weight 117 kg (257 lb 8 oz), not currently breastfeeding.      Physical Exam   Constitutional:   Morbidly obese.   Neck: Normal range of motion. Neck supple. Carotid bruit is not present. No thyromegaly present.    Cardiovascular: Normal rate, regular rhythm, normal heart sounds and intact distal pulses. Exam reveals no gallop and no friction rub.   No murmur heard.  No peripheral edema.   Pulmonary/Chest: Effort normal and breath sounds normal.   Abdominal: Soft. Bowel sounds are normal. She exhibits no distension, no abdominal bruit and no mass. There is no hepatosplenomegaly. There is tenderness (mild diffuse). There is no rebound, no guarding and no CVA tenderness.   Psychiatric: She has a normal mood and affect.   Nursing note and vitals reviewed.    Results for orders placed or performed in visit on 06/26/19   POC Glycosylated Hemoglobin (Hb A1C)   Result Value Ref Range    Hemoglobin A1C 9.1 %    Lot Number [id number]     Expiration Date 3/6/21        Assessment / Plan:  Ernestina was seen today for follow-up.    Diagnoses and all orders for this visit:    Type 2 diabetes mellitus with diabetic autonomic neuropathy, without long-term current use of insulin (CMS/[facility])  -     POC Glycosylated Hemoglobin (Hb A1C)  -     Comprehensive Metabolic Panel  -     Lipid Panel   Continue current medication(s) as noted in the history of present illness.    Essential hypertension  -     Comprehensive Metabolic Panel  -     Lipid Panel   Continue current medication(s) as noted in the history of present illness.    Other hyperlipidemia  -     Comprehensive Metabolic Panel  -     Lipid Panel   Continue current medication(s) as noted in the history of present illness.    Vitamin D deficiency  -     Vitamin D 25 Hydroxy   Continue Vitamin D supplementation.    Other elevated white blood cell (WBC) count  -     CBC & Differential  -     CBC Auto Differential        The patient declined scheduling a Mammogram until the chest wall pain improves.      Return in about 3 months (around 9/26/2019) for Recheck fasting, fasting.

## 2019-06-28 RX ORDER — ERGOCALCIFEROL 1.25 MG/1
50000 CAPSULE ORAL WEEKLY
Qty: 5 CAPSULE | Refills: 0 | Status: SHIPPED | OUTPATIENT
Start: 2019-06-28 | End: 2020-03-17

## 2019-06-30 DIAGNOSIS — D72.828 OTHER ELEVATED WHITE BLOOD CELL (WBC) COUNT: Primary | ICD-10-CM

## 2019-06-30 DIAGNOSIS — R79.89 ELEVATED SERUM CREATININE: ICD-10-CM

## 2019-07-29 ENCOUNTER — TELEPHONE (OUTPATIENT)
Dept: INTERNAL MEDICINE | Facility: CLINIC | Age: 49
End: 2019-07-29

## 2019-07-29 DIAGNOSIS — I10 ESSENTIAL HYPERTENSION: ICD-10-CM

## 2019-07-29 RX ORDER — LISINOPRIL 20 MG/1
TABLET ORAL
Qty: 30 TABLET | Refills: 4 | Status: SHIPPED | OUTPATIENT
Start: 2019-07-29 | End: 2020-03-17

## 2019-07-29 RX ORDER — HYDROCHLOROTHIAZIDE 50 MG/1
TABLET ORAL
Qty: 30 TABLET | Refills: 4 | Status: SHIPPED | OUTPATIENT
Start: 2019-07-29 | End: 2020-03-17

## 2019-08-05 NOTE — TELEPHONE ENCOUNTER
Ernestina states she is seeing a lady doctor - Neurology and she will call to [facility] an see what they recommend for her numbness  .  Verb understanding given

## 2019-08-05 NOTE — TELEPHONE ENCOUNTER
She has been seen most recently at , and I believe they are in the process of a work up.  She should call them to discuss these symptoms.

## 2019-08-19 ENCOUNTER — TELEPHONE (OUTPATIENT)
Dept: INTERNAL MEDICINE | Facility: CLINIC | Age: 49
End: 2019-08-19

## 2019-08-19 DIAGNOSIS — R21 RASH: Primary | ICD-10-CM

## 2019-08-19 RX ORDER — NYSTATIN 100000 U/G
OINTMENT TOPICAL 3 TIMES DAILY
Qty: 60 G | Refills: 0 | Status: SHIPPED | OUTPATIENT
Start: 2019-08-19 | End: 2019-08-26

## 2019-08-19 NOTE — TELEPHONE ENCOUNTER
----- Message from Reema Choi sent at 8/19/2019  1:05 PM EDT -----  Patient states she has a rash under her stomach. She states she has been using anti-fungal cream and powder. [place] [employer]. She can be reached at [phone]

## 2019-08-19 NOTE — TELEPHONE ENCOUNTER
Call please.  What does the rash look like?  Is it painful or itchy?  Have the over-the-counter creams helped at all?  Has she tried anything else?

## 2019-08-19 NOTE — TELEPHONE ENCOUNTER
Pt states she has red bumps all over abdomin for 2 week that itches and burns. Very irritated. States she has tried [employer]. Wants to now if something can be Rx to help clear up

## 2019-08-28 ENCOUNTER — TELEPHONE (OUTPATIENT)
Dept: INTERNAL MEDICINE | Facility: CLINIC | Age: 49
End: 2019-08-28

## 2019-08-28 DIAGNOSIS — M62.838 MUSCLE SPASM: Primary | ICD-10-CM

## 2019-08-28 RX ORDER — LANCETS 28 GAUGE
EACH MISCELLANEOUS
Qty: 100 EACH | Refills: 6 | Status: SHIPPED | OUTPATIENT
Start: 2019-08-28 | End: 2020-06-15 | Stop reason: SDUPTHER

## 2019-08-28 RX ORDER — CYCLOBENZAPRINE HCL 10 MG
10 TABLET ORAL NIGHTLY PRN
Qty: 30 TABLET | Refills: 1 | Status: SHIPPED | OUTPATIENT
Start: 2019-08-28 | End: 2020-01-15

## 2019-08-28 NOTE — TELEPHONE ENCOUNTER
Ernestina notifed . She will stop in tomorrow for lab work.   She is wondering if she could have a rx for muscle relaxant sent to pharmacy

## 2019-08-28 NOTE — TELEPHONE ENCOUNTER
Ernestina notified Rx sent to pharmacy and that the medication can make her drowsy and should not take when driving or working .  Verb understanding given

## 2019-08-28 NOTE — TELEPHONE ENCOUNTER
Flexeril e-rx'd.  Please make sure she knows this can cause sedation so she should not take it during the day if she has to drive or work.

## 2019-08-28 NOTE — TELEPHONE ENCOUNTER
Ernestina  Is c/o cramping like a catracho horse in her left hip radiating down her left leg for about 5 days .   It seems to be worse in the morning.  She states she will have 4/5 episodes a day lasting from  5 minutes  to 45 minutes   She is taking tramadol at [facility]   Etodolac 200 mg BID   She had to leave work yesterday as she was unable to walk on her Left leg.

## 2019-08-28 NOTE — TELEPHONE ENCOUNTER
Ernestina called to inform that she is having muscle spasms in her back that travel into her legs. She says she is getting these spasms about 5x a day and they're lasting between 5-10 minutes.    [phone]    Thanks.

## 2019-08-28 NOTE — TELEPHONE ENCOUNTER
She should probably have her potassium level checked.  She actually has non-fasting labs pending from 6/30/2019.  Can she stop by this week to have those done?

## 2019-08-29 ENCOUNTER — LAB (OUTPATIENT)
Dept: INTERNAL MEDICINE | Facility: CLINIC | Age: 49
End: 2019-08-29

## 2019-08-29 DIAGNOSIS — D72.828 OTHER ELEVATED WHITE BLOOD CELL (WBC) COUNT: ICD-10-CM

## 2019-08-29 DIAGNOSIS — R79.89 ELEVATED SERUM CREATININE: ICD-10-CM

## 2019-08-29 DIAGNOSIS — G89.4 CHRONIC PAIN SYNDROME: ICD-10-CM

## 2019-08-29 LAB
ANION GAP SERPL CALCULATED.3IONS-SCNC: 10.2 MMOL/L (ref 5–15)
BASOPHILS # BLD AUTO: 0.04 10*3/MM3 (ref 0–0.2)
BASOPHILS NFR BLD AUTO: 0.4 % (ref 0–1.5)
BUN BLD-MCNC: 12 MG/DL (ref 6–20)
BUN/CREAT SERPL: 15.4 (ref 7–25)
CALCIUM SPEC-SCNC: 9 MG/DL (ref 8.6–10.5)
CHLORIDE SERPL-SCNC: 102 MMOL/L (ref 98–107)
CO2 SERPL-SCNC: 26.8 MMOL/L (ref 22–29)
CREAT BLD-MCNC: 0.78 MG/DL (ref 0.57–1)
DEPRECATED RDW RBC AUTO: 48.8 FL (ref 37–54)
EOSINOPHIL # BLD AUTO: 0.24 10*3/MM3 (ref 0–0.4)
EOSINOPHIL NFR BLD AUTO: 2.4 % (ref 0.3–6.2)
ERYTHROCYTE [DISTWIDTH] IN BLOOD BY AUTOMATED COUNT: 13.7 % (ref 12.3–15.4)
GFR SERPL CREATININE-BSD FRML MDRD: 96 ML/MIN/1.73
GLUCOSE BLD-MCNC: 130 MG/DL (ref 65–99)
HCT VFR BLD AUTO: 38 % (ref 34–46.6)
HGB BLD-MCNC: 11.9 G/DL (ref 12–15.9)
IMM GRANULOCYTES # BLD AUTO: 0.03 10*3/MM3 (ref 0–0.05)
IMM GRANULOCYTES NFR BLD AUTO: 0.3 % (ref 0–0.5)
LDH SERPL-CCNC: 207 U/L (ref 135–214)
LYMPHOCYTES # BLD AUTO: 3.73 10*3/MM3 (ref 0.7–3.1)
LYMPHOCYTES NFR BLD AUTO: 37 % (ref 19.6–45.3)
MCH RBC QN AUTO: 30.2 PG (ref 26.6–33)
MCHC RBC AUTO-ENTMCNC: 31.3 G/DL (ref 31.5–35.7)
MCV RBC AUTO: 96.4 FL (ref 79–97)
MONOCYTES # BLD AUTO: 0.73 10*3/MM3 (ref 0.1–0.9)
MONOCYTES NFR BLD AUTO: 7.2 % (ref 5–12)
NEUTROPHILS # BLD AUTO: 5.32 10*3/MM3 (ref 1.7–7)
NEUTROPHILS NFR BLD AUTO: 52.7 % (ref 42.7–76)
NRBC BLD AUTO-RTO: 0 /100 WBC (ref 0–0.2)
PLATELET # BLD AUTO: 169 10*3/MM3 (ref 140–450)
PMV BLD AUTO: 13 FL (ref 6–12)
POTASSIUM BLD-SCNC: 3.5 MMOL/L (ref 3.5–5.2)
RBC # BLD AUTO: 3.94 10*6/MM3 (ref 3.77–5.28)
SODIUM BLD-SCNC: 139 MMOL/L (ref 136–145)
WBC NRBC COR # BLD: 10.09 10*3/MM3 (ref 3.4–10.8)

## 2019-08-29 PROCEDURE — 85025 COMPLETE CBC W/AUTO DIFF WBC: CPT | Performed by: INTERNAL MEDICINE

## 2019-08-29 PROCEDURE — 83615 LACTATE (LD) (LDH) ENZYME: CPT | Performed by: INTERNAL MEDICINE

## 2019-08-29 PROCEDURE — 80048 BASIC METABOLIC PNL TOTAL CA: CPT | Performed by: INTERNAL MEDICINE

## 2019-08-29 PROCEDURE — 36415 COLL VENOUS BLD VENIPUNCTURE: CPT | Performed by: INTERNAL MEDICINE

## 2019-08-30 RX ORDER — GABAPENTIN 600 MG/1
TABLET ORAL
Qty: 90 TABLET | Refills: 1 | Status: SHIPPED | OUTPATIENT
Start: 2019-08-30 | End: 2020-07-19 | Stop reason: DRUGHIGH

## 2019-09-23 ENCOUNTER — OFFICE VISIT (OUTPATIENT)
Dept: INTERNAL MEDICINE | Facility: CLINIC | Age: 49
End: 2019-09-23

## 2019-09-23 VITALS
SYSTOLIC BLOOD PRESSURE: 130 MMHG | TEMPERATURE: 97.3 F | WEIGHT: 263.38 LBS | DIASTOLIC BLOOD PRESSURE: 94 MMHG | BODY MASS INDEX: 41.87 KG/M2 | HEART RATE: 72 BPM | RESPIRATION RATE: 20 BRPM

## 2019-09-23 DIAGNOSIS — E03.9 ACQUIRED HYPOTHYROIDISM: ICD-10-CM

## 2019-09-23 DIAGNOSIS — E78.49 OTHER HYPERLIPIDEMIA: ICD-10-CM

## 2019-09-23 DIAGNOSIS — I10 ESSENTIAL HYPERTENSION: ICD-10-CM

## 2019-09-23 DIAGNOSIS — Z23 NEED FOR HEPATITIS A IMMUNIZATION: ICD-10-CM

## 2019-09-23 DIAGNOSIS — F32.89 OTHER DEPRESSION: ICD-10-CM

## 2019-09-23 DIAGNOSIS — E66.01 MORBID OBESITY WITH BMI OF 40.0-44.9, ADULT (HCC): ICD-10-CM

## 2019-09-23 DIAGNOSIS — K21.9 GASTROESOPHAGEAL REFLUX DISEASE WITHOUT ESOPHAGITIS: ICD-10-CM

## 2019-09-23 DIAGNOSIS — E11.43 TYPE 2 DIABETES MELLITUS WITH DIABETIC AUTONOMIC NEUROPATHY, WITHOUT LONG-TERM CURRENT USE OF INSULIN (HCC): Primary | ICD-10-CM

## 2019-09-23 DIAGNOSIS — Z79.899 HIGH RISK MEDICATION USE: ICD-10-CM

## 2019-09-23 DIAGNOSIS — J20.9 ACUTE BRONCHITIS, UNSPECIFIED ORGANISM: ICD-10-CM

## 2019-09-23 DIAGNOSIS — Z12.31 ENCOUNTER FOR SCREENING MAMMOGRAM FOR BREAST CANCER: ICD-10-CM

## 2019-09-23 DIAGNOSIS — G89.4 CHRONIC PAIN SYNDROME: ICD-10-CM

## 2019-09-23 DIAGNOSIS — E55.9 VITAMIN D DEFICIENCY: ICD-10-CM

## 2019-09-23 LAB
EXPIRATION DATE: NORMAL
HBA1C MFR BLD: 7.5 %
Lab: NORMAL

## 2019-09-23 PROCEDURE — 90632 HEPA VACCINE ADULT IM: CPT | Performed by: INTERNAL MEDICINE

## 2019-09-23 PROCEDURE — 80053 COMPREHEN METABOLIC PANEL: CPT | Performed by: INTERNAL MEDICINE

## 2019-09-23 PROCEDURE — 82306 VITAMIN D 25 HYDROXY: CPT | Performed by: INTERNAL MEDICINE

## 2019-09-23 PROCEDURE — 90471 IMMUNIZATION ADMIN: CPT | Performed by: INTERNAL MEDICINE

## 2019-09-23 PROCEDURE — 99214 OFFICE O/P EST MOD 30 MIN: CPT | Performed by: INTERNAL MEDICINE

## 2019-09-23 PROCEDURE — 83036 HEMOGLOBIN GLYCOSYLATED A1C: CPT | Performed by: INTERNAL MEDICINE

## 2019-09-23 RX ORDER — CEFDINIR 300 MG/1
300 CAPSULE ORAL 2 TIMES DAILY
Qty: 20 CAPSULE | Refills: 0 | Status: SHIPPED | OUTPATIENT
Start: 2019-09-23 | End: 2019-10-03

## 2019-09-23 NOTE — PATIENT INSTRUCTIONS
Recommend Mucinex as needed for the congestion and cough.    Heart-Healthy Eating Plan  Many factors influence your heart (coronary) health, including eating and exercise habits. Coronary risk increases with abnormal blood fat (lipid) levels. Heart-healthy meal planning includes limiting unhealthy fats, increasing healthy fats, and making other diet and lifestyle changes.  What is my plan?  Your health care provider may recommend that you:  · Limit your fat intake to _________% or less of your total calories each day.  · Limit your saturated fat intake to _________% or less of your total calories each day.  · Limit the amount of cholesterol in your diet to less than _________ mg per day.  What are tips for following this plan?  Cooking  Cook foods using methods other than frying. Baking, boiling, grilling, and broiling are all good options. Other ways to reduce fat include:  · Removing the skin from poultry.  · Removing all visible fats from meats.  · Steaming vegetables in water or broth.  Meal planning    · At meals, imagine dividing your plate into fourths:  ? Fill one-half of your plate with vegetables and green salads.  ? Fill one-fourth of your plate with whole grains.  ? Fill one-fourth of your plate with lean protein foods.  · Eat 4-5 servings of vegetables per day. One serving equals 1 cup raw or cooked vegetable, or 2 cups raw leafy greens.  · Eat 4-5 servings of fruit per day. One serving equals 1 medium whole fruit, ¼ cup dried fruit, ½ cup fresh, frozen, or canned fruit, or ½ cup 100% fruit juice.  · Eat more foods that contain soluble fiber. Examples include apples, broccoli, carrots, beans, peas, and barley. Aim to get 25-30 g of fiber per day.  · Increase your consumption of legumes, nuts, and seeds to 4-5 servings per week. One serving of dried beans or legumes equals ½ cup cooked, 1 serving of nuts is ¼ cup, and 1 serving of seeds equals 1 tablespoon.  Fats  · Choose healthy fats more often. Choose  monounsaturated and polyunsaturated fats, such as olive and canola oils, flaxseeds, walnuts, almonds, and seeds.  · Eat more omega-3 fats. Choose salmon, mackerel, sardines, tuna, flaxseed oil, and ground flaxseeds. Aim to eat fish at least 2 times each week.  · Check food labels carefully to identify foods with trans fats or high amounts of saturated fat.  · Limit saturated fats. These are found in animal products, such as meats, butter, and cream. Plant sources of saturated fats include palm oil, palm kernel oil, and coconut oil.  · Avoid foods with partially hydrogenated oils in them. These contain trans fats. Examples are stick margarine, some tub margarines, cookies, crackers, and other baked goods.  · Avoid fried foods.  General information  · Eat more home-cooked food and less restaurant, buffet, and fast food.  · Limit or avoid alcohol.  · Limit foods that are high in starch and sugar.  · Lose weight if you are overweight. Losing just 5-10% of your body weight can help your overall health and prevent diseases such as diabetes and heart disease.  · Monitor your salt (sodium) intake, especially if you have high blood pressure. Talk with your health care provider about your sodium intake.  · Try to incorporate more vegetarian meals weekly.  What foods can I eat?  Fruits  All fresh, canned (in natural juice), or frozen fruits.  Vegetables  Fresh or frozen vegetables (raw, steamed, roasted, or grilled). Green salads.  Grains  Most grains. Choose whole wheat and whole grains most of the time. Rice and pasta, including brown rice and pastas made with whole wheat.  Meats and other proteins  Lean, well-trimmed beef, veal, pork, and lamb. Chicken and turkey without skin. All fish and shellfish. Wild duck, rabbit, pheasant, and venison. Egg whites or low-cholesterol egg substitutes. Dried beans, peas, lentils, and tofu. Seeds and most nuts.  Dairy  Low-fat or nonfat cheeses, including ricotta and mozzarella. Skim or 1%  milk (liquid, powdered, or evaporated). Buttermilk made with low-fat milk. Nonfat or low-fat yogurt.  Fats and oils  Non-hydrogenated (trans-free) margarines. Vegetable oils, including soybean, sesame, sunflower, olive, peanut, safflower, corn, canola, and cottonseed. Salad dressings or mayonnaise made with a vegetable oil.  Beverages  Water (mineral or sparkling). Coffee and tea. Diet carbonated beverages.  Sweets and desserts  Sherbet, gelatin, and fruit ice. Small amounts of dark chocolate.  Limit all sweets and desserts.  Seasonings and condiments  All seasonings and condiments.  The items listed above may not be a complete list of foods and beverages you can eat. Contact a dietitian for more options.  What foods are not recommended?  Fruits  Canned fruit in heavy syrup. Fruit in cream or butter sauce. Fried fruit. Limit coconut.  Vegetables  Vegetables cooked in cheese, cream, or butter sauce. Fried vegetables.  Grains  Breads made with saturated or trans fats, oils, or whole milk. Croissants. Sweet rolls. Donuts. High-fat crackers, such as cheese crackers.  Meats and other proteins  Fatty meats, such as hot dogs, ribs, sausage, martines, rib-eye roast or steak. High-fat deli meats, such as salami and bologna. Caviar. Domestic duck and goose. Organ meats, such as liver.  Dairy  Cream, sour cream, cream cheese, and creamed cottage cheese. Whole milk cheeses. Whole or 2% milk (liquid, evaporated, or condensed). Whole buttermilk. Cream sauce or high-fat cheese sauce. Whole-milk yogurt.  Fats and oils  Meat fat, or shortening. Cocoa butter, hydrogenated oils, palm oil, coconut oil, palm kernel oil. Solid fats and shortenings, including martines fat, salt pork, lard, and butter. Nondairy cream substitutes. Salad dressings with cheese or sour cream.  Beverages  Regular sodas and any drinks with added sugar.  Sweets and desserts  Frosting. Pudding. Cookies. Cakes. Pies. Milk chocolate or white chocolate. Buttered syrups.  Full-fat ice cream or ice cream drinks.  The items listed above may not be a complete list of foods and beverages to avoid. Contact a dietitian for more information.  Summary  · Heart-healthy meal planning includes limiting unhealthy fats, increasing healthy fats, and making other diet and lifestyle changes.  · Lose weight if you are overweight. Losing just 5-10% of your body weight can help your overall health and prevent diseases such as diabetes and heart disease.  · Focus on eating a balance of foods, including fruits and vegetables, low-fat or nonfat dairy, lean protein, nuts and legumes, whole grains, and heart-healthy oils and fats.  This information is not intended to replace advice given to you by your health care provider. Make sure you discuss any questions you have with your health care provider.  Document Released: 09/26/2009 Document Revised: 01/25/2019 Document Reviewed: 01/25/2019  [employer] Interactive Patient Education © 2019 [employer]      Exercising to Lose Weight  Exercise is structured, repetitive physical activity to improve fitness and health. Getting regular exercise is important for everyone. It is especially important if you are overweight. Being overweight increases your risk of heart disease, stroke, diabetes, high blood pressure, and several types of cancer. Reducing your calorie intake and exercising can help you lose weight.  Exercise is usually categorized as moderate or vigorous intensity. To lose weight, most people need to do a certain amount of moderate-intensity or vigorous-intensity exercise each week.  Moderate-intensity exercise    Moderate-intensity exercise is any activity that gets you moving enough to burn at least three times more energy (calories) than if you were sitting.  Examples of moderate exercise include:  · Walking a mile in 15 minutes.  · Doing light yard work.  · Biking at an easy pace.  Most people should get at least 150 minutes (2 hours and 30 minutes) a  week of moderate-intensity exercise to maintain their body weight.  Vigorous-intensity exercise  Vigorous-intensity exercise is any activity that gets you moving enough to burn at least six times more calories than if you were sitting. When you exercise at this intensity, you should be working hard enough that you are not able to carry on a conversation.  Examples of vigorous exercise include:  · Running.  · Playing a team sport, such as football, basketball, and soccer.  · Jumping rope.  Most people should get at least 75 minutes (1 hour and 15 minutes) a week of vigorous-intensity exercise to maintain their body weight.  How can exercise affect me?  When you exercise enough to burn more calories than you eat, you lose weight. Exercise also reduces body fat and builds muscle. The more muscle you have, the more calories you burn. Exercise also:  · Improves mood.  · Reduces stress and tension.  · Improves your overall fitness, flexibility, and endurance.  · Increases bone strength.  The amount of exercise you need to lose weight depends on:  · Your age.  · The type of exercise.  · Any health conditions you have.  · Your overall physical ability.  Talk to your health care provider about how much exercise you need and what types of activities are safe for you.  What actions can I take to lose weight?  Nutrition    · Make changes to your diet as told by your health care provider or diet and nutrition specialist (dietitian). This may include:  ? Eating fewer calories.  ? Eating more protein.  ? Eating less unhealthy fats.  ? Eating a diet that includes fresh fruits and vegetables, whole grains, low-fat dairy products, and lean protein.  ? Avoiding foods with added fat, salt, and sugar.  · Drink plenty of water while you exercise to prevent dehydration or heat stroke.  Activity  · Choose an activity that you enjoy and set realistic goals. Your health care provider can help you make an exercise plan that works for  you.  · Exercise at a moderate or vigorous intensity most days of the week.  ? The intensity of exercise may vary from person to person. You can tell how intense a workout is for you by paying attention to your breathing and heartbeat. Most people will notice their breathing and heartbeat get faster with more intense exercise.  · Do resistance training twice each week, such as:  ? Push-ups.  ? Sit-ups.  ? Lifting weights.  ? Using resistance bands.  · Getting short amounts of exercise can be just as helpful as long structured periods of exercise. If you have trouble finding time to exercise, try to include exercise in your daily routine.  ? Get up, stretch, and walk around every 30 minutes throughout the day.  ? Go for a walk during your lunch break.  ? Park your car farther away from your destination.  ? If you take public transportation, get off one stop early and walk the rest of the way.  ? Make phone calls while standing up and walking around.  ? Take the stairs instead of elevators or escalators.  · Wear comfortable clothes and shoes with good support.  · Do not exercise so much that you hurt yourself, feel dizzy, or get very short of breath.  Where to find more information  · [place] Department of Health and Human Services: www.hhs.gov  · Centers for Disease Control and Prevention (CDC): www.cdc.gov  Contact a health care provider:  · Before starting a new exercise program.  · If you have questions or concerns about your weight.  · If you have a medical problem that keeps you from exercising.  Get help right away if you have any of the following while exercising:  · Injury.  · Dizziness.  · Difficulty breathing or shortness of breath that does not go away when you stop exercising.  · Chest pain.  · Rapid heartbeat.  Summary  · Being overweight increases your risk of heart disease, stroke, diabetes, high blood pressure, and several types of cancer.  · Losing weight happens when you burn more calories than you  eat.  · Reducing the amount of calories you eat in addition to getting regular moderate or vigorous exercise each week helps you lose weight.  This information is not intended to replace advice given to you by your health care provider. Make sure you discuss any questions you have with your health care provider.  Document Released: 01/20/2012 Document Revised: 12/31/2018 Document Reviewed: 12/31/2018  [employer] Interactive Patient Education © 2019 [employer]

## 2019-09-23 NOTE — PROGRESS NOTES
Subjective       Ernestina Garrido is a 49 y.o. female.     Chief Complaint   Patient presents with   • Diabetes     3 month follow up non fasting    • Sinusitis   • Spasms       History obtained from the patient.    The patient had an elevated white blood cell count with labs on 6/26/2019.  Repeat CBC on 8/29/2019 was significant for mild Anemia (11.9/38.0), but a normal white blood cell count.    Primary Care Cardiac Diagnostic Constellation: The patient is here today for a follow-up visit.  She is non-fasting today.     Her Hypertension has been stable.   Medication(s): Lisinopril and HCTZ.  Her Diabetes Mellitus Type 2 has been unstable.  Medication:  Actos and Lisinopril.   Her Hyperlipidemia has been stable.    LDL goal is <70.  Her last LDL was 54   The patient is adherent with her medication regimen. She denies medication side effects.       Interval Events:   The patient had a negative cardiac stress test on 6/7/2019.  The patient's Atorvastatin was increased to 40 mg daily on 3/19/2019 ().  Last Hemoglobin A1c on  6/26/2019 was 9.1 (improved).  Janumet was stopped due to an elevated Creatinine (1.69). Actos was increased to 45 mg daily.  Repeat BMP on 8/29/2019 was significant for an elevated glucose (130),  but a normal Creatinine (0.78).  The patient states her blood sugar at home has been around 128-130 non-fasting, and <100 postprandial.  She denies episodes of low blood sugar. The patient's last Ophthalmology appointment was 5/2/2019, no retinopathy.  She does check her feet daily.       Symptoms:  Complains of MAIN and orthopnea, not new.  Denies chest pain (other than chronic chest wall pain), resting shortness of breath,  PND, palpitations, syncope, lower extremity edema, intermittent leg claudication, lightheadedness, and dizziness..   Associated symptoms: Weight up 6 pounds since last visit. Stable fatigue. No headache, polyuria, polydipsia, myalgias, arthralgias, memory  issues, concentration issues, or focal neurologic deficits.  Has stable numbness and tingling of the feet and the fingers, but no pain or ulcers.      Lifestyle and Disease Management: Diet: She consumes a diverse and healthy diet, overall.  Weight Issues: She has weight concerns. Exercise: She has been walking daily.  Smoking: She does not use tobacco. She quit smoking 11/6/16    Hypothyroidism Follow-Up: The patient is being seen for follow-up of Hypothyroidism, which is stable.    Interval Events: None.  Symptoms: has stable fatigue and cold intolerance, but denies heat intolerance, weakness, constipation, and dry skin.   Associated symptoms: has paresthesias hands and feet, but no myalgias or arthralgias.    Medications:  Levothyroxine (Synthroid).   The patient is adherent to her medication regimen, and she denies medication side effects.      Chronic Pain Syndrome  follow-up: The patient is being seen for a routine clinic follow-up of Chronic Pain Syndrome, which is worse overall.  Interval Events: The patient has a Neurosurgery appointment scheduled for a second opinion at  on 9/26/2019..      Symptoms:  stable low back pain, neck pain, and chest wall pain.   She states the pain is constant, but the intensity waxes and wanes.  She reports cramping in her left buttock, occasionally radiating to the right thigh, for the past 2 months.  Medication:   Tramadol 3 times daily (last dose this morning), Gabapentin 3 times daily (last dose this morning), and Flexeril..    GERD Follow-up: The patient is here for follow-up of Epigastric Pain, which is stable.   Interval Events: None.    Symptoms: Denies abdominal pain, heartburn, acid regurgitation, nausea, vomiting, dysphagia, odynophagia, hematemesis, melena, hematochezia,  early satiety, belching, and bloating.  Associated Symptoms:  No chronic sore throat, cough, wheezing, orhoarseness.    Medications: Omeprazole prn.    Vitamin D Deficiency Follow-Up: The patient  is here for follow-up of Vitamin D Deficiency, which is unstable.   Interval Events: Vitamin D level on 6/26/2019 was 13.0.  The patient was started on supplements.    Symptoms: Stable fatigue and paresthesias.  Denies myalgias, arthralgias, loss of balance, and gait instability.    Medication: Vitamin D2, 50,000 IU weekly.    Depression Follow-Up: The patient is being seen for a routine clinic follow-up of Depression, which has been stable.  Comorbid Illnesses:  Insomnia.   Interval Events: None.  Symptoms: stable depression and insomnia. Has mild anxiety. Denies anhedonia, panic attacks, feelings of worthlessness, feelings of hopelessness, feelings of guilt, memory loss, and trouble concentrating.   Associated symptoms: Symptoms 1 the patient denies thoughts of suicide.   Social Support: the patient has good social support.   Medications: None.          URI    This is a new problem. Episode onset: 2 days ago. The problem has been gradually worsening. There has been no fever. Associated symptoms include chest pain, congestion, coughing (mild, occasionally productive of yellow sputum), headaches, neck pain, rhinorrhea (clear) and sinus pain. Pertinent negatives include no abdominal pain, diarrhea, ear pain, joint pain, joint swelling, nausea, rash, sneezing, sore throat, swollen glands, vomiting or wheezing. Associated symptoms comments: Has a pain in her left arm, feels like it is from her nerve pain.. She has tried nothing for the symptoms.          Current Outpatient Medications on File Prior to Visit   Medication Sig Dispense Refill   • atorvastatin (LIPITOR) 40 MG tablet Take one tablet daily 30 tablet 5   • Cream Base cream LAMOTRIGINE 2.5%, LIDOCAINE 2.2%, PRILOCAINE 2.2% B APPLY 1 GRAM 3-4 TIMES DAILY 60 g PRN   • Cream Base cream LIDOCAINE 5% OINTMENT APPLY  1 GRAM 3-4 TIMES DAILY 35.44 g PRN   • cyclobenzaprine (FLEXERIL) 10 MG tablet Take 1 tablet by mouth At Night As Needed for Muscle Spasms. 30 tablet 1    • gabapentin (NEURONTIN) 600 MG tablet TAKE ONE TABLET BY MOUTH THREE TIMES A DAY 90 tablet 1   • glucose blood (FREESTYLE LITE) test strip USE FOR TESTING BLOOD SUGAR ONCE DAILY AS DIRECTED . 100 each 6   • hydrochlorothiazide (HYDRODIURIL) 50 MG tablet TAKE ONE TABLET BY MOUTH DAILY 30 tablet 4   • Lancets (FREESTYLE) lancets USE ONE LANCET TO TEST DAILY 100 each 6   • levothyroxine (SYNTHROID, LEVOTHROID) 100 MCG tablet Take 1 tablet by mouth Daily. 30 tablet 5   • lisinopril (PRINIVIL,ZESTRIL) 20 MG tablet TAKE ONE TABLET BY MOUTH EVERY NIGHT AT BEDTIME 30 tablet 4   • omeprazole (priLOSEC) 40 MG capsule Take 1 capsule by mouth Daily. 30 capsule 5   • pioglitazone (ACTOS) 45 MG tablet Take 1 tablet by mouth Daily. 30 tablet 5   • traMADol (ULTRAM) 50 MG tablet Take 1 tablet by mouth 3 (Three) Times a Day As Needed for Moderate Pain . 90 tablet 2   • vitamin D (ERGOCALCIFEROL) 60324 units capsule capsule Take 1 capsule by mouth 1 (One) Time Per Week. 5 capsule 0   • sitaGLIPtin-metFORMIN (JANUMET)  MG per tablet Take 1 tablet by mouth 2 (Two) Times a Day With Meals. 60 tablet 5     No current facility-administered medications on file prior to visit.        Current outpatient and discharge medications have been reconciled for the patient.  Reviewed by: Stephanie Tanner MD        The following portions of the patient's history were reviewed and updated as appropriate: allergies, current medications, past family history, past medical history, past social history, past surgical history and problem list.    Review of Systems   Constitutional: Positive for fatigue and unexpected weight change. Negative for appetite change, chills and fever.   HENT: Positive for congestion, rhinorrhea (clear), sinus pressure and sinus pain. Negative for ear discharge, ear pain, postnasal drip, sneezing, sore throat and voice change.    Eyes: Positive for pain and discharge (watery). Negative for redness, itching and visual  disturbance.   Respiratory: Positive for cough (mild, occasionally productive of yellow sputum). Negative for shortness of breath and wheezing.    Cardiovascular: Positive for chest pain. Negative for palpitations and leg swelling.        Stable MAIN and orthopnea, but no claudication.   Gastrointestinal: Negative for abdominal pain, blood in stool, constipation, diarrhea, nausea and vomiting.        Denies melena.   Endocrine: Negative for polydipsia and polyuria.   Musculoskeletal: Positive for back pain and neck pain. Negative for arthralgias, joint pain, joint swelling, myalgias and neck stiffness.        No joint stiffness.   Skin: Negative for rash.   Neurological: Positive for numbness and headaches. Negative for dizziness, syncope and light-headedness.        No memory issues.   Hematological: Negative for adenopathy.   Psychiatric/Behavioral: Positive for sleep disturbance. Negative for decreased concentration. The patient is nervous/anxious.          Objective       Blood pressure 130/94, pulse 72, temperature 97.3 °F (36.3 °C), temperature source Temporal, resp. rate 20, weight 119 kg (263 lb 6 oz), not currently breastfeeding.      Physical Exam   Constitutional:   Morbidly obese.   HENT:   Head: Normocephalic and atraumatic.   Right Ear: Tympanic membrane, external ear and ear canal normal.   Left Ear: Tympanic membrane, external ear and ear canal normal.   Mouth/Throat: Oropharynx is clear and moist and mucous membranes are normal. No oral lesions.   Tonsils normal.  There is bilateral maxillary and frontal sinus tenderness to palpation.   Eyes: Conjunctivae are normal.   Neck: Normal range of motion. Neck supple. Carotid bruit is not present. No thyromegaly present.   Cardiovascular: Normal rate, regular rhythm, normal heart sounds and intact distal pulses. Exam reveals no gallop and no friction rub.   No murmur heard.  No peripheral edema.   Pulmonary/Chest: Effort normal and breath sounds normal.    Abdominal: Soft. Bowel sounds are normal. She exhibits no distension, no abdominal bruit and no mass. There is no hepatosplenomegaly. There is no tenderness.   Lymphadenopathy:     She has no cervical adenopathy (mild tenderness in the anterior, but not posterior, cervical area without increased lymph node size).   Skin: No rash noted.   Psychiatric: She has a normal mood and affect.   Nursing note and vitals reviewed.      Results for orders placed or performed in visit on 09/23/19   POC Glycosylated Hemoglobin (Hb A1C)   Result Value Ref Range    Hemoglobin A1C 7.5 %    Lot Number [id number]     Expiration Date 5-5-21      Assessment / Plan:  Ernestina was seen today for diabetes, sinusitis and spasms.    Diagnoses and all orders for this visit:    Type 2 diabetes mellitus with diabetic autonomic neuropathy, without long-term current use of insulin (CMS/[facility])  -     POC Glycosylated Hemoglobin (Hb A1C)  -     Comprehensive Metabolic Panel   Continue current medication(s) as noted in the history of present illness.   Consider Januvia.    Essential hypertension  -     Comprehensive Metabolic Panel   Continue current medication(s) as noted in the history of present illness.    Other hyperlipidemia  -     Comprehensive Metabolic Panel   Continue current medication(s) as noted in the history of present illness.    Morbid obesity with BMI of 40.0-44.9, adult (CMS/[facility])   Diet and exercise information given on after visit summary.    Acquired hypothyroidism   Continue current medication(s) as noted in the history of present illness.    Chronic pain syndrome   Continue current medication(s) as noted in the history of present illness.   Follow-up per [facility].    The patient was instructed in the side effects of the medication.  Risks of the potential for tolerance, dependence, and addiction were discussed.  The patient was instructed to take the lowest dosage of the medication, at the lowest frequency, and for the  shortest period of time possible.  The patient was instructed not to receive controlled substances or narcotics from other doctors, and not to giveaway or sell the medication.    The patient was instructed to abstain from illicit drug use.  The patient was instructed to avoid alcohol while taking these medications.      Narcotics/controlled substance agreement, [facility]upe report, and Urine Drug Screen were updated today if needed.    Gastroesophageal reflux disease without esophagitis   Continue current medication(s) as noted in the history of present illness.    Vitamin D deficiency  -     Vitamin D 25 Hydroxy   Continue weekly Vitamin D supplementation.    Other depression   Stable no medication.    Acute bronchitis, unspecified organism  -     cefdinir (OMNICEF) 300 MG capsule; Take 1 capsule by mouth 2 (Two) Times a Day for 10 days.   Recommend Mucinex as needed for the congestion and cough.    High risk medication use  -     Urine Drug Screen - Urine, Clean Catch; Future    Encounter for screening mammogram for breast cancer  -     Mammo Screening Digital Tomosynthesis Bilateral With CAD; Future    Need for hepatitis A immunization  -     Hepatitis A Vaccine Adult IM        Return in about 3 months (around 12/23/2019) for Recheck Diabetes, fasting.

## 2019-09-24 LAB
25(OH)D3 SERPL-MCNC: 17.9 NG/ML (ref 30–100)
ALBUMIN SERPL-MCNC: 4.4 G/DL (ref 3.5–5.2)
ALBUMIN/GLOB SERPL: 1.6 G/DL
ALP SERPL-CCNC: 69 U/L (ref 39–117)
ALT SERPL W P-5'-P-CCNC: 5 U/L (ref 1–33)
ANION GAP SERPL CALCULATED.3IONS-SCNC: 10.1 MMOL/L (ref 5–15)
AST SERPL-CCNC: 9 U/L (ref 1–32)
BILIRUB SERPL-MCNC: 0.3 MG/DL (ref 0.2–1.2)
BUN BLD-MCNC: 11 MG/DL (ref 6–20)
BUN/CREAT SERPL: 13.6 (ref 7–25)
CALCIUM SPEC-SCNC: 8.9 MG/DL (ref 8.6–10.5)
CHLORIDE SERPL-SCNC: 101 MMOL/L (ref 98–107)
CO2 SERPL-SCNC: 29.9 MMOL/L (ref 22–29)
CREAT BLD-MCNC: 0.81 MG/DL (ref 0.57–1)
GFR SERPL CREATININE-BSD FRML MDRD: 91 ML/MIN/1.73
GLOBULIN UR ELPH-MCNC: 2.8 GM/DL
GLUCOSE BLD-MCNC: 153 MG/DL (ref 65–99)
POTASSIUM BLD-SCNC: 3.7 MMOL/L (ref 3.5–5.2)
PROT SERPL-MCNC: 7.2 G/DL (ref 6–8.5)
SODIUM BLD-SCNC: 141 MMOL/L (ref 136–145)

## 2019-10-07 ENCOUNTER — OFFICE VISIT (OUTPATIENT)
Dept: INTERNAL MEDICINE | Facility: CLINIC | Age: 49
End: 2019-10-07

## 2019-10-07 VITALS
SYSTOLIC BLOOD PRESSURE: 144 MMHG | OXYGEN SATURATION: 99 % | RESPIRATION RATE: 18 BRPM | BODY MASS INDEX: 42.45 KG/M2 | WEIGHT: 267 LBS | DIASTOLIC BLOOD PRESSURE: 84 MMHG | HEART RATE: 73 BPM

## 2019-10-07 DIAGNOSIS — G89.4 CHRONIC PAIN SYNDROME: Primary | ICD-10-CM

## 2019-10-07 PROCEDURE — 99213 OFFICE O/P EST LOW 20 MIN: CPT | Performed by: INTERNAL MEDICINE

## 2019-10-07 NOTE — PROGRESS NOTES
"Subjective       Ernestina Chrissy Garrido is a 49 y.o. female.     Chief Complaint   Patient presents with   • Diabetes     follow up   • Leg Pain     \"catracho horse\" in L thigh into buttocks.        History obtained from the patient.      History of Present Illness     Patient continues to have leg pain.  She states she has been seen by  neurosurgery and pain management.  She has an MRI scheduled.  She is off etodolac.  They have started her on Meloxicam and Tizanidine.  She is still on Flexeril nightly, Gabapentin 3 times daily, and Tramadol nightly.    The patient was asked to schedule this appointment to discuss her recent UDS on 9/23/2019.  It was positive for Tramadol, but negative for Flexeril and Gabapentin.  It was also positive for THC, Oxycodone, and Oxymorphine.  The patient is not currently on Hydrocodone or Oxycodone.  When asked about this UDS, the patient stated she had a left over Hydrocodone tablet from dental surgery in May 2019.  Jose Guadalupe does show a prescription from Ame Iqbal DMD for Hydrocodone on 5/9/2019.  When asked why Oxycodone showed up on the UDS, the patient did then admit to taking a coworker's medication.  The patient did admit to THC use as well.    Current Outpatient Medications on File Prior to Visit   Medication Sig Dispense Refill   • atorvastatin (LIPITOR) 40 MG tablet Take one tablet daily 30 tablet 5   • Cream Base cream LAMOTRIGINE 2.5%, LIDOCAINE 2.2%, PRILOCAINE 2.2% B APPLY 1 GRAM 3-4 TIMES DAILY 60 g PRN   • Cream Base cream LIDOCAINE 5% OINTMENT APPLY  1 GRAM 3-4 TIMES DAILY 35.44 g PRN   • cyclobenzaprine (FLEXERIL) 10 MG tablet Take 1 tablet by mouth At Night As Needed for Muscle Spasms. 30 tablet 1   • gabapentin (NEURONTIN) 600 MG tablet TAKE ONE TABLET BY MOUTH THREE TIMES A DAY 90 tablet 1   • glucose blood (FREESTYLE LITE) test strip USE FOR TESTING BLOOD SUGAR ONCE DAILY AS DIRECTED . 100 each 6   • hydrochlorothiazide (HYDRODIURIL) 50 MG tablet TAKE " "ONE TABLET BY MOUTH DAILY 30 tablet 4   • Lancets (FREESTYLE) lancets USE ONE LANCET TO TEST DAILY 100 each 6   • levothyroxine (SYNTHROID, LEVOTHROID) 100 MCG tablet Take 1 tablet by mouth Daily. 30 tablet 5   • lisinopril (PRINIVIL,ZESTRIL) 20 MG tablet TAKE ONE TABLET BY MOUTH EVERY NIGHT AT BEDTIME 30 tablet 4   • omeprazole (priLOSEC) 40 MG capsule Take 1 capsule by mouth Daily. 30 capsule 5   • pioglitazone (ACTOS) 45 MG tablet Take 1 tablet by mouth Daily. 30 tablet 5   • traMADol (ULTRAM) 50 MG tablet Take 1 tablet by mouth 3 (Three) Times a Day As Needed for Moderate Pain . 90 tablet 2   • vitamin D (ERGOCALCIFEROL) 90118 units capsule capsule Take 1 capsule by mouth 1 (One) Time Per Week. 5 capsule 0     No current facility-administered medications on file prior to visit.        Current outpatient and discharge medications have been reconciled for the patient.  Reviewed by: Stephanie Tanner MD        The following portions of the patient's history were reviewed and updated as appropriate: allergies, current medications, past family history, past medical history, past social history, past surgical history and problem list.    Review of Systems   Constitutional: Negative for chills and fever.   Respiratory: Negative for shortness of breath.    Cardiovascular: Positive for chest pain (chronic chest wall).   Musculoskeletal: Positive for back pain and neck pain.         Objective       Blood pressure 144/84, pulse 73, resp. rate 18, weight 121 kg (267 lb), SpO2 99 %, not currently breastfeeding.      Physical Exam   Constitutional:   Morbidly obese.   Neurological: She is alert.   Psychiatric: She has a normal mood and affect.   Nursing note and vitals reviewed.    Counseling was given to patient for the following topics: discussed labs and diagnostic tests performed last visit or since last visit (UDS), importance of medication compliance (taking medications prescribed to her only and not taking other\"s " medication, especially controlled medications), risks of  THC, and side effects of medications (especially sedation and respiratory depression, and increased risk with use of other drugs such as alcohol and THC, as well as controlled medications not prescribed to her).  Specifically discussed not taking pills from others, as these could be contaminated with other drugs such as Fentanyl, which can kill her.  Total time of the encounter was 15 minutes and 15 minutes was spent counseling.        Assessment / Plan:  Ernestina was seen today for diabetes and leg pain.    Diagnoses and all orders for this visit:    Chronic pain syndrome     Informed the patient I would be unable to fill any controlled substances, including Tramadol and Gabapentin, for her due to the above issues.  Did recommend she could discuss this with her Pain Management physician.      Return in about 3 months (around 1/7/2020) for Recheck, Diabetes,  fasting.

## 2019-10-11 ENCOUNTER — HOSPITAL ENCOUNTER (OUTPATIENT)
Dept: PHYSICAL THERAPY | Facility: HOSPITAL | Age: 49
Setting detail: THERAPIES SERIES
Discharge: HOME | End: 2019-10-11

## 2019-10-11 ENCOUNTER — TRANSCRIBE ORDERS (OUTPATIENT)
Dept: PHYSICAL THERAPY | Facility: HOSPITAL | Age: 49
End: 2019-10-11

## 2019-10-11 DIAGNOSIS — M54.89 OTHER BACK PAIN, UNSPECIFIED CHRONICITY: Primary | ICD-10-CM

## 2019-10-11 PROCEDURE — 97162 PT EVAL MOD COMPLEX 30 MIN: CPT

## 2019-10-11 NOTE — THERAPY EVALUATION
Outpatient Physical Therapy Ortho Initial Evaluation  [facility]     Patient Name: Ernestina Garrido  : 1970  MRN: [id number]  Today's Date: 10/11/2019      Visit Date: 10/11/2019    Patient Active Problem List   Diagnosis   • Chronic pain syndrome   • Depression   • Hemorrhoid   • Essential hypertension   • Hypothyroidism   • Insomnia   • Arthralgia of hip   • Arthralgia of shoulder   • Degenerative disc disease, cervical   • Cervical facet arthropathy/cervical spondylosis without myelopathy   • Diabetic autonomic neuropathy (CMS/HCC)   • Hx of fusion of cervical spine   • Morbid obesity with BMI of 40.0-44.9, adult (CMS/HCC)   • Neuroforaminal stenosis of spine   • Carpal tunnel syndrome of right wrist   • Type 2 diabetes mellitus, without long-term current use of insulin (CMS/HCC)   • GERD (gastroesophageal reflux disease)   • Costochondral chest pain   • Intercostal neuralgia   • Other hyperlipidemia   • Cervical post-laminectomy syndrome   • Encounter for fitting and adjustment of neuropacemaker of spinal cord   • Vitamin D deficiency   • Renal angiomyolipoma   • Duplicated left renal collecting system        Past Medical History:   Diagnosis Date   • Arthralgia of hip    • Cervical facet arthropathy/cervical spondylosis without myelopathy    • Chronic pain syndrome     Description: hx of epidural injections.   • Degenerative disc disease, cervical    • Depression     Description: dx .   • Diabetes mellitus (CMS/HCC)     TYPE 2   • Duplicated left renal collecting system     Dx  by CT   • Essential hypertension     Description: dx .   • Hemorrhoid 2016   • History of cardiovascular stress test 2019    low risk myocardial perfusion study   • History of colonoscopy 2013    normal per patient   • History of uterine leiomyoma    • History of varicella    • Hypertension    • Hypothyroidism     Description: dx .   • Insomnia 2016   • Low back pain    • Lumbosacral  "disc disease    • Morbid obesity with BMI of 40.0-44.9, adult (CMS/[facility]) 9/15/2016   • Neck pain    • Osteoarthritis    • Renal angiomyolipoma     Left dx by CT .   • Tattoos     HIV neg  per patient   • Tobacco abuse    • Vitamin D deficiency 3/20/2019    Dx 3/19.   • Wears partial dentures     upper        Past Surgical History:   Procedure Laterality Date   • ANTERIOR CERVICAL DISCECTOMY  2016    C4-6 DISC (Dr. Pena)   • ANTERIOR CERVICAL DISCECTOMY W/ FUSION Left 1/3/2018    Procedure: CERVICAL DISCECTOMY ANTERIOR WITH FUSION C6-7;  Surgeon: Nicolás Pena MD;  Location:  [facility];  Service:    • BACK SURGERY      lumbar discectomy   •  SECTION      , ,    • CHOLECYSTECTOMY WITH INTRAOPERATIVE CHOLANGIOGRAM N/A 2017    Procedure: CHOLECYSTECTOMY LAPAROSCOPIC INTRAOPERATIVE CHOLANGIOGRAM;  Surgeon: Clifford Freed MD;  Location:  [facility] OR;  Service:    • COLONOSCOPY     • HYSTERECTOMY  2015   • SPINAL CORD STIMULATOR IMPLANT N/A 2019    Procedure: SPINAL CORD STIMULATOR INSERTION PHASE 1;  Surgeon: Blayne Pandya MD;  Location:  [facility];  Service: Pain Management       Visit Dx:     ICD-10-CM ICD-9-CM   1. Other back pain, unspecified chronicity M54.89 724.5         Patient History     Row Name 10/11/19 1440             History    Chief Complaint  Pain;Muscle tenderness  -AC      Type of Pain  Lower Extremity / Leg \"Liborio horse but in thigh and leg\"  -AC      Date Current Problem(s) Began  -- 2 months ago  -AC      Brief Description of Current Complaint  Pt presents with L leg pain that began 2 months ago with insidious onset. Noticed it when she was walking, and began worsening. Started having to go to ED and family doctor d/t pain. Has stopped her for working and driving. Felt like a Liborio horse was going to come constantly, and put her out of commission when it flared up. Would subside into soreness. States it has gotten a little better, no " "full-blown Liborio horse since beginning muscle relaxers on Saturday.Still has difficulty walking/soreness. States she has history of chest pain and had seen neurosurgeon, but told him to focus on leg pain. In process of undergoing MRI of lower back.   -AC      Previous treatment for THIS PROBLEM  Medication;Pain Management;Other (comment) Neurosurgery  -AC      Patient/Caregiver Goals  Relieve pain  -AC      Patient's Rating of General Health  Fair DM, HTN, overweight, \"a lot of chronic pain issues\"  -AC      Occupation/sports/leisure activities  Works at [employer] as a . Enjoys [employer] rehearsal but cannot anymore d/t pain.  -AC      Patient seeing anyone else for problem(s)?  Elizabeth  -      How has patient tried to help current problem?  Hot/cold, stretch, massage, ibuprofen  -AC      What clinical tests have you had for this problem?  -- Awaiting MRI for lumbar  -AC      Surgery/Hospitalization  Lumbar fusion many years back/history of radiculpathy, chronic pain, R knee pain/bilateral DJD  -AC         Pain     Pain Location  Back;Leg  -AC      Pain at Present  5  -AC      Pain at Best  5  -AC      Pain at Worst  10  -AC      Pain Frequency  Intermittent  -AC      Pain Description  Cramping;Other (Comment) \"Liborio horse\"  -AC      What Performance Factors Make the Current Problem(s) WORSE?  Standing, walking, straightening knee  -AC      What Performance Factors Make the Current Problem(s) BETTER?  None; up/moving until cramping resolves  -AC      Is your sleep disturbed?  Yes  -AC      Is medication used to assist with sleep?  Yes Tramadol  -AC      Total hours of sleep per night  4  -AC      What position do you sleep in?  Supine Unable to lie on L side d/t L chest pain  -AC      Difficulties at work?  Difficulty lifting boxes, standing/walking  -AC      Difficulties with ADL's?  Cooking/cleaning/dressing (even without leg cramping)  -AC      Difficulties with recreational activities?  Going to choir " "practice  -AC         Fall Risk Assessment    Any falls in the past year:  Yes \"Not into floor but into wall d/t pain in leg\"  -AC      Number of falls reported in the last 12 months  20  -AC      Factors that contributed to the fall:  Fatigue;Other (comment) Left leg weak/giving out  -AC         Daily Activities    Primary Language  English  -AC      Are you able to read  Yes  -AC      Are you able to write  Yes  -AC      How does patient learn best?  Demonstration  -AC      Teaching needs identified  Home Exercise Program  -AC      Patient is concerned about/has problems with  Climbing Stairs;Difficulty with self care (i.e. bathing, dressing, toileting:;Walking;Performing home management (household chores, shopping, care of dependents)  -AC      Does patient have problems with the following?  Depression No treatment currently, but previously on medication  -AC      Barriers to learning  None  -AC      Pt Participated in POC and Goals  Yes  -AC         Safety    Are you being hurt, hit, or frightened by anyone at home or in your life?  No  -AC      Are you being neglected by a caregiver  No  -AC        User Key  (r) = Recorded By, (t) = Taken By, (c) = Cosigned By    Initials Name Provider Type    AC Mitali Mosher, PT Physical Therapist          PT Ortho     Row Name 10/11/19 6700       Posture/Observations    Alignment Options  Lumbar lordosis;Genu valgus  -AC    Lumbar lordosis  Moderate;Severe  -AC    Genu valgus  Bilateral:;Moderate  -AC    Posture/Observations Comments  Stands with forward flexed posture, antalgic gait; stands with lateral shift to right  -AC       Quarter Clearing    Quarter Clearing  Lower Quarter Clearing  -AC       DTR- Lower Quarter Clearing    Patellar tendon (L2-4)  Bilateral:;1- Minimal response  -AC    Achilles tendon (S1-2)  Bilateral:;1- Minimal response  -AC       Neural Tension Signs- Lower Quarter Clearing    Slump  Left:;Positive Radiates to knee  -AC    SLR  " Left:;Positive Radicular pain to knee with ~30 deg hip flexion  -AC       Sensory Screen for Light Touch- Lower Quarter Clearing    L1 (inguinal area)  Left:;Diminished  -AC    L2 (anterior mid thigh)  Left:;Diminished  -AC    L3 (distal anterior thigh)  Left:;Diminished  -AC    L4 (medial lower leg/foot)  Bilateral:;Intact  -AC    L5 (lateral lower leg/great toe)  Bilateral:;Intact  -AC    S1 (bottom of foot)  Bilateral:;Intact  -AC       Myotomal Screen- Lower Quarter Clearing    Hip flexion (L2)  Right:;3- (Fair -);Left:;2+ (Poor +)  -AC    Knee extension (L3)  Right:;4 (Good);Left:;4- (Good -)  -AC    Ankle DF (L4)  Right:;5 (Normal);Left:;3+ (Fair +)  -AC    Great toe extension (L5)  Right:;4+ (Good +);Left:;3+ (Fair +)  -AC    Knee flexion (S2)  Right:;4+ (Good +);Left:;4 (Good)  -AC       Lumbar ROM Screen- Lower Quarter Clearing    Lumbar Flexion  Impaired 45 deg LBP/L pain to anterior hip  -AC    Lumbar Extension  Impaired 5 deg with back pain  -AC    Lumbar Lateral Flexion  Impaired Mod limit bilaterally  -AC    Lumbar Rotation  Impaired Mod R, mod-severe L  -AC       General ROM    GENERAL ROM COMMENTS  Unable to tolerate passive hip mobility or prone on elbows  -AC      User Key  (r) = Recorded By, (t) = Taken By, (c) = Cosigned By    Initials Name Provider Type    AC Mitali Msoher, PT Physical Therapist                      Therapy Education  Education Details: Pt educated on chronic pain science/reintroducing movement before attempting extension bias; HEP provided including: LTRs, PPTs, heel slides, supine marching, and prone lying (progressing to UMAIR).   Given: HEP  Program: New  How Provided: Verbal, Written  Provided to: Patient  Level of Understanding: Verbalized     PT OP Goals     Row Name 10/11/19 5790          PT Short Term Goals    STG Date to Achieve  11/01/19  -AC     STG 1  Decrease LBP/LLE muscle spasms by > or = 25%.  -AC     STG 1 Progress  New  -AC     STG 2  Perform independent  HEP to improve lumbar/pelvic mobility.  -AC     STG 2 Progress  New  -AC     STG 3  Enable ability to lie UMAIR for 1 minute.  -AC     STG 3 Progress  New  -AC        Long Term Goals    LTG Date to Achieve  11/22/19  -AC     LTG 1  Decrease LBP/LLE muscle spasms by > or = 50%.  -AC     LTG 1 Progress  New  -AC     LTG 2  Improve Elmer to < or = 43%.  -AC     LTG 2 Progress  New  -AC     LTG 3  Enable ability to stand/walk for work, choir, and daily activities with < or = 4/10 pain.  -AC     LTG 3 Progress  New  -AC     LTG 4  Independently perform box lifts using proper body mechanics.  -AC     LTG 4 Progress  New  -AC        Time Calculation    PT Goal Re-Cert Due Date  01/09/20  -AC       User Key  (r) = Recorded By, (t) = Taken By, (c) = Cosigned By    Initials Name Provider Type    AC Mitali Mosher, PT Physical Therapist          PT Assessment/Plan     Row Name 10/11/19 1440          PT Assessment    Functional Limitations  Limitation in home management;Performance in work activities;Performance in leisure activities;Performance in self-care ADL;Limitations in community activities;Impaired gait  -AC     Impairments  Joint mobility;Pain;Range of motion;Impaired postural alignment;Impaired muscle endurance;Gait;Muscle strength;Sensation;Peripheral nerve integrity;Impaired muscle length;Impaired muscle power;Poor body mechanics  -AC     Assessment Comments  Pt presents with evolving symptoms of moderate complexity with signs consistent with lumbar radiculopathy along femoral nerve distribution.  Pt's condition is complicated by the following factors: diabetes, hypertension, depression, history of lumbar fusion, and chronic pain syndrome. Pt has notable limitations in lumbar/pelvic mobility, and evaluation today was limited d/t significant pain. PT intervention is warranted to maximize mobility/strength and decrease pain with daily activities.   -AC     Please refer to paper survey for additional self-reported  information  Yes  -AC     Rehab Potential  Good Good to fair  -AC     Patient/caregiver participated in establishment of treatment plan and goals  Yes  -AC     Patient would benefit from skilled therapy intervention  Yes  -AC        PT Plan    PT Frequency  2x/week  -AC     Predicted Duration of Therapy Intervention (Therapy Eval)  12 weeks   -AC     Planned CPT's?  PT THER PROC EA 15 MIN: 28822;PT EVAL MOD COMPLELITY: 27249;PT RE-EVAL: 85796;PT THER ACT EA 15 MIN: 21338;PT NEUROMUSC RE-EDUCATION EA 15 MIN: 10608;PT SELF CARE/HOME MGMT/TRAIN EA 15: 52841;PT ELECTRICAL STIM UNATTEND: ;PT THER SUPP EA 15 MIN;PT THER MASS EA 15 MIN: 49736;PT MANUAL THERAPY EA 15 MIN: 09157;PT GAIT TRAINING EA 15 MIN: 90013;PT HOT/COLD PACK WC NONMCARE: 44768  -AC     PT Plan Comments  Progress mobility/strengthening as tolerated, incorporating repeated extensions as tolerated. Consider heat prior to TE.   -AC       User Key  (r) = Recorded By, (t) = Taken By, (c) = Cosigned By    Initials Name Provider Type    AC Mitali Mosher, PT Physical Therapist                              Outcome Measure Options: Modifed Owestry  Modified Oswestry  Modified Oswestry Score/Comments: 58%      Time Calculation:     Start Time: 1440     Therapy Charges for Today     Code Description Service Date Service Provider Modifiers Qty    [id number]  PT EVAL MOD COMPLEXITY 4 10/11/2019 Mitali Mosher, PT GP 1          PT G-Codes  Outcome Measure Options: Modifed Owestry  Modified Oswestry Score/Comments: 58%         Mitali Mosher PT  10/11/2019

## 2019-11-15 DIAGNOSIS — E03.9 ACQUIRED HYPOTHYROIDISM: ICD-10-CM

## 2019-11-15 DIAGNOSIS — G89.4 CHRONIC PAIN SYNDROME: ICD-10-CM

## 2019-11-15 RX ORDER — LEVOTHYROXINE SODIUM 0.1 MG/1
TABLET ORAL
Qty: 30 TABLET | Refills: 4 | Status: SHIPPED | OUTPATIENT
Start: 2019-11-15 | End: 2020-08-18

## 2019-11-15 RX ORDER — GABAPENTIN 600 MG/1
TABLET ORAL
Qty: 90 TABLET | Refills: 0 | OUTPATIENT
Start: 2019-11-15

## 2019-11-15 NOTE — TELEPHONE ENCOUNTER
8/30/2019  Last refill  # 90     UDS 12/3/17 in ED positive for THC.   9/23/19- CSA signed, UDS inappropriate (positive for Tramadol, but negative for Flexeril and Neurontin.  Also positive for THC, Oxycodone, and Oxymorphine)- patient only on Tramadol, Flexeril, and Neurontin.  Jose Guadalupe appropriate. See office note 10/7/19 (NO CONTROLLED MEDICATION RX).

## 2019-11-15 NOTE — TELEPHONE ENCOUNTER
JOSE MIGUEL at [phone] informing her that her medication had been sent to the pharmacy and that we would like a call back, office number was provided.

## 2019-12-28 ENCOUNTER — HOSPITAL ENCOUNTER (EMERGENCY)
Facility: HOSPITAL | Age: 49
Discharge: HOME | End: 2019-12-28
Attending: EMERGENCY MEDICINE | Admitting: EMERGENCY MEDICINE

## 2019-12-28 ENCOUNTER — APPOINTMENT (OUTPATIENT)
Dept: GENERAL RADIOLOGY | Facility: HOSPITAL | Age: 49
End: 2019-12-28

## 2019-12-28 VITALS
TEMPERATURE: 99.6 F | WEIGHT: 250 LBS | SYSTOLIC BLOOD PRESSURE: 139 MMHG | HEIGHT: 68 IN | HEART RATE: 96 BPM | RESPIRATION RATE: 18 BRPM | BODY MASS INDEX: 37.89 KG/M2 | DIASTOLIC BLOOD PRESSURE: 90 MMHG | OXYGEN SATURATION: 93 %

## 2019-12-28 DIAGNOSIS — B34.9 ACUTE VIRAL SYNDROME: Primary | ICD-10-CM

## 2019-12-28 DIAGNOSIS — R05.9 COUGH IN ADULT: ICD-10-CM

## 2019-12-28 DIAGNOSIS — M79.10 MYALGIA: ICD-10-CM

## 2019-12-28 LAB
ALBUMIN SERPL-MCNC: 3.9 G/DL (ref 3.5–5.2)
ALBUMIN/GLOB SERPL: 1.1 G/DL
ALP SERPL-CCNC: 85 U/L (ref 39–117)
ALT SERPL W P-5'-P-CCNC: 12 U/L (ref 1–33)
ANION GAP SERPL CALCULATED.3IONS-SCNC: 11 MMOL/L (ref 5–15)
AST SERPL-CCNC: 11 U/L (ref 1–32)
BASOPHILS # BLD AUTO: 0.03 10*3/MM3 (ref 0–0.2)
BASOPHILS NFR BLD AUTO: 0.3 % (ref 0–1.5)
BILIRUB SERPL-MCNC: 1 MG/DL (ref 0.2–1.2)
BILIRUB UR QL STRIP: NEGATIVE
BUN BLD-MCNC: 10 MG/DL (ref 6–20)
BUN/CREAT SERPL: 12.7 (ref 7–25)
CALCIUM SPEC-SCNC: 9.2 MG/DL (ref 8.6–10.5)
CHLORIDE SERPL-SCNC: 99 MMOL/L (ref 98–107)
CLARITY UR: CLEAR
CO2 SERPL-SCNC: 26 MMOL/L (ref 22–29)
COLOR UR: YELLOW
CREAT BLD-MCNC: 0.79 MG/DL (ref 0.57–1)
DEPRECATED RDW RBC AUTO: 44.7 FL (ref 37–54)
EOSINOPHIL # BLD AUTO: 0.07 10*3/MM3 (ref 0–0.4)
EOSINOPHIL NFR BLD AUTO: 0.8 % (ref 0.3–6.2)
ERYTHROCYTE [DISTWIDTH] IN BLOOD BY AUTOMATED COUNT: 13.2 % (ref 12.3–15.4)
FLUAV AG NPH QL: NEGATIVE
FLUBV AG NPH QL IA: NEGATIVE
GFR SERPL CREATININE-BSD FRML MDRD: 94 ML/MIN/1.73
GLOBULIN UR ELPH-MCNC: 3.6 GM/DL
GLUCOSE BLD-MCNC: 185 MG/DL (ref 65–99)
GLUCOSE UR STRIP-MCNC: ABNORMAL MG/DL
HCT VFR BLD AUTO: 43.3 % (ref 34–46.6)
HGB BLD-MCNC: 13.9 G/DL (ref 12–15.9)
HGB UR QL STRIP.AUTO: NEGATIVE
HOLD SPECIMEN: NORMAL
HOLD SPECIMEN: NORMAL
IMM GRANULOCYTES # BLD AUTO: 0.06 10*3/MM3 (ref 0–0.05)
IMM GRANULOCYTES NFR BLD AUTO: 0.7 % (ref 0–0.5)
KETONES UR QL STRIP: ABNORMAL
LEUKOCYTE ESTERASE UR QL STRIP.AUTO: NEGATIVE
LIPASE SERPL-CCNC: 29 U/L (ref 13–60)
LYMPHOCYTES # BLD AUTO: 0.78 10*3/MM3 (ref 0.7–3.1)
LYMPHOCYTES NFR BLD AUTO: 8.7 % (ref 19.6–45.3)
MCH RBC QN AUTO: 29.3 PG (ref 26.6–33)
MCHC RBC AUTO-ENTMCNC: 32.1 G/DL (ref 31.5–35.7)
MCV RBC AUTO: 91.4 FL (ref 79–97)
MONOCYTES # BLD AUTO: 0.89 10*3/MM3 (ref 0.1–0.9)
MONOCYTES NFR BLD AUTO: 9.9 % (ref 5–12)
NEUTROPHILS # BLD AUTO: 7.12 10*3/MM3 (ref 1.7–7)
NEUTROPHILS NFR BLD AUTO: 79.6 % (ref 42.7–76)
NITRITE UR QL STRIP: NEGATIVE
NRBC BLD AUTO-RTO: 0 /100 WBC (ref 0–0.2)
NT-PROBNP SERPL-MCNC: 394.6 PG/ML (ref 5–450)
PH UR STRIP.AUTO: 7 [PH] (ref 5–8)
PLATELET # BLD AUTO: 160 10*3/MM3 (ref 140–450)
PMV BLD AUTO: 10.6 FL (ref 6–12)
POTASSIUM BLD-SCNC: 3.7 MMOL/L (ref 3.5–5.2)
PROT SERPL-MCNC: 7.5 G/DL (ref 6–8.5)
PROT UR QL STRIP: NEGATIVE
RBC # BLD AUTO: 4.74 10*6/MM3 (ref 3.77–5.28)
SODIUM BLD-SCNC: 136 MMOL/L (ref 136–145)
SP GR UR STRIP: 1.01 (ref 1–1.03)
TROPONIN T SERPL-MCNC: <0.01 NG/ML (ref 0–0.03)
UROBILINOGEN UR QL STRIP: ABNORMAL
WBC NRBC COR # BLD: 8.95 10*3/MM3 (ref 3.4–10.8)
WHOLE BLOOD HOLD SPECIMEN: NORMAL
WHOLE BLOOD HOLD SPECIMEN: NORMAL

## 2019-12-28 PROCEDURE — 87804 INFLUENZA ASSAY W/OPTIC: CPT | Performed by: NURSE PRACTITIONER

## 2019-12-28 PROCEDURE — 96375 TX/PRO/DX INJ NEW DRUG ADDON: CPT

## 2019-12-28 PROCEDURE — 85025 COMPLETE CBC W/AUTO DIFF WBC: CPT

## 2019-12-28 PROCEDURE — 93005 ELECTROCARDIOGRAM TRACING: CPT | Performed by: EMERGENCY MEDICINE

## 2019-12-28 PROCEDURE — 80053 COMPREHEN METABOLIC PANEL: CPT

## 2019-12-28 PROCEDURE — 99284 EMERGENCY DEPT VISIT MOD MDM: CPT

## 2019-12-28 PROCEDURE — 25010000002 PROMETHAZINE PER 50 MG: Performed by: NURSE PRACTITIONER

## 2019-12-28 PROCEDURE — 25010000002 FUROSEMIDE PER 20 MG: Performed by: NURSE PRACTITIONER

## 2019-12-28 PROCEDURE — 96374 THER/PROPH/DIAG INJ IV PUSH: CPT

## 2019-12-28 PROCEDURE — 84484 ASSAY OF TROPONIN QUANT: CPT

## 2019-12-28 PROCEDURE — 81003 URINALYSIS AUTO W/O SCOPE: CPT | Performed by: NURSE PRACTITIONER

## 2019-12-28 PROCEDURE — 83880 ASSAY OF NATRIURETIC PEPTIDE: CPT

## 2019-12-28 PROCEDURE — 71045 X-RAY EXAM CHEST 1 VIEW: CPT

## 2019-12-28 PROCEDURE — 83690 ASSAY OF LIPASE: CPT

## 2019-12-28 PROCEDURE — 93005 ELECTROCARDIOGRAM TRACING: CPT

## 2019-12-28 RX ORDER — SODIUM CHLORIDE 0.9 % (FLUSH) 0.9 %
10 SYRINGE (ML) INJECTION AS NEEDED
Status: DISCONTINUED | OUTPATIENT
Start: 2019-12-28 | End: 2019-12-28

## 2019-12-28 RX ORDER — DEXTROMETHORPHAN HYDROBROMIDE AND PROMETHAZINE HYDROCHLORIDE 15; 6.25 MG/5ML; MG/5ML
5 SYRUP ORAL 4 TIMES DAILY PRN
Qty: 180 ML | Refills: 0 | Status: SHIPPED | OUTPATIENT
Start: 2019-12-28 | End: 2020-01-15

## 2019-12-28 RX ORDER — ASPIRIN 81 MG/1
324 TABLET, CHEWABLE ORAL ONCE
Status: COMPLETED | OUTPATIENT
Start: 2019-12-28 | End: 2019-12-28

## 2019-12-28 RX ORDER — HYDROCODONE BITARTRATE AND ACETAMINOPHEN 5; 325 MG/1; MG/1
1 TABLET ORAL ONCE
Status: DISCONTINUED | OUTPATIENT
Start: 2019-12-28 | End: 2019-12-28 | Stop reason: HOSPADM

## 2019-12-28 RX ORDER — PROMETHAZINE HYDROCHLORIDE 25 MG/ML
12.5 INJECTION, SOLUTION INTRAMUSCULAR; INTRAVENOUS ONCE
Status: COMPLETED | OUTPATIENT
Start: 2019-12-28 | End: 2019-12-28

## 2019-12-28 RX ORDER — SODIUM CHLORIDE 0.9 % (FLUSH) 0.9 %
10 SYRINGE (ML) INJECTION AS NEEDED
Status: DISCONTINUED | OUTPATIENT
Start: 2019-12-28 | End: 2019-12-28 | Stop reason: HOSPADM

## 2019-12-28 RX ORDER — FUROSEMIDE 10 MG/ML
20 INJECTION INTRAMUSCULAR; INTRAVENOUS ONCE
Status: COMPLETED | OUTPATIENT
Start: 2019-12-28 | End: 2019-12-28

## 2019-12-28 RX ORDER — LABETALOL HYDROCHLORIDE 5 MG/ML
10 INJECTION, SOLUTION INTRAVENOUS ONCE
Status: COMPLETED | OUTPATIENT
Start: 2019-12-28 | End: 2019-12-28

## 2019-12-28 RX ORDER — OSELTAMIVIR PHOSPHATE 75 MG/1
75 CAPSULE ORAL 2 TIMES DAILY
Qty: 10 CAPSULE | Refills: 0 | Status: SHIPPED | OUTPATIENT
Start: 2019-12-28 | End: 2020-01-15

## 2019-12-28 RX ORDER — IBUPROFEN 800 MG/1
800 TABLET ORAL ONCE
Status: COMPLETED | OUTPATIENT
Start: 2019-12-28 | End: 2019-12-28

## 2019-12-28 RX ADMIN — FUROSEMIDE 20 MG: 10 INJECTION, SOLUTION INTRAMUSCULAR; INTRAVENOUS at 14:40

## 2019-12-28 RX ADMIN — PROMETHAZINE HYDROCHLORIDE 12.5 MG: 25 INJECTION INTRAMUSCULAR; INTRAVENOUS at 13:42

## 2019-12-28 RX ADMIN — LABETALOL 20 MG/4 ML (5 MG/ML) INTRAVENOUS SYRINGE 10 MG: at 14:42

## 2019-12-28 RX ADMIN — SODIUM CHLORIDE 500 ML: 9 INJECTION, SOLUTION INTRAVENOUS at 14:35

## 2019-12-28 RX ADMIN — ASPIRIN 81 MG 324 MG: 81 TABLET ORAL at 13:34

## 2019-12-28 RX ADMIN — IBUPROFEN 800 MG: 800 TABLET ORAL at 13:35

## 2020-01-13 ENCOUNTER — OFFICE VISIT (OUTPATIENT)
Dept: INTERNAL MEDICINE | Facility: CLINIC | Age: 50
End: 2020-01-13

## 2020-01-13 VITALS
WEIGHT: 262 LBS | BODY MASS INDEX: 39.84 KG/M2 | DIASTOLIC BLOOD PRESSURE: 88 MMHG | RESPIRATION RATE: 20 BRPM | TEMPERATURE: 98.4 F | SYSTOLIC BLOOD PRESSURE: 140 MMHG | HEART RATE: 72 BPM

## 2020-01-13 DIAGNOSIS — Z23 NEED FOR VACCINATION FOR PNEUMOCOCCUS: ICD-10-CM

## 2020-01-13 DIAGNOSIS — Z23 NEED FOR IMMUNIZATION AGAINST INFLUENZA: ICD-10-CM

## 2020-01-13 DIAGNOSIS — E03.9 ACQUIRED HYPOTHYROIDISM: ICD-10-CM

## 2020-01-13 DIAGNOSIS — E11.43 TYPE 2 DIABETES MELLITUS WITH DIABETIC AUTONOMIC NEUROPATHY, WITHOUT LONG-TERM CURRENT USE OF INSULIN (HCC): Primary | ICD-10-CM

## 2020-01-13 DIAGNOSIS — I10 ESSENTIAL HYPERTENSION: ICD-10-CM

## 2020-01-13 DIAGNOSIS — G89.4 CHRONIC PAIN SYNDROME: ICD-10-CM

## 2020-01-13 DIAGNOSIS — B37.2 CANDIDAL DERMATITIS: ICD-10-CM

## 2020-01-13 DIAGNOSIS — E78.49 OTHER HYPERLIPIDEMIA: ICD-10-CM

## 2020-01-13 DIAGNOSIS — E55.9 VITAMIN D DEFICIENCY: ICD-10-CM

## 2020-01-13 DIAGNOSIS — L02.214 ABSCESS OF GROIN: ICD-10-CM

## 2020-01-13 LAB
25(OH)D3 SERPL-MCNC: 19.6 NG/ML (ref 30–100)
ANION GAP SERPL CALCULATED.3IONS-SCNC: 8.9 MMOL/L (ref 5–15)
BUN BLD-MCNC: 12 MG/DL (ref 6–20)
BUN/CREAT SERPL: 14.6 (ref 7–25)
CALCIUM SPEC-SCNC: 9.2 MG/DL (ref 8.6–10.5)
CHLORIDE SERPL-SCNC: 99 MMOL/L (ref 98–107)
CHOLEST SERPL-MCNC: 153 MG/DL (ref 0–200)
CO2 SERPL-SCNC: 29.1 MMOL/L (ref 22–29)
CREAT BLD-MCNC: 0.82 MG/DL (ref 0.57–1)
EXPIRATION DATE: NORMAL
GFR SERPL CREATININE-BSD FRML MDRD: 90 ML/MIN/1.73
GLUCOSE BLD-MCNC: 234 MG/DL (ref 65–99)
HBA1C MFR BLD: 9.9 %
HDLC SERPL-MCNC: 66 MG/DL (ref 40–60)
LDLC SERPL CALC-MCNC: 73 MG/DL (ref 0–100)
LDLC/HDLC SERPL: 1.11 {RATIO}
Lab: NORMAL
POTASSIUM BLD-SCNC: 4 MMOL/L (ref 3.5–5.2)
SODIUM BLD-SCNC: 137 MMOL/L (ref 136–145)
T4 FREE SERPL-MCNC: 1.07 NG/DL (ref 0.93–1.7)
TRIGL SERPL-MCNC: 69 MG/DL (ref 0–150)
TSH SERPL DL<=0.05 MIU/L-ACNC: 2.25 UIU/ML (ref 0.27–4.2)
VLDLC SERPL-MCNC: 13.8 MG/DL (ref 5–40)

## 2020-01-13 PROCEDURE — 83036 HEMOGLOBIN GLYCOSYLATED A1C: CPT | Performed by: INTERNAL MEDICINE

## 2020-01-13 PROCEDURE — 90670 PCV13 VACCINE IM: CPT | Performed by: INTERNAL MEDICINE

## 2020-01-13 PROCEDURE — 90472 IMMUNIZATION ADMIN EACH ADD: CPT | Performed by: INTERNAL MEDICINE

## 2020-01-13 PROCEDURE — 84443 ASSAY THYROID STIM HORMONE: CPT | Performed by: INTERNAL MEDICINE

## 2020-01-13 PROCEDURE — 80048 BASIC METABOLIC PNL TOTAL CA: CPT | Performed by: INTERNAL MEDICINE

## 2020-01-13 PROCEDURE — 90686 IIV4 VACC NO PRSV 0.5 ML IM: CPT | Performed by: INTERNAL MEDICINE

## 2020-01-13 PROCEDURE — 80061 LIPID PANEL: CPT | Performed by: INTERNAL MEDICINE

## 2020-01-13 PROCEDURE — 82306 VITAMIN D 25 HYDROXY: CPT | Performed by: INTERNAL MEDICINE

## 2020-01-13 PROCEDURE — 90471 IMMUNIZATION ADMIN: CPT | Performed by: INTERNAL MEDICINE

## 2020-01-13 PROCEDURE — 84439 ASSAY OF FREE THYROXINE: CPT | Performed by: INTERNAL MEDICINE

## 2020-01-13 PROCEDURE — 99214 OFFICE O/P EST MOD 30 MIN: CPT | Performed by: INTERNAL MEDICINE

## 2020-01-13 RX ORDER — TIZANIDINE 2 MG/1
1 TABLET ORAL 2 TIMES DAILY
COMMUNITY
Start: 2019-11-15 | End: 2021-04-02 | Stop reason: SDUPTHER

## 2020-01-13 RX ORDER — MELOXICAM 7.5 MG/1
1 TABLET ORAL
COMMUNITY
Start: 2019-11-21 | End: 2020-07-17 | Stop reason: ALTCHOICE

## 2020-01-13 RX ORDER — NYSTATIN 100000 U/G
OINTMENT TOPICAL 3 TIMES DAILY
Qty: 30 G | Refills: 1 | Status: SHIPPED | OUTPATIENT
Start: 2020-01-13 | End: 2020-01-20

## 2020-01-13 RX ORDER — SULFAMETHOXAZOLE AND TRIMETHOPRIM 800; 160 MG/1; MG/1
1 TABLET ORAL 2 TIMES DAILY
Qty: 20 TABLET | Refills: 0 | Status: SHIPPED | OUTPATIENT
Start: 2020-01-13 | End: 2020-01-23

## 2020-01-13 NOTE — PATIENT INSTRUCTIONS
Heart-Healthy Eating Plan  Many factors influence your heart (coronary) health, including eating and exercise habits. Coronary risk increases with abnormal blood fat (lipid) levels. Heart-healthy meal planning includes limiting unhealthy fats, increasing healthy fats, and making other diet and lifestyle changes.  What is my plan?  Your health care provider may recommend that you:  · Limit your fat intake to _________% or less of your total calories each day.  · Limit your saturated fat intake to _________% or less of your total calories each day.  · Limit the amount of cholesterol in your diet to less than _________ mg per day.  What are tips for following this plan?  Cooking  Cook foods using methods other than frying. Baking, boiling, grilling, and broiling are all good options. Other ways to reduce fat include:  · Removing the skin from poultry.  · Removing all visible fats from meats.  · Steaming vegetables in water or broth.  Meal planning    · At meals, imagine dividing your plate into fourths:  ? Fill one-half of your plate with vegetables and green salads.  ? Fill one-fourth of your plate with whole grains.  ? Fill one-fourth of your plate with lean protein foods.  · Eat 4-5 servings of vegetables per day. One serving equals 1 cup raw or cooked vegetable, or 2 cups raw leafy greens.  · Eat 4-5 servings of fruit per day. One serving equals 1 medium whole fruit, ¼ cup dried fruit, ½ cup fresh, frozen, or canned fruit, or ½ cup 100% fruit juice.  · Eat more foods that contain soluble fiber. Examples include apples, broccoli, carrots, beans, peas, and barley. Aim to get 25-30 g of fiber per day.  · Increase your consumption of legumes, nuts, and seeds to 4-5 servings per week. One serving of dried beans or legumes equals ½ cup cooked, 1 serving of nuts is ¼ cup, and 1 serving of seeds equals 1 tablespoon.  Fats  · Choose healthy fats more often. Choose monounsaturated and polyunsaturated fats, such as olive and  canola oils, flaxseeds, walnuts, almonds, and seeds.  · Eat more omega-3 fats. Choose salmon, mackerel, sardines, tuna, flaxseed oil, and ground flaxseeds. Aim to eat fish at least 2 times each week.  · Check food labels carefully to identify foods with trans fats or high amounts of saturated fat.  · Limit saturated fats. These are found in animal products, such as meats, butter, and cream. Plant sources of saturated fats include palm oil, palm kernel oil, and coconut oil.  · Avoid foods with partially hydrogenated oils in them. These contain trans fats. Examples are stick margarine, some tub margarines, cookies, crackers, and other baked goods.  · Avoid fried foods.  General information  · Eat more home-cooked food and less restaurant, buffet, and fast food.  · Limit or avoid alcohol.  · Limit foods that are high in starch and sugar.  · Lose weight if you are overweight. Losing just 5-10% of your body weight can help your overall health and prevent diseases such as diabetes and heart disease.  · Monitor your salt (sodium) intake, especially if you have high blood pressure. Talk with your health care provider about your sodium intake.  · Try to incorporate more vegetarian meals weekly.  What foods can I eat?  Fruits  All fresh, canned (in natural juice), or frozen fruits.  Vegetables  Fresh or frozen vegetables (raw, steamed, roasted, or grilled). Green salads.  Grains  Most grains. Choose whole wheat and whole grains most of the time. Rice and pasta, including brown rice and pastas made with whole wheat.  Meats and other proteins  Lean, well-trimmed beef, veal, pork, and lamb. Chicken and turkey without skin. All fish and shellfish. Wild duck, rabbit, pheasant, and venison. Egg whites or low-cholesterol egg substitutes. Dried beans, peas, lentils, and tofu. Seeds and most nuts.  Dairy  Low-fat or nonfat cheeses, including ricotta and mozzarella. Skim or 1% milk (liquid, powdered, or evaporated). Buttermilk made  with low-fat milk. Nonfat or low-fat yogurt.  Fats and oils  Non-hydrogenated (trans-free) margarines. Vegetable oils, including soybean, sesame, sunflower, olive, peanut, safflower, corn, canola, and cottonseed. Salad dressings or mayonnaise made with a vegetable oil.  Beverages  Water (mineral or sparkling). Coffee and tea. Diet carbonated beverages.  Sweets and desserts  Sherbet, gelatin, and fruit ice. Small amounts of dark chocolate.  Limit all sweets and desserts.  Seasonings and condiments  All seasonings and condiments.  The items listed above may not be a complete list of foods and beverages you can eat. Contact a dietitian for more options.  What foods are not recommended?  Fruits  Canned fruit in heavy syrup. Fruit in cream or butter sauce. Fried fruit. Limit coconut.  Vegetables  Vegetables cooked in cheese, cream, or butter sauce. Fried vegetables.  Grains  Breads made with saturated or trans fats, oils, or whole milk. Croissants. Sweet rolls. Donuts. High-fat crackers, such as cheese crackers.  Meats and other proteins  Fatty meats, such as hot dogs, ribs, sausage, martines, rib-eye roast or steak. High-fat deli meats, such as salami and bologna. Caviar. Domestic duck and goose. Organ meats, such as liver.  Dairy  Cream, sour cream, cream cheese, and creamed cottage cheese. Whole milk cheeses. Whole or 2% milk (liquid, evaporated, or condensed). Whole buttermilk. Cream sauce or high-fat cheese sauce. Whole-milk yogurt.  Fats and oils  Meat fat, or shortening. Cocoa butter, hydrogenated oils, palm oil, coconut oil, palm kernel oil. Solid fats and shortenings, including martines fat, salt pork, lard, and butter. Nondairy cream substitutes. Salad dressings with cheese or sour cream.  Beverages  Regular sodas and any drinks with added sugar.  Sweets and desserts  Frosting. Pudding. Cookies. Cakes. Pies. Milk chocolate or white chocolate. Buttered syrups. Full-fat ice cream or ice cream drinks.  The items listed  above may not be a complete list of foods and beverages to avoid. Contact a dietitian for more information.  Summary  · Heart-healthy meal planning includes limiting unhealthy fats, increasing healthy fats, and making other diet and lifestyle changes.  · Lose weight if you are overweight. Losing just 5-10% of your body weight can help your overall health and prevent diseases such as diabetes and heart disease.  · Focus on eating a balance of foods, including fruits and vegetables, low-fat or nonfat dairy, lean protein, nuts and legumes, whole grains, and heart-healthy oils and fats.  This information is not intended to replace advice given to you by your health care provider. Make sure you discuss any questions you have with your health care provider.  Document Released: 09/26/2009 Document Revised: 01/25/2019 Document Reviewed: 01/25/2019  [employer] [facility] Interactive Patient Education © 2019 [employer]      Exercising to Lose Weight  Exercise is structured, repetitive physical activity to improve fitness and health. Getting regular exercise is important for everyone. It is especially important if you are overweight. Being overweight increases your risk of heart disease, stroke, diabetes, high blood pressure, and several types of cancer. Reducing your calorie intake and exercising can help you lose weight.  Exercise is usually categorized as moderate or vigorous intensity. To lose weight, most people need to do a certain amount of moderate-intensity or vigorous-intensity exercise each week.  Moderate-intensity exercise    Moderate-intensity exercise is any activity that gets you moving enough to burn at least three times more energy (calories) than if you were sitting.  Examples of moderate exercise include:  · Walking a mile in 15 minutes.  · Doing light yard work.  · Biking at an easy pace.  Most people should get at least 150 minutes (2 hours and 30 minutes) a week of moderate-intensity exercise to maintain their  body weight.  Vigorous-intensity exercise  Vigorous-intensity exercise is any activity that gets you moving enough to burn at least six times more calories than if you were sitting. When you exercise at this intensity, you should be working hard enough that you are not able to carry on a conversation.  Examples of vigorous exercise include:  · Running.  · Playing a team sport, such as football, basketball, and soccer.  · Jumping rope.  Most people should get at least 75 minutes (1 hour and 15 minutes) a week of vigorous-intensity exercise to maintain their body weight.  How can exercise affect me?  When you exercise enough to burn more calories than you eat, you lose weight. Exercise also reduces body fat and builds muscle. The more muscle you have, the more calories you burn. Exercise also:  · Improves mood.  · Reduces stress and tension.  · Improves your overall fitness, flexibility, and endurance.  · Increases bone strength.  The amount of exercise you need to lose weight depends on:  · Your age.  · The type of exercise.  · Any health conditions you have.  · Your overall physical ability.  Talk to your health care provider about how much exercise you need and what types of activities are safe for you.  What actions can I take to lose weight?  Nutrition    · Make changes to your diet as told by your health care provider or diet and nutrition specialist (dietitian). This may include:  ? Eating fewer calories.  ? Eating more protein.  ? Eating less unhealthy fats.  ? Eating a diet that includes fresh fruits and vegetables, whole grains, low-fat dairy products, and lean protein.  ? Avoiding foods with added fat, salt, and sugar.  · Drink plenty of water while you exercise to prevent dehydration or heat stroke.  Activity  · Choose an activity that you enjoy and set realistic goals. Your health care provider can help you make an exercise plan that works for you.  · Exercise at a moderate or vigorous intensity most days  of the week.  ? The intensity of exercise may vary from person to person. You can tell how intense a workout is for you by paying attention to your breathing and heartbeat. Most people will notice their breathing and heartbeat get faster with more intense exercise.  · Do resistance training twice each week, such as:  ? Push-ups.  ? Sit-ups.  ? Lifting weights.  ? Using resistance bands.  · Getting short amounts of exercise can be just as helpful as long structured periods of exercise. If you have trouble finding time to exercise, try to include exercise in your daily routine.  ? Get up, stretch, and walk around every 30 minutes throughout the day.  ? Go for a walk during your lunch break.  ? Park your car farther away from your destination.  ? If you take public transportation, get off one stop early and walk the rest of the way.  ? Make phone calls while standing up and walking around.  ? Take the stairs instead of elevators or escalators.  · Wear comfortable clothes and shoes with good support.  · Do not exercise so much that you hurt yourself, feel dizzy, or get very short of breath.  Where to find more information  · [place] Department of Health and Human Services: www.hhs.gov  · Centers for Disease Control and Prevention (CDC): www.cdc.gov  Contact a health care provider:  · Before starting a new exercise program.  · If you have questions or concerns about your weight.  · If you have a medical problem that keeps you from exercising.  Get help right away if you have any of the following while exercising:  · Injury.  · Dizziness.  · Difficulty breathing or shortness of breath that does not go away when you stop exercising.  · Chest pain.  · Rapid heartbeat.  Summary  · Being overweight increases your risk of heart disease, stroke, diabetes, high blood pressure, and several types of cancer.  · Losing weight happens when you burn more calories than you eat.  · Reducing the amount of calories you eat in addition to  getting regular moderate or vigorous exercise each week helps you lose weight.  This information is not intended to replace advice given to you by your health care provider. Make sure you discuss any questions you have with your health care provider.  Document Released: 01/20/2012 Document Revised: 12/31/2018 Document Reviewed: 12/31/2018  [employer] Interactive Patient Education © 2019 [employer]

## 2020-01-13 NOTE — PROGRESS NOTES
"Subjective       Ernestina Garrido is a 49 y.o. female.     Chief Complaint   Patient presents with   • Diabetes     fasting    • Recurrent Skin Infections     inside thighs bilateral        History obtained from the patient.      History of Present Illness     The patient was diagnosed with Acute Viral Illness @ [facility] ED on 12/28/20. Influenza swab was negative.  She was treated with Tamiflu.  She states she is better.    The patient is complaining of a \"boil\" on each of her inner thighs  X 1 week.  The right has been larger than the left.  There has been occasional bloody yellow discharge.  No fever or chills.  She has not treated this with anything.    She is also requesting a medication for a recurrent lower abdominal yeast infection.      Primary Care Cardiac Diagnostic Constellation: The patient is here today for a follow-up visit.       Her Hypertension has been stable.   Medication(s): Lisinopril and HCTZ.  Her Diabetes Mellitus Type 2 has been unstable.  Medication:  Actos and Lisinopril.   Her Hyperlipidemia has been stable.    LDL goal is <70.  Her last LDL was 54.   The patient is adherent with her medication regimen. She denies medication side effects.       Interval Events:  CBC and CMP were normal in the ED 12/28/19.   The patient had a negative cardiac stress test on 6/7/2019.  The patient's Atorvastatin was increased to 40 mg daily on 3/19/2019.  Last Hemoglobin A1c on 9/23/19 was 7.5 (improved).  Janumet was stopped due to an elevated Creatinine (1.69). Actos was increased to 45 mg daily after the 6/26/19 visit.   The patient has not been checking her blood sugar at home.  The patient's last Ophthalmology appointment was 5/2/2019, no retinopathy.  She does check her feet daily.       Symptoms:  Denies chest pain (other than chronic chest wall pain), shortness of breath, MAIN, orthopnea,  PND, palpitations, syncope, lower extremity edema, intermittent leg claudication, " lightheadedness, and dizziness..   Associated Symptoms: Weight down 5 pounds, intentionally, since last visit. Stable fatigue. Reports myalgias.  No headache, polyuria, polydipsia, arthralgias, memory issues, concentration issues, or focal neurologic deficits.  Has stable numbness and tingling of the feet and the fingers, but no pain or ulcers.      Lifestyle and Disease Management: Diet: She consumes a diverse and healthy diet, overall.  Weight Issues: She has weight concerns. Exercise: She has not been exercising, but states she will be starting water aerobics.  Tobacco Use: Former Smoker. She quit smoking 11/6/16    Chronic Pain Syndrome  follow-up: The patient is being seen for a routine clinic follow-up of Chronic Pain Syndrome, which is stable.  Interval Events: The patient is being seen by  Neurosurgery.    Symptoms:  stable low back pain, neck pain, and chest wall pain.  Medication:   Tramadol every other night and Gabapentin 3 times daily.  Off Flexeril..    Hypothyroidism Follow-Up: The patient is being seen for follow-up of Hypothyroidism, which is stable.    Interval Events: None.  Symptoms: Weight down 5 pounds, intentionally, since last visit. has stable fatigue and cold intolerance, but denies heat intolerance, weakness, constipation, and dry skin.   Associated Symptoms: has paresthesias hands and feet and  Myalgias, but no arthralgias.    Medications:  Levothyroxine (Synthroid).   The patient is adherent to her medication regimen, and she denies medication side effects.     Vitamin D Deficiency Follow-Up: The patient is here for follow-up of Vitamin D Deficiency, which is unstable.   Interval Events: Vitamin D level on 9/232019 was 17.9.      Symptoms: Stable fatigue, myalgias,  and paresthesias.  Denies arthralgias, loss of balance, and gait instability.    Medication: Vitamin D2, 50,000 IU weekly.    Current Outpatient Medications on File Prior to Visit   Medication Sig Dispense Refill   •  atorvastatin (LIPITOR) 40 MG tablet Take one tablet daily 30 tablet 5   • gabapentin (NEURONTIN) 600 MG tablet TAKE ONE TABLET BY MOUTH THREE TIMES A DAY 90 tablet 1   • glucose blood (FREESTYLE LITE) test strip USE FOR TESTING BLOOD SUGAR ONCE DAILY AS DIRECTED . 100 each 6   • hydrochlorothiazide (HYDRODIURIL) 50 MG tablet TAKE ONE TABLET BY MOUTH DAILY 30 tablet 4   • Lancets (FREESTYLE) lancets USE ONE LANCET TO TEST DAILY 100 each 6   • levothyroxine (SYNTHROID, LEVOTHROID) 100 MCG tablet TAKE ONE TABLET BY MOUTH DAILY 30 tablet 4   • lisinopril (PRINIVIL,ZESTRIL) 20 MG tablet TAKE ONE TABLET BY MOUTH EVERY NIGHT AT BEDTIME 30 tablet 4   • meloxicam (MOBIC) 7.5 MG tablet 1 tablet.     • omeprazole (priLOSEC) 40 MG capsule Take 1 capsule by mouth Daily. 30 capsule 5   • pioglitazone (ACTOS) 45 MG tablet Take 1 tablet by mouth Daily. 30 tablet 5   • tiZANidine (ZANAFLEX) 2 MG tablet 1 tablet 2 (Two) Times a Day.     • vitamin D (ERGOCALCIFEROL) 30067 units capsule capsule Take 1 capsule by mouth 1 (One) Time Per Week. 5 capsule 0   • oseltamivir (TAMIFLU) 75 MG capsule Take 1 capsule by mouth 2 (Two) Times a Day. 10 capsule 0   • promethazine-dextromethorphan (PROMETHAZINE-DM) 6.25-15 MG/5ML syrup Take 5 mL by mouth 4 (Four) Times a Day As Needed for Cough. 180 mL 0   • traMADol (ULTRAM) 50 MG tablet Take 1 tablet by mouth 3 (Three) Times a Day As Needed for Moderate Pain . 90 tablet 2   • [DISCONTINUED] Cream Base cream LAMOTRIGINE 2.5%, LIDOCAINE 2.2%, PRILOCAINE 2.2% B APPLY 1 GRAM 3-4 TIMES DAILY 60 g PRN   • [DISCONTINUED] Cream Base cream LIDOCAINE 5% OINTMENT APPLY  1 GRAM 3-4 TIMES DAILY 35.44 g PRN   • [DISCONTINUED] cyclobenzaprine (FLEXERIL) 10 MG tablet Take 1 tablet by mouth At Night As Needed for Muscle Spasms. 30 tablet 1     No current facility-administered medications on file prior to visit.        Current outpatient and discharge medications have been reconciled for the patient.  Reviewed by:  Stephanie Tanner MD        The following portions of the patient's history were reviewed and updated as appropriate: allergies, current medications, past family history, past medical history, past social history, past surgical history and problem list.    Review of Systems   Constitutional: Positive for fatigue. Negative for chills, fever and unexpected weight change.   Eyes: Negative for visual disturbance.   Respiratory: Negative for cough, shortness of breath and wheezing.    Cardiovascular: Negative for chest pain, palpitations and leg swelling.        No MAIN, orthopnea, or claudication.   Gastrointestinal: Negative for abdominal pain, blood in stool, constipation, diarrhea, nausea and vomiting.        Denies melena.   Endocrine: Negative for polydipsia and polyuria.   Musculoskeletal: Positive for back pain and myalgias. Negative for arthralgias.   Skin: Positive for rash.   Neurological: Negative for dizziness, syncope, light-headedness and headaches.        No memory issues.   Psychiatric/Behavioral: Negative for decreased concentration.         Objective       Blood pressure 140/88, pulse 72, temperature 98.4 °F (36.9 °C), temperature source Temporal, resp. rate 20, weight 119 kg (262 lb), not currently breastfeeding.      Physical Exam   Constitutional:   Obese.   Neck: Normal range of motion. Neck supple. Carotid bruit is not present. No thyromegaly present.   Cardiovascular: Normal rate, regular rhythm, normal heart sounds and intact distal pulses. Exam reveals no gallop and no friction rub.   No murmur heard.  No peripheral edema.   Pulmonary/Chest: Effort normal and breath sounds normal.   Abdominal: Soft. Bowel sounds are normal. She exhibits no distension, no abdominal bruit and no mass. There is no hepatosplenomegaly. There is no tenderness.   Psychiatric: She has a normal mood and affect.   Nursing note and vitals reviewed.    Results for orders placed or performed in visit on 01/13/20   POC  Glycosylated Hemoglobin (Hb A1C)   Result Value Ref Range    Hemoglobin A1C 9.9 %    Lot Number [id number]     Expiration Date 8-21-21        Assessment / Plan:  Ernestina was seen today for diabetes and recurrent skin infections.    Diagnoses and all orders for this visit:    Type 2 diabetes mellitus with diabetic autonomic neuropathy, without long-term current use of insulin (CMS/[facility])  -     POC Glycosylated Hemoglobin (Hb A1C)  -     Basic Metabolic Panel  -     Lipid Panel   Continue current medication(s) as noted in the history of present illness.    Essential hypertension  -     Basic Metabolic Panel  -     Lipid Panel   Continue current medication(s) as noted in the history of present illness.    Other hyperlipidemia  -     Basic Metabolic Panel  -     Lipid Panel   Continue current medication(s) as noted in the history of present illness.    Acquired hypothyroidism  -     T4, Free  -     TSH   Continue current medication(s) as noted in the history of present illness.    Chronic pain syndrome   Continue current medication(s) as noted in the history of present illness.    Vitamin D deficiency  -     Vitamin D 25 Hydroxy   Continue Vitamin D supplementation.    Abscess of groin  -     sulfamethoxazole-trimethoprim (BACTRIM DS) 800-160 MG per tablet; Take 1 tablet by mouth 2 (Two) Times a Day for 10 days.    Candidal dermatitis  -     nystatin (MYCOSTATIN) 295460 UNIT/GM ointment; Apply  topically to the appropriate area as directed 3 (Three) Times a Day for 7 days.    Need for vaccination for pneumococcus  -     Pneumococcal Conjugate Vaccine 13-Valent All    Need for immunization against influenza  -     Fluarix/Fluzone/Afluria Quad>6 Months          Return in about 3 months (around 4/13/2020) for Annual physical, fasting (after 3/18/20).

## 2020-01-17 ENCOUNTER — DOCUMENTATION (OUTPATIENT)
Dept: PHYSICAL THERAPY | Facility: HOSPITAL | Age: 50
End: 2020-01-17

## 2020-01-17 ENCOUNTER — TELEPHONE (OUTPATIENT)
Dept: INTERNAL MEDICINE | Facility: CLINIC | Age: 50
End: 2020-01-17

## 2020-01-17 DIAGNOSIS — M54.89 OTHER BACK PAIN, UNSPECIFIED CHRONICITY: Primary | ICD-10-CM

## 2020-01-17 NOTE — THERAPY DISCHARGE NOTE
Outpatient Physical Therapy Discharge Summary         Patient Name: Ernestina Garrido  : 1970  MRN: [id number]    Today's Date: 2020    Visit Dx:    ICD-10-CM ICD-9-CM   1. Other back pain, unspecified chronicity M54.89 724.5       PT OP Goals     Row Name 20 1540          PT Short Term Goals    STG Date to Achieve  19  -AC     STG 1  Decrease LBP/LLE muscle spasms by > or = 25%.  -AC     STG 1 Progress  Not Met  -AC     STG 2  Perform independent HEP to improve lumbar/pelvic mobility.  -AC     STG 2 Progress  Not Met  -AC     STG 3  Enable ability to lie UMAIR for 1 minute.  -AC     STG 3 Progress  Not Met  -AC        Long Term Goals    LTG Date to Achieve  19  -AC     LTG 1  Decrease LBP/LLE muscle spasms by > or = 50%.  -AC     LTG 1 Progress  Not Met  -AC     LTG 2  Improve Elmer to < or = 43%.  -AC     LTG 2 Progress  Not Met  -AC     LTG 3  Enable ability to stand/walk for work, choir, and daily activities with < or = 4/10 pain.  -AC     LTG 3 Progress  Not Met  -AC     LTG 4  Independently perform box lifts using proper body mechanics.  -AC     LTG 4 Progress  Not Met  -AC        Time Calculation    PT Goal Re-Cert Due Date  20  -AC       User Key  (r) = Recorded By, (t) = Taken By, (c) = Cosigned By    Initials Name Provider Type    AC Mitali Mosher, PT Physical Therapist          OP PT Discharge Summary  Date of Discharge: 20  Reason for Discharge: Non-compliant  Outcomes Achieved: Unable to make functional progress toward goals at this time  Discharge Destination: Home without follow-up, Home with home program  Discharge Instructions/Additional Comments: D/c due to lapse in treatment.       Time Calculation:   Start Time:                 Mitali Mosher, KANDI  2020

## 2020-01-17 NOTE — TELEPHONE ENCOUNTER
Patient called back and states she missed a call from [employer] regarding a medication that isn't covered. She can be reached at [phone].

## 2020-01-23 ENCOUNTER — PRIOR AUTHORIZATION (OUTPATIENT)
Dept: INTERNAL MEDICINE | Facility: CLINIC | Age: 50
End: 2020-01-23

## 2020-01-23 NOTE — TELEPHONE ENCOUNTER
PA sent to cover my meds.-resent to try and get approval   Tried and failed metformin 500 mg BID   6/27/2017 to  2/13/2018  Did not control glucose     Tried and failed Glimperided   Diarrhea per patient   Tried and failed Janumet  Increased creatinine per patient     Previous pa that was sent noone documented tried and failed medication and was denied

## 2020-01-28 ENCOUNTER — TELEPHONE (OUTPATIENT)
Dept: INTERNAL MEDICINE | Facility: CLINIC | Age: 50
End: 2020-01-28

## 2020-01-28 DIAGNOSIS — L50.9 HIVES: Primary | ICD-10-CM

## 2020-01-28 NOTE — TELEPHONE ENCOUNTER
PT WOULD LIKE A REFERRAL FOR DERMATOLOGY SHE IS ITCHING ALL OVER  WITH WHELPS    PLEASE ADVISE AND GIVE CALL

## 2020-02-05 ENCOUNTER — TELEPHONE (OUTPATIENT)
Dept: INTERNAL MEDICINE | Facility: CLINIC | Age: 50
End: 2020-02-05

## 2020-02-05 DIAGNOSIS — R21 RASH: Primary | ICD-10-CM

## 2020-02-05 NOTE — TELEPHONE ENCOUNTER
Patient called and states that her dermatology appointment would not be until the end of March. She would like to know if she should schedule with you again to get some help with the itching until she can get in with Dermatology, or can the referral be sent in as urgent?    Ernestina can be contacted at [phone]

## 2020-02-05 NOTE — TELEPHONE ENCOUNTER
She has tried the Nystatin rx she was given and  has not been working  Nutragenia exzema moisturizer and hydrocortisone  Cream has not helped the itching

## 2020-02-05 NOTE — TELEPHONE ENCOUNTER
Is she itching all over or just in one place?  Does she have a rash?  Is she on any new medications?

## 2020-02-06 RX ORDER — PREDNISONE 20 MG/1
TABLET ORAL
Qty: 11 TABLET | Refills: 0 | Status: SHIPPED | OUTPATIENT
Start: 2020-02-06 | End: 2020-07-17

## 2020-02-06 NOTE — TELEPHONE ENCOUNTER
I have sent a prescription to the pharmacy for a Prednisone taper.  This should hopefully get her through until her Dermatology appointment.

## 2020-02-06 NOTE — TELEPHONE ENCOUNTER
She states she is itching all over her body.  She has a rash on stomach, waist , neck  looks like red rash.that comes and goes .   And also white raised bumps on Left buttock for 2 weeks .   She has not started any new medication or changed any soaps or lotions

## 2020-03-16 DIAGNOSIS — I10 ESSENTIAL HYPERTENSION: ICD-10-CM

## 2020-03-17 RX ORDER — ATORVASTATIN CALCIUM 40 MG/1
TABLET, FILM COATED ORAL
Qty: 30 TABLET | Refills: 4 | Status: SHIPPED | OUTPATIENT
Start: 2020-03-17 | End: 2020-11-25

## 2020-03-17 RX ORDER — LISINOPRIL 20 MG/1
TABLET ORAL
Qty: 30 TABLET | Refills: 3 | Status: SHIPPED | OUTPATIENT
Start: 2020-03-17 | End: 2020-09-30

## 2020-03-17 RX ORDER — ERGOCALCIFEROL 1.25 MG/1
CAPSULE ORAL
Qty: 4 CAPSULE | Refills: 2 | Status: SHIPPED | OUTPATIENT
Start: 2020-03-17 | End: 2023-01-31

## 2020-03-17 RX ORDER — HYDROCHLOROTHIAZIDE 50 MG/1
TABLET ORAL
Qty: 30 TABLET | Refills: 3 | Status: SHIPPED | OUTPATIENT
Start: 2020-03-17 | End: 2020-09-30

## 2020-06-08 NOTE — THERAPY TREATMENT NOTE
Outpatient Physical Therapy Ortho Treatment Note   Shashank     Patient Name: Ernestina Garrido  : 1970  MRN: [id number]  Today's Date: 2017      Visit Date: 2017    Visit Dx:    ICD-10-CM ICD-9-CM   1. Neck pain on left side M54.2 723.1   2. Radicular pain in left arm M79.2 729.2       Patient Active Problem List   Diagnosis   • Chronic pain syndrome   • Depression   • Hemorrhoid   • Essential hypertension   • Hypothyroidism   • Insomnia   • Arthralgia of hip   • Arthralgia of shoulder   • Degenerative disc disease, cervical   • Cervical facet arthropathy/cervical spondylosis without myelopathy   • Diabetic autonomic neuropathy   • Left adductor tendonitis   • Hx of fusion of cervical spine   • Obesity   • Neuroforaminal stenosis of spine   • Lateral epicondylitis, right elbow   • Carpal tunnel syndrome of right wrist   • Type 2 diabetes mellitus, without long-term current use of insulin        Past Medical History:   Diagnosis Date   • Arthralgia of hip    • Cervical facet arthropathy/cervical spondylosis without myelopathy    • Chronic pain syndrome     Description: hx of epidural injections.   • Degenerative disc disease, cervical    • Depression     Description: dx .   • Diabetes mellitus    • Encounter for Papanicolaou smear of cervix 2013    normal per patient   • Essential hypertension     Description: dx .   • Hemorrhoid 2016   • History of colonoscopy 2013    normal per patient   • History of mammogram 2013    normal per patient   • History of uterine leiomyoma    • History of varicella    • Hypertension    • Hypothyroidism     Description: dx .   • Insomnia 2016   • Low back pain    • Lumbosacral disc disease    • Neck pain    • Obesity    • Osteoarthritis    • Tattoos     HIV neg  per patient   • Tobacco abuse         Past Surgical History:   Procedure Laterality Date   • ABDOMINAL SURGERY     • ANTERIOR CERVICAL DISCECTOMY  2016    C4-6 DISC  Detail Level: Generalized (Dr. Pena)   • BACK SURGERY     •  SECTION      , ,    • CHOLECYSTECTOMY WITH INTRAOPERATIVE CHOLANGIOGRAM N/A 2017    Procedure: CHOLECYSTECTOMY LAPAROSCOPIC INTRAOPERATIVE CHOLANGIOGRAM;  Surgeon: Clifford Freed MD;  [facility];  Service:    • HYSTERECTOMY  2015   • LUMBAR DISCECTOMY  2000   • NECK SURGERY                               PT Assessment/Plan       17 1646       PT Assessment    Assessment Comments Patient does not get much relief with distraction and after about 30 sec, has increased left arm symptoms.  -GB     PT Plan    PT Plan Comments Continue incorporating taping as planned.  -GB       User Key  (r) = Recorded By, (t) = Taken By, (c) = Cosigned By    Initials Name Provider Type    KYLE Boucher, PT Physical Therapist                Modalities       17 1600          ELECTRICAL STIMULATION    Attended/Unattended Unattended  -GB      Stimulation Type Pre-Mod  -GB      Location/Electrode Placement/Other Bilateral CPS and left upper trap  -GB      Rx Minutes 20 mins  -GB        User Key  (r) = Recorded By, (t) = Taken By, (c) = Cosigned By    Initials Name Provider Type    KYLE Boucher, PT Physical Therapist                Exercises       17 1600          Subjective Comments    Subjective Comments Patient is about the same.  She reports that she has been using ice as suggested by PT and it does help provide some relief.  -GB      Subjective Pain    Able to rate subjective pain? yes  -GB      Pre-Treatment Pain Level 8  -GB      Post-Treatment Pain Level 8  -GB      Exercise 1    Exercise Name 1 Scapular squeezes  -GB      Sets 1 2  -GB      Reps 1 5  -GB      Exercise 2    Exercise Name 2 Shoulder rolls  -GB      Reps 2 5  -GB        User Key  (r) = Recorded By, (t) = Taken By, (c) = Cosigned By    Initials Name Provider Type    KYLE Boucher, PT Physical Therapist                        Manual Rx (last 36 hours)       Manual Treatments       12/18/17 1500          Manual Rx 1    Manual Rx 1 Location Cervical spine  -GB      Manual Rx 1 Type STM Distraction in sitting (manual)  -GB      Manual Rx 1 Duration 3-4 min  -GB      Manual Rx 2    Manual Rx 2 Location Median Nerve  -GB      Manual Rx 2 Type Gr I glide  -GB      Manual Rx 2 Duration 2 min  -GB        User Key  (r) = Recorded By, (t) = Taken By, (c) = Cosigned By    Initials Name Provider Type    KYLE Boucher, PT Physical Therapist              Therapy Education  Education Details: Scapular squeezes and shoulder rolls added to home program with Median N Glide I  Given: HEP, Symptoms/condition management  Program: New  How Provided: Verbal  Provided to: Patient  Level of Understanding: Verbalized              Time Calculation:   Start Time: 1337  Total Timed Code Minutes- PT: 10 minute(s)    Therapy Charges for Today     Code Description Service Date Service Provider Modifiers Qty    28705567827 HC PT ELECTRICAL STIM UNATTENDED 12/18/2017 Terence Boucher, PT  1    03406282028 HC PT THER PROC EA 15 MIN 12/18/2017 Terence Boucher, PT GP 1                    Terence Boucher, PT  12/18/2017

## 2020-06-15 ENCOUNTER — TELEPHONE (OUTPATIENT)
Dept: INTERNAL MEDICINE | Facility: CLINIC | Age: 50
End: 2020-06-15

## 2020-06-15 RX ORDER — LANCETS 28 GAUGE
EACH MISCELLANEOUS
Qty: 100 EACH | Refills: 11 | Status: SHIPPED | OUTPATIENT
Start: 2020-06-15

## 2020-06-15 RX ORDER — BLOOD-GLUCOSE METER
KIT MISCELLANEOUS
Qty: 1 EACH | Refills: 0 | Status: SHIPPED | OUTPATIENT
Start: 2020-06-15

## 2020-06-15 NOTE — TELEPHONE ENCOUNTER
Patient states she needs a prescription for a new glucometer, test strips and lancets. She can be reached at [phone].

## 2020-06-15 NOTE — TELEPHONE ENCOUNTER
Spoke with Ernestina . She states she uses a freestyle glucose monitor.     Rx for monitor , stripes and lancets sent to pharmacy

## 2020-07-17 ENCOUNTER — OFFICE VISIT (OUTPATIENT)
Dept: INTERNAL MEDICINE | Facility: CLINIC | Age: 50
End: 2020-07-17

## 2020-07-17 VITALS
HEART RATE: 72 BPM | DIASTOLIC BLOOD PRESSURE: 82 MMHG | BODY MASS INDEX: 38.94 KG/M2 | WEIGHT: 256.13 LBS | TEMPERATURE: 98 F | SYSTOLIC BLOOD PRESSURE: 118 MMHG | RESPIRATION RATE: 20 BRPM

## 2020-07-17 DIAGNOSIS — E78.49 OTHER HYPERLIPIDEMIA: ICD-10-CM

## 2020-07-17 DIAGNOSIS — K64.9 HEMORRHOIDS, UNSPECIFIED HEMORRHOID TYPE: ICD-10-CM

## 2020-07-17 DIAGNOSIS — E03.9 ACQUIRED HYPOTHYROIDISM: ICD-10-CM

## 2020-07-17 DIAGNOSIS — G89.4 CHRONIC PAIN SYNDROME: ICD-10-CM

## 2020-07-17 DIAGNOSIS — E11.65 TYPE 2 DIABETES MELLITUS WITH HYPERGLYCEMIA, WITHOUT LONG-TERM CURRENT USE OF INSULIN (HCC): Primary | ICD-10-CM

## 2020-07-17 DIAGNOSIS — E55.9 VITAMIN D DEFICIENCY: ICD-10-CM

## 2020-07-17 DIAGNOSIS — I10 ESSENTIAL HYPERTENSION: ICD-10-CM

## 2020-07-17 LAB
25(OH)D3 SERPL-MCNC: 33.2 NG/ML (ref 30–100)
A/C: NORMAL
ALBUMIN SERPL-MCNC: 4.1 G/DL (ref 3.5–5.2)
ALBUMIN/GLOB SERPL: 1.4 G/DL
ALP SERPL-CCNC: 73 U/L (ref 39–117)
ALT SERPL W P-5'-P-CCNC: 14 U/L (ref 1–33)
ANION GAP SERPL CALCULATED.3IONS-SCNC: 7 MMOL/L (ref 5–15)
AST SERPL-CCNC: 11 U/L (ref 1–32)
BASOPHILS # BLD AUTO: 0.04 10*3/MM3 (ref 0–0.2)
BASOPHILS NFR BLD AUTO: 0.4 % (ref 0–1.5)
BILIRUB SERPL-MCNC: 0.8 MG/DL (ref 0–1.2)
BUN SERPL-MCNC: 19 MG/DL (ref 6–20)
BUN/CREAT SERPL: 17.9 (ref 7–25)
CALCIUM SPEC-SCNC: 9.7 MG/DL (ref 8.6–10.5)
CHLORIDE SERPL-SCNC: 96 MMOL/L (ref 98–107)
CHOLEST SERPL-MCNC: 140 MG/DL (ref 0–200)
CLARITY, POC: ABNORMAL
CO2 SERPL-SCNC: 30 MMOL/L (ref 22–29)
COLOR UR: YELLOW
CREAT SERPL-MCNC: 1.06 MG/DL (ref 0.57–1)
DEPRECATED RDW RBC AUTO: 39.3 FL (ref 37–54)
EOSINOPHIL # BLD AUTO: 0.17 10*3/MM3 (ref 0–0.4)
EOSINOPHIL NFR BLD AUTO: 1.5 % (ref 0.3–6.2)
ERYTHROCYTE [DISTWIDTH] IN BLOOD BY AUTOMATED COUNT: 11.8 % (ref 12.3–15.4)
EXPIRATION DATE: ABNORMAL
EXPIRATION DATE: NORMAL
EXPIRATION DATE: NORMAL
GFR SERPL CREATININE-BSD FRML MDRD: 67 ML/MIN/1.73
GLOBULIN UR ELPH-MCNC: 3 GM/DL
GLUCOSE SERPL-MCNC: 307 MG/DL (ref 65–99)
GLUCOSE UR STRIP-MCNC: ABNORMAL MG/DL
HBA1C MFR BLD: 13.7 %
HCT VFR BLD AUTO: 38.8 % (ref 34–46.6)
HDLC SERPL-MCNC: 57 MG/DL (ref 40–60)
HGB BLD-MCNC: 13.1 G/DL (ref 12–15.9)
IMM GRANULOCYTES # BLD AUTO: 0.16 10*3/MM3 (ref 0–0.05)
IMM GRANULOCYTES NFR BLD AUTO: 1.4 % (ref 0–0.5)
KETONES UR QL: NEGATIVE
LDLC SERPL CALC-MCNC: 68 MG/DL (ref 0–100)
LDLC/HDLC SERPL: 1.19 {RATIO}
LEUKOCYTE EST, POC: NEGATIVE
LYMPHOCYTES # BLD AUTO: 2.97 10*3/MM3 (ref 0.7–3.1)
LYMPHOCYTES NFR BLD AUTO: 26.1 % (ref 19.6–45.3)
Lab: 1051
Lab: 1061
Lab: NORMAL
MCH RBC QN AUTO: 30.9 PG (ref 26.6–33)
MCHC RBC AUTO-ENTMCNC: 33.8 G/DL (ref 31.5–35.7)
MCV RBC AUTO: 91.5 FL (ref 79–97)
MONOCYTES # BLD AUTO: 0.74 10*3/MM3 (ref 0.1–0.9)
MONOCYTES NFR BLD AUTO: 6.5 % (ref 5–12)
NEUTROPHILS NFR BLD AUTO: 64.1 % (ref 42.7–76)
NEUTROPHILS NFR BLD AUTO: 7.32 10*3/MM3 (ref 1.7–7)
NITRITE UR-MCNC: NEGATIVE MG/ML
NRBC BLD AUTO-RTO: 0.1 /100 WBC (ref 0–0.2)
PH UR: 5.5 [PH] (ref 5–8)
PLATELET # BLD AUTO: 175 10*3/MM3 (ref 140–450)
PMV BLD AUTO: 13 FL (ref 6–12)
POC CREATININE URINE: 200
POC MICROALBUMIN URINE: 30
POTASSIUM SERPL-SCNC: 4.1 MMOL/L (ref 3.5–5.2)
PROT SERPL-MCNC: 7.1 G/DL (ref 6–8.5)
PROT UR STRIP-MCNC: NEGATIVE MG/DL
PROT/CREAT UR: 100 MG/G CREA (ref 0–200)
RBC # BLD AUTO: 4.24 10*6/MM3 (ref 3.77–5.28)
RBC # UR STRIP: NEGATIVE /UL
SODIUM SERPL-SCNC: 133 MMOL/L (ref 136–145)
SP GR UR: 1.02 (ref 1–1.03)
TRIGL SERPL-MCNC: 77 MG/DL (ref 0–150)
TSH SERPL DL<=0.05 MIU/L-ACNC: 0.91 UIU/ML (ref 0.27–4.2)
VLDLC SERPL-MCNC: 15.4 MG/DL (ref 5–40)
WBC # BLD AUTO: 11.4 10*3/MM3 (ref 3.4–10.8)

## 2020-07-17 PROCEDURE — 82044 UR ALBUMIN SEMIQUANTITATIVE: CPT | Performed by: INTERNAL MEDICINE

## 2020-07-17 PROCEDURE — 80053 COMPREHEN METABOLIC PANEL: CPT | Performed by: INTERNAL MEDICINE

## 2020-07-17 PROCEDURE — 82306 VITAMIN D 25 HYDROXY: CPT | Performed by: INTERNAL MEDICINE

## 2020-07-17 PROCEDURE — 84443 ASSAY THYROID STIM HORMONE: CPT | Performed by: INTERNAL MEDICINE

## 2020-07-17 PROCEDURE — 83036 HEMOGLOBIN GLYCOSYLATED A1C: CPT | Performed by: INTERNAL MEDICINE

## 2020-07-17 PROCEDURE — 80061 LIPID PANEL: CPT | Performed by: INTERNAL MEDICINE

## 2020-07-17 PROCEDURE — 99214 OFFICE O/P EST MOD 30 MIN: CPT | Performed by: INTERNAL MEDICINE

## 2020-07-17 PROCEDURE — 85025 COMPLETE CBC W/AUTO DIFF WBC: CPT | Performed by: INTERNAL MEDICINE

## 2020-07-17 RX ORDER — ADALIMUMAB 80MG/0.8ML
40 KIT SUBCUTANEOUS WEEKLY
COMMUNITY
Start: 2020-07-01 | End: 2021-06-08 | Stop reason: ALTCHOICE

## 2020-07-17 RX ORDER — CELECOXIB 200 MG/1
1 CAPSULE ORAL 2 TIMES DAILY
COMMUNITY
Start: 2020-07-10 | End: 2021-06-08 | Stop reason: ALTCHOICE

## 2020-07-17 RX ORDER — DIAPER,BRIEF,INFANT-TODD,DISP
EACH MISCELLANEOUS 3 TIMES DAILY
Qty: 30 G | Refills: 1 | Status: SHIPPED | OUTPATIENT
Start: 2020-07-17 | End: 2020-07-24

## 2020-07-17 RX ORDER — GABAPENTIN 800 MG/1
1 TABLET ORAL 3 TIMES DAILY
COMMUNITY
Start: 2020-07-09 | End: 2022-05-12 | Stop reason: ALTCHOICE

## 2020-07-17 NOTE — PROGRESS NOTES
Subjective       Ernestina Garrido is a 50 y.o. female.     Chief Complaint   Patient presents with   • Diabetes     6 month follow up fasting    • Hemorrhoids     referral       History obtained from the patient.      History of Present Illness     The patient is now on Humira per [facility] for her Vaginal Hidradenitis Suppurativa.    The patient states that she has seen Dr. Azevedo, Orthopedist at , for her right knee pain.  He recently started her on Celebrex.    The patient states she is also seeing a Hand Surgeon at  for Bilateral Carpal Tunnel Syndrome.    The patient is complaining of painful bleeding Hemorrhoids.  She would like a referral to Colorectal Surgery.  She has seen Dr. Love in the past, but he does not take her insurance.    [facility]: The patient is here today for a follow-up visit.       Her Hypertension has been stable.   Medication(s): Lisinopril and HCTZ.  Her Diabetes Mellitus Type 2 has been unstable.  Medication:  Januvia, Actos, Atorvastatin, and Lisinopril.   Her Hyperlipidemia has been stable.    LDL goal is <70.  Her last LDL was 73   Medication:  Atorvastatin.  The patient is adherent with her medication regimen. She denies medication side effects.     Procedures:  The patient had a negative cardiac stress test on 6/7/2019.        Interval Events:   Last Hemoglobin A1c on 1/13/20 was 9.9.  Janumet was stopped due to an elevated Creatinine (1.69), but Januvia and Actos were continued.  Blood sugar at home has been 200-430 fasting and 200-300 postprandial.  She denies episodes of low blood sugar.  The patient's last Ophthalmology appointment was 5/2/2019, no retinopathy.  She does check her feet daily.  She states her blood pressure at home has been 120s/80s.     Symptoms:  Denies chest pain (other than chronic chest wall pain), shortness of breath, MAIN, orthopnea, PND, palpitations, syncope, lower extremity edema, intermittent  leg claudication, lightheadedness, and dizziness..   Associated Symptoms: Weight down 6 pounds, intentionally, since last visit. Stable fatigue. Reports myalgias (legs).  No headache, polyuria, polydipsia, arthralgias, memory issues, concentration issues, or focal neurologic deficits.  Has stable numbness and tingling of the feet and the fingers, but no pain or ulcers.      Lifestyle and Disease Management: Diet: She has not been consuming a diverse and healthy diet, overall.  Weight Issues: She has weight concerns. Exercise: She has not been exercising regularly.  Tobacco Use: Former Smoker.      Chronic Pain Syndrome  follow-up: The patient is being seen for a routine clinic follow-up of Chronic Pain Syndrome, which is stable.  Interval Events: The patient is being seen by  Neurosurgery.    Symptoms:  stable low back pain, neck pain, and chest wall pain.  Medication: Meloxicam, Tizanidine, and Gabapentin.     Hypothyroidism Follow-Up: The patient is being seen for follow-up of Hypothyroidism, which is stable.    Interval Events: T4 and TSH were normal 1/13/20..  Symptoms: Weight down 6 pounds, intentionally, since last visit. has stable fatigue and cold intolerance, but denies heat intolerance, weakness, constipation, and dry skin.   Associated Symptoms: has paresthesias hands and feet and  Myalgias, but no arthralgias.    Medications:  Levothyroxine (Synthroid).   The patient is adherent to her medication regimen, and she denies medication side effects.      Vitamin D Deficiency Follow-Up: The patient is here for follow-up of Vitamin D Deficiency, which is unstable.   Interval Events: Vitamin D level on 1/13/20 was 19.6.      Symptoms: Stable fatigue, myalgias, and paresthesias. Has occasional loss of balance.  Denies arthralgias and gait instability.    Medication: Vitamin D2, 50,000 IU weekly.       Current Outpatient Medications on File Prior to Visit   Medication Sig Dispense Refill   • atorvastatin  (LIPITOR) 40 MG tablet TAKE ONE TABLET BY MOUTH DAILY 30 tablet 4   • Blood Glucose Monitoring Suppl (FREESTYLE FREEDOM LITE) w/Device kit Use twice daily   Dx  E11.9 1 each 0   • gabapentin (NEURONTIN) 800 MG tablet 1 tablet 3 (Three) Times a Day.     • glucose blood (FREESTYLE LITE) test strip USE FOR TESTING BLOOD SUGAR TWICE  DAILY AS DIRECTED .   Dx E11/9 100 each 6   • HUMIRA PEN-CD/UC/HS STARTER 80 MG/0.8ML injection Inject 40 Units as directed 1 (One) Time Per Week.     • hydroCHLOROthiazide (HYDRODIURIL) 50 MG tablet TAKE ONE TABLET BY MOUTH DAILY 30 tablet 3   • Lancets (FREESTYLE) lancets USE  LANCET  TO TEST TWICE  DAILY 100 each 11   • levothyroxine (SYNTHROID, LEVOTHROID) 100 MCG tablet TAKE ONE TABLET BY MOUTH DAILY 30 tablet 4   • lisinopril (PRINIVIL,ZESTRIL) 20 MG tablet TAKE ONE TABLET BY MOUTH EVERY NIGHT AT BEDTIME 30 tablet 3   • omeprazole (priLOSEC) 40 MG capsule Take 1 capsule by mouth Daily. 30 capsule 5   • pioglitazone (ACTOS) 45 MG tablet Take 1 tablet by mouth Daily. 30 tablet 5   • SITagliptin (JANUVIA) 100 MG tablet Take 1 tablet by mouth Daily. 30 tablet 5   • tiZANidine (ZANAFLEX) 2 MG tablet 1 tablet 2 (Two) Times a Day.     • vitamin D (ERGOCALCIFEROL) 1.25 MG (10398 UT) capsule capsule TAKE ONE CAPSULE BY MOUTH ONCE WEEKLY 4 capsule 2   • [DISCONTINUED] gabapentin (NEURONTIN) 600 MG tablet TAKE ONE TABLET BY MOUTH THREE TIMES A DAY (Patient taking differently: Take 800 mg by mouth.) 90 tablet 1   • celecoxib (CeleBREX) 200 MG capsule 1 capsule 2 (Two) Times a Day.       No current facility-administered medications on file prior to visit.        Current outpatient and discharge medications have been reconciled for the patient.  Reviewed by: Stephanie Tanner MD        The following portions of the patient's history were reviewed and updated as appropriate: allergies, current medications, past family history, past medical history, past social history, past surgical history and problem  list.    Review of Systems   Constitutional: Positive for fatigue. Negative for unexpected weight change.   Eyes: Negative for visual disturbance.   Respiratory: Negative for cough, shortness of breath and wheezing.    Cardiovascular: Negative for chest pain, palpitations and leg swelling.        Stable MAIN, but no orthopnea or claudication.   Gastrointestinal: Positive for anal bleeding (hemorrhoids) and nausea. Negative for abdominal pain, blood in stool, constipation, diarrhea and vomiting.        Denies melena.   Endocrine: Negative for polydipsia and polyuria.   Musculoskeletal: Positive for back pain, myalgias (legs) and neck pain. Negative for arthralgias.   Neurological: Positive for numbness. Negative for dizziness, syncope, light-headedness and headaches.        No memory issues.   Psychiatric/Behavioral: Negative for decreased concentration.         Objective       Blood pressure 118/82, pulse 72, temperature 98 °F (36.7 °C), temperature source Temporal, resp. rate 20, weight 116 kg (256 lb 2 oz), not currently breastfeeding.      Physical Exam   Constitutional:   Obese.   Neck: Normal range of motion. Neck supple. Carotid bruit is not present. No thyromegaly present.   Cardiovascular: Normal rate, regular rhythm, normal heart sounds and intact distal pulses. Exam reveals no gallop and no friction rub.   No murmur heard.  No peripheral edema.   Pulmonary/Chest: Effort normal and breath sounds normal.   Abdominal: Soft. Bowel sounds are normal. She exhibits no distension, no abdominal bruit and no mass. There is no hepatosplenomegaly. There is no tenderness.   Reviewed a picture on the patient's phone of the patient's inflamed enlarged hemorrhoids.    Ernestina had a diabetic foot exam performed today.   During the foot exam she had a monofilament test performed (see form).  Vascular Status -  Her right foot exhibits normal foot vasculature .  Skin Integrity  -  Her right foot skin is intact (dry).Her left  foot skin is intact (dry)..  Psychiatric: She has a normal mood and affect.   Nursing note and vitals reviewed.    Results for orders placed or performed in visit on 07/17/20   POC Glycosylated Hemoglobin (Hb A1C)   Result Value Ref Range    Hemoglobin A1C 13.7 %    Lot Number [id number]     Expiration Date 3-23-22    POC Microalbumin   Result Value Ref Range    Microalbumin, Urine 30     Creatinine, Urine 200     A/C <30mg/g NORMAL     Lot Number [id number]     Expiration Date 4-30-21    POC Urinalysis Dipstick, Multipro   Result Value Ref Range    Color Yellow Yellow, Straw, Dark Yellow, [place]    Clarity, UA Slightly Cloudy (A) Clear    Glucose,  mg/dL (A) Negative, 1000 mg/dL (3+) mg/dL    Ketones, UA Negative Negative    Specific Gravity  1.025 1.005 - 1.030    Blood, UA Negative Negative    pH, Urine 5.5 5.0 - 8.0    Protein, POC Negative Negative mg/dL    Nitrite, UA Negative Negative    Leukocytes Negative Negative    Protein/Creatinine Ratio, Urine 100.0 0.0 - 200.0 mg/G Crea    Lot Number [id number]     Expiration Date 4-30-21        Assessment / Plan:  Ernestina was seen today for diabetes and hemorrhoids.    Diagnoses and all orders for this visit:    Type 2 diabetes mellitus with hyperglycemia, without long-term current use of insulin (CMS/[facility])  -     Lipid Panel  -     Comprehensive Metabolic Panel  -     POC Glycosylated Hemoglobin (Hb A1C)  -     POC Microalbumin  -     POC Urinalysis Dipstick, Multipro  -     CBC & Differential  -     TSH  -     CBC Auto Differential  -     dapagliflozin (Farxiga) 5 MG tablet tablet; Take 1 tablet by mouth Daily (new).   Continue current medication(s) as noted in the history of present illness.    Essential hypertension  -     Lipid Panel  -     Comprehensive Metabolic Panel  -     POC Urinalysis Dipstick, Multipro  -     CBC & Differential  -     TSH  -     CBC Auto Differential   Continue current medication(s) as noted in the history of present illness.    Other  hyperlipidemia  -     Lipid Panel  -     Comprehensive Metabolic Panel  -     CBC & Differential  -     TSH  -     CBC Auto Differential   Continue current medication(s) as noted in the history of present illness.    Acquired hypothyroidism  -     TSH   Continue current medication(s) as noted in the history of present illness.    Vitamin D deficiency  -     Vitamin D 25 Hydroxy   Continue Vitamin D supplementation.    Chronic pain syndrome   Continue current medication(s) as noted in the history of present illness.   The patient was instructed to discontinue Meloxicam, since she is now on Celebrex.    Hemorrhoids, unspecified hemorrhoid type  -     Ambulatory Referral to Colorectal Surgery  -     hydrocortisone 1 % cream; Apply  topically to the appropriate area as directed 3 (Three) Times a Day for 7 days.        Return in about 1 month (around 8/17/2020) for Annual physical/Pap, fasting.

## 2020-07-22 ENCOUNTER — TELEPHONE (OUTPATIENT)
Dept: INTERNAL MEDICINE | Facility: CLINIC | Age: 50
End: 2020-07-22

## 2020-07-24 ENCOUNTER — PRIOR AUTHORIZATION (OUTPATIENT)
Dept: INTERNAL MEDICINE | Facility: CLINIC | Age: 50
End: 2020-07-24

## 2020-07-24 DIAGNOSIS — E11.65 TYPE 2 DIABETES MELLITUS WITH HYPERGLYCEMIA, WITHOUT LONG-TERM CURRENT USE OF INSULIN (HCC): Primary | ICD-10-CM

## 2020-07-24 NOTE — TELEPHONE ENCOUNTER
Ernestina Garrido (Cespedes: [id number])   Rx #: [id number]   Farxiga 5MG tablets   PA sent to cover my meds   Tried and failed Glimperide - caused diarrhea   Janumet 50/1000 from 6/27/2016 - 10/15/2018  Increased Creatnine     Waiting determination

## 2020-07-30 NOTE — TELEPHONE ENCOUNTER
YANG shankar reviewed.  Needs to try and fail Steglatro or Segluromet.  She cannot take Segluromet. due to an elevated creatinine while on Janumet.    Farxiga discontinued.  Prescription sent to the pharmacy for Steglatro.    Please inform patient.

## 2020-07-31 ENCOUNTER — PRIOR AUTHORIZATION (OUTPATIENT)
Dept: INTERNAL MEDICINE | Facility: CLINIC | Age: 50
End: 2020-07-31

## 2020-08-01 NOTE — TELEPHONE ENCOUNTER
Drug:Steglatro 5MG tablets    Next Steps   The plan will fax you a determination, typically within 1 to 5 business days.    Form: [employer] [place] [employer]  [phone] phone   [phone] fax

## 2020-08-18 DIAGNOSIS — E03.9 ACQUIRED HYPOTHYROIDISM: ICD-10-CM

## 2020-08-18 RX ORDER — LEVOTHYROXINE SODIUM 0.1 MG/1
TABLET ORAL
Qty: 30 TABLET | Refills: 3 | Status: SHIPPED | OUTPATIENT
Start: 2020-08-18 | End: 2021-06-08 | Stop reason: SDUPTHER

## 2020-09-08 ENCOUNTER — TELEPHONE (OUTPATIENT)
Dept: INTERNAL MEDICINE | Facility: CLINIC | Age: 50
End: 2020-09-08

## 2020-09-08 DIAGNOSIS — N76.0 ACUTE VAGINITIS: Primary | ICD-10-CM

## 2020-09-08 RX ORDER — FLUCONAZOLE 150 MG/1
150 TABLET ORAL ONCE
Qty: 2 TABLET | Refills: 0 | Status: SHIPPED | OUTPATIENT
Start: 2020-09-08 | End: 2020-09-08

## 2020-09-08 NOTE — TELEPHONE ENCOUNTER
Caller: Ernestina Garrido    Relationship: Self    Best call back number: [phone]      What medication are you requesting: medication for a yeast infection    What are your current symptoms: vaginal burning, vaginal itching, white discharge    How long have you been experiencing symptoms: 2 days    Have you had these symptoms before:    [x] Yes  [] No    Have you been treated for these symptoms before:   [x] Yes  [] No    If a prescription is needed, what is your preferred pharmacy and phone number: [facility] [address] [place] [address] [phone] [facility] [phone]

## 2020-09-16 DIAGNOSIS — L73.2 AXILLARY HIDRADENITIS SUPPURATIVA: Primary | ICD-10-CM

## 2020-09-16 RX ORDER — ADALIMUMAB 80MG/0.8ML
KIT SUBCUTANEOUS WEEKLY
Qty: 1 EACH | Status: CANCELLED | OUTPATIENT
Start: 2020-09-16

## 2020-09-16 NOTE — TELEPHONE ENCOUNTER
Loco from [facility] pharmacy called stating the patients prescription for HUMIRA PEN-CD/UC/HS STARTER 80 MG/0.8ML injection could not be completed due to it being written by Dr Campoverde who is the physician who prescribed this medication  To the patient. Loco stated Dr Campoverde's office is no longer accepting the patients insurance. Loco was wanting to know if Dr Tanner could write the prescription or had a solution for the patient to receive her medication.    [facility]   [phone]

## 2020-09-17 NOTE — TELEPHONE ENCOUNTER
trent states her Dermatologist  Dr Campoverde does not take her medication now and is wondering if Dr Tanner would prescribe

## 2020-09-17 NOTE — TELEPHONE ENCOUNTER
This is not typically a medication I prescribe.  I recommend she establish with a new Dermatologist.

## 2020-09-18 NOTE — TELEPHONE ENCOUNTER
Spoke to patient and advised her two options patient would like for you to place referral for [facility] in [facility] please/

## 2020-09-29 ENCOUNTER — OFFICE VISIT (OUTPATIENT)
Dept: INTERNAL MEDICINE | Facility: CLINIC | Age: 50
End: 2020-09-29

## 2020-09-29 VITALS
RESPIRATION RATE: 20 BRPM | BODY MASS INDEX: 37.77 KG/M2 | WEIGHT: 248.38 LBS | DIASTOLIC BLOOD PRESSURE: 84 MMHG | HEART RATE: 72 BPM | SYSTOLIC BLOOD PRESSURE: 134 MMHG | TEMPERATURE: 97.1 F

## 2020-09-29 DIAGNOSIS — M79.671 BILATERAL FOOT PAIN: ICD-10-CM

## 2020-09-29 DIAGNOSIS — E11.65 TYPE 2 DIABETES MELLITUS WITH HYPERGLYCEMIA, WITHOUT LONG-TERM CURRENT USE OF INSULIN (HCC): Primary | ICD-10-CM

## 2020-09-29 DIAGNOSIS — M79.672 BILATERAL FOOT PAIN: ICD-10-CM

## 2020-09-29 DIAGNOSIS — L02.416 ABSCESS OF LEFT THIGH: ICD-10-CM

## 2020-09-29 LAB
ANION GAP SERPL CALCULATED.3IONS-SCNC: 7.5 MMOL/L (ref 5–15)
BUN SERPL-MCNC: 13 MG/DL (ref 6–20)
BUN/CREAT SERPL: 13.8 (ref 7–25)
CALCIUM SPEC-SCNC: 9.5 MG/DL (ref 8.6–10.5)
CHLORIDE SERPL-SCNC: 100 MMOL/L (ref 98–107)
CO2 SERPL-SCNC: 31.5 MMOL/L (ref 22–29)
CREAT SERPL-MCNC: 0.94 MG/DL (ref 0.57–1)
GFR SERPL CREATININE-BSD FRML MDRD: 77 ML/MIN/1.73
GLUCOSE SERPL-MCNC: 200 MG/DL (ref 65–99)
POTASSIUM SERPL-SCNC: 3.5 MMOL/L (ref 3.5–5.2)
SODIUM SERPL-SCNC: 139 MMOL/L (ref 136–145)

## 2020-09-29 PROCEDURE — 99214 OFFICE O/P EST MOD 30 MIN: CPT | Performed by: INTERNAL MEDICINE

## 2020-09-29 PROCEDURE — 80048 BASIC METABOLIC PNL TOTAL CA: CPT | Performed by: INTERNAL MEDICINE

## 2020-09-29 NOTE — PROGRESS NOTES
"Subjective       Ernestina Garrido is a 50 y.o. female.     Chief Complaint   Patient presents with   • Diabetes     Follow-up   • Abscess     left thigh   • Foot Pain     bilateral       History obtained from the patient.      History of Present Illness     The patient has had a \"boil on her left thigh for greater than 1 month.  She has been treating it with alcohol with improvement.  There has been no drainage, fever, or chills.    The patient is complaining of bilateral foot pain.  She denies injury or trauma.  These are 2 different types of pain.  The left foot pain started 1 week ago and is on the bottom of the foot.  She states it feels like she stepped on something.  The right foot pain started 3 days ago, and feels like a twisted muscle which starts at the top of her foot and radiates to the bottom.  She has not treated this pain with anything, and states the pain is unchanged.    Primary Care Cardiac Diagnostic Constellation: The patient is here today for a follow-up visit.       Her Diabetes Mellitus Type 2 has been unstable.  Medication:  Januvia, Steglatro, Actos, Atorvastatin, and Lisinopril.   The patient is adherent with her medication regimen. She denies medication side effects.      Procedures:  The patient had a negative cardiac stress test on 6/7/2019.        Interval Events:   Last Hemoglobin A1c on 1/13/20 was 9.9.  Janumet was stopped due to an elevated Creatinine (1.69), but Januvia and Actos were continued.   On 7/17/2020, Hemoglobin A1c was 13.7.  Farxiga was added, but was changes to Steglatro due to insurance.  The patient has tolerated this without side effects.  She has not been checking her blood sugar at home recently. The patient's last Ophthalmology appointment was 5/2/2019, no retinopathy.  She does check her feet daily.       Symptoms:  Denies chest pain (other than chronic chest wall pain), shortness of breath, MAIN, orthopnea, PND, palpitations, syncope, lower extremity " edema, intermittent leg claudication, lightheadedness, and dizziness..   Associated Symptoms: Weight down 8 pounds, intentionally, since last visit. Stable fatigue, myalgias (legs), and arthralgias.  No headache, polyuria, polydipsia,  memory issues, concentration issues, or focal neurologic deficits.  Has stable numbness and tingling of the feet and the fingers, new onset pain, but no ulcers.      Lifestyle and Disease Management: Diet: She has been consuming a diverse and healthy diet, overall.  Weight Issues: She has weight concerns. Exercise: She has been walking daily.  Tobacco Use: Former Smoker.     Current Outpatient Medications on File Prior to Visit   Medication Sig Dispense Refill   • atorvastatin (LIPITOR) 40 MG tablet TAKE ONE TABLET BY MOUTH DAILY 30 tablet 4   • Blood Glucose Monitoring Suppl (FREESTYLE FREEDOM LITE) w/Device kit Use twice daily   Dx  E11.9 1 each 0   • celecoxib (CeleBREX) 200 MG capsule 1 capsule 2 (Two) Times a Day.     • Ertugliflozin L-PyroglutamicAc (Steglatro) 5 MG tablet Take 1 tablet by mouth Every Morning. 30 tablet 5   • gabapentin (NEURONTIN) 800 MG tablet 1 tablet 3 (Three) Times a Day.     • glucose blood (FREESTYLE LITE) test strip USE FOR TESTING BLOOD SUGAR TWICE  DAILY AS DIRECTED .   Dx E11/9 100 each 6   • HUMIRA PEN-CD/UC/HS STARTER 80 MG/0.8ML injection Inject 40 Units as directed 1 (One) Time Per Week.     • hydroCHLOROthiazide (HYDRODIURIL) 50 MG tablet TAKE ONE TABLET BY MOUTH DAILY 30 tablet 3   • Lancets (FREESTYLE) lancets USE  LANCET  TO TEST TWICE  DAILY 100 each 11   • levothyroxine (SYNTHROID, LEVOTHROID) 100 MCG tablet TAKE ONE TABLET BY MOUTH DAILY 30 tablet 3   • lisinopril (PRINIVIL,ZESTRIL) 20 MG tablet TAKE ONE TABLET BY MOUTH EVERY NIGHT AT BEDTIME 30 tablet 3   • omeprazole (priLOSEC) 40 MG capsule Take 1 capsule by mouth Daily. 30 capsule 5   • pioglitazone (ACTOS) 45 MG tablet Take 1 tablet by mouth Daily. 30 tablet 5   • SITagliptin (JANUVIA)  100 MG tablet Take 1 tablet by mouth Daily. 30 tablet 5   • tiZANidine (ZANAFLEX) 2 MG tablet 1 tablet 2 (Two) Times a Day.     • vitamin D (ERGOCALCIFEROL) 1.25 MG (51651 UT) capsule capsule TAKE ONE CAPSULE BY MOUTH ONCE WEEKLY 4 capsule 2     No current facility-administered medications on file prior to visit.        Current outpatient and discharge medications have been reconciled for the patient.  Reviewed by: Stephanie Tanner MD        The following portions of the patient's history were reviewed and updated as appropriate: allergies, current medications, past family history, past medical history, past social history, past surgical history and problem list.    Review of Systems   Constitutional: Negative for chills, fatigue, fever and unexpected weight change.   Eyes: Negative for visual disturbance.   Respiratory: Negative for cough, shortness of breath and wheezing.    Cardiovascular: Negative for chest pain, palpitations and leg swelling.        No MAIN, orthopnea, or claudication.   Gastrointestinal: Negative for abdominal pain, blood in stool, constipation, diarrhea, nausea and vomiting.        Denies melena.   Endocrine: Negative for polydipsia and polyuria.   Musculoskeletal: Positive for arthralgias and myalgias.   Neurological: Positive for numbness. Negative for dizziness, syncope, light-headedness and headaches.        No memory issues.   Psychiatric/Behavioral: Negative for decreased concentration.         Objective       Blood pressure 134/84, pulse 72, temperature 97.1 °F (36.2 °C), temperature source Temporal, resp. rate 20, weight 113 kg (248 lb 6 oz), not currently breastfeeding.      Physical Exam  Vitals signs and nursing note reviewed.   Constitutional:       Appearance: She is well-developed. She is obese.   Neck:      Thyroid: No thyroid mass or thyromegaly.      Vascular: No carotid bruit.   Cardiovascular:      Rate and Rhythm: Normal rate and regular rhythm.      Pulses: Normal pulses.       Heart sounds: Normal heart sounds. No murmur. No friction rub. No gallop.    Pulmonary:      Effort: Pulmonary effort is normal.      Breath sounds: Normal breath sounds.   Musculoskeletal:      Right lower leg: No edema.      Left lower leg: No edema.      Comments: Right foot exam is significant for pain at the base of the fifth metatarsal, both dorsally and on the plantar surface.  There is pain with right foot inversion and eversion, as well as right foot flexion and extension.  Left foot exam is significant for pain on the medial foot, both dorsally and on the plantar surface.  There is no pain with left ankle flexion or extension, but there is pain with left foot inversion and eversion.  There is no erythema, edema, or warmth of either foot.  Achilles tendons are intact and nonpainful to palpation.   Skin:     Comments: There is a small nodular area left upper inner thigh, without erythema, warmth, or tenderness to palpation.   Neurological:      Mental Status: She is alert.   Psychiatric:         Mood and Affect: Mood normal.         Assessment / Plan:  Ernestina was seen today for diabetes, abscess and foot pain.    Diagnoses and all orders for this visit:    Type 2 diabetes mellitus with hyperglycemia, without long-term current use of insulin (CMS/[facility])  -     Basic Metabolic Panel    Bilateral foot pain  -     diclofenac (VOLTAREN) 1 % gel gel; 2 grams QID prn pain upper extremity joints and 4 grams QID pain lower extremity joints  -     XR Foot 3+ View Bilateral    Abscess of left thigh  -     mupirocin (Bactroban) 2 % ointment; Apply  topically to the appropriate area as directed 3 (Three) Times a Day for 7 days.          Return in about 1 month (around 10/29/2020) for Recheck Diabetes, fasting.

## 2020-09-30 DIAGNOSIS — I10 ESSENTIAL HYPERTENSION: ICD-10-CM

## 2020-09-30 RX ORDER — HYDROCHLOROTHIAZIDE 50 MG/1
TABLET ORAL
Qty: 30 TABLET | Refills: 2 | Status: SHIPPED | OUTPATIENT
Start: 2020-09-30 | End: 2021-06-08 | Stop reason: SDUPTHER

## 2020-09-30 RX ORDER — LISINOPRIL 20 MG/1
TABLET ORAL
Qty: 30 TABLET | Refills: 2 | Status: SHIPPED | OUTPATIENT
Start: 2020-09-30 | End: 2021-06-08 | Stop reason: SDUPTHER

## 2020-10-08 ENCOUNTER — TELEPHONE (OUTPATIENT)
Dept: INTERNAL MEDICINE | Facility: CLINIC | Age: 50
End: 2020-10-08

## 2020-10-08 NOTE — TELEPHONE ENCOUNTER
[facility] CALL[facility] ON THE STATUS OF PRIOR AUTHORIZATION FOR FARXIGA.      REFERENCE NUMBER  [id number]    PLEASE ADVISE  [phone]

## 2020-10-08 NOTE — TELEPHONE ENCOUNTER
Notified Alexandrea that the medication had been denied and she was changed to Steglatro on July 30 2020    She states the PA was still open and would close the file.

## 2020-10-16 ENCOUNTER — TELEPHONE (OUTPATIENT)
Dept: INTERNAL MEDICINE | Facility: CLINIC | Age: 50
End: 2020-10-16

## 2020-10-16 DIAGNOSIS — L02.416 ABSCESS OF LEFT THIGH: Primary | ICD-10-CM

## 2020-10-16 RX ORDER — SULFAMETHOXAZOLE AND TRIMETHOPRIM 800; 160 MG/1; MG/1
1 TABLET ORAL 2 TIMES DAILY
Qty: 20 TABLET | Refills: 0 | Status: SHIPPED | OUTPATIENT
Start: 2020-10-16 | End: 2020-10-26

## 2020-10-16 NOTE — TELEPHONE ENCOUNTER
PATIENT CALLED AND STATED THAT THE BOIL THAT SHE WAS IN FOR ABOUT TWO WEEKS AGO IS NOT GOING AWAY AND HAS BEEN USING THE OINTMENT GIVEN.  PATIENT STATED SHE IS NOT GETTING ANOTHER BOIL ON OTHER SIDE OF LEG.  PATIENT HAS REQUESTED ANTIBIOTIC.       JESI ARRIAGA[address] [place] [address] [phone] PH

## 2020-10-16 NOTE — TELEPHONE ENCOUNTER
Spoke with Pt and she stated she is getting another boil on the other leg and the first one is not healing.   Please advise?

## 2020-10-19 ENCOUNTER — PRIOR AUTHORIZATION (OUTPATIENT)
Dept: INTERNAL MEDICINE | Facility: CLINIC | Age: 50
End: 2020-10-19

## 2020-10-19 NOTE — TELEPHONE ENCOUNTER
Prior Auth Request for  Diclofenac Sodium 1% Gel  [id number]    Contact plan to follow up on [id number]

## 2020-10-20 NOTE — TELEPHONE ENCOUNTER
Please inform the patient her insurance will not cover this medication, but she can get it over-the-counter.

## 2020-10-22 NOTE — TELEPHONE ENCOUNTER
Notified patient that the medication is not covered by her insurance and she would need to purchase it over the counter.  Patient verbalized understanding and appreciation.

## 2020-11-02 ENCOUNTER — TELEPHONE (OUTPATIENT)
Dept: INTERNAL MEDICINE | Facility: CLINIC | Age: 50
End: 2020-11-02

## 2020-11-02 NOTE — TELEPHONE ENCOUNTER
PATIENT STATED SHE FOUND A DERMATOLOGIST THAT WOULD TAKE HER INSURANCE BUT SHE NEEDS A REFERRAL     DERMATOLOGY IN [place] PHONE NUMBER IS  [phone]

## 2020-11-24 NOTE — TELEPHONE ENCOUNTER
7/17/2020 last office visit   No future office visit scheduled   3/17/2020 last rx  # 30  Refill 4

## 2020-11-25 RX ORDER — ATORVASTATIN CALCIUM 40 MG/1
TABLET, FILM COATED ORAL
Qty: 30 TABLET | Refills: 0 | Status: SHIPPED | OUTPATIENT
Start: 2020-11-25 | End: 2021-06-08 | Stop reason: SDUPTHER

## 2021-01-27 ENCOUNTER — OFFICE VISIT (OUTPATIENT)
Dept: INTERNAL MEDICINE | Facility: CLINIC | Age: 51
End: 2021-01-27

## 2021-01-27 VITALS
SYSTOLIC BLOOD PRESSURE: 136 MMHG | HEART RATE: 80 BPM | BODY MASS INDEX: 37.56 KG/M2 | RESPIRATION RATE: 21 BRPM | WEIGHT: 247 LBS | DIASTOLIC BLOOD PRESSURE: 80 MMHG | TEMPERATURE: 96.9 F

## 2021-01-27 DIAGNOSIS — E78.49 OTHER HYPERLIPIDEMIA: ICD-10-CM

## 2021-01-27 DIAGNOSIS — I10 ESSENTIAL HYPERTENSION: ICD-10-CM

## 2021-01-27 DIAGNOSIS — N76.0 ACUTE VAGINITIS: Primary | ICD-10-CM

## 2021-01-27 DIAGNOSIS — L73.9 FOLLICULITIS: ICD-10-CM

## 2021-01-27 DIAGNOSIS — Z79.899 HIGH RISK MEDICATION USE: ICD-10-CM

## 2021-01-27 DIAGNOSIS — E03.9 ACQUIRED HYPOTHYROIDISM: ICD-10-CM

## 2021-01-27 DIAGNOSIS — E11.65 TYPE 2 DIABETES MELLITUS WITH HYPERGLYCEMIA, WITHOUT LONG-TERM CURRENT USE OF INSULIN (HCC): ICD-10-CM

## 2021-01-27 LAB
ALBUMIN SERPL-MCNC: 4.1 G/DL (ref 3.5–5.2)
ALBUMIN/GLOB SERPL: 1.2 G/DL
ALP SERPL-CCNC: 85 U/L (ref 39–117)
ALT SERPL W P-5'-P-CCNC: 15 U/L (ref 1–33)
ANION GAP SERPL CALCULATED.3IONS-SCNC: 9.5 MMOL/L (ref 5–15)
ANISOCYTOSIS BLD QL: ABNORMAL
AST SERPL-CCNC: 11 U/L (ref 1–32)
BILIRUB SERPL-MCNC: 1.1 MG/DL (ref 0–1.2)
BUN SERPL-MCNC: 13 MG/DL (ref 6–20)
BUN/CREAT SERPL: 16.5 (ref 7–25)
CALCIUM SPEC-SCNC: 9.3 MG/DL (ref 8.6–10.5)
CHLORIDE SERPL-SCNC: 100 MMOL/L (ref 98–107)
CHOLEST SERPL-MCNC: 187 MG/DL (ref 0–200)
CO2 SERPL-SCNC: 29.5 MMOL/L (ref 22–29)
CREAT SERPL-MCNC: 0.79 MG/DL (ref 0.57–1)
DEPRECATED RDW RBC AUTO: 38.7 FL (ref 37–54)
ERYTHROCYTE [DISTWIDTH] IN BLOOD BY AUTOMATED COUNT: 11.8 % (ref 12.3–15.4)
EXPIRATION DATE: NORMAL
GFR SERPL CREATININE-BSD FRML MDRD: 94 ML/MIN/1.73
GLOBULIN UR ELPH-MCNC: 3.3 GM/DL
GLUCOSE SERPL-MCNC: 232 MG/DL (ref 65–99)
HBA1C MFR BLD: 12 %
HCT VFR BLD AUTO: 42.4 % (ref 34–46.6)
HDLC SERPL-MCNC: 74 MG/DL (ref 40–60)
HGB BLD-MCNC: 14 G/DL (ref 12–15.9)
LDLC SERPL CALC-MCNC: 99 MG/DL (ref 0–100)
LDLC/HDLC SERPL: 1.32 {RATIO}
LYMPHOCYTES # BLD MANUAL: 4.37 10*3/MM3 (ref 0.7–3.1)
LYMPHOCYTES NFR BLD MANUAL: 34.1 % (ref 19.6–45.3)
LYMPHOCYTES NFR BLD MANUAL: 5.5 % (ref 5–12)
Lab: NORMAL
MAGNESIUM SERPL-MCNC: 1.7 MG/DL (ref 1.6–2.6)
MCH RBC QN AUTO: 29.7 PG (ref 26.6–33)
MCHC RBC AUTO-ENTMCNC: 33 G/DL (ref 31.5–35.7)
MCV RBC AUTO: 90 FL (ref 79–97)
MONOCYTES # BLD AUTO: 0.71 10*3/MM3 (ref 0.1–0.9)
NEUTROPHILS # BLD AUTO: 7.74 10*3/MM3 (ref 1.7–7)
NEUTROPHILS NFR BLD MANUAL: 60.4 % (ref 42.7–76)
PLAT MORPH BLD: NORMAL
PLATELET # BLD AUTO: 222 10*3/MM3 (ref 140–450)
PMV BLD AUTO: 12.3 FL (ref 6–12)
POLYCHROMASIA BLD QL SMEAR: ABNORMAL
POTASSIUM SERPL-SCNC: 3.6 MMOL/L (ref 3.5–5.2)
PROT SERPL-MCNC: 7.4 G/DL (ref 6–8.5)
RBC # BLD AUTO: 4.71 10*6/MM3 (ref 3.77–5.28)
SODIUM SERPL-SCNC: 139 MMOL/L (ref 136–145)
T4 FREE SERPL-MCNC: 1.3 NG/DL (ref 0.93–1.7)
TRIGL SERPL-MCNC: 75 MG/DL (ref 0–150)
TSH SERPL DL<=0.05 MIU/L-ACNC: 2.35 UIU/ML (ref 0.27–4.2)
VLDLC SERPL-MCNC: 14 MG/DL (ref 5–40)
WBC # BLD AUTO: 12.82 10*3/MM3 (ref 3.4–10.8)
WBC MORPH BLD: NORMAL

## 2021-01-27 PROCEDURE — 99214 OFFICE O/P EST MOD 30 MIN: CPT | Performed by: INTERNAL MEDICINE

## 2021-01-27 PROCEDURE — 84439 ASSAY OF FREE THYROXINE: CPT | Performed by: INTERNAL MEDICINE

## 2021-01-27 PROCEDURE — 80061 LIPID PANEL: CPT | Performed by: INTERNAL MEDICINE

## 2021-01-27 PROCEDURE — 85007 BL SMEAR W/DIFF WBC COUNT: CPT | Performed by: INTERNAL MEDICINE

## 2021-01-27 PROCEDURE — 83036 HEMOGLOBIN GLYCOSYLATED A1C: CPT | Performed by: INTERNAL MEDICINE

## 2021-01-27 PROCEDURE — 80050 GENERAL HEALTH PANEL: CPT | Performed by: INTERNAL MEDICINE

## 2021-01-27 PROCEDURE — 83735 ASSAY OF MAGNESIUM: CPT | Performed by: INTERNAL MEDICINE

## 2021-01-27 RX ORDER — AMOXICILLIN AND CLAVULANATE POTASSIUM 875; 125 MG/1; MG/1
1 TABLET, FILM COATED ORAL 2 TIMES DAILY
Qty: 20 TABLET | Refills: 0 | Status: SHIPPED | OUTPATIENT
Start: 2021-01-27 | End: 2021-02-06

## 2021-01-27 RX ORDER — FLUCONAZOLE 150 MG/1
150 TABLET ORAL ONCE
Qty: 2 TABLET | Refills: 0 | Status: SHIPPED | OUTPATIENT
Start: 2021-01-27 | End: 2021-01-27

## 2021-01-27 RX ORDER — NYSTATIN 100000 U/G
OINTMENT TOPICAL 3 TIMES DAILY
Qty: 30 G | Refills: 1 | Status: SHIPPED | OUTPATIENT
Start: 2021-01-27 | End: 2021-02-03

## 2021-01-27 NOTE — PROGRESS NOTES
Subjective       Ernestina Garrido is a 50 y.o. female.     Chief Complaint   Patient presents with   • Vaginitis       History obtained from the patient.    The patient is fasting and would like to do labs, and schedule follow up for a later date.      Vaginal Itching  The patient's primary symptoms include genital itching, genital lesions (infected hair follicles), a genital rash and vaginal discharge. The patient's pertinent negatives include no pelvic pain or vaginal bleeding. This is a new problem. Episode onset: 2 weeks ago. The problem occurs constantly. The problem has been unchanged. The pain is mild. Associated symptoms include joint pain (knees) and rash. Pertinent negatives include no abdominal pain, back pain, chills, constipation, diarrhea, discolored urine, dysuria, fever, flank pain, frequency, headaches, hematuria, nausea, sore throat, urgency or vomiting. The vaginal discharge was milky and thick. There has been no bleeding. Nothing aggravates the symptoms. Treatments tried: topical alcohol and topical Zinc product otc. The treatment provided mild relief. She is not sexually active. Contraceptive use: DEYSI. Her past medical history is significant for an abdominal surgery (cholecystectomy) and a gynecological surgery (DEYSI and ).        The following portions of the patient's history were reviewed and updated as appropriate: allergies, current medications, past family history, past medical history, past social history, past surgical history and problem list.      Review of Systems   Constitutional: Negative for chills, fever and unexpected weight change.   HENT: Negative for sore throat.    Respiratory: Negative for cough and shortness of breath.    Cardiovascular: Negative for chest pain.   Gastrointestinal: Negative for abdominal pain, constipation, diarrhea, nausea and vomiting.   Genitourinary: Positive for vaginal discharge. Negative for dysuria, flank pain, frequency, hematuria,  pelvic pain, urgency and vaginal bleeding.   Musculoskeletal: Positive for arthralgias and joint pain (knees). Negative for back pain and joint swelling.   Skin: Positive for rash.   Neurological: Negative for headaches.   Hematological: Negative for adenopathy.           Objective     Blood pressure 136/80, pulse 80, temperature 96.9 °F (36.1 °C), resp. rate 21, weight 112 kg (247 lb), not currently breastfeeding.    Physical Exam  Vitals signs and nursing note reviewed.   Constitutional:       Appearance: She is well-developed. She is obese.   Cardiovascular:      Rate and Rhythm: Normal rate and regular rhythm.      Heart sounds: Normal heart sounds. No murmur.   Pulmonary:      Effort: Pulmonary effort is normal.      Breath sounds: Normal breath sounds.   Abdominal:      General: Bowel sounds are normal. There is no distension.      Palpations: Abdomen is soft. There is no hepatomegaly, splenomegaly or mass.      Tenderness: There is no abdominal tenderness. There is no right CVA tenderness or left CVA tenderness.   Genitourinary:     Comments: There are multiple nodular lesions bilateral vulva.  Skin:     Findings: No rash.   Neurological:      Mental Status: She is alert.   Psychiatric:         Mood and Affect: Mood normal.           Assessment/Plan   Diagnoses and all orders for this visit:    1. Acute vaginitis (Primary)  -     nystatin (MYCOSTATIN) 494105 UNIT/GM ointment; Apply  topically to the appropriate area as directed 3 (Three) Times a Day for 7 days.  Dispense: 30 g; Refill: 1  -     fluconazole (Diflucan) 150 MG tablet; Take 1 tablet by mouth 1 (One) Time for 1 dose. May repeat after 4 days.  Dispense: 2 tablet; Refill: 0   The patient agrees to keep her Dermatology appointment for 2/24/21.    2. Folliculitis  -     amoxicillin-clavulanate (Augmentin) 875-125 MG per tablet; Take 1 tablet by mouth 2 (Two) Times a Day for 10 days.  Dispense: 20 tablet; Refill: 0    3. Type 2 diabetes mellitus with  hyperglycemia, without long-term current use of insulin (CMS/[facility])  -     POC Glycosylated Hemoglobin (Hb A1C)  -     TSH  -     CBC & Differential  -     Lipid Panel  -     Comprehensive Metabolic Panel  -     CBC Auto Differential  -     Manual Differential    4. Essential hypertension  -     TSH  -     CBC & Differential  -     Lipid Panel  -     Comprehensive Metabolic Panel  -     CBC Auto Differential  -     Manual Differential    5. Other hyperlipidemia  -     TSH  -     CBC & Differential  -     Lipid Panel  -     Comprehensive Metabolic Panel  -     CBC Auto Differential  -     Manual Differential    6. Acquired hypothyroidism  -     T4, Free  -     TSH    7. High risk medication use  -     Magnesium        Return in about 2 weeks (around 2/10/2021) for Annual physical, non-fasting.

## 2021-02-11 DIAGNOSIS — E11.65 TYPE 2 DIABETES MELLITUS WITH HYPERGLYCEMIA, WITHOUT LONG-TERM CURRENT USE OF INSULIN (HCC): Primary | ICD-10-CM

## 2021-02-11 RX ORDER — SITAGLIPTIN 100 MG/1
TABLET, FILM COATED ORAL
Qty: 30 TABLET | Refills: 3 | Status: SHIPPED | OUTPATIENT
Start: 2021-02-11 | End: 2021-02-15 | Stop reason: CLARIF

## 2021-02-15 ENCOUNTER — TELEPHONE (OUTPATIENT)
Dept: INTERNAL MEDICINE | Facility: CLINIC | Age: 51
End: 2021-02-15

## 2021-02-15 DIAGNOSIS — E11.65 TYPE 2 DIABETES MELLITUS WITH HYPERGLYCEMIA, WITHOUT LONG-TERM CURRENT USE OF INSULIN (HCC): Primary | ICD-10-CM

## 2021-02-15 NOTE — TELEPHONE ENCOUNTER
Pt states she does not want to take Metformin. She has tried in the past and refuses to take again.

## 2021-02-15 NOTE — TELEPHONE ENCOUNTER
Please call the pharmacy and discontinue the Metformin   prescription.  I have taken it off the medication list.      Please ask the patient what her side effect from Metformin was and document in  Allergies.    Please PA Januvia.

## 2021-02-18 ENCOUNTER — PRIOR AUTHORIZATION (OUTPATIENT)
Dept: INTERNAL MEDICINE | Facility: CLINIC | Age: 51
End: 2021-02-18

## 2021-02-18 NOTE — TELEPHONE ENCOUNTER
This request has received a Favorable outcome.    Please note any additional information provided by [facility] at the bottom of this request.

## 2021-02-18 NOTE — TELEPHONE ENCOUNTER
Ernestina Garrido (Key: [id number])  Drug:Januvia 100MG tablets      Your information has been sent to [facility].

## 2021-02-19 ENCOUNTER — TELEPHONE (OUTPATIENT)
Dept: INTERNAL MEDICINE | Facility: CLINIC | Age: 51
End: 2021-02-19

## 2021-02-19 NOTE — TELEPHONE ENCOUNTER
Let pharmacy know that pt's JANUVIA tablet 100MG was approved. Called pt but her voicemail box was not set up.

## 2021-03-25 DIAGNOSIS — I10 ESSENTIAL HYPERTENSION: ICD-10-CM

## 2021-03-25 DIAGNOSIS — E11.65 TYPE 2 DIABETES MELLITUS WITH HYPERGLYCEMIA, WITHOUT LONG-TERM CURRENT USE OF INSULIN (HCC): ICD-10-CM

## 2021-03-25 DIAGNOSIS — E03.9 ACQUIRED HYPOTHYROIDISM: ICD-10-CM

## 2021-03-25 RX ORDER — DAPAGLIFLOZIN 5 MG/1
TABLET, FILM COATED ORAL
Qty: 30 TABLET | Refills: 0 | OUTPATIENT
Start: 2021-03-25

## 2021-03-25 RX ORDER — LEVOTHYROXINE SODIUM 0.1 MG/1
TABLET ORAL
Qty: 30 TABLET | Refills: 2 | OUTPATIENT
Start: 2021-03-25

## 2021-03-25 RX ORDER — LISINOPRIL 20 MG/1
TABLET ORAL
Qty: 30 TABLET | Refills: 1 | OUTPATIENT
Start: 2021-03-25

## 2021-03-25 RX ORDER — HYDROCHLOROTHIAZIDE 50 MG/1
TABLET ORAL
Qty: 30 TABLET | Refills: 1 | OUTPATIENT
Start: 2021-03-25

## 2021-04-02 ENCOUNTER — HOSPITAL ENCOUNTER (OUTPATIENT)
Dept: GENERAL RADIOLOGY | Facility: HOSPITAL | Age: 51
Discharge: HOME | End: 2021-04-02
Admitting: INTERNAL MEDICINE

## 2021-04-02 ENCOUNTER — OFFICE VISIT (OUTPATIENT)
Dept: INTERNAL MEDICINE | Facility: CLINIC | Age: 51
End: 2021-04-02

## 2021-04-02 VITALS
DIASTOLIC BLOOD PRESSURE: 92 MMHG | HEART RATE: 132 BPM | RESPIRATION RATE: 20 BRPM | HEIGHT: 68 IN | SYSTOLIC BLOOD PRESSURE: 138 MMHG | BODY MASS INDEX: 34.49 KG/M2 | TEMPERATURE: 96.8 F | OXYGEN SATURATION: 99 % | WEIGHT: 227.6 LBS

## 2021-04-02 DIAGNOSIS — R07.89 OTHER CHEST PAIN: Primary | ICD-10-CM

## 2021-04-02 LAB — SARS-COV-2 RNA NOSE QL NAA+PROBE: NOT DETECTED

## 2021-04-02 PROCEDURE — U0004 COV-19 TEST NON-CDC HGH THRU: HCPCS | Performed by: INTERNAL MEDICINE

## 2021-04-02 PROCEDURE — 71046 X-RAY EXAM CHEST 2 VIEWS: CPT

## 2021-04-02 PROCEDURE — 93000 ELECTROCARDIOGRAM COMPLETE: CPT | Performed by: INTERNAL MEDICINE

## 2021-04-02 PROCEDURE — 96372 THER/PROPH/DIAG INJ SC/IM: CPT | Performed by: INTERNAL MEDICINE

## 2021-04-02 PROCEDURE — 99214 OFFICE O/P EST MOD 30 MIN: CPT | Performed by: INTERNAL MEDICINE

## 2021-04-02 RX ORDER — IBUPROFEN 800 MG/1
TABLET ORAL
COMMUNITY
Start: 2021-02-11 | End: 2021-06-08 | Stop reason: ALTCHOICE

## 2021-04-02 RX ORDER — TIZANIDINE 4 MG/1
4 TABLET ORAL 3 TIMES DAILY PRN
Qty: 90 TABLET | Refills: 1 | Status: SHIPPED | OUTPATIENT
Start: 2021-04-02 | End: 2022-05-12 | Stop reason: ALTCHOICE

## 2021-04-02 RX ORDER — NYSTATIN 100000 [USP'U]/G
POWDER TOPICAL
COMMUNITY
Start: 2021-02-25 | End: 2022-05-09

## 2021-04-02 RX ORDER — DAPAGLIFLOZIN 5 MG/1
TABLET, FILM COATED ORAL
COMMUNITY
Start: 2021-02-15 | End: 2021-06-08 | Stop reason: ALTCHOICE

## 2021-04-02 RX ORDER — KETOROLAC TROMETHAMINE 30 MG/ML
60 INJECTION, SOLUTION INTRAMUSCULAR; INTRAVENOUS ONCE
Status: COMPLETED | OUTPATIENT
Start: 2021-04-02 | End: 2021-04-02

## 2021-04-02 RX ORDER — SITAGLIPTIN 100 MG/1
TABLET, FILM COATED ORAL DAILY
COMMUNITY
Start: 2021-02-19 | End: 2021-06-08 | Stop reason: SDUPTHER

## 2021-04-02 RX ORDER — DIAZEPAM 5 MG/1
5 TABLET ORAL 2 TIMES DAILY PRN
Qty: 10 TABLET | Refills: 0 | Status: SHIPPED | OUTPATIENT
Start: 2021-04-02 | End: 2021-04-07

## 2021-04-02 RX ORDER — FLUCONAZOLE 150 MG/1
TABLET ORAL
COMMUNITY
Start: 2021-01-27 | End: 2021-06-08

## 2021-04-02 RX ADMIN — KETOROLAC TROMETHAMINE 60 MG: 30 INJECTION, SOLUTION INTRAMUSCULAR; INTRAVENOUS at 09:57

## 2021-04-02 NOTE — PROGRESS NOTES
Subjective     Chief Complaint:  Physical Exam.    History of Present Illness    History obtained from {source of history:224448}.    Primary Care Cardiac Diagnostic Constellation: The patient is here today for a follow-up visit.       Her Diabetes Mellitus Type 2 has been unstable.  Medication:  Januvia, Steglatro, Actos, Atorvastatin, and Lisinopril.   The patient is adherent with her medication regimen. She denies medication side effects.      Procedures:  The patient had a negative cardiac stress test on 6/7/2019.        Interval Events:   Last Hemoglobin A1c on 1/13/20 was 9.9.  Janumet was stopped due to an elevated Creatinine (1.69), but Januvia and Actos were continued.   On 7/17/2020, Hemoglobin A1c was 13.7.  Farxiga was added, but was changes to Steglatro due to insurance.  The patient has tolerated this without side effects.  She has not been checking her blood sugar at home recently. The patient's last Ophthalmology appointment was 5/2/2019, no retinopathy.  She does check her feet daily.       Symptoms:  Denies chest pain (other than chronic chest wall pain), shortness of breath, MAIN, orthopnea, PND, palpitations, syncope, lower extremity edema, intermittent leg claudication, lightheadedness, and dizziness..   Associated Symptoms: Weight down 8 pounds, intentionally, since last visit. Stable fatigue, myalgias (legs), and arthralgias.  No headache, polyuria, polydipsia,  memory issues, concentration issues, or focal neurologic deficits.  Has stable numbness and tingling of the feet and the fingers, new onset pain, but no ulcers.      Lifestyle and Disease Management: Diet: She has been consuming a diverse and healthy diet, overall.  Weight Issues: She has weight concerns. Exercise: She has been walking daily.  Tobacco Use: Former Smoker.     Chronic Pain Syndrome  follow-up: The patient is being seen for a routine clinic follow-up of Chronic Pain Syndrome, which is stable.  Interval Events:  The patient is being seen by  Neurosurgery.    Symptoms:  stable low back pain, neck pain, and chest wall pain.  Medication: Meloxicam, Tizanidine, and Gabapentin.     Hypothyroidism Follow-Up: The patient is being seen for follow-up of Hypothyroidism, which is stable.    Interval Events: T4 and TSH were normal 1/13/20..  Symptoms: Weight down 6 pounds, intentionally, since last visit. has stable fatigue and cold intolerance, but denies heat intolerance, weakness, constipation, and dry skin.   Associated Symptoms: has paresthesias hands and feet and  Myalgias, but no arthralgias.    Medications:  Levothyroxine (Synthroid).   The patient is adherent to her medication regimen, and she denies medication side effects.      Vitamin D Deficiency Follow-Up: The patient is here for follow-up of Vitamin D Deficiency, which is unstable.   Interval Events: Vitamin D level on 1/13/20 was 19.6.      Symptoms: Stable fatigue, myalgias, and paresthesias. Has occasional loss of balance.  Denies arthralgias and gait instability.    Medication: Vitamin D2, 50,000 IU weekly.      Ernestina Garrido is a 51 y.o. female who presents for an Annual Physical.      PMH, PSH, SocHx, FamHx, Allergies, and Medications: Reviewed and updated.    Outpatient Medications Prior to Visit   Medication Sig Dispense Refill   • atorvastatin (LIPITOR) 40 MG tablet TAKE ONE TABLET BY MOUTH DAILY 30 tablet 0   • Blood Glucose Monitoring Suppl (FREESTYLE FREEDOM LITE) w/Device kit Use twice daily   Dx  E11.9 1 each 0   • celecoxib (CeleBREX) 200 MG capsule 1 capsule 2 (Two) Times a Day.     • diclofenac (VOLTAREN) 1 % gel gel 2 grams QID prn pain upper extremity joints and 4 grams QID pain lower extremity joints 100 g 5   • Ertugliflozin L-PyroglutamicAc (Steglatro) 5 MG tablet Take 1 tablet by mouth Every Morning. 30 tablet 5   • gabapentin (NEURONTIN) 800 MG tablet 1 tablet 3 (Three) Times a Day.     • glucose blood (FREESTYLE LITE) test strip USE  FOR TESTING BLOOD SUGAR TWICE  DAILY AS DIRECTED .   Dx E11/9 100 each 6   • HUMIRA PEN-CD/UC/HS STARTER 80 MG/0.8ML injection Inject 40 Units as directed 1 (One) Time Per Week.     • hydroCHLOROthiazide (HYDRODIURIL) 50 MG tablet TAKE ONE TABLET BY MOUTH DAILY 30 tablet 2   • Lancets (FREESTYLE) lancets USE  LANCET  TO TEST TWICE  DAILY 100 each 11   • levothyroxine (SYNTHROID, LEVOTHROID) 100 MCG tablet TAKE ONE TABLET BY MOUTH DAILY 30 tablet 3   • lisinopril (PRINIVIL,ZESTRIL) 20 MG tablet TAKE ONE TABLET BY MOUTH EVERY NIGHT AT BEDTIME 30 tablet 2   • omeprazole (priLOSEC) 40 MG capsule Take 1 capsule by mouth Daily. 30 capsule 5   • pioglitazone (ACTOS) 45 MG tablet Take 1 tablet by mouth Daily. 30 tablet 5   • tiZANidine (ZANAFLEX) 2 MG tablet 1 tablet 2 (Two) Times a Day.     • vitamin D (ERGOCALCIFEROL) 1.25 MG (16762 UT) capsule capsule TAKE ONE CAPSULE BY MOUTH ONCE WEEKLY 4 capsule 2     No facility-administered medications prior to visit.       Immunization History   Administered Date(s) Administered   • Flulaval/Fluarix/Fluzone Quad 01/13/2020   • Hepatitis A 03/18/2019, 09/23/2019   • Pneumococcal Conjugate 13-Valent (PCV13) 01/13/2020   • Pneumococcal Polysaccharide (PPSV23) 04/19/2017   • Tdap 02/25/2017         Patient Active Problem List   Diagnosis   • Chronic pain syndrome   • Depression   • Hemorrhoid   • Essential hypertension   • Hypothyroidism   • Insomnia   • Arthralgia of hip   • Arthralgia of shoulder   • Degenerative disc disease, cervical   • Cervical facet arthropathy/cervical spondylosis without myelopathy   • Diabetic autonomic neuropathy (CMS/HCC)   • Hx of fusion of cervical spine   • Morbid obesity with BMI of 40.0-44.9, adult (CMS/HCC)   • Neuroforaminal stenosis of spine   • Carpal tunnel syndrome of right wrist   • Type 2 diabetes mellitus, without long-term current use of insulin (CMS/HCC)   • GERD (gastroesophageal reflux disease)   • Costochondral chest pain   • Intercostal  neuralgia   • Other hyperlipidemia   • Cervical post-laminectomy syndrome   • Encounter for fitting and adjustment of neuropacemaker of spinal cord   • Vitamin D deficiency   • Renal angiomyolipoma   • Duplicated left renal collecting system       Health Habits:  Dental Exam. {status:87639}  Eye Exam. {status:39240}  Hearing Loss:  ***  Exercise: {numbers; 0-10:23983} times/week.  Current exercise activities include: {misc; exercise types:43977}  Diet: ***  Multivitamin: ***    Safe Driving:  ***  Seat Belt:  ***  Bike Helmet:  ***  Skin Screening:  ***  Sunscreen: ***  SBE / CARMITA: ***  Sexual Activity:  ***  Birth Control:  ***  STD Prevention:  ***    Last Pap: ***  Last Mammogram:  ***  Last DEXA Scan: ***  Last Colonoscopy: ***  Last PSA: ***    Social:    Social History     Socioeconomic History   • Marital status: Legally      Spouse name: Not on file   • Number of children: 3   • Years of education: Not on file   • Highest education level: Not on file   Tobacco Use   • Smoking status: Former Smoker     Packs/day: 0.50     Years: 28.00     Pack years: 14.00     Types: Cigarettes     Start date: 1988     Quit date: 11/6/2017     Years since quitting: 3.4   • Smokeless tobacco: Never Used   Substance and Sexual Activity   • Alcohol use: Yes     Comment: 2-3 cocktains, 3-4 times per year   • Drug use: No   • Sexual activity: Not Currently     Partners: Male         Current Medical Providers:    Stephanie Tanner MD (Internal Medicine / Pediatrics)    The [facility] providers who are involved in the care of this patient are listed above.         Review of Systems        Objective     There were no vitals filed for this visit.    There is no height or weight on file to calculate BMI.    Physical Exam    PHQ-2 Depression Screening  Little interest or pleasure in doing things?     Feeling down, depressed, or hopeless?     PHQ-2 Total Score           Counseling was given to {person:[id number]} for the  following topics:  {[facility] Counseling Topics 2:35990}. Also discussed the importance of regular dental and vision care, as well recommendation for a yearly screening skin exam after age 40.  Written information provided to {person:[id number]} on these topics and other health maintenance issues.      Assessment/Plan       {Assess/PlanSmartLinks:56201}    Patient's There is no height or weight on file to calculate BMI. BMI is {BMI range:16502}.        No follow-ups on file.

## 2021-04-02 NOTE — PROGRESS NOTES
Subjective       Ernestina Garrido is a 51 y.o. female.     Chief Complaint   Patient presents with   • Chest Pain   • Fall       History obtained from the patient.      Chest Pain   This is a new problem. The current episode started yesterday. The onset quality is sudden. The problem occurs constantly. The problem has been gradually worsening. Pain location: entire chest, from neck down. The pain is severe. The quality of the pain is described as stabbing and sharp. The pain radiates to the upper back. Associated symptoms include back pain (referred), dizziness, headaches (mild) and weakness. Pertinent negatives include no abdominal pain, cough, diaphoresis, fever, hemoptysis, lower extremity edema, malaise/fatigue, nausea, near-syncope, numbness, orthopnea, palpitations, shortness of breath (but feels like she needs to take a deep breath) or vomiting. The pain is aggravated by breathing, movement and lifting arm. Treatments tried: On Celebrex daily and Tizanidine 2 mg twice daily. Risk factors include obesity and lack of exercise.   Her past medical history is significant for diabetes, hyperlipidemia, hypertension and thyroid problem.   Pertinent negatives for past medical history include no anxiety/panic attacks, no CHF and no recent injury.   Her family medical history is significant for diabetes and hypertension.   Pertinent negatives for family medical history include: no hyperlipidemia. Prior workup: Cardiac stress test negative 6/7/19.   Fall  Incident onset: 6 days ago. Also tripped and fell down the steps 3 days ago. The fall occurred while walking. She landed on hard floor (slipped on the wet floor). There was no blood loss. The point of impact was the head, left shoulder and right shoulder (also hit right arm and buttocks). The pain is present in the right shoulder and head. The pain is mild. Exacerbated by: Nothing. Associated symptoms include headaches (mild) and a visual change (blurred and  "double vision). Pertinent negatives include no abdominal pain, fever, loss of consciousness, nausea, numbness, tingling or vomiting. She has tried ice (On Celebrex daily) for the symptoms. The treatment provided mild relief.        The following portions of the patient's history were reviewed and updated as appropriate: allergies, current medications, past family history, past medical history, past social history, past surgical history and problem list.      Review of Systems   Constitutional: Positive for appetite change (decreased, not new). Negative for diaphoresis, fever, malaise/fatigue and unexpected weight change.   HENT: Positive for rhinorrhea (clear). Negative for congestion and sore throat.         No loss of taste or smell     Respiratory: Positive for chest tightness. Negative for cough, hemoptysis, shortness of breath (but feels like she needs to take a deep breath) and wheezing.    Cardiovascular: Positive for chest pain. Negative for palpitations, orthopnea, leg swelling and near-syncope.   Gastrointestinal: Positive for blood in stool (not new) and diarrhea (not new). Negative for abdominal pain, constipation, nausea and vomiting.        Denies melena   Musculoskeletal: Positive for back pain (referred), myalgias, neck pain and neck stiffness. Negative for arthralgias and joint swelling.   Skin: Negative for rash.   Neurological: Positive for dizziness, weakness, light-headedness and headaches (mild). Negative for tingling, loss of consciousness, syncope and numbness.   Hematological: Negative for adenopathy.           Objective     Blood pressure 138/92, pulse (!) 132, temperature 96.8 °F (36 °C), temperature source Temporal, resp. rate 20, height 172.7 cm (68\"), weight 103 kg (227 lb 9.6 oz), SpO2 99 %, not currently breastfeeding.    Physical Exam  Vitals and nursing note reviewed.   Constitutional:       Appearance: She is well-developed. She is obese.   HENT:      Head: Normocephalic and " atraumatic.      Comments: There is some mild soreness in the mid occipital area, but no appreciated depression or break in the bone.  Neck:      Thyroid: No thyroid mass or thyromegaly.      Vascular: No carotid bruit.   Cardiovascular:      Rate and Rhythm: Normal rate and regular rhythm.      Pulses: Normal pulses.      Heart sounds: Normal heart sounds. No murmur heard.   No friction rub. No gallop.    Pulmonary:      Effort: Pulmonary effort is normal.      Breath sounds: Normal breath sounds.      Comments: There is diffuse chest wall tenderness to palpation.  Abdominal:      General: Bowel sounds are normal. There is no distension or abdominal bruit.      Palpations: Abdomen is soft. There is no hepatomegaly, splenomegaly or mass.      Tenderness: There is no abdominal tenderness.   Musculoskeletal:      Cervical back: Normal range of motion and neck supple.      Right lower leg: No edema.      Left lower leg: No edema.      Comments: There is pain to palpation over the right posterior shoulder. There is pain with right shoulder abduction, but not adduction. There is pain with right shoulder external and internal rotation.   Neurological:      Mental Status: She is alert.   Psychiatric:         Mood and Affect: Mood normal.           ECG 12 Lead    Date/Time: 4/2/2021 9:09 AM  Performed by: Stephanie Tanner MD  Authorized by: Stephanie Tanner MD   Comparison: compared with previous ECG from 12/28/2019  Comparison to previous ECG: Similar to previous EKG, with exception of bradycardia now present  Rhythm: sinus bradycardia  Ectopy comments: None  Rate: bradycardic  Conduction: conduction normal  ST Segments: ST segments normal  T Waves: T waves normal  QRS axis: normal  Other: no other findings    Clinical impression: normal ECG            Imaging:  X-ray of the chest showed no acute disease/ no fracture, per my interpretation.  The patient was informed of this result at the time of the  visit.        Assessment/Plan   Diagnoses and all orders for this visit:    1. Other chest pain (Primary)  -     ECG 12 Lead  -     ketorolac (TORADOL) injection 60 mg  -     XR Chest PA & Lateral  -     COVID-19 PCR, [facility], NP SWAB IN [facility] MEDIA 24-30 HR TAT - Swab, Nasopharynx; Future  -     tiZANidine (ZANAFLEX) 4 MG tablet; Take 1 tablet by mouth 3 (Three) Times a Day As Needed for Muscle Spasms.  Dispense: 90 tablet; Refill: 1  -     diazePAM (Valium) 5 MG tablet; Take 1 tablet by mouth 2 (Two) Times a Day As Needed for Muscle Spasms for up to 5 days.  Dispense: 10 tablet; Refill: 0  -     COVID-19 PCR, [facility], NP SWAB IN [facility] MEDIA 24-30 HR TAT - Swab, Nasopharynx     Patient was instructed to continue Celebrex, and try to rest as much as possible.        Return in about 1 month (around 5/2/2021) for Annual physical, fasting.

## 2021-04-05 ENCOUNTER — TELEPHONE (OUTPATIENT)
Dept: INTERNAL MEDICINE | Facility: CLINIC | Age: 51
End: 2021-04-05

## 2021-04-05 NOTE — TELEPHONE ENCOUNTER
Caller: Ernestina Garrido    Relationship: Self    Best call back number: [phone]     What test was performed: COVID 19     When was the test performed: 4-2-21    Where was the test performed: IN OFFICE

## 2021-04-05 NOTE — TELEPHONE ENCOUNTER
I called patient and informed her that her covid test was negative. She verbalized understanding and will call us with any further questions or concerns

## 2021-06-08 ENCOUNTER — OFFICE VISIT (OUTPATIENT)
Dept: INTERNAL MEDICINE | Facility: CLINIC | Age: 51
End: 2021-06-08

## 2021-06-08 VITALS
BODY MASS INDEX: 34.69 KG/M2 | WEIGHT: 221 LBS | SYSTOLIC BLOOD PRESSURE: 137 MMHG | HEART RATE: 78 BPM | HEIGHT: 67 IN | TEMPERATURE: 97.1 F | DIASTOLIC BLOOD PRESSURE: 84 MMHG

## 2021-06-08 DIAGNOSIS — G89.4 CHRONIC PAIN SYNDROME: ICD-10-CM

## 2021-06-08 DIAGNOSIS — Z00.00 ENCOUNTER FOR HEALTH MAINTENANCE EXAMINATION IN ADULT: Primary | ICD-10-CM

## 2021-06-08 DIAGNOSIS — I10 ESSENTIAL HYPERTENSION: ICD-10-CM

## 2021-06-08 DIAGNOSIS — Z12.31 ENCOUNTER FOR SCREENING MAMMOGRAM FOR BREAST CANCER: ICD-10-CM

## 2021-06-08 DIAGNOSIS — E55.9 VITAMIN D DEFICIENCY: ICD-10-CM

## 2021-06-08 DIAGNOSIS — E11.43 TYPE 2 DIABETES MELLITUS WITH DIABETIC AUTONOMIC NEUROPATHY, WITHOUT LONG-TERM CURRENT USE OF INSULIN (HCC): ICD-10-CM

## 2021-06-08 DIAGNOSIS — E78.49 OTHER HYPERLIPIDEMIA: ICD-10-CM

## 2021-06-08 DIAGNOSIS — E03.9 ACQUIRED HYPOTHYROIDISM: ICD-10-CM

## 2021-06-08 DIAGNOSIS — E66.9 OBESITY (BMI 30.0-34.9): ICD-10-CM

## 2021-06-08 DIAGNOSIS — F51.01 PRIMARY INSOMNIA: ICD-10-CM

## 2021-06-08 DIAGNOSIS — K21.9 GASTROESOPHAGEAL REFLUX DISEASE WITHOUT ESOPHAGITIS: ICD-10-CM

## 2021-06-08 DIAGNOSIS — F43.21 SITUATIONAL DEPRESSION: ICD-10-CM

## 2021-06-08 PROBLEM — Z46.2 ENCOUNTER FOR FITTING AND ADJUSTMENT OF NEUROPACEMAKER OF SPINAL CORD: Status: RESOLVED | Noted: 2019-01-15 | Resolved: 2021-06-08

## 2021-06-08 LAB
25(OH)D3 SERPL-MCNC: 19.4 NG/ML (ref 30–100)
A/C: NORMAL
ALBUMIN SERPL-MCNC: 4.1 G/DL (ref 3.5–5.2)
ALBUMIN/GLOB SERPL: 1.4 G/DL
ALP SERPL-CCNC: 88 U/L (ref 39–117)
ALT SERPL W P-5'-P-CCNC: 11 U/L (ref 1–33)
ANION GAP SERPL CALCULATED.3IONS-SCNC: 9.2 MMOL/L (ref 5–15)
AST SERPL-CCNC: 12 U/L (ref 1–32)
BASOPHILS # BLD AUTO: 0.03 10*3/MM3 (ref 0–0.2)
BASOPHILS NFR BLD AUTO: 0.4 % (ref 0–1.5)
BILIRUB BLD-MCNC: NEGATIVE MG/DL
BILIRUB SERPL-MCNC: 0.6 MG/DL (ref 0–1.2)
BUN SERPL-MCNC: 7 MG/DL (ref 6–20)
BUN/CREAT SERPL: 8.9 (ref 7–25)
CALCIUM SPEC-SCNC: 8.9 MG/DL (ref 8.6–10.5)
CHLORIDE SERPL-SCNC: 99 MMOL/L (ref 98–107)
CHOLEST SERPL-MCNC: 161 MG/DL (ref 0–200)
CLARITY, POC: CLEAR
CO2 SERPL-SCNC: 28.8 MMOL/L (ref 22–29)
COLOR UR: YELLOW
CREAT SERPL-MCNC: 0.79 MG/DL (ref 0.57–1)
DEPRECATED RDW RBC AUTO: 40.1 FL (ref 37–54)
EOSINOPHIL # BLD AUTO: 0.23 10*3/MM3 (ref 0–0.4)
EOSINOPHIL NFR BLD AUTO: 2.8 % (ref 0.3–6.2)
ERYTHROCYTE [DISTWIDTH] IN BLOOD BY AUTOMATED COUNT: 12 % (ref 12.3–15.4)
EXPIRATION DATE: NORMAL
GFR SERPL CREATININE-BSD FRML MDRD: 93 ML/MIN/1.73
GLOBULIN UR ELPH-MCNC: 3 GM/DL
GLUCOSE SERPL-MCNC: 201 MG/DL (ref 65–99)
GLUCOSE UR STRIP-MCNC: NEGATIVE MG/DL
HBA1C MFR BLD: 11.9 %
HCT VFR BLD AUTO: 41.8 % (ref 34–46.6)
HDLC SERPL-MCNC: 58 MG/DL (ref 40–60)
HGB BLD-MCNC: 14 G/DL (ref 12–15.9)
IMM GRANULOCYTES # BLD AUTO: 0.05 10*3/MM3 (ref 0–0.05)
IMM GRANULOCYTES NFR BLD AUTO: 0.6 % (ref 0–0.5)
KETONES UR QL: NEGATIVE
LDLC SERPL CALC-MCNC: 88 MG/DL (ref 0–100)
LDLC/HDLC SERPL: 1.49 {RATIO}
LEUKOCYTE EST, POC: NEGATIVE
LYMPHOCYTES # BLD AUTO: 2.42 10*3/MM3 (ref 0.7–3.1)
LYMPHOCYTES NFR BLD AUTO: 29.6 % (ref 19.6–45.3)
Lab: NORMAL
MCH RBC QN AUTO: 30.6 PG (ref 26.6–33)
MCHC RBC AUTO-ENTMCNC: 33.5 G/DL (ref 31.5–35.7)
MCV RBC AUTO: 91.3 FL (ref 79–97)
MONOCYTES # BLD AUTO: 0.94 10*3/MM3 (ref 0.1–0.9)
MONOCYTES NFR BLD AUTO: 11.5 % (ref 5–12)
NEUTROPHILS NFR BLD AUTO: 4.51 10*3/MM3 (ref 1.7–7)
NEUTROPHILS NFR BLD AUTO: 55.1 % (ref 42.7–76)
NITRITE UR-MCNC: NEGATIVE MG/ML
NRBC BLD AUTO-RTO: 0.1 /100 WBC (ref 0–0.2)
PH UR: 5 [PH] (ref 5–8)
PLATELET # BLD AUTO: 186 10*3/MM3 (ref 140–450)
PMV BLD AUTO: 12.6 FL (ref 6–12)
POC CREATININE URINE: NORMAL
POC MICROALBUMIN URINE: NORMAL
POTASSIUM SERPL-SCNC: 3.1 MMOL/L (ref 3.5–5.2)
PROT SERPL-MCNC: 7.1 G/DL (ref 6–8.5)
PROT UR STRIP-MCNC: NEGATIVE MG/DL
RBC # BLD AUTO: 4.58 10*6/MM3 (ref 3.77–5.28)
RBC # UR STRIP: NEGATIVE /UL
SODIUM SERPL-SCNC: 137 MMOL/L (ref 136–145)
SP GR UR: 1.02 (ref 1–1.03)
T4 FREE SERPL-MCNC: 1.25 NG/DL (ref 0.93–1.7)
TRIGL SERPL-MCNC: 82 MG/DL (ref 0–150)
TSH SERPL DL<=0.05 MIU/L-ACNC: 2.19 UIU/ML (ref 0.27–4.2)
UROBILINOGEN UR QL: NORMAL
VLDLC SERPL-MCNC: 15 MG/DL (ref 5–40)
WBC # BLD AUTO: 8.18 10*3/MM3 (ref 3.4–10.8)

## 2021-06-08 PROCEDURE — 82306 VITAMIN D 25 HYDROXY: CPT | Performed by: INTERNAL MEDICINE

## 2021-06-08 PROCEDURE — 99396 PREV VISIT EST AGE 40-64: CPT | Performed by: INTERNAL MEDICINE

## 2021-06-08 PROCEDURE — 99214 OFFICE O/P EST MOD 30 MIN: CPT | Performed by: INTERNAL MEDICINE

## 2021-06-08 PROCEDURE — 80061 LIPID PANEL: CPT | Performed by: INTERNAL MEDICINE

## 2021-06-08 PROCEDURE — 84439 ASSAY OF FREE THYROXINE: CPT | Performed by: INTERNAL MEDICINE

## 2021-06-08 PROCEDURE — 83036 HEMOGLOBIN GLYCOSYLATED A1C: CPT | Performed by: INTERNAL MEDICINE

## 2021-06-08 PROCEDURE — 82044 UR ALBUMIN SEMIQUANTITATIVE: CPT | Performed by: INTERNAL MEDICINE

## 2021-06-08 PROCEDURE — 80050 GENERAL HEALTH PANEL: CPT | Performed by: INTERNAL MEDICINE

## 2021-06-08 RX ORDER — ATORVASTATIN CALCIUM 40 MG/1
40 TABLET, FILM COATED ORAL DAILY
Qty: 30 TABLET | Refills: 5 | Status: SHIPPED | OUTPATIENT
Start: 2021-06-08 | End: 2022-05-16

## 2021-06-08 RX ORDER — PANTOPRAZOLE SODIUM 40 MG/1
40 TABLET, DELAYED RELEASE ORAL DAILY
Qty: 30 TABLET | Refills: 5 | Status: SHIPPED | OUTPATIENT
Start: 2021-06-08 | End: 2021-06-15 | Stop reason: ALTCHOICE

## 2021-06-08 RX ORDER — PIOGLITAZONEHYDROCHLORIDE 45 MG/1
45 TABLET ORAL DAILY
Qty: 30 TABLET | Refills: 5 | Status: SHIPPED | OUTPATIENT
Start: 2021-06-08

## 2021-06-08 RX ORDER — SITAGLIPTIN 100 MG/1
100 TABLET, FILM COATED ORAL DAILY
Qty: 30 TABLET | Refills: 5 | Status: SHIPPED | OUTPATIENT
Start: 2021-06-08 | End: 2022-05-16

## 2021-06-08 RX ORDER — LISINOPRIL 20 MG/1
20 TABLET ORAL
Qty: 30 TABLET | Refills: 5 | Status: SHIPPED | OUTPATIENT
Start: 2021-06-08 | End: 2022-05-16

## 2021-06-08 RX ORDER — OMEPRAZOLE 40 MG/1
40 CAPSULE, DELAYED RELEASE ORAL DAILY
Qty: 30 CAPSULE | Refills: 5 | Status: CANCELLED | OUTPATIENT
Start: 2021-06-08

## 2021-06-08 RX ORDER — HYDROCHLOROTHIAZIDE 50 MG/1
50 TABLET ORAL DAILY
Qty: 30 TABLET | Refills: 5 | Status: SHIPPED | OUTPATIENT
Start: 2021-06-08 | End: 2022-05-16

## 2021-06-08 RX ORDER — ESCITALOPRAM OXALATE 10 MG/1
10 TABLET ORAL DAILY
Qty: 30 TABLET | Refills: 5 | Status: SHIPPED | OUTPATIENT
Start: 2021-06-08 | End: 2022-05-16

## 2021-06-08 RX ORDER — LEVOTHYROXINE SODIUM 0.1 MG/1
100 TABLET ORAL DAILY
Qty: 30 TABLET | Refills: 4 | Status: SHIPPED | OUTPATIENT
Start: 2021-06-08 | End: 2022-03-24

## 2021-06-08 NOTE — PATIENT INSTRUCTIONS
I recommend Shingrix (new Shingles vaccine) at the pharmacy.    Recommend [facility].   Information was given today.      Health Maintenance, Female  Adopting a healthy lifestyle and getting preventive care are important in promoting health and wellness. Ask your health care provider about:  · The right schedule for you to have regular tests and exams.  · Things you can do on your own to prevent diseases and keep yourself healthy.  What should I know about diet, weight, and exercise?  Eat a healthy diet    · Eat a diet that includes plenty of vegetables, fruits, low-fat dairy products, and lean protein.  · Do not eat a lot of foods that are high in solid fats, added sugars, or sodium.  Maintain a healthy weight  Body mass index (BMI) is used to identify weight problems. It estimates body fat based on height and weight. Your health care provider can help determine your BMI and help you achieve or maintain a healthy weight.  Get regular exercise  Get regular exercise. This is one of the most important things you can do for your health. Most adults should:  · Exercise for at least 150 minutes each week. The exercise should increase your heart rate and make you sweat (moderate-intensity exercise).  · Do strengthening exercises at least twice a week. This is in addition to the moderate-intensity exercise.  · Spend less time sitting. Even light physical activity can be beneficial.  Watch cholesterol and blood lipids  Have your blood tested for lipids and cholesterol at 20 years of age, then have this test every 5 years.  Have your cholesterol levels checked more often if:  · Your lipid or cholesterol levels are high.  · You are older than 40 years of age.  · You are at high risk for heart disease.  What should I know about cancer screening?  Depending on your health history and family history, you may need to have cancer screening at various ages. This may include screening for:  · Breast  cancer.  · Cervical cancer.  · Colorectal cancer.  · Skin cancer.  · Lung cancer.  What should I know about heart disease, diabetes, and high blood pressure?  Blood pressure and heart disease  · High blood pressure causes heart disease and increases the risk of stroke. This is more likely to develop in people who have high blood pressure readings, are of  descent, or are overweight.  · Have your blood pressure checked:  ? Every 3-5 years if you are 18-39 years of age.  ? Every year if you are 40 years old or older.  Diabetes  Have regular diabetes screenings. This checks your fasting blood sugar level. Have the screening done:  · Once every three years after age 40 if you are at a normal weight and have a low risk for diabetes.  · More often and at a younger age if you are overweight or have a high risk for diabetes.  What should I know about preventing infection?  Hepatitis B  If you have a higher risk for hepatitis B, you should be screened for this virus. Talk with your health care provider to find out if you are at risk for hepatitis B infection.  Hepatitis C  Testing is recommended for:  · Everyone born from 1945 through 1965.  · Anyone with known risk factors for hepatitis C.  Sexually transmitted infections (STIs)  · Get screened for STIs, including gonorrhea and chlamydia, if:  ? You are sexually active and are younger than 24 years of age.  ? You are older than 24 years of age and your health care provider tells you that you are at risk for this type of infection.  ? Your sexual activity has changed since you were last screened, and you are at increased risk for chlamydia or gonorrhea. Ask your health care provider if you are at risk.  · Ask your health care provider about whether you are at high risk for HIV. Your health care provider may recommend a prescription medicine to help prevent HIV infection. If you choose to take medicine to prevent HIV, you should first get tested for HIV. You should  then be tested every 3 months for as long as you are taking the medicine.  Pregnancy  · If you are about to stop having your period (premenopausal) and you may become pregnant, seek counseling before you get pregnant.  · Take 400 to 800 micrograms (mcg) of folic acid every day if you become pregnant.  · Ask for birth control (contraception) if you want to prevent pregnancy.  Osteoporosis and menopause  Osteoporosis is a disease in which the bones lose minerals and strength with aging. This can result in bone fractures. If you are 65 years old or older, or if you are at risk for osteoporosis and fractures, ask your health care provider if you should:  · Be screened for bone loss.  · Take a calcium or vitamin D supplement to lower your risk of fractures.  · Be given hormone replacement therapy (HRT) to treat symptoms of menopause.  Follow these instructions at home:  Lifestyle  · Do not use any products that contain nicotine or tobacco, such as cigarettes, e-cigarettes, and chewing tobacco. If you need help quitting, ask your health care provider.  · Do not use street drugs.  · Do not share needles.  · Ask your health care provider for help if you need support or information about quitting drugs.  Alcohol use  · Do not drink alcohol if:  ? Your health care provider tells you not to drink.  ? You are pregnant, may be pregnant, or are planning to become pregnant.  · If you drink alcohol:  ? Limit how much you use to 0-1 drink a day.  ? Limit intake if you are breastfeeding.  · Be aware of how much alcohol is in your drink. In the [place] one drink equals one 12 oz bottle of beer (355 mL), one 5 oz glass of wine (148 mL), or one 1½ oz glass of hard liquor (44 mL).  General instructions  · Schedule regular health, dental, and eye exams.  · Stay current with your vaccines.  · Tell your health care provider if:  ? You often feel depressed.  ? You have ever been abused or do not feel safe at home.  Summary  · Adopting a  healthy lifestyle and getting preventive care are important in promoting health and wellness.  · Follow your health care provider's instructions about healthy diet, exercising, and getting tested or screened for diseases.  · Follow your health care provider's instructions on monitoring your cholesterol and blood pressure.  This information is not intended to replace advice given to you by your health care provider. Make sure you discuss any questions you have with your health care provider.  Document Revised: 12/11/2019 Document Reviewed: 12/11/2019  [employer] Patient Education © 2021 [employer]      Obesity, Adult  Obesity is the condition of having too much total body fat. Being overweight or obese means that your weight is greater than what is considered healthy for your body size. Obesity is determined by a measurement called BMI. BMI is an estimate of body fat and is calculated from height and weight. For adults, a BMI of 30 or higher is considered obese.  Obesity can lead to other health concerns and major illnesses, including:  · Stroke.  · Coronary artery disease (CAD).  · Type 2 diabetes.  · Some types of cancer, including cancers of the colon, breast, uterus, and gallbladder.  · Osteoarthritis.  · High blood pressure (hypertension).  · High cholesterol.  · Sleep apnea.  · Gallbladder stones.  · Infertility problems.  What are the causes?  Common causes of this condition include:  · Eating daily meals that are high in calories, sugar, and fat.  · Being born with genes that may make you more likely to become obese.  · Having a medical condition that causes obesity, including:  ? Hypothyroidism.  ? Polycystic ovarian syndrome (PCOS).  ? Binge-eating disorder.  ? Cushing syndrome.  · Taking certain medicines, such as steroids, antidepressants, and seizure medicines.  · Not being physically active (sedentary lifestyle).  · Not getting enough sleep.  · Drinking high amounts of sugar-sweetened beverages, such  as soft drinks.  What increases the risk?  The following factors may make you more likely to develop this condition:  · Having a family history of obesity.  · Being a woman of  descent.  · Being a man of  descent.  · Living in an area with limited access to:  ? [place], recreation centers, or sidewalks.  ? Healthy food choices, such as grocery stores and farmers' markets.  What are the signs or symptoms?  The main sign of this condition is having too much body fat.  How is this diagnosed?  This condition is diagnosed based on:  · Your BMI. If you are an adult with a BMI of 30 or higher, you are considered obese.  · Your waist circumference. This measures the distance around your waistline.  · Your skinfold thickness. Your health care provider may gently pinch a fold of your skin and measure it.  You may have other tests to check for underlying conditions.  How is this treated?  Treatment for this condition often includes changing your lifestyle. Treatment may include some or all of the following:  · Dietary changes. This may include developing a healthy meal plan.  · Regular physical activity. This may include activity that causes your heart to beat faster (aerobic exercise) and strength training. Work with your health care provider to design an exercise program that works for you.  · Medicine to help you lose weight if you are unable to lose 1 pound a week after 6 weeks of healthy eating and more physical activity.  · Treating conditions that cause the obesity (underlying conditions).  · Surgery. Surgical options may include gastric banding and gastric bypass. Surgery may be done if:  ? Other treatments have not helped to improve your condition.  ? You have a BMI of 40 or higher.  ? You have life-threatening health problems related to obesity.  Follow these instructions at home:  Eating and drinking    · Follow recommendations from your health care provider about what you eat and drink. Your  health care provider may advise you to:  ? Limit fast food, sweets, and processed snack foods.  ? Choose low-fat options, such as low-fat milk instead of whole milk.  ? Eat 5 or more servings of fruits or vegetables every day.  ? Eat at home more often. This gives you more control over what you eat.  ? Choose healthy foods when you eat out.  ? Learn to read food labels. This will help you understand how much food is considered 1 serving.  ? Learn what a healthy serving size is.  ? Keep low-fat snacks available.  ? Limit sugary drinks, such as soda, fruit juice, sweetened iced tea, and flavored milk.  · Drink enough water to keep your urine pale yellow.  · Do not follow a fad diet. Fad diets can be unhealthy and even dangerous.  Physical activity  · Exercise regularly, as told by your health care provider.  ? Most adults should get up to 150 minutes of moderate-intensity exercise every week.  ? Ask your health care provider what types of exercise are safe for you and how often you should exercise.  · Warm up and stretch before being active.  · Cool down and stretch after being active.  · Rest between periods of activity.  Lifestyle  · Work with your health care provider and a dietitian to set a weight-loss goal that is healthy and reasonable for you.  · Limit your screen time.  · Find ways to reward yourself that do not involve food.  · Do not drink alcohol if:  ? Your health care provider tells you not to drink.  ? You are pregnant, may be pregnant, or are planning to become pregnant.  · If you drink alcohol:  ? Limit how much you use to:  § 0-1 drink a day for women.  § 0-2 drinks a day for men.  ? Be aware of how much alcohol is in your drink. In the [place] one drink equals one 12 oz bottle of beer (355 mL), one 5 oz glass of wine (148 mL), or one 1½ oz glass of hard liquor (44 mL).  General instructions  · Keep a weight-loss journal to keep track of the food you eat and how much exercise you get.  · Take  over-the-counter and prescription medicines only as told by your health care provider.  · Take vitamins and supplements only as told by your health care provider.  · Consider joining a support group. Your health care provider may be able to recommend a support group.  · Keep all follow-up visits as told by your health care provider. This is important.  Contact a health care provider if:  · You are unable to meet your weight loss goal after 6 weeks of dietary and lifestyle changes.  Get help right away if you are having:  · Trouble breathing.  · Suicidal thoughts or behaviors.  Summary  · Obesity is the condition of having too much total body fat.  · Being overweight or obese means that your weight is greater than what is considered healthy for your body size.  · Work with your health care provider and a dietitian to set a weight-loss goal that is healthy and reasonable for you.  · Exercise regularly, as told by your health care provider. Ask your health care provider what types of exercise are safe for you and how often you should exercise.  This information is not intended to replace advice given to you by your health care provider. Make sure you discuss any questions you have with your health care provider.  Document Revised: 08/22/2019 Document Reviewed: 08/22/2019  [employer] Patient Education © 2021 [employer]      MyPlate from [employer]    MyPlate is an outline of a general healthy diet based on the 2010 Dietary Guidelines for Americans, from the [place] Department of Agriculture (USDA). It sets guidelines for how much food you should eat from each food group based on your age, sex, and level of physical activity.  What are tips for following MyPlate?  To follow MyPlate recommendations:  · Eat a wide variety of fruits and vegetables, grains, and protein foods.  · Serve smaller portions and eat less food throughout the day.  · Limit portion sizes to avoid overeating.  · Enjoy your food.  · Get at least 150 minutes of  exercise every week. This is about 30 minutes each day, 5 or more days per week.  It can be difficult to have every meal look like MyPlate. Think about MyPlate as eating guidelines for an entire day, rather than each individual meal.  Fruits and vegetables  · Make half of your plate fruits and vegetables.  · Eat many different colors of fruits and vegetables each day.  · For a 2,000 calorie daily food plan, eat:  ? 2½ cups of vegetables every day.  ? 2 cups of fruit every day.  · 1 cup is equal to:  ? 1 cup raw or cooked vegetables.  ? 1 cup raw fruit.  ? 1 medium-sized orange, apple, or banana.  ? 1 cup 100% fruit or vegetable juice.  ? 2 cups raw leafy greens, such as lettuce, spinach, or kale.  ? ½ cup dried fruit.  Grains  · One fourth of your plate should be grains.  · Make at least half of the grains you eat each day whole grains.  · For a 2,000 calorie daily food plan, eat 6 oz of grains every day.  · 1 oz is equal to:  ? 1 slice bread.  ? 1 cup cereal.  ? ½ cup cooked rice, cereal, or pasta.  Protein  · One fourth of your plate should be protein.  · Eat a wide variety of protein foods, including meat, poultry, fish, eggs, beans, nuts, and tofu.  · For a 2,000 calorie daily food plan, eat 5½ oz of protein every day.  · 1 oz is equal to:  ? 1 oz meat, poultry, or fish.  ? ¼ cup cooked beans.  ? 1 egg.  ? ½ oz nuts or seeds.  ? 1 Tbsp peanut butter.  Dairy  · Drink fat-free or low-fat (1%) milk.  · Eat or drink dairy as a side to meals.  · For a 2,000 calorie daily food plan, eat or drink 3 cups of dairy every day.  · 1 cup is equal to:  ? 1 cup milk, yogurt, cottage cheese, or soy milk (soy beverage).  ? 2 oz processed cheese.  ? 1½ oz natural cheese.  Fats, oils, salt, and sugars  · Only small amounts of oils are recommended.  · Avoid foods that are high in calories and low in nutritional value (empty calories), like foods high in fat or added sugars.  · Choose foods that are low in salt (sodium). Choose  foods that have less than 140 milligrams (mg) of sodium per serving.  · Drink water instead of sugary drinks. Drink enough water each day to keep your urine pale yellow.  Where to find support  · Work with your health care provider or a nutrition specialist (dietitian) to develop a customized eating plan that is right for you.  · Download an raven (mobile application) to help you track your daily food intake.  Where to find more information  · Go to Choose[employer].gov for more information.  Summary  · MyPlate is a general guideline for healthy eating from the USDA. It is based on the 2010 Dietary Guidelines for Americans.  · In general, fruits and vegetables should take up ½ of your plate, grains should take up ¼ of your plate, and protein should take up ¼ of your plate.  This information is not intended to replace advice given to you by your health care provider. Make sure you discuss any questions you have with your health care provider.  Document Revised: 05/21/2020 Document Reviewed: 03/19/2018  [employer] Patient Education © 2021 [employer]      Exercising to Lose Weight  Exercise is structured, repetitive physical activity to improve fitness and health. Getting regular exercise is important for everyone. It is especially important if you are overweight. Being overweight increases your risk of heart disease, stroke, diabetes, high blood pressure, and several types of cancer. Reducing your calorie intake and exercising can help you lose weight.  Exercise is usually categorized as moderate or vigorous intensity. To lose weight, most people need to do a certain amount of moderate-intensity or vigorous-intensity exercise each week.  Moderate-intensity exercise    Moderate-intensity exercise is any activity that gets you moving enough to burn at least three times more energy (calories) than if you were sitting.  Examples of moderate exercise include:  · Walking a mile in 15 minutes.  · Doing light yard work.  · Biking  at an easy pace.  Most people should get at least 150 minutes (2 hours and 30 minutes) a week of moderate-intensity exercise to maintain their body weight.  Vigorous-intensity exercise  Vigorous-intensity exercise is any activity that gets you moving enough to burn at least six times more calories than if you were sitting. When you exercise at this intensity, you should be working hard enough that you are not able to carry on a conversation.  Examples of vigorous exercise include:  · Running.  · Playing a team sport, such as football, basketball, and soccer.  · Jumping rope.  Most people should get at least 75 minutes (1 hour and 15 minutes) a week of vigorous-intensity exercise to maintain their body weight.  How can exercise affect me?  When you exercise enough to burn more calories than you eat, you lose weight. Exercise also reduces body fat and builds muscle. The more muscle you have, the more calories you burn. Exercise also:  · Improves mood.  · Reduces stress and tension.  · Improves your overall fitness, flexibility, and endurance.  · Increases bone strength.  The amount of exercise you need to lose weight depends on:  · Your age.  · The type of exercise.  · Any health conditions you have.  · Your overall physical ability.  Talk to your health care provider about how much exercise you need and what types of activities are safe for you.  What actions can I take to lose weight?  Nutrition    · Make changes to your diet as told by your health care provider or diet and nutrition specialist (dietitian). This may include:  ? Eating fewer calories.  ? Eating more protein.  ? Eating less unhealthy fats.  ? Eating a diet that includes fresh fruits and vegetables, whole grains, low-fat dairy products, and lean protein.  ? Avoiding foods with added fat, salt, and sugar.  · Drink plenty of water while you exercise to prevent dehydration or heat stroke.  Activity  · Choose an activity that you enjoy and set realistic  goals. Your health care provider can help you make an exercise plan that works for you.  · Exercise at a moderate or vigorous intensity most days of the week.  ? The intensity of exercise may vary from person to person. You can tell how intense a workout is for you by paying attention to your breathing and heartbeat. Most people will notice their breathing and heartbeat get faster with more intense exercise.  · Do resistance training twice each week, such as:  ? Push-ups.  ? Sit-ups.  ? Lifting weights.  ? Using resistance bands.  · Getting short amounts of exercise can be just as helpful as long structured periods of exercise. If you have trouble finding time to exercise, try to include exercise in your daily routine.  ? Get up, stretch, and walk around every 30 minutes throughout the day.  ? Go for a walk during your lunch break.  ? Park your car farther away from your destination.  ? If you take public transportation, get off one stop early and walk the rest of the way.  ? Make phone calls while standing up and walking around.  ? Take the stairs instead of elevators or escalators.  · Wear comfortable clothes and shoes with good support.  · Do not exercise so much that you hurt yourself, feel dizzy, or get very short of breath.  Where to find more information  · [place] Department of Health and Human Services: www.hhs.gov  · Centers for Disease Control and Prevention (CDC): www.cdc.gov  Contact a health care provider:  · Before starting a new exercise program.  · If you have questions or concerns about your weight.  · If you have a medical problem that keeps you from exercising.  Get help right away if you have any of the following while exercising:  · Injury.  · Dizziness.  · Difficulty breathing or shortness of breath that does not go away when you stop exercising.  · Chest pain.  · Rapid heartbeat.  Summary  · Being overweight increases your risk of heart disease, stroke, diabetes, high blood pressure, and several  types of cancer.  · Losing weight happens when you burn more calories than you eat.  · Reducing the amount of calories you eat in addition to getting regular moderate or vigorous exercise each week helps you lose weight.  This information is not intended to replace advice given to you by your health care provider. Make sure you discuss any questions you have with your health care provider.  Document Revised: 12/31/2018 Document Reviewed: 12/31/2018  [employer] Patient Education © 2021 [employer]

## 2021-06-08 NOTE — PROGRESS NOTES
Subjective     Chief Complaint:  Physical Exam.    History of Present Illness    History obtained from the patient.    The patient is here for an ED follow-up.  The patient went to  ED on 5/30/2021.  Records have been received and reviewed.  Urinalysis showed greater than 1000 glucose and large blood.  Urine drug screen was positive for THC and possibly Opiates.  CMP was significant for an elevated Glucose (391) and a low Potassium (3.5).  Bilirubin level was elevated (1.2).  CBC was normal.  Troponin was negative.  Testing for COVID-19, HIV, and Hepatitis C were negative.  EKG showed normal sinus rhythm.  Chest x-ray showed no acute findings.  CT chest, abdomen, and pelvis with IV contrast showed showed a right adnexal cyst.  There was also ectasia of the ascending thoracic aorta without aneurysm, otherwise negative.  She was given Morphine, Droperidol, a GI cocktail and IV fluids with improvement in her symptoms.  She was also given 10 units of Insulin due to an elevated blood glucose level.    The patient is still complaining of of abdominal pain, now lasting 3 to 4 weeks..  It starts in the epigastric area and radiates down to the periumbilical area.  She also has a decreased appetite, early satiety, belching, and bloating.  She has had some diarrhea.  She had nausea and vomiting prior to the ED visit, but that has resolved.  She complains of some bright red blood per rectum (has a history of hemorrhoids), but no constipation.  There are no other GI symptoms.  Overall there is no change in her symptoms.  The patient had a Colonoscopy on 9/2/2020 which showed internal hemorrhoids, a healing anal fissure, and a cluster of diverticuli in the right colon without inflammation to suggest diverticulitis.    The patient has Hydradenitis Suppurativa diagnosed in September 2020.  She was seeing a Dermatologist and was on Humira.  She states she now has a Dermatologist who has taken her off the Humira and is  treating topically with improvement.    Primary Care Cardiac Diagnostic Constellation: The patient is here today for a follow-up visit.       Her Hypertension has been stable.   Medication(s): Lisinopril and HCTZ.  Her Hyperlipidemia has been unstable.    LDL goal is <70.  Her last LDL was 99, TG 71.   Medication:  Atorvastatin.  Her Diabetes Mellitus Type 2 has been unstable.  Medication:  Januvia, Steglatro, Actos, Atorvastatin, and Lisinopril.   The patient is adherent with her medication regimen. She denies medication side effects.      Procedures:  The patient had a negative cardiac stress test on 6/7/2019.        Interval Events:   Last Hemoglobin A1c on 1/27/2021 was 12.0, which was improved.  She states her blood sugar at home has been 220-240 fasting and postprandial.  She denies episodes of low blood sugar. The patient states her last Ophthalmology appointment was in February 2020, no retinopathy.  She does check her feet daily.       Symptoms:  Denies chest pain (other than chronic chest wall pain), shortness of breath, MAIN, orthopnea, PND, palpitations, syncope, lower extremity edema, claudication, lightheadedness, and dizziness..   Associated Symptoms: Weight down 26  pounds, unintentionally (decreased appetite), since last visit. Stable myalgias (legs) and arthralgias.  No fatigue, headache, polyuria, polydipsia, memory issues, concentration issues, or focal neurologic deficits.  Has stable numbness and tingling of the feet and the fingers, but pain or ulcers.      Lifestyle and Disease Management: Diet: She has been consuming a diverse and healthy diet, overall.  Weight Issues: She has weight concerns. Exercise: She has not been exercising regularly.    Tobacco Use: Former Smoker.     Chronic Pain Syndrome  follow-up: The patient is being seen for a routine clinic follow-up of Chronic Pain Syndrome, which is stable.  Interval Events: The patient is being seen by  Neurosurgery.    Symptoms:  stable  low back pain, neck pain, and chest wall pain.  Medication:  Tizanidine and Gabapentin.  Off Celebrex.     Hypothyroidism Follow-Up: The patient is being seen for follow-up of Hypothyroidism, which is stable.    Interval Events: T4 and TSH were normal 21.  Symptoms: Weight down 26  pounds, unintentionally (decreased appetite), since last visit.  Has diarrhea as above.  Stable cold intolerance.  Denies fatigue, heat intolerance, weakness, constipation, and dry skin.   Associated Symptoms: has paresthesias hands and feet, myalgias, and  arthralgias.    Medications:  Levothyroxine (Synthroid).   The patient is adherent to her medication regimen, and she denies medication side effects.      Vitamin D Deficiency Follow-Up: The patient is here for follow-up of Vitamin D Deficiency, which is unstable.   Interval Events: Vitamin D level on 2020 was 33.2.      Symptoms: Fatigue resolved.  Stable  myalgias, arthralgias and paresthesias. Has occasional loss of balance.  Denies gait instability.    Medication: Vitamin D2, 50,000 IU weekly.    Situational Depression Initial Evaluation: The patient is here for an initial evaluation of Situational Depression, which is a new problem.  Comorbid Illness: Insomnia.  Interval Events: Patient's mother  2 months ago.  She is still having difficulty getting through this.  She states she saw Dr. Denis several years ago and would like a referral to see her again.    Symptoms: Reports depression, anxiety, panic attacks, and insomnia.  She denies anhedonia, memory loss, and concentration issues.    Associated Symptoms: No suicidal ideation.    Medication: None.         Ernestina Garrido is a 51 y.o. female who presents for an Annual Physical.      PMH, PSH, SocHx, FamHx, Allergies, and Medications: Reviewed and updated.    Outpatient Medications Prior to Visit   Medication Sig Dispense Refill   • Blood Glucose Monitoring Suppl (FREESTYLE FREEDOM LITE) w/Device kit Use  twice daily   Dx  E11.9 1 each 0   • diclofenac (VOLTAREN) 1 % gel gel 2 grams QID prn pain upper extremity joints and 4 grams QID pain lower extremity joints 100 g 5   • gabapentin (NEURONTIN) 800 MG tablet 1 tablet 3 (Three) Times a Day.     • glucose blood (FREESTYLE LITE) test strip USE FOR TESTING BLOOD SUGAR TWICE  DAILY AS DIRECTED .   Dx E11/9 100 each 6   • Lancets (FREESTYLE) lancets USE  LANCET  TO TEST TWICE  DAILY 100 each 11   • nystatin 939856 UNIT/GM powder      • tiZANidine (ZANAFLEX) 4 MG tablet Take 1 tablet by mouth 3 (Three) Times a Day As Needed for Muscle Spasms. 90 tablet 1   • vitamin D (ERGOCALCIFEROL) 1.25 MG (36521 UT) capsule capsule TAKE ONE CAPSULE BY MOUTH ONCE WEEKLY 4 capsule 2   • atorvastatin (LIPITOR) 40 MG tablet TAKE ONE TABLET BY MOUTH DAILY 30 tablet 0   • Ertugliflozin L-PyroglutamicAc (Steglatro) 5 MG tablet Take 1 tablet by mouth Every Morning. 30 tablet 5   • hydroCHLOROthiazide (HYDRODIURIL) 50 MG tablet TAKE ONE TABLET BY MOUTH DAILY 30 tablet 2   • Januvia 100 MG tablet Daily.     • levothyroxine (SYNTHROID, LEVOTHROID) 100 MCG tablet TAKE ONE TABLET BY MOUTH DAILY 30 tablet 3   • lisinopril (PRINIVIL,ZESTRIL) 20 MG tablet TAKE ONE TABLET BY MOUTH EVERY NIGHT AT BEDTIME 30 tablet 2   • omeprazole (priLOSEC) 40 MG capsule Take 1 capsule by mouth Daily. 30 capsule 5   • pioglitazone (ACTOS) 45 MG tablet Take 1 tablet by mouth Daily. 30 tablet 5   • celecoxib (CeleBREX) 200 MG capsule 1 capsule 2 (Two) Times a Day.     • Farxiga 5 MG tablet tablet      • fluconazole (DIFLUCAN) 150 MG tablet      • HUMIRA PEN-CD/UC/HS STARTER 80 MG/0.8ML injection Inject 40 Units as directed 1 (One) Time Per Week.     • ibuprofen (ADVIL,MOTRIN) 800 MG tablet        No facility-administered medications prior to visit.       Immunization History   Administered Date(s) Administered   • COVID-19 (MODERNA) 05/21/2021   • Flulaval/Fluarix/Fluzone Quad 01/13/2020   • Hepatitis A 03/18/2019,  09/23/2019   • Pneumococcal Conjugate 13-Valent (PCV13) 01/13/2020   • Pneumococcal Polysaccharide (PPSV23) 04/19/2017   • Tdap 02/25/2017         Patient Active Problem List   Diagnosis   • Chronic pain syndrome   • Depression   • Hemorrhoid   • Essential hypertension   • Hypothyroidism   • Insomnia   • Arthralgia of hip   • Arthralgia of shoulder   • Degenerative disc disease, cervical   • Cervical facet arthropathy/cervical spondylosis without myelopathy   • Diabetic autonomic neuropathy (CMS/HCC)   • Hx of fusion of cervical spine   • Obesity (BMI 30.0-34.9)   • Neuroforaminal stenosis of spine   • Carpal tunnel syndrome of right wrist   • Type 2 diabetes mellitus, without long-term current use of insulin (CMS/HCC)   • GERD (gastroesophageal reflux disease)   • Costochondral chest pain   • Intercostal neuralgia   • Other hyperlipidemia   • Cervical post-laminectomy syndrome   • Vitamin D deficiency   • Renal angiomyolipoma   • Duplicated left renal collecting system       Health Habits:  Dental Exam. up to date  Eye Exam. not up to date - last 5/2/19  Hearing Loss:  No  Exercise: 0 times/week.  Current exercise activities include: none  Diet: Healthy  Multivitamin: No    Safe Driving:  Yes  Seat Belt:  Yes  Bike Helmet:  N/A  Skin Screening:  Yes  Sunscreen: Yes  SBE / CARMITA: Yes  Sexual Activity:  Not currently  Birth Control:  DEYSI  STD Prevention:  N/A    Last Pap: N/A (DEYSI)  Last Mammogram: 5/30/2017, category 1.  Last DEXA Scan: None.  Last Colonoscopy: 9/2/2020, normal except healing anal fissure and diverticuli.  Last PSA: N/A    Social:    Social History     Socioeconomic History   • Marital status: Legally      Spouse name: Not on file   • Number of children: 3   • Years of education: Not on file   • Highest education level: Not on file   Tobacco Use   • Smoking status: Former Smoker     Packs/day: 0.50     Years: 28.00     Pack years: 14.00     Types: Cigarettes     Start date: 1988     Quit  date: 11/6/2017     Years since quitting: 3.5   • Smokeless tobacco: Never Used   Vaping Use   • Vaping Use: Never used   Substance and Sexual Activity   • Alcohol use: Yes     Comment: 2-3 cocktains, 3-4 times per year   • Drug use: No   • Sexual activity: Not Currently     Partners: Male         Current Medical Providers:    Stephanie Tanner MD (Internal Medicine / Pediatrics)    The [facility] providers who are involved in the care of this patient are listed above.         Review of Systems   Constitutional: Positive for appetite change (decreased) and unexpected weight change. Negative for chills, fatigue and fever.        No night sweats.    HENT: Negative for congestion, ear pain, hearing loss, nosebleeds, postnasal drip, rhinorrhea, sinus pressure, sinus pain, sneezing, sore throat, tinnitus and voice change.         Denies snoring.   Eyes: Negative for photophobia, pain, discharge, redness, itching and visual disturbance.   Respiratory: Negative for cough, chest tightness, shortness of breath, wheezing and stridor.         No chest congestion.  No hemoptysis.   Cardiovascular: Negative for chest pain, palpitations and leg swelling.        No orthopnea, MAIN, or PND.  No claudication or syncope.   Gastrointestinal: Positive for abdominal pain, blood in stool and diarrhea. Negative for constipation, nausea, rectal pain and vomiting.        No melena.  No hematemesis.  No heartburn, dysphagia or odynophagia.  Has early satiety, belching, and bloating.    Endocrine: Negative for cold intolerance, heat intolerance, polydipsia, polyphagia and polyuria.        No hair loss or dry skin.  No hot flashes.     Genitourinary: Positive for pelvic pain. Negative for difficulty urinating, dysuria, flank pain, frequency, hematuria, urgency, vaginal bleeding and vaginal discharge.        Has nocturia, but no  incomplete emptying or incontinence.   Musculoskeletal: Positive for arthralgias, back pain, joint swelling (right  "knee and hands), myalgias and neck pain. Negative for gait problem and neck stiffness.        Has  joint stiffness in right knee.  Complains of bilateral hand pain   Skin: Negative for rash.        No new skin lesions or changes in skin lesions. No breast pain or masses.  No nipple discharge or nipple inversion.   Neurological: Positive for numbness (hands and feet). Negative for dizziness, tremors, syncope, speech difficulty, weakness, light-headedness and headaches.        Has hand and feet tingling.  No memory loss.  No decreased concentration.   Hematological: Negative for adenopathy. Does not bruise/bleed easily.   Psychiatric/Behavioral: Positive for sleep disturbance. Negative for confusion and suicidal ideas. The patient is nervous/anxious.         Reports depression.           Objective     Vitals:    06/08/21 0814   BP: 137/84   BP Location: Right arm   Pulse: 78   Temp: 97.1 °F (36.2 °C)   TempSrc: Temporal   Weight: 100 kg (221 lb)   Height: 169.5 cm (66.75\")       Body mass index is 34.87 kg/m².    Physical Exam  Vitals and nursing note reviewed.   Constitutional:       Appearance: Normal appearance. She is well-developed. She is obese.   HENT:      Head: Normocephalic and atraumatic.      Right Ear: Tympanic membrane, ear canal and external ear normal.      Left Ear: Tympanic membrane, ear canal and external ear normal.      Mouth/Throat:      Mouth: Mucous membranes are moist. No oral lesions.      Pharynx: Oropharynx is clear.      Comments: Tonsils normal.  Eyes:      Extraocular Movements: Extraocular movements intact.      Conjunctiva/sclera: Conjunctivae normal.      Pupils: Pupils are equal, round, and reactive to light.      Comments: Fundi normal bilaterally.   Neck:      Thyroid: No thyromegaly.      Vascular: No carotid bruit.   Cardiovascular:      Rate and Rhythm: Normal rate and regular rhythm.      Pulses: Normal pulses.      Heart sounds: Normal heart sounds. No murmur heard.   No " friction rub. No gallop.    Pulmonary:      Effort: Pulmonary effort is normal.      Breath sounds: Normal breath sounds.   Chest:      Breasts:         Right: No inverted nipple, mass, nipple discharge, skin change or tenderness.         Left: No inverted nipple, mass, nipple discharge, skin change or tenderness.   Abdominal:      General: Bowel sounds are normal. There is no distension or abdominal bruit.      Palpations: Abdomen is soft. There is no hepatomegaly, splenomegaly or mass.      Tenderness: There is no abdominal tenderness.   Genitourinary:     Comments:  and rectal exam deferred.  Musculoskeletal:         General: Normal range of motion.      Cervical back: Normal range of motion and neck supple.      Right lower leg: No edema.      Left lower leg: No edema.   Feet:      Right foot:      Skin integrity: Dry skin present. No ulcer, blister, skin breakdown, erythema or warmth.      Left foot:      Skin integrity: Dry skin present. No ulcer, blister, skin breakdown, erythema or warmth.   Lymphadenopathy:      Cervical: No cervical adenopathy.      Upper Body:      Right upper body: No supraclavicular or axillary adenopathy.      Left upper body: No supraclavicular or axillary adenopathy.   Skin:     Findings: No rash.      Comments: No atypical skin lesions.   Neurological:      Mental Status: She is alert.      Cranial Nerves: Cranial nerves are intact.      Motor: Motor function is intact. No abnormal muscle tone.      Coordination: Coordination is intact.      Gait: Gait is intact.      Deep Tendon Reflexes: Reflexes are normal and symmetric.   Psychiatric:         Mood and Affect: Mood normal.     Diabetic Foot Exam Performed  Monofilament Test Performed      PHQ-9 Depression Screening  Little interest or pleasure in doing things? 3   Feeling down, depressed, or hopeless? 3   Trouble falling or staying asleep, or sleeping too much? 3   Feeling tired or having little energy? 3   Poor appetite or  overeating? 3 (poor appetite)   Feeling bad about yourself - or that you are a failure or have let yourself or your family down? 3   Trouble concentrating on things, such as reading the newspaper or watching television? 3   Moving or speaking so slowly that other people could have noticed? Or the opposite - being so fidgety or restless that you have been moving around a lot more than usual? 3 (speaking and movinng slowly)   Thoughts that you would be better off dead, or of hurting yourself in some way? 0   PHQ-9 Total Score 24   If you checked off any problems, how difficult have these problems made it for you to do your work, take care of things at home, or get along with other people? Somewhat difficult     PHQ-9 Total Score: 24      Counseling was given to patient for the following topics:  appropriate exercise, healthy eating habits, disease prevention, risk factors for cancer, importance of self breast exam and breast health, importance of immunizations, including risks and benefits, sun safety, seatbelt use, safe driving and safe sex. Also discussed the importance of regular dental and vision care, as well recommendation for a yearly screening skin exam after age 40.  Written information provided to patient on these topics and other health maintenance issues.    Results for orders placed or performed in visit on 06/08/21   POC Glycosylated Hemoglobin (Hb A1C)    Specimen: Blood   Result Value Ref Range    Hemoglobin A1C 11.9 %    Lot Number [id number]     Expiration Date 03/16/2023    POC Urinalysis Dipstick, Automated    Specimen: Urine   Result Value Ref Range    Color Yellow Yellow, Straw, Dark Yellow, [place]    Clarity, UA Clear Clear    Specific Gravity  1.020 1.005 - 1.030    pH, Urine 5.0 5.0 - 8.0    Leukocytes Negative Negative    Nitrite, UA Negative Negative    Protein, POC Negative Negative mg/dL    Glucose, UA Negative Negative, 1000 mg/dL (3+) mg/dL    Ketones, UA Negative Negative     Urobilinogen, UA Normal Normal    Bilirubin Negative Negative    Blood, UA Negative Negative    Lot Number [id number]     Expiration Date 11/30/2021    POC Microalbumin    Specimen: Urine   Result Value Ref Range    Microalbumin, Urine 30 mg/L     Creatinine, Urine 200mg/dL     A/C <30 mg/g     Lot Number [id number]     Expiration Date 08/31/22        Assessment/Plan       Diagnoses and all orders for this visit:    1. Encounter for health maintenance examination in adult (Primary)  -     Lipid Panel  -     Comprehensive Metabolic Panel  -     TSH  -     Vitamin D 25 Hydroxy  -     CBC & Differential  -     POC Glycosylated Hemoglobin (Hb A1C)  -     POC Urinalysis Dipstick, Automated  -     POC Microalbumin  -     T4, Free    2. Type 2 diabetes mellitus with diabetic autonomic neuropathy, without long-term current use of insulin (CMS/[facility])  -     Ertugliflozin L-PyroglutamicAc (Steglatro) 5 MG tablet; Take 1 tablet by mouth Every Morning.  Dispense: 30 tablet; Refill: 5-REFILL  -     Januvia 100 MG tablet; Take 1 tablet by mouth Daily.  Dispense: 30 tablet; Refill: 5- REFILL  -     pioglitazone (ACTOS) 45 MG tablet; Take 1 tablet by mouth Daily.  Dispense: 30 tablet; Refill: 5- REFILL  -     Lipid Panel  -     Comprehensive Metabolic Panel  -     TSH  -     CBC & Differential  -     POC Glycosylated Hemoglobin (Hb A1C)  -     POC Urinalysis Dipstick, Automated  -     POC Microalbumin   Continue current medication(s) as noted in the history of present illness.   Stressed the need for increased exercise.    3. Essential hypertension  -     hydroCHLOROthiazide (HYDRODIURIL) 50 MG tablet; Take 1 tablet by mouth Daily.  Dispense: 30 tablet; Refill: 5-REFILL  -     lisinopril (PRINIVIL,ZESTRIL) 20 MG tablet; Take 1 tablet by mouth every night at bedtime.  Dispense: 30 tablet; Refill: 5-REFILL  -     Lipid Panel  -     Comprehensive Metabolic Panel  -     TSH  -     CBC & Differential  -     POC Urinalysis Dipstick,  Automated   Continue current medication(s) as noted in the history of present illness.    4. Other hyperlipidemia  -     atorvastatin (LIPITOR) 40 MG tablet; Take 1 tablet by mouth Daily.  Dispense: 30 tablet; Refill: 5-REFILL  -     Lipid Panel  -     Comprehensive Metabolic Panel  -     TSH  -     CBC & Differential   Continue current medication(s) as noted in the history of present illness.   Stressed the need for increased exercise.    5. Acquired hypothyroidism  -     levothyroxine (SYNTHROID, LEVOTHROID) 100 MCG tablet; Take 1 tablet by mouth Daily.  Dispense: 30 tablet; Refill: 4-REFILL  -     TSH  -     T4, Free   Continue current medication(s) as noted in the history of present illness.    6. Chronic pain syndrome   Continue current medication(s) as noted in the history of present illness.    7. Obesity (BMI 30.0-34.9)   BMI Plan done.    8. Gastroesophageal reflux disease without esophagitis  -     pantoprazole (Protonix) 40 MG EC tablet; Take 1 tablet by mouth Daily.  Dispense: 30 tablet; Refill: 5-REFILL   Continue current medication(s) as noted in the history of present illness.'    9. Vitamin D deficiency  -     Vitamin D 25 Hydroxy   Continue Vitamin D supplementation.    10. Primary insomnia   Stable, no medication.    11. Situational depression  -     Ambulatory Referral to Behavioral Health  -     escitalopram (Lexapro) 10 MG tablet; Take 1 tablet by mouth Daily.  Dispense: 30 tablet; Refill: 5- NEW   Lexapro side effects discussed.    12. Encounter for screening mammogram for breast cancer  -     Mammo Screening Digital Tomosynthesis Bilateral With CAD; Future        Patient's Body mass index is 34.87 kg/m². indicating that she is obese (BMI >30). Obesity-related health conditions include the following: hypertension, diabetes mellitus, dyslipidemias and GERD. Obesity is improving with lifestyle modifications. BMI is is above average; BMI management plan is completed. We discussed portion control  and increasing exercise..      Recommended Shingrix (new Shingles vaccine) at the pharmacy.    Recommended [facility].   Information was given today.    Return in about 3 weeks (around 6/29/2021) for Recheck Depression, non-fasting.

## 2021-06-11 ENCOUNTER — PRIOR AUTHORIZATION (OUTPATIENT)
Dept: INTERNAL MEDICINE | Facility: CLINIC | Age: 51
End: 2021-06-11

## 2021-06-11 DIAGNOSIS — E11.65 TYPE 2 DIABETES MELLITUS WITH HYPERGLYCEMIA, WITHOUT LONG-TERM CURRENT USE OF INSULIN (HCC): Primary | ICD-10-CM

## 2021-06-11 NOTE — TELEPHONE ENCOUNTER
PA sent for approval to Cover my meds   Waiting determination     Medication she has tried   Januvia, Steglatro, Actos, Atorvastatin, and Lisinopril.

## 2021-06-14 ENCOUNTER — TELEPHONE (OUTPATIENT)
Dept: INTERNAL MEDICINE | Facility: CLINIC | Age: 51
End: 2021-06-14

## 2021-06-14 DIAGNOSIS — K21.9 GASTROESOPHAGEAL REFLUX DISEASE, UNSPECIFIED WHETHER ESOPHAGITIS PRESENT: Primary | ICD-10-CM

## 2021-06-14 RX ORDER — DAPAGLIFLOZIN 10 MG/1
1 TABLET, FILM COATED ORAL DAILY
Qty: 30 TABLET | Refills: 5 | Status: SHIPPED | OUTPATIENT
Start: 2021-06-14 | End: 2022-07-05

## 2021-06-14 NOTE — TELEPHONE ENCOUNTER
Ernestina called stating the pantoprazole is not working for her .  She is belching all the time.  She would like it changed to omeprazole as it worked well for her     [facility]

## 2021-06-15 RX ORDER — OMEPRAZOLE 40 MG/1
40 CAPSULE, DELAYED RELEASE ORAL DAILY
Qty: 30 CAPSULE | Refills: 5 | Status: SHIPPED | OUTPATIENT
Start: 2021-06-15 | End: 2021-07-02 | Stop reason: SDUPTHER

## 2021-07-02 DIAGNOSIS — K21.9 GASTROESOPHAGEAL REFLUX DISEASE, UNSPECIFIED WHETHER ESOPHAGITIS PRESENT: ICD-10-CM

## 2021-07-02 RX ORDER — OMEPRAZOLE 40 MG/1
40 CAPSULE, DELAYED RELEASE ORAL DAILY
Qty: 30 CAPSULE | Refills: 5 | Status: SHIPPED | OUTPATIENT
Start: 2021-07-02 | End: 2021-07-07 | Stop reason: SDUPTHER

## 2021-07-02 NOTE — TELEPHONE ENCOUNTER
Caller: Ernestina Garrido    Relationship: Self    Best call back number: [phone]  Medication needed:   Requested Prescriptions     Pending Prescriptions Disp Refills   • omeprazole (priLOSEC) 40 MG capsule 30 capsule 5     Sig: Take 1 capsule by mouth Daily.       When do you need the refill by: TODAY    What additional details did the patient provide when requesting the medication: PATIENT HAS NOT BEEN TAKING MEDICATION AS PRESCRIBED.  SHE STATES HER STOMACH HAS BEEN SO BAD IN THE EVENING SHE'S HAVING TO TAKE A SECOND DOSE.  SHE'S ASKING FOR A CHANGE IN DOSING SO SHE CAN GET IT REFILLED.    Does the patient have less than a 3 day supply:  [x] Yes  [] No    What is the patient's preferred pharmacy: [facility] [address] [place] [address] [phone] [facility] [phone]

## 2021-07-07 ENCOUNTER — OFFICE VISIT (OUTPATIENT)
Dept: INTERNAL MEDICINE | Facility: CLINIC | Age: 51
End: 2021-07-07

## 2021-07-07 VITALS
BODY MASS INDEX: 35.56 KG/M2 | WEIGHT: 225.38 LBS | HEART RATE: 80 BPM | DIASTOLIC BLOOD PRESSURE: 64 MMHG | RESPIRATION RATE: 20 BRPM | TEMPERATURE: 97.1 F | SYSTOLIC BLOOD PRESSURE: 102 MMHG

## 2021-07-07 DIAGNOSIS — K64.8 OTHER HEMORRHOIDS: ICD-10-CM

## 2021-07-07 DIAGNOSIS — K21.9 GASTROESOPHAGEAL REFLUX DISEASE, UNSPECIFIED WHETHER ESOPHAGITIS PRESENT: ICD-10-CM

## 2021-07-07 DIAGNOSIS — F32.89 OTHER DEPRESSION: Primary | ICD-10-CM

## 2021-07-07 DIAGNOSIS — R10.30 LOWER ABDOMINAL PAIN: ICD-10-CM

## 2021-07-07 PROCEDURE — 99213 OFFICE O/P EST LOW 20 MIN: CPT | Performed by: INTERNAL MEDICINE

## 2021-07-07 RX ORDER — OMEPRAZOLE 40 MG/1
40 CAPSULE, DELAYED RELEASE ORAL 2 TIMES DAILY
Qty: 60 CAPSULE | Refills: 5 | Status: SHIPPED | OUTPATIENT
Start: 2021-07-07 | End: 2022-07-05

## 2021-07-07 RX ORDER — DAPSONE 25 MG/1
1 TABLET ORAL DAILY
COMMUNITY
Start: 2021-05-13 | End: 2022-03-23

## 2021-07-07 NOTE — PROGRESS NOTES
Subjective       Ernestina Garrido is a 51 y.o. female.     Chief Complaint   Patient presents with   • Depression     follow up    • Bloated       History obtained from the patient.      History of Present Illness     The patient has been having lower abdominal pain.  She has had some bleeding with hemorrhoids.  No nausea, vomiting, diarrhea, constipation, melena, fever, or chills.  She self increased her Omeprazole to twice daily dosing with some relief of the pain.  She states that she has seen her Gynecologist and had a normal pelvic ultrasound.  She would like a GI referral for abdominal pain.  In addition, she would like to see Dr. Love again for re-evaluation of her hemorrhoids.  She had a Colonoscopy on 2020 which was negative with the exception of internal hemorrhoids in the right colon diverticula without diverticulitis.  She states she also had a negative CT of the abdomen and pelvis one month ago at .    Situational Depression Follow-Up: The patient is here for follow-up of Situational Depression, which is improved.  Comorbid Illness: Insomnia.  Interval Events: The patient's mother  2-3 months ago.  She is having difficulty getting through this.  She was seen on 2021 and Lexapro was started.  She states she feels better overall, has been energy, and is more positive.  She has an appointment with Dr. Do at the end of July.    Symptoms: Reports improved depression and anxiety. She denies panic attacks, insomnia, anhedonia, memory loss, and concentration issues.    Associated Symptoms: No suicidal ideation.    Medication: Lexapro.  Side Effects: None.    Current Outpatient Medications on File Prior to Visit   Medication Sig Dispense Refill   • atorvastatin (LIPITOR) 40 MG tablet Take 1 tablet by mouth Daily. 30 tablet 5   • Blood Glucose Monitoring Suppl (FREESTYLE FREEDOM LITE) w/Device kit Use twice daily   Dx  E11.9 1 each 0   • Dapagliflozin Propanediol (Farxiga) 10 MG tablet  Take 10 mg by mouth Daily. 30 tablet 5   • diclofenac (VOLTAREN) 1 % gel gel 2 grams QID prn pain upper extremity joints and 4 grams QID pain lower extremity joints 100 g 5   • escitalopram (Lexapro) 10 MG tablet Take 1 tablet by mouth Daily. 30 tablet 5   • gabapentin (NEURONTIN) 800 MG tablet 1 tablet 3 (Three) Times a Day.     • glucose blood (FREESTYLE LITE) test strip USE FOR TESTING BLOOD SUGAR TWICE  DAILY AS DIRECTED .   Dx E11/9 100 each 6   • hydroCHLOROthiazide (HYDRODIURIL) 50 MG tablet Take 1 tablet by mouth Daily. 30 tablet 5   • Januvia 100 MG tablet Take 1 tablet by mouth Daily. 30 tablet 5   • Lancets (FREESTYLE) lancets USE  LANCET  TO TEST TWICE  DAILY 100 each 11   • levothyroxine (SYNTHROID, LEVOTHROID) 100 MCG tablet Take 1 tablet by mouth Daily. 30 tablet 4   • lisinopril (PRINIVIL,ZESTRIL) 20 MG tablet Take 1 tablet by mouth every night at bedtime. 30 tablet 5   • pioglitazone (ACTOS) 45 MG tablet Take 1 tablet by mouth Daily. 30 tablet 5   • tiZANidine (ZANAFLEX) 4 MG tablet Take 1 tablet by mouth 3 (Three) Times a Day As Needed for Muscle Spasms. 90 tablet 1   • vitamin D (ERGOCALCIFEROL) 1.25 MG (20937 UT) capsule capsule TAKE ONE CAPSULE BY MOUTH ONCE WEEKLY 4 capsule 2   • [DISCONTINUED] omeprazole (priLOSEC) 40 MG capsule Take 1 capsule by mouth Daily. 30 capsule 5   • dapsone 25 MG tablet 1 tablet Daily.     • nystatin 940110 UNIT/GM powder        No current facility-administered medications on file prior to visit.       Current outpatient and discharge medications have been reconciled for the patient.  Reviewed by: Stephanie Tanner MD        The following portions of the patient's history were reviewed and updated as appropriate: allergies, current medications, past family history, past medical history, past social history, past surgical history and problem list.    Review of Systems   Constitutional: Negative for chills, fever and unexpected weight change.   Gastrointestinal: Positive  for abdominal pain and blood in stool. Negative for diarrhea, nausea and vomiting.   Neurological:        Denies memory loss.   Psychiatric/Behavioral: Negative for decreased concentration, sleep disturbance and suicidal ideas. The patient is nervous/anxious.          Objective       Blood pressure 102/64, pulse 80, temperature 97.1 °F (36.2 °C), temperature source Temporal, resp. rate 20, weight 102 kg (225 lb 6 oz), not currently breastfeeding.  Body mass index is 35.56 kg/m².      Physical Exam  Vitals and nursing note reviewed.   Constitutional:       Appearance: She is obese.   Neurological:      Mental Status: She is alert.   Psychiatric:         Mood and Affect: Mood normal.         Assessment / Plan:  Diagnoses and all orders for this visit:    1. Other depression (Primary)   Continue current medication(s) as noted in the history of present illness.    2. Other hemorrhoids  -     Ambulatory Referral to Colorectal Surgery    3. Lower abdominal pain  -     Ambulatory Referral to Gastroenterology   The patient was instructed to get a disc of her CT from  to bring to her GI appointment.    4. Gastroesophageal reflux disease, unspecified whether esophagitis present  -     omeprazole (priLOSEC) 40 MG capsule; Take 1 capsule by mouth 2 (Two) Times a Day.  Dispense: 60 capsule; Refill: 5- INCREASED DOSE REFILL        Return in about 2 months (around 9/7/2021) for Recheck Diabetrs, fasting (appt must be after 9/8/21).

## 2021-07-13 ENCOUNTER — TELEPHONE (OUTPATIENT)
Dept: INTERNAL MEDICINE | Facility: CLINIC | Age: 51
End: 2021-07-13

## 2021-07-13 NOTE — TELEPHONE ENCOUNTER
Ernestina notified on Voicemail  rx for BID was sent to pharmacy on 7/7/2021   Advised her to call the pharmacy and see it they filled the rx.  And to call back if she had further question

## 2021-07-13 NOTE — TELEPHONE ENCOUNTER
Caller: Ernestina Garrido    Relationship: Self    Best call back number: [phone]    What medications are you currently taking:   Current Outpatient Medications on File Prior to Visit   Medication Sig Dispense Refill   • atorvastatin (LIPITOR) 40 MG tablet Take 1 tablet by mouth Daily. 30 tablet 5   • Blood Glucose Monitoring Suppl (FREESTYLE FREEDOM LITE) w/Device kit Use twice daily   Dx  E11.9 1 each 0   • Dapagliflozin Propanediol (Farxiga) 10 MG tablet Take 10 mg by mouth Daily. 30 tablet 5   • dapsone 25 MG tablet 1 tablet Daily.     • diclofenac (VOLTAREN) 1 % gel gel 2 grams QID prn pain upper extremity joints and 4 grams QID pain lower extremity joints 100 g 5   • escitalopram (Lexapro) 10 MG tablet Take 1 tablet by mouth Daily. 30 tablet 5   • gabapentin (NEURONTIN) 800 MG tablet 1 tablet 3 (Three) Times a Day.     • glucose blood (FREESTYLE LITE) test strip USE FOR TESTING BLOOD SUGAR TWICE  DAILY AS DIRECTED .   Dx E11/9 100 each 6   • hydroCHLOROthiazide (HYDRODIURIL) 50 MG tablet Take 1 tablet by mouth Daily. 30 tablet 5   • Januvia 100 MG tablet Take 1 tablet by mouth Daily. 30 tablet 5   • Lancets (FREESTYLE) lancets USE  LANCET  TO TEST TWICE  DAILY 100 each 11   • levothyroxine (SYNTHROID, LEVOTHROID) 100 MCG tablet Take 1 tablet by mouth Daily. 30 tablet 4   • lisinopril (PRINIVIL,ZESTRIL) 20 MG tablet Take 1 tablet by mouth every night at bedtime. 30 tablet 5   • nystatin 074510 UNIT/GM powder      • omeprazole (priLOSEC) 40 MG capsule Take 1 capsule by mouth 2 (Two) Times a Day. 60 capsule 5   • pioglitazone (ACTOS) 45 MG tablet Take 1 tablet by mouth Daily. 30 tablet 5   • tiZANidine (ZANAFLEX) 4 MG tablet Take 1 tablet by mouth 3 (Three) Times a Day As Needed for Muscle Spasms. 90 tablet 1   • vitamin D (ERGOCALCIFEROL) 1.25 MG (80930 UT) capsule capsule TAKE ONE CAPSULE BY MOUTH ONCE WEEKLY 4 capsule 2     No current facility-administered medications on file prior to visit.         Which medication are you concerned about: OMEPRAZOLE    Who prescribed you this medication: DR SEGOVIA    What are your concerns: DR SEGOVIA WAS SUPPOSE TO INCREASE THIS MEDICATION TO TWICE A DAY AND WHEN REFILL WAS SENT IN IT WAS STILL ONCE A DAY. PLEASE ADVISE AND SEND NEW SCRIPT WITH NEW DIRECTIONS TO [facility].

## 2021-08-09 ENCOUNTER — TELEPHONE (OUTPATIENT)
Dept: INTERNAL MEDICINE | Facility: CLINIC | Age: 51
End: 2021-08-09

## 2021-08-09 DIAGNOSIS — E11.65 TYPE 2 DIABETES MELLITUS WITH HYPERGLYCEMIA, WITHOUT LONG-TERM CURRENT USE OF INSULIN (HCC): Primary | ICD-10-CM

## 2021-08-09 NOTE — TELEPHONE ENCOUNTER
Caller: Ernestina Garrido    Relationship: Self    Best call back number: [phone]    What is the best time to reach you: ANYTIME  BEFORE 3PM    Who are you requesting to speak with (clinical staff, provider,  specific staff member): PROVIDER    What was the call regarding:   PATIENT CALLED TO SEE IF SHE CAN GET THE NEW DEVICE TO CHECK HER SUGAR LEVELS WITH OUT STICKING YOURSELF PRESCRIPTION     Do you require a callback: YES

## 2021-08-09 NOTE — TELEPHONE ENCOUNTER
Call please.  I would be glad to prescribe a new device for her, but she should check with her insurance company first to make sure it is covered.

## 2021-08-11 NOTE — TELEPHONE ENCOUNTER
I spoke with patient and she said she called her insurance company and they told her they will cover one. Patient would like rx called into pharm

## 2021-08-12 RX ORDER — BLOOD-GLUCOSE METER
KIT MISCELLANEOUS
Qty: 1 EACH | Refills: 0 | Status: SHIPPED | OUTPATIENT
Start: 2021-08-12 | End: 2022-07-19

## 2021-08-12 NOTE — TELEPHONE ENCOUNTER
Pharmacy states the script just needs to state glucose machine and they will run through and give the patient which machine is covered.

## 2021-09-08 ENCOUNTER — TELEPHONE (OUTPATIENT)
Dept: INTERNAL MEDICINE | Facility: CLINIC | Age: 51
End: 2021-09-08

## 2021-09-08 NOTE — TELEPHONE ENCOUNTER
Caller: Ernestina Garrido    Relationship: Self    Best call back number: [phone]     What medication are you requesting: STYE ON EYE    What are your current symptoms: SWOLLEN EYE, SMALL MAS ON EYELID    How long have you been experiencing symptoms: 4 DAYS    Have you had these symptoms before:    [] Yes  [x] No    Have you been treated for these symptoms before:   [] Yes  [x] No    If a prescription is needed, what is your preferred pharmacy and phone number: [facility] [address] [place] [address] [phone] [facility] [phone]      Additional notes:

## 2021-09-10 NOTE — TELEPHONE ENCOUNTER
LVM requesting pt call back, office number given.     HUB/PAR please advise:  Recommend warm moist compresses on the stye, 5-6 times per day.

## 2021-09-26 ENCOUNTER — TELEPHONE (OUTPATIENT)
Dept: INTERNAL MEDICINE | Facility: CLINIC | Age: 51
End: 2021-09-26

## 2021-09-26 NOTE — TELEPHONE ENCOUNTER
Call patient please.  I received a note from [facility], where she went to have her abdominal pain evaluated.  They did mention a CT abdomen she had had in May 2021 which showed a right adnexal mass, with recommended follow-up in 3 months.  Has she had any additional imaging for this mass?

## 2021-09-27 NOTE — TELEPHONE ENCOUNTER
Spoke with Ernestina and gave her information from Dr Tanner to follow up at GYN . She said she goes to Dr Garnica.    After speaking with Dr Tanner she recommends she follow up with Dr Garnica and she will be able to do the US there.  Notified Ernestina to take a copy of the CT that she had done at  to her appointment with Dr Henriquez. Ernestina will make the appointment   Verbal understanding given

## 2021-09-27 NOTE — TELEPHONE ENCOUNTER
This would not be a GI issue, more GYN.  I would start with a pelvic ultrasound, which I can order for her.

## 2021-09-27 NOTE — TELEPHONE ENCOUNTER
Spoke with Ernestina  She has not followed up with GI . She stated that she will call their office and scheduled her appointment

## 2021-09-28 RX ORDER — BLOOD-GLUCOSE METER
KIT MISCELLANEOUS
Qty: 100 EACH | Refills: 3 | Status: SHIPPED | OUTPATIENT
Start: 2021-09-28

## 2022-03-23 ENCOUNTER — LAB (OUTPATIENT)
Dept: LAB | Facility: HOSPITAL | Age: 52
End: 2022-03-23

## 2022-03-23 ENCOUNTER — OFFICE VISIT (OUTPATIENT)
Dept: INTERNAL MEDICINE | Facility: CLINIC | Age: 52
End: 2022-03-23

## 2022-03-23 VITALS
SYSTOLIC BLOOD PRESSURE: 110 MMHG | HEART RATE: 80 BPM | RESPIRATION RATE: 20 BRPM | DIASTOLIC BLOOD PRESSURE: 62 MMHG | WEIGHT: 200.38 LBS | TEMPERATURE: 98 F | BODY MASS INDEX: 31.62 KG/M2

## 2022-03-23 DIAGNOSIS — G89.4 CHRONIC PAIN SYNDROME: ICD-10-CM

## 2022-03-23 DIAGNOSIS — E11.65 TYPE 2 DIABETES MELLITUS WITH HYPERGLYCEMIA, WITHOUT LONG-TERM CURRENT USE OF INSULIN: Primary | ICD-10-CM

## 2022-03-23 DIAGNOSIS — E03.9 ACQUIRED HYPOTHYROIDISM: ICD-10-CM

## 2022-03-23 DIAGNOSIS — E55.9 VITAMIN D DEFICIENCY: ICD-10-CM

## 2022-03-23 DIAGNOSIS — E78.49 OTHER HYPERLIPIDEMIA: ICD-10-CM

## 2022-03-23 DIAGNOSIS — Z12.31 ENCOUNTER FOR SCREENING MAMMOGRAM FOR BREAST CANCER: ICD-10-CM

## 2022-03-23 DIAGNOSIS — K21.9 GASTROESOPHAGEAL REFLUX DISEASE, UNSPECIFIED WHETHER ESOPHAGITIS PRESENT: ICD-10-CM

## 2022-03-23 DIAGNOSIS — I10 ESSENTIAL HYPERTENSION: ICD-10-CM

## 2022-03-23 LAB
25(OH)D3 SERPL-MCNC: 18.5 NG/ML (ref 30–100)
A/C: NORMAL
CHOLEST SERPL-MCNC: 186 MG/DL (ref 0–200)
EXPIRATION DATE: NORMAL
HDLC SERPL-MCNC: 71 MG/DL (ref 40–60)
LDLC SERPL CALC-MCNC: 103 MG/DL (ref 0–100)
LDLC/HDLC SERPL: 1.44 {RATIO}
Lab: NORMAL
POC CREATININE URINE: NORMAL
POC MICROALBUMIN URINE: NORMAL
T4 SERPL-MCNC: 8.08 MCG/DL (ref 4.5–11.7)
TRIGL SERPL-MCNC: 65 MG/DL (ref 0–150)
TSH SERPL DL<=0.05 MIU/L-ACNC: 2.22 UIU/ML (ref 0.27–4.2)
VLDLC SERPL-MCNC: 12 MG/DL (ref 5–40)

## 2022-03-23 PROCEDURE — 84443 ASSAY THYROID STIM HORMONE: CPT | Performed by: INTERNAL MEDICINE

## 2022-03-23 PROCEDURE — 99214 OFFICE O/P EST MOD 30 MIN: CPT | Performed by: INTERNAL MEDICINE

## 2022-03-23 PROCEDURE — 82306 VITAMIN D 25 HYDROXY: CPT | Performed by: INTERNAL MEDICINE

## 2022-03-23 PROCEDURE — 80061 LIPID PANEL: CPT | Performed by: INTERNAL MEDICINE

## 2022-03-23 PROCEDURE — 82044 UR ALBUMIN SEMIQUANTITATIVE: CPT | Performed by: INTERNAL MEDICINE

## 2022-03-23 PROCEDURE — 84436 ASSAY OF TOTAL THYROXINE: CPT | Performed by: INTERNAL MEDICINE

## 2022-03-23 RX ORDER — KETOROLAC TROMETHAMINE 10 MG/1
10 TABLET, FILM COATED ORAL EVERY 6 HOURS PRN
COMMUNITY
Start: 2022-03-21 | End: 2022-03-24

## 2022-03-23 NOTE — PROGRESS NOTES
Subjective       Ernestina Garrido is a 52 y.o. female.     Chief Complaint   Patient presents with   • Diabetes     Follow up fasting    • Abdominal Pain      ED follow up   GI appt 4/23/2022       History obtained from the patient.      History of Present Illness     Patient verbalized consent to the visit recording.    Ernestina Garrido is a 52-year-old female patient who presents after [facility] visits on 3/14/2022 and 3/20/2022 for abdominal pain and intractable vomiting.  She is noted to be down by 25 pounds which she attributes to her abdominal pain to include nausea and vomiting.  She was admitted overnight on 3/20/2022.  Records have been received and reviewed.  Labs were significant for an elevated white blood cell count (13.73 on 3/14/2022 and 11.19 on 3/20/2022).  CT angio chest/abdomen/pelvis, was negative with the exception of a 3.3 cm left ovarian cyst.  She was prescribed Toradol and Zofran, and instructed to continue taking her Omeprazole. She was also referred to Gastroenterology with an appointment on 4/29/2022.      The patient has Hydradenitis Suppurativa diagnosed in September 2020.  She was seeing a Dermatologist and was on Humira.  She states she now has a Dermatologist who has taken her off the Humira and is treating topically with improvement.     Primary Care Cardiac Diagnostic Constellation: The patient is here today for a follow-up visit.       Her Hypertension has been stable.   Medication(s): Lisinopril and HCTZ.  Her Hyperlipidemia has been unstable.    LDL goal is <70.  Her last LDL on 6/8/2021 was 88, TG 82.   Medication:  Atorvastatin.  Her Diabetes Mellitus Type 2 has been unstable.  Medication:  Januvia, Farxiga, Actos, Atorvastatin, and Lisinopril.   The patient is adherent with her medication regimen. She denies medication side effects.      Procedures:  The patient had a negative cardiac stress test on 6/7/2019.        Interval Events:   Last Hemoglobin A1c on 3/20/2022 at  [facility] was 10.3 which was improved from last check.  She states her blood sugar at home has been 220-240 fasting and < 200 postprandial.  She denies episodes of low blood sugar. 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  She does check her feet daily.       Symptoms:  Denies chest pain (other than chronic chest wall pain), shortness of breath, MAIN, orthopnea, PND, palpitations, syncope, lower extremity edema, claudication, lightheadedness, and dizziness..   Associated Symptoms: Weight down 25 pounds, unintentionally (decreased appetite), since 6/8/2021. Stable myalgias (legs) and arthralgias.  No fatigue, headache, polyuria, polydipsia, memory issues, concentration issues, or focal neurologic deficits.  Has stable numbness and tingling of the feet and the fingers, but pain or ulcers.      Lifestyle and Disease Management: The patient states she attempts to walk daily for 20 minutes, admitting she does not consume a healthy diet.  Tobacco Use: Former Smoker.      Chronic Pain Syndrome  follow-up: The patient is being seen for a routine clinic follow-up of Chronic Pain Syndrome, which is stable.  Interval Events: The patient is being seen by  Neurosurgery.    Symptoms:  stable low back pain, neck pain, and chest wall pain.  Medication:  Tizanidine and Gabapentin.  Off Celebrex.     Hypothyroidism Follow-Up: The patient is being seen for follow-up of Hypothyroidism, which is stable.    Interval Events: Labs at  3/20/2020 were significant for an elevated TSH (4.61).  Symptoms: Weight down 25  pounds, unintentionally (decreased appetite), since 6/8/2021. Stable cold intolerance.  Denies fatigue, heat intolerance, weakness, diarrhea, constipation, and dry skin.   Associated Symptoms: has paresthesias hands and feet, myalgias, and arthralgias.    Medications:  Levothyroxine (Synthroid).   The patient is adherent to her  medication regimen, and she denies medication side effects.      Vitamin D Deficiency Follow-Up: The patient is here for follow-up of Vitamin D Deficiency, which is unstable.   Interval Events: Vitamin D level on  6/8/2021 was 19.4.      Symptoms:  Stable myalgias, arthralgias and paresthesias. Has occasional loss of balance.  Denies fatigue and gait instability.    Medication: Vitamin D 3 1000 IU daily.     Situational Depression Initial Evaluation: The patient is here for follow-up of Situational Depression, which is resolved.  Comorbid Illness: Insomnia.  Interval Events: None.    Symptoms: She denies depression, anxiety, insomnia, anhedonia, memory loss, and concentration issues.    Associated Symptoms: No suicidal ideation or thoughts of self-harm.    Medication: None.       Current Outpatient Medications on File Prior to Visit   Medication Sig Dispense Refill   • atorvastatin (LIPITOR) 40 MG tablet Take 1 tablet by mouth Daily. 30 tablet 5   • Blood Glucose Monitoring Suppl (FREESTYLE FREEDOM LITE) w/Device kit Use twice daily   Dx  E11.9 1 each 0   • Dapagliflozin Propanediol (Farxiga) 10 MG tablet Take 10 mg by mouth Daily. 30 tablet 5   • escitalopram (Lexapro) 10 MG tablet Take 1 tablet by mouth Daily. 30 tablet 5   • gabapentin (NEURONTIN) 800 MG tablet 1 tablet 3 (Three) Times a Day.     • glucose blood (FREESTYLE LITE) test strip USE TO TEST BLOOD SUGAR TWO TIMES A DAY AS DIRECTED 100 each 3   • glucose monitor monitoring kit Check blood sugar daily, varying the time a day. 1 each 0   • hydroCHLOROthiazide (HYDRODIURIL) 50 MG tablet Take 1 tablet by mouth Daily. 30 tablet 5   • Januvia 100 MG tablet Take 1 tablet by mouth Daily. 30 tablet 5   • ketorolac (TORADOL) 10 MG tablet Take 10 mg by mouth Every 6 (Six) Hours As Needed.     • Lancets (FREESTYLE) lancets USE  LANCET  TO TEST TWICE  DAILY 100 each 11   • lisinopril (PRINIVIL,ZESTRIL) 20 MG tablet Take 1 tablet by mouth every night at bedtime. 30  tablet 5   • nystatin [id number] UNIT/GM powder      • omeprazole (priLOSEC) 40 MG capsule Take 1 capsule by mouth 2 (Two) Times a Day. 60 capsule 5   • pioglitazone (ACTOS) 45 MG tablet Take 1 tablet by mouth Daily. 30 tablet 5   • tiZANidine (ZANAFLEX) 4 MG tablet Take 1 tablet by mouth 3 (Three) Times a Day As Needed for Muscle Spasms. 90 tablet 1   • vitamin D (ERGOCALCIFEROL) 1.25 MG (48916 UT) capsule capsule TAKE ONE CAPSULE BY MOUTH ONCE WEEKLY 4 capsule 2   • [DISCONTINUED] levothyroxine (SYNTHROID, LEVOTHROID) 100 MCG tablet Take 1 tablet by mouth Daily. 30 tablet 4     No current facility-administered medications on file prior to visit.       Current outpatient and discharge medications have been reconciled for the patient.  Reviewed by: Stephanie Tanner MD        The following portions of the patient's history were reviewed and updated as appropriate: allergies, current medications, past family history, past medical history, past social history, past surgical history and problem list.    Review of Systems   Constitutional: Positive for unexpected weight change. Negative for fatigue.   Eyes: Negative for visual disturbance.   Respiratory: Negative for cough, shortness of breath and wheezing.    Cardiovascular: Positive for chest pain (chest wall). Negative for palpitations and leg swelling.        No MAIN, orthopnea, or claudication.   Gastrointestinal: Positive for abdominal pain, nausea and vomiting. Negative for blood in stool, constipation and diarrhea.        Denies melena.   Endocrine: Negative for polydipsia and polyuria.   Musculoskeletal: Positive for arthralgias, back pain, myalgias and neck pain.   Neurological: Positive for numbness. Negative for dizziness, syncope, light-headedness and headaches.        No memory issues.   Psychiatric/Behavioral: Negative for decreased concentration.         Objective       Blood pressure 110/62, pulse 80, temperature 98 °F (36.7 °C), temperature source Temporal,  resp. rate 20, weight 90.9 kg (200 lb 6 oz), not currently breastfeeding.  Body mass index is 31.62 kg/m².      Physical Exam  Vitals and nursing note reviewed.   Constitutional:       Appearance: She is well-developed. She is obese.   Neck:      Thyroid: No thyroid mass or thyromegaly.      Vascular: No carotid bruit.   Cardiovascular:      Rate and Rhythm: Normal rate and regular rhythm.      Pulses: Normal pulses.      Heart sounds: Normal heart sounds. No murmur heard.    No friction rub. No gallop.   Pulmonary:      Effort: Pulmonary effort is normal.      Breath sounds: Normal breath sounds.   Musculoskeletal:      Right lower leg: No edema.      Left lower leg: No edema.   Neurological:      Mental Status: She is alert.   Psychiatric:         Mood and Affect: Mood normal.       Results for orders placed or performed in visit on 03/23/22   POC Microalbumin    Specimen: Urine   Result Value Ref Range    Microalbumin, Urine 30 mg/L     Creatinine, Urine 100 mg/dL     A/C <30 mg/g     Lot Number [id number]     Expiration Date 01/31/2023        Assessment / Plan:  Diagnoses and all orders for this visit:    1. Type 2 diabetes mellitus with hyperglycemia, without long-term current use of insulin (HCC) (Primary)  -     POC Microalbumin  -     Lipid Panel    Continue current medication(s) as noted in the history of present illness.    2. Essential hypertension  -     Lipid Panel   Continue current medication(s) as noted in the history of present illness.    3. Other hyperlipidemia  -     Lipid Panel   Continue current medication(s) as noted in the history of present illness.    4. Acquired hypothyroidism  -     TSH  -     T4   Continue current medication(s) as noted in the history of present illness.    5. Chronic pain syndrome   Continue current medication(s) as noted in the history of present illness.   Follow up with  Neurosurgery.    6. Gastroesophageal reflux disease, unspecified whether esophagitis  present   Continue current medication(s) as noted in the history of present illness.   The patient was instructed to keep her GI appointment.    7. Vitamin D deficiency  -     Vitamin D 25 Hydroxy   Continue Vitamin D supplementation.    8. Encounter for screening mammogram for breast cancer  -     Mammo Screening Digital Tomosynthesis Bilateral With CAD; Future      Vaccines.   - The patient declines both the Influenza vaccine and COVID-19 booster today. Discussed with her the risks of declining secondary to her age and type II diabetes. The patient was informed she could be hospitalized and die from Influenza and/or COVID-19 infection.  Also discussed the importance of increased vaccination numbers to eradicate the COVID-19 virus.        Return in about 1 month (around 4/23/2022) for Annual physical, non-fasting.           Transcribed from ambient dictation for Stephanie Tanner MD by Navdeep Bowers.  03/23/22   11:51 EDT

## 2022-03-24 DIAGNOSIS — E03.9 ACQUIRED HYPOTHYROIDISM: ICD-10-CM

## 2022-03-24 PROBLEM — H11.132 CONJUNCTIVAL MELANOSIS OF LEFT EYE: Status: ACTIVE | Noted: 2017-12-07

## 2022-03-24 PROBLEM — H52.13 MYOPIA OF BOTH EYES: Status: ACTIVE | Noted: 2017-12-07

## 2022-03-24 RX ORDER — LEVOTHYROXINE SODIUM 0.1 MG/1
TABLET ORAL
Qty: 30 TABLET | Refills: 4 | Status: SHIPPED | OUTPATIENT
Start: 2022-03-24 | End: 2022-09-28

## 2022-05-05 ENCOUNTER — HOSPITAL ENCOUNTER (OUTPATIENT)
Dept: MAMMOGRAPHY | Facility: HOSPITAL | Age: 52
Discharge: HOME | End: 2022-05-05

## 2022-05-05 ENCOUNTER — TELEPHONE (OUTPATIENT)
Dept: INTERNAL MEDICINE | Facility: CLINIC | Age: 52
End: 2022-05-05

## 2022-05-05 DIAGNOSIS — Z12.31 ENCOUNTER FOR SCREENING MAMMOGRAM FOR BREAST CANCER: ICD-10-CM

## 2022-05-05 NOTE — TELEPHONE ENCOUNTER
Morgan please read and get information     There is a mammogram order from 3/24/22 for screening mammogram     Is she having any symptoms?   Please get more information   Where did she go to have her mammogram ?

## 2022-05-05 NOTE — TELEPHONE ENCOUNTER
PATIENT HAS CALLED AND STATED SHE WENT TO GET MAMMOGRAM TODAY BUT IT COULD NOT BE DONE BECAUSE SHE IS NEEDING A SCRIPT OR AN ORDER FOR A DIAGNOSTIC MAMMOGRAM.    CALL BACK NUMBER [phone]

## 2022-05-06 NOTE — TELEPHONE ENCOUNTER
2nd attempt to reach Ernestina    Morgan please read and get information      There is a mammogram order from 3/24/22 for screening mammogram      Is she having any symptoms?   Please get more information   Where did she go to have her mammogram ?

## 2022-05-09 ENCOUNTER — OFFICE VISIT (OUTPATIENT)
Dept: INTERNAL MEDICINE | Facility: CLINIC | Age: 52
End: 2022-05-09

## 2022-05-09 ENCOUNTER — HOSPITAL ENCOUNTER (OUTPATIENT)
Dept: GENERAL RADIOLOGY | Facility: HOSPITAL | Age: 52
Discharge: HOME | End: 2022-05-09
Admitting: INTERNAL MEDICINE

## 2022-05-09 VITALS
TEMPERATURE: 98.4 F | HEART RATE: 92 BPM | DIASTOLIC BLOOD PRESSURE: 72 MMHG | BODY MASS INDEX: 31.09 KG/M2 | SYSTOLIC BLOOD PRESSURE: 130 MMHG | RESPIRATION RATE: 20 BRPM | WEIGHT: 197 LBS

## 2022-05-09 DIAGNOSIS — M79.605 LEFT LEG PAIN: ICD-10-CM

## 2022-05-09 DIAGNOSIS — R22.42 LOCALIZED SWELLING OF LEFT LOWER LEG: ICD-10-CM

## 2022-05-09 DIAGNOSIS — M79.605 LEFT LEG PAIN: Primary | ICD-10-CM

## 2022-05-09 DIAGNOSIS — Z12.31 ENCOUNTER FOR SCREENING MAMMOGRAM FOR BREAST CANCER: Primary | ICD-10-CM

## 2022-05-09 PROCEDURE — 73590 X-RAY EXAM OF LOWER LEG: CPT

## 2022-05-09 PROCEDURE — 99213 OFFICE O/P EST LOW 20 MIN: CPT | Performed by: INTERNAL MEDICINE

## 2022-05-09 NOTE — TELEPHONE ENCOUNTER
Spoke with Ernestina   She states she went to get her mammogram and told them she gets chest pain from a previous surgery that has been on going for years.  She was told she had to get a diagnostic mammogram order.   She has an appt for follow up with Dr Tanner this afternoon and will discuss with DR Tanner then
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
## 2022-05-16 ENCOUNTER — TELEPHONE (OUTPATIENT)
Dept: INTERNAL MEDICINE | Facility: CLINIC | Age: 52
End: 2022-05-16

## 2022-05-16 ENCOUNTER — HOSPITAL ENCOUNTER (OUTPATIENT)
Dept: CARDIOLOGY | Facility: HOSPITAL | Age: 52
Discharge: HOME | End: 2022-05-16
Admitting: INTERNAL MEDICINE

## 2022-05-16 ENCOUNTER — OFFICE VISIT (OUTPATIENT)
Dept: INTERNAL MEDICINE | Facility: CLINIC | Age: 52
End: 2022-05-16

## 2022-05-16 VITALS
DIASTOLIC BLOOD PRESSURE: 80 MMHG | BODY MASS INDEX: 30.87 KG/M2 | TEMPERATURE: 98 F | RESPIRATION RATE: 20 BRPM | WEIGHT: 195.5 LBS | SYSTOLIC BLOOD PRESSURE: 140 MMHG | HEART RATE: 76 BPM

## 2022-05-16 VITALS — HEIGHT: 67 IN | WEIGHT: 197.09 LBS | BODY MASS INDEX: 30.93 KG/M2

## 2022-05-16 DIAGNOSIS — I82.452 ACUTE DEEP VEIN THROMBOSIS (DVT) OF LEFT PERONEAL VEIN: Primary | ICD-10-CM

## 2022-05-16 DIAGNOSIS — I10 ESSENTIAL HYPERTENSION: ICD-10-CM

## 2022-05-16 DIAGNOSIS — F43.21 SITUATIONAL DEPRESSION: ICD-10-CM

## 2022-05-16 DIAGNOSIS — R07.9 CHRONIC CHEST PAIN: ICD-10-CM

## 2022-05-16 DIAGNOSIS — G89.29 CHRONIC CHEST PAIN: ICD-10-CM

## 2022-05-16 DIAGNOSIS — E11.43 TYPE 2 DIABETES MELLITUS WITH DIABETIC AUTONOMIC NEUROPATHY, WITHOUT LONG-TERM CURRENT USE OF INSULIN: ICD-10-CM

## 2022-05-16 DIAGNOSIS — E78.49 OTHER HYPERLIPIDEMIA: ICD-10-CM

## 2022-05-16 DIAGNOSIS — M79.605 LEFT LEG PAIN: ICD-10-CM

## 2022-05-16 DIAGNOSIS — R22.42 LOCALIZED SWELLING OF LEFT LOWER LEG: ICD-10-CM

## 2022-05-16 PROBLEM — I82.402 ACUTE DEEP VEIN THROMBOSIS (DVT) OF LEFT LOWER EXTREMITY (HCC): Status: ACTIVE | Noted: 2022-05-16

## 2022-05-16 LAB
BH CV LOWER VASCULAR LEFT COMMON FEMORAL AUGMENT: NORMAL
BH CV LOWER VASCULAR LEFT COMMON FEMORAL COMPRESS: NORMAL
BH CV LOWER VASCULAR LEFT COMMON FEMORAL PHASIC: NORMAL
BH CV LOWER VASCULAR LEFT COMMON FEMORAL SPONT: NORMAL
BH CV LOWER VASCULAR LEFT DISTAL FEMORAL AUGMENT: NORMAL
BH CV LOWER VASCULAR LEFT DISTAL FEMORAL COMPRESS: NORMAL
BH CV LOWER VASCULAR LEFT DISTAL FEMORAL PHASIC: NORMAL
BH CV LOWER VASCULAR LEFT DISTAL FEMORAL SPONT: NORMAL
BH CV LOWER VASCULAR LEFT GASTRONEMIUS COMPRESS: NORMAL
BH CV LOWER VASCULAR LEFT GREATER SAPH AK COMPRESS: NORMAL
BH CV LOWER VASCULAR LEFT GREATER SAPH BK COMPRESS: NORMAL
BH CV LOWER VASCULAR LEFT LESSER SAPH COMPRESS: NORMAL
BH CV LOWER VASCULAR LEFT MID FEMORAL AUGMENT: NORMAL
BH CV LOWER VASCULAR LEFT MID FEMORAL COMPRESS: NORMAL
BH CV LOWER VASCULAR LEFT MID FEMORAL PHASIC: NORMAL
BH CV LOWER VASCULAR LEFT MID FEMORAL SPONT: NORMAL
BH CV LOWER VASCULAR LEFT PERONEAL COMPRESS: NORMAL
BH CV LOWER VASCULAR LEFT POPLITEAL AUGMENT: NORMAL
BH CV LOWER VASCULAR LEFT POPLITEAL COMPRESS: NORMAL
BH CV LOWER VASCULAR LEFT POPLITEAL PHASIC: NORMAL
BH CV LOWER VASCULAR LEFT POPLITEAL SPONT: NORMAL
BH CV LOWER VASCULAR LEFT POSTERIOR TIBIAL COMPRESS: NORMAL
BH CV LOWER VASCULAR LEFT PROFUNDA FEMORAL AUGMENT: NORMAL
BH CV LOWER VASCULAR LEFT PROFUNDA FEMORAL COMPRESS: NORMAL
BH CV LOWER VASCULAR LEFT PROFUNDA FEMORAL PHASIC: NORMAL
BH CV LOWER VASCULAR LEFT PROFUNDA FEMORAL SPONT: NORMAL
BH CV LOWER VASCULAR LEFT PROXIMAL FEMORAL AUGMENT: NORMAL
BH CV LOWER VASCULAR LEFT PROXIMAL FEMORAL COMPRESS: NORMAL
BH CV LOWER VASCULAR LEFT PROXIMAL FEMORAL PHASIC: NORMAL
BH CV LOWER VASCULAR LEFT PROXIMAL FEMORAL SPONT: NORMAL
BH CV LOWER VASCULAR LEFT SAPHENOFEMORAL JUNCTION AUGMENT: NORMAL
BH CV LOWER VASCULAR LEFT SAPHENOFEMORAL JUNCTION COMPRESS: NORMAL
BH CV LOWER VASCULAR LEFT SAPHENOFEMORAL JUNCTION PHASIC: NORMAL
BH CV LOWER VASCULAR LEFT SAPHENOFEMORAL JUNCTION SPONT: NORMAL
BH CV LOWER VASCULAR RIGHT COMMON FEMORAL AUGMENT: NORMAL
BH CV LOWER VASCULAR RIGHT COMMON FEMORAL COMPETENT: NORMAL
BH CV LOWER VASCULAR RIGHT COMMON FEMORAL COMPRESS: NORMAL
BH CV LOWER VASCULAR RIGHT COMMON FEMORAL PHASIC: NORMAL
BH CV LOWER VASCULAR RIGHT COMMON FEMORAL SPONT: NORMAL
MAXIMAL PREDICTED HEART RATE: 169 BPM
STRESS TARGET HR: 144 BPM

## 2022-05-16 PROCEDURE — 93971 EXTREMITY STUDY: CPT

## 2022-05-16 PROCEDURE — 99214 OFFICE O/P EST MOD 30 MIN: CPT | Performed by: INTERNAL MEDICINE

## 2022-05-16 PROCEDURE — 93971 EXTREMITY STUDY: CPT | Performed by: INTERNAL MEDICINE

## 2022-05-16 RX ORDER — ATORVASTATIN CALCIUM 40 MG/1
TABLET, FILM COATED ORAL
Qty: 30 TABLET | Refills: 5 | Status: SHIPPED | OUTPATIENT
Start: 2022-05-16 | End: 2022-05-26 | Stop reason: HOSPADM

## 2022-05-16 RX ORDER — ESCITALOPRAM OXALATE 10 MG/1
TABLET ORAL
Qty: 30 TABLET | Refills: 5 | Status: SHIPPED | OUTPATIENT
Start: 2022-05-16 | End: 2022-07-19 | Stop reason: SDUPTHER

## 2022-05-16 RX ORDER — HYDROCHLOROTHIAZIDE 50 MG/1
TABLET ORAL
Qty: 30 TABLET | Refills: 5 | Status: SHIPPED | OUTPATIENT
Start: 2022-05-16 | End: 2022-10-18 | Stop reason: SDUPTHER

## 2022-05-16 RX ORDER — SITAGLIPTIN 100 MG/1
TABLET, FILM COATED ORAL
Qty: 30 TABLET | Refills: 5 | Status: SHIPPED | OUTPATIENT
Start: 2022-05-16 | End: 2022-12-29

## 2022-05-16 RX ORDER — LISINOPRIL 20 MG/1
TABLET ORAL
Qty: 30 TABLET | Refills: 5 | Status: SHIPPED | OUTPATIENT
Start: 2022-05-16 | End: 2022-12-29

## 2022-05-16 NOTE — PATIENT INSTRUCTIONS
Discontinue Diclofenac.  No NSAIDS (Ibuprofen or Aleve), but may take Tylenol for pain.  Continue leg elevation.

## 2022-05-16 NOTE — TELEPHONE ENCOUNTER
Spoke with Bernard  At vasular lab  There was a Left perineal vein that is very painful and in the proximal segment .    Ernestina states she has noticed it before now.    Spoke with Dr Tanner   She wanted her come over to be seen now.  Bernard notified and she will tell Ernestina

## 2022-05-16 NOTE — PROGRESS NOTES
Subjective       Ernestina Garrido is a 52 y.o. female.     Chief Complaint   Patient presents with   • blood clot       History obtained from the patient.      History of Present Illness     The patient was seen on 5/9/2022 for left lower extremity swelling and pain.  She had a left lower extremity venous ultrasound today and the preliminary report indicated the left peroneal vein was partly compressible, ? acute versus chronic.  This is a new problem.  The patient states the pain and swelling is unchanged from last visit, and worse at night.  She is still having tingly needle pain feelings,  which started in her left lower extremity and progress to different parts of her body.  She has been taking Diclofenac without relief.  She does get some relief with heat and elevation.  She has her usual chest pain, unchanged.  She denies shortness of breath, MAIN, fever, and cough.    The following portions of the patient's history were reviewed and updated as appropriate: allergies, current medications, past family history, past medical history, past social history, past surgical history and problem list.      Review of Systems   Constitutional: Negative for fever.   Respiratory: Negative for cough, shortness of breath and wheezing.    Cardiovascular: Positive for chest pain and leg swelling.   Skin: Negative for rash.           Objective     Blood pressure 140/80, pulse 76, temperature 98 °F (36.7 °C), temperature source Temporal, resp. rate 20, weight 88.7 kg (195 lb 8 oz), not currently breastfeeding.    Physical Exam  Vitals and nursing note reviewed.   Constitutional:       Appearance: She is obese.   Cardiovascular:      Rate and Rhythm: Normal rate and regular rhythm.      Heart sounds: Normal heart sounds. No murmur heard.  Pulmonary:      Effort: Pulmonary effort is normal.      Breath sounds: Normal breath sounds.   Musculoskeletal:      Left lower leg: Edema (1+ non-pitting) present.      Comments: There is  diffuse tenderness to palpation over the left lower extremity.   Skin:     Findings: No rash.   Psychiatric:         Mood and Affect: Mood normal.           Assessment & Plan   Diagnoses and all orders for this visit:    1. Acute deep vein thrombosis (DVT) of left peroneal vein (HCC) (Primary)  -     apixaban (ELIQUIS) 5 MG tablet tablet; 2 tabs po BID x 1 week, then 1 tab BID  Dispense: 50 tablet; Refill: 0  -     CT Angiogram Chest With & Without Contrast; Future   Discontinue Diclofenac.  No NSAIDS (Ibuprofen or Aleve), but may take Tylenol for pain.     Continue leg elevation.    2. Chronic chest pain  -     CT Angiogram Chest With & Without Contrast; Future        Return in about 1 month (around 6/16/2022) for Recheck- Diabetes and LLE DVT, fasting.

## 2022-05-16 NOTE — TELEPHONE ENCOUNTER
Bernard with [facility] called. She needed to report the patient has Paraneal vain chronic verses acute thromboses.     Bernard can be reached at [phone].

## 2022-05-25 ENCOUNTER — APPOINTMENT (OUTPATIENT)
Dept: CARDIOLOGY | Facility: HOSPITAL | Age: 52
End: 2022-05-25

## 2022-05-25 ENCOUNTER — APPOINTMENT (OUTPATIENT)
Dept: MRI IMAGING | Facility: HOSPITAL | Age: 52
End: 2022-05-25

## 2022-05-25 ENCOUNTER — APPOINTMENT (OUTPATIENT)
Dept: CT IMAGING | Facility: HOSPITAL | Age: 52
End: 2022-05-25

## 2022-05-25 ENCOUNTER — APPOINTMENT (OUTPATIENT)
Dept: GENERAL RADIOLOGY | Facility: HOSPITAL | Age: 52
End: 2022-05-25

## 2022-05-25 ENCOUNTER — HOSPITAL ENCOUNTER (INPATIENT)
Facility: HOSPITAL | Age: 52
LOS: 1 days | Discharge: HOME | End: 2022-05-26
Attending: STUDENT IN AN ORGANIZED HEALTH CARE EDUCATION/TRAINING PROGRAM | Admitting: STUDENT IN AN ORGANIZED HEALTH CARE EDUCATION/TRAINING PROGRAM

## 2022-05-25 DIAGNOSIS — R53.1 ACUTE LEFT-SIDED WEAKNESS: Primary | ICD-10-CM

## 2022-05-25 DIAGNOSIS — G44.53 PRIMARY THUNDERCLAP HEADACHE: ICD-10-CM

## 2022-05-25 DIAGNOSIS — R29.810 FACIAL DROOP: ICD-10-CM

## 2022-05-25 DIAGNOSIS — U07.1 COVID-19 VIRUS DETECTED: ICD-10-CM

## 2022-05-25 PROBLEM — E87.6 HYPOKALEMIA: Status: ACTIVE | Noted: 2022-05-25

## 2022-05-25 PROBLEM — F12.10 TETRAHYDROCANNABINOL (THC) USE DISORDER, MILD, ABUSE: Status: ACTIVE | Noted: 2022-05-25

## 2022-05-25 PROBLEM — E83.42 HYPOMAGNESEMIA: Status: ACTIVE | Noted: 2022-05-25

## 2022-05-25 PROBLEM — Z86.718 HISTORY OF DVT (DEEP VEIN THROMBOSIS): Status: ACTIVE | Noted: 2022-05-25

## 2022-05-25 LAB
ALBUMIN SERPL-MCNC: 4.4 G/DL (ref 3.5–5.2)
ALBUMIN/GLOB SERPL: 1.5 G/DL
ALP SERPL-CCNC: 76 U/L (ref 39–117)
ALT SERPL W P-5'-P-CCNC: 7 U/L (ref 1–33)
AMPHET+METHAMPHET UR QL: NEGATIVE
AMPHETAMINES UR QL: NEGATIVE
ANION GAP SERPL CALCULATED.3IONS-SCNC: 11 MMOL/L (ref 5–15)
APAP SERPL-MCNC: <5 MCG/ML (ref 0–30)
AST SERPL-CCNC: 9 U/L (ref 1–32)
B PARAPERT DNA SPEC QL NAA+PROBE: NOT DETECTED
B PERT DNA SPEC QL NAA+PROBE: NOT DETECTED
BARBITURATES UR QL SCN: NEGATIVE
BASOPHILS # BLD AUTO: 0.03 10*3/MM3 (ref 0–0.2)
BASOPHILS NFR BLD AUTO: 0.3 % (ref 0–1.5)
BENZODIAZ UR QL SCN: NEGATIVE
BH CV ECHO MEAS - AO MAX PG: 6.6 MMHG
BH CV ECHO MEAS - AO MEAN PG: 3.6 MMHG
BH CV ECHO MEAS - AO ROOT DIAM: 3.3 CM
BH CV ECHO MEAS - AO V2 MAX: 128.3 CM/SEC
BH CV ECHO MEAS - AO V2 VTI: 27.1 CM
BH CV ECHO MEAS - AVA(I,D): 2.6 CM2
BH CV ECHO MEAS - EDV(CUBED): 139.5 ML
BH CV ECHO MEAS - EDV(MOD-SP2): 142 ML
BH CV ECHO MEAS - EDV(MOD-SP4): 119 ML
BH CV ECHO MEAS - EF(MOD-BP): 61 %
BH CV ECHO MEAS - EF(MOD-SP2): 61.3 %
BH CV ECHO MEAS - EF(MOD-SP4): 60.5 %
BH CV ECHO MEAS - ESV(CUBED): 33.6 ML
BH CV ECHO MEAS - ESV(MOD-SP2): 55 ML
BH CV ECHO MEAS - ESV(MOD-SP4): 47 ML
BH CV ECHO MEAS - FS: 37.8 %
BH CV ECHO MEAS - IVS/LVPW: 1.47 CM
BH CV ECHO MEAS - IVSD: 1 CM
BH CV ECHO MEAS - LA DIMENSION: 4.6 CM
BH CV ECHO MEAS - LAT PEAK E' VEL: 9.7 CM/SEC
BH CV ECHO MEAS - LV MASS(C)D: 153.2 GRAMS
BH CV ECHO MEAS - LV MAX PG: 4.3 MMHG
BH CV ECHO MEAS - LV MEAN PG: 2.19 MMHG
BH CV ECHO MEAS - LV V1 MAX: 103.7 CM/SEC
BH CV ECHO MEAS - LV V1 VTI: 22.9 CM
BH CV ECHO MEAS - LVIDD: 5.2 CM
BH CV ECHO MEAS - LVIDS: 3.2 CM
BH CV ECHO MEAS - LVOT AREA: 3.1 CM2
BH CV ECHO MEAS - LVOT DIAM: 1.99 CM
BH CV ECHO MEAS - LVPWD: 0.68 CM
BH CV ECHO MEAS - MED PEAK E' VEL: 7.13 CM/SEC
BH CV ECHO MEAS - MV A MAX VEL: 115 CM/SEC
BH CV ECHO MEAS - MV DEC SLOPE: 498.5 CM/SEC2
BH CV ECHO MEAS - MV DEC TIME: 0.28 MSEC
BH CV ECHO MEAS - MV E MAX VEL: 85.4 CM/SEC
BH CV ECHO MEAS - MV E/A: 0.74
BH CV ECHO MEAS - MV MAX PG: 6.8 MMHG
BH CV ECHO MEAS - MV MEAN PG: ABNORMAL MMHG
BH CV ECHO MEAS - MV P1/2T: 72.1 MSEC
BH CV ECHO MEAS - MV V2 VTI: ABNORMAL CM
BH CV ECHO MEAS - MVA(P1/2T): 3.1 CM2
BH CV ECHO MEAS - PA ACC SLOPE: 997.8 CM/SEC2
BH CV ECHO MEAS - PA ACC TIME: 0.06 SEC
BH CV ECHO MEAS - PA PR(ACCEL): 52.1 MMHG
BH CV ECHO MEAS - PI END-D VEL: 111.3 CM/SEC
BH CV ECHO MEAS - RAP SYSTOLE: 3 MMHG
BH CV ECHO MEAS - RVSP: 23 MMHG
BH CV ECHO MEAS - SV(LVOT): 71.4 ML
BH CV ECHO MEAS - SV(MOD-SP2): 87 ML
BH CV ECHO MEAS - SV(MOD-SP4): 72 ML
BH CV ECHO MEAS - TAPSE (>1.6): 2.2 CM
BH CV ECHO MEAS - TR MAX PG: 20 MMHG
BH CV ECHO MEAS - TR MAX VEL: 202.2 CM/SEC
BH CV ECHO MEASUREMENTS AVERAGE E/E' RATIO: 10.15
BH CV XLRA - RV BASE: 4 CM
BH CV XLRA - RV LENGTH: 6.5 CM
BH CV XLRA - RV MID: 2.4 CM
BH CV XLRA - TDI S': 12.5 CM/SEC
BILIRUB SERPL-MCNC: 0.7 MG/DL (ref 0–1.2)
BILIRUB UR QL STRIP: NEGATIVE
BUN BLDA-MCNC: 13 MG/DL (ref 8–26)
BUN SERPL-MCNC: 12 MG/DL (ref 6–20)
BUN/CREAT SERPL: 16.2 (ref 7–25)
BUPRENORPHINE SERPL-MCNC: NEGATIVE NG/ML
C PNEUM DNA NPH QL NAA+NON-PROBE: NOT DETECTED
CA-I BLDA-SCNC: 1.15 MMOL/L (ref 1.2–1.32)
CALCIUM SPEC-SCNC: 9.7 MG/DL (ref 8.6–10.5)
CANNABINOIDS SERPL QL: POSITIVE
CHLORIDE BLDA-SCNC: 96 MMOL/L (ref 98–109)
CHLORIDE SERPL-SCNC: 98 MMOL/L (ref 98–107)
CHOLEST SERPL-MCNC: 204 MG/DL (ref 0–200)
CLARITY UR: CLEAR
CO2 BLDA-SCNC: 30 MMOL/L (ref 24–29)
CO2 SERPL-SCNC: 30 MMOL/L (ref 22–29)
COCAINE UR QL: NEGATIVE
COLOR UR: YELLOW
CREAT BLDA-MCNC: 0.7 MG/DL (ref 0.6–1.3)
CREAT SERPL-MCNC: 0.74 MG/DL (ref 0.57–1)
CRP SERPL-MCNC: <0.3 MG/DL (ref 0–0.5)
CRP SERPL-MCNC: <0.3 MG/DL (ref 0–0.5)
D DIMER PPP FEU-MCNC: 0.34 MCGFEU/ML (ref 0.01–0.5)
D-LACTATE SERPL-SCNC: 1.5 MMOL/L (ref 0.5–2)
DEPRECATED RDW RBC AUTO: 44.4 FL (ref 37–54)
EGFRCR SERPLBLD CKD-EPI 2021: 104.9 ML/MIN/1.73
EGFRCR SERPLBLD CKD-EPI 2021: 98.1 ML/MIN/1.73
EOSINOPHIL # BLD AUTO: 0.22 10*3/MM3 (ref 0–0.4)
EOSINOPHIL NFR BLD AUTO: 2.1 % (ref 0.3–6.2)
ERYTHROCYTE [DISTWIDTH] IN BLOOD BY AUTOMATED COUNT: 13 % (ref 12.3–15.4)
ERYTHROCYTE [SEDIMENTATION RATE] IN BLOOD: 47 MM/HR (ref 0–30)
ETHANOL BLD-MCNC: <10 MG/DL (ref 0–10)
FERRITIN SERPL-MCNC: 295.7 NG/ML (ref 13–150)
FLUAV RNA RESP QL NAA+PROBE: NOT DETECTED
FLUAV SUBTYP SPEC NAA+PROBE: NOT DETECTED
FLUBV RNA ISLT QL NAA+PROBE: NOT DETECTED
FLUBV RNA RESP QL NAA+PROBE: NOT DETECTED
GLOBULIN UR ELPH-MCNC: 2.9 GM/DL
GLUCOSE BLDC GLUCOMTR-MCNC: 205 MG/DL (ref 70–130)
GLUCOSE BLDC GLUCOMTR-MCNC: 218 MG/DL (ref 70–130)
GLUCOSE BLDC GLUCOMTR-MCNC: 236 MG/DL (ref 70–130)
GLUCOSE SERPL-MCNC: 233 MG/DL (ref 65–99)
GLUCOSE UR STRIP-MCNC: ABNORMAL MG/DL
HADV DNA SPEC NAA+PROBE: NOT DETECTED
HBA1C MFR BLD: 9.6 % (ref 4.8–5.6)
HCOV 229E RNA SPEC QL NAA+PROBE: NOT DETECTED
HCOV HKU1 RNA SPEC QL NAA+PROBE: NOT DETECTED
HCOV NL63 RNA SPEC QL NAA+PROBE: NOT DETECTED
HCOV OC43 RNA SPEC QL NAA+PROBE: NOT DETECTED
HCT VFR BLD AUTO: 43.4 % (ref 34–46.6)
HCT VFR BLDA CALC: 45 % (ref 38–51)
HDLC SERPL-MCNC: 67 MG/DL (ref 40–60)
HGB BLD-MCNC: 14.4 G/DL (ref 12–15.9)
HGB BLDA-MCNC: 15.3 G/DL (ref 12–17)
HGB UR QL STRIP.AUTO: NEGATIVE
HMPV RNA NPH QL NAA+NON-PROBE: NOT DETECTED
HOLD SPECIMEN: NORMAL
HPIV1 RNA ISLT QL NAA+PROBE: NOT DETECTED
HPIV2 RNA SPEC QL NAA+PROBE: NOT DETECTED
HPIV3 RNA NPH QL NAA+PROBE: NOT DETECTED
HPIV4 P GENE NPH QL NAA+PROBE: NOT DETECTED
IMM GRANULOCYTES # BLD AUTO: 0.04 10*3/MM3 (ref 0–0.05)
IMM GRANULOCYTES NFR BLD AUTO: 0.4 % (ref 0–0.5)
KETONES UR QL STRIP: NEGATIVE
LDH SERPL-CCNC: 224 U/L (ref 135–214)
LDLC SERPL CALC-MCNC: 117 MG/DL (ref 0–100)
LDLC/HDLC SERPL: 1.71 {RATIO}
LEFT ATRIUM VOLUME INDEX: 39.7 ML/M2
LEUKOCYTE ESTERASE UR QL STRIP.AUTO: NEGATIVE
LV EF 2D ECHO EST: 65 %
LYMPHOCYTES # BLD AUTO: 3.71 10*3/MM3 (ref 0.7–3.1)
LYMPHOCYTES NFR BLD AUTO: 35.1 % (ref 19.6–45.3)
M PNEUMO IGG SER IA-ACNC: NOT DETECTED
MAGNESIUM SERPL-MCNC: 1.6 MG/DL (ref 1.6–2.6)
MAXIMAL PREDICTED HEART RATE: 169 BPM
MCH RBC QN AUTO: 30.9 PG (ref 26.6–33)
MCHC RBC AUTO-ENTMCNC: 33.2 G/DL (ref 31.5–35.7)
MCV RBC AUTO: 93.1 FL (ref 79–97)
METHADONE UR QL SCN: NEGATIVE
MONOCYTES # BLD AUTO: 0.78 10*3/MM3 (ref 0.1–0.9)
MONOCYTES NFR BLD AUTO: 7.4 % (ref 5–12)
NEUTROPHILS NFR BLD AUTO: 5.79 10*3/MM3 (ref 1.7–7)
NEUTROPHILS NFR BLD AUTO: 54.7 % (ref 42.7–76)
NITRITE UR QL STRIP: NEGATIVE
NRBC BLD AUTO-RTO: 0 /100 WBC (ref 0–0.2)
NT-PROBNP SERPL-MCNC: 225.4 PG/ML (ref 0–900)
OPIATES UR QL: NEGATIVE
OXYCODONE UR QL SCN: NEGATIVE
PCP UR QL SCN: NEGATIVE
PH UR STRIP.AUTO: 7.5 [PH] (ref 5–8)
PLATELET # BLD AUTO: 179 10*3/MM3 (ref 140–450)
PMV BLD AUTO: 11.2 FL (ref 6–12)
POTASSIUM BLDA-SCNC: 3.3 MMOL/L (ref 3.5–4.9)
POTASSIUM SERPL-SCNC: 3.4 MMOL/L (ref 3.5–5.2)
PROPOXYPH UR QL: NEGATIVE
PROT SERPL-MCNC: 7.3 G/DL (ref 6–8.5)
PROT UR QL STRIP: NEGATIVE
PTH-INTACT SERPL-MCNC: 71 PG/ML (ref 15–65)
RBC # BLD AUTO: 4.66 10*6/MM3 (ref 3.77–5.28)
RHINOVIRUS RNA SPEC NAA+PROBE: NOT DETECTED
RSV RNA NPH QL NAA+NON-PROBE: NOT DETECTED
RSV RNA NPH QL NAA+NON-PROBE: NOT DETECTED
SALICYLATES SERPL-MCNC: <0.3 MG/DL
SARS-COV-2 RDRP RESP QL NAA+PROBE: NORMAL
SARS-COV-2 RNA NPH QL NAA+NON-PROBE: NOT DETECTED
SARS-COV-2 RNA RESP QL NAA+PROBE: DETECTED
SODIUM BLD-SCNC: 139 MMOL/L (ref 138–146)
SODIUM SERPL-SCNC: 139 MMOL/L (ref 136–145)
SP GR UR STRIP: >=1.099 (ref 1–1.03)
STRESS TARGET HR: 144 BPM
T4 FREE SERPL-MCNC: 1.3 NG/DL (ref 0.93–1.7)
TRICYCLICS UR QL SCN: NEGATIVE
TRIGL SERPL-MCNC: 112 MG/DL (ref 0–150)
TROPONIN T SERPL-MCNC: <0.01 NG/ML (ref 0–0.03)
TSH SERPL DL<=0.05 MIU/L-ACNC: 3.15 UIU/ML (ref 0.27–4.2)
UROBILINOGEN UR QL STRIP: ABNORMAL
VLDLC SERPL-MCNC: 20 MG/DL (ref 5–40)
WBC NRBC COR # BLD: 10.57 10*3/MM3 (ref 3.4–10.8)
WHOLE BLOOD HOLD COAG: NORMAL
WHOLE BLOOD HOLD SPECIMEN: NORMAL

## 2022-05-25 PROCEDURE — 80179 DRUG ASSAY SALICYLATE: CPT | Performed by: STUDENT IN AN ORGANIZED HEALTH CARE EDUCATION/TRAINING PROGRAM

## 2022-05-25 PROCEDURE — 99222 1ST HOSP IP/OBS MODERATE 55: CPT | Performed by: PSYCHIATRY & NEUROLOGY

## 2022-05-25 PROCEDURE — 71275 CT ANGIOGRAPHY CHEST: CPT

## 2022-05-25 PROCEDURE — 80143 DRUG ASSAY ACETAMINOPHEN: CPT | Performed by: STUDENT IN AN ORGANIZED HEALTH CARE EDUCATION/TRAINING PROGRAM

## 2022-05-25 PROCEDURE — 85014 HEMATOCRIT: CPT

## 2022-05-25 PROCEDURE — 80306 DRUG TEST PRSMV INSTRMNT: CPT | Performed by: STUDENT IN AN ORGANIZED HEALTH CARE EDUCATION/TRAINING PROGRAM

## 2022-05-25 PROCEDURE — 80047 BASIC METABLC PNL IONIZED CA: CPT

## 2022-05-25 PROCEDURE — 93306 TTE W/DOPPLER COMPLETE: CPT | Performed by: INTERNAL MEDICINE

## 2022-05-25 PROCEDURE — 70496 CT ANGIOGRAPHY HEAD: CPT

## 2022-05-25 PROCEDURE — 0202U NFCT DS 22 TRGT SARS-COV-2: CPT | Performed by: FAMILY MEDICINE

## 2022-05-25 PROCEDURE — 25010000002 DIPHENHYDRAMINE PER 50 MG: Performed by: STUDENT IN AN ORGANIZED HEALTH CARE EDUCATION/TRAINING PROGRAM

## 2022-05-25 PROCEDURE — 99223 1ST HOSP IP/OBS HIGH 75: CPT | Performed by: FAMILY MEDICINE

## 2022-05-25 PROCEDURE — 85652 RBC SED RATE AUTOMATED: CPT | Performed by: FAMILY MEDICINE

## 2022-05-25 PROCEDURE — 83880 ASSAY OF NATRIURETIC PEPTIDE: CPT | Performed by: STUDENT IN AN ORGANIZED HEALTH CARE EDUCATION/TRAINING PROGRAM

## 2022-05-25 PROCEDURE — 84484 ASSAY OF TROPONIN QUANT: CPT | Performed by: STUDENT IN AN ORGANIZED HEALTH CARE EDUCATION/TRAINING PROGRAM

## 2022-05-25 PROCEDURE — 87637 SARSCOV2&INF A&B&RSV AMP PRB: CPT | Performed by: STUDENT IN AN ORGANIZED HEALTH CARE EDUCATION/TRAINING PROGRAM

## 2022-05-25 PROCEDURE — 25010000002 DEXAMETHASONE PER 1 MG: Performed by: FAMILY MEDICINE

## 2022-05-25 PROCEDURE — 63710000001 INSULIN LISPRO (HUMAN) PER 5 UNITS: Performed by: PHYSICIAN ASSISTANT

## 2022-05-25 PROCEDURE — 80050 GENERAL HEALTH PANEL: CPT | Performed by: STUDENT IN AN ORGANIZED HEALTH CARE EDUCATION/TRAINING PROGRAM

## 2022-05-25 PROCEDURE — 87040 BLOOD CULTURE FOR BACTERIA: CPT | Performed by: FAMILY MEDICINE

## 2022-05-25 PROCEDURE — 25010000002 PROCHLORPERAZINE 10 MG/2ML SOLUTION: Performed by: STUDENT IN AN ORGANIZED HEALTH CARE EDUCATION/TRAINING PROGRAM

## 2022-05-25 PROCEDURE — 93306 TTE W/DOPPLER COMPLETE: CPT

## 2022-05-25 PROCEDURE — 0 IOPAMIDOL PER 1 ML: Performed by: STUDENT IN AN ORGANIZED HEALTH CARE EDUCATION/TRAINING PROGRAM

## 2022-05-25 PROCEDURE — 82077 ASSAY SPEC XCP UR&BREATH IA: CPT | Performed by: STUDENT IN AN ORGANIZED HEALTH CARE EDUCATION/TRAINING PROGRAM

## 2022-05-25 PROCEDURE — 70551 MRI BRAIN STEM W/O DYE: CPT

## 2022-05-25 PROCEDURE — 93005 ELECTROCARDIOGRAM TRACING: CPT | Performed by: STUDENT IN AN ORGANIZED HEALTH CARE EDUCATION/TRAINING PROGRAM

## 2022-05-25 PROCEDURE — 85379 FIBRIN DEGRADATION QUANT: CPT | Performed by: FAMILY MEDICINE

## 2022-05-25 PROCEDURE — 84439 ASSAY OF FREE THYROXINE: CPT | Performed by: STUDENT IN AN ORGANIZED HEALTH CARE EDUCATION/TRAINING PROGRAM

## 2022-05-25 PROCEDURE — 86140 C-REACTIVE PROTEIN: CPT

## 2022-05-25 PROCEDURE — 4A03X5D MEASUREMENT OF ARTERIAL FLOW, INTRACRANIAL, EXTERNAL APPROACH: ICD-10-PCS | Performed by: RADIOLOGY

## 2022-05-25 PROCEDURE — 93005 ELECTROCARDIOGRAM TRACING: CPT

## 2022-05-25 PROCEDURE — 70498 CT ANGIOGRAPHY NECK: CPT

## 2022-05-25 PROCEDURE — 25010000002 LORAZEPAM PER 2 MG: Performed by: PSYCHIATRY & NEUROLOGY

## 2022-05-25 PROCEDURE — 83970 ASSAY OF PARATHORMONE: CPT | Performed by: PHYSICIAN ASSISTANT

## 2022-05-25 PROCEDURE — 87635 SARS-COV-2 COVID-19 AMP PRB: CPT | Performed by: INTERNAL MEDICINE

## 2022-05-25 PROCEDURE — 81003 URINALYSIS AUTO W/O SCOPE: CPT | Performed by: STUDENT IN AN ORGANIZED HEALTH CARE EDUCATION/TRAINING PROGRAM

## 2022-05-25 PROCEDURE — 70450 CT HEAD/BRAIN W/O DYE: CPT

## 2022-05-25 PROCEDURE — 36415 COLL VENOUS BLD VENIPUNCTURE: CPT

## 2022-05-25 PROCEDURE — 83605 ASSAY OF LACTIC ACID: CPT | Performed by: STUDENT IN AN ORGANIZED HEALTH CARE EDUCATION/TRAINING PROGRAM

## 2022-05-25 PROCEDURE — 86140 C-REACTIVE PROTEIN: CPT | Performed by: STUDENT IN AN ORGANIZED HEALTH CARE EDUCATION/TRAINING PROGRAM

## 2022-05-25 PROCEDURE — 82728 ASSAY OF FERRITIN: CPT | Performed by: FAMILY MEDICINE

## 2022-05-25 PROCEDURE — 70544 MR ANGIOGRAPHY HEAD W/O DYE: CPT

## 2022-05-25 PROCEDURE — 25010000002 ONDANSETRON PER 1 MG: Performed by: PHYSICIAN ASSISTANT

## 2022-05-25 PROCEDURE — 80061 LIPID PANEL: CPT | Performed by: PHYSICIAN ASSISTANT

## 2022-05-25 PROCEDURE — 83735 ASSAY OF MAGNESIUM: CPT | Performed by: STUDENT IN AN ORGANIZED HEALTH CARE EDUCATION/TRAINING PROGRAM

## 2022-05-25 PROCEDURE — 83615 LACTATE (LD) (LDH) ENZYME: CPT | Performed by: FAMILY MEDICINE

## 2022-05-25 PROCEDURE — 0042T HC CT CEREBRAL PERFUSION W/WO CONTRAST: CPT

## 2022-05-25 PROCEDURE — 82962 GLUCOSE BLOOD TEST: CPT

## 2022-05-25 PROCEDURE — 83036 HEMOGLOBIN GLYCOSYLATED A1C: CPT

## 2022-05-25 PROCEDURE — 99284 EMERGENCY DEPT VISIT MOD MDM: CPT

## 2022-05-25 PROCEDURE — 92523 SPEECH SOUND LANG COMPREHEN: CPT

## 2022-05-25 RX ORDER — NICOTINE POLACRILEX 4 MG
15 LOZENGE BUCCAL
Status: DISCONTINUED | OUTPATIENT
Start: 2022-05-25 | End: 2022-05-26 | Stop reason: HOSPADM

## 2022-05-25 RX ORDER — ATORVASTATIN CALCIUM 40 MG/1
80 TABLET, FILM COATED ORAL NIGHTLY
Status: DISCONTINUED | OUTPATIENT
Start: 2022-05-25 | End: 2022-05-26 | Stop reason: HOSPADM

## 2022-05-25 RX ORDER — MAGNESIUM SULFATE HEPTAHYDRATE 40 MG/ML
4 INJECTION, SOLUTION INTRAVENOUS AS NEEDED
Status: DISCONTINUED | OUTPATIENT
Start: 2022-05-25 | End: 2022-05-26 | Stop reason: HOSPADM

## 2022-05-25 RX ORDER — DEXTROSE MONOHYDRATE 25 G/50ML
25 INJECTION, SOLUTION INTRAVENOUS
Status: DISCONTINUED | OUTPATIENT
Start: 2022-05-25 | End: 2022-05-26 | Stop reason: HOSPADM

## 2022-05-25 RX ORDER — ASPIRIN 81 MG/1
81 TABLET, CHEWABLE ORAL DAILY
Status: DISCONTINUED | OUTPATIENT
Start: 2022-05-25 | End: 2022-05-26 | Stop reason: HOSPADM

## 2022-05-25 RX ORDER — POTASSIUM CHLORIDE 1.5 G/1.77G
40 POWDER, FOR SOLUTION ORAL AS NEEDED
Status: DISCONTINUED | OUTPATIENT
Start: 2022-05-25 | End: 2022-05-26 | Stop reason: HOSPADM

## 2022-05-25 RX ORDER — MAGNESIUM SULFATE HEPTAHYDRATE 40 MG/ML
2 INJECTION, SOLUTION INTRAVENOUS AS NEEDED
Status: DISCONTINUED | OUTPATIENT
Start: 2022-05-25 | End: 2022-05-26 | Stop reason: HOSPADM

## 2022-05-25 RX ORDER — SODIUM CHLORIDE 0.9 % (FLUSH) 0.9 %
10 SYRINGE (ML) INJECTION AS NEEDED
Status: DISCONTINUED | OUTPATIENT
Start: 2022-05-25 | End: 2022-05-26 | Stop reason: HOSPADM

## 2022-05-25 RX ORDER — POTASSIUM CHLORIDE 750 MG/1
40 CAPSULE, EXTENDED RELEASE ORAL AS NEEDED
Status: DISCONTINUED | OUTPATIENT
Start: 2022-05-25 | End: 2022-05-26 | Stop reason: HOSPADM

## 2022-05-25 RX ORDER — LORAZEPAM 2 MG/ML
1 INJECTION INTRAMUSCULAR ONCE
Status: COMPLETED | OUTPATIENT
Start: 2022-05-25 | End: 2022-05-25

## 2022-05-25 RX ORDER — DIPHENHYDRAMINE HYDROCHLORIDE 50 MG/ML
25 INJECTION INTRAMUSCULAR; INTRAVENOUS ONCE
Status: COMPLETED | OUTPATIENT
Start: 2022-05-25 | End: 2022-05-25

## 2022-05-25 RX ORDER — ONDANSETRON 2 MG/ML
4 INJECTION INTRAMUSCULAR; INTRAVENOUS EVERY 6 HOURS PRN
Status: DISCONTINUED | OUTPATIENT
Start: 2022-05-25 | End: 2022-05-26 | Stop reason: HOSPADM

## 2022-05-25 RX ORDER — CHOLECALCIFEROL (VITAMIN D3) 125 MCG
5 CAPSULE ORAL NIGHTLY PRN
Status: DISCONTINUED | OUTPATIENT
Start: 2022-05-25 | End: 2022-05-26 | Stop reason: HOSPADM

## 2022-05-25 RX ORDER — ONDANSETRON 4 MG/1
4 TABLET, FILM COATED ORAL EVERY 6 HOURS PRN
Status: DISCONTINUED | OUTPATIENT
Start: 2022-05-25 | End: 2022-05-26 | Stop reason: HOSPADM

## 2022-05-25 RX ORDER — LEVOTHYROXINE SODIUM 0.1 MG/1
100 TABLET ORAL DAILY
Status: DISCONTINUED | OUTPATIENT
Start: 2022-05-25 | End: 2022-05-26 | Stop reason: HOSPADM

## 2022-05-25 RX ORDER — PANTOPRAZOLE SODIUM 40 MG/1
40 TABLET, DELAYED RELEASE ORAL EVERY MORNING
Status: DISCONTINUED | OUTPATIENT
Start: 2022-05-25 | End: 2022-05-26 | Stop reason: HOSPADM

## 2022-05-25 RX ORDER — SODIUM CHLORIDE 0.9 % (FLUSH) 0.9 %
10 SYRINGE (ML) INJECTION EVERY 12 HOURS SCHEDULED
Status: DISCONTINUED | OUTPATIENT
Start: 2022-05-25 | End: 2022-05-26 | Stop reason: HOSPADM

## 2022-05-25 RX ORDER — ACETAMINOPHEN 500 MG
1000 TABLET ORAL ONCE
Status: COMPLETED | OUTPATIENT
Start: 2022-05-25 | End: 2022-05-25

## 2022-05-25 RX ORDER — PROCHLORPERAZINE EDISYLATE 5 MG/ML
5 INJECTION INTRAMUSCULAR; INTRAVENOUS ONCE
Status: COMPLETED | OUTPATIENT
Start: 2022-05-25 | End: 2022-05-25

## 2022-05-25 RX ORDER — INSULIN LISPRO 100 [IU]/ML
0-9 INJECTION, SOLUTION INTRAVENOUS; SUBCUTANEOUS
Status: DISCONTINUED | OUTPATIENT
Start: 2022-05-25 | End: 2022-05-26 | Stop reason: HOSPADM

## 2022-05-25 RX ORDER — ESCITALOPRAM OXALATE 10 MG/1
10 TABLET ORAL DAILY
Status: DISCONTINUED | OUTPATIENT
Start: 2022-05-25 | End: 2022-05-26 | Stop reason: HOSPADM

## 2022-05-25 RX ORDER — ACETAMINOPHEN 325 MG/1
650 TABLET ORAL EVERY 4 HOURS PRN
Status: DISCONTINUED | OUTPATIENT
Start: 2022-05-25 | End: 2022-05-26 | Stop reason: HOSPADM

## 2022-05-25 RX ORDER — ASPIRIN 300 MG/1
300 SUPPOSITORY RECTAL DAILY
Status: DISCONTINUED | OUTPATIENT
Start: 2022-05-25 | End: 2022-05-26 | Stop reason: HOSPADM

## 2022-05-25 RX ORDER — DEXAMETHASONE SODIUM PHOSPHATE 4 MG/ML
6 INJECTION, SOLUTION INTRA-ARTICULAR; INTRALESIONAL; INTRAMUSCULAR; INTRAVENOUS; SOFT TISSUE ONCE
Status: COMPLETED | OUTPATIENT
Start: 2022-05-25 | End: 2022-05-25

## 2022-05-25 RX ORDER — POTASSIUM CHLORIDE 7.45 MG/ML
10 INJECTION INTRAVENOUS
Status: DISCONTINUED | OUTPATIENT
Start: 2022-05-25 | End: 2022-05-26 | Stop reason: HOSPADM

## 2022-05-25 RX ADMIN — PROCHLORPERAZINE EDISYLATE 5 MG: 5 INJECTION INTRAMUSCULAR; INTRAVENOUS at 02:30

## 2022-05-25 RX ADMIN — PANTOPRAZOLE SODIUM 40 MG: 40 TABLET, DELAYED RELEASE ORAL at 09:19

## 2022-05-25 RX ADMIN — INSULIN LISPRO 2 UNITS: 100 INJECTION, SOLUTION INTRAVENOUS; SUBCUTANEOUS at 16:47

## 2022-05-25 RX ADMIN — Medication 10 ML: at 20:45

## 2022-05-25 RX ADMIN — INSULIN LISPRO 4 UNITS: 100 INJECTION, SOLUTION INTRAVENOUS; SUBCUTANEOUS at 09:19

## 2022-05-25 RX ADMIN — IOPAMIDOL 80 ML: 755 INJECTION, SOLUTION INTRAVENOUS at 01:52

## 2022-05-25 RX ADMIN — ONDANSETRON 4 MG: 2 INJECTION INTRAMUSCULAR; INTRAVENOUS at 17:17

## 2022-05-25 RX ADMIN — POTASSIUM CHLORIDE 40 MEQ: 750 CAPSULE, EXTENDED RELEASE ORAL at 09:19

## 2022-05-25 RX ADMIN — LORAZEPAM 1 MG: 2 INJECTION INTRAMUSCULAR; INTRAVENOUS at 20:45

## 2022-05-25 RX ADMIN — SODIUM CHLORIDE, POTASSIUM CHLORIDE, SODIUM LACTATE AND CALCIUM CHLORIDE 1000 ML: 600; 310; 30; 20 INJECTION, SOLUTION INTRAVENOUS at 02:30

## 2022-05-25 RX ADMIN — POTASSIUM CHLORIDE 40 MEQ: 750 CAPSULE, EXTENDED RELEASE ORAL at 16:11

## 2022-05-25 RX ADMIN — APIXABAN 5 MG: 5 TABLET, FILM COATED ORAL at 09:20

## 2022-05-25 RX ADMIN — ACETAMINOPHEN 1000 MG: 500 TABLET, FILM COATED ORAL at 02:30

## 2022-05-25 RX ADMIN — ACETAMINOPHEN 650 MG: 325 TABLET ORAL at 13:05

## 2022-05-25 RX ADMIN — IOPAMIDOL 125 ML: 755 INJECTION, SOLUTION INTRAVENOUS at 01:45

## 2022-05-25 RX ADMIN — APIXABAN 5 MG: 5 TABLET, FILM COATED ORAL at 20:45

## 2022-05-25 RX ADMIN — LEVOTHYROXINE SODIUM 100 MCG: 0.1 TABLET ORAL at 09:20

## 2022-05-25 RX ADMIN — ASPIRIN 81 MG 81 MG: 81 TABLET ORAL at 09:23

## 2022-05-25 RX ADMIN — DEXAMETHASONE SODIUM PHOSPHATE 6 MG: 4 INJECTION INTRA-ARTICULAR; INTRALESIONAL; INTRAMUSCULAR; INTRAVENOUS; SOFT TISSUE at 07:59

## 2022-05-25 RX ADMIN — ATORVASTATIN CALCIUM 80 MG: 40 TABLET, FILM COATED ORAL at 20:45

## 2022-05-25 RX ADMIN — Medication 10 ML: at 09:21

## 2022-05-25 RX ADMIN — ESCITALOPRAM OXALATE 10 MG: 10 TABLET ORAL at 09:20

## 2022-05-25 RX ADMIN — Medication 10 ML: at 21:41

## 2022-05-25 RX ADMIN — DIPHENHYDRAMINE HYDROCHLORIDE 25 MG: 50 INJECTION INTRAMUSCULAR; INTRAVENOUS at 02:30

## 2022-05-25 RX ADMIN — INSULIN LISPRO 4 UNITS: 100 INJECTION, SOLUTION INTRAVENOUS; SUBCUTANEOUS at 13:44

## 2022-05-26 ENCOUNTER — READMISSION MANAGEMENT (OUTPATIENT)
Dept: CALL CENTER | Facility: HOSPITAL | Age: 52
End: 2022-05-26

## 2022-05-26 VITALS
WEIGHT: 199.96 LBS | BODY MASS INDEX: 30.31 KG/M2 | DIASTOLIC BLOOD PRESSURE: 98 MMHG | TEMPERATURE: 98.4 F | HEART RATE: 107 BPM | RESPIRATION RATE: 20 BRPM | HEIGHT: 68 IN | OXYGEN SATURATION: 98 % | SYSTOLIC BLOOD PRESSURE: 173 MMHG

## 2022-05-26 PROBLEM — E87.6 HYPOKALEMIA: Status: RESOLVED | Noted: 2022-05-25 | Resolved: 2022-05-26

## 2022-05-26 PROBLEM — E83.42 HYPOMAGNESEMIA: Status: RESOLVED | Noted: 2022-05-25 | Resolved: 2022-05-26

## 2022-05-26 PROBLEM — U07.1 COVID-19 VIRUS DETECTED: Status: RESOLVED | Noted: 2022-05-25 | Resolved: 2022-05-26

## 2022-05-26 PROBLEM — R53.1 ACUTE LEFT-SIDED WEAKNESS: Status: RESOLVED | Noted: 2022-05-25 | Resolved: 2022-05-26

## 2022-05-26 LAB
25(OH)D3 SERPL-MCNC: 19.5 NG/ML (ref 30–100)
ALBUMIN SERPL-MCNC: 3.8 G/DL (ref 3.5–5.2)
ALBUMIN/GLOB SERPL: 1.1 G/DL
ALP SERPL-CCNC: 72 U/L (ref 39–117)
ALT SERPL W P-5'-P-CCNC: 8 U/L (ref 1–33)
ANION GAP SERPL CALCULATED.3IONS-SCNC: 13 MMOL/L (ref 5–15)
AST SERPL-CCNC: 10 U/L (ref 1–32)
BASOPHILS # BLD AUTO: 0.02 10*3/MM3 (ref 0–0.2)
BASOPHILS NFR BLD AUTO: 0.2 % (ref 0–1.5)
BILIRUB SERPL-MCNC: 1 MG/DL (ref 0–1.2)
BUN SERPL-MCNC: 11 MG/DL (ref 6–20)
BUN/CREAT SERPL: 15.3 (ref 7–25)
CALCIUM SPEC-SCNC: 9.5 MG/DL (ref 8.6–10.5)
CHLORIDE SERPL-SCNC: 97 MMOL/L (ref 98–107)
CO2 SERPL-SCNC: 26 MMOL/L (ref 22–29)
CREAT SERPL-MCNC: 0.72 MG/DL (ref 0.57–1)
DEPRECATED RDW RBC AUTO: 41.9 FL (ref 37–54)
EGFRCR SERPLBLD CKD-EPI 2021: 101.4 ML/MIN/1.73
EOSINOPHIL # BLD AUTO: 0 10*3/MM3 (ref 0–0.4)
EOSINOPHIL NFR BLD AUTO: 0 % (ref 0.3–6.2)
ERYTHROCYTE [DISTWIDTH] IN BLOOD BY AUTOMATED COUNT: 12.8 % (ref 12.3–15.4)
FOLATE SERPL-MCNC: 12.1 NG/ML (ref 4.78–24.2)
GLOBULIN UR ELPH-MCNC: 3.4 GM/DL
GLUCOSE BLDC GLUCOMTR-MCNC: 149 MG/DL (ref 70–130)
GLUCOSE BLDC GLUCOMTR-MCNC: 216 MG/DL (ref 70–130)
GLUCOSE BLDC GLUCOMTR-MCNC: 251 MG/DL (ref 70–130)
GLUCOSE SERPL-MCNC: 278 MG/DL (ref 65–99)
HCT VFR BLD AUTO: 42.8 % (ref 34–46.6)
HGB BLD-MCNC: 14.3 G/DL (ref 12–15.9)
IMM GRANULOCYTES # BLD AUTO: 0.1 10*3/MM3 (ref 0–0.05)
IMM GRANULOCYTES NFR BLD AUTO: 0.8 % (ref 0–0.5)
LYMPHOCYTES # BLD AUTO: 1.29 10*3/MM3 (ref 0.7–3.1)
LYMPHOCYTES NFR BLD AUTO: 10.5 % (ref 19.6–45.3)
MAGNESIUM SERPL-MCNC: 1.6 MG/DL (ref 1.6–2.6)
MCH RBC QN AUTO: 30.4 PG (ref 26.6–33)
MCHC RBC AUTO-ENTMCNC: 33.4 G/DL (ref 31.5–35.7)
MCV RBC AUTO: 90.9 FL (ref 79–97)
MONOCYTES # BLD AUTO: 0.63 10*3/MM3 (ref 0.1–0.9)
MONOCYTES NFR BLD AUTO: 5.1 % (ref 5–12)
NEUTROPHILS NFR BLD AUTO: 10.29 10*3/MM3 (ref 1.7–7)
NEUTROPHILS NFR BLD AUTO: 83.4 % (ref 42.7–76)
NRBC BLD AUTO-RTO: 0 /100 WBC (ref 0–0.2)
PLATELET # BLD AUTO: 181 10*3/MM3 (ref 140–450)
PMV BLD AUTO: 11.5 FL (ref 6–12)
POTASSIUM SERPL-SCNC: 4 MMOL/L (ref 3.5–5.2)
PROT SERPL-MCNC: 7.2 G/DL (ref 6–8.5)
QT INTERVAL: 402 MS
QTC INTERVAL: 418 MS
RBC # BLD AUTO: 4.71 10*6/MM3 (ref 3.77–5.28)
SODIUM SERPL-SCNC: 136 MMOL/L (ref 136–145)
VIT B12 BLD-MCNC: 384 PG/ML (ref 211–946)
WBC NRBC COR # BLD: 12.33 10*3/MM3 (ref 3.4–10.8)

## 2022-05-26 PROCEDURE — 82607 VITAMIN B-12: CPT | Performed by: PSYCHIATRY & NEUROLOGY

## 2022-05-26 PROCEDURE — 84425 ASSAY OF VITAMIN B-1: CPT | Performed by: PSYCHIATRY & NEUROLOGY

## 2022-05-26 PROCEDURE — 99239 HOSP IP/OBS DSCHRG MGMT >30: CPT | Performed by: STUDENT IN AN ORGANIZED HEALTH CARE EDUCATION/TRAINING PROGRAM

## 2022-05-26 PROCEDURE — 82962 GLUCOSE BLOOD TEST: CPT

## 2022-05-26 PROCEDURE — 63710000001 INSULIN LISPRO (HUMAN) PER 5 UNITS: Performed by: PHYSICIAN ASSISTANT

## 2022-05-26 PROCEDURE — 25010000002 ONDANSETRON PER 1 MG: Performed by: PHYSICIAN ASSISTANT

## 2022-05-26 PROCEDURE — 25010000002 PROCHLORPERAZINE 10 MG/2ML SOLUTION: Performed by: NURSE PRACTITIONER

## 2022-05-26 PROCEDURE — 83735 ASSAY OF MAGNESIUM: CPT | Performed by: PHYSICIAN ASSISTANT

## 2022-05-26 PROCEDURE — G0108 DIAB MANAGE TRN  PER INDIV: HCPCS | Performed by: REGISTERED NURSE

## 2022-05-26 PROCEDURE — 25010000002 HYDRALAZINE PER 20 MG: Performed by: NURSE PRACTITIONER

## 2022-05-26 PROCEDURE — 99232 SBSQ HOSP IP/OBS MODERATE 35: CPT | Performed by: NURSE PRACTITIONER

## 2022-05-26 PROCEDURE — 82525 ASSAY OF COPPER: CPT | Performed by: PSYCHIATRY & NEUROLOGY

## 2022-05-26 PROCEDURE — 82306 VITAMIN D 25 HYDROXY: CPT | Performed by: PSYCHIATRY & NEUROLOGY

## 2022-05-26 PROCEDURE — 85025 COMPLETE CBC W/AUTO DIFF WBC: CPT | Performed by: PHYSICIAN ASSISTANT

## 2022-05-26 PROCEDURE — 97165 OT EVAL LOW COMPLEX 30 MIN: CPT

## 2022-05-26 PROCEDURE — 97161 PT EVAL LOW COMPLEX 20 MIN: CPT

## 2022-05-26 PROCEDURE — 80053 COMPREHEN METABOLIC PANEL: CPT | Performed by: INTERNAL MEDICINE

## 2022-05-26 PROCEDURE — 82746 ASSAY OF FOLIC ACID SERUM: CPT | Performed by: PSYCHIATRY & NEUROLOGY

## 2022-05-26 RX ORDER — ASPIRIN 81 MG/1
81 TABLET, CHEWABLE ORAL DAILY
Qty: 30 TABLET | Refills: 0 | Status: SHIPPED | OUTPATIENT
Start: 2022-05-27 | End: 2022-08-08 | Stop reason: SDUPTHER

## 2022-05-26 RX ORDER — PROCHLORPERAZINE EDISYLATE 5 MG/ML
5 INJECTION INTRAMUSCULAR; INTRAVENOUS EVERY 6 HOURS PRN
Status: DISCONTINUED | OUTPATIENT
Start: 2022-05-26 | End: 2022-05-26 | Stop reason: HOSPADM

## 2022-05-26 RX ORDER — ATORVASTATIN CALCIUM 80 MG/1
80 TABLET, FILM COATED ORAL NIGHTLY
Qty: 90 TABLET | Refills: 0 | Status: SHIPPED | OUTPATIENT
Start: 2022-05-26 | End: 2022-08-08 | Stop reason: SDUPTHER

## 2022-05-26 RX ORDER — LABETALOL HYDROCHLORIDE 5 MG/ML
10 INJECTION, SOLUTION INTRAVENOUS ONCE
Status: COMPLETED | OUTPATIENT
Start: 2022-05-26 | End: 2022-05-26

## 2022-05-26 RX ORDER — HYDRALAZINE HYDROCHLORIDE 20 MG/ML
10 INJECTION INTRAMUSCULAR; INTRAVENOUS ONCE
Status: COMPLETED | OUTPATIENT
Start: 2022-05-26 | End: 2022-05-26

## 2022-05-26 RX ADMIN — INSULIN LISPRO 6 UNITS: 100 INJECTION, SOLUTION INTRAVENOUS; SUBCUTANEOUS at 09:11

## 2022-05-26 RX ADMIN — PROCHLORPERAZINE EDISYLATE 5 MG: 5 INJECTION INTRAMUSCULAR; INTRAVENOUS at 01:05

## 2022-05-26 RX ADMIN — APIXABAN 5 MG: 5 TABLET, FILM COATED ORAL at 09:13

## 2022-05-26 RX ADMIN — ASPIRIN 81 MG 81 MG: 81 TABLET ORAL at 09:16

## 2022-05-26 RX ADMIN — PROCHLORPERAZINE EDISYLATE 5 MG: 5 INJECTION INTRAMUSCULAR; INTRAVENOUS at 06:34

## 2022-05-26 RX ADMIN — LEVOTHYROXINE SODIUM 100 MCG: 0.1 TABLET ORAL at 09:13

## 2022-05-26 RX ADMIN — ACETAMINOPHEN 650 MG: 325 TABLET ORAL at 00:12

## 2022-05-26 RX ADMIN — ESCITALOPRAM OXALATE 10 MG: 10 TABLET ORAL at 09:13

## 2022-05-26 RX ADMIN — ONDANSETRON 4 MG: 2 INJECTION INTRAMUSCULAR; INTRAVENOUS at 15:47

## 2022-05-26 RX ADMIN — ONDANSETRON 4 MG: 2 INJECTION INTRAMUSCULAR; INTRAVENOUS at 04:09

## 2022-05-26 RX ADMIN — HYDRALAZINE HYDROCHLORIDE 10 MG: 20 INJECTION INTRAMUSCULAR; INTRAVENOUS at 04:09

## 2022-05-26 RX ADMIN — INSULIN LISPRO 4 UNITS: 100 INJECTION, SOLUTION INTRAVENOUS; SUBCUTANEOUS at 13:25

## 2022-05-26 RX ADMIN — LIDOCAINE HYDROCHLORIDE: 20 SOLUTION ORAL; TOPICAL at 03:08

## 2022-05-26 RX ADMIN — LABETALOL 20 MG/4 ML (5 MG/ML) INTRAVENOUS SYRINGE 10 MG: at 06:34

## 2022-05-26 RX ADMIN — ONDANSETRON 4 MG: 2 INJECTION INTRAMUSCULAR; INTRAVENOUS at 00:12

## 2022-05-26 RX ADMIN — Medication 10 ML: at 09:14

## 2022-05-27 ENCOUNTER — TRANSITIONAL CARE MANAGEMENT TELEPHONE ENCOUNTER (OUTPATIENT)
Dept: CALL CENTER | Facility: HOSPITAL | Age: 52
End: 2022-05-27

## 2022-05-27 NOTE — OUTREACH NOTE
Call Center TCM Note    Flowsheet Row Responses   [facility] patient discharged from? [place]   Does the patient have one of the following disease processes/diagnoses(primary or secondary)? Stroke (TIA)   TCM attempt successful? Yes  [no verbal release]   Call start time 1515   Call end time 1524   Discharge diagnosis COVID-19 virus detected new evidence of acute infarct however showed a small remote lacunar infarct in the left cerebellum   Meds reviewed with patient/caregiver? Yes   Is the patient having any side effects they believe may be caused by any medication additions or changes? No   Does the patient have all medications ordered at discharge? Yes   Is the patient taking all medications as directed (includes completed medication regime)? Yes   Does the patient have a primary care provider?  Yes   Does the patient have an appointment with their PCP within 7 days of discharge? No   Comments regarding PCP Will route a note regarding scheduling of PCP f/u--lost connection during call,  patient did not answer on return call  (no available appts open within TCM guidelines)   Nursing Interventions --  [Routed note]   Has the patient kept scheduled appointments due by today? N/A   Has home health visited the patient within 72 hours of discharge? N/A   Does the patient require any assistance with activities of daily living such as eating, bathing, dressing, walking, etc.? No   Does the patient have any residual symptoms from stroke/TIA? No   Did the patient receive a copy of their discharge instructions? Yes   Nursing interventions Reviewed instructions with patient   What is the patient's perception of their health status since discharge? Improving  [Patient reports improvement,  no residual effects---has no questions or concerns at time of call.  Poor connectivity during call and disconnected.  ]   Nursing interventions Nurse provided patient education   Is the patient able to teach back FAST for Stroke?  Yes  [REviewed with patient]   Is the patient/caregiver able to teach back the risk factors for a stroke? High Cholesterol, History of TIAs, High blood pressure-goal below 120/80, Diabetes  [Encouraged to bring BP and BG readings to f/u appts]   Is the patient/caregiver able to teach back signs and symptoms related to disease process for when to call PCP? Yes   Is the patient/caregiver able to teach back signs and symptoms related to disease process for when to call 911? Yes   Is the patient/caregiver able to teach back the hierarchy of who to call/visit for symptoms/problems? PCP, Specialist, Home health nurse, Urgent Care, ED, 911 Yes   TCM call completed? Yes          Elvia Beckham RN    5/27/2022, 15:28 EDT

## 2022-05-27 NOTE — OUTREACH NOTE
Prep Survey    Flowsheet Row Responses   [facility] patient discharged from? [place]   Is LACE score < 7 ? No   Emergency Room discharge w/ pulse ox? No   Eligibility [facility]   Date of Admission 05/25/22   Date of Discharge 05/26/22   Discharge Disposition Home or Self Care   Discharge diagnosis COVID-19 virus detected new evidence of acute infarct however showed a small remote lacunar infarct in the left cerebellum   Does the patient have one of the following disease processes/diagnoses(primary or secondary)? Stroke (TIA)   Does the patient have Home health ordered? No   Is there a DME ordered? No   Prep survey completed? Yes          TREY STYLES - Registered Nurse

## 2022-05-28 LAB — COPPER SERPL-MCNC: 134 UG/DL (ref 80–158)

## 2022-05-30 LAB
BACTERIA SPEC AEROBE CULT: NORMAL
BACTERIA SPEC AEROBE CULT: NORMAL

## 2022-06-02 LAB — VIT B1 BLD-SCNC: 163 NMOL/L (ref 66.5–200)

## 2022-06-07 ENCOUNTER — READMISSION MANAGEMENT (OUTPATIENT)
Dept: CALL CENTER | Facility: HOSPITAL | Age: 52
End: 2022-06-07

## 2022-06-07 ENCOUNTER — HOSPITAL ENCOUNTER (OUTPATIENT)
Dept: MAMMOGRAPHY | Facility: HOSPITAL | Age: 52
Discharge: HOME | End: 2022-06-07
Admitting: INTERNAL MEDICINE

## 2022-06-07 DIAGNOSIS — Z12.31 ENCOUNTER FOR SCREENING MAMMOGRAM FOR BREAST CANCER: ICD-10-CM

## 2022-06-07 PROCEDURE — 77067 SCR MAMMO BI INCL CAD: CPT | Performed by: RADIOLOGY

## 2022-06-07 PROCEDURE — 77063 BREAST TOMOSYNTHESIS BI: CPT

## 2022-06-07 PROCEDURE — 77067 SCR MAMMO BI INCL CAD: CPT

## 2022-06-07 PROCEDURE — 77063 BREAST TOMOSYNTHESIS BI: CPT | Performed by: RADIOLOGY

## 2022-06-07 NOTE — OUTREACH NOTE
Stroke Week 2 Survey    Flowsheet Row Responses   [facility] patient discharged from? [place]   Does the patient have one of the following disease processes/diagnoses(primary or secondary)? Stroke (TIA)   Week 2 attempt successful? Yes   Call start time 0909   Call end time 0911   Discharge diagnosis COVID-19 virus detected new evidence of acute infarct however showed a small remote lacunar infarct in the left cerebellum   Meds reviewed with patient/caregiver? Yes   Is the patient taking all medications as directed (includes completed medication regime)? Yes   Has the patient kept scheduled appointments due by today? Yes  [Mammogram today and 6-9-22 apt ]   Psychosocial issues? No   Does the patient require any assistance with activities of daily living such as eating, bathing, dressing, walking, etc.? No   Does the patient have any residual symptoms from stroke/TIA? No   Does the patient understand the diet ordered at discharge? Yes   What is the patient's perception of their health status since discharge? Improving   Is the patient able to teach back FAST for Stroke? Yes   Is the patient/caregiver able to teach back the risk factors for a stroke? High Cholesterol, High blood pressure-goal below 120/80   If the patient is a current smoker, are they able to teach back resources for cessation? Not a smoker   Is the patient/caregiver able to teach back the hierarchy of who to call/visit for symptoms/problems? PCP, Specialist, Home health nurse, Urgent Care, ED, 911 Yes   Week 2 call completed? Yes          CRICKET THOMAS - Registered Nurse

## 2022-06-08 DIAGNOSIS — I82.452 ACUTE DEEP VEIN THROMBOSIS (DVT) OF LEFT PERONEAL VEIN: ICD-10-CM

## 2022-06-08 NOTE — TELEPHONE ENCOUNTER
Caller: Ernestina Garrido    Relationship: Self    Best call back number: [phone]     Requested Prescriptions:   Requested Prescriptions     Pending Prescriptions Disp Refills   • apixaban (ELIQUIS) 5 MG tablet tablet 50 tablet 0     Si tabs po BID x 1 week, then 1 tab BID        Pharmacy where request should be sent: [facility] [address] [phone] [facility] [phone] [facility]     Additional details provided by patient: PATIENT HAS ONE PILL LEFT    Does the patient have less than a 3 day supply:  [x] Yes  [] No    [place] Rep   22 13:29 EDT

## 2022-06-09 ENCOUNTER — HOSPITAL ENCOUNTER (OUTPATIENT)
Dept: CT IMAGING | Facility: HOSPITAL | Age: 52
Discharge: HOME | End: 2022-06-09
Admitting: INTERNAL MEDICINE

## 2022-06-09 DIAGNOSIS — G89.29 CHRONIC CHEST PAIN: ICD-10-CM

## 2022-06-09 DIAGNOSIS — R07.9 CHRONIC CHEST PAIN: ICD-10-CM

## 2022-06-09 DIAGNOSIS — I82.452 ACUTE DEEP VEIN THROMBOSIS (DVT) OF LEFT PERONEAL VEIN: ICD-10-CM

## 2022-06-09 PROCEDURE — 0 IOPAMIDOL PER 1 ML: Performed by: INTERNAL MEDICINE

## 2022-06-09 PROCEDURE — 71275 CT ANGIOGRAPHY CHEST: CPT

## 2022-06-09 RX ADMIN — IOPAMIDOL 75 ML: 755 INJECTION, SOLUTION INTRAVENOUS at 10:05

## 2022-06-15 ENCOUNTER — READMISSION MANAGEMENT (OUTPATIENT)
Dept: CALL CENTER | Facility: HOSPITAL | Age: 52
End: 2022-06-15

## 2022-06-15 NOTE — OUTREACH NOTE
Stroke Week 3 Survey    Flowsheet Row Responses   [facility] patient discharged from? [place]   Does the patient have one of the following disease processes/diagnoses(primary or secondary)? Stroke (TIA)   Week 3 attempt successful? Yes   Call start time 1154   Call end time 1156   Discharge diagnosis COVID-19 virus detected new evidence of acute infarct however showed a small remote lacunar infarct in the left cerebellum   Meds reviewed with patient/caregiver? Yes   Has the patient kept scheduled appointments due by today? Yes   Psychosocial issues? No   Does the patient require any assistance with activities of daily living such as eating, bathing, dressing, walking, etc.? No   Does the patient have any residual symptoms from stroke/TIA? No   Does the patient understand the diet ordered at discharge? Yes   Comments left leg feels like veins are burning.    What is the patient's perception of their health status since discharge? Returned to baseline/stable   Week 3 call completed? Yes   Revoked No further contact(revokes)-requires comment   Is the patient interested in additional calls from an ambulatory ?  NOTE:  applies to high risk patients requiring additional follow-up. No   Graduated/Revoked comments pt out and about, stable. Call was quick as kept talking to someone else           CHRISTINA HODGES - Registered Nurse

## 2022-06-16 ENCOUNTER — TELEPHONE (OUTPATIENT)
Dept: INTERNAL MEDICINE | Facility: CLINIC | Age: 52
End: 2022-06-16

## 2022-06-16 DIAGNOSIS — M25.562 ARTHRALGIA OF LEFT KNEE: Primary | ICD-10-CM

## 2022-06-16 NOTE — TELEPHONE ENCOUNTER
She can take Tylenol for the pain.  Please make sure she is not taking any anti-inflammatories, since she is on a blood thinner.  I can also call in Voltaren gel if she would like.

## 2022-06-16 NOTE — TELEPHONE ENCOUNTER
Caller: Jagjit Garrido    Relationship: Self    Best call back number: [phone]    What is the best time to reach you: ANYTIME    Who are you requesting to speak with (clinical staff, provider,  specific staff member): DR SEGOVIA     Do you know the name of the person who called: JAGJIT    What was the call regarding: LEFT KNEE PAIN SINCE 4/7/22. PATIENT WANTING TO SEE IF SHE SHOULD SEE AN ORTHOPEDIC DOCTOR FOR FREQUENT PAIN AND BURNING IN HER KNEE. SHOULD THE PATIENT SCHEDULE AN APPOINTMENT WITH DR SEGOVIA?     Do you require a callback: YES

## 2022-06-16 NOTE — TELEPHONE ENCOUNTER
I would be glad to see her for this, but if she has significant pain it may make sense just to schedule with the orthopedist.  If she is agreeable, I will enter an order.

## 2022-06-16 NOTE — TELEPHONE ENCOUNTER
HUB RELAYED MESSAGE.  PATIENT STILL HAS QUESTIONS ABOUT WHETHER SHE WILL BE GETTING HER PAIN MEDICATIONS BECAUSE SHE IS UNABLE TO SEE THE ORTHO DOCTOR UNTIL July.    CALL STEPHANI SANCHEZ

## 2022-06-16 NOTE — TELEPHONE ENCOUNTER
No voicemail set up . Can only send Sms message.sent for her to call back     HUBB please read per Dr aTnner -I  would be glad to see her for this, but if she has significant pain it may make sense just to schedule with the orthopedist.  If she is agreeable, I will enter an order.

## 2022-06-16 NOTE — TELEPHONE ENCOUNTER
Patient states she already has an appointment with Ortho on 7/7/22. She states she wants Dr Tanner to call something in for pain. She states she's been taking OTC medication and it's not working.

## 2022-06-17 NOTE — TELEPHONE ENCOUNTER
Ernestina notified  She would like the Rx for Voltaren sent to pharmacy     Verbal understanding given

## 2022-06-22 ENCOUNTER — APPOINTMENT (OUTPATIENT)
Dept: CT IMAGING | Facility: HOSPITAL | Age: 52
End: 2022-06-22

## 2022-06-22 ENCOUNTER — APPOINTMENT (OUTPATIENT)
Dept: GENERAL RADIOLOGY | Facility: HOSPITAL | Age: 52
End: 2022-06-22

## 2022-06-22 ENCOUNTER — HOSPITAL ENCOUNTER (EMERGENCY)
Facility: HOSPITAL | Age: 52
Discharge: HOME | End: 2022-06-22
Attending: EMERGENCY MEDICINE | Admitting: EMERGENCY MEDICINE

## 2022-06-22 VITALS
WEIGHT: 180 LBS | OXYGEN SATURATION: 98 % | SYSTOLIC BLOOD PRESSURE: 140 MMHG | BODY MASS INDEX: 27.28 KG/M2 | RESPIRATION RATE: 20 BRPM | HEIGHT: 68 IN | TEMPERATURE: 98.1 F | HEART RATE: 75 BPM | DIASTOLIC BLOOD PRESSURE: 85 MMHG

## 2022-06-22 DIAGNOSIS — R10.9 ACUTE ABDOMINAL PAIN: Primary | ICD-10-CM

## 2022-06-22 DIAGNOSIS — I10 ELEVATED BLOOD PRESSURE READING WITH DIAGNOSIS OF HYPERTENSION: ICD-10-CM

## 2022-06-22 DIAGNOSIS — E87.6 HYPOKALEMIA: ICD-10-CM

## 2022-06-22 LAB
ALBUMIN SERPL-MCNC: 4.7 G/DL (ref 3.5–5.2)
ALBUMIN/GLOB SERPL: 1.2 G/DL
ALP SERPL-CCNC: 89 U/L (ref 39–117)
ALT SERPL W P-5'-P-CCNC: 10 U/L (ref 1–33)
ANION GAP SERPL CALCULATED.3IONS-SCNC: 15 MMOL/L (ref 5–15)
AST SERPL-CCNC: 13 U/L (ref 1–32)
BASOPHILS # BLD AUTO: 0.03 10*3/MM3 (ref 0–0.2)
BASOPHILS NFR BLD AUTO: 0.3 % (ref 0–1.5)
BILIRUB SERPL-MCNC: 1.5 MG/DL (ref 0–1.2)
BUN SERPL-MCNC: 20 MG/DL (ref 6–20)
BUN/CREAT SERPL: 20 (ref 7–25)
CALCIUM SPEC-SCNC: 10.4 MG/DL (ref 8.6–10.5)
CHLORIDE SERPL-SCNC: 92 MMOL/L (ref 98–107)
CO2 SERPL-SCNC: 32 MMOL/L (ref 22–29)
CREAT SERPL-MCNC: 1 MG/DL (ref 0.57–1)
D-LACTATE SERPL-SCNC: 2.5 MMOL/L (ref 0.5–2)
DEPRECATED RDW RBC AUTO: 42.2 FL (ref 37–54)
EGFRCR SERPLBLD CKD-EPI 2021: 68.3 ML/MIN/1.73
EOSINOPHIL # BLD AUTO: 0 10*3/MM3 (ref 0–0.4)
EOSINOPHIL NFR BLD AUTO: 0 % (ref 0.3–6.2)
ERYTHROCYTE [DISTWIDTH] IN BLOOD BY AUTOMATED COUNT: 12.2 % (ref 12.3–15.4)
GLOBULIN UR ELPH-MCNC: 3.8 GM/DL
GLUCOSE SERPL-MCNC: 260 MG/DL (ref 65–99)
HCT VFR BLD AUTO: 45.1 % (ref 34–46.6)
HGB BLD-MCNC: 15 G/DL (ref 12–15.9)
HOLD SPECIMEN: NORMAL
IMM GRANULOCYTES # BLD AUTO: 0.08 10*3/MM3 (ref 0–0.05)
IMM GRANULOCYTES NFR BLD AUTO: 0.8 % (ref 0–0.5)
LIPASE SERPL-CCNC: 25 U/L (ref 13–60)
LYMPHOCYTES # BLD AUTO: 1.39 10*3/MM3 (ref 0.7–3.1)
LYMPHOCYTES NFR BLD AUTO: 13.6 % (ref 19.6–45.3)
MAGNESIUM SERPL-MCNC: 2.2 MG/DL (ref 1.6–2.6)
MCH RBC QN AUTO: 30.7 PG (ref 26.6–33)
MCHC RBC AUTO-ENTMCNC: 33.3 G/DL (ref 31.5–35.7)
MCV RBC AUTO: 92.2 FL (ref 79–97)
MONOCYTES # BLD AUTO: 0.34 10*3/MM3 (ref 0.1–0.9)
MONOCYTES NFR BLD AUTO: 3.3 % (ref 5–12)
NEUTROPHILS NFR BLD AUTO: 8.36 10*3/MM3 (ref 1.7–7)
NEUTROPHILS NFR BLD AUTO: 82 % (ref 42.7–76)
NRBC BLD AUTO-RTO: 0 /100 WBC (ref 0–0.2)
NT-PROBNP SERPL-MCNC: 203.9 PG/ML (ref 0–900)
PLATELET # BLD AUTO: 213 10*3/MM3 (ref 140–450)
PMV BLD AUTO: 11.2 FL (ref 6–12)
POTASSIUM SERPL-SCNC: 2.9 MMOL/L (ref 3.5–5.2)
PROT SERPL-MCNC: 8.5 G/DL (ref 6–8.5)
QT INTERVAL: 464 MS
QTC INTERVAL: 470 MS
RBC # BLD AUTO: 4.89 10*6/MM3 (ref 3.77–5.28)
SODIUM SERPL-SCNC: 139 MMOL/L (ref 136–145)
TROPONIN T SERPL-MCNC: <0.01 NG/ML (ref 0–0.03)
WBC NRBC COR # BLD: 10.2 10*3/MM3 (ref 3.4–10.8)
WHOLE BLOOD HOLD COAG: NORMAL
WHOLE BLOOD HOLD SPECIMEN: NORMAL

## 2022-06-22 PROCEDURE — 93005 ELECTROCARDIOGRAM TRACING: CPT | Performed by: EMERGENCY MEDICINE

## 2022-06-22 PROCEDURE — 0 POTASSIUM CHLORIDE 10 MEQ/100ML SOLUTION: Performed by: EMERGENCY MEDICINE

## 2022-06-22 PROCEDURE — 71045 X-RAY EXAM CHEST 1 VIEW: CPT

## 2022-06-22 PROCEDURE — 96365 THER/PROPH/DIAG IV INF INIT: CPT

## 2022-06-22 PROCEDURE — 80053 COMPREHEN METABOLIC PANEL: CPT | Performed by: EMERGENCY MEDICINE

## 2022-06-22 PROCEDURE — 83690 ASSAY OF LIPASE: CPT | Performed by: EMERGENCY MEDICINE

## 2022-06-22 PROCEDURE — 96375 TX/PRO/DX INJ NEW DRUG ADDON: CPT

## 2022-06-22 PROCEDURE — 83735 ASSAY OF MAGNESIUM: CPT | Performed by: EMERGENCY MEDICINE

## 2022-06-22 PROCEDURE — 99284 EMERGENCY DEPT VISIT MOD MDM: CPT

## 2022-06-22 PROCEDURE — 85025 COMPLETE CBC W/AUTO DIFF WBC: CPT | Performed by: EMERGENCY MEDICINE

## 2022-06-22 PROCEDURE — 83605 ASSAY OF LACTIC ACID: CPT | Performed by: EMERGENCY MEDICINE

## 2022-06-22 PROCEDURE — 25010000002 DIPHENHYDRAMINE PER 50 MG: Performed by: EMERGENCY MEDICINE

## 2022-06-22 PROCEDURE — 83880 ASSAY OF NATRIURETIC PEPTIDE: CPT | Performed by: EMERGENCY MEDICINE

## 2022-06-22 PROCEDURE — 84484 ASSAY OF TROPONIN QUANT: CPT | Performed by: EMERGENCY MEDICINE

## 2022-06-22 PROCEDURE — 25010000002 HYDROMORPHONE PER 4 MG: Performed by: EMERGENCY MEDICINE

## 2022-06-22 PROCEDURE — 25010000002 METOCLOPRAMIDE PER 10 MG: Performed by: EMERGENCY MEDICINE

## 2022-06-22 PROCEDURE — 74176 CT ABD & PELVIS W/O CONTRAST: CPT

## 2022-06-22 RX ORDER — POTASSIUM CHLORIDE 20 MEQ/1
20 TABLET, EXTENDED RELEASE ORAL 2 TIMES DAILY
Qty: 20 TABLET | Refills: 0 | Status: SHIPPED | OUTPATIENT
Start: 2022-06-22 | End: 2023-01-31

## 2022-06-22 RX ORDER — METOCLOPRAMIDE HYDROCHLORIDE 5 MG/ML
10 INJECTION INTRAMUSCULAR; INTRAVENOUS ONCE
Status: COMPLETED | OUTPATIENT
Start: 2022-06-22 | End: 2022-06-22

## 2022-06-22 RX ORDER — LABETALOL HYDROCHLORIDE 5 MG/ML
20 INJECTION, SOLUTION INTRAVENOUS ONCE
Status: DISCONTINUED | OUTPATIENT
Start: 2022-06-22 | End: 2022-06-22 | Stop reason: HOSPADM

## 2022-06-22 RX ORDER — SODIUM CHLORIDE 0.9 % (FLUSH) 0.9 %
10 SYRINGE (ML) INJECTION AS NEEDED
Status: DISCONTINUED | OUTPATIENT
Start: 2022-06-22 | End: 2022-06-22 | Stop reason: HOSPADM

## 2022-06-22 RX ORDER — HYDROMORPHONE HYDROCHLORIDE 1 MG/ML
0.5 INJECTION, SOLUTION INTRAMUSCULAR; INTRAVENOUS; SUBCUTANEOUS ONCE
Status: COMPLETED | OUTPATIENT
Start: 2022-06-22 | End: 2022-06-22

## 2022-06-22 RX ORDER — METOCLOPRAMIDE 10 MG/1
10 TABLET ORAL 3 TIMES DAILY PRN
Qty: 30 TABLET | Refills: 0 | Status: SHIPPED | OUTPATIENT
Start: 2022-06-22 | End: 2022-07-28 | Stop reason: SDUPTHER

## 2022-06-22 RX ORDER — DIPHENHYDRAMINE HYDROCHLORIDE 50 MG/ML
25 INJECTION INTRAMUSCULAR; INTRAVENOUS ONCE
Status: COMPLETED | OUTPATIENT
Start: 2022-06-22 | End: 2022-06-22

## 2022-06-22 RX ORDER — POTASSIUM CHLORIDE 7.45 MG/ML
10 INJECTION INTRAVENOUS ONCE
Status: COMPLETED | OUTPATIENT
Start: 2022-06-22 | End: 2022-06-22

## 2022-06-22 RX ADMIN — SODIUM CHLORIDE 1000 ML: 9 INJECTION, SOLUTION INTRAVENOUS at 09:18

## 2022-06-22 RX ADMIN — POTASSIUM CHLORIDE 10 MEQ: 7.46 INJECTION, SOLUTION INTRAVENOUS at 08:55

## 2022-06-22 RX ADMIN — METOCLOPRAMIDE 10 MG: 5 INJECTION, SOLUTION INTRAMUSCULAR; INTRAVENOUS at 07:51

## 2022-06-22 RX ADMIN — HYDROMORPHONE HYDROCHLORIDE 0.5 MG: 1 INJECTION, SOLUTION INTRAMUSCULAR; INTRAVENOUS; SUBCUTANEOUS at 07:50

## 2022-06-22 RX ADMIN — SODIUM CHLORIDE 1000 ML: 9 INJECTION, SOLUTION INTRAVENOUS at 07:52

## 2022-06-22 RX ADMIN — DIPHENHYDRAMINE HYDROCHLORIDE 25 MG: 50 INJECTION, SOLUTION INTRAMUSCULAR; INTRAVENOUS at 07:51

## 2022-06-22 NOTE — ED PROVIDER NOTES
Subjective   Mrs. Garrido presents with diffuse abdominal pain and vomiting.  She tells me she has had similar problems for a couple of years.  She reports that she was going to have an upper endoscopy and colonoscopy done today to evaluate it.  She took the bowel prep yesterday which consisted of magnesium citrate and something else which she cannot name.  She tells me almost immediately she developed vomiting and abdominal distention.  She tells me she did have good stool output.  Pain is continuous.  She is found no aggravating or alleviating factors.  She reports feeling short of breath.  She has had hysterectomy and cholecystectomy.  She has not taken any medications this morning.  She is asking to be admitted to the hospital for her testing.  She tells me she had a stroke a few weeks ago.  She is on Eliquis for DVT.  She has history of chronic low back pain.      History provided by:  Patient and medical records  Abdominal Pain  Pain location:  Generalized  Pain quality: aching    Pain radiates to:  Does not radiate  Pain severity:  Severe  Onset quality:  Sudden  Timing:  Constant  Progression:  Unchanged  Chronicity:  New  Context comment:  After taking bowel prep  Relieved by:  Nothing  Worsened by:  Nothing  Associated symptoms: chills, diarrhea and vomiting    Associated symptoms: no chest pain, no cough, no dysuria, no fever, no nausea, no shortness of breath and no sore throat        Review of Systems   Constitutional: Positive for chills. Negative for diaphoresis and fever.   HENT: Negative for congestion, rhinorrhea and sore throat.    Eyes: Negative for visual disturbance.   Respiratory: Negative for cough and shortness of breath.    Cardiovascular: Negative for chest pain and leg swelling.   Gastrointestinal: Positive for abdominal pain, diarrhea and vomiting. Negative for nausea.   Genitourinary: Negative for difficulty urinating and dysuria.   Skin: Negative for rash.   Neurological: Negative for  dizziness, weakness, numbness and headaches.   Psychiatric/Behavioral: Negative for confusion.   All other systems reviewed and are negative.      Past Medical History:   Diagnosis Date   • Acute deep vein thrombosis (DVT) of left lower extremity (HCC) 2022    Dx 22- left peroneal   • Arthralgia of hip    • Cervical facet arthropathy/cervical spondylosis without myelopathy    • Chronic pain syndrome     Description: hx of epidural injections.   • Conjunctival melanosis of left eye    • Degenerative disc disease, cervical    • Depression     Description: dx .   • Diabetes mellitus (HCC)     TYPE 2   • Duplicated left renal collecting system     Dx  by CT   • Essential hypertension     Description: dx .   • Hemorrhoid    • History of cardiovascular stress test 2019    low risk myocardial perfusion study   • History of colonoscopy 2013    normal per patient   • History of esophagogastroduodenoscopy (EGD) 10/06/2021    Path consistent with reactive gastropathy   • History of uterine leiomyoma    • History of varicella    • Hypertension    • Hypothyroidism     Description: dx .   • Insomnia    • Low back pain    • Lumbosacral disc disease    • Myopia of both eyes    • Neck pain    • Obesity (BMI 30.0-34.9)    • Osteoarthritis    • Renal angiomyolipoma     Left dx by CT .   • Tattoos     HIV neg  per patient   • Tobacco abuse    • Vitamin D deficiency     Dx 3/19.   • Wears partial dentures     upper       Allergies   Allergen Reactions   • Glimepiride Diarrhea       Past Surgical History:   Procedure Laterality Date   • ANTERIOR CERVICAL DISCECTOMY  2016    C4-6 DISC (Dr. Pena)   • ANTERIOR CERVICAL DISCECTOMY W/ FUSION Left 2018    Procedure: CERVICAL DISCECTOMY ANTERIOR WITH FUSION C6-7;  Surgeon: Nicolás Pena MD;  Location: [facility];  Service:    • BACK SURGERY      lumbar discectomy   •  SECTION      , ,    • CHOLECYSTECTOMY WITH  INTRAOPERATIVE CHOLANGIOGRAM N/A 2017    Procedure: CHOLECYSTECTOMY LAPAROSCOPIC INTRAOPERATIVE CHOLANGIOGRAM;  Surgeon: Clifford Freed MD;  Location:  [facility];  Service:    • COLONOSCOPY  2016   • HYSTERECTOMY  2015   • SPINAL CORD STIMULATOR IMPLANT N/A 2019    Procedure: SPINAL CORD STIMULATOR INSERTION PHASE 1;  Surgeon: Blayne Pandya MD;  Location:  [facility];  Service: Pain Management       Family History   Problem Relation Age of Onset   • Diabetes Mother    • Hypertension Mother    • Colon cancer Maternal Grandmother    • Diabetes Maternal Grandmother    • Hypertension Maternal Grandmother    • Diabetes Daughter    • Hypothyroidism Daughter    • Diabetes Maternal Uncle    • Hypertension Maternal Uncle    • Hypertension Other    • No Known Problems Father         Medical and Family History unknown   • Breast cancer Neg Hx    • Ovarian cancer Neg Hx        Social History     Socioeconomic History   • Marital status: Legally    • Number of children: 3   Tobacco Use   • Smoking status: Former Smoker     Packs/day: 0.50     Years: 28.00     Pack years: 14.00     Types: Cigarettes     Start date:      Quit date: 2017     Years since quittin.6   • Smokeless tobacco: Never Used   Vaping Use   • Vaping Use: Never used   Substance and Sexual Activity   • Alcohol use: Yes     Comment: 2-3 cocktains, 3-4 times per year   • Drug use: No   • Sexual activity: Not Currently     Partners: Male           Objective   Physical Exam  Vitals and nursing note reviewed.   Constitutional:       General: She is not in acute distress.     Appearance: Normal appearance. She is well-developed.      Comments: She is very anxious, she is sitting talking to me with her eyes closed   HENT:      Head: Normocephalic and atraumatic.      Nose: Nose normal. No congestion or rhinorrhea.   Eyes:      General: No scleral icterus.     Conjunctiva/sclera: Conjunctivae normal.   Neck:      Vascular: No JVD.       Trachea: No tracheal deviation.      Comments: No JVD  Cardiovascular:      Rate and Rhythm: Normal rate and regular rhythm.      Heart sounds: Normal heart sounds. No murmur heard.    No friction rub. No gallop.   Pulmonary:      Effort: Pulmonary effort is normal. No respiratory distress.      Breath sounds: Normal breath sounds. No stridor. No wheezing, rhonchi or rales.   Chest:      Chest wall: No tenderness.   Abdominal:      General: Bowel sounds are normal. There is no distension.      Palpations: Abdomen is soft.      Tenderness: There is abdominal tenderness. There is no guarding or rebound.      Comments: She is diffusely tender to light touch in all areas of her abdomen.   Musculoskeletal:         General: No tenderness or deformity. Normal range of motion.      Cervical back: Normal range of motion and neck supple.      Right lower leg: No edema.      Left lower leg: No edema.   Skin:     General: Skin is warm and dry.      Findings: No erythema or rash.   Neurological:      Mental Status: She is alert and oriented to person, place, and time.      Motor: No weakness.      Coordination: Coordination normal.   Psychiatric:         Mood and Affect: Mood is anxious.         Behavior: Behavior normal.         Thought Content: Thought content normal.         Procedures           ED Course  ED Course as of 06/22/22 1006   Wed Jun 22, 2022   0746 Upon further questioning she tells me she went to Dr. Schaeffer's office this morning to have her colonoscopy and they discovered she was on Eliquis and told her they could not perform.  Review of her records indicates that she actually did not have a stroke a few weeks ago but was admitted for left-sided weakness with negative findings on MRI and angiograms. [DT]   0829 Has received medications and her blood pressure is still high.  We will give labetalol. [DT]   0850 CT is negative.  Bowel is mostly empty and certainly not distended.  Potassium is 2.9, will give IV  potassium. [DT]   0917 Blood pressure has come down.  On repeat exam Mrs. Garrido is sleeping soundly.  I have ordered second liter of IV fluids.  Anticipate discharge with symptomatic treatment when her potassium and second liter of fluids RN. [DT]   1003 Mrs. Garrido has been here over 2 hours and has not had any further vomiting.  Upon awakening she tells me she feels better.  I spoke with her about discharge instructions.  She will take her blood pressure medicine when she gets home.  We gave Reglan here and it has helped her quite a bit.  She tells me she is never tried that at home so will prescribe that at home for her to try [DT]      ED Course User Index  [DT] Александр Fountain MD                                           MDM  Number of Diagnoses or Management Options  Acute abdominal pain: new and requires workup  Elevated blood pressure reading with diagnosis of hypertension: new and requires workup  Hypokalemia: new and does not require workup     Amount and/or Complexity of Data Reviewed  Clinical lab tests: ordered and reviewed  Tests in the radiology section of CPT®: ordered and reviewed  Review and summarize past medical records: yes  Independent visualization of images, tracings, or specimens: yes    Patient Progress  Patient progress: improved      Final diagnoses:   Elevated blood pressure reading with diagnosis of hypertension   Acute abdominal pain   Hypokalemia       ED Disposition  ED Disposition     ED Disposition   Discharge    Condition   Stable    Comment   --             Stephanie Tanner MD  [address] [facility] [address]  [facility] [address] [phone]          Mark Schaeffer MD  [address]  [facility] [address] [phone]               Medication List      New Prescriptions    metoclopramide 10 MG tablet  Commonly known as: REGLAN  Take 1 tablet by mouth 3 (Three) Times a Day As Needed (Nausea, vomiting, and abdominal pain).     potassium chloride 20 MEQ CR tablet  Commonly  known as: K-DUR,KLOR-CON  Take 1 tablet by mouth 2 (Two) Times a Day.        Stop    apixaban 5 MG tablet tablet  Commonly known as: LEON           Where to Get Your Medications      These medications were sent to [facility] - [place], [place] - [address] [place] [address] - [phone]  - [phone] FX  [address] [id number]    Phone: [phone]   · metoclopramide 10 MG tablet  · potassium chloride 20 MEQ CR tablet          Александр Fountain MD  06/22/22 8431

## 2022-06-22 NOTE — DISCHARGE INSTRUCTIONS
I have sent in a prescription for Reglan.  This should help with your nausea and possibly your abdominal pain 2.  It may make you sleepy should not plan on driving after you take it.  Call your primary care provider today and arrange follow-up with them.  Return here anytime if any concerns.  Your potassium level is also low.  I have sent in a prescription for potassium for you to take over the next week.

## 2022-07-05 DIAGNOSIS — K21.9 GASTROESOPHAGEAL REFLUX DISEASE, UNSPECIFIED WHETHER ESOPHAGITIS PRESENT: ICD-10-CM

## 2022-07-05 DIAGNOSIS — E11.65 TYPE 2 DIABETES MELLITUS WITH HYPERGLYCEMIA, WITHOUT LONG-TERM CURRENT USE OF INSULIN: ICD-10-CM

## 2022-07-05 RX ORDER — OMEPRAZOLE 40 MG/1
CAPSULE, DELAYED RELEASE ORAL
Qty: 30 CAPSULE | Refills: 3 | Status: SHIPPED | OUTPATIENT
Start: 2022-07-05 | End: 2022-11-01 | Stop reason: SDUPTHER

## 2022-07-05 RX ORDER — DAPAGLIFLOZIN 10 MG/1
TABLET, FILM COATED ORAL
Qty: 30 TABLET | Refills: 5 | Status: SHIPPED | OUTPATIENT
Start: 2022-07-05 | End: 2023-03-02

## 2022-07-19 ENCOUNTER — TELEPHONE (OUTPATIENT)
Dept: INTERNAL MEDICINE | Facility: CLINIC | Age: 52
End: 2022-07-19

## 2022-07-19 ENCOUNTER — OFFICE VISIT (OUTPATIENT)
Dept: INTERNAL MEDICINE | Facility: CLINIC | Age: 52
End: 2022-07-19

## 2022-07-19 VITALS
DIASTOLIC BLOOD PRESSURE: 62 MMHG | BODY MASS INDEX: 26.49 KG/M2 | TEMPERATURE: 98 F | HEART RATE: 88 BPM | RESPIRATION RATE: 20 BRPM | SYSTOLIC BLOOD PRESSURE: 104 MMHG | WEIGHT: 174.25 LBS

## 2022-07-19 DIAGNOSIS — I82.452 ACUTE DEEP VEIN THROMBOSIS (DVT) OF LEFT PERONEAL VEIN: ICD-10-CM

## 2022-07-19 DIAGNOSIS — F43.21 SITUATIONAL DEPRESSION: ICD-10-CM

## 2022-07-19 DIAGNOSIS — R10.84 GENERALIZED ABDOMINAL PAIN: ICD-10-CM

## 2022-07-19 DIAGNOSIS — I67.9 CEREBROVASCULAR DISEASE: ICD-10-CM

## 2022-07-19 DIAGNOSIS — R00.2 PALPITATIONS: Primary | ICD-10-CM

## 2022-07-19 DIAGNOSIS — K21.9 GASTROESOPHAGEAL REFLUX DISEASE, UNSPECIFIED WHETHER ESOPHAGITIS PRESENT: Primary | ICD-10-CM

## 2022-07-19 PROCEDURE — 93000 ELECTROCARDIOGRAM COMPLETE: CPT | Performed by: INTERNAL MEDICINE

## 2022-07-19 PROCEDURE — 99214 OFFICE O/P EST MOD 30 MIN: CPT | Performed by: INTERNAL MEDICINE

## 2022-07-19 RX ORDER — ATORVASTATIN CALCIUM 40 MG/1
1 TABLET, FILM COATED ORAL NIGHTLY
COMMUNITY
Start: 2022-07-05 | End: 2022-07-19 | Stop reason: DRUGHIGH

## 2022-07-19 RX ORDER — ESCITALOPRAM OXALATE 20 MG/1
20 TABLET ORAL DAILY
Qty: 30 TABLET | Refills: 5 | Status: SHIPPED | OUTPATIENT
Start: 2022-07-19

## 2022-07-19 RX ORDER — APIXABAN 5 MG/1
1 TABLET, FILM COATED ORAL 2 TIMES DAILY
COMMUNITY
Start: 2022-07-05 | End: 2022-09-28

## 2022-07-19 NOTE — TELEPHONE ENCOUNTER
Caller: Ernestina Epstien    Relationship: Self    Best call back number: [phone]    What is the best time to reach you: ANYTIME    Who are you requesting to speak with (clinical staff, provider,  specific staff member): PROVIDER    Do you know the name of the person who called: SELF    What was the call regarding: PATIENT STATES THAT HER MEDICATION WAS METOCLOPRAMIDE 10MG. PATIENT ALSO STATES THAT SHE CALLED DR. CAST AND SHE HAS TO HAVE CLEARNECE FROM HER CARDIAC DOCTOR, PATIENT STATES THAT IS DR. SEGOVIA.    Do you require a callback: YES

## 2022-07-19 NOTE — PROGRESS NOTES
Subjective       Ernestina Epstein is a 52 y.o. female.     Chief Complaint   Patient presents with   • Abdominal Pain     X 1 year   nausea    • Depression   • Irregular Heart Beat       History obtained from the patient.    The patient had a left peroneal DVT on 5/16/2022.  She is on Eliquis.  She reports occasional leg pain, but no swelling.    The patient has been having problems with abdominal pain, nausea, and vomiting.  She has lost 26 pounds in the past 4 months.  She states she had an upper endoscopy and a colonoscopy scheduled, to be done per Dr. Barrera and Dr. Schaeffer, but it was canceled when they found out she was on Eliquis.      Palpitations   This is a new problem. Episode onset: 3 days ago. The problem occurs intermittently. The problem has been gradually improving. On average, each episode lasts 5 minutes. The symptoms are aggravated by exercise (and vomiting). Associated symptoms include anxiety, diaphoresis, dizziness, an irregular heartbeat, malaise/fatigue, nausea and vomiting. Pertinent negatives include no chest fullness, chest pain, coughing, fever, near-syncope, numbness, shortness of breath (but needs to take deep breaths) or syncope. She has tried nothing for the symptoms. Risk factors include diabetes mellitus, sedentary lifestyle, obesity and dyslipidemia. Her past medical history is significant for anxiety. There is no history of heart disease or hyperthyroidism.      Situational Depression Initial Evaluation: The patient is here for follow-up of Situational Depression, which has worsened.    Comorbid Illness: Insomnia.  Interval Events:  She is anxious and depressed over her recent medical issues.  She has increased stress because she has had to miss work frequently.    Symptoms: She reports worsening depression, anxiety, and insomnia.  Denies panic attacks, anhedonia, memory loss, and concentration issues.    Associated Symptoms: No suicidal ideation or thoughts of  self-harm.    Medication: Lexapro  Side Effects: None.    The following portions of the patient's history were reviewed and updated as appropriate: allergies, current medications, past family history, past medical history, past social history, past surgical history and problem list.      Review of Systems   Constitutional: Positive for diaphoresis, malaise/fatigue and unexpected weight change (decreased 26 pounds unintentionally in 4 months). Negative for fatigue and fever.   Eyes: Negative for visual disturbance.   Respiratory: Negative for cough, shortness of breath (but needs to take deep breaths) and wheezing.    Cardiovascular: Positive for palpitations. Negative for chest pain, leg swelling, syncope and near-syncope.         Reports MAIN, orthopnea, or claudication.   Gastrointestinal: Positive for abdominal pain, nausea and vomiting. Negative for blood in stool, constipation and diarrhea.        Denies melena. Reports frequent belching.   Endocrine: Positive for polydipsia. Negative for polyuria.   Musculoskeletal: Positive for arthralgias and myalgias.   Neurological: Positive for dizziness and light-headedness. Negative for syncope, numbness and headaches.        No memory issues.   Psychiatric/Behavioral: Positive for sleep disturbance. Negative for decreased concentration, self-injury and suicidal ideas. The patient is nervous/anxious.            Objective     Blood pressure 104/62, pulse 88, temperature 98 °F (36.7 °C), temperature source Temporal, resp. rate 20, weight 79 kg (174 lb 4 oz), not currently breastfeeding.    Physical Exam  Vitals and nursing note reviewed.   Constitutional:       Appearance: She is well-developed.      Comments: Overweight.   Neck:      Thyroid: No thyroid mass or thyromegaly.      Vascular: No carotid bruit.   Cardiovascular:      Rate and Rhythm: Normal rate and regular rhythm.      Pulses: Normal pulses.      Heart sounds: Normal heart sounds. No murmur heard.    No  friction rub. No gallop.   Pulmonary:      Effort: Pulmonary effort is normal.      Breath sounds: Normal breath sounds.   Abdominal:      General: Bowel sounds are normal. There is no distension or abdominal bruit.      Palpations: Abdomen is soft. There is no hepatomegaly, splenomegaly or mass.      Tenderness: There is abdominal tenderness (moderate diffuse). There is no guarding or rebound.   Musculoskeletal:      Cervical back: Normal range of motion and neck supple.      Right lower leg: No edema.      Left lower leg: No edema.   Neurological:      Mental Status: She is alert.   Psychiatric:         Mood and Affect: Mood normal.           ECG 12 Lead    Date/Time: 7/19/2022 8:31 AM  Performed by: Stephanie Tanner MD  Authorized by: Stephanie Tanner MD   Comparison: compared with previous ECG from 6/22/2022  Comparison to previous ECG: PVCs have resolved.  Rhythm: sinus rhythm  Ectopy comments: None  Rate: normal  Conduction: conduction normal  QRS axis: normal  Other findings: non-specific ST-T wave changes    Clinical impression: normal ECG              Assessment & Plan   Diagnoses and all orders for this visit:    1. Palpitations (Primary)- may be stress related  -     ECG 12 Lead   May need an echocardiogram and/or Holter monitor if symptoms persist.    2. Generalized abdominal pain   Recommended the patient call GI to reschedule her testing.  We will need to take the patient off Eliquis and switch to Heparin prior to the procedure.    3. Acute deep vein thrombosis (DVT) of left peroneal vein (HCC)   Continue current medication(s) as noted in the history of present illness.   Will need repeat ultrasound approximately 8/16/2022.    4. Situational depression  -     escitalopram (LEXAPRO) 20 MG tablet; Take 1 tablet by mouth Daily.  Dispense: 30 tablet; Refill: 5- INCREASED DOSE            Return in about 1 month (around 8/19/2022) for Recheck Diabetes, fasting.

## 2022-07-20 NOTE — TELEPHONE ENCOUNTER
Please call GA.    The patient had gone for a Colonoscopy and EGD, and it was canceled when they realized she was on Eliquis.  I told her to call them back and re-schedule these tests, and I would switch her to Heparin for the procedures.    She is on the Eliquis for a DVT, which I am prescribing.  She does not have a cardiac doctor.    I would need to know how long they would want her to be off the Eliquis prior to the procedures.

## 2022-07-20 NOTE — TELEPHONE ENCOUNTER
Please have them reschedule these tests and give me the date, so I can take her off of Eliquis and put her on heparin.

## 2022-07-21 NOTE — TELEPHONE ENCOUNTER
Left detailed message on [facility] voicemail  To return call to get Ernestina rescheduled   See msg from Dr Tanner

## 2022-07-22 NOTE — TELEPHONE ENCOUNTER
Tuyet with [facility] called. She stated that they have this patient on a recall list for October. They are mailing her a letter explaining this. Due to having a stroke there office protocol is to have patient reevaluated in 3 months and a new P ran.     Tuyet can be reached at [phone].

## 2022-07-25 NOTE — TELEPHONE ENCOUNTER
Patient is calling regarding medication that she recently requested and also GI referral, asking for a call back.

## 2022-07-25 NOTE — TELEPHONE ENCOUNTER
Left message for Tuyet to return call.  Please read to Tuyet   Per Dr Tanner The patient did not have a stroke, just a blood clot in her leg. Not a stroke   Is there any other information they need done.

## 2022-07-26 NOTE — TELEPHONE ENCOUNTER
Please inform the patient, per review of her hospital records she did not have any acute stroke or mini stroke.  There was evidence of a chronic infarct, indicating a possible previous stroke remotely, on her CT.    At this point, I would recommend a Neurology evaluation to clear her for her EGD and Colonoscopy.  If she is agreeable, I will enter an order.

## 2022-07-26 NOTE — TELEPHONE ENCOUNTER
Left Tuyet a message to call back regarding the information from Dr Tnaner that she did not have a stroke  She had a blood clot in her leg.  Office # given

## 2022-07-26 NOTE — TELEPHONE ENCOUNTER
Received message from Tuyet at [facility] office  And said that when she called to scheduled EGD Ernestina told her she recently had a mini stroke and if that was the case she would need to wait for 4 months and be cleared from her neurologist  to have procedure.    Spoke with Ernestina she states that she was admitted on May 25 [facility] thru the ED and was in the hospital for 1 day. She did have imaging tests done at that time.  When discharged she was told she had a mini stroke   She also went back to [facility] ED on 6/22/2022 for treatment.  She also wants to know if she should continue the ASA if she is taking Eliquis  Please advise   She was told she had a mini stroke.   Please advise

## 2022-07-28 RX ORDER — METOCLOPRAMIDE 10 MG/1
10 TABLET ORAL 3 TIMES DAILY PRN
Qty: 50 TABLET | Refills: 1 | Status: SHIPPED | OUTPATIENT
Start: 2022-07-28 | End: 2022-10-03 | Stop reason: SDUPTHER

## 2022-07-28 NOTE — TELEPHONE ENCOUNTER
Pt agrees for Neuro evaluation. Please schedule    Also, patient states ER prescribed Reglan 10mg TID prn.   This medication helped and she is out. Wants to know if Dr. Tanner can send in another presciption

## 2022-08-05 ENCOUNTER — TELEPHONE (OUTPATIENT)
Dept: INTERNAL MEDICINE | Facility: CLINIC | Age: 52
End: 2022-08-05

## 2022-08-05 NOTE — TELEPHONE ENCOUNTER
Caller: Ernestina Epstein    Relationship: Self    Best call back number:[phone]    What medication are you requesting:   MEDICATED EYE SOLUTION     What are your current symptoms:   STYE ON THE RIGHT EYE      How long have you been experiencing symptoms:   A WEEK     Have you had these symptoms before:    [x] Yes  [] No    Have you been treated for these symptoms before:   [x] Yes  [] No    If a prescription is needed, what is your preferred pharmacy and phone number:    [facility] [address] - [phone] [facility] [phone]   [phone]    Additional notes:  PATIENT STATED THAT SHE HAS BEEN USING OVER THE COUNTER EYE SOLUTION AND WARM COMPRESS THAT'S NOT HELPING TO THE STYE ON THE RIGHT EYE AND PATIENT WOULD LIKE FOR MEDICATION TO BE CALLED INTO THE PHARMACY

## 2022-08-08 ENCOUNTER — OFFICE VISIT (OUTPATIENT)
Dept: NEUROLOGY | Facility: CLINIC | Age: 52
End: 2022-08-08

## 2022-08-08 VITALS
WEIGHT: 180 LBS | HEIGHT: 68 IN | HEART RATE: 85 BPM | DIASTOLIC BLOOD PRESSURE: 68 MMHG | SYSTOLIC BLOOD PRESSURE: 102 MMHG | BODY MASS INDEX: 27.28 KG/M2

## 2022-08-08 DIAGNOSIS — R00.2 PALPITATIONS: ICD-10-CM

## 2022-08-08 DIAGNOSIS — R55 SYNCOPE AND COLLAPSE: Primary | ICD-10-CM

## 2022-08-08 DIAGNOSIS — Z86.73 HISTORY OF CEREBELLAR STROKE: ICD-10-CM

## 2022-08-08 PROCEDURE — 99215 OFFICE O/P EST HI 40 MIN: CPT | Performed by: NURSE PRACTITIONER

## 2022-08-08 RX ORDER — ATORVASTATIN CALCIUM 80 MG/1
80 TABLET, FILM COATED ORAL NIGHTLY
Qty: 90 TABLET | Refills: 3 | Status: SHIPPED | OUTPATIENT
Start: 2022-08-08 | End: 2022-12-29 | Stop reason: SDUPTHER

## 2022-08-08 RX ORDER — ASPIRIN 81 MG/1
81 TABLET, CHEWABLE ORAL DAILY
Qty: 90 TABLET | Refills: 3 | Status: SHIPPED | OUTPATIENT
Start: 2022-08-08

## 2022-08-08 RX ORDER — ERYTHROMYCIN 5 MG/G
OINTMENT OPHTHALMIC NIGHTLY
Qty: 1 G | Refills: 0 | Status: SHIPPED | OUTPATIENT
Start: 2022-08-08 | End: 2022-08-15

## 2022-08-08 NOTE — TELEPHONE ENCOUNTER
Please let her know I have sent in erythromycin ointment for 7 days. She is to pull the lower eyelid down slightly, apply thin line of ointment right before bed. If her stye does not improve, she needs to be seen in office to rule out other infections or eye conditions.

## 2022-08-08 NOTE — PROGRESS NOTES
Subjective:     Patient ID: Ernestina Epstein is a 52 y.o. female.    CC:   Chief Complaint   Patient presents with   • Syncope   • Stroke       HPI:   History of Present Illness   Today 8/8/2022-  This is a 52-year-old female who presents for initial neurological evaluation of reports of cerebrovascular disease. She was referred by her primary care provider for further evaluation. She was admitted to [facility] from 05/25/2022 until 05/26/2022 for acute onset left-sided weakness with a history of recent DVT, started on Eliquis. She also has underlying cervical spondylosis, chronic pain syndrome, degenerative disc changes, depression, type 2 diabetes, hypertension, and hypothyroidism. She presented to the ER for severe headache and left-sided facial droop. CTA of the head and neck showed no evidence of acute infarct but CT Cerebral perfusion showed a small remote lacunar infarct in the left cerebellum. MRI of the brain showed no acute intracranial abnormalities. The patient initially had a positive COVID-19 test but repeat testing was negative and so they suspected this was a false positive. They did complete an MRA venogram which showed no thrombosis or stroke. Neurology did evaluate her due to her chronic infarct. They did recommend continuing aspirin 81 mg daily along with Lipitor 80 mg daily and completing her home Eliquis. She did undergo echocardiogram with agitated saline which showed an ejection fraction of 65% with mild mitral regurgitation, mild left atrial enlargement, and negative saline bubble study. She was discharged home. I was able to review neurology inpatient consultation. She is here for neurology clearance for some GI procedures she is needing to have done due to their request. She has no recollection of prior stroke or stroke like symptoms. She has no long term effects from her hospitalization.    The patient states she has been to the hospital at least 20 times  "for abdominal pain.  She notes that \"she was hysterical\" secondary to her trip to the emergency room regarding abdominal pain from drinking the liquid for colonoscopy preparation. She reports that her colonoscopy procedure is necessary secondary to her abdominal pain causing nausea, vomiting, and difficulty eating. She notes she has lost 60 pounds in 3 to 4 months. She reports previously suspecting her symptoms were stress-related because she lost her mother recently. She also notes that she is on the verge of losing her job.     She reports she was told that she experienced a \"mini stroke\" which raised concern. She notes she is currently taking a blood thinner. She reports that she has been asking to see cardiology and she has not yet seen them. After remote surgery on her neck, she notes experiencing a constant pain in her left chest. She reports she has been experiencing heart palpitations and denies any history of seizures, head injuries, or concussions. She states that she smokes occasionally when she is stressed out. She notes that she is not an everyday smoker.    She reports 1 month ago she sustained a fall out of her bed while reaching for her phone, and she only remembers waking up on the floor. She states after that incident she experienced pain in the posterior region of her head where she had hit the floor. About  1 year ago in the kitchen, she recalls experiencing syncope then waking up with bruises and moved furniture. She reports waking up dizzy and denies experiencing any incontinence or tongue biting. She states she does not typically experience headaches.    The following portions of the patient's history were reviewed and updated as appropriate: allergies, current medications, past family history, past medical history, past social history, past surgical history and problem list.    Past Medical History:   Diagnosis Date   • Acute deep vein thrombosis (DVT) of left lower extremity (HCC) 05/16/2022 "    Dx 22- left peroneal   • Arthralgia of hip    • Cervical facet arthropathy/cervical spondylosis without myelopathy    • Chronic pain syndrome     Description: hx of epidural injections.   • Conjunctival melanosis of left eye    • Degenerative disc disease, cervical    • Depression     Description: dx .   • Diabetes mellitus (HCC)     TYPE 2   • Duplicated left renal collecting system     Dx  by CT   • Essential hypertension     Description: dx .   • Hemorrhoid    • History of cardiovascular stress test 2019    low risk myocardial perfusion study   • History of colonoscopy 2013    normal per patient   • History of esophagogastroduodenoscopy (EGD) 10/06/2021    Path consistent with reactive gastropathy   • History of uterine leiomyoma    • History of varicella    • Hypertension    • Hypothyroidism     Description: dx .   • Insomnia    • Low back pain    • Lumbosacral disc disease    • Myopia of both eyes    • Neck pain    • Obesity (BMI 30.0-34.9)    • Osteoarthritis    • Renal angiomyolipoma     Left dx by CT .   • Tattoos     HIV neg  per patient   • Tobacco abuse    • Vitamin D deficiency     Dx 3/19.   • Wears partial dentures     upper       Past Surgical History:   Procedure Laterality Date   • ANTERIOR CERVICAL DISCECTOMY  2016    C4-6 DISC (Dr. Pena)   • ANTERIOR CERVICAL DISCECTOMY W/ FUSION Left 2018    Procedure: CERVICAL DISCECTOMY ANTERIOR WITH FUSION C6-7;  Surgeon: Nicolás Pena MD;  Location:  [facility];  Service:    • BACK SURGERY      lumbar discectomy   •  SECTION      , ,    • CHOLECYSTECTOMY WITH INTRAOPERATIVE CHOLANGIOGRAM N/A 2017    Procedure: CHOLECYSTECTOMY LAPAROSCOPIC INTRAOPERATIVE CHOLANGIOGRAM;  Surgeon: Clifford Freed MD;  Location:  [facility];  Service:    • COLONOSCOPY  2016   • HYSTERECTOMY  2015   • SPINAL CORD STIMULATOR IMPLANT N/A 2019    Procedure: SPINAL CORD STIMULATOR INSERTION  PHASE 1;  Surgeon: Blayne Pandya MD;  Location: [facility];  Service: Pain Management       Social History     Socioeconomic History   • Marital status: Legally    • Number of children: 3   Tobacco Use   • Smoking status: Former Smoker     Packs/day: 0.50     Years: 28.00     Pack years: 14.00     Types: Cigarettes     Start date:      Quit date: 2017     Years since quittin.7   • Smokeless tobacco: Never Used   Vaping Use   • Vaping Use: Never used   Substance and Sexual Activity   • Alcohol use: Yes     Comment: 2-3 cocktains, 3-4 times per year   • Drug use: No   • Sexual activity: Not Currently     Partners: Male       Family History   Problem Relation Age of Onset   • Diabetes Mother    • Hypertension Mother    • Colon cancer Maternal Grandmother    • Diabetes Maternal Grandmother    • Hypertension Maternal Grandmother    • Diabetes Daughter    • Hypothyroidism Daughter    • Diabetes Maternal Uncle    • Hypertension Maternal Uncle    • Hypertension Other    • No Known Problems Father         Medical and Family History unknown   • Breast cancer Neg Hx    • Ovarian cancer Neg Hx           Current Outpatient Medications:   •  aspirin 81 MG chewable tablet, Chew 1 tablet Daily., Disp: 90 tablet, Rfl: 3  •  atorvastatin (LIPITOR) 80 MG tablet, Take 1 tablet by mouth Every Night., Disp: 90 tablet, Rfl: 3  •  Blood Glucose Monitoring Suppl (FREESTYLE FREEDOM LITE) w/Device kit, Use twice daily   Dx  E11.9, Disp: 1 each, Rfl: 0  •  Diclofenac Sodium (VOLTAREN) 1 % gel gel, 2 grams QID prn pain upper extremity joints and 4 grams QID pain lower extremity joints, Disp: 100 g, Rfl: 5  •  Eliquis 5 MG tablet tablet, 1 tablet 2 (Two) Times a Day., Disp: , Rfl:   •  escitalopram (LEXAPRO) 20 MG tablet, Take 1 tablet by mouth Daily., Disp: 30 tablet, Rfl: 5  •  Farxiga 10 MG tablet, TAKE ONE TABLET BY MOUTH DAILY, Disp: 30 tablet, Rfl: 5  •  glucose blood (FREESTYLE LITE) test strip, USE TO TEST BLOOD  SUGAR TWO TIMES A DAY AS DIRECTED, Disp: 100 each, Rfl: 3  •  hydroCHLOROthiazide (HYDRODIURIL) 50 MG tablet, TAKE ONE TABLET BY MOUTH DAILY, Disp: 30 tablet, Rfl: 5  •  Januvia 100 MG tablet, TAKE ONE TABLET BY MOUTH DAILY, Disp: 30 tablet, Rfl: 5  •  Lancets (FREESTYLE) lancets, USE  LANCET  TO TEST TWICE  DAILY, Disp: 100 each, Rfl: 11  •  levothyroxine (SYNTHROID, LEVOTHROID) 100 MCG tablet, TAKE ONE TABLET BY MOUTH DAILY, Disp: 30 tablet, Rfl: 4  •  lisinopril (PRINIVIL,ZESTRIL) 20 MG tablet, TAKE ONE TABLET BY MOUTH EVERY NIGHT AT BEDTIME, Disp: 30 tablet, Rfl: 5  •  metoclopramide (REGLAN) 10 MG tablet, Take 1 tablet by mouth 3 (Three) Times a Day As Needed (Nausea, vomiting, and abdominal pain)., Disp: 50 tablet, Rfl: 1  •  omeprazole (priLOSEC) 40 MG capsule, TAKE ONE CAPSULE BY MOUTH DAILY, Disp: 30 capsule, Rfl: 3  •  erythromycin (ROMYCIN) 5 MG/GM ophthalmic ointment, Administer  to the right eye Every Night for 7 days., Disp: 1 g, Rfl: 0  •  pioglitazone (ACTOS) 45 MG tablet, Take 1 tablet by mouth Daily., Disp: 30 tablet, Rfl: 5  •  potassium chloride (K-DUR,KLOR-CON) 20 MEQ CR tablet, Take 1 tablet by mouth 2 (Two) Times a Day., Disp: 20 tablet, Rfl: 0  •  vitamin D (ERGOCALCIFEROL) 1.25 MG (48374 UT) capsule capsule, TAKE ONE CAPSULE BY MOUTH ONCE WEEKLY, Disp: 4 capsule, Rfl: 2     Review of Systems   Constitutional: Negative for chills, fatigue, fever and unexpected weight change.   HENT: Negative for ear pain, hearing loss, nosebleeds, rhinorrhea and sore throat.    Eyes: Negative for photophobia, pain, discharge, itching and visual disturbance.   Respiratory: Negative for cough, chest tightness, shortness of breath and wheezing.    Cardiovascular: Positive for palpitations. Negative for chest pain and leg swelling.   Gastrointestinal: Positive for abdominal pain. Negative for blood in stool, constipation, diarrhea, nausea and vomiting.   Genitourinary: Negative for dysuria, frequency, hematuria  "and urgency.   Musculoskeletal: Negative for arthralgias, back pain, gait problem, joint swelling, myalgias, neck pain and neck stiffness.   Skin: Negative for rash and wound.   Allergic/Immunologic: Negative for environmental allergies and food allergies.   Neurological: Positive for syncope and light-headedness. Negative for dizziness, tremors, seizures, speech difficulty, weakness, numbness and headaches.   Hematological: Negative for adenopathy. Does not bruise/bleed easily.   Psychiatric/Behavioral: Negative for agitation, confusion, decreased concentration, hallucinations, sleep disturbance and suicidal ideas. The patient is nervous/anxious.         Objective:  /68   Pulse 85   Ht 172.7 cm (68\")   Wt 81.6 kg (180 lb)   LMP  (LMP Unknown)   BMI 27.37 kg/m²     Neurologic Exam     Mental Status   Oriented to person, place, and time.   Speech: speech is normal   Level of consciousness: alert  Knowledge: good.     Cranial Nerves   Cranial nerves II through XII intact.     Motor Exam   Muscle bulk: normal  Overall muscle tone: normal    Strength   Strength 5/5 throughout.     Sensory Exam   Light touch normal.   Vibration normal.   Proprioception normal.   Pinprick normal.     Gait, Coordination, and Reflexes     Gait  Gait: normal    Coordination   Romberg: negative  Finger to nose coordination: normal  Heel to shin coordination: normal  Tandem walking coordination: normal    Tremor   Resting tremor: absent  Intention tremor: absent  Action tremor: absent    Reflexes   Reflexes 2+ except as noted.   Right : 2+  Left : 2+  Right plantar: normal  Left plantar: normal  Right Sheehan: absent  Left Sheehan: absent  Right ankle clonus: absent  Left ankle clonus: absent      Physical Exam  Constitutional:       Appearance: Normal appearance.   Cardiovascular:      Rate and Rhythm: Normal rate and regular rhythm.      Heart sounds: Normal heart sounds, S1 normal and S2 normal.   Pulmonary:      Effort: " Pulmonary effort is normal.      Breath sounds: Normal breath sounds.   Neurological:      Mental Status: She is alert and oriented to person, place, and time.      Coordination: Finger-Nose-Finger Test, Heel to Shin Test and Romberg Test normal.      Gait: Gait is intact. Tandem walk normal.      Deep Tendon Reflexes: Strength normal.   Psychiatric:         Mood and Affect: Mood is anxious.         Speech: Speech normal.         Behavior: Behavior normal.         Thought Content: Thought content normal.         Cognition and Memory: Cognition and memory normal.         Judgment: Judgment normal.         Assessment/Plan:       Diagnoses and all orders for this visit:    1. Syncope and collapse (Primary)  -     EEG Continuous Monitoring With Video; Future  -     Holter Monitor - 48 Hour; Future  -     Ambulatory Referral to Cardiology    2. Palpitations  -     Holter Monitor - 48 Hour; Future  -     Ambulatory Referral to Cardiology    3. History of cerebellar stroke  -     EEG Continuous Monitoring With Video; Future  -     aspirin 81 MG chewable tablet; Chew 1 tablet Daily.  Dispense: 90 tablet; Refill: 3  -     atorvastatin (LIPITOR) 80 MG tablet; Take 1 tablet by mouth Every Night.  Dispense: 90 tablet; Refill: 3           1. Syncope and collapse  - Ambulatory referral to Cardiology.  - Ordered 48-hour Holter monitoring  - We will also obtain EEG w/ unexplained syncope.    With her new reports of sudden syncope and falling out of her bed about 3 weeks ago without any warning, I have recommended EEG, although she does not have any history of prior seizures or family history of seizures or any concussions or head injury. She does know that she hit her head slightly. We will go ahead and complete this to rule out any other possibility of seizure etiology. Also with the syncope and palpitations, I recommended referring to cardiology for clearance since she has had her echocardiogram. Also recommend 48-hour Holter  monitor. Once she has these additional tests and evaluation, hopefully we can get her clearance for her EGD and colonoscopy. From a neurological standpoint, if her EEG is normal and cardiac work-up is negative, she would be able to hold her aspirin for 3 to 5 days prior to colonoscopy and EGD and then would need to restart this medication the day after her colonoscopy and EGD. She should continue aspirin 81 mg daily and Lipitor 80 mg daily lifetime for secondary stroke prevention as well as working on diabetes and hypertension management. Her PCP plans to bridge her with SQ heparin for the procedures and then restarting her on Eliquis for her DVT treatment.     Total time of visit was 40 minutes including reviewing hospital records, PCP notes, obtaining history from the patient, completing exam, discussing findings, and recommendations moving forward. [facility], Dr. Mark Schaeffer, who we will send our note today in regards to her visit.    Reviewed medications, potential side effects and signs and symptoms to report. Discussed risk versus benefits of treatment plan with patient and/or family-including medications, labs and radiology that may be ordered. Addressed questions and concerns during visit. Patient and/or family verbalized understanding and agree with plan.    AS THE PROVIDER, I PERSONALLY WORE PPE DURING ENTIRE FACE TO FACE ENCOUNTER IN CLINIC WITH THE PATIENT. PATIENT ALSO WORE PPE DURING ENTIRE FACE TO FACE ENCOUNTER EXCEPT FOR A MAX OF 30 SECONDS DURING NEUROLOGICAL EVALUATION OF CRANIAL NERVES AND THEN MASK WAS PLACED BACK OVER PATIENT FACE FOR REMAINDER OF VISIT. I WASHED MY HANDS BEFORE AND AFTER VISIT.    Discussed signs and symptoms of stroke and when to call 911. Instructed to follow a low fat diet including the Mediterranean diet. Instructed to take all medications daily as prescribed. Encouraged 30 minutes of exercise 3-4 times a week as tolerated. Stay well  hydrated. Discussed potential side effects of new medications and signs and symptoms to report. If patient is currently using tobacco products, smoking cessation was encouraged. Reviewed stroke risk factors and stroke prevention plan. Patient and/or family verbalizes understanding and agrees with plan.     During this visit the following were done:  Labs Reviewed [x]  hgba1c 9.6%, ldl 117 in hospital 5/2022  Labs Ordered []    Radiology Reports Reviewed [x]    Radiology Ordered [x]    PCP Records Reviewed [x]    Referring Provider Records Reviewed [x]    ER Records Reviewed [x]    Hospital Records Reviewed [x]    History Obtained From Family []    Radiology Images Reviewed [x]    Other Reviewed [x]    Records Requested []      Transcribed from ambient dictation for MIRIAM Claudio by Lisbet Peters.  08/08/22   17:57 EDT    Patient verbalized consent to the visit recording.  I have personally performed the services described in this document as transcribed by the above individual, and it is both accurate and complete.  MIRIAM Claudio  8/9/2022  20:49 EDT

## 2022-09-01 ENCOUNTER — APPOINTMENT (OUTPATIENT)
Dept: CT IMAGING | Facility: HOSPITAL | Age: 52
End: 2022-09-01

## 2022-09-01 ENCOUNTER — HOSPITAL ENCOUNTER (EMERGENCY)
Facility: HOSPITAL | Age: 52
Discharge: HOME | End: 2022-09-01
Attending: EMERGENCY MEDICINE | Admitting: EMERGENCY MEDICINE

## 2022-09-01 VITALS
TEMPERATURE: 98 F | SYSTOLIC BLOOD PRESSURE: 158 MMHG | HEART RATE: 69 BPM | BODY MASS INDEX: 27.28 KG/M2 | HEIGHT: 68 IN | OXYGEN SATURATION: 98 % | WEIGHT: 180 LBS | RESPIRATION RATE: 18 BRPM | DIASTOLIC BLOOD PRESSURE: 93 MMHG

## 2022-09-01 DIAGNOSIS — R73.9 HYPERGLYCEMIA: ICD-10-CM

## 2022-09-01 DIAGNOSIS — R19.7 NAUSEA VOMITING AND DIARRHEA: ICD-10-CM

## 2022-09-01 DIAGNOSIS — R07.9 CHEST PAIN, UNSPECIFIED TYPE: Primary | ICD-10-CM

## 2022-09-01 DIAGNOSIS — E87.6 HYPOKALEMIA: ICD-10-CM

## 2022-09-01 DIAGNOSIS — R11.2 NAUSEA VOMITING AND DIARRHEA: ICD-10-CM

## 2022-09-01 DIAGNOSIS — R10.84 GENERALIZED ABDOMINAL PAIN: ICD-10-CM

## 2022-09-01 LAB
ALBUMIN SERPL-MCNC: 4.7 G/DL (ref 3.5–5.2)
ALBUMIN/GLOB SERPL: 1.5 G/DL
ALP SERPL-CCNC: 88 U/L (ref 39–117)
ALT SERPL W P-5'-P-CCNC: 10 U/L (ref 1–33)
ANION GAP SERPL CALCULATED.3IONS-SCNC: 13 MMOL/L (ref 5–15)
AST SERPL-CCNC: 12 U/L (ref 1–32)
BASOPHILS # BLD AUTO: 0.03 10*3/MM3 (ref 0–0.2)
BASOPHILS NFR BLD AUTO: 0.3 % (ref 0–1.5)
BILIRUB SERPL-MCNC: 0.9 MG/DL (ref 0–1.2)
BILIRUB UR QL STRIP: NEGATIVE
BUN SERPL-MCNC: 18 MG/DL (ref 6–20)
BUN/CREAT SERPL: 19.8 (ref 7–25)
CALCIUM SPEC-SCNC: 10.2 MG/DL (ref 8.6–10.5)
CHLORIDE SERPL-SCNC: 97 MMOL/L (ref 98–107)
CLARITY UR: CLEAR
CO2 SERPL-SCNC: 30 MMOL/L (ref 22–29)
COLOR UR: YELLOW
CREAT SERPL-MCNC: 0.91 MG/DL (ref 0.57–1)
DEPRECATED RDW RBC AUTO: 43.5 FL (ref 37–54)
EGFRCR SERPLBLD CKD-EPI 2021: 76.5 ML/MIN/1.73
EOSINOPHIL # BLD AUTO: 0.04 10*3/MM3 (ref 0–0.4)
EOSINOPHIL NFR BLD AUTO: 0.4 % (ref 0.3–6.2)
ERYTHROCYTE [DISTWIDTH] IN BLOOD BY AUTOMATED COUNT: 12.8 % (ref 12.3–15.4)
FLUAV RNA RESP QL NAA+PROBE: NOT DETECTED
FLUBV RNA RESP QL NAA+PROBE: NOT DETECTED
GLOBULIN UR ELPH-MCNC: 3.2 GM/DL
GLUCOSE SERPL-MCNC: 226 MG/DL (ref 65–99)
GLUCOSE UR STRIP-MCNC: ABNORMAL MG/DL
HCT VFR BLD AUTO: 40.9 % (ref 34–46.6)
HGB BLD-MCNC: 13.6 G/DL (ref 12–15.9)
HGB UR QL STRIP.AUTO: NEGATIVE
IMM GRANULOCYTES # BLD AUTO: 0.08 10*3/MM3 (ref 0–0.05)
IMM GRANULOCYTES NFR BLD AUTO: 0.8 % (ref 0–0.5)
KETONES UR QL STRIP: ABNORMAL
LEUKOCYTE ESTERASE UR QL STRIP.AUTO: NEGATIVE
LIPASE SERPL-CCNC: 34 U/L (ref 13–60)
LYMPHOCYTES # BLD AUTO: 1.74 10*3/MM3 (ref 0.7–3.1)
LYMPHOCYTES NFR BLD AUTO: 16.7 % (ref 19.6–45.3)
MAGNESIUM SERPL-MCNC: 1.8 MG/DL (ref 1.6–2.6)
MCH RBC QN AUTO: 30.9 PG (ref 26.6–33)
MCHC RBC AUTO-ENTMCNC: 33.3 G/DL (ref 31.5–35.7)
MCV RBC AUTO: 93 FL (ref 79–97)
MONOCYTES # BLD AUTO: 0.5 10*3/MM3 (ref 0.1–0.9)
MONOCYTES NFR BLD AUTO: 4.8 % (ref 5–12)
NEUTROPHILS NFR BLD AUTO: 77 % (ref 42.7–76)
NEUTROPHILS NFR BLD AUTO: 8.04 10*3/MM3 (ref 1.7–7)
NITRITE UR QL STRIP: NEGATIVE
NRBC BLD AUTO-RTO: 0 /100 WBC (ref 0–0.2)
NT-PROBNP SERPL-MCNC: 122.2 PG/ML (ref 0–900)
PH UR STRIP.AUTO: 5.5 [PH] (ref 5–8)
PLATELET # BLD AUTO: 179 10*3/MM3 (ref 140–450)
PMV BLD AUTO: 11.1 FL (ref 6–12)
POTASSIUM SERPL-SCNC: 3 MMOL/L (ref 3.5–5.2)
PROT SERPL-MCNC: 7.9 G/DL (ref 6–8.5)
PROT UR QL STRIP: NEGATIVE
QT INTERVAL: 424 MS
QT INTERVAL: 440 MS
QTC INTERVAL: 475 MS
QTC INTERVAL: 477 MS
RBC # BLD AUTO: 4.4 10*6/MM3 (ref 3.77–5.28)
SARS-COV-2 RNA RESP QL NAA+PROBE: NOT DETECTED
SODIUM SERPL-SCNC: 140 MMOL/L (ref 136–145)
SP GR UR STRIP: 1.04 (ref 1–1.03)
TROPONIN T SERPL-MCNC: <0.01 NG/ML (ref 0–0.03)
TROPONIN T SERPL-MCNC: <0.01 NG/ML (ref 0–0.03)
UROBILINOGEN UR QL STRIP: ABNORMAL
WBC NRBC COR # BLD: 10.43 10*3/MM3 (ref 3.4–10.8)

## 2022-09-01 PROCEDURE — 93005 ELECTROCARDIOGRAM TRACING: CPT | Performed by: EMERGENCY MEDICINE

## 2022-09-01 PROCEDURE — 87636 SARSCOV2 & INF A&B AMP PRB: CPT | Performed by: EMERGENCY MEDICINE

## 2022-09-01 PROCEDURE — 83880 ASSAY OF NATRIURETIC PEPTIDE: CPT | Performed by: EMERGENCY MEDICINE

## 2022-09-01 PROCEDURE — 83690 ASSAY OF LIPASE: CPT | Performed by: EMERGENCY MEDICINE

## 2022-09-01 PROCEDURE — 74177 CT ABD & PELVIS W/CONTRAST: CPT

## 2022-09-01 PROCEDURE — 25010000002 ONDANSETRON PER 1 MG: Performed by: EMERGENCY MEDICINE

## 2022-09-01 PROCEDURE — 71275 CT ANGIOGRAPHY CHEST: CPT

## 2022-09-01 PROCEDURE — 84484 ASSAY OF TROPONIN QUANT: CPT | Performed by: EMERGENCY MEDICINE

## 2022-09-01 PROCEDURE — 25010000002 HYDROMORPHONE PER 4 MG: Performed by: EMERGENCY MEDICINE

## 2022-09-01 PROCEDURE — 99284 EMERGENCY DEPT VISIT MOD MDM: CPT

## 2022-09-01 PROCEDURE — 83735 ASSAY OF MAGNESIUM: CPT | Performed by: EMERGENCY MEDICINE

## 2022-09-01 PROCEDURE — 0 IOPAMIDOL PER 1 ML: Performed by: EMERGENCY MEDICINE

## 2022-09-01 PROCEDURE — 96374 THER/PROPH/DIAG INJ IV PUSH: CPT

## 2022-09-01 PROCEDURE — 85025 COMPLETE CBC W/AUTO DIFF WBC: CPT | Performed by: EMERGENCY MEDICINE

## 2022-09-01 PROCEDURE — 36415 COLL VENOUS BLD VENIPUNCTURE: CPT

## 2022-09-01 PROCEDURE — 96375 TX/PRO/DX INJ NEW DRUG ADDON: CPT

## 2022-09-01 PROCEDURE — 80053 COMPREHEN METABOLIC PANEL: CPT | Performed by: EMERGENCY MEDICINE

## 2022-09-01 PROCEDURE — 81003 URINALYSIS AUTO W/O SCOPE: CPT | Performed by: EMERGENCY MEDICINE

## 2022-09-01 RX ORDER — ONDANSETRON 2 MG/ML
4 INJECTION INTRAMUSCULAR; INTRAVENOUS ONCE
Status: COMPLETED | OUTPATIENT
Start: 2022-09-01 | End: 2022-09-01

## 2022-09-01 RX ORDER — ONDANSETRON 4 MG/1
4 TABLET, FILM COATED ORAL EVERY 8 HOURS PRN
Qty: 15 TABLET | Refills: 0 | Status: SHIPPED | OUTPATIENT
Start: 2022-09-01 | End: 2022-10-03 | Stop reason: SDUPTHER

## 2022-09-01 RX ORDER — ALUMINA, MAGNESIA, AND SIMETHICONE 2400; 2400; 240 MG/30ML; MG/30ML; MG/30ML
15 SUSPENSION ORAL ONCE
Status: COMPLETED | OUTPATIENT
Start: 2022-09-01 | End: 2022-09-01

## 2022-09-01 RX ORDER — LIDOCAINE HYDROCHLORIDE 20 MG/ML
15 SOLUTION OROPHARYNGEAL ONCE
Status: COMPLETED | OUTPATIENT
Start: 2022-09-01 | End: 2022-09-01

## 2022-09-01 RX ORDER — HYDROMORPHONE HYDROCHLORIDE 1 MG/ML
0.5 INJECTION, SOLUTION INTRAMUSCULAR; INTRAVENOUS; SUBCUTANEOUS ONCE
Status: COMPLETED | OUTPATIENT
Start: 2022-09-01 | End: 2022-09-01

## 2022-09-01 RX ORDER — DICYCLOMINE HYDROCHLORIDE 10 MG/1
20 CAPSULE ORAL ONCE
Status: COMPLETED | OUTPATIENT
Start: 2022-09-01 | End: 2022-09-01

## 2022-09-01 RX ORDER — POTASSIUM CHLORIDE 750 MG/1
10 TABLET, FILM COATED, EXTENDED RELEASE ORAL 2 TIMES DAILY
Qty: 6 TABLET | Refills: 0 | Status: SHIPPED | OUTPATIENT
Start: 2022-09-01 | End: 2022-09-04

## 2022-09-01 RX ORDER — POTASSIUM CHLORIDE 750 MG/1
40 CAPSULE, EXTENDED RELEASE ORAL ONCE
Status: COMPLETED | OUTPATIENT
Start: 2022-09-01 | End: 2022-09-01

## 2022-09-01 RX ADMIN — DICYCLOMINE HYDROCHLORIDE 20 MG: 10 CAPSULE ORAL at 16:05

## 2022-09-01 RX ADMIN — SODIUM CHLORIDE 1000 ML: 9 INJECTION, SOLUTION INTRAVENOUS at 10:56

## 2022-09-01 RX ADMIN — ONDANSETRON 4 MG: 2 INJECTION INTRAMUSCULAR; INTRAVENOUS at 10:56

## 2022-09-01 RX ADMIN — LIDOCAINE HYDROCHLORIDE 15 ML: 20 SOLUTION ORAL; TOPICAL at 12:48

## 2022-09-01 RX ADMIN — IOPAMIDOL 100 ML: 755 INJECTION, SOLUTION INTRAVENOUS at 13:39

## 2022-09-01 RX ADMIN — ALUMINUM HYDROXIDE, MAGNESIUM HYDROXIDE, AND DIMETHICONE 15 ML: 400; 400; 40 SUSPENSION ORAL at 12:48

## 2022-09-01 RX ADMIN — HYDROMORPHONE HYDROCHLORIDE 0.5 MG: 1 INJECTION, SOLUTION INTRAMUSCULAR; INTRAVENOUS; SUBCUTANEOUS at 10:56

## 2022-09-01 RX ADMIN — POTASSIUM CHLORIDE 40 MEQ: 750 CAPSULE, EXTENDED RELEASE ORAL at 11:56

## 2022-09-01 NOTE — ED PROVIDER NOTES
"Subjective   52-year-old female presents for evaluation of multiple complaints.  She states that this morning around 7 AM she began experiencing generalized abdominal pain, chest pain, nausea, vomiting, and diarrhea.  She notes that her symptoms have persisted since that time so she called EMS and was subsequent brought to our facility to be evaluated.  She received Zofran in route.  She currently rates her pain at \"10 out of 10\" in severity.  She denies any fevers.  She denies any known dietary triggers for her symptoms.  She denies any known exposures to COVID-19.  Of note, the patient is anticoagulated with Eliquis.  She has cardiac risk factors of hypertension, diabetes, and smoking.          Review of Systems   Cardiovascular: Positive for chest pain.   Gastrointestinal: Positive for abdominal pain, diarrhea, nausea and vomiting.   All other systems reviewed and are negative.      Past Medical History:   Diagnosis Date   • Acute deep vein thrombosis (DVT) of left lower extremity (HCC) 05/16/2022    Dx 5/16/22- left peroneal   • Arthralgia of hip    • Cervical facet arthropathy/cervical spondylosis without myelopathy    • Chronic pain syndrome     Description: hx of epidural injections.   • Conjunctival melanosis of left eye    • Degenerative disc disease, cervical    • Depression     Description: dx 2012.   • Diabetes mellitus (HCC)     TYPE 2   • Duplicated left renal collecting system     Dx 5/19 by CT   • Essential hypertension     Description: dx 2005.   • Hemorrhoid    • History of cardiovascular stress test 06/07/2019    low risk myocardial perfusion study   • History of colonoscopy 2013    normal per patient   • History of esophagogastroduodenoscopy (EGD) 10/06/2021    Path consistent with reactive gastropathy   • History of uterine leiomyoma    • History of varicella    • Hypertension    • Hypothyroidism     Description: dx 2005.   • Insomnia    • Low back pain    • Lumbosacral disc disease    • Myopia " of both eyes    • Neck pain    • Obesity (BMI 30.0-34.9)    • Osteoarthritis    • Renal angiomyolipoma     Left dx by CT .   • Tattoos     HIV neg  per patient   • Tobacco abuse    • Vitamin D deficiency     Dx 3/19.   • Wears partial dentures     upper       Allergies   Allergen Reactions   • Glimepiride Diarrhea       Past Surgical History:   Procedure Laterality Date   • ANTERIOR CERVICAL DISCECTOMY  2016    C4-6 DISC (Dr. Pena)   • ANTERIOR CERVICAL DISCECTOMY W/ FUSION Left 2018    Procedure: CERVICAL DISCECTOMY ANTERIOR WITH FUSION C6-7;  Surgeon: Nicolás Pena MD;  Location:  [facility];  Service:    • BACK SURGERY      lumbar discectomy   •  SECTION      , ,    • CHOLECYSTECTOMY WITH INTRAOPERATIVE CHOLANGIOGRAM N/A 2017    Procedure: CHOLECYSTECTOMY LAPAROSCOPIC INTRAOPERATIVE CHOLANGIOGRAM;  Surgeon: Clifford Freed MD;  Location:  [facility];  Service:    • COLONOSCOPY     • HYSTERECTOMY  2015   • SPINAL CORD STIMULATOR IMPLANT N/A 2019    Procedure: SPINAL CORD STIMULATOR INSERTION PHASE 1;  Surgeon: Blayne Pandya MD;  Location:  [facility];  Service: Pain Management       Family History   Problem Relation Age of Onset   • Diabetes Mother    • Hypertension Mother    • Colon cancer Maternal Grandmother    • Diabetes Maternal Grandmother    • Hypertension Maternal Grandmother    • Diabetes Daughter    • Hypothyroidism Daughter    • Diabetes Maternal Uncle    • Hypertension Maternal Uncle    • Hypertension Other    • No Known Problems Father         Medical and Family History unknown   • Breast cancer Neg Hx    • Ovarian cancer Neg Hx        Social History     Socioeconomic History   • Marital status: Legally    • Number of children: 3   Tobacco Use   • Smoking status: Former Smoker     Packs/day: 0.50     Years: 28.00     Pack years: 14.00     Types: Cigarettes     Start date:      Quit date: 2017     Years since quitting:  4.8   • Smokeless tobacco: Never Used   Vaping Use   • Vaping Use: Never used   Substance and Sexual Activity   • Alcohol use: Yes     Comment: 2-3 cocktains, 3-4 times per year   • Drug use: No   • Sexual activity: Not Currently     Partners: Male           Objective   Physical Exam  Vitals and nursing note reviewed.   Constitutional:       Appearance: She is well-developed. She is not diaphoretic.      Comments: Nontoxic-appearing female   HENT:      Head: Normocephalic and atraumatic.   Eyes:      Pupils: Pupils are equal, round, and reactive to light.   Cardiovascular:      Rate and Rhythm: Normal rate and regular rhythm.      Heart sounds: Normal heart sounds. No murmur heard.    No friction rub. No gallop.   Pulmonary:      Effort: Pulmonary effort is normal. No respiratory distress.      Breath sounds: Normal breath sounds. No wheezing or rales.   Abdominal:      General: Bowel sounds are normal. There is no distension.      Palpations: Abdomen is soft. There is no mass.      Tenderness: There is abdominal tenderness. There is guarding. There is no rebound.      Comments: Generalized, nonfocal abdominal tenderness with guarding present, no pain out of proportion to exam, negative Zimmerman's sign   Genitourinary:     Comments: No CVA tenderness present  Musculoskeletal:         General: Normal range of motion.      Cervical back: Neck supple.   Skin:     General: Skin is warm and dry.      Findings: No erythema or rash.   Neurological:      General: No focal deficit present.      Mental Status: She is alert and oriented to person, place, and time.   Psychiatric:         Mood and Affect: Mood normal.         Thought Content: Thought content normal.         Judgment: Judgment normal.         Procedures           ED Course  ED Course as of 09/01/22 1559   Thu Sep 01, 2022   0958 52-year-old female presents for evaluation of chest pain, diffuse abdominal pain, nausea, vomiting, and diarrhea since 7 AM.  She called  EMS regarding her symptoms and was brought to our facility to be evaluated.  On arrival, the patient is nontoxic-appearing.  Exam remarkable for diffuse, nonfocal abdominal tenderness with guarding noted.  No pain out of proportion to exam.  Broad differential diagnosis.  We will obtain labs, imaging, and EKG, and we will reassess following initial interventions. [DD]   1136 Labs remarkable for mild hyperglycemia and hypokalemia.  Potassium replaced.  IV fluids given. [DD]   1552 Upon reevaluation, the patient looks and feels improved. [DD]   1552 Repeat troponin/EKG negative/unchanged.  Doubt ACS, PE, dissection, or emergent cardiothoracic process at this time based on exam, history, clinical presentation, gestalt, objective findings in the ED, and risk stratification.  HEART score of 3.  The patient will follow up with the chest pain clinic within the next 72 hours for further outpatient work-up and evaluation.  Agreeable with plan and given appropriate strict return precautions.     [DD]   1552 Prescription for Zofran as needed. [DD]   1554 Prescription for oral potassium replacement as well. [DD]      ED Course User Index  [DD] Bertrand, Rito Ambriz MD                                 Recent Results (from the past 24 hour(s))   Urinalysis With Culture If Indicated - Urine, Clean Catch    Collection Time: 09/01/22 10:44 AM    Specimen: Urine, Clean Catch   Result Value Ref Range    Color, UA Yellow Yellow, Straw    Appearance, UA Clear Clear    pH, UA 5.5 5.0 - 8.0    Specific Gravity, UA 1.036 (H) 1.001 - 1.030    Glucose, UA >=1000 mg/dL (3+) (A) Negative    Ketones, UA Trace (A) Negative    Bilirubin, UA Negative Negative    Blood, UA Negative Negative    Protein, UA Negative Negative    Leuk Esterase, UA Negative Negative    Nitrite, UA Negative Negative    Urobilinogen, UA 0.2 E.U./dL 0.2 - 1.0 E.U./dL   Comprehensive Metabolic Panel    Collection Time: 09/01/22 10:53 AM    Specimen: Blood   Result Value Ref  Range    Glucose 226 (H) 65 - 99 mg/dL    BUN 18 6 - 20 mg/dL    Creatinine 0.91 0.57 - 1.00 mg/dL    Sodium 140 136 - 145 mmol/L    Potassium 3.0 (L) 3.5 - 5.2 mmol/L    Chloride 97 (L) 98 - 107 mmol/L    CO2 30.0 (H) 22.0 - 29.0 mmol/L    Calcium 10.2 8.6 - 10.5 mg/dL    Total Protein 7.9 6.0 - 8.5 g/dL    Albumin 4.70 3.50 - 5.20 g/dL    ALT (SGPT) 10 1 - 33 U/L    AST (SGOT) 12 1 - 32 U/L    Alkaline Phosphatase 88 39 - 117 U/L    Total Bilirubin 0.9 0.0 - 1.2 mg/dL    Globulin 3.2 gm/dL    A/G Ratio 1.5 g/dL    BUN/Creatinine Ratio 19.8 7.0 - 25.0    Anion Gap 13.0 5.0 - 15.0 mmol/L    eGFR 76.5 >60.0 mL/min/1.73   Lipase    Collection Time: 09/01/22 10:53 AM    Specimen: Blood   Result Value Ref Range    Lipase 34 13 - 60 U/L   Troponin    Collection Time: 09/01/22 10:53 AM    Specimen: Blood   Result Value Ref Range    Troponin T <0.010 0.000 - 0.030 ng/mL   BNP    Collection Time: 09/01/22 10:53 AM    Specimen: Blood   Result Value Ref Range    proBNP 122.2 0.0 - 900.0 pg/mL   Magnesium    Collection Time: 09/01/22 10:53 AM    Specimen: Blood   Result Value Ref Range    Magnesium 1.8 1.6 - 2.6 mg/dL   COVID-19 and FLU A/B PCR - Swab, Nasopharynx    Collection Time: 09/01/22 10:53 AM    Specimen: Nasopharynx; Swab   Result Value Ref Range    COVID19 Not Detected Not Detected - Ref. Range    Influenza A PCR Not Detected Not Detected    Influenza B PCR Not Detected Not Detected   CBC Auto Differential    Collection Time: 09/01/22 10:53 AM    Specimen: Blood   Result Value Ref Range    WBC 10.43 3.40 - 10.80 10*3/mm3    RBC 4.40 3.77 - 5.28 10*6/mm3    Hemoglobin 13.6 12.0 - 15.9 g/dL    Hematocrit 40.9 34.0 - 46.6 %    MCV 93.0 79.0 - 97.0 fL    MCH 30.9 26.6 - 33.0 pg    MCHC 33.3 31.5 - 35.7 g/dL    RDW 12.8 12.3 - 15.4 %    RDW-SD 43.5 37.0 - 54.0 fl    MPV 11.1 6.0 - 12.0 fL    Platelets 179 140 - 450 10*3/mm3    Neutrophil % 77.0 (H) 42.7 - 76.0 %    Lymphocyte % 16.7 (L) 19.6 - 45.3 %    Monocyte % 4.8  (L) 5.0 - 12.0 %    Eosinophil % 0.4 0.3 - 6.2 %    Basophil % 0.3 0.0 - 1.5 %    Immature Grans % 0.8 (H) 0.0 - 0.5 %    Neutrophils, Absolute 8.04 (H) 1.70 - 7.00 10*3/mm3    Lymphocytes, Absolute 1.74 0.70 - 3.10 10*3/mm3    Monocytes, Absolute 0.50 0.10 - 0.90 10*3/mm3    Eosinophils, Absolute 0.04 0.00 - 0.40 10*3/mm3    Basophils, Absolute 0.03 0.00 - 0.20 10*3/mm3    Immature Grans, Absolute 0.08 (H) 0.00 - 0.05 10*3/mm3    nRBC 0.0 0.0 - 0.2 /100 WBC   ECG 12 Lead    Collection Time: 09/01/22 11:06 AM   Result Value Ref Range    QT Interval 424 ms    QTC Interval 477 ms   Troponin    Collection Time: 09/01/22  3:06 PM    Specimen: Blood   Result Value Ref Range    Troponin T <0.010 0.000 - 0.030 ng/mL   ECG 12 Lead    Collection Time: 09/01/22  3:12 PM   Result Value Ref Range    QT Interval 440 ms    QTC Interval 475 ms     Note: In addition to lab results from this visit, the labs listed above may include labs taken at another facility or during a different encounter within the last 24 hours. Please correlate lab times with ED admission and discharge times for further clarification of the services performed during this visit.    CT Angiogram Chest   Final Result   1.  No evidence for pulmonary embolism.   2.  No evidence for acute intrathoracic abnormality.   3.  Mild coronary artery calcifications are noted.       This report was finalized on 9/1/2022 1:54 PM by Rito De La Torre MD.          CT Abdomen Pelvis With Contrast   Final Result   1.  No evidence for acute abnormality throughout the abdomen or pelvis.       This report was finalized on 9/1/2022 1:58 PM by Rito De La Torre MD.            Vitals:    09/01/22 1400 09/01/22 1406 09/01/22 1506 09/01/22 1530   BP: 165/96   152/91   Pulse: 78 77 71 69   Resp:  18 18    Temp:       SpO2: 98% 97% 98% 96%   Weight:       Height:         Medications   dicyclomine (BENTYL) capsule 20 mg (has no administration in time range)   sodium chloride 0.9 % bolus 1,000 mL  (0 mL Intravenous Stopped 9/1/22 1248)   ondansetron (ZOFRAN) injection 4 mg (4 mg Intravenous Given 9/1/22 1056)   HYDROmorphone (DILAUDID) injection 0.5 mg (0.5 mg Intravenous Given 9/1/22 1056)   potassium chloride (MICRO-K) CR capsule 40 mEq (40 mEq Oral Given 9/1/22 1156)   aluminum-magnesium hydroxide-simethicone (MAALOX MAX) 400-400-40 MG/5ML suspension 15 mL (15 mL Oral Given 9/1/22 1248)   Lidocaine Viscous HCl (XYLOCAINE) 2 % solution 15 mL (15 mL Mouth/Throat Given 9/1/22 1248)   iopamidol (ISOVUE-370) 76 % injection 100 mL (100 mL Intravenous Given 9/1/22 1339)     ECG/EMG Results (last 24 hours)     Procedure Component Value Units Date/Time    ECG 12 Lead [[id number]] Collected: 09/01/22 1106     Updated: 09/01/22 1517     QT Interval 424 ms      QTC Interval 477 ms     Narrative:      Test Reason : CP  Blood Pressure :   */*   mmHG  Vent. Rate :  76 BPM     Atrial Rate :  76 BPM     P-R Int : 182 ms          QRS Dur :  82 ms      QT Int : 424 ms       P-R-T Axes :  41  10  60 degrees     QTc Int : 477 ms    Normal sinus rhythm  Normal ECG  Confirmed by MD Thurston Michael ([id number]) on 9/1/2022 3:17:32 PM    Referred By: MANNIE           Confirmed By: Pb Thurston MD    ECG 12 Lead [[id number]] Collected: 09/01/22 1512     Updated: 09/01/22 1520     QT Interval 440 ms      QTC Interval 475 ms     Narrative:      Test Reason : repeats  Blood Pressure :   */*   mmHG  Vent. Rate :  70 BPM     Atrial Rate :  70 BPM     P-R Int : 178 ms          QRS Dur :  90 ms      QT Int : 440 ms       P-R-T Axes :  47  25  56 degrees     QTc Int : 475 ms    Normal sinus rhythm  Normal ECG  When compared with ECG of 01-SEP-2022 11:06, (Unconfirmed)  No significant change was found  Confirmed by MD Thurston Michael ([id number]) on 9/1/2022 3:19:49 PM    Referred By: MANNIE           Confirmed By: Pb Thurston MD        ECG 12 Lead   Final Result   Test Reason : repeats   Blood Pressure :   */*   mmHG   Vent. Rate :  70 BPM     Atrial Rate :   70 BPM      P-R Int : 178 ms          QRS Dur :  90 ms       QT Int : 440 ms       P-R-T Axes :  47  25  56 degrees      QTc Int : 475 ms      Normal sinus rhythm   Normal ECG   When compared with ECG of 01-SEP-2022 11:06, (Unconfirmed)   No significant change was found   Confirmed by MD Thurston Michael ([id number]) on 9/1/2022 3:19:49 PM      Referred By: MANNIE           Confirmed By: Pb Thurston MD      ECG 12 Lead   Final Result   Test Reason : CP   Blood Pressure :   */*   mmHG   Vent. Rate :  76 BPM     Atrial Rate :  76 BPM      P-R Int : 182 ms          QRS Dur :  82 ms       QT Int : 424 ms       P-R-T Axes :  41  10  60 degrees      QTc Int : 477 ms      Normal sinus rhythm   Normal ECG   Confirmed by MD Thurston Michael ([id number]) on 9/1/2022 3:17:32 PM      Referred By: MANNIE           Confirmed By: Pb Thurston MD                    [facility]    Final diagnoses:   Chest pain, unspecified type   Generalized abdominal pain   Nausea vomiting and diarrhea   Hyperglycemia   Hypokalemia       ED Disposition  ED Disposition     ED Disposition   Discharge    Condition   Stable    Comment   --             [facility]  [address]  [facility] [address] [phone]  In 3 days           Medication List      New Prescriptions    ondansetron 4 MG tablet  Commonly known as: ZOFRAN  Take 1 tablet by mouth Every 8 (Eight) Hours As Needed for Nausea.     potassium chloride 10 MEQ CR tablet  Take 1 tablet by mouth 2 (Two) Times a Day for 3 days.           Where to Get Your Medications      These medications were sent to [facility] - [address] [place] [address] - [phone]  - [phone]   [address] [facility] [address] [address]    Phone: [phone]   · ondansetron 4 MG tablet  · potassium chloride 10 MEQ CR tablet          Rito Thurston MD  09/01/22 6574

## 2022-09-21 ENCOUNTER — TELEPHONE (OUTPATIENT)
Dept: NEUROLOGY | Facility: CLINIC | Age: 52
End: 2022-09-21

## 2022-09-21 NOTE — TELEPHONE ENCOUNTER
----- Message from MIRIAM Claudio sent at 9/21/2022  1:07 PM EDT -----  Please notify Mrs. Epstein that her 48-hour Holter monitor was considered a benign Holter study by cardiology.  She had one very short episode of a rapid heart rate but otherwise had a normal study.  Follow-up with our clinic as well as primary care provider as scheduled.  We do have her scheduled to see cardiology for further evaluation on 10/18/2022 and she should keep this appointment.  Thanks, MIRIAM Krause.

## 2022-09-28 DIAGNOSIS — E03.9 ACQUIRED HYPOTHYROIDISM: ICD-10-CM

## 2022-09-28 RX ORDER — LEVOTHYROXINE SODIUM 0.1 MG/1
TABLET ORAL
Qty: 90 TABLET | Refills: 1 | Status: SHIPPED | OUTPATIENT
Start: 2022-09-28

## 2022-09-29 ENCOUNTER — HOSPITAL ENCOUNTER (OUTPATIENT)
Dept: NEUROLOGY | Facility: HOSPITAL | Age: 52
Discharge: HOME | End: 2022-09-29
Admitting: NURSE PRACTITIONER

## 2022-09-29 DIAGNOSIS — R55 SYNCOPE AND COLLAPSE: ICD-10-CM

## 2022-09-29 DIAGNOSIS — Z86.73 HISTORY OF CEREBELLAR STROKE: ICD-10-CM

## 2022-09-29 PROCEDURE — 95713 VEEG 2-12 HR CONT MNTR: CPT

## 2022-09-30 ENCOUNTER — TELEPHONE (OUTPATIENT)
Dept: NEUROLOGY | Facility: CLINIC | Age: 52
End: 2022-09-30

## 2022-09-30 NOTE — TELEPHONE ENCOUNTER
----- Message from MIRIAM Claudio sent at 9/29/2022  9:21 PM EDT -----  Please notify patient that her 2 hour video EEG is normal. No evidence of seizure activity or any abnormalities seen on this study looking at the electrical activity within the brain. We will follow up as scheduled in clinic. She should continue seeing PCP, Cardiology and all specialists. Thanks, MIRIAM Krause

## 2022-09-30 NOTE — PROGRESS NOTES
Please notify patient that her 2 hour video EEG is normal. No evidence of seizure activity or any abnormalities seen on this study looking at the electrical activity within the brain. We will follow up as scheduled in clinic. She should continue seeing PCP, Cardiology and all specialists. Thanks, MIRIAM Krause

## 2022-10-03 ENCOUNTER — LAB (OUTPATIENT)
Dept: LAB | Facility: HOSPITAL | Age: 52
End: 2022-10-03

## 2022-10-03 ENCOUNTER — OFFICE VISIT (OUTPATIENT)
Dept: INTERNAL MEDICINE | Facility: CLINIC | Age: 52
End: 2022-10-03

## 2022-10-03 VITALS
TEMPERATURE: 97.7 F | SYSTOLIC BLOOD PRESSURE: 84 MMHG | DIASTOLIC BLOOD PRESSURE: 50 MMHG | RESPIRATION RATE: 20 BRPM | BODY MASS INDEX: 27.12 KG/M2 | WEIGHT: 178.38 LBS | HEART RATE: 72 BPM

## 2022-10-03 DIAGNOSIS — I82.452 ACUTE DEEP VEIN THROMBOSIS (DVT) OF LEFT PERONEAL VEIN: ICD-10-CM

## 2022-10-03 DIAGNOSIS — E03.9 ACQUIRED HYPOTHYROIDISM: ICD-10-CM

## 2022-10-03 DIAGNOSIS — G89.4 CHRONIC PAIN SYNDROME: ICD-10-CM

## 2022-10-03 DIAGNOSIS — E11.65 TYPE 2 DIABETES MELLITUS WITH HYPERGLYCEMIA, WITHOUT LONG-TERM CURRENT USE OF INSULIN: Primary | ICD-10-CM

## 2022-10-03 DIAGNOSIS — I10 ESSENTIAL HYPERTENSION: ICD-10-CM

## 2022-10-03 DIAGNOSIS — K21.9 GASTROESOPHAGEAL REFLUX DISEASE, UNSPECIFIED WHETHER ESOPHAGITIS PRESENT: ICD-10-CM

## 2022-10-03 DIAGNOSIS — E55.9 VITAMIN D DEFICIENCY: ICD-10-CM

## 2022-10-03 LAB
25(OH)D3 SERPL-MCNC: 29.8 NG/ML (ref 30–100)
ANION GAP SERPL CALCULATED.3IONS-SCNC: 9 MMOL/L (ref 5–15)
BUN SERPL-MCNC: 24 MG/DL (ref 6–20)
BUN/CREAT SERPL: 23.1 (ref 7–25)
CALCIUM SPEC-SCNC: 9.2 MG/DL (ref 8.6–10.5)
CHLORIDE SERPL-SCNC: 100 MMOL/L (ref 98–107)
CO2 SERPL-SCNC: 30 MMOL/L (ref 22–29)
CREAT SERPL-MCNC: 1.04 MG/DL (ref 0.57–1)
EGFRCR SERPLBLD CKD-EPI 2021: 65.2 ML/MIN/1.73
EXPIRATION DATE: ABNORMAL
GLUCOSE SERPL-MCNC: 153 MG/DL (ref 65–99)
HBA1C MFR BLD: 8.8 %
Lab: ABNORMAL
POTASSIUM SERPL-SCNC: 3.5 MMOL/L (ref 3.5–5.2)
SODIUM SERPL-SCNC: 139 MMOL/L (ref 136–145)
T4 FREE SERPL-MCNC: 1.46 NG/DL (ref 0.93–1.7)
TSH SERPL DL<=0.05 MIU/L-ACNC: 0.8 UIU/ML (ref 0.27–4.2)

## 2022-10-03 PROCEDURE — 3052F HG A1C>EQUAL 8.0%<EQUAL 9.0%: CPT | Performed by: INTERNAL MEDICINE

## 2022-10-03 PROCEDURE — 84439 ASSAY OF FREE THYROXINE: CPT | Performed by: INTERNAL MEDICINE

## 2022-10-03 PROCEDURE — 99214 OFFICE O/P EST MOD 30 MIN: CPT | Performed by: INTERNAL MEDICINE

## 2022-10-03 PROCEDURE — 83036 HEMOGLOBIN GLYCOSYLATED A1C: CPT | Performed by: INTERNAL MEDICINE

## 2022-10-03 PROCEDURE — 82306 VITAMIN D 25 HYDROXY: CPT | Performed by: INTERNAL MEDICINE

## 2022-10-03 PROCEDURE — 84443 ASSAY THYROID STIM HORMONE: CPT | Performed by: INTERNAL MEDICINE

## 2022-10-03 PROCEDURE — 80048 BASIC METABOLIC PNL TOTAL CA: CPT | Performed by: INTERNAL MEDICINE

## 2022-10-03 RX ORDER — METOCLOPRAMIDE 10 MG/1
10 TABLET ORAL 3 TIMES DAILY PRN
Qty: 50 TABLET | Refills: 1 | Status: SHIPPED | OUTPATIENT
Start: 2022-10-03

## 2022-10-03 RX ORDER — ONDANSETRON 4 MG/1
4 TABLET, FILM COATED ORAL EVERY 8 HOURS PRN
Qty: 15 TABLET | Refills: 0 | Status: SHIPPED | OUTPATIENT
Start: 2022-10-03 | End: 2023-01-31

## 2022-10-03 NOTE — PROGRESS NOTES
Subjective       Ernestina Epstein is a 52 y.o. female.     Chief Complaint   Patient presents with   • Deep Vein Thrombosis     Follow up    • Diabetes       History obtained from the patient.      History of Present Illness     The patient was seen by Neurology on 8/8/2022 for Syncope.  Holter monitor done on 9/16/2022 was normal.  On 9/1/2022, CMP was significant for low Potassium (3.0) and elevated Glucose (226).  CBC was normal.    The patient is here for follow-up of a Left Lower Extremity Peroneal DVT,  diagnosed on 5/16/2022.  She is on Eliquis and aspirin.    Primary Care Cardiac Diagnostic Constellation: The patient is here today for a follow-up visit.       Her Hypertension has been stable.   Medication(s): Lisinopril and HCTZ.  Her Hyperlipidemia has been unstable.    LDL goal is <70.  Her last LDL on 6/8/2021 was 88, TG 82.   Medication:  Atorvastatin.  Her Diabetes Mellitus Type 2 has been unstable.  Medication:  Januvia, Farxiga, Actos, Atorvastatin, and Lisinopril.   The patient is adherent with her medication regimen. She denies medication side effects.      Procedures:  The patient had a negative cardiac stress test on 6/7/2019.        Interval Events:   Last Hemoglobin A1c on 5/25/2022 at  was 9.6, which was improved from last check.  The patient's last Ophthalmology appointment was 8/25/21 (Nancy Nieves OD) -right mild nonproliferative retinopathy without macular edema.  She does check her feet daily.       Symptoms: Reports some bilateral lower extremity edema.  Denies chest pain (other than chronic chest wall pain), shortness of breath, MAIN, orthopnea, PND, palpitations, syncope,  claudication, lightheadedness, and dizziness..   Associated Symptoms: Weight up 4 pounds, in the past 3 months.  No fatigue, headache, polyuria, polydipsia, myalgias, arthralgias, memory issues, concentration issues, or focal neurologic deficits.  Has stable numbness and tingling of the feet and  the fingers, but pain or ulcers.      Lifestyle:  She states she also had the patient states she attempts to walks every other day  Tobacco Use: She states she is smoking 1 pack of cigarettes every 3 days.  She is also smoking THC 3-4 times per week.     Chronic Pain Syndrome  follow-up: The patient is being seen for a routine clinic follow-up of Chronic Pain Syndrome, which is stable.  Interval Events: The patient is being seen by  Neurosurgery.    Symptoms:  stable low back pain, neck pain, and chest wall pain.  Medication: Voltaren gel as needed.    Hypothyroidism Follow-Up: The patient is being seen for follow-up of Hypothyroidism, which is stable.    Interval Events: T4 and TSH were normal in 5/25/2022.    Symptoms: Weight up 4 pounds, in the past 3 months. Stable cold intolerance.  Denies fatigue, heat intolerance, weakness, diarrhea, constipation, and dry skin.   Associated Symptoms: has paresthesias hands and feet.  Denies myalgias and arthralgias.    Medications:  Levothyroxine (Synthroid).      Vitamin D Deficiency Follow-Up: The patient is here for follow-up of Vitamin D Deficiency, which is unstable.   Interval Events: Vitamin D level on 5/25/2022 was 19.5      Symptoms:  Stable paresthesias.  Denies myalgias and. Has occasional loss of balance.  Denies fatigue and gait instability.    Medication: Vitamin D 3 1000 IU daily.        Current Outpatient Medications on File Prior to Visit   Medication Sig Dispense Refill   • apixaban (Eliquis) 5 MG tablet tablet Take 1 tablet by mouth Every 12 (Twelve) Hours. 60 tablet 5   • aspirin 81 MG chewable tablet Chew 1 tablet Daily. 90 tablet 3   • atorvastatin (LIPITOR) 80 MG tablet Take 1 tablet by mouth Every Night. 90 tablet 3   • Blood Glucose Monitoring Suppl (FREESTYLE FREEDOM LITE) w/Device kit Use twice daily   Dx  E11.9 1 each 0   • Diclofenac Sodium (VOLTAREN) 1 % gel gel 2 grams QID prn pain upper extremity joints and 4 grams QID pain lower extremity  joints 100 g 5   • escitalopram (LEXAPRO) 20 MG tablet Take 1 tablet by mouth Daily. 30 tablet 5   • Farxiga 10 MG tablet TAKE ONE TABLET BY MOUTH DAILY 30 tablet 5   • glucose blood (FREESTYLE LITE) test strip USE TO TEST BLOOD SUGAR TWO TIMES A DAY AS DIRECTED 100 each 3   • hydroCHLOROthiazide (HYDRODIURIL) 50 MG tablet TAKE ONE TABLET BY MOUTH DAILY 30 tablet 5   • Januvia 100 MG tablet TAKE ONE TABLET BY MOUTH DAILY 30 tablet 5   • Lancets (FREESTYLE) lancets USE  LANCET  TO TEST TWICE  DAILY 100 each 11   • levothyroxine (SYNTHROID, LEVOTHROID) 100 MCG tablet TAKE ONE TABLET BY MOUTH DAILY 90 tablet 1   • lisinopril (PRINIVIL,ZESTRIL) 20 MG tablet TAKE ONE TABLET BY MOUTH EVERY NIGHT AT BEDTIME 30 tablet 5   • omeprazole (priLOSEC) 40 MG capsule TAKE ONE CAPSULE BY MOUTH DAILY 30 capsule 3   • pioglitazone (ACTOS) 45 MG tablet Take 1 tablet by mouth Daily. 30 tablet 5   • potassium chloride (K-DUR,KLOR-CON) 20 MEQ CR tablet Take 1 tablet by mouth 2 (Two) Times a Day. 20 tablet 0   • vitamin D (ERGOCALCIFEROL) 1.25 MG (37545 UT) capsule capsule TAKE ONE CAPSULE BY MOUTH ONCE WEEKLY 4 capsule 2     No current facility-administered medications on file prior to visit.       Current outpatient and discharge medications have been reconciled for the patient.  Reviewed by: Stephanie Tanner MD        The following portions of the patient's history were reviewed and updated as appropriate: allergies, current medications, past family history, past medical history, past social history, past surgical history and problem list.    Review of Systems   Constitutional: Negative for fatigue and unexpected weight change.   Eyes: Negative for visual disturbance.   Respiratory: Negative for cough, shortness of breath and wheezing.    Cardiovascular: Positive for leg swelling. Negative for chest pain and palpitations.        No MAIN, orthopnea, or claudication.   Gastrointestinal: Positive for abdominal pain (stable). Negative for  blood in stool, constipation, diarrhea, nausea and vomiting.        Denies melena.   Endocrine: Negative for polydipsia and polyuria.   Musculoskeletal: Positive for back pain and neck pain. Negative for arthralgias and myalgias.   Neurological: Negative for dizziness, syncope, light-headedness and headaches.        No memory issues.   Psychiatric/Behavioral: Negative for decreased concentration.         Objective       Blood pressure (!) 84/50, pulse 72, temperature 97.7 °F (36.5 °C), resp. rate 20, weight 80.9 kg (178 lb 6 oz), not currently breastfeeding.  Body mass index is 27.12 kg/m².      Physical Exam  Vitals and nursing note reviewed.   Constitutional:       Appearance: She is well-developed.      Comments: Overweight.  Smells strongly of THC.   Neck:      Thyroid: No thyroid mass or thyromegaly.      Vascular: No carotid bruit.   Cardiovascular:      Rate and Rhythm: Normal rate and regular rhythm.      Pulses: Normal pulses.      Heart sounds: Normal heart sounds. No murmur heard.    No friction rub. No gallop.   Pulmonary:      Effort: Pulmonary effort is normal.      Breath sounds: Normal breath sounds.   Musculoskeletal:      Right lower leg: No edema.      Left lower leg: No edema.   Neurological:      Mental Status: She is alert.   Psychiatric:         Mood and Affect: Mood normal.       Results for orders placed or performed in visit on 10/03/22   POC Glycosylated Hemoglobin (Hb A1C)    Specimen: Blood   Result Value Ref Range    Hemoglobin A1C 8.8 %    Lot Number [id number]     Expiration Date 05/02/2024        Assessment / Plan:  Diagnoses and all orders for this visit:    1. Type 2 diabetes mellitus with hyperglycemia, without long-term current use of insulin (HCC) (Primary)  -     POC Glycosylated Hemoglobin (Hb A1C)  -     Basic Metabolic Panel  -     Ambulatory Referral to Ophthalmology   Continue current medication(s) as noted in the history of present illness.    2. Essential hypertension  -      Basic Metabolic Panel   Continue current medication(s) as noted in the history of present illness.    3. Acute deep vein thrombosis (DVT) of left peroneal vein (HCC)  -     Duplex Venous Lower Extremity - Left CAR; Future    4. Acquired hypothyroidism  -     TSH  -     T4, Free   Continue current medication(s) as noted in the history of present illness.    5. Chronic pain syndrome   Continue current medication(s) as noted in the history of present illness.    6. Gastroesophageal reflux disease, unspecified whether esophagitis present  -     ondansetron (ZOFRAN) 4 MG tablet; Take 1 tablet by mouth Every 8 (Eight) Hours As Needed for Nausea.  Dispense: 15 tablet; Refill: 0- REFILL  -     metoclopramide (REGLAN) 10 MG tablet; Take 1 tablet by mouth 3 (Three) Times a Day As Needed (Nausea, vomiting, and abdominal pain).  Dispense: 50 tablet; Refill: 1- REFILL   Hopefully GI evaluation can proceed if patient able to get off Eliquis.  Otherwise can bridge with Heparin.    7. Vitamin D deficiency  -     Vitamin D 25 Hydroxy   Continue Vitamin D supplementation.      The patient declined Influenza vaccine and a COVID-19 booster today.    Discussed with the patient the need to quit smoking cigarettes and marijuana.      Return in about 3 months (around 1/3/2023) for Recheck Diabetes, fasting.

## 2022-10-04 PROBLEM — M17.11 PRIMARY OSTEOARTHRITIS OF ONE KNEE, RIGHT: Status: ACTIVE | Noted: 2017-05-04

## 2022-10-04 PROBLEM — R00.2 PALPITATIONS: Status: RESOLVED | Noted: 2022-08-08 | Resolved: 2022-10-04

## 2022-10-04 PROBLEM — R55 SYNCOPE AND COLLAPSE: Status: RESOLVED | Noted: 2022-08-08 | Resolved: 2022-10-04

## 2022-10-04 PROBLEM — M17.0 PRIMARY OSTEOARTHRITIS OF BOTH KNEES: Status: ACTIVE | Noted: 2017-05-04

## 2022-10-07 ENCOUNTER — HOSPITAL ENCOUNTER (OUTPATIENT)
Dept: CARDIOLOGY | Facility: HOSPITAL | Age: 52
Discharge: HOME | End: 2022-10-07
Admitting: INTERNAL MEDICINE

## 2022-10-07 VITALS — HEIGHT: 68 IN | BODY MASS INDEX: 27.03 KG/M2 | WEIGHT: 178.35 LBS

## 2022-10-07 DIAGNOSIS — I82.452 ACUTE DEEP VEIN THROMBOSIS (DVT) OF LEFT PERONEAL VEIN: ICD-10-CM

## 2022-10-07 LAB
BH CV LOW VAS LEFT PERONEAL VESSEL: 1
BH CV LOWER VASCULAR LEFT COMMON FEMORAL AUGMENT: NORMAL
BH CV LOWER VASCULAR LEFT COMMON FEMORAL COMPETENT: NORMAL
BH CV LOWER VASCULAR LEFT COMMON FEMORAL COMPRESS: NORMAL
BH CV LOWER VASCULAR LEFT COMMON FEMORAL PHASIC: NORMAL
BH CV LOWER VASCULAR LEFT COMMON FEMORAL SPONT: NORMAL
BH CV LOWER VASCULAR LEFT DISTAL FEMORAL AUGMENT: NORMAL
BH CV LOWER VASCULAR LEFT DISTAL FEMORAL COMPETENT: NORMAL
BH CV LOWER VASCULAR LEFT DISTAL FEMORAL COMPRESS: NORMAL
BH CV LOWER VASCULAR LEFT DISTAL FEMORAL PHASIC: NORMAL
BH CV LOWER VASCULAR LEFT DISTAL FEMORAL SPONT: NORMAL
BH CV LOWER VASCULAR LEFT GASTRONEMIUS COMPRESS: NORMAL
BH CV LOWER VASCULAR LEFT GREATER SAPH AK COMPRESS: NORMAL
BH CV LOWER VASCULAR LEFT GREATER SAPH BK COMPRESS: NORMAL
BH CV LOWER VASCULAR LEFT LESSER SAPH COMPRESS: NORMAL
BH CV LOWER VASCULAR LEFT MID FEMORAL AUGMENT: NORMAL
BH CV LOWER VASCULAR LEFT MID FEMORAL COMPETENT: NORMAL
BH CV LOWER VASCULAR LEFT MID FEMORAL COMPRESS: NORMAL
BH CV LOWER VASCULAR LEFT MID FEMORAL PHASIC: NORMAL
BH CV LOWER VASCULAR LEFT MID FEMORAL SPONT: NORMAL
BH CV LOWER VASCULAR LEFT PERONEAL COMPRESS: NORMAL
BH CV LOWER VASCULAR LEFT PERONEAL THROMBUS: NORMAL
BH CV LOWER VASCULAR LEFT POPLITEAL AUGMENT: NORMAL
BH CV LOWER VASCULAR LEFT POPLITEAL COMPETENT: NORMAL
BH CV LOWER VASCULAR LEFT POPLITEAL COMPRESS: NORMAL
BH CV LOWER VASCULAR LEFT POPLITEAL PHASIC: NORMAL
BH CV LOWER VASCULAR LEFT POPLITEAL SPONT: NORMAL
BH CV LOWER VASCULAR LEFT POSTERIOR TIBIAL COMPRESS: NORMAL
BH CV LOWER VASCULAR LEFT PROFUNDA FEMORAL AUGMENT: NORMAL
BH CV LOWER VASCULAR LEFT PROFUNDA FEMORAL COMPRESS: NORMAL
BH CV LOWER VASCULAR LEFT PROFUNDA FEMORAL PHASIC: NORMAL
BH CV LOWER VASCULAR LEFT PROFUNDA FEMORAL SPONT: NORMAL
BH CV LOWER VASCULAR LEFT PROXIMAL FEMORAL AUGMENT: NORMAL
BH CV LOWER VASCULAR LEFT PROXIMAL FEMORAL COMPETENT: NORMAL
BH CV LOWER VASCULAR LEFT PROXIMAL FEMORAL COMPRESS: NORMAL
BH CV LOWER VASCULAR LEFT PROXIMAL FEMORAL PHASIC: NORMAL
BH CV LOWER VASCULAR LEFT PROXIMAL FEMORAL SPONT: NORMAL
BH CV LOWER VASCULAR LEFT SAPHENOFEMORAL JUNCTION AUGMENT: NORMAL
BH CV LOWER VASCULAR LEFT SAPHENOFEMORAL JUNCTION COMPETENT: NORMAL
BH CV LOWER VASCULAR LEFT SAPHENOFEMORAL JUNCTION COMPRESS: NORMAL
BH CV LOWER VASCULAR LEFT SAPHENOFEMORAL JUNCTION PHASIC: NORMAL
BH CV LOWER VASCULAR LEFT SAPHENOFEMORAL JUNCTION SPONT: NORMAL
BH CV LOWER VASCULAR RIGHT COMMON FEMORAL AUGMENT: NORMAL
BH CV LOWER VASCULAR RIGHT COMMON FEMORAL COMPETENT: NORMAL
BH CV LOWER VASCULAR RIGHT COMMON FEMORAL COMPRESS: NORMAL
BH CV LOWER VASCULAR RIGHT COMMON FEMORAL PHASIC: NORMAL
BH CV LOWER VASCULAR RIGHT COMMON FEMORAL SPONT: NORMAL
MAXIMAL PREDICTED HEART RATE: 169 BPM
STRESS TARGET HR: 144 BPM

## 2022-10-07 PROCEDURE — 93971 EXTREMITY STUDY: CPT

## 2022-10-07 PROCEDURE — 93971 EXTREMITY STUDY: CPT | Performed by: INTERNAL MEDICINE

## 2022-10-11 ENCOUNTER — TELEPHONE (OUTPATIENT)
Dept: INTERNAL MEDICINE | Facility: CLINIC | Age: 52
End: 2022-10-11

## 2022-10-11 NOTE — TELEPHONE ENCOUNTER
Call patient please.  Her lower extremity ultrasound showed persistent chronic blood clot.  Therefore, she needs to stay on her blood thinners.  She needs to schedule her colonoscopy and EGD, and we will give her heparin injections instead of her Eliquis around the time of the procedures.

## 2022-10-12 NOTE — TELEPHONE ENCOUNTER
Ernestina notified of message from Dr Tanner  Verbal understanding given.  She will call back once the EGD and colonoscopu are scheduled .  Verbal understanding given

## 2022-10-17 NOTE — PROGRESS NOTES
[facility] Cardiology   Consult  Ernestina Epstein  1970    VISIT DATE:  10/18/22    PCP:   Stephanie Tanner MD  [address]  [facility] [address]        CC:  Loss of Consciousness      Problem List:  1.  DVT  2.  HTN  3.  HLD  4.  DM  5.  Hypothyroid  6.  Current smoker  7.  GERD  8.  Syncope: Normal EEG  9.  Chronic pain.  10.  Previous left lacunar infarct.    Cardiac testing:    Echo May 2022  • Mild left atrial enlargement.  • Normal left ventricular systolic function, estimated ejection fraction 65%.  • Mild mitral regurgitation.  • Mild tricuspid regurgitation with normal RVSP.  • Negative saline bubble study.    Venous duplex October 2022:  • Chronic left lower extremity deep vein thrombosis noted in the peroneal.  • All other left sided veins appeared normal.    Holter August 2022: Benign study with 6 beats NSVT    Stress test 2019  • Patient reported baseline chest pain that is constant and had for 1 year, 7/10, did not change with exercise or in recovery  • Hypertensive bp response  • Expected exercise time 8:10, actual time 7:31, test stopped at patient request due to fatigue  • Normal ECG stress ECG interpretation.  • Left ventricular ejection fraction is normal (Calculated EF = 57%).  • Myocardial perfusion imaging indicates a normal myocardial perfusion study with no evidence of ischemia.  • Impressions are consistent with a low risk study.      History of Present Illness:  Ernestina Epstein  Is a 52 y.o. female with pertinent cardiac history detailed above.  Patient referred by neurology for evaluation of syncope.  She does have a history of DVT currently on Eliquis, hyperlipidemia, diabetes patient seen in the neurology clinic in August.  Notes suggest she fell out of bed while reaching for her phone and remembers waking up on the floor.  She states she had another episode about three months ago where she had preceding dizziness as well.  During a  doctors visit earlier this month she did have a hypotensive recorded blood pressure.  She occasionally will get dizziness upon standing.  Holter monitor and echo this year did not show specific cardiac cause of syncope.  No other complaints or concerns.  She is going to get a EGD and colonoscopy sometime in the future.      Patient Active Problem List    Diagnosis Date Noted   • History of cerebellar stroke 08/08/2022   • History of DVT (deep vein thrombosis) 05/25/2022   • Tetrahydrocannabinol (THC) use disorder, mild, abuse 05/25/2022   • Acute deep vein thrombosis (DVT) of left lower extremity (HCC) 05/16/2022     Note Last Updated: 5/16/2022     Dx 5/16/22- left peroneal     • Renal angiomyolipoma 05/28/2019     Note Last Updated: 5/28/2019     Left dx by CT 5/19.     • Duplicated left renal collecting system 05/28/2019     Note Last Updated: 5/28/2019     Dx 5/19 by CT     • Vitamin D deficiency 03/20/2019     Note Last Updated: 3/20/2019     Dx 3/19.     • Cervical post-laminectomy syndrome 12/17/2018     Note Last Updated: 12/17/2018     Added automatically from request for surgery [id number]     • Other hyperlipidemia 04/11/2018     Note Last Updated: 4/11/2018     Dx 2/18.     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   • Myopia of both eyes 12/07/2017   • Primary osteoarthritis of both knees 05/04/2017   • Carpal tunnel syndrome of right wrist 12/15/2016   • Type 2 diabetes mellitus, without long-term current use of insulin (HCC) 12/15/2016     Note Last Updated: 1/12/2017     Dx 2005     • Cervical facet arthropathy/cervical spondylosis without myelopathy 09/15/2016   • Diabetic autonomic neuropathy (HCC) 09/15/2016   • Hx of fusion of cervical spine 09/15/2016   • Obesity (BMI 30.0-34.9) 09/15/2016   • Neuroforaminal stenosis of spine 09/15/2016   • Degenerative disc  disease, cervical 2016   • Chronic pain syndrome 2016     Note Last Updated: 2016     Description: hx of epidural injections.     • Depression 2016     Note Last Updated: 2016     Description: dx .     • Hemorrhoid 2016   • Essential hypertension 2016     Note Last Updated: 2016     Description: dx .     • Hypothyroidism 2016     Note Last Updated: 2016     Description: dx .     • Insomnia 2016   • Arthralgia of hip 2016   • Arthralgia of shoulder 2016       Allergies   Allergen Reactions   • Glimepiride Diarrhea       Social History     Socioeconomic History   • Marital status: Legally    • Number of children: 3   Tobacco Use   • Smoking status: Every Day     Packs/day: 0.50     Years: 28.00     Pack years: 14.00     Types: Cigarettes     Start date:      Last attempt to quit: 2017     Years since quittin.9   • Smokeless tobacco: Never   • Tobacco comments:     started smoking 8/3/2022   Vaping Use   • Vaping Use: Never used   Substance and Sexual Activity   • Alcohol use: Yes     Comment: 2-3 cocktains, 3-4 times per year   • Drug use: No   • Sexual activity: Not Currently     Partners: Male       Family History   Problem Relation Age of Onset   • Diabetes Mother    • Hypertension Mother    • No Known Problems Father         Medical and Family History unknown   • Hypertension Brother    • Diabetes Maternal Uncle    • Hypertension Maternal Uncle    • Colon cancer Maternal Grandmother    • Diabetes Maternal Grandmother    • Hypertension Maternal Grandmother    • Diabetes Daughter    • Hypothyroidism Daughter    • Hypertension Other    • Breast cancer Neg Hx    • Ovarian cancer Neg Hx        Current Medications:    Current Outpatient Medications:   •  apixaban (Eliquis) 5 MG tablet tablet, Take 1 tablet by mouth Every 12 (Twelve) Hours., Disp: 60 tablet, Rfl: 5  •  aspirin 81 MG chewable tablet, Chew 1 tablet  Daily., Disp: 90 tablet, Rfl: 3  •  atorvastatin (LIPITOR) 80 MG tablet, Take 1 tablet by mouth Every Night., Disp: 90 tablet, Rfl: 3  •  Blood Glucose Monitoring Suppl (FREESTYLE FREEDOM LITE) w/Device kit, Use twice daily   Dx  E11.9, Disp: 1 each, Rfl: 0  •  Diclofenac Sodium (VOLTAREN) 1 % gel gel, 2 grams QID prn pain upper extremity joints and 4 grams QID pain lower extremity joints, Disp: 100 g, Rfl: 5  •  escitalopram (LEXAPRO) 20 MG tablet, Take 1 tablet by mouth Daily., Disp: 30 tablet, Rfl: 5  •  Farxiga 10 MG tablet, TAKE ONE TABLET BY MOUTH DAILY, Disp: 30 tablet, Rfl: 5  •  glucose blood (FREESTYLE LITE) test strip, USE TO TEST BLOOD SUGAR TWO TIMES A DAY AS DIRECTED, Disp: 100 each, Rfl: 3  •  hydroCHLOROthiazide (HYDRODIURIL) 50 MG tablet, TAKE ONE TABLET BY MOUTH DAILY, Disp: 30 tablet, Rfl: 5  •  Januvia 100 MG tablet, TAKE ONE TABLET BY MOUTH DAILY, Disp: 30 tablet, Rfl: 5  •  Lancets (FREESTYLE) lancets, USE  LANCET  TO TEST TWICE  DAILY, Disp: 100 each, Rfl: 11  •  levothyroxine (SYNTHROID, LEVOTHROID) 100 MCG tablet, TAKE ONE TABLET BY MOUTH DAILY, Disp: 90 tablet, Rfl: 1  •  lisinopril (PRINIVIL,ZESTRIL) 20 MG tablet, TAKE ONE TABLET BY MOUTH EVERY NIGHT AT BEDTIME, Disp: 30 tablet, Rfl: 5  •  metoclopramide (REGLAN) 10 MG tablet, Take 1 tablet by mouth 3 (Three) Times a Day As Needed (Nausea, vomiting, and abdominal pain)., Disp: 50 tablet, Rfl: 1  •  omeprazole (priLOSEC) 40 MG capsule, TAKE ONE CAPSULE BY MOUTH DAILY, Disp: 30 capsule, Rfl: 3  •  ondansetron (ZOFRAN) 4 MG tablet, Take 1 tablet by mouth Every 8 (Eight) Hours As Needed for Nausea., Disp: 15 tablet, Rfl: 0  •  vitamin D (ERGOCALCIFEROL) 1.25 MG (78131 UT) capsule capsule, TAKE ONE CAPSULE BY MOUTH ONCE WEEKLY, Disp: 4 capsule, Rfl: 2  •  pioglitazone (ACTOS) 45 MG tablet, Take 1 tablet by mouth Daily., Disp: 30 tablet, Rfl: 5  •  potassium chloride (K-DUR,KLOR-CON) 20 MEQ CR tablet, Take 1 tablet by mouth 2 (Two) Times a Day.,  "Disp: 20 tablet, Rfl: 0     Review of Systems   Cardiovascular: Positive for syncope. Negative for chest pain, claudication, dyspnea on exertion and irregular heartbeat.   Musculoskeletal:        Left calf soreness that she attributes to DVT   Neurological: Positive for dizziness.       Vitals:    10/18/22 0908   Weight: 82.1 kg (181 lb)   Height: 172.7 cm (68\")       Physical Exam  Constitutional:       Appearance: Normal appearance.   Neck:      Vascular: No carotid bruit.   Cardiovascular:      Rate and Rhythm: Normal rate and regular rhythm.      Pulses: Normal pulses.      Heart sounds: Normal heart sounds.   Pulmonary:      Breath sounds: Normal breath sounds.   Musculoskeletal:      Right lower leg: No edema.      Left lower leg: No edema.   Neurological:      General: No focal deficit present.      Mental Status: She is alert.         Diagnostic Data:  Procedures  Lab Results   Component Value Date    TRIG 112 05/25/2022    HDL 67 (H) 05/25/2022     Lab Results   Component Value Date    GLUCOSE 153 (H) 10/03/2022    BUN 24 (H) 10/03/2022    CREATININE 1.04 (H) 10/03/2022     10/03/2022    K 3.5 10/03/2022     10/03/2022    CO2 30.0 (H) 10/03/2022     Lab Results   Component Value Date    HGBA1C 8.8 10/03/2022     Lab Results   Component Value Date    WBC 10.43 09/01/2022    HGB 13.6 09/01/2022    HCT 40.9 09/01/2022     09/01/2022       Assessment:  No diagnosis found.    Plan:      1.  Prior left lacunar infarct  -MRI and MRV negative in May  -Continue ASA and statin  -Continue to address risk factors    2.  Possible syncopal episode  -Short episode NSVT on heart monitor, no sustained arrhythmias  -No abnormalities on echo earlier this year that would cause syncope  -Orthostatic blood pressures 118/80 with a pulse of 66, standing 108/66 with a pulse of 72  -Does endorse some symptoms of dizziness upon standing we will reduce her HCTZ to 25 mg    3.  Hypertension  WNL today, orthostatics " negative   On HCTZ, lisinopril    4.  Hyperlipidemia  214,   -on atorvastatin 80mg  -Adding Zetia 10 mg for further LDL reduction    5.  DM  -A1c 8.8  -on Actos, farxiga, januvia    6.  LLE DVT  -Noted on duplex May 2022  -on eliquis, DVT still present 10/3/22    7.  Pre-op for EGD/ C-scope  -We will risk to proceed from my standpoint.  We typically recommend holding Eliquis for 48 hours prior.  -With chronic DVT in a calfvein she may not require bridging    Follow-up 6 months    Roshan Bay MD [facility]

## 2022-10-18 ENCOUNTER — OFFICE VISIT (OUTPATIENT)
Dept: CARDIOLOGY | Facility: CLINIC | Age: 52
End: 2022-10-18

## 2022-10-18 VITALS — HEIGHT: 68 IN | WEIGHT: 181 LBS | BODY MASS INDEX: 27.43 KG/M2

## 2022-10-18 DIAGNOSIS — I10 ESSENTIAL HYPERTENSION: ICD-10-CM

## 2022-10-18 PROCEDURE — 99204 OFFICE O/P NEW MOD 45 MIN: CPT | Performed by: INTERNAL MEDICINE

## 2022-10-18 RX ORDER — EZETIMIBE 10 MG/1
10 TABLET ORAL DAILY
Qty: 30 TABLET | Refills: 11 | Status: SHIPPED | OUTPATIENT
Start: 2022-10-18 | End: 2023-01-31

## 2022-10-18 RX ORDER — HYDROCHLOROTHIAZIDE 25 MG/1
25 TABLET ORAL DAILY
Qty: 30 TABLET | Refills: 6 | Status: SHIPPED | OUTPATIENT
Start: 2022-10-18

## 2022-10-19 ENCOUNTER — HOSPITAL ENCOUNTER (EMERGENCY)
Facility: HOSPITAL | Age: 52
Discharge: HOME | End: 2022-10-19
Attending: EMERGENCY MEDICINE | Admitting: EMERGENCY MEDICINE

## 2022-10-19 ENCOUNTER — APPOINTMENT (OUTPATIENT)
Dept: CT IMAGING | Facility: HOSPITAL | Age: 52
End: 2022-10-19

## 2022-10-19 VITALS
HEIGHT: 68 IN | HEART RATE: 100 BPM | TEMPERATURE: 98.5 F | WEIGHT: 180 LBS | SYSTOLIC BLOOD PRESSURE: 158 MMHG | DIASTOLIC BLOOD PRESSURE: 105 MMHG | RESPIRATION RATE: 18 BRPM | OXYGEN SATURATION: 97 % | BODY MASS INDEX: 27.28 KG/M2

## 2022-10-19 DIAGNOSIS — Z79.01 CHRONIC ANTICOAGULATION: ICD-10-CM

## 2022-10-19 DIAGNOSIS — Z72.0 TOBACCO ABUSE: ICD-10-CM

## 2022-10-19 DIAGNOSIS — E11.65 TYPE 2 DIABETES MELLITUS WITH HYPERGLYCEMIA, WITHOUT LONG-TERM CURRENT USE OF INSULIN: ICD-10-CM

## 2022-10-19 DIAGNOSIS — Z86.718 HISTORY OF DVT (DEEP VEIN THROMBOSIS): ICD-10-CM

## 2022-10-19 DIAGNOSIS — Z86.39 HISTORY OF HYPERLIPIDEMIA: ICD-10-CM

## 2022-10-19 DIAGNOSIS — Z86.73 HISTORY OF STROKE: ICD-10-CM

## 2022-10-19 DIAGNOSIS — R19.7 NAUSEA VOMITING AND DIARRHEA: ICD-10-CM

## 2022-10-19 DIAGNOSIS — R07.89 ATYPICAL CHEST PAIN: ICD-10-CM

## 2022-10-19 DIAGNOSIS — Z86.79 HISTORY OF HYPERTENSION: ICD-10-CM

## 2022-10-19 DIAGNOSIS — R11.2 NAUSEA VOMITING AND DIARRHEA: ICD-10-CM

## 2022-10-19 DIAGNOSIS — R10.33 PERIUMBILICAL ABDOMINAL PAIN: Primary | ICD-10-CM

## 2022-10-19 LAB
ALBUMIN SERPL-MCNC: 4.4 G/DL (ref 3.5–5.2)
ALBUMIN/GLOB SERPL: 1.5 G/DL
ALP SERPL-CCNC: 95 U/L (ref 39–117)
ALT SERPL W P-5'-P-CCNC: 14 U/L (ref 1–33)
AMYLASE SERPL-CCNC: 96 U/L (ref 28–100)
ANION GAP SERPL CALCULATED.3IONS-SCNC: 10 MMOL/L (ref 5–15)
AST SERPL-CCNC: 15 U/L (ref 1–32)
BASOPHILS # BLD AUTO: 0.05 10*3/MM3 (ref 0–0.2)
BASOPHILS NFR BLD AUTO: 0.4 % (ref 0–1.5)
BILIRUB SERPL-MCNC: 0.6 MG/DL (ref 0–1.2)
BILIRUB UR QL STRIP: NEGATIVE
BUN SERPL-MCNC: 22 MG/DL (ref 6–20)
BUN/CREAT SERPL: 22.4 (ref 7–25)
CALCIUM SPEC-SCNC: 9.9 MG/DL (ref 8.6–10.5)
CHLORIDE SERPL-SCNC: 96 MMOL/L (ref 98–107)
CLARITY UR: CLEAR
CO2 SERPL-SCNC: 30 MMOL/L (ref 22–29)
COLOR UR: YELLOW
CREAT SERPL-MCNC: 0.98 MG/DL (ref 0.57–1)
D-LACTATE SERPL-SCNC: 1.8 MMOL/L (ref 0.5–2)
DEPRECATED RDW RBC AUTO: 46.8 FL (ref 37–54)
EGFRCR SERPLBLD CKD-EPI 2021: 70 ML/MIN/1.73
EOSINOPHIL # BLD AUTO: 0.21 10*3/MM3 (ref 0–0.4)
EOSINOPHIL NFR BLD AUTO: 1.8 % (ref 0.3–6.2)
ERYTHROCYTE [DISTWIDTH] IN BLOOD BY AUTOMATED COUNT: 13.5 % (ref 12.3–15.4)
GLOBULIN UR ELPH-MCNC: 2.9 GM/DL
GLUCOSE SERPL-MCNC: 226 MG/DL (ref 65–99)
GLUCOSE UR STRIP-MCNC: ABNORMAL MG/DL
HCT VFR BLD AUTO: 40.8 % (ref 34–46.6)
HGB BLD-MCNC: 13.6 G/DL (ref 12–15.9)
HGB UR QL STRIP.AUTO: NEGATIVE
HOLD SPECIMEN: NORMAL
IMM GRANULOCYTES # BLD AUTO: 0.12 10*3/MM3 (ref 0–0.05)
IMM GRANULOCYTES NFR BLD AUTO: 1 % (ref 0–0.5)
KETONES UR QL STRIP: NEGATIVE
LEUKOCYTE ESTERASE UR QL STRIP.AUTO: NEGATIVE
LIPASE SERPL-CCNC: 30 U/L (ref 13–60)
LYMPHOCYTES # BLD AUTO: 3.06 10*3/MM3 (ref 0.7–3.1)
LYMPHOCYTES NFR BLD AUTO: 25.9 % (ref 19.6–45.3)
MCH RBC QN AUTO: 31.5 PG (ref 26.6–33)
MCHC RBC AUTO-ENTMCNC: 33.3 G/DL (ref 31.5–35.7)
MCV RBC AUTO: 94.4 FL (ref 79–97)
MONOCYTES # BLD AUTO: 0.88 10*3/MM3 (ref 0.1–0.9)
MONOCYTES NFR BLD AUTO: 7.4 % (ref 5–12)
NEUTROPHILS NFR BLD AUTO: 63.5 % (ref 42.7–76)
NEUTROPHILS NFR BLD AUTO: 7.5 10*3/MM3 (ref 1.7–7)
NITRITE UR QL STRIP: NEGATIVE
NRBC BLD AUTO-RTO: 0 /100 WBC (ref 0–0.2)
PH UR STRIP.AUTO: <=5 [PH] (ref 5–8)
PLATELET # BLD AUTO: 214 10*3/MM3 (ref 140–450)
PMV BLD AUTO: 10.6 FL (ref 6–12)
POTASSIUM SERPL-SCNC: 3.5 MMOL/L (ref 3.5–5.2)
PROT SERPL-MCNC: 7.3 G/DL (ref 6–8.5)
PROT UR QL STRIP: NEGATIVE
QT INTERVAL: 430 MS
QTC INTERVAL: 454 MS
RBC # BLD AUTO: 4.32 10*6/MM3 (ref 3.77–5.28)
SODIUM SERPL-SCNC: 136 MMOL/L (ref 136–145)
SP GR UR STRIP: 1.03 (ref 1–1.03)
TRIGL SERPL-MCNC: 108 MG/DL (ref 0–150)
TROPONIN T SERPL-MCNC: <0.01 NG/ML (ref 0–0.03)
TROPONIN T SERPL-MCNC: <0.01 NG/ML (ref 0–0.03)
UROBILINOGEN UR QL STRIP: ABNORMAL
WBC NRBC COR # BLD: 11.82 10*3/MM3 (ref 3.4–10.8)
WHOLE BLOOD HOLD COAG: NORMAL
WHOLE BLOOD HOLD SPECIMEN: NORMAL

## 2022-10-19 PROCEDURE — 84484 ASSAY OF TROPONIN QUANT: CPT | Performed by: PHYSICIAN ASSISTANT

## 2022-10-19 PROCEDURE — 93005 ELECTROCARDIOGRAM TRACING: CPT | Performed by: EMERGENCY MEDICINE

## 2022-10-19 PROCEDURE — 83690 ASSAY OF LIPASE: CPT

## 2022-10-19 PROCEDURE — 96374 THER/PROPH/DIAG INJ IV PUSH: CPT

## 2022-10-19 PROCEDURE — 80053 COMPREHEN METABOLIC PANEL: CPT

## 2022-10-19 PROCEDURE — 99284 EMERGENCY DEPT VISIT MOD MDM: CPT

## 2022-10-19 PROCEDURE — 81003 URINALYSIS AUTO W/O SCOPE: CPT | Performed by: EMERGENCY MEDICINE

## 2022-10-19 PROCEDURE — 25010000002 ONDANSETRON PER 1 MG: Performed by: EMERGENCY MEDICINE

## 2022-10-19 PROCEDURE — 96375 TX/PRO/DX INJ NEW DRUG ADDON: CPT

## 2022-10-19 PROCEDURE — 84478 ASSAY OF TRIGLYCERIDES: CPT | Performed by: EMERGENCY MEDICINE

## 2022-10-19 PROCEDURE — 93005 ELECTROCARDIOGRAM TRACING: CPT | Performed by: PHYSICIAN ASSISTANT

## 2022-10-19 PROCEDURE — 36415 COLL VENOUS BLD VENIPUNCTURE: CPT

## 2022-10-19 PROCEDURE — 85025 COMPLETE CBC W/AUTO DIFF WBC: CPT

## 2022-10-19 PROCEDURE — 25010000002 IOPAMIDOL 61 % SOLUTION: Performed by: EMERGENCY MEDICINE

## 2022-10-19 PROCEDURE — 25010000002 HYDROMORPHONE PER 4 MG: Performed by: EMERGENCY MEDICINE

## 2022-10-19 PROCEDURE — 74177 CT ABD & PELVIS W/CONTRAST: CPT

## 2022-10-19 PROCEDURE — 82150 ASSAY OF AMYLASE: CPT | Performed by: EMERGENCY MEDICINE

## 2022-10-19 PROCEDURE — 84484 ASSAY OF TROPONIN QUANT: CPT | Performed by: EMERGENCY MEDICINE

## 2022-10-19 PROCEDURE — 83605 ASSAY OF LACTIC ACID: CPT

## 2022-10-19 RX ORDER — PANTOPRAZOLE SODIUM 40 MG/10ML
40 INJECTION, POWDER, LYOPHILIZED, FOR SOLUTION INTRAVENOUS ONCE
Status: COMPLETED | OUTPATIENT
Start: 2022-10-19 | End: 2022-10-19

## 2022-10-19 RX ORDER — SODIUM CHLORIDE 9 MG/ML
10 INJECTION INTRAVENOUS AS NEEDED
Status: DISCONTINUED | OUTPATIENT
Start: 2022-10-19 | End: 2022-10-19 | Stop reason: HOSPADM

## 2022-10-19 RX ORDER — METOCLOPRAMIDE 10 MG/1
10 TABLET ORAL
Qty: 40 TABLET | Refills: 0 | Status: SHIPPED | OUTPATIENT
Start: 2022-10-19 | End: 2023-01-31

## 2022-10-19 RX ORDER — LIDOCAINE HYDROCHLORIDE 20 MG/ML
15 SOLUTION OROPHARYNGEAL ONCE
Status: COMPLETED | OUTPATIENT
Start: 2022-10-19 | End: 2022-10-19

## 2022-10-19 RX ORDER — HYDROMORPHONE HYDROCHLORIDE 1 MG/ML
0.5 INJECTION, SOLUTION INTRAMUSCULAR; INTRAVENOUS; SUBCUTANEOUS ONCE
Status: COMPLETED | OUTPATIENT
Start: 2022-10-19 | End: 2022-10-19

## 2022-10-19 RX ORDER — ONDANSETRON 2 MG/ML
4 INJECTION INTRAMUSCULAR; INTRAVENOUS ONCE
Status: COMPLETED | OUTPATIENT
Start: 2022-10-19 | End: 2022-10-19

## 2022-10-19 RX ORDER — ALUMINA, MAGNESIA, AND SIMETHICONE 2400; 2400; 240 MG/30ML; MG/30ML; MG/30ML
15 SUSPENSION ORAL ONCE
Status: COMPLETED | OUTPATIENT
Start: 2022-10-19 | End: 2022-10-19

## 2022-10-19 RX ADMIN — ONDANSETRON 4 MG: 2 INJECTION INTRAMUSCULAR; INTRAVENOUS at 14:24

## 2022-10-19 RX ADMIN — SODIUM CHLORIDE 1000 ML: 9 INJECTION, SOLUTION INTRAVENOUS at 14:23

## 2022-10-19 RX ADMIN — PANTOPRAZOLE SODIUM 40 MG: 40 INJECTION, POWDER, FOR SOLUTION INTRAVENOUS at 14:25

## 2022-10-19 RX ADMIN — LIDOCAINE HYDROCHLORIDE 15 ML: 20 SOLUTION ORAL; TOPICAL at 17:11

## 2022-10-19 RX ADMIN — HYDROMORPHONE HYDROCHLORIDE 0.5 MG: 1 INJECTION, SOLUTION INTRAMUSCULAR; INTRAVENOUS; SUBCUTANEOUS at 14:24

## 2022-10-19 RX ADMIN — ALUMINUM HYDROXIDE, MAGNESIUM HYDROXIDE, AND DIMETHICONE 15 ML: 400; 400; 40 SUSPENSION ORAL at 17:11

## 2022-10-19 RX ADMIN — IOPAMIDOL 100 ML: 612 INJECTION, SOLUTION INTRAVENOUS at 15:45

## 2022-10-19 NOTE — DISCHARGE INSTRUCTIONS
ER evaluation revealed normal cardiac work-up with 2 stable EKGs and 2 normal troponins.  CT of the abdomen/pelvis with contrast revealed no acute infectious or inflammatory process.  Urinalysis was normal.  We recommend close GI follow-up with Dr. Osuna to set up upper endoscopy and gastric emptying study in the near future.  Rx for Reglan 10 mg by mouth 4 times daily as needed for nausea/vomiting and abdominal cramping.  Recommend referral to the chest pain clinic due to atypical chest pain.  Continue with all other current medical management.  Return to the ER if worsening symptoms.

## 2022-10-19 NOTE — ED PROVIDER NOTES
Subjective   History of Present Illness  This is a 52-year-old female that presents the ER with sudden onset of periumbilical abdominal pain that started around 3 AM this morning, approximately 8 hours ago.  Patient describes sharp pains with bloating and pressure.  She reports associated nausea with vomiting.  She has vomited 10 times.  She reports increased gas and belching.  She also reports increased diarrhea and says that she has had 5 loose stools.  She denies any hematochezia or melena.  Patient reports chills but has not tested positive for fever.  Patient says pain radiates to the substernal chest.  She reports increased belching.  Patient has been trying to get in with GI to have a EGD/colonoscopy performed in the near future due to recurrent pain for the last few months.  Last colonoscopy was in 2016.  Patient says that scopes have not been able to be set up because she had a recent mini stroke approximately 4 months ago.  She says abdominal pain is recurrent and last episode was 2 months ago.  Patient reports previous abdominal surgeries , hysterectomy, and cholecystectomy.  Past medical history is significant for hypertension, osteoarthritis, vitamin D deficiency, tobacco abuse, history of TIA without any residual deficits, type 2 diabetes mellitus, non-insulin managed, depression, hypothyroidism, and history of DVT to left peroneal vein on chronic Eliquis.  Patient denies any dysuria, urgency, or frequency.Last stress test was 2019 and impression was consistent with a low risk study.  Last echocardiogram was May, 2022 and EF was 65%.  Patient had mild MR and mild TR.  Negative saline bubble study.  Patient says blood sugars have been running in the 200s.    History provided by:  Patient  Abdominal Pain  Pain location:  Periumbilical  Pain quality: bloating, pressure and sharp    Pain radiates to:  Chest  Onset quality:  Sudden  Duration:  8 hours  Timing:  Constant  Progression:   Unchanged  Chronicity:  Recurrent  Context: not previous surgeries    Relieved by:  Nothing  Worsened by:  Nothing  Ineffective treatments: Prilosec and Carafate.  Associated symptoms: anorexia, belching, chest pain (abdominal pain radiates to SS chest), chills, diarrhea (x 5), flatus, nausea and vomiting (x 10)    Associated symptoms: no constipation, no cough, no dysuria, no fever, no hematuria and no shortness of breath    Risk factors comment:  Recurrent periumbilical abd pain.  Last episode was 2 months ago.  Sugars are running high with DM, around 200's.  H/o TIA 4 months ago, so pt on Eliquis.  Trying to get set up with EGD/colonoscopy, but hasn't had that done yet.      Review of Systems   Constitutional: Positive for appetite change and chills. Negative for fever.   HENT: Negative.    Respiratory: Negative.  Negative for cough and shortness of breath.    Cardiovascular: Positive for chest pain (abdominal pain radiates to SS chest). Negative for palpitations and leg swelling.   Gastrointestinal: Positive for abdominal pain (Periumbilical), anorexia, diarrhea (x 5), flatus, nausea and vomiting (x 10). Negative for constipation.   Genitourinary: Negative.  Negative for dysuria, hematuria and urgency.   Musculoskeletal: Negative.  Negative for back pain.   Neurological: Negative.  Negative for dizziness and syncope.   All other systems reviewed and are negative.      Past Medical History:   Diagnosis Date   • Acute deep vein thrombosis (DVT) of left lower extremity (HCC) 05/16/2022    Dx 5/16/22- left peroneal   • Arthralgia of hip    • Cervical facet arthropathy/cervical spondylosis without myelopathy    • Chronic pain syndrome     Description: hx of epidural injections.   • Conjunctival melanosis of left eye    • COVID-19 01/2022   • Degenerative disc disease, cervical    • Depression     Description: dx 2012.   • Diabetes mellitus (HCC)     TYPE 2   • Duplicated left renal collecting system     Dx 5/19 by CT    • Essential hypertension     Description: dx .   • Hemorrhoid    • History of cardiovascular stress test 2019    low risk myocardial perfusion study   • History of colonoscopy 2013    normal per patient   • History of esophagogastroduodenoscopy (EGD) 10/06/2021    Path consistent with reactive gastropathy   • History of uterine leiomyoma    • History of varicella    • Hypertension    • Hypothyroidism     Description: dx .   • Insomnia    • Low back pain    • Lumbosacral disc disease    • Myopia of both eyes    • Neck pain    • Obesity (BMI 30.0-34.9)    • Osteoarthritis    • Primary osteoarthritis of both knees 2017   • Renal angiomyolipoma     Left dx by CT .   • Stroke (HCC)    • Tattoos     HIV neg 2012 per patient   • Tobacco abuse    • Vitamin D deficiency     Dx 3/19.   • Wears partial dentures     upper       Allergies   Allergen Reactions   • Glimepiride Diarrhea       Past Surgical History:   Procedure Laterality Date   • ANTERIOR CERVICAL DISCECTOMY  2016    C4-6 DISC (Dr. Pena)   • ANTERIOR CERVICAL DISCECTOMY W/ FUSION Left 2018    Procedure: CERVICAL DISCECTOMY ANTERIOR WITH FUSION C6-7;  Surgeon: Nicolás Pena MD;  Location:  [facility];  Service:    • BACK SURGERY      lumbar discectomy   •  SECTION      , ,    • CHOLECYSTECTOMY WITH INTRAOPERATIVE CHOLANGIOGRAM N/A 2017    Procedure: CHOLECYSTECTOMY LAPAROSCOPIC INTRAOPERATIVE CHOLANGIOGRAM;  Surgeon: Clifford Freed MD;  Location:  [facility];  Service:    • COLONOSCOPY     • HYSTERECTOMY  2015   • SPINAL CORD STIMULATOR IMPLANT N/A 2019    Procedure: SPINAL CORD STIMULATOR INSERTION PHASE 1;  Surgeon: Blayne Pandya MD;  Location:  [facility];  Service: Pain Management       Family History   Problem Relation Age of Onset   • Diabetes Mother    • Hypertension Mother    • No Known Problems Father         Medical and Family History unknown   • Hypertension Brother    •  Diabetes Maternal Uncle    • Hypertension Maternal Uncle    • Colon cancer Maternal Grandmother    • Diabetes Maternal Grandmother    • Hypertension Maternal Grandmother    • Diabetes Daughter    • Hypothyroidism Daughter    • Hypertension Other    • Breast cancer Neg Hx    • Ovarian cancer Neg Hx        Social History     Socioeconomic History   • Marital status: Legally    • Number of children: 3   Tobacco Use   • Smoking status: Every Day     Packs/day: 0.50     Years: 28.00     Pack years: 14.00     Types: Cigarettes     Start date:      Last attempt to quit: 2017     Years since quittin.9   • Smokeless tobacco: Never   • Tobacco comments:     started smoking 8/3/2022   Vaping Use   • Vaping Use: Never used   Substance and Sexual Activity   • Alcohol use: Yes     Comment: 2-3 cocktains, 3-4 times per year   • Drug use: No   • Sexual activity: Not Currently     Partners: Male           Objective   Physical Exam  Vitals and nursing note reviewed.   Constitutional:       General: She is not in acute distress.     Appearance: Normal appearance. She is ill-appearing. She is not diaphoretic.      Comments: Mildly ill.  Patient actively vomiting.  No acute sign of distress.  Nontoxic.   HENT:      Head: Normocephalic and atraumatic.      Right Ear: Tympanic membrane normal.      Left Ear: Tympanic membrane normal.      Nose: Nose normal.      Mouth/Throat:      Mouth: Mucous membranes are dry.      Pharynx: Oropharynx is clear.      Comments: Oral mucous membranes are dry.  Eyes:      Extraocular Movements: Extraocular movements intact.      Conjunctiva/sclera: Conjunctivae normal.      Pupils: Pupils are equal, round, and reactive to light.   Cardiovascular:      Rate and Rhythm: Normal rate and regular rhythm.  No extrasystoles are present.     Pulses: Normal pulses.      Heart sounds: Normal heart sounds.      Comments: Regular rate and rhythm.  No ectopy.  Pulmonary:      Effort: Pulmonary  effort is normal.      Breath sounds: Normal breath sounds.      Comments: Lungs are clear to auscultation bilaterally  Abdominal:      General: Bowel sounds are normal. There is no distension.      Palpations: Abdomen is soft.      Tenderness: There is abdominal tenderness in the epigastric area, periumbilical area and left upper quadrant. There is no right CVA tenderness, left CVA tenderness, guarding or rebound.      Comments: Abdomen soft without distention.  Active bowel sounds in all 4 quadrants.  Moderate periumbilical, epigastric, left upper quadrant tenderness.  No rebound or guarding.  No flank or CVA tenderness.  Abdominal exam is benign and nonsurgical.   Musculoskeletal:         General: Normal range of motion.      Cervical back: Normal range of motion and neck supple.      Right lower leg: No edema.      Left lower leg: No edema.   Skin:     General: Skin is warm and dry.   Neurological:      General: No focal deficit present.      Mental Status: She is alert.      Cranial Nerves: Cranial nerves 2-12 are intact.      Sensory: Sensation is intact.      Motor: Motor function is intact.      Coordination: Coordination is intact.         Procedures           ED Course  ED Course as of 10/19/22 1900   Wed Oct 19, 2022   1511 EKG shows normal sinus rhythm.  No acute ST-T wave changes consistent with ischemia no evidence of ectopy or arrhythmia.  CBC was within normal limits.  Chemistry showed serum glucose 226, BUN and creatinine were 22 and 0.98.  LFTs were within normal limits.  Lipase is 30, lactic acid is 1.8.  Amylase is 96.  Triglycerides are 108.  Urinalysis reveals glucosuria but no acute infectious process and initial troponin is less than 0.010. [FC]   1637 Repeat 2-hour EKG shows sinus rhythm with first-degree AV block.  No acute ST-T wave changes consistent with ischemia.  Repeat 2-hour troponin is in process.  CT of the abdomen/pelvis with contrast reveals no acute intra-abdominal or  intrapelvic pathology.  Patient is status postcholecystectomy.  Portal vasculature is patent.  Spleen and pancreas are normal.  Kidneys are unremarkable.  Colon is decompressed and appendix is also unremarkable.  Patient requested GI cocktail.  Now that her stomach has settled after IV fluids and medications, we will try GI cocktail. [FC]   1854 Upon reassessment, patient's abdominal pain and nausea/vomiting have improved.  We will prescribe Reglan 10 mg by mouth 4 times daily as needed for nausea and abdominal discomfort.  Differential diagnoses includes acute gastritis, hiatal hernia, peptic ulcer disease, and gastroparesis with history of diabetes mellitus.  We will give close GI follow-up information for Dr. Osuna.  Patient needs to avoid eating greasy, spicy, or fatty foods.  We recommend upper endoscopy and gastric emptying study in the near future.  Abdominal exam is benign.  Patient ready for discharge to home.  We also will refer her closely to the chest pain clinic due to atypical chest pain. [FC]      ED Course User Index  [FC] Elsy Guillermo, MELODY                 Recent Results (from the past 24 hour(s))   Comprehensive Metabolic Panel    Collection Time: 10/19/22 11:45 AM    Specimen: Blood   Result Value Ref Range    Glucose 226 (H) 65 - 99 mg/dL    BUN 22 (H) 6 - 20 mg/dL    Creatinine 0.98 0.57 - 1.00 mg/dL    Sodium 136 136 - 145 mmol/L    Potassium 3.5 3.5 - 5.2 mmol/L    Chloride 96 (L) 98 - 107 mmol/L    CO2 30.0 (H) 22.0 - 29.0 mmol/L    Calcium 9.9 8.6 - 10.5 mg/dL    Total Protein 7.3 6.0 - 8.5 g/dL    Albumin 4.40 3.50 - 5.20 g/dL    ALT (SGPT) 14 1 - 33 U/L    AST (SGOT) 15 1 - 32 U/L    Alkaline Phosphatase 95 39 - 117 U/L    Total Bilirubin 0.6 0.0 - 1.2 mg/dL    Globulin 2.9 gm/dL    A/G Ratio 1.5 g/dL    BUN/Creatinine Ratio 22.4 7.0 - 25.0    Anion Gap 10.0 5.0 - 15.0 mmol/L    eGFR 70.0 >60.0 mL/min/1.73   Lipase    Collection Time: 10/19/22 11:45 AM    Specimen: Blood   Result Value  Ref Range    Lipase 30 13 - 60 U/L   Lactic Acid, Plasma    Collection Time: 10/19/22 11:45 AM    Specimen: Blood   Result Value Ref Range    Lactate 1.8 0.5 - 2.0 mmol/L   Green Top (Gel)    Collection Time: 10/19/22 11:45 AM   Result Value Ref Range    Extra Tube Hold for add-ons.    Lavender Top    Collection Time: 10/19/22 11:45 AM   Result Value Ref Range    Extra Tube hold for add-on    Gold Top - SST    Collection Time: 10/19/22 11:45 AM   Result Value Ref Range    Extra Tube Hold for add-ons.    Gray Top    Collection Time: 10/19/22 11:45 AM   Result Value Ref Range    Extra Tube Hold for add-ons.    Light Blue Top    Collection Time: 10/19/22 11:45 AM   Result Value Ref Range    Extra Tube Hold for add-ons.    CBC Auto Differential    Collection Time: 10/19/22 11:45 AM    Specimen: Blood   Result Value Ref Range    WBC 11.82 (H) 3.40 - 10.80 10*3/mm3    RBC 4.32 3.77 - 5.28 10*6/mm3    Hemoglobin 13.6 12.0 - 15.9 g/dL    Hematocrit 40.8 34.0 - 46.6 %    MCV 94.4 79.0 - 97.0 fL    MCH 31.5 26.6 - 33.0 pg    MCHC 33.3 31.5 - 35.7 g/dL    RDW 13.5 12.3 - 15.4 %    RDW-SD 46.8 37.0 - 54.0 fl    MPV 10.6 6.0 - 12.0 fL    Platelets 214 140 - 450 10*3/mm3    Neutrophil % 63.5 42.7 - 76.0 %    Lymphocyte % 25.9 19.6 - 45.3 %    Monocyte % 7.4 5.0 - 12.0 %    Eosinophil % 1.8 0.3 - 6.2 %    Basophil % 0.4 0.0 - 1.5 %    Immature Grans % 1.0 (H) 0.0 - 0.5 %    Neutrophils, Absolute 7.50 (H) 1.70 - 7.00 10*3/mm3    Lymphocytes, Absolute 3.06 0.70 - 3.10 10*3/mm3    Monocytes, Absolute 0.88 0.10 - 0.90 10*3/mm3    Eosinophils, Absolute 0.21 0.00 - 0.40 10*3/mm3    Basophils, Absolute 0.05 0.00 - 0.20 10*3/mm3    Immature Grans, Absolute 0.12 (H) 0.00 - 0.05 10*3/mm3    nRBC 0.0 0.0 - 0.2 /100 WBC   Amylase    Collection Time: 10/19/22 11:45 AM    Specimen: Blood   Result Value Ref Range    Amylase 96 28 - 100 U/L   Triglycerides    Collection Time: 10/19/22 11:45 AM    Specimen: Blood   Result Value Ref Range     Triglycerides 108 0 - 150 mg/dL   ECG 12 Lead    Collection Time: 10/19/22  1:57 PM   Result Value Ref Range    QT Interval 430 ms    QTC Interval 454 ms   Troponin    Collection Time: 10/19/22  2:03 PM    Specimen: Blood   Result Value Ref Range    Troponin T <0.010 0.000 - 0.030 ng/mL   Urinalysis With Microscopic If Indicated (No Culture) - Urine, Clean Catch    Collection Time: 10/19/22  2:10 PM    Specimen: Urine, Clean Catch   Result Value Ref Range    Color, UA Yellow Yellow, Straw    Appearance, UA Clear Clear    pH, UA <=5.0 5.0 - 8.0    Specific Gravity, UA 1.035 (H) 1.001 - 1.030    Glucose,  mg/dL (2+) (A) Negative    Ketones, UA Negative Negative    Bilirubin, UA Negative Negative    Blood, UA Negative Negative    Protein, UA Negative Negative    Leuk Esterase, UA Negative Negative    Nitrite, UA Negative Negative    Urobilinogen, UA 0.2 E.U./dL 0.2 - 1.0 E.U./dL   Troponin    Collection Time: 10/19/22  4:16 PM    Specimen: Blood   Result Value Ref Range    Troponin T <0.010 0.000 - 0.030 ng/mL   ECG 12 Lead    Collection Time: 10/19/22  4:21 PM   Result Value Ref Range    QT Interval 424 ms    QTC Interval 470 ms     Note: In addition to lab results from this visit, the labs listed above may include labs taken at another facility or during a different encounter within the last 24 hours. Please correlate lab times with ED admission and discharge times for further clarification of the services performed during this visit.    CT Abdomen Pelvis With Contrast   Final Result       1. No acute intra-abdominal pathology.               This report was finalized on 10/19/2022 4:32 PM by Loco Sanchez MD.            Vitals:    10/19/22 1730 10/19/22 1800 10/19/22 1815 10/19/22 1830   BP: (!) 162/107 150/82  159/89   BP Location:       Patient Position:       Pulse:  75     Resp:       Temp:       TempSrc:       SpO2: 96% 99% 98% 97%   Weight:       Height:         Medications   Sodium Chloride (PF) 0.9 % 10  mL (has no administration in time range)   sodium chloride 0.9 % bolus 1,000 mL (0 mL Intravenous Stopped 10/19/22 1611)   pantoprazole (PROTONIX) injection 40 mg (40 mg Intravenous Given 10/19/22 1425)   ondansetron (ZOFRAN) injection 4 mg (4 mg Intravenous Given 10/19/22 1424)   HYDROmorphone (DILAUDID) injection 0.5 mg (0.5 mg Intravenous Given 10/19/22 1424)   iopamidol (ISOVUE-300) 61 % injection 100 mL (100 mL Intravenous Given 10/19/22 1545)   aluminum-magnesium hydroxide-simethicone (MAALOX MAX) 400-400-40 MG/5ML suspension 15 mL (15 mL Oral Given 10/19/22 1711)   Lidocaine Viscous HCl (XYLOCAINE) 2 % solution 15 mL (15 mL Mouth/Throat Given 10/19/22 1711)     ECG/EMG Results (last 24 hours)     ** No results found for the last 24 hours. **        ECG 12 Lead   Preliminary Result   Test Reason : Repeat 2-hour CP   Blood Pressure :   */*   mmHG   Vent. Rate :  74 BPM     Atrial Rate :  74 BPM      P-R Int : 214 ms          QRS Dur :  82 ms       QT Int : 424 ms       P-R-T Axes :  58  26  67 degrees      QTc Int : 470 ms      Sinus rhythm with sinus arrhythmia with 1st degree AV block   Possible Left atrial enlargement   Borderline ECG   When compared with ECG of 19-OCT-2022 13:57,   DE interval has increased      Referred By: ELIER           Confirmed By:       ECG 12 Lead   Final Result   Test Reason : SS CP   Blood Pressure :   */*   mmHG   Vent. Rate :  67 BPM     Atrial Rate :  67 BPM      P-R Int : 168 ms          QRS Dur :  84 ms       QT Int : 430 ms       P-R-T Axes :  56  39  72 degrees      QTc Int : 454 ms      Normal sinus rhythm with sinus arrhythmia   Normal ECG   When compared with ECG of 01-SEP-2022 15:12,   No significant change was found   Confirmed by KARIS PONCE MD ([id number]) on 10/19/2022 3:16:44 PM      Referred By: ESTEPHANIA           Confirmed By: KARIS PONCE MD            HEART Score for Major Cardiac Events - MDCalc  Calculated on Oct 19 2022 6:55 PM  3 points -> Low Score (0-3 points)  Risk of MACE of 0.9-1.7%.                           MDM    Final diagnoses:   Periumbilical abdominal pain   Nausea vomiting and diarrhea   Atypical chest pain   Type 2 diabetes mellitus with hyperglycemia, without long-term current use of insulin (HCC)   History of stroke   History of DVT (deep vein thrombosis)   Chronic anticoagulation   History of hypertension   History of hyperlipidemia   Tobacco abuse       ED Disposition  ED Disposition     ED Disposition   Discharge    Condition   Stable    Comment   --             Stephanie Tanner MD  [address] [facility]  [address]  [facility] [address]  [phone]    Schedule an appointment as soon as possible for a visit in 2 days  Close PCP follow-up for recheck    Josh Osuna MD  [address] [facility]  [address]  [facility] [id number] [phone]    Call in 1 day  Call tomorrow for first available [facility]  [address] [address] [phone]    If symptoms worsen         Medication List      Changed    * metoclopramide 10 MG tablet  Commonly known as: REGLAN  Take 1 tablet by mouth 3 (Three) Times a Day As Needed (Nausea, vomiting, and abdominal pain).  What changed: Another medication with the same name was added. Make sure you understand how and when to take each.     * metoclopramide 10 MG tablet  Commonly known as: REGLAN  Take 1 tablet by mouth 4 (Four) Times a Day Before Meals & at Bedtime.  What changed: You were already taking a medication with the same name, and this prescription was added. Make sure you understand how and when to take each.         * This list has 2 medication(s) that are the same as other medications prescribed for you. Read the directions carefully, and ask your doctor or other care provider to review them with you.               Where to Get Your Medications      These medications were sent to [facility] [id number] - [facility] [address] [place] [address]  ROAD - [phone] [facility] [phone] FX  [address] [facility] [address] [address]    Phone: [phone]   · metoclopramide 10 MG tablet         Elsy Guillermo PA-C  10/19/22 3208

## 2022-10-25 ENCOUNTER — HOSPITAL ENCOUNTER (EMERGENCY)
Facility: HOSPITAL | Age: 52
Discharge: HOME | End: 2022-10-25
Attending: EMERGENCY MEDICINE | Admitting: EMERGENCY MEDICINE

## 2022-10-25 ENCOUNTER — APPOINTMENT (OUTPATIENT)
Dept: CARDIOLOGY | Facility: HOSPITAL | Age: 52
End: 2022-10-25

## 2022-10-25 VITALS
BODY MASS INDEX: 26.52 KG/M2 | RESPIRATION RATE: 18 BRPM | HEART RATE: 77 BPM | OXYGEN SATURATION: 100 % | DIASTOLIC BLOOD PRESSURE: 65 MMHG | WEIGHT: 175 LBS | SYSTOLIC BLOOD PRESSURE: 113 MMHG | HEIGHT: 68 IN | TEMPERATURE: 98.5 F

## 2022-10-25 DIAGNOSIS — M25.40 EFFUSION OF JOINT, UNSPECIFIED LOCATION: ICD-10-CM

## 2022-10-25 DIAGNOSIS — I80.9 PHLEBITIS: Primary | ICD-10-CM

## 2022-10-25 PROCEDURE — 93971 EXTREMITY STUDY: CPT | Performed by: INTERNAL MEDICINE

## 2022-10-25 PROCEDURE — 93971 EXTREMITY STUDY: CPT

## 2022-10-25 PROCEDURE — 99282 EMERGENCY DEPT VISIT SF MDM: CPT

## 2022-10-25 RX ORDER — HYDROCODONE BITARTRATE AND ACETAMINOPHEN 5; 325 MG/1; MG/1
1 TABLET ORAL EVERY 8 HOURS PRN
Qty: 10 TABLET | Refills: 0 | Status: SHIPPED | OUTPATIENT
Start: 2022-10-25 | End: 2023-01-31

## 2022-10-26 LAB
BH CV LOWER VASCULAR LEFT COMMON FEMORAL AUGMENT: NORMAL
BH CV LOWER VASCULAR LEFT COMMON FEMORAL COMPRESS: NORMAL
BH CV LOWER VASCULAR LEFT COMMON FEMORAL PHASIC: NORMAL
BH CV LOWER VASCULAR LEFT COMMON FEMORAL SPONT: NORMAL
BH CV LOWER VASCULAR RIGHT COMMON FEMORAL AUGMENT: NORMAL
BH CV LOWER VASCULAR RIGHT COMMON FEMORAL COMPRESS: NORMAL
BH CV LOWER VASCULAR RIGHT COMMON FEMORAL PHASIC: NORMAL
BH CV LOWER VASCULAR RIGHT COMMON FEMORAL SPONT: NORMAL
BH CV LOWER VASCULAR RIGHT DISTAL FEMORAL AUGMENT: NORMAL
BH CV LOWER VASCULAR RIGHT DISTAL FEMORAL COMPRESS: NORMAL
BH CV LOWER VASCULAR RIGHT DISTAL FEMORAL PHASIC: NORMAL
BH CV LOWER VASCULAR RIGHT DISTAL FEMORAL SPONT: NORMAL
BH CV LOWER VASCULAR RIGHT GASTRONEMIUS COMPRESS: NORMAL
BH CV LOWER VASCULAR RIGHT GREATER SAPH AK COMPRESS: NORMAL
BH CV LOWER VASCULAR RIGHT GREATER SAPH BK COMPRESS: NORMAL
BH CV LOWER VASCULAR RIGHT LESSER SAPH COMPRESS: NORMAL
BH CV LOWER VASCULAR RIGHT MID FEMORAL AUGMENT: NORMAL
BH CV LOWER VASCULAR RIGHT MID FEMORAL COMPETENT: NORMAL
BH CV LOWER VASCULAR RIGHT MID FEMORAL COMPRESS: NORMAL
BH CV LOWER VASCULAR RIGHT MID FEMORAL PHASIC: NORMAL
BH CV LOWER VASCULAR RIGHT MID FEMORAL SPONT: NORMAL
BH CV LOWER VASCULAR RIGHT PERONEAL AUGMENT: NORMAL
BH CV LOWER VASCULAR RIGHT PERONEAL COMPRESS: NORMAL
BH CV LOWER VASCULAR RIGHT POPLITEAL AUGMENT: NORMAL
BH CV LOWER VASCULAR RIGHT POPLITEAL COMPRESS: NORMAL
BH CV LOWER VASCULAR RIGHT POPLITEAL PHASIC: NORMAL
BH CV LOWER VASCULAR RIGHT POPLITEAL SPONT: NORMAL
BH CV LOWER VASCULAR RIGHT POSTERIOR TIBIAL AUGMENT: NORMAL
BH CV LOWER VASCULAR RIGHT POSTERIOR TIBIAL COMPRESS: NORMAL
BH CV LOWER VASCULAR RIGHT PROFUNDA FEMORAL AUGMENT: NORMAL
BH CV LOWER VASCULAR RIGHT PROFUNDA FEMORAL PHASIC: NORMAL
BH CV LOWER VASCULAR RIGHT PROFUNDA FEMORAL SPONT: NORMAL
BH CV LOWER VASCULAR RIGHT PROXIMAL FEMORAL AUGMENT: NORMAL
BH CV LOWER VASCULAR RIGHT PROXIMAL FEMORAL COMPRESS: NORMAL
BH CV LOWER VASCULAR RIGHT PROXIMAL FEMORAL PHASIC: NORMAL
BH CV LOWER VASCULAR RIGHT PROXIMAL FEMORAL SPONT: NORMAL
BH CV LOWER VASCULAR RIGHT SAPHENOFEMORAL JUNCTION AUGMENT: NORMAL
BH CV LOWER VASCULAR RIGHT SAPHENOFEMORAL JUNCTION COMPRESS: NORMAL
BH CV LOWER VASCULAR RIGHT SAPHENOFEMORAL JUNCTION PHASIC: NORMAL
BH CV LOWER VASCULAR RIGHT SAPHENOFEMORAL JUNCTION SPONT: NORMAL
MAXIMAL PREDICTED HEART RATE: 169 BPM
STRESS TARGET HR: 144 BPM

## 2022-10-29 LAB
QT INTERVAL: 424 MS
QTC INTERVAL: 470 MS

## 2022-11-01 ENCOUNTER — TELEPHONE (OUTPATIENT)
Dept: INTERNAL MEDICINE | Facility: CLINIC | Age: 52
End: 2022-11-01

## 2022-11-01 DIAGNOSIS — L02.91 ABSCESS: Primary | ICD-10-CM

## 2022-11-01 DIAGNOSIS — K21.9 GASTROESOPHAGEAL REFLUX DISEASE, UNSPECIFIED WHETHER ESOPHAGITIS PRESENT: ICD-10-CM

## 2022-11-01 RX ORDER — OMEPRAZOLE 40 MG/1
40 CAPSULE, DELAYED RELEASE ORAL DAILY
Qty: 90 CAPSULE | Refills: 3 | Status: SHIPPED | OUTPATIENT
Start: 2022-11-01

## 2022-11-01 RX ORDER — SULFAMETHOXAZOLE AND TRIMETHOPRIM 800; 160 MG/1; MG/1
1 TABLET ORAL 2 TIMES DAILY
Qty: 20 TABLET | Refills: 0 | Status: SHIPPED | OUTPATIENT
Start: 2022-11-01 | End: 2022-11-11

## 2022-11-01 NOTE — TELEPHONE ENCOUNTER
Left message on voicemail to return call   HUBB please read to patient .  Prescription for Bactrim sent to the pharmacy.  If she is not significantly better in 2 to 3 days, she should be seen.

## 2022-11-01 NOTE — TELEPHONE ENCOUNTER
Caller: Ernestina Epstein    Relationship: Self    Best call back number: [phone]    What medication are you requesting: ANTIBIOTIC    What are your current symptoms: BOILS IN VAGINAL AREA    How long have you been experiencing symptoms: 3 DAYS    Have you had these symptoms before:    [x] Yes  [] No    Have you been treated for these symptoms before:   [x] Yes  [] No    If a prescription is needed, what is your preferred pharmacy and phone number: [facility] [id number] [address] [phone] [facility] [phone]      Additional notes: WANTS TO KNOW WHAT IS GOING ON WITH THE PRESCRIPTION FOR OMEPRAZOLE?

## 2022-11-01 NOTE — TELEPHONE ENCOUNTER
Prescription for Bactrim sent to the pharmacy.  If she is not significantly better in 2 to 3 days, she should be seen.

## 2022-11-01 NOTE — TELEPHONE ENCOUNTER
She has 2 boils on labia that is inflamed and sore. .She states it has not come to a head .  She states she has had these before and is requesting a antibiotic     Rx for Omeprazole sent to pharmacy .  Ernestina  notified

## 2022-11-22 ENCOUNTER — TELEPHONE (OUTPATIENT)
Dept: INTERNAL MEDICINE | Facility: CLINIC | Age: 52
End: 2022-11-22

## 2022-11-22 NOTE — TELEPHONE ENCOUNTER
Caller: Ernestina Epstein    Relationship: Self    Best call back number: [phone]    What is the best time to reach you: ANYTIME     Who are you requesting to speak with (clinical staff, provider,  specific staff member): CLINICAL STAFF     What was the call regarding: PATIENT IS HAVING A COLONOSCOPY TOMORROW. SHE IS WANTING TO KNOW IF IT IS OKAY FOR HER TO NOT TAKE THE ELIQUIS IN THE MORNING AND TO TAKE IT TOMORROW NIGHT INSTEAD.     Do you require a callback: YES

## 2022-11-22 NOTE — TELEPHONE ENCOUNTER
I called patient and instructed her  hold eliquis two days prior to procedure and restart the day after if no concerns for bleeding after surgery.

## 2022-11-23 ENCOUNTER — TELEPHONE (OUTPATIENT)
Dept: NEUROSURGERY | Facility: CLINIC | Age: 52
End: 2022-11-23

## 2022-11-23 NOTE — TELEPHONE ENCOUNTER
Yohana called from Dr. Schaeffer's office. Patient is suppose to be having a colonoscopy and EGD today and they are asking for clearance from Dr. Pena. They need a letter faxed to [phone]. Patient was suppose to start procedure at 8am, but they have delayed her pending our clearance. Patient received Eliquis hold from Dr. Tanner's office.

## 2022-11-23 NOTE — TELEPHONE ENCOUNTER
Provider:  Jean  Surgery/Procedure:    Surgery/Procedure Date:    Last visit:   Office Visit with Nicolás Pena MD (01/28/2019)     Next visit: NA     Reason for call:    Pt last seen in 2019.  Needs to have EGD and colonoscopy TODAY - currently holding pt awaiting clearance letter.      Letter pended for approval. K

## 2022-11-23 NOTE — TELEPHONE ENCOUNTER
I spoke with Dr. Schaeffer's office and relayed the information. They received clearance this morning from Sydney Cruz's office in Neurology.

## 2022-11-29 ENCOUNTER — TELEPHONE (OUTPATIENT)
Dept: INTERNAL MEDICINE | Facility: CLINIC | Age: 52
End: 2022-11-29

## 2022-11-29 DIAGNOSIS — I82.452 ACUTE DEEP VEIN THROMBOSIS (DVT) OF LEFT PERONEAL VEIN: Primary | ICD-10-CM

## 2022-11-29 DIAGNOSIS — M79.605 BILATERAL LEG PAIN: ICD-10-CM

## 2022-11-29 DIAGNOSIS — M79.604 BILATERAL LEG PAIN: ICD-10-CM

## 2022-12-01 NOTE — TELEPHONE ENCOUNTER
C/o both leg having pain and sore to touch   She states the pain never goes away .Pain level 7 every day   She is wanting to know if she can be referred vascular or blood clot doctor

## 2022-12-09 ENCOUNTER — TELEPHONE (OUTPATIENT)
Dept: INTERNAL MEDICINE | Facility: CLINIC | Age: 52
End: 2022-12-09

## 2022-12-09 NOTE — TELEPHONE ENCOUNTER
Caller: Ernestina Epstein    Relationship: Self    Best call back number: [phone]    What is the best time to reach you: ANYTIME     Who are you requesting to speak with (clinical staff, provider,  specific staff member): CLINICAL STAFF        What was the call regarding: THE PATIENT STATES THAT SHE WAS REFERRED TO A VEIN SPECIALIST THE PATIENT STATES THAT SHE WOULD LIKE TO GET THE DOCTORS NAME AND PHONE NUMBER SO SHE CAN CALL THEM     Do you require a callback: YES

## 2022-12-11 ENCOUNTER — HOSPITAL ENCOUNTER (EMERGENCY)
Facility: HOSPITAL | Age: 52
Discharge: HOME | End: 2022-12-11
Attending: EMERGENCY MEDICINE | Admitting: EMERGENCY MEDICINE

## 2022-12-11 ENCOUNTER — APPOINTMENT (OUTPATIENT)
Dept: GENERAL RADIOLOGY | Facility: HOSPITAL | Age: 52
End: 2022-12-11

## 2022-12-11 VITALS
HEART RATE: 84 BPM | WEIGHT: 185 LBS | HEIGHT: 68 IN | RESPIRATION RATE: 20 BRPM | TEMPERATURE: 98 F | OXYGEN SATURATION: 97 % | SYSTOLIC BLOOD PRESSURE: 178 MMHG | DIASTOLIC BLOOD PRESSURE: 106 MMHG | BODY MASS INDEX: 28.04 KG/M2

## 2022-12-11 DIAGNOSIS — R11.10 VOMITING AND DIARRHEA: ICD-10-CM

## 2022-12-11 DIAGNOSIS — R10.9 ACUTE ABDOMINAL PAIN: Primary | ICD-10-CM

## 2022-12-11 DIAGNOSIS — J40 BRONCHITIS: ICD-10-CM

## 2022-12-11 DIAGNOSIS — R19.7 VOMITING AND DIARRHEA: ICD-10-CM

## 2022-12-11 DIAGNOSIS — R03.0 ELEVATED BLOOD PRESSURE, SITUATIONAL: ICD-10-CM

## 2022-12-11 LAB
ALBUMIN SERPL-MCNC: 4.4 G/DL (ref 3.5–5.2)
ALBUMIN/GLOB SERPL: 1.2 G/DL
ALP SERPL-CCNC: 80 U/L (ref 39–117)
ALT SERPL W P-5'-P-CCNC: 11 U/L (ref 1–33)
ANION GAP SERPL CALCULATED.3IONS-SCNC: 10 MMOL/L (ref 5–15)
AST SERPL-CCNC: 16 U/L (ref 1–32)
BACTERIA UR QL AUTO: ABNORMAL /HPF
BASOPHILS # BLD AUTO: 0.03 10*3/MM3 (ref 0–0.2)
BASOPHILS NFR BLD AUTO: 0.3 % (ref 0–1.5)
BILIRUB SERPL-MCNC: 0.6 MG/DL (ref 0–1.2)
BILIRUB UR QL STRIP: NEGATIVE
BUN SERPL-MCNC: 14 MG/DL (ref 6–20)
BUN/CREAT SERPL: 21.2 (ref 7–25)
CALCIUM SPEC-SCNC: 9.7 MG/DL (ref 8.6–10.5)
CHLORIDE SERPL-SCNC: 101 MMOL/L (ref 98–107)
CLARITY UR: ABNORMAL
CO2 SERPL-SCNC: 30 MMOL/L (ref 22–29)
COLOR UR: YELLOW
CREAT SERPL-MCNC: 0.66 MG/DL (ref 0.57–1)
DEPRECATED RDW RBC AUTO: 45.8 FL (ref 37–54)
EGFRCR SERPLBLD CKD-EPI 2021: 106.4 ML/MIN/1.73
EOSINOPHIL # BLD AUTO: 0.11 10*3/MM3 (ref 0–0.4)
EOSINOPHIL NFR BLD AUTO: 1.1 % (ref 0.3–6.2)
ERYTHROCYTE [DISTWIDTH] IN BLOOD BY AUTOMATED COUNT: 12.7 % (ref 12.3–15.4)
GLOBULIN UR ELPH-MCNC: 3.8 GM/DL
GLUCOSE SERPL-MCNC: 190 MG/DL (ref 65–99)
GLUCOSE UR STRIP-MCNC: ABNORMAL MG/DL
HCT VFR BLD AUTO: 43.8 % (ref 34–46.6)
HGB BLD-MCNC: 14.1 G/DL (ref 12–15.9)
HGB UR QL STRIP.AUTO: NEGATIVE
HOLD SPECIMEN: NORMAL
HYALINE CASTS UR QL AUTO: ABNORMAL /LPF
IMM GRANULOCYTES # BLD AUTO: 0.03 10*3/MM3 (ref 0–0.05)
IMM GRANULOCYTES NFR BLD AUTO: 0.3 % (ref 0–0.5)
KETONES UR QL STRIP: ABNORMAL
LEUKOCYTE ESTERASE UR QL STRIP.AUTO: NEGATIVE
LIPASE SERPL-CCNC: 17 U/L (ref 13–60)
LYMPHOCYTES # BLD AUTO: 1.94 10*3/MM3 (ref 0.7–3.1)
LYMPHOCYTES NFR BLD AUTO: 19.8 % (ref 19.6–45.3)
MCH RBC QN AUTO: 31.1 PG (ref 26.6–33)
MCHC RBC AUTO-ENTMCNC: 32.2 G/DL (ref 31.5–35.7)
MCV RBC AUTO: 96.7 FL (ref 79–97)
MONOCYTES # BLD AUTO: 0.48 10*3/MM3 (ref 0.1–0.9)
MONOCYTES NFR BLD AUTO: 4.9 % (ref 5–12)
NEUTROPHILS NFR BLD AUTO: 7.23 10*3/MM3 (ref 1.7–7)
NEUTROPHILS NFR BLD AUTO: 73.6 % (ref 42.7–76)
NITRITE UR QL STRIP: NEGATIVE
NRBC BLD AUTO-RTO: 0 /100 WBC (ref 0–0.2)
PH UR STRIP.AUTO: 8 [PH] (ref 5–8)
PLATELET # BLD AUTO: 212 10*3/MM3 (ref 140–450)
PMV BLD AUTO: 10.2 FL (ref 6–12)
POTASSIUM SERPL-SCNC: 3.7 MMOL/L (ref 3.5–5.2)
PROT SERPL-MCNC: 8.2 G/DL (ref 6–8.5)
PROT UR QL STRIP: ABNORMAL
RBC # BLD AUTO: 4.53 10*6/MM3 (ref 3.77–5.28)
RBC # UR STRIP: ABNORMAL /HPF
REF LAB TEST METHOD: ABNORMAL
SODIUM SERPL-SCNC: 141 MMOL/L (ref 136–145)
SP GR UR STRIP: 1.03 (ref 1–1.03)
SQUAMOUS #/AREA URNS HPF: ABNORMAL /HPF
TROPONIN T SERPL-MCNC: <0.01 NG/ML (ref 0–0.03)
UROBILINOGEN UR QL STRIP: ABNORMAL
WBC # UR STRIP: ABNORMAL /HPF
WBC NRBC COR # BLD: 9.82 10*3/MM3 (ref 3.4–10.8)
WHOLE BLOOD HOLD COAG: NORMAL
WHOLE BLOOD HOLD SPECIMEN: NORMAL

## 2022-12-11 PROCEDURE — 81001 URINALYSIS AUTO W/SCOPE: CPT

## 2022-12-11 PROCEDURE — 84484 ASSAY OF TROPONIN QUANT: CPT | Performed by: EMERGENCY MEDICINE

## 2022-12-11 PROCEDURE — 36415 COLL VENOUS BLD VENIPUNCTURE: CPT

## 2022-12-11 PROCEDURE — 96374 THER/PROPH/DIAG INJ IV PUSH: CPT

## 2022-12-11 PROCEDURE — 85025 COMPLETE CBC W/AUTO DIFF WBC: CPT

## 2022-12-11 PROCEDURE — 93005 ELECTROCARDIOGRAM TRACING: CPT

## 2022-12-11 PROCEDURE — 71045 X-RAY EXAM CHEST 1 VIEW: CPT

## 2022-12-11 PROCEDURE — 99284 EMERGENCY DEPT VISIT MOD MDM: CPT

## 2022-12-11 PROCEDURE — 80053 COMPREHEN METABOLIC PANEL: CPT | Performed by: EMERGENCY MEDICINE

## 2022-12-11 PROCEDURE — 93005 ELECTROCARDIOGRAM TRACING: CPT | Performed by: EMERGENCY MEDICINE

## 2022-12-11 PROCEDURE — 83690 ASSAY OF LIPASE: CPT | Performed by: EMERGENCY MEDICINE

## 2022-12-11 PROCEDURE — 25010000002 HYDROMORPHONE 1 MG/ML SOLUTION: Performed by: EMERGENCY MEDICINE

## 2022-12-11 PROCEDURE — 25010000002 METOCLOPRAMIDE PER 10 MG: Performed by: EMERGENCY MEDICINE

## 2022-12-11 PROCEDURE — 25010000002 DIPHENHYDRAMINE PER 50 MG: Performed by: EMERGENCY MEDICINE

## 2022-12-11 PROCEDURE — 96375 TX/PRO/DX INJ NEW DRUG ADDON: CPT

## 2022-12-11 RX ORDER — ALUMINA, MAGNESIA, AND SIMETHICONE 2400; 2400; 240 MG/30ML; MG/30ML; MG/30ML
15 SUSPENSION ORAL ONCE
Status: COMPLETED | OUTPATIENT
Start: 2022-12-11 | End: 2022-12-11

## 2022-12-11 RX ORDER — SODIUM CHLORIDE 0.9 % (FLUSH) 0.9 %
10 SYRINGE (ML) INJECTION AS NEEDED
Status: DISCONTINUED | OUTPATIENT
Start: 2022-12-11 | End: 2022-12-11 | Stop reason: HOSPADM

## 2022-12-11 RX ORDER — DIPHENHYDRAMINE HYDROCHLORIDE 50 MG/ML
25 INJECTION INTRAMUSCULAR; INTRAVENOUS ONCE
Status: COMPLETED | OUTPATIENT
Start: 2022-12-11 | End: 2022-12-11

## 2022-12-11 RX ORDER — METOCLOPRAMIDE HYDROCHLORIDE 5 MG/ML
10 INJECTION INTRAMUSCULAR; INTRAVENOUS ONCE
Status: COMPLETED | OUTPATIENT
Start: 2022-12-11 | End: 2022-12-11

## 2022-12-11 RX ORDER — PANTOPRAZOLE SODIUM 40 MG/10ML
40 INJECTION, POWDER, LYOPHILIZED, FOR SOLUTION INTRAVENOUS ONCE
Status: COMPLETED | OUTPATIENT
Start: 2022-12-11 | End: 2022-12-11

## 2022-12-11 RX ORDER — HYDROMORPHONE HYDROCHLORIDE 1 MG/ML
0.5 INJECTION, SOLUTION INTRAMUSCULAR; INTRAVENOUS; SUBCUTANEOUS ONCE
Status: COMPLETED | OUTPATIENT
Start: 2022-12-11 | End: 2022-12-11

## 2022-12-11 RX ORDER — LIDOCAINE HYDROCHLORIDE 20 MG/ML
15 SOLUTION OROPHARYNGEAL ONCE
Status: COMPLETED | OUTPATIENT
Start: 2022-12-11 | End: 2022-12-11

## 2022-12-11 RX ADMIN — ALUMINUM HYDROXIDE, MAGNESIUM HYDROXIDE, AND DIMETHICONE 15 ML: 400; 400; 40 SUSPENSION ORAL at 14:28

## 2022-12-11 RX ADMIN — DIPHENHYDRAMINE HYDROCHLORIDE 25 MG: 50 INJECTION INTRAMUSCULAR; INTRAVENOUS at 15:29

## 2022-12-11 RX ADMIN — PANTOPRAZOLE SODIUM 40 MG: 40 INJECTION, POWDER, FOR SOLUTION INTRAVENOUS at 15:28

## 2022-12-11 RX ADMIN — HYDROMORPHONE HYDROCHLORIDE 0.5 MG: 1 INJECTION, SOLUTION INTRAMUSCULAR; INTRAVENOUS; SUBCUTANEOUS at 15:21

## 2022-12-11 RX ADMIN — METOCLOPRAMIDE 10 MG: 5 INJECTION, SOLUTION INTRAMUSCULAR; INTRAVENOUS at 15:29

## 2022-12-11 RX ADMIN — SODIUM CHLORIDE 1000 ML: 9 INJECTION, SOLUTION INTRAVENOUS at 15:12

## 2022-12-11 RX ADMIN — LIDOCAINE HYDROCHLORIDE 15 ML: 20 SOLUTION ORAL at 14:28

## 2022-12-11 NOTE — ED PROVIDER NOTES
Subjective   History of Present Illness  Mrs. Epstein presents with epigastric abdominal pain and vomiting.  She has had diarrhea as well.  She tells me she has been having these episodes intermittently for about a year.  She reports that she has not found a cause yet.  She reports that she just had upper and lower endoscopy which showed GERD but no other problems.  She was hoping that would point to a cause of these spells.  She has had her gallbladder out.  She takes omeprazole.  She tells me the only thing that helps is a GI cocktail and a shot of pain medicine.  She has been prescribed Reglan and she feels pretty sure she is taking that regularly at home.  She denies fevers or chills.  She tells me she has had a bad cough for about a month.    History provided by:  Patient and medical records  Abdominal Pain  Pain location:  Epigastric  Pain quality: aching    Pain radiates to:  Does not radiate  Pain severity:  Severe  Onset quality:  Sudden  Timing:  Constant  Progression:  Unchanged  Chronicity:  Recurrent  Context: awakening from sleep    Relieved by:  Nothing  Worsened by:  Nothing  Ineffective treatments:  None tried  Associated symptoms: chest pain, cough, diarrhea, nausea and vomiting    Associated symptoms: no chills, no dysuria, no fever, no shortness of breath and no sore throat        Review of Systems   Constitutional: Negative for chills, diaphoresis and fever.   HENT: Negative for congestion, rhinorrhea and sore throat.    Eyes: Negative for visual disturbance.   Respiratory: Positive for cough. Negative for shortness of breath.    Cardiovascular: Positive for chest pain. Negative for leg swelling.   Gastrointestinal: Positive for abdominal pain, diarrhea, nausea and vomiting.   Genitourinary: Negative for difficulty urinating and dysuria.   Skin: Negative for rash.   Neurological: Negative for dizziness, weakness, numbness and headaches.   Psychiatric/Behavioral: Negative for confusion.   All  other systems reviewed and are negative.      Past Medical History:   Diagnosis Date   • Acute deep vein thrombosis (DVT) of left lower extremity (HCC) 2022    Dx 22- left peroneal   • Arthralgia of hip    • Cervical facet arthropathy/cervical spondylosis without myelopathy    • Chronic pain syndrome     Description: hx of epidural injections.   • Conjunctival melanosis of left eye    • COVID-19 2022   • Degenerative disc disease, cervical    • Depression     Description: dx .   • Diabetes mellitus (HCC)     TYPE 2   • Duplicated left renal collecting system     Dx  by CT   • Essential hypertension     Description: dx .   • Hemorrhoid    • History of cardiovascular stress test 2019    low risk myocardial perfusion study   • History of colonoscopy 2013    normal per patient   • History of esophagogastroduodenoscopy (EGD) 10/06/2021    Path consistent with reactive gastropathy   • History of uterine leiomyoma    • History of varicella    • Hypertension    • Hypothyroidism     Description: dx .   • Insomnia    • Low back pain    • Lumbosacral disc disease    • Myopia of both eyes    • Neck pain    • Obesity (BMI 30.0-34.9)    • Osteoarthritis    • Primary osteoarthritis of both knees 2017   • Renal angiomyolipoma     Left dx by CT .   • Stroke (HCC)    • Tattoos     HIV neg 2012 per patient   • Tobacco abuse    • Vitamin D deficiency     Dx 3/19.   • Wears partial dentures     upper       Allergies   Allergen Reactions   • Glimepiride Diarrhea       Past Surgical History:   Procedure Laterality Date   • ANTERIOR CERVICAL DISCECTOMY  2016    C4-6 DISC (Dr. Pena)   • ANTERIOR CERVICAL DISCECTOMY W/ FUSION Left 2018    Procedure: CERVICAL DISCECTOMY ANTERIOR WITH FUSION C6-7;  Surgeon: Nicolás Pena MD;  Location: [facility];  Service:    • BACK SURGERY      lumbar discectomy   •  SECTION      , ,    • CHOLECYSTECTOMY WITH INTRAOPERATIVE  CHOLANGIOGRAM N/A 2017    Procedure: CHOLECYSTECTOMY LAPAROSCOPIC INTRAOPERATIVE CHOLANGIOGRAM;  Surgeon: Clifford Freed MD;  Location:  [facility];  Service:    • COLONOSCOPY  2016   • HYSTERECTOMY  2015   • SPINAL CORD STIMULATOR IMPLANT N/A 2019    Procedure: SPINAL CORD STIMULATOR INSERTION PHASE 1;  Surgeon: Blayne Pandya MD;  Location:  [facility];  Service: Pain Management       Family History   Problem Relation Age of Onset   • Diabetes Mother    • Hypertension Mother    • No Known Problems Father         Medical and Family History unknown   • Hypertension Brother    • Diabetes Maternal Uncle    • Hypertension Maternal Uncle    • Colon cancer Maternal Grandmother    • Diabetes Maternal Grandmother    • Hypertension Maternal Grandmother    • Diabetes Daughter    • Hypothyroidism Daughter    • Hypertension Other    • Breast cancer Neg Hx    • Ovarian cancer Neg Hx        Social History     Socioeconomic History   • Marital status:    • Number of children: 3   Tobacco Use   • Smoking status: Every Day     Packs/day: 0.50     Years: 28.00     Pack years: 14.00     Types: Cigarettes     Start date:      Last attempt to quit: 2017     Years since quittin.0   • Smokeless tobacco: Never   • Tobacco comments:     started smoking 8/3/2022   Vaping Use   • Vaping Use: Never used   Substance and Sexual Activity   • Alcohol use: Yes     Comment: 2-3 cocktains, 3-4 times per year   • Drug use: No   • Sexual activity: Not Currently     Partners: Male           Objective   Physical Exam  Vitals and nursing note reviewed.   Constitutional:       General: She is not in acute distress.     Appearance: Normal appearance.   HENT:      Head: Normocephalic and atraumatic.      Nose: Nose normal. No congestion or rhinorrhea.   Eyes:      General: No scleral icterus.     Conjunctiva/sclera: Conjunctivae normal.   Neck:      Comments: No JVD   Cardiovascular:      Rate and Rhythm: Normal rate and  regular rhythm.      Heart sounds: No murmur heard.    No friction rub.   Pulmonary:      Effort: Pulmonary effort is normal.      Breath sounds: Normal breath sounds. No wheezing or rales.      Comments: Occasional rattly cough  Abdominal:      General: Bowel sounds are normal.      Palpations: Abdomen is soft.      Tenderness: There is abdominal tenderness. There is no guarding or rebound.      Comments: She is tender to palpate in the epigastrium.  Belly is soft with active bowel sounds   Musculoskeletal:         General: No tenderness.      Cervical back: Normal range of motion and neck supple.      Right lower leg: No edema.      Left lower leg: No edema.   Skin:     General: Skin is warm and dry.      Coloration: Skin is not pale.      Findings: No erythema.   Neurological:      General: No focal deficit present.      Mental Status: She is alert and oriented to person, place, and time.      Motor: No weakness.      Coordination: Coordination normal.   Psychiatric:         Mood and Affect: Mood normal.         Behavior: Behavior normal.         Thought Content: Thought content normal.         Procedures           ED Course  ED Course as of 12/11/22 1642   Sun Dec 11, 2022   1607 She is sleeping.  Upon awakening she tells me she feels better.  I talked to her about prescribing something for home and she tells me she is really never found anything that helps.  She has been prescribed Reglan and Zofran and has those at home. [DT]      ED Course User Index  [DT] Александр Fountain MD                                           MDM  Number of Diagnoses or Management Options  Acute abdominal pain: new and requires workup  Bronchitis: established and improving  Vomiting and diarrhea: new and requires workup     Amount and/or Complexity of Data Reviewed  Clinical lab tests: ordered and reviewed  Review and summarize past medical records: yes    Patient Progress  Patient progress: improved      Final diagnoses:   Acute  abdominal pain   Vomiting and diarrhea   Bronchitis   Elevated blood pressure, situational       ED Disposition  ED Disposition     ED Disposition   Discharge    Condition   Stable    Comment   --             Stephanie Tanner MD  [address] [address]  [phone]               Medication List      No changes were made to your prescriptions during this visit.          Александр Fountain MD  12/11/22 1608       Александр Fountain MD  12/11/22 1624       Александр Fountain MD  12/11/22 [id number]

## 2022-12-11 NOTE — DISCHARGE INSTRUCTIONS
Return if significant worsening or any concerns.  Continue to work with your primary care provider regarding treatment and further evaluation of this.  Do not drive today as you have been given medicine that make you sleepy.  Monitor your blood pressure at home and report it to your primary care provider upon follow-up.

## 2022-12-15 ENCOUNTER — TELEPHONE (OUTPATIENT)
Dept: INTERNAL MEDICINE | Facility: CLINIC | Age: 52
End: 2022-12-15

## 2022-12-15 DIAGNOSIS — M79.604 BILATERAL LEG PAIN: Primary | ICD-10-CM

## 2022-12-15 DIAGNOSIS — R05.1 ACUTE COUGH: ICD-10-CM

## 2022-12-15 DIAGNOSIS — M79.605 BILATERAL LEG PAIN: Primary | ICD-10-CM

## 2022-12-15 NOTE — TELEPHONE ENCOUNTER
Caller: Jagjit Epstein    Relationship: Self    Best call back number: [phone]    What is the best time to reach you: ANYTIME    Who are you requesting to speak with (clinical staff, provider,  specific staff member): CLINICAL    Do you know the name of the person who called: JAGJIT    What was the call regarding: ENTIRETY OF LEGS PAINFUL TO TOUCH, CANNOT TOUCH WITH CLOTHING OR LET THEM RUB TOGETHER    Do you require a callback: YES

## 2022-12-15 NOTE — TELEPHONE ENCOUNTER
This sounds like a nerve type pain.  I would recommend a trial of Gabapentin or Lyrica.  If she is agreeable, I will send a prescription to the pharmacy.

## 2022-12-15 NOTE — TELEPHONE ENCOUNTER
She states her legs burn and ache all day long.   She tries to put biofreeze on her legs but it hurts to touch her legs .  Her pain goes deep into her upper and lower legs for 1 month    She has a vascular appointment on 12/21/2022   She took percocet - 3 weeks ago as needed that she had got from the ED for the knot on leg but she is out of them now.    They helped a little with the pain

## 2022-12-16 RX ORDER — BENZONATATE 100 MG/1
100 CAPSULE ORAL 3 TIMES DAILY PRN
Qty: 30 CAPSULE | Refills: 1 | Status: SHIPPED | OUTPATIENT
Start: 2022-12-16 | End: 2023-01-31

## 2022-12-16 RX ORDER — PREGABALIN 50 MG/1
50 CAPSULE ORAL 3 TIMES DAILY
Qty: 90 CAPSULE | Refills: 2 | Status: SHIPPED | OUTPATIENT
Start: 2022-12-16 | End: 2023-01-31

## 2022-12-16 NOTE — TELEPHONE ENCOUNTER
Ernestina notified   She is agreeable to medication .  She prefers Lyrica if her insurance will pay for it

## 2022-12-16 NOTE — TELEPHONE ENCOUNTER
Prescription for Lyrica 50 mg 3 times daily sent to the pharmacy.  Please schedule follow-up appointment with me in 1 month to see how she is doing with this.

## 2022-12-16 NOTE — TELEPHONE ENCOUNTER
Pt reached advised o fclinical message. Pt verbalized good understanding. Pt scheduled 1/13/2023 at 10: 00 am with PCP.    Pt added she has had a cough for about a month taking mucinex OTC, but cough keeps her up at night. Please advise.

## 2022-12-17 LAB
QT INTERVAL: 424 MS
QTC INTERVAL: 437 MS

## 2022-12-19 ENCOUNTER — TELEPHONE (OUTPATIENT)
Dept: INTERNAL MEDICINE | Facility: CLINIC | Age: 52
End: 2022-12-19

## 2022-12-19 DIAGNOSIS — M79.605 BILATERAL LEG PAIN: Primary | ICD-10-CM

## 2022-12-19 DIAGNOSIS — M79.604 BILATERAL LEG PAIN: Primary | ICD-10-CM

## 2022-12-19 RX ORDER — GABAPENTIN 100 MG/1
100 CAPSULE ORAL 3 TIMES DAILY
Qty: 90 CAPSULE | Refills: 2 | Status: SHIPPED | OUTPATIENT
Start: 2022-12-19

## 2022-12-19 NOTE — TELEPHONE ENCOUNTER
Caller: Ernestina Epstein    Relationship: Self    Best call back number: [phone]    What is the best time to reach you: ANYTIME     Who are you requesting to speak with (clinical staff, provider,  specific staff member): CLINICAL STAFF     What was the call regarding: PATIENT STATES THAT THE [facility] IS NEEDING A PRIOR AUTHORIZATION     Do you require a callback: YES

## 2022-12-20 NOTE — TELEPHONE ENCOUNTER
Spoke with Ernestina   She states she took Gabapentin 2 months ago and it was covered by her insurance.   She would like Rx sent to pharmacy

## 2022-12-20 NOTE — TELEPHONE ENCOUNTER
SMS message sent for Ernestina to return call   HUBB  Please read to patient   Prescription for gabapentin 100 mg 3 times daily sent to the pharmacy.  We can increase the dosage if needed

## 2022-12-20 NOTE — TELEPHONE ENCOUNTER
Prescription for gabapentin 100 mg 3 times daily sent to the pharmacy.  We can increase the dosage if needed

## 2022-12-29 DIAGNOSIS — I10 ESSENTIAL HYPERTENSION: ICD-10-CM

## 2022-12-29 DIAGNOSIS — Z86.73 HISTORY OF CEREBELLAR STROKE: ICD-10-CM

## 2022-12-29 DIAGNOSIS — E11.43 TYPE 2 DIABETES MELLITUS WITH DIABETIC AUTONOMIC NEUROPATHY, WITHOUT LONG-TERM CURRENT USE OF INSULIN: ICD-10-CM

## 2022-12-29 RX ORDER — ATORVASTATIN CALCIUM 40 MG/1
TABLET, FILM COATED ORAL
Qty: 30 TABLET | Refills: 3 | OUTPATIENT
Start: 2022-12-29

## 2022-12-29 RX ORDER — ATORVASTATIN CALCIUM 80 MG/1
80 TABLET, FILM COATED ORAL NIGHTLY
Qty: 90 TABLET | Refills: 3 | Status: CANCELLED | OUTPATIENT
Start: 2022-12-29

## 2022-12-29 RX ORDER — ATORVASTATIN CALCIUM 80 MG/1
80 TABLET, FILM COATED ORAL NIGHTLY
Qty: 90 TABLET | Refills: 3 | Status: SHIPPED | OUTPATIENT
Start: 2022-12-29

## 2022-12-29 RX ORDER — LISINOPRIL 20 MG/1
TABLET ORAL
Qty: 30 TABLET | Refills: 3 | Status: SHIPPED | OUTPATIENT
Start: 2022-12-29

## 2022-12-29 RX ORDER — SITAGLIPTIN 100 MG/1
TABLET, FILM COATED ORAL
Qty: 30 TABLET | Refills: 3 | Status: SHIPPED | OUTPATIENT
Start: 2022-12-29

## 2023-01-31 ENCOUNTER — OFFICE VISIT (OUTPATIENT)
Dept: INTERNAL MEDICINE | Facility: CLINIC | Age: 53
End: 2023-01-31
Payer: COMMERCIAL

## 2023-01-31 VITALS
BODY MASS INDEX: 29.98 KG/M2 | RESPIRATION RATE: 18 BRPM | HEIGHT: 68 IN | TEMPERATURE: 97.5 F | DIASTOLIC BLOOD PRESSURE: 74 MMHG | SYSTOLIC BLOOD PRESSURE: 118 MMHG | WEIGHT: 197.8 LBS | HEART RATE: 66 BPM

## 2023-01-31 DIAGNOSIS — L72.0 EPIDERMOID CYST OF SKIN: ICD-10-CM

## 2023-01-31 DIAGNOSIS — N76.4 ABSCESS OF LABIA MAJORA: ICD-10-CM

## 2023-01-31 DIAGNOSIS — R19.09 MASS OF LEFT INGUINAL REGION: Primary | ICD-10-CM

## 2023-01-31 PROCEDURE — 99213 OFFICE O/P EST LOW 20 MIN: CPT | Performed by: NURSE PRACTITIONER

## 2023-01-31 RX ORDER — DOXYCYCLINE HYCLATE 100 MG/1
100 CAPSULE ORAL 2 TIMES DAILY
Qty: 20 CAPSULE | Refills: 0 | Status: SHIPPED | OUTPATIENT
Start: 2023-01-31

## 2023-01-31 NOTE — PROGRESS NOTES
"Patient Name: Ernestina Epstein  : 1970   MRN: [id number]     Chief Complaint:    Chief Complaint   Patient presents with   • Abscess     On vagina       History of Present Illness: Ernestina Epstein is a 52 y.o. female presents to clinic to follow up on left leg and groin discomfort. She reports that she has 2 areas of discomfort, 1 being a boil on her right labia lasting for 4 days and the other being a knot for 2 weeks. She reports that she has experienced a burning sensation traveling up her left inner leg and terminating in her left groin. She admits that she feels a knot in her left groin region. The patient reports that she was prescribed gabapentin 100 mg for what she describes as a \"burning and stabbing\" sensation in her upper left leg. She admits that she has not had any relief. She notes that she previously took gabapentin 800 mg. The patient reports that she was seen by vascular who informed her to see a specialist for her back. The patient describes that the pain in her groin feels as though she \"hit a box while riding a bike\". She denies fever or chills. She admits that 2 days ago she attempted to pop the boil on her right  labia. She admits that she has been applying alcohol to the boil.    The patient reports an allergy to glipizide and notes it causes diarrhea.    Subjective     Review of System: Review of Systems   Gastrointestinal:        Inguinal mass   Musculoskeletal: Positive for myalgias.   Skin:        Boil on right of labia.        Medications:     Current Outpatient Medications:   •  apixaban (Eliquis) 5 MG tablet tablet, Take 1 tablet by mouth Every 12 (Twelve) Hours., Disp: 60 tablet, Rfl: 5  •  aspirin 81 MG chewable tablet, Chew 1 tablet Daily., Disp: 90 tablet, Rfl: 3  •  atorvastatin (LIPITOR) 80 MG tablet, Take 1 tablet by mouth Every Night., Disp: 90 tablet, Rfl: 3  •  Blood Glucose Monitoring Suppl (FREESTYLE FREEDOM LITE) w/Device kit, Use twice daily   " Dx  E11.9, Disp: 1 each, Rfl: 0  •  Diclofenac Sodium (VOLTAREN) 1 % gel gel, 2 grams QID prn pain upper extremity joints and 4 grams QID pain lower extremity joints, Disp: 100 g, Rfl: 5  •  escitalopram (LEXAPRO) 20 MG tablet, Take 1 tablet by mouth Daily., Disp: 30 tablet, Rfl: 5  •  Farxiga 10 MG tablet, TAKE ONE TABLET BY MOUTH DAILY, Disp: 30 tablet, Rfl: 5  •  gabapentin (Neurontin) 100 MG capsule, Take 1 capsule by mouth 3 (Three) Times a Day. 1-3 po qhs prn pain, Disp: 90 capsule, Rfl: 2  •  glucose blood (FREESTYLE LITE) test strip, USE TO TEST BLOOD SUGAR TWO TIMES A DAY AS DIRECTED, Disp: 100 each, Rfl: 3  •  hydroCHLOROthiazide (HYDRODIURIL) 25 MG tablet, Take 1 tablet by mouth Daily., Disp: 30 tablet, Rfl: 6  •  Januvia 100 MG tablet, TAKE ONE TABLET BY MOUTH DAILY, Disp: 30 tablet, Rfl: 3  •  Lancets (FREESTYLE) lancets, USE  LANCET  TO TEST TWICE  DAILY, Disp: 100 each, Rfl: 11  •  levothyroxine (SYNTHROID, LEVOTHROID) 100 MCG tablet, TAKE ONE TABLET BY MOUTH DAILY, Disp: 90 tablet, Rfl: 1  •  lisinopril (PRINIVIL,ZESTRIL) 20 MG tablet, TAKE ONE TABLET BY MOUTH EVERY NIGHT AT BEDTIME, Disp: 30 tablet, Rfl: 3  •  metoclopramide (REGLAN) 10 MG tablet, Take 1 tablet by mouth 3 (Three) Times a Day As Needed (Nausea, vomiting, and abdominal pain)., Disp: 50 tablet, Rfl: 1  •  omeprazole (priLOSEC) 40 MG capsule, Take 1 capsule by mouth Daily., Disp: 90 capsule, Rfl: 3  •  pioglitazone (ACTOS) 45 MG tablet, Take 1 tablet by mouth Daily., Disp: 30 tablet, Rfl: 5  •  doxycycline (VIBRAMYCIN) 100 MG capsule, Take 1 capsule by mouth 2 (Two) Times a Day., Disp: 20 capsule, Rfl: 0    Allergies:   Allergies   Allergen Reactions   • Glimepiride Diarrhea       Objective     Physical Exam:   Vital Signs:   Vitals:    01/31/23 0837   BP: 118/74   BP Location: Right arm   Patient Position: Sitting   Cuff Size: Adult   Pulse: 66   Resp: 18   Temp: 97.5 °F (36.4 °C)   TempSrc: Infrared   Weight: 89.7 kg (197 lb 12.8 oz)  "  Height: 172.7 cm (68\")   PainSc: 10-Worst pain ever           Physical Exam  Constitutional:       General: She is not in acute distress.     Appearance: She is not ill-appearing.   HENT:      Head: Normocephalic.   Cardiovascular:      Rate and Rhythm: Normal rate and regular rhythm.      Heart sounds: Normal heart sounds. No murmur heard.  Pulmonary:      Breath sounds: Normal breath sounds.   Abdominal:      General: Bowel sounds are normal.      Tenderness: There is no abdominal tenderness.   Genitourinary:      Skin:         Neurological:      General: No focal deficit present.      Mental Status: She is oriented to person, place, and time.   Psychiatric:         Mood and Affect: Mood normal.         Assessment / Plan      Assessment/Plan:   Diagnoses and all orders for this visit:    1. Mass of left inguinal region (Primary)  -     US Pelvis Limited; Future    2. Abscess of labia majora  -     doxycycline (VIBRAMYCIN) 100 MG capsule; Take 1 capsule by mouth 2 (Two) Times a Day.  Dispense: 20 capsule; Refill: 0  -     Ambulatory Referral to Gynecology    3. Epidermoid cyst of skin  -     Ambulatory Referral to Gynecology       1. Inguinal mass.  The patient appears to have an inguinal hernia. I will obtain an ultrasound.     2. Abscess  I will refer her to gynecology. She may cleanse the area with soap and water and apply warm compresses as needed. She may do Epson salt soaks.     I have instructed her to go to the ER for a CT if the area becomes severely sore.      Follow Up: I will discuss follow-up pending testing.   MIRIAM Rivera  [facility]    Transcribed from ambient dictation for MIRIAM Rivera by Jessi Butler.  01/31/23   11:32 EST    Patient or patient representative verbalized consent to the visit recording.  I have personally performed the services described in this document as transcribed by the above individual, and it is both accurate " and complete.

## 2023-02-16 ENCOUNTER — HOSPITAL ENCOUNTER (OUTPATIENT)
Dept: ULTRASOUND IMAGING | Facility: HOSPITAL | Age: 53
Discharge: HOME | End: 2023-02-16
Admitting: NURSE PRACTITIONER
Payer: COMMERCIAL

## 2023-02-16 DIAGNOSIS — R19.09 MASS OF LEFT INGUINAL REGION: ICD-10-CM

## 2023-02-16 PROCEDURE — 76857 US EXAM PELVIC LIMITED: CPT

## 2023-03-02 DIAGNOSIS — E11.65 TYPE 2 DIABETES MELLITUS WITH HYPERGLYCEMIA, WITHOUT LONG-TERM CURRENT USE OF INSULIN: ICD-10-CM

## 2023-03-02 RX ORDER — DAPAGLIFLOZIN 10 MG/1
TABLET, FILM COATED ORAL
Qty: 30 TABLET | Refills: 5 | Status: SHIPPED | OUTPATIENT
Start: 2023-03-02

## 2023-04-03 ENCOUNTER — APPOINTMENT (OUTPATIENT)
Dept: CT IMAGING | Facility: HOSPITAL | Age: 53
End: 2023-04-03
Payer: COMMERCIAL

## 2023-04-03 ENCOUNTER — HOSPITAL ENCOUNTER (EMERGENCY)
Facility: HOSPITAL | Age: 53
Discharge: HOME | End: 2023-04-03
Attending: EMERGENCY MEDICINE | Admitting: EMERGENCY MEDICINE
Payer: COMMERCIAL

## 2023-04-03 VITALS
HEART RATE: 55 BPM | TEMPERATURE: 98.5 F | HEIGHT: 67 IN | OXYGEN SATURATION: 98 % | SYSTOLIC BLOOD PRESSURE: 178 MMHG | DIASTOLIC BLOOD PRESSURE: 95 MMHG | RESPIRATION RATE: 18 BRPM | WEIGHT: 210 LBS | BODY MASS INDEX: 32.96 KG/M2

## 2023-04-03 DIAGNOSIS — I10 ELEVATED BLOOD PRESSURE READING WITH DIAGNOSIS OF HYPERTENSION: ICD-10-CM

## 2023-04-03 DIAGNOSIS — R19.7 NAUSEA VOMITING AND DIARRHEA: ICD-10-CM

## 2023-04-03 DIAGNOSIS — R73.9 HYPERGLYCEMIA: ICD-10-CM

## 2023-04-03 DIAGNOSIS — R11.2 NAUSEA VOMITING AND DIARRHEA: ICD-10-CM

## 2023-04-03 DIAGNOSIS — R10.84 GENERALIZED ABDOMINAL PAIN: Primary | ICD-10-CM

## 2023-04-03 LAB
ALBUMIN SERPL-MCNC: 4.3 G/DL (ref 3.5–5.2)
ALBUMIN/GLOB SERPL: 1.3 G/DL
ALP SERPL-CCNC: 84 U/L (ref 39–117)
ALT SERPL W P-5'-P-CCNC: 15 U/L (ref 1–33)
ANION GAP SERPL CALCULATED.3IONS-SCNC: 13 MMOL/L (ref 5–15)
AST SERPL-CCNC: 15 U/L (ref 1–32)
BACTERIA UR QL AUTO: ABNORMAL /HPF
BASOPHILS # BLD AUTO: 0.02 10*3/MM3 (ref 0–0.2)
BASOPHILS NFR BLD AUTO: 0.2 % (ref 0–1.5)
BILIRUB SERPL-MCNC: 1.5 MG/DL (ref 0–1.2)
BILIRUB UR QL STRIP: NEGATIVE
BUN SERPL-MCNC: 12 MG/DL (ref 6–20)
BUN/CREAT SERPL: 16.2 (ref 7–25)
CALCIUM SPEC-SCNC: 9.1 MG/DL (ref 8.6–10.5)
CHLORIDE SERPL-SCNC: 102 MMOL/L (ref 98–107)
CLARITY UR: CLEAR
CO2 SERPL-SCNC: 25 MMOL/L (ref 22–29)
COLOR UR: YELLOW
CREAT BLDA-MCNC: 0.7 MG/DL
CREAT BLDA-MCNC: 0.7 MG/DL (ref 0.6–1.3)
CREAT SERPL-MCNC: 0.74 MG/DL (ref 0.57–1)
DEPRECATED RDW RBC AUTO: 44.2 FL (ref 37–54)
EGFRCR SERPLBLD CKD-EPI 2021: 97.5 ML/MIN/1.73
EOSINOPHIL # BLD AUTO: 0.01 10*3/MM3 (ref 0–0.4)
EOSINOPHIL NFR BLD AUTO: 0.1 % (ref 0.3–6.2)
ERYTHROCYTE [DISTWIDTH] IN BLOOD BY AUTOMATED COUNT: 13.2 % (ref 12.3–15.4)
GLOBULIN UR ELPH-MCNC: 3.3 GM/DL
GLUCOSE SERPL-MCNC: 184 MG/DL (ref 65–99)
GLUCOSE UR STRIP-MCNC: ABNORMAL MG/DL
HCT VFR BLD AUTO: 44.6 % (ref 34–46.6)
HGB BLD-MCNC: 14.5 G/DL (ref 12–15.9)
HGB UR QL STRIP.AUTO: ABNORMAL
HOLD SPECIMEN: NORMAL
HYALINE CASTS UR QL AUTO: ABNORMAL /LPF
IMM GRANULOCYTES # BLD AUTO: 0.03 10*3/MM3 (ref 0–0.05)
IMM GRANULOCYTES NFR BLD AUTO: 0.3 % (ref 0–0.5)
KETONES UR QL STRIP: ABNORMAL
LEUKOCYTE ESTERASE UR QL STRIP.AUTO: NEGATIVE
LIPASE SERPL-CCNC: 17 U/L (ref 13–60)
LYMPHOCYTES # BLD AUTO: 1.15 10*3/MM3 (ref 0.7–3.1)
LYMPHOCYTES NFR BLD AUTO: 11.5 % (ref 19.6–45.3)
MCH RBC QN AUTO: 29.9 PG (ref 26.6–33)
MCHC RBC AUTO-ENTMCNC: 32.5 G/DL (ref 31.5–35.7)
MCV RBC AUTO: 92 FL (ref 79–97)
MONOCYTES # BLD AUTO: 0.37 10*3/MM3 (ref 0.1–0.9)
MONOCYTES NFR BLD AUTO: 3.7 % (ref 5–12)
NEUTROPHILS NFR BLD AUTO: 8.44 10*3/MM3 (ref 1.7–7)
NEUTROPHILS NFR BLD AUTO: 84.2 % (ref 42.7–76)
NITRITE UR QL STRIP: NEGATIVE
NRBC BLD AUTO-RTO: 0 /100 WBC (ref 0–0.2)
PH UR STRIP.AUTO: 7 [PH] (ref 5–8)
PLATELET # BLD AUTO: 180 10*3/MM3 (ref 140–450)
PMV BLD AUTO: 10.4 FL (ref 6–12)
POTASSIUM SERPL-SCNC: 3.5 MMOL/L (ref 3.5–5.2)
PROT SERPL-MCNC: 7.6 G/DL (ref 6–8.5)
PROT UR QL STRIP: ABNORMAL
QT INTERVAL: 396 MS
QT INTERVAL: 416 MS
QTC INTERVAL: 436 MS
QTC INTERVAL: 454 MS
RBC # BLD AUTO: 4.85 10*6/MM3 (ref 3.77–5.28)
RBC # UR STRIP: ABNORMAL /HPF
REF LAB TEST METHOD: ABNORMAL
SODIUM SERPL-SCNC: 140 MMOL/L (ref 136–145)
SP GR UR STRIP: 1.02 (ref 1–1.03)
SQUAMOUS #/AREA URNS HPF: ABNORMAL /HPF
TROPONIN T SERPL HS-MCNC: 11 NG/L
TROPONIN T SERPL HS-MCNC: 12 NG/L
UROBILINOGEN UR QL STRIP: ABNORMAL
WBC # UR STRIP: ABNORMAL /HPF
WBC NRBC COR # BLD: 10.02 10*3/MM3 (ref 3.4–10.8)
WHOLE BLOOD HOLD COAG: NORMAL
WHOLE BLOOD HOLD SPECIMEN: NORMAL

## 2023-04-03 PROCEDURE — 74177 CT ABD & PELVIS W/CONTRAST: CPT

## 2023-04-03 PROCEDURE — 25010000002 HYDROMORPHONE PER 4 MG: Performed by: EMERGENCY MEDICINE

## 2023-04-03 PROCEDURE — 80053 COMPREHEN METABOLIC PANEL: CPT | Performed by: EMERGENCY MEDICINE

## 2023-04-03 PROCEDURE — 99284 EMERGENCY DEPT VISIT MOD MDM: CPT

## 2023-04-03 PROCEDURE — 82565 ASSAY OF CREATININE: CPT

## 2023-04-03 PROCEDURE — 25510000001 IOPAMIDOL PER 1 ML: Performed by: EMERGENCY MEDICINE

## 2023-04-03 PROCEDURE — 96375 TX/PRO/DX INJ NEW DRUG ADDON: CPT

## 2023-04-03 PROCEDURE — 25010000002 ONDANSETRON PER 1 MG: Performed by: EMERGENCY MEDICINE

## 2023-04-03 PROCEDURE — 71275 CT ANGIOGRAPHY CHEST: CPT

## 2023-04-03 PROCEDURE — 81001 URINALYSIS AUTO W/SCOPE: CPT | Performed by: EMERGENCY MEDICINE

## 2023-04-03 PROCEDURE — 93005 ELECTROCARDIOGRAM TRACING: CPT | Performed by: EMERGENCY MEDICINE

## 2023-04-03 PROCEDURE — 96374 THER/PROPH/DIAG INJ IV PUSH: CPT

## 2023-04-03 PROCEDURE — 85025 COMPLETE CBC W/AUTO DIFF WBC: CPT | Performed by: EMERGENCY MEDICINE

## 2023-04-03 PROCEDURE — 36415 COLL VENOUS BLD VENIPUNCTURE: CPT

## 2023-04-03 PROCEDURE — 84484 ASSAY OF TROPONIN QUANT: CPT | Performed by: EMERGENCY MEDICINE

## 2023-04-03 PROCEDURE — 83690 ASSAY OF LIPASE: CPT | Performed by: EMERGENCY MEDICINE

## 2023-04-03 PROCEDURE — 96376 TX/PRO/DX INJ SAME DRUG ADON: CPT

## 2023-04-03 RX ORDER — METOPROLOL TARTRATE 5 MG/5ML
5 INJECTION INTRAVENOUS ONCE
Status: COMPLETED | OUTPATIENT
Start: 2023-04-03 | End: 2023-04-03

## 2023-04-03 RX ORDER — ALUMINA, MAGNESIA, AND SIMETHICONE 2400; 2400; 240 MG/30ML; MG/30ML; MG/30ML
15 SUSPENSION ORAL ONCE
Status: COMPLETED | OUTPATIENT
Start: 2023-04-03 | End: 2023-04-03

## 2023-04-03 RX ORDER — LIDOCAINE HYDROCHLORIDE 20 MG/ML
15 SOLUTION OROPHARYNGEAL ONCE
Status: COMPLETED | OUTPATIENT
Start: 2023-04-03 | End: 2023-04-03

## 2023-04-03 RX ORDER — ONDANSETRON 2 MG/ML
4 INJECTION INTRAMUSCULAR; INTRAVENOUS ONCE
Status: COMPLETED | OUTPATIENT
Start: 2023-04-03 | End: 2023-04-03

## 2023-04-03 RX ORDER — HYDROMORPHONE HYDROCHLORIDE 1 MG/ML
0.5 INJECTION, SOLUTION INTRAMUSCULAR; INTRAVENOUS; SUBCUTANEOUS ONCE
Status: COMPLETED | OUTPATIENT
Start: 2023-04-03 | End: 2023-04-03

## 2023-04-03 RX ORDER — SODIUM CHLORIDE 0.9 % (FLUSH) 0.9 %
10 SYRINGE (ML) INJECTION AS NEEDED
Status: DISCONTINUED | OUTPATIENT
Start: 2023-04-03 | End: 2023-04-03 | Stop reason: HOSPADM

## 2023-04-03 RX ADMIN — ONDANSETRON 4 MG: 2 INJECTION INTRAMUSCULAR; INTRAVENOUS at 15:30

## 2023-04-03 RX ADMIN — ONDANSETRON 4 MG: 2 INJECTION INTRAMUSCULAR; INTRAVENOUS at 14:46

## 2023-04-03 RX ADMIN — LIDOCAINE HYDROCHLORIDE 15 ML: 20 SOLUTION ORAL; TOPICAL at 13:46

## 2023-04-03 RX ADMIN — HYDROMORPHONE HYDROCHLORIDE 0.5 MG: 1 INJECTION, SOLUTION INTRAMUSCULAR; INTRAVENOUS; SUBCUTANEOUS at 15:30

## 2023-04-03 RX ADMIN — ALUMINUM HYDROXIDE, MAGNESIUM HYDROXIDE, AND DIMETHICONE 15 ML: 400; 400; 40 SUSPENSION ORAL at 13:46

## 2023-04-03 RX ADMIN — IOPAMIDOL 100 ML: 755 INJECTION, SOLUTION INTRAVENOUS at 16:24

## 2023-04-03 RX ADMIN — METOPROLOL TARTRATE 5 MG: 5 INJECTION INTRAVENOUS at 15:30

## 2023-04-03 NOTE — ED PROVIDER NOTES
"Subjective   History of Present Illness  53-year-old female presents for evaluation of abdominal pain, nausea, diarrhea.  The patient states that she has had similar episodes \"all the time\" in the past.  She has multiple comorbidities.  She states that early this morning she began experiencing central abdominal pain accompanied by nausea and diarrhea that has persisted since that time.  She adamantly states that she \"needs a GI cocktail.\"  She denies any accompanying fevers.  No bloody stools.  No known dietary triggers for her symptoms.  No known sick contacts.  She currently rates her pain 8 out of 10 in severity with no aggravating or alleviating factors.  She denies any recent antibiotic use.  No chest pain.        Review of Systems   Gastrointestinal: Positive for abdominal pain, diarrhea, nausea and vomiting.   All other systems reviewed and are negative.      Past Medical History:   Diagnosis Date   • Acute deep vein thrombosis (DVT) of left lower extremity 05/16/2022    Dx 5/16/22- left peroneal   • Arthralgia of hip    • Cervical facet arthropathy/cervical spondylosis without myelopathy    • Chronic pain syndrome     Description: hx of epidural injections.   • Conjunctival melanosis of left eye    • COVID-19 01/2022   • Degenerative disc disease, cervical    • Depression     Description: dx 2012.   • Diabetes mellitus     TYPE 2   • Duplicated left renal collecting system     Dx 5/19 by CT   • Essential hypertension     Description: dx 2005.   • Hemorrhoid    • History of cardiovascular stress test 06/07/2019    low risk myocardial perfusion study   • History of colonoscopy 2013    normal per patient   • History of esophagogastroduodenoscopy (EGD) 10/06/2021    Path consistent with reactive gastropathy   • History of esophagogastroduodenoscopy (EGD) 11/23/2022    Gastritis, path c/w chronic active gastritis   • History of uterine leiomyoma    • History of varicella    • Hypertension    • Hypothyroidism     " Description: dx .   • Insomnia    • Low back pain    • Lumbosacral disc disease    • Myopia of both eyes    • Neck pain    • Obesity (BMI 30.0-34.9)    • Osteoarthritis    • Primary osteoarthritis of both knees 2017   • Renal angiomyolipoma     Left dx by CT .   • Stroke    • Tattoos     HIV neg  per patient   • Tobacco abuse    • Vitamin D deficiency     Dx 3/19.   • Wears partial dentures     upper       Allergies   Allergen Reactions   • Glimepiride Diarrhea       Past Surgical History:   Procedure Laterality Date   • ANTERIOR CERVICAL DISCECTOMY  2016    C4-6 DISC (Dr. Pena)   • ANTERIOR CERVICAL DISCECTOMY W/ FUSION Left 2018    Procedure: CERVICAL DISCECTOMY ANTERIOR WITH FUSION C6-7;  Surgeon: Nicolás Pena MD;  Location:  [facility];  Service:    • BACK SURGERY      lumbar discectomy   •  SECTION      , ,    • CHOLECYSTECTOMY WITH INTRAOPERATIVE CHOLANGIOGRAM N/A 2017    Procedure: CHOLECYSTECTOMY LAPAROSCOPIC INTRAOPERATIVE CHOLANGIOGRAM;  Surgeon: Clifford Freed MD;  Location:  [facility];  Service:    • COLONOSCOPY     • HYSTERECTOMY  2015   • SPINAL CORD STIMULATOR IMPLANT N/A 2019    Procedure: SPINAL CORD STIMULATOR INSERTION PHASE 1;  Surgeon: Blayne Pandya MD;  Location:  [facility];  Service: Pain Management       Family History   Problem Relation Age of Onset   • Diabetes Mother    • Hypertension Mother    • No Known Problems Father         Medical and Family History unknown   • Hypertension Brother    • Diabetes Maternal Uncle    • Hypertension Maternal Uncle    • Colon cancer Maternal Grandmother    • Diabetes Maternal Grandmother    • Hypertension Maternal Grandmother    • Diabetes Daughter    • Hypothyroidism Daughter    • Hypertension Other    • Breast cancer Neg Hx    • Ovarian cancer Neg Hx        Social History     Socioeconomic History   • Marital status:    • Number of children: 3   Tobacco Use   •  Smoking status: Former     Packs/day: 0.50     Years: 28.00     Pack years: 14.00     Types: Cigarettes     Start date:      Quit date: 2017     Years since quittin.4   • Smokeless tobacco: Never   • Tobacco comments:     started smoking 8/3/2022   Vaping Use   • Vaping Use: Never used   Substance and Sexual Activity   • Alcohol use: Yes     Comment: 2-3 cocktains, 3-4 times per year   • Drug use: No   • Sexual activity: Not Currently     Partners: Male           Objective   Physical Exam  Vitals and nursing note reviewed.   Constitutional:       Appearance: She is well-developed. She is not diaphoretic.      Comments: Nontoxic-appearing female   HENT:      Head: Normocephalic and atraumatic.   Eyes:      Pupils: Pupils are equal, round, and reactive to light.   Cardiovascular:      Rate and Rhythm: Normal rate and regular rhythm.      Heart sounds: Normal heart sounds. No murmur heard.    No friction rub. No gallop.   Pulmonary:      Effort: Pulmonary effort is normal. No respiratory distress.      Breath sounds: Normal breath sounds. No wheezing or rales.   Abdominal:      General: Bowel sounds are normal. There is no distension.      Palpations: Abdomen is soft. There is no mass.      Tenderness: There is abdominal tenderness. There is no guarding or rebound.      Comments: Mild generalized abdominal tenderness noted, no peritoneal signs, no pain out of proportion to exam   Musculoskeletal:         General: Normal range of motion.      Cervical back: Neck supple.   Skin:     General: Skin is warm and dry.      Findings: No erythema or rash.   Neurological:      Mental Status: She is alert and oriented to person, place, and time.   Psychiatric:         Thought Content: Thought content normal.         Judgment: Judgment normal.      Comments: Appears anxious         Procedures           ED Course  ED Course as of 23   1216 53-year-old female presents for evaluation of  "abdominal pain, nausea, and diarrhea.  The patient states that she has similar episodes \"all the time.\"  She states that early this morning she began experiencing central abdominal pain accompanied by nausea and diarrhea that has persisted since that time.  She states \"I need a GI cocktail.\"  On arrival to the ED, the patient is anxious appearing.  Exam remarkable for mild, generalized abdominal tenderness.  No pain out of proportion to exam.  Broad differential diagnosis.  We will obtain labs and imaging, and we will reassess following initial interventions. [DD]   1217 EKG revealed normal sinus rhythm with a heart rate of 66 and no ST segments suggestive of or concerning for ischemia. [DD]   1246 Labs remarkable for minimally elevated initial high-sensitivity troponin. [DD]   1650 Advanced imaging is negative for emergent/surgical intra-abdominal or cardiothoracic process. [DD]   1655 Upon reevaluation, the patient looks and feels markedly improved.  She is tolerating p.o. without difficulty.  Her blood pressure has significantly improved.  She states that this is a recurrent issue for her and notes that she is already established with gastroenterology.  She feels comfortable going home at this time.  I feel that this is a reasonable approach.  She will follow-up with her gastroenterologist as needed.  Agreeable with plan and given appropriate strict return precautions. [DD]   1656 The patient's blood pressure was initially quite elevated in the emergency department.  The patient is currently asymptomatic from a blood pressure standpoint, and their clinical presentation clearly is not consistent with hypertensive emergency or hypertensive encephalopathy.  No indication to emergently lower the patient's blood pressure at this time per [facility] clinical policy on asymptomatic hypertension.  Over the next 3 days, the patient will keep close tabs on their blood pressure and will keep a log of readings 2-3 times per day. "  They will then follow-up with their primary care physician to discuss potential tweaks to their medication regimen.     [DD]      ED Course User Index  [DD] Rito Thurston MD                                       Recent Results (from the past 24 hour(s))   ECG 12 Lead ED Triage Standing Order; Abdominal Pain (Upper)    Collection Time: 04/03/23 11:56 AM   Result Value Ref Range    QT Interval 416 ms    QTC Interval 436 ms   Comprehensive Metabolic Panel    Collection Time: 04/03/23 12:10 PM    Specimen: Blood   Result Value Ref Range    Glucose 184 (H) 65 - 99 mg/dL    BUN 12 6 - 20 mg/dL    Creatinine 0.74 0.57 - 1.00 mg/dL    Sodium 140 136 - 145 mmol/L    Potassium 3.5 3.5 - 5.2 mmol/L    Chloride 102 98 - 107 mmol/L    CO2 25.0 22.0 - 29.0 mmol/L    Calcium 9.1 8.6 - 10.5 mg/dL    Total Protein 7.6 6.0 - 8.5 g/dL    Albumin 4.3 3.5 - 5.2 g/dL    ALT (SGPT) 15 1 - 33 U/L    AST (SGOT) 15 1 - 32 U/L    Alkaline Phosphatase 84 39 - 117 U/L    Total Bilirubin 1.5 (H) 0.0 - 1.2 mg/dL    Globulin 3.3 gm/dL    A/G Ratio 1.3 g/dL    BUN/Creatinine Ratio 16.2 7.0 - 25.0    Anion Gap 13.0 5.0 - 15.0 mmol/L    eGFR 97.5 >60.0 mL/min/1.73   Lipase    Collection Time: 04/03/23 12:10 PM    Specimen: Blood   Result Value Ref Range    Lipase 17 13 - 60 U/L   Single High Sensitivity Troponin T    Collection Time: 04/03/23 12:10 PM    Specimen: Blood   Result Value Ref Range    HS Troponin T 12 (H) <10 ng/L   Green Top (Gel)    Collection Time: 04/03/23 12:10 PM   Result Value Ref Range    Extra Tube Hold for add-ons.    Lavender Top    Collection Time: 04/03/23 12:10 PM   Result Value Ref Range    Extra Tube hold for add-on    Gold Top - SST    Collection Time: 04/03/23 12:10 PM   Result Value Ref Range    Extra Tube Hold for add-ons.    Gray Top    Collection Time: 04/03/23 12:10 PM   Result Value Ref Range    Extra Tube Hold for add-ons.    Light Blue Top    Collection Time: 04/03/23 12:10 PM   Result Value Ref Range     Extra Tube Hold for add-ons.    CBC Auto Differential    Collection Time: 04/03/23 12:10 PM    Specimen: Blood   Result Value Ref Range    WBC 10.02 3.40 - 10.80 10*3/mm3    RBC 4.85 3.77 - 5.28 10*6/mm3    Hemoglobin 14.5 12.0 - 15.9 g/dL    Hematocrit 44.6 34.0 - 46.6 %    MCV 92.0 79.0 - 97.0 fL    MCH 29.9 26.6 - 33.0 pg    MCHC 32.5 31.5 - 35.7 g/dL    RDW 13.2 12.3 - 15.4 %    RDW-SD 44.2 37.0 - 54.0 fl    MPV 10.4 6.0 - 12.0 fL    Platelets 180 140 - 450 10*3/mm3    Neutrophil % 84.2 (H) 42.7 - 76.0 %    Lymphocyte % 11.5 (L) 19.6 - 45.3 %    Monocyte % 3.7 (L) 5.0 - 12.0 %    Eosinophil % 0.1 (L) 0.3 - 6.2 %    Basophil % 0.2 0.0 - 1.5 %    Immature Grans % 0.3 0.0 - 0.5 %    Neutrophils, Absolute 8.44 (H) 1.70 - 7.00 10*3/mm3    Lymphocytes, Absolute 1.15 0.70 - 3.10 10*3/mm3    Monocytes, Absolute 0.37 0.10 - 0.90 10*3/mm3    Eosinophils, Absolute 0.01 0.00 - 0.40 10*3/mm3    Basophils, Absolute 0.02 0.00 - 0.20 10*3/mm3    Immature Grans, Absolute 0.03 0.00 - 0.05 10*3/mm3    nRBC 0.0 0.0 - 0.2 /100 WBC   Urinalysis With Microscopic If Indicated (No Culture) - Urine, Clean Catch    Collection Time: 04/03/23 12:13 PM    Specimen: Urine, Clean Catch   Result Value Ref Range    Color, UA Yellow Yellow, Straw    Appearance, UA Clear Clear    pH, UA 7.0 5.0 - 8.0    Specific Gravity, UA 1.018 1.001 - 1.030    Glucose,  mg/dL (Trace) (A) Negative    Ketones, UA 15 mg/dL (1+) (A) Negative    Bilirubin, UA Negative Negative    Blood, UA Trace (A) Negative    Protein, UA 30 mg/dL (1+) (A) Negative    Leuk Esterase, UA Negative Negative    Nitrite, UA Negative Negative    Urobilinogen, UA 0.2 E.U./dL 0.2 - 1.0 E.U./dL   Urinalysis, Microscopic Only - Urine, Clean Catch    Collection Time: 04/03/23 12:13 PM    Specimen: Urine, Clean Catch   Result Value Ref Range    RBC, UA 3-6 (A) None Seen, 0-2 /HPF    WBC, UA None Seen None Seen, 0-2 /HPF    Bacteria, UA None Seen None Seen, Trace /HPF    Squamous  Epithelial Cells, UA 0-2 None Seen, 0-2 /HPF    Hyaline Casts, UA None Seen 0 - 6 /LPF    Methodology Automated Microscopy    POC Creatinine    Collection Time: 04/03/23 12:18 PM    Specimen: Blood   Result Value Ref Range    Creatinine 0.70 0.60 - 1.30 mg/dL   POC Creatinine    Collection Time: 04/03/23 12:19 PM    Specimen: Blood   Result Value Ref Range    Creatinine 0.70 mg/dL   Single High Sensitivity Troponin T    Collection Time: 04/03/23  2:05 PM    Specimen: Blood   Result Value Ref Range    HS Troponin T 11 (H) <10 ng/L   ECG 12 Lead Other; SECOND SET    Collection Time: 04/03/23  2:19 PM   Result Value Ref Range    QT Interval 396 ms    QTC Interval 454 ms     Note: In addition to lab results from this visit, the labs listed above may include labs taken at another facility or during a different encounter within the last 24 hours. Please correlate lab times with ED admission and discharge times for further clarification of the services performed during this visit.    CT Angiogram Chest   Final Result   No evidence of aortic dissection, pulmonary embolic disease, pneumonia, or other active chest pathology.            CT SCAN OF THE ABDOMEN AND PELVIS WITH IV CONTRAST: Clips are seen in the gallbladder fossa. Liver morphology appears normal. There is moderate fatty liver change. Spleen is not enlarged. No significant abnormalities are seen of the pancreas, adrenal    glands, or kidneys. Left upper pole renal cyst is again noted. No upper abdominal free air, ascites, adenopathy or acute inflammatory focus is seen. Thoracic aorta is normal in caliber and normal in appearance. Iliac arteries and proximal superficial    femoral arteries appear normal.      Stomach is fluid and air distended. Small bowel loops are nondistended-- actually decompressed, and practically airless. The colon is markedly decompressed, and these findings suggest dehydration and perhaps diarrhea. There is minimal if any colon wall     thickening.. Review of axial images 40 through 51 the upper abdomen suggests thickening of the distal stomach/pyloric channel but this appears much milder on the delayed series and is probably simply due to peristalsis.      Regarding lower abdomen pelvis, bladder is decompressed. Uterus and ovaries are not identified, either surgically absent or atrophic. No mass or adenopathy is seen. Terminal ileum and cecum appear normal. Appendix is very small, in the anterior right    iliac fossa region and appears normal. Delayed venous phase images show no evidence of obstructive uropathy. Bony structures appear to be intact. There is moderately advanced L5-S1 degenerative disc disease.      Impression:      1. No evidence of aortic aneurysm, dissection or periaortic inflammation.      2. Fluid-filled, moderately stomach, with inconsistent appearance of thickening of the pylorus between initial and delayed scans. This is favored to be due to peristalsis rather than inflammation or mass.      3. Very decompressed appearance of large and small bowel, which can reflect dehydration, diarrhea, etc. No visible bowel wall inflammation.      4. No other evidence of acute intra-abdominal intrapelvic disease elsewhere.      Electronically Signed: Tao Cee     4/3/2023 4:46 PM EDT     Workstation ID: [id number]      CT Abdomen Pelvis With Contrast   Final Result   No evidence of aortic dissection, pulmonary embolic disease, pneumonia, or other active chest pathology.            CT SCAN OF THE ABDOMEN AND PELVIS WITH IV CONTRAST: Clips are seen in the gallbladder fossa. Liver morphology appears normal. There is moderate fatty liver change. Spleen is not enlarged. No significant abnormalities are seen of the pancreas, adrenal    glands, or kidneys. Left upper pole renal cyst is again noted. No upper abdominal free air, ascites, adenopathy or acute inflammatory focus is seen. Thoracic aorta is normal in caliber and normal in appearance.  Iliac arteries and proximal superficial    femoral arteries appear normal.      Stomach is fluid and air distended. Small bowel loops are nondistended-- actually decompressed, and practically airless. The colon is markedly decompressed, and these findings suggest dehydration and perhaps diarrhea. There is minimal if any colon wall    thickening.. Review of axial images 40 through 51 the upper abdomen suggests thickening of the distal stomach/pyloric channel but this appears much milder on the delayed series and is probably simply due to peristalsis.      Regarding lower abdomen pelvis, bladder is decompressed. Uterus and ovaries are not identified, either surgically absent or atrophic. No mass or adenopathy is seen. Terminal ileum and cecum appear normal. Appendix is very small, in the anterior right    iliac fossa region and appears normal. Delayed venous phase images show no evidence of obstructive uropathy. Bony structures appear to be intact. There is moderately advanced L5-S1 degenerative disc disease.      Impression:      1. No evidence of aortic aneurysm, dissection or periaortic inflammation.      2. Fluid-filled, moderately stomach, with inconsistent appearance of thickening of the pylorus between initial and delayed scans. This is favored to be due to peristalsis rather than inflammation or mass.      3. Very decompressed appearance of large and small bowel, which can reflect dehydration, diarrhea, etc. No visible bowel wall inflammation.      4. No other evidence of acute intra-abdominal intrapelvic disease elsewhere.      Electronically Signed: Tao Cee     4/3/2023 4:46 PM EDT     Workstation ID: [id number]        Vitals:    04/03/23 1150 04/03/23 1459 04/03/23 1644   BP: (!) 208/114 (!) 209/128 178/95   BP Location: Left arm     Patient Position: Sitting     Pulse: 65 85 55   Resp: 18     Temp: 98.5 °F (36.9 °C)     TempSrc: Oral     SpO2: 100% 91% 98%   Weight: 95.3 kg (210 lb)     Height: 170.2 cm  "(67\")       Medications   aluminum-magnesium hydroxide-simethicone (MAALOX MAX) 400-400-40 MG/5ML suspension 15 mL (15 mL Oral Given 4/3/23 1346)   Lidocaine Viscous HCl (XYLOCAINE) 2 % solution 15 mL (15 mL Mouth/Throat Given 4/3/23 1346)   ondansetron (ZOFRAN) injection 4 mg (4 mg Intravenous Given 4/3/23 1446)   ondansetron (ZOFRAN) injection 4 mg (4 mg Intravenous Given 4/3/23 1530)   HYDROmorphone (DILAUDID) injection 0.5 mg (0.5 mg Intravenous Given 4/3/23 1530)   metoprolol tartrate (LOPRESSOR) injection 5 mg (5 mg Intravenous Given 4/3/23 1530)   iopamidol (ISOVUE-370) 76 % injection 100 mL (100 mL Intravenous Given 4/3/23 1624)     ECG/EMG Results (last 24 hours)     ** No results found for the last 24 hours. **        ECG 12 Lead Other; SECOND SET   Final Result   Test Reason : Other~   Blood Pressure :   */*   mmHG   Vent. Rate :  79 BPM     Atrial Rate :  79 BPM      P-R Int : 208 ms          QRS Dur :  82 ms       QT Int : 396 ms       P-R-T Axes :  59  -1  63 degrees      QTc Int : 454 ms      Normal sinus rhythm   Normal ECG   When compared with ECG of 03-APR-2023 11:56, (Unconfirmed)   No significant change was found   Confirmed by MD Thurston Michael ([id number]) on 4/3/2023 6:25:22 PM      Referred By: ELIER           Confirmed By: Pb Thurston MD      ECG 12 Lead ED Triage Standing Order; Abdominal Pain (Upper)   Final Result   Test Reason : ED Triage Standing Order~   Blood Pressure :   */*   mmHG   Vent. Rate :  66 BPM     Atrial Rate :  66 BPM      P-R Int : 192 ms          QRS Dur :  82 ms       QT Int : 416 ms       P-R-T Axes :  47 -19  69 degrees      QTc Int : 436 ms      ** Poor data quality, interpretation may be adversely affected   Normal sinus rhythm   Normal ECG   When compared with ECG of 11-DEC-2022 12:40,   No significant change was found   Confirmed by MD Thurston Michael ([id number]) on 4/3/2023 6:25:16 PM      Referred By: JUSTO SIMS           Confirmed By: Pb Thurston MD             "     MDM    Final diagnoses:   Generalized abdominal pain   Hyperglycemia   Nausea vomiting and diarrhea   Elevated blood pressure reading with diagnosis of hypertension       ED Disposition  ED Disposition     ED Disposition   Discharge    Condition   Stable    Comment   --             Stephanie Tanner MD  [address] [place], [place] [id number] [phone]    In 1 week           Medication List      No changes were made to your prescriptions during this visit.          Rito Thurston MD  04/03/23 2004

## 2023-04-05 ENCOUNTER — HOSPITAL ENCOUNTER (EMERGENCY)
Facility: HOSPITAL | Age: 53
Discharge: HOME | End: 2023-04-05
Attending: EMERGENCY MEDICINE | Admitting: EMERGENCY MEDICINE
Payer: COMMERCIAL

## 2023-04-05 VITALS
TEMPERATURE: 98.3 F | HEIGHT: 67 IN | DIASTOLIC BLOOD PRESSURE: 72 MMHG | OXYGEN SATURATION: 95 % | SYSTOLIC BLOOD PRESSURE: 118 MMHG | BODY MASS INDEX: 32.96 KG/M2 | HEART RATE: 72 BPM | WEIGHT: 210 LBS | RESPIRATION RATE: 20 BRPM

## 2023-04-05 DIAGNOSIS — R11.2 NAUSEA AND VOMITING, UNSPECIFIED VOMITING TYPE: ICD-10-CM

## 2023-04-05 DIAGNOSIS — F12.90 MARIJUANA USE: ICD-10-CM

## 2023-04-05 DIAGNOSIS — R10.84 GENERALIZED ABDOMINAL PAIN: Primary | ICD-10-CM

## 2023-04-05 DIAGNOSIS — I16.0 HYPERTENSIVE URGENCY: ICD-10-CM

## 2023-04-05 LAB
ALBUMIN SERPL-MCNC: 4.3 G/DL (ref 3.5–5.2)
ALBUMIN/GLOB SERPL: 1.3 G/DL
ALP SERPL-CCNC: 82 U/L (ref 39–117)
ALT SERPL W P-5'-P-CCNC: 14 U/L (ref 1–33)
AMPHET+METHAMPHET UR QL: NEGATIVE
AMPHETAMINES UR QL: NEGATIVE
ANION GAP SERPL CALCULATED.3IONS-SCNC: 13 MMOL/L (ref 5–15)
AST SERPL-CCNC: 18 U/L (ref 1–32)
BARBITURATES UR QL SCN: NEGATIVE
BASOPHILS # BLD AUTO: 0.04 10*3/MM3 (ref 0–0.2)
BASOPHILS NFR BLD AUTO: 0.4 % (ref 0–1.5)
BENZODIAZ UR QL SCN: NEGATIVE
BILIRUB SERPL-MCNC: 1.4 MG/DL (ref 0–1.2)
BILIRUB UR QL STRIP: NEGATIVE
BUN SERPL-MCNC: 18 MG/DL (ref 6–20)
BUN/CREAT SERPL: 18.8 (ref 7–25)
BUPRENORPHINE SERPL-MCNC: NEGATIVE NG/ML
CALCIUM SPEC-SCNC: 9.2 MG/DL (ref 8.6–10.5)
CANNABINOIDS SERPL QL: POSITIVE
CHLORIDE SERPL-SCNC: 97 MMOL/L (ref 98–107)
CLARITY UR: CLEAR
CO2 SERPL-SCNC: 26 MMOL/L (ref 22–29)
COCAINE UR QL: NEGATIVE
COLOR UR: YELLOW
CREAT SERPL-MCNC: 0.96 MG/DL (ref 0.57–1)
DEPRECATED RDW RBC AUTO: 45 FL (ref 37–54)
EGFRCR SERPLBLD CKD-EPI 2021: 71.3 ML/MIN/1.73
EOSINOPHIL # BLD AUTO: 0.08 10*3/MM3 (ref 0–0.4)
EOSINOPHIL NFR BLD AUTO: 0.9 % (ref 0.3–6.2)
ERYTHROCYTE [DISTWIDTH] IN BLOOD BY AUTOMATED COUNT: 13.1 % (ref 12.3–15.4)
GLOBULIN UR ELPH-MCNC: 3.3 GM/DL
GLUCOSE SERPL-MCNC: 190 MG/DL (ref 65–99)
GLUCOSE UR STRIP-MCNC: ABNORMAL MG/DL
HCT VFR BLD AUTO: 43.8 % (ref 34–46.6)
HGB BLD-MCNC: 14 G/DL (ref 12–15.9)
HGB UR QL STRIP.AUTO: NEGATIVE
HOLD SPECIMEN: NORMAL
IMM GRANULOCYTES # BLD AUTO: 0.05 10*3/MM3 (ref 0–0.05)
IMM GRANULOCYTES NFR BLD AUTO: 0.6 % (ref 0–0.5)
KETONES UR QL STRIP: ABNORMAL
LEUKOCYTE ESTERASE UR QL STRIP.AUTO: NEGATIVE
LIPASE SERPL-CCNC: 20 U/L (ref 13–60)
LYMPHOCYTES # BLD AUTO: 2.22 10*3/MM3 (ref 0.7–3.1)
LYMPHOCYTES NFR BLD AUTO: 24.6 % (ref 19.6–45.3)
MCH RBC QN AUTO: 29.7 PG (ref 26.6–33)
MCHC RBC AUTO-ENTMCNC: 32 G/DL (ref 31.5–35.7)
MCV RBC AUTO: 93 FL (ref 79–97)
METHADONE UR QL SCN: NEGATIVE
MONOCYTES # BLD AUTO: 0.69 10*3/MM3 (ref 0.1–0.9)
MONOCYTES NFR BLD AUTO: 7.6 % (ref 5–12)
NEUTROPHILS NFR BLD AUTO: 5.96 10*3/MM3 (ref 1.7–7)
NEUTROPHILS NFR BLD AUTO: 65.9 % (ref 42.7–76)
NITRITE UR QL STRIP: NEGATIVE
NRBC BLD AUTO-RTO: 0 /100 WBC (ref 0–0.2)
OPIATES UR QL: NEGATIVE
OXYCODONE UR QL SCN: NEGATIVE
PCP UR QL SCN: NEGATIVE
PH UR STRIP.AUTO: 6.5 [PH] (ref 5–8)
PLATELET # BLD AUTO: 175 10*3/MM3 (ref 140–450)
PMV BLD AUTO: 10.5 FL (ref 6–12)
POTASSIUM SERPL-SCNC: 3.4 MMOL/L (ref 3.5–5.2)
PROPOXYPH UR QL: NEGATIVE
PROT SERPL-MCNC: 7.6 G/DL (ref 6–8.5)
PROT UR QL STRIP: ABNORMAL
RBC # BLD AUTO: 4.71 10*6/MM3 (ref 3.77–5.28)
SODIUM SERPL-SCNC: 136 MMOL/L (ref 136–145)
SP GR UR STRIP: 1.02 (ref 1–1.03)
TRICYCLICS UR QL SCN: NEGATIVE
TROPONIN T SERPL HS-MCNC: 11 NG/L
TROPONIN T SERPL HS-MCNC: 16 NG/L
UROBILINOGEN UR QL STRIP: ABNORMAL
WBC NRBC COR # BLD: 9.04 10*3/MM3 (ref 3.4–10.8)
WHOLE BLOOD HOLD COAG: NORMAL
WHOLE BLOOD HOLD SPECIMEN: NORMAL

## 2023-04-05 PROCEDURE — 93005 ELECTROCARDIOGRAM TRACING: CPT

## 2023-04-05 PROCEDURE — 25010000002 DIPHENHYDRAMINE PER 50 MG: Performed by: EMERGENCY MEDICINE

## 2023-04-05 PROCEDURE — 25010000002 MORPHINE PER 10 MG: Performed by: EMERGENCY MEDICINE

## 2023-04-05 PROCEDURE — 83690 ASSAY OF LIPASE: CPT | Performed by: EMERGENCY MEDICINE

## 2023-04-05 PROCEDURE — 96376 TX/PRO/DX INJ SAME DRUG ADON: CPT

## 2023-04-05 PROCEDURE — 85025 COMPLETE CBC W/AUTO DIFF WBC: CPT | Performed by: EMERGENCY MEDICINE

## 2023-04-05 PROCEDURE — 36415 COLL VENOUS BLD VENIPUNCTURE: CPT

## 2023-04-05 PROCEDURE — 93005 ELECTROCARDIOGRAM TRACING: CPT | Performed by: EMERGENCY MEDICINE

## 2023-04-05 PROCEDURE — 96375 TX/PRO/DX INJ NEW DRUG ADDON: CPT

## 2023-04-05 PROCEDURE — 81003 URINALYSIS AUTO W/O SCOPE: CPT | Performed by: EMERGENCY MEDICINE

## 2023-04-05 PROCEDURE — 25010000002 HYDROMORPHONE PER 4 MG: Performed by: EMERGENCY MEDICINE

## 2023-04-05 PROCEDURE — 80306 DRUG TEST PRSMV INSTRMNT: CPT | Performed by: EMERGENCY MEDICINE

## 2023-04-05 PROCEDURE — 84484 ASSAY OF TROPONIN QUANT: CPT | Performed by: EMERGENCY MEDICINE

## 2023-04-05 PROCEDURE — 99284 EMERGENCY DEPT VISIT MOD MDM: CPT

## 2023-04-05 PROCEDURE — 25010000002 METOCLOPRAMIDE PER 10 MG: Performed by: EMERGENCY MEDICINE

## 2023-04-05 PROCEDURE — 25010000002 ONDANSETRON PER 1 MG: Performed by: EMERGENCY MEDICINE

## 2023-04-05 PROCEDURE — 96374 THER/PROPH/DIAG INJ IV PUSH: CPT

## 2023-04-05 PROCEDURE — 80053 COMPREHEN METABOLIC PANEL: CPT | Performed by: EMERGENCY MEDICINE

## 2023-04-05 RX ORDER — SODIUM CHLORIDE 0.9 % (FLUSH) 0.9 %
10 SYRINGE (ML) INJECTION AS NEEDED
Status: DISCONTINUED | OUTPATIENT
Start: 2023-04-05 | End: 2023-04-05 | Stop reason: HOSPADM

## 2023-04-05 RX ORDER — HYDROMORPHONE HYDROCHLORIDE 1 MG/ML
0.25 INJECTION, SOLUTION INTRAMUSCULAR; INTRAVENOUS; SUBCUTANEOUS ONCE
Status: COMPLETED | OUTPATIENT
Start: 2023-04-05 | End: 2023-04-05

## 2023-04-05 RX ORDER — ALUMINA, MAGNESIA, AND SIMETHICONE 2400; 2400; 240 MG/30ML; MG/30ML; MG/30ML
15 SUSPENSION ORAL ONCE
Status: COMPLETED | OUTPATIENT
Start: 2023-04-05 | End: 2023-04-05

## 2023-04-05 RX ORDER — LIDOCAINE HYDROCHLORIDE 20 MG/ML
15 SOLUTION OROPHARYNGEAL ONCE
Status: COMPLETED | OUTPATIENT
Start: 2023-04-05 | End: 2023-04-05

## 2023-04-05 RX ORDER — MORPHINE SULFATE 2 MG/ML
2 INJECTION, SOLUTION INTRAMUSCULAR; INTRAVENOUS
Status: DISCONTINUED | OUTPATIENT
Start: 2023-04-05 | End: 2023-04-05 | Stop reason: HOSPADM

## 2023-04-05 RX ORDER — DIPHENHYDRAMINE HYDROCHLORIDE 50 MG/ML
25 INJECTION INTRAMUSCULAR; INTRAVENOUS ONCE
Status: COMPLETED | OUTPATIENT
Start: 2023-04-05 | End: 2023-04-05

## 2023-04-05 RX ORDER — METOCLOPRAMIDE HYDROCHLORIDE 5 MG/ML
10 INJECTION INTRAMUSCULAR; INTRAVENOUS ONCE
Status: COMPLETED | OUTPATIENT
Start: 2023-04-05 | End: 2023-04-05

## 2023-04-05 RX ORDER — LABETALOL HYDROCHLORIDE 5 MG/ML
10 INJECTION, SOLUTION INTRAVENOUS ONCE
Status: COMPLETED | OUTPATIENT
Start: 2023-04-05 | End: 2023-04-05

## 2023-04-05 RX ORDER — ONDANSETRON 2 MG/ML
4 INJECTION INTRAMUSCULAR; INTRAVENOUS ONCE
Status: COMPLETED | OUTPATIENT
Start: 2023-04-05 | End: 2023-04-05

## 2023-04-05 RX ORDER — LABETALOL HYDROCHLORIDE 5 MG/ML
20 INJECTION, SOLUTION INTRAVENOUS ONCE
Status: COMPLETED | OUTPATIENT
Start: 2023-04-05 | End: 2023-04-05

## 2023-04-05 RX ADMIN — MORPHINE SULFATE 2 MG: 2 INJECTION, SOLUTION INTRAMUSCULAR; INTRAVENOUS at 11:54

## 2023-04-05 RX ADMIN — DIPHENHYDRAMINE HYDROCHLORIDE 25 MG: 50 INJECTION, SOLUTION INTRAMUSCULAR; INTRAVENOUS at 10:50

## 2023-04-05 RX ADMIN — ALUMINUM HYDROXIDE, MAGNESIUM HYDROXIDE, AND DIMETHICONE 15 ML: 400; 400; 40 SUSPENSION ORAL at 10:45

## 2023-04-05 RX ADMIN — MORPHINE SULFATE 2 MG: 2 INJECTION, SOLUTION INTRAMUSCULAR; INTRAVENOUS at 13:21

## 2023-04-05 RX ADMIN — METOCLOPRAMIDE 10 MG: 5 INJECTION, SOLUTION INTRAMUSCULAR; INTRAVENOUS at 14:41

## 2023-04-05 RX ADMIN — LABETALOL HYDROCHLORIDE 20 MG: 5 INJECTION, SOLUTION INTRAVENOUS at 13:21

## 2023-04-05 RX ADMIN — DIPHENHYDRAMINE HYDROCHLORIDE 25 MG: 50 INJECTION, SOLUTION INTRAMUSCULAR; INTRAVENOUS at 14:42

## 2023-04-05 RX ADMIN — LABETALOL HYDROCHLORIDE 10 MG: 5 INJECTION, SOLUTION INTRAVENOUS at 11:54

## 2023-04-05 RX ADMIN — LIDOCAINE HYDROCHLORIDE 15 ML: 20 SOLUTION ORAL at 10:45

## 2023-04-05 RX ADMIN — LABETALOL HYDROCHLORIDE 10 MG: 5 INJECTION, SOLUTION INTRAVENOUS at 12:41

## 2023-04-05 RX ADMIN — ONDANSETRON 4 MG: 2 INJECTION INTRAMUSCULAR; INTRAVENOUS at 10:49

## 2023-04-05 RX ADMIN — HYDROMORPHONE HYDROCHLORIDE 0.25 MG: 1 INJECTION, SOLUTION INTRAMUSCULAR; INTRAVENOUS; SUBCUTANEOUS at 14:41

## 2023-04-05 NOTE — ED PROVIDER NOTES
Subjective   History of Present Illness  Patient is a pleasant 53-year-old female with a history of a recurrent abdominal pain, diabetes, chronic pain syndrome, hypertension, who presents today with abdominal pain, nausea, vomiting and diarrhea.  She says that has been going on for several days and that these episodes happen several times in the past.  She says a GI cocktail typically helps her pain.  She was in the emergency department 3 days ago with the same symptoms and had extensive work-up including CT of the abdomen and pelvis which did not show acute emergent pathology.  She felt better at the time of discharge but symptoms returned shortly thereafter.  Admits to generalized fatigue, nausea, vomiting.  Denies chest pain or difficulty breathing.  Abdominal pain is diffuse.  Denies fever, chills, chest pain, shortness of breath, or other acute complaints.        Review of Systems   All other systems reviewed and are negative.      Past Medical History:   Diagnosis Date   • Acute deep vein thrombosis (DVT) of left lower extremity 05/16/2022    Dx 5/16/22- left peroneal   • Arthralgia of hip    • Cervical facet arthropathy/cervical spondylosis without myelopathy    • Chronic pain syndrome     Description: hx of epidural injections.   • Conjunctival melanosis of left eye    • COVID-19 01/2022   • Degenerative disc disease, cervical    • Depression     Description: dx 2012.   • Diabetes mellitus     TYPE 2   • Duplicated left renal collecting system     Dx 5/19 by CT   • Essential hypertension     Description: dx 2005.   • Hemorrhoid    • History of cardiovascular stress test 06/07/2019    low risk myocardial perfusion study   • History of colonoscopy 2013    normal per patient   • History of esophagogastroduodenoscopy (EGD) 10/06/2021    Path consistent with reactive gastropathy   • History of esophagogastroduodenoscopy (EGD) 11/23/2022    Gastritis, path c/w chronic active gastritis   • History of uterine  leiomyoma    • History of varicella    • Hypertension    • Hypothyroidism     Description: dx .   • Insomnia    • Low back pain    • Lumbosacral disc disease    • Myopia of both eyes    • Neck pain    • Obesity (BMI 30.0-34.9)    • Osteoarthritis    • Primary osteoarthritis of both knees 2017   • Renal angiomyolipoma     Left dx by CT .   • Stroke    • Tattoos     HIV neg  per patient   • Tobacco abuse    • Vitamin D deficiency     Dx 3/19.   • Wears partial dentures     upper       Allergies   Allergen Reactions   • Glimepiride Diarrhea       Past Surgical History:   Procedure Laterality Date   • ANTERIOR CERVICAL DISCECTOMY  2016    C4-6 DISC (Dr. Pena)   • ANTERIOR CERVICAL DISCECTOMY W/ FUSION Left 2018    Procedure: CERVICAL DISCECTOMY ANTERIOR WITH FUSION C6-7;  Surgeon: Nicolás Pena MD;  Location:  [facility];  Service:    • BACK SURGERY      lumbar discectomy   •  SECTION      , ,    • CHOLECYSTECTOMY WITH INTRAOPERATIVE CHOLANGIOGRAM N/A 2017    Procedure: CHOLECYSTECTOMY LAPAROSCOPIC INTRAOPERATIVE CHOLANGIOGRAM;  Surgeon: Clifford Freed MD;  Location:  [facility];  Service:    • COLONOSCOPY  2016   • HYSTERECTOMY  2015   • SPINAL CORD STIMULATOR IMPLANT N/A 2019    Procedure: SPINAL CORD STIMULATOR INSERTION PHASE 1;  Surgeon: Blayne Pandya MD;  Location:  [facility];  Service: Pain Management       Family History   Problem Relation Age of Onset   • Diabetes Mother    • Hypertension Mother    • No Known Problems Father         Medical and Family History unknown   • Hypertension Brother    • Diabetes Maternal Uncle    • Hypertension Maternal Uncle    • Colon cancer Maternal Grandmother    • Diabetes Maternal Grandmother    • Hypertension Maternal Grandmother    • Diabetes Daughter    • Hypothyroidism Daughter    • Hypertension Other    • Breast cancer Neg Hx    • Ovarian cancer Neg Hx        Social History     Socioeconomic History    • Marital status:    • Number of children: 3   Tobacco Use   • Smoking status: Former     Packs/day: 0.50     Years: 28.00     Pack years: 14.00     Types: Cigarettes     Start date:      Quit date: 2017     Years since quittin.4   • Smokeless tobacco: Never   • Tobacco comments:     started smoking 8/3/2022   Vaping Use   • Vaping Use: Never used   Substance and Sexual Activity   • Alcohol use: Yes     Comment: 2-3 cocktains, 3-4 times per year   • Drug use: No   • Sexual activity: Not Currently     Partners: Male           Objective   Physical Exam  Vitals and nursing note reviewed.   Constitutional:       General: She is not in acute distress.     Appearance: She is well-developed. She is ill-appearing.      Comments: Patient is actively vomiting.  Skin is clammy.  Appears ill   HENT:      Head: Normocephalic and atraumatic.      Mouth/Throat:      Mouth: Mucous membranes are moist.   Eyes:      Extraocular Movements: Extraocular movements intact.      Conjunctiva/sclera: Conjunctivae normal.      Pupils: Pupils are equal, round, and reactive to light.   Cardiovascular:      Rate and Rhythm: Normal rate and regular rhythm.      Heart sounds: Normal heart sounds.   Pulmonary:      Effort: Pulmonary effort is normal. No respiratory distress.      Breath sounds: Normal breath sounds.   Abdominal:      General: Bowel sounds are normal. There is no distension.      Palpations: Abdomen is soft. There is no mass.      Tenderness: There is generalized abdominal tenderness. There is no rebound.   Musculoskeletal:         General: Normal range of motion.      Cervical back: Normal range of motion and neck supple.   Skin:     General: Skin is warm and dry.      Capillary Refill: Capillary refill takes less than 2 seconds.   Neurological:      General: No focal deficit present.      Mental Status: She is alert and oriented to person, place, and time.         Procedures           ED Course      CT  Abdomen Pelvis With Contrast    Result Date: 4/3/2023  CT ANGIOGRAM CHEST, CT ABDOMEN PELVIS W CONTRAST Date of Exam: 4/3/2023 12:38 PM EDT Indication: abd pain radiating to back, dissection protocol. Comparison: Angiographic chest CT scan 9/1/2022 Technique: CTA of the chest was performed after the uneventful intravenous administration of 100 mL Isovue-370. Reconstructed coronal and sagittal images were also obtained. In addition, a 3-D volume rendered image was created for interpretation. Automated exposure control and iterative reconstruction methods were used. Findings: Patient history indicates abdominal pain radiating to back. The previous 9/1/2022 scan by report showed no acute intrathoracic abnormalities. CT ANGIOGRAM CHEST: Thoracic aorta is moderately well contrast opacified, sufficient to exclude dissection. No aneurysm or periaortic inflammation is seen. There is very mild coronary artery calcium. Pulmonary arteries are well contrast opacified, with no evidence of filling defect to suggest pulmonary embolic disease. No pericardial or pleural effusion or mediastinal adenopathy is seen. There is no evidence of axillary adenopathy. Superficial soft tissues elsewhere appear unremarkable. Lungs appear clear of active disease. Airways appear to be normally patent. Bony structures appear intact.     No evidence of aortic dissection, pulmonary embolic disease, pneumonia, or other active chest pathology. CT SCAN OF THE ABDOMEN AND PELVIS WITH IV CONTRAST: Clips are seen in the gallbladder fossa. Liver morphology appears normal. There is moderate fatty liver change. Spleen is not enlarged. No significant abnormalities are seen of the pancreas, adrenal glands, or kidneys. Left upper pole renal cyst is again noted. No upper abdominal free air, ascites, adenopathy or acute inflammatory focus is seen. Thoracic aorta is normal in caliber and normal in appearance. Iliac arteries and proximal superficial femoral arteries  appear normal. Stomach is fluid and air distended. Small bowel loops are nondistended-- actually decompressed, and practically airless. The colon is markedly decompressed, and these findings suggest dehydration and perhaps diarrhea. There is minimal if any colon wall thickening.. Review of axial images 40 through 51 the upper abdomen suggests thickening of the distal stomach/pyloric channel but this appears much milder on the delayed series and is probably simply due to peristalsis. Regarding lower abdomen pelvis, bladder is decompressed. Uterus and ovaries are not identified, either surgically absent or atrophic. No mass or adenopathy is seen. Terminal ileum and cecum appear normal. Appendix is very small, in the anterior right iliac fossa region and appears normal. Delayed venous phase images show no evidence of obstructive uropathy. Bony structures appear to be intact. There is moderately advanced L5-S1 degenerative disc disease. Impression: 1. No evidence of aortic aneurysm, dissection or periaortic inflammation. 2. Fluid-filled, moderately stomach, with inconsistent appearance of thickening of the pylorus between initial and delayed scans. This is favored to be due to peristalsis rather than inflammation or mass. 3. Very decompressed appearance of large and small bowel, which can reflect dehydration, diarrhea, etc. No visible bowel wall inflammation. 4. No other evidence of acute intra-abdominal intrapelvic disease elsewhere. Electronically Signed: Tao Cee  4/3/2023 4:46 PM EDT  Workstation ID: [id number]    CT Angiogram Chest    Result Date: 4/3/2023  CT ANGIOGRAM CHEST, CT ABDOMEN PELVIS W CONTRAST Date of Exam: 4/3/2023 12:38 PM EDT Indication: abd pain radiating to back, dissection protocol. Comparison: Angiographic chest CT scan 9/1/2022 Technique: CTA of the chest was performed after the uneventful intravenous administration of 100 mL Isovue-370. Reconstructed coronal and sagittal images were also  obtained. In addition, a 3-D volume rendered image was created for interpretation. Automated exposure control and iterative reconstruction methods were used. Findings: Patient history indicates abdominal pain radiating to back. The previous 9/1/2022 scan by report showed no acute intrathoracic abnormalities. CT ANGIOGRAM CHEST: Thoracic aorta is moderately well contrast opacified, sufficient to exclude dissection. No aneurysm or periaortic inflammation is seen. There is very mild coronary artery calcium. Pulmonary arteries are well contrast opacified, with no evidence of filling defect to suggest pulmonary embolic disease. No pericardial or pleural effusion or mediastinal adenopathy is seen. There is no evidence of axillary adenopathy. Superficial soft tissues elsewhere appear unremarkable. Lungs appear clear of active disease. Airways appear to be normally patent. Bony structures appear intact.     No evidence of aortic dissection, pulmonary embolic disease, pneumonia, or other active chest pathology. CT SCAN OF THE ABDOMEN AND PELVIS WITH IV CONTRAST: Clips are seen in the gallbladder fossa. Liver morphology appears normal. There is moderate fatty liver change. Spleen is not enlarged. No significant abnormalities are seen of the pancreas, adrenal glands, or kidneys. Left upper pole renal cyst is again noted. No upper abdominal free air, ascites, adenopathy or acute inflammatory focus is seen. Thoracic aorta is normal in caliber and normal in appearance. Iliac arteries and proximal superficial femoral arteries appear normal. Stomach is fluid and air distended. Small bowel loops are nondistended-- actually decompressed, and practically airless. The colon is markedly decompressed, and these findings suggest dehydration and perhaps diarrhea. There is minimal if any colon wall thickening.. Review of axial images 40 through 51 the upper abdomen suggests thickening of the distal stomach/pyloric channel but this appears  much milder on the delayed series and is probably simply due to peristalsis. Regarding lower abdomen pelvis, bladder is decompressed. Uterus and ovaries are not identified, either surgically absent or atrophic. No mass or adenopathy is seen. Terminal ileum and cecum appear normal. Appendix is very small, in the anterior right iliac fossa region and appears normal. Delayed venous phase images show no evidence of obstructive uropathy. Bony structures appear to be intact. There is moderately advanced L5-S1 degenerative disc disease. Impression: 1. No evidence of aortic aneurysm, dissection or periaortic inflammation. 2. Fluid-filled, moderately stomach, with inconsistent appearance of thickening of the pylorus between initial and delayed scans. This is favored to be due to peristalsis rather than inflammation or mass. 3. Very decompressed appearance of large and small bowel, which can reflect dehydration, diarrhea, etc. No visible bowel wall inflammation. 4. No other evidence of acute intra-abdominal intrapelvic disease elsewhere. Electronically Signed: Tao Cee  4/3/2023 4:46 PM EDT  Workstation ID: [id number]          Latest Reference Range & Units 04/05/23 09:55 04/05/23 12:00   HS Troponin T <10 ng/L 16 (H)    Glucose 65 - 99 mg/dL 190 (H)    Sodium 136 - 145 mmol/L 136    Potassium 3.5 - 5.2 mmol/L 3.4 (L)    CO2 22.0 - 29.0 mmol/L 26.0    Chloride 98 - 107 mmol/L 97 (L)    Anion Gap 5.0 - 15.0 mmol/L 13.0    Creatinine 0.57 - 1.00 mg/dL 0.96    BUN 6 - 20 mg/dL 18    BUN/Creatinine Ratio 7.0 - 25.0  18.8    Calcium 8.6 - 10.5 mg/dL 9.2    eGFR >60.0 mL/min/1.73 71.3    Alkaline Phosphatase 39 - 117 U/L 82    Total Protein 6.0 - 8.5 g/dL 7.6    ALT (SGPT) 1 - 33 U/L 14    AST (SGOT) 1 - 32 U/L 18    Total Bilirubin 0.0 - 1.2 mg/dL 1.4 (H)    Albumin 3.5 - 5.2 g/dL 4.3    Globulin gm/dL 3.3    A/G Ratio g/dL 1.3    Lipase 13 - 60 U/L 20    WBC 3.40 - 10.80 10*3/mm3 9.04    RBC 3.77 - 5.28 10*6/mm3 4.71    Hemoglobin  12.0 - 15.9 g/dL 14.0    Hematocrit 34.0 - 46.6 % 43.8    RDW 12.3 - 15.4 % 13.1    MCV 79.0 - 97.0 fL 93.0    MCH 26.6 - 33.0 pg 29.7    MCHC 31.5 - 35.7 g/dL 32.0    MPV 6.0 - 12.0 fL 10.5    Platelets 140 - 450 10*3/mm3 175    RDW-SD 37.0 - 54.0 fl 45.0    Neutrophil Rel % 42.7 - 76.0 % 65.9    Lymphocyte Rel % 19.6 - 45.3 % 24.6    Monocyte Rel % 5.0 - 12.0 % 7.6    Eosinophil Rel % 0.3 - 6.2 % 0.9    Basophil Rel % 0.0 - 1.5 % 0.4    Immature Granulocyte Rel % 0.0 - 0.5 % 0.6 (H)    Neutrophils Absolute 1.70 - 7.00 10*3/mm3 5.96    Lymphocytes Absolute 0.70 - 3.10 10*3/mm3 2.22    Monocytes Absolute 0.10 - 0.90 10*3/mm3 0.69    Eosinophils Absolute 0.00 - 0.40 10*3/mm3 0.08    Basophils Absolute 0.00 - 0.20 10*3/mm3 0.04    Immature Grans, Absolute 0.00 - 0.05 10*3/mm3 0.05    nRBC 0.0 - 0.2 /100 WBC 0.0    Color, UA Yellow, Straw   Yellow   Appearance, UA Clear   Clear   Specific Gravity, UA 1.001 - 1.030   1.018   pH, UA 5.0 - 8.0   6.5   Glucose Negative   100 mg/dL (Trace) !   Ketones, UA Negative   15 mg/dL (1+) !   Blood, UA Negative   Negative   Nitrite, UA Negative   Negative   Leukocytes, UA Negative   Negative   Protein, UA Negative   Trace !   Bilirubin, UA Negative   Negative   Urobilinogen, UA 0.2 - 1.0 E.U./dL   0.2 E.U./dL   (H): Data is abnormally high  (L): Data is abnormally low  !: Data is abnormal                                Medical Decision Making  Patient proved significantly with the migraine cocktail.  I suspect that some of her nausea vomiting could be related to her marijuana use.  Although this is not a definitive diagnosis I have encouraged her to stop smoking marijuana, at least for a few months to see if her recurrent abdominal pain and nausea subsides.  She states that she can definitely do this and that she has not before and she will try again.  Otherwise, she will follow-up with gastroenterology.  She understands to have a low threshold to return to the emergency department  should symptoms persist or concerns arise.    Generalized abdominal pain: complicated acute illness or injury  Hypertensive urgency: complicated acute illness or injury  Marijuana use: complicated acute illness or injury  Nausea and vomiting, unspecified vomiting type: complicated acute illness or injury  Amount and/or Complexity of Data Reviewed  External Data Reviewed: notes.  Labs: ordered. Decision-making details documented in ED Course.  Radiology: ordered and independent interpretation performed. Decision-making details documented in ED Course.  ECG/medicine tests: ordered and independent interpretation performed. Decision-making details documented in ED Course.      Risk  OTC drugs.  Prescription drug management.          Final diagnoses:   Generalized abdominal pain   Nausea and vomiting, unspecified vomiting type   Marijuana use   Hypertensive urgency       ED Disposition  ED Disposition     ED Disposition   Discharge    Condition   Stable    Comment   --           DISCHARGE    Patient discharged in stable condition.    Reviewed implications of results, diagnosis, meds, responsibility to follow up, warning signs and symptoms of possible worsening, potential complications and reasons to return to ER.    Patient/Family voiced understanding of above instructions.    Discussed plan for discharge, as there is no emergent indication for admission.  Pt/family is agreeable and understands need for follow up and possible repeat testing.  Pt/family is aware that discharge does not mean that nothing is wrong but that it indicates no emergency is currently present that requires admission and they must continue care with follow-up as given below or with a physician of their choice.     FOLLOW-UP  Stephanie Tanner MD  [address] [place] [address] [address]  [facility] [address] [phone]    Schedule an appointment as soon as possible for a visit       [facility] Emergency Department  [address]  [facility] [id number] [phone]    If symptoms worsen    Joel Silva MD  [address] [address]  [phone]    Schedule an appointment as soon as possible for a visit            Medication List      No changes were made to your prescriptions during this visit.              Juan Pablo Mcintosh,   04/07/23 1438

## 2023-04-07 LAB
QT INTERVAL: 394 MS
QTC INTERVAL: 413 MS

## 2023-05-10 ENCOUNTER — TELEPHONE (OUTPATIENT)
Dept: INTERNAL MEDICINE | Facility: CLINIC | Age: 53
End: 2023-05-10
Payer: COMMERCIAL

## 2023-05-10 NOTE — TELEPHONE ENCOUNTER
Called pharmacy, verified  and pt identity. Pharmacy was informed about approval letter for [facility], pharmacy stated they will call pt when ready.

## 2023-06-27 PROBLEM — H52.4 PRESBYOPIA OF BOTH EYES: Status: ACTIVE | Noted: 2022-10-17

## 2023-06-27 PROBLEM — H52.223 REGULAR ASTIGMATISM OF BOTH EYES: Status: ACTIVE | Noted: 2022-10-17

## 2023-07-27 ENCOUNTER — OFFICE VISIT (OUTPATIENT)
Dept: INTERNAL MEDICINE | Facility: CLINIC | Age: 53
End: 2023-07-27
Payer: COMMERCIAL

## 2023-07-27 VITALS
WEIGHT: 203.38 LBS | TEMPERATURE: 97.8 F | DIASTOLIC BLOOD PRESSURE: 72 MMHG | RESPIRATION RATE: 16 BRPM | BODY MASS INDEX: 31.92 KG/M2 | HEART RATE: 68 BPM | HEIGHT: 67 IN | SYSTOLIC BLOOD PRESSURE: 132 MMHG

## 2023-07-27 DIAGNOSIS — I10 PRIMARY HYPERTENSION: ICD-10-CM

## 2023-07-27 DIAGNOSIS — K21.9 GASTROESOPHAGEAL REFLUX DISEASE, UNSPECIFIED WHETHER ESOPHAGITIS PRESENT: ICD-10-CM

## 2023-07-27 DIAGNOSIS — E78.5 DYSLIPIDEMIA: ICD-10-CM

## 2023-07-27 DIAGNOSIS — F32.A DEPRESSION, UNSPECIFIED DEPRESSION TYPE: ICD-10-CM

## 2023-07-27 DIAGNOSIS — E11.9 TYPE 2 DIABETES MELLITUS WITHOUT COMPLICATION, WITHOUT LONG-TERM CURRENT USE OF INSULIN: ICD-10-CM

## 2023-07-27 DIAGNOSIS — E03.9 ACQUIRED HYPOTHYROIDISM: ICD-10-CM

## 2023-07-27 DIAGNOSIS — G89.4 CHRONIC PAIN SYNDROME: ICD-10-CM

## 2023-07-27 DIAGNOSIS — E55.9 VITAMIN D DEFICIENCY: ICD-10-CM

## 2023-07-27 DIAGNOSIS — N89.8 VAGINAL CYSTS: ICD-10-CM

## 2023-07-27 DIAGNOSIS — Z00.00 ENCOUNTER FOR HEALTH MAINTENANCE EXAMINATION IN ADULT: Primary | ICD-10-CM

## 2023-07-27 DIAGNOSIS — I82.452 ACUTE DEEP VEIN THROMBOSIS (DVT) OF LEFT PERONEAL VEIN: ICD-10-CM

## 2023-07-31 ENCOUNTER — HOSPITAL ENCOUNTER (OUTPATIENT)
Dept: CARDIOLOGY | Facility: HOSPITAL | Age: 53
Discharge: HOME | End: 2023-07-31
Admitting: INTERNAL MEDICINE
Payer: COMMERCIAL

## 2023-07-31 VITALS — BODY MASS INDEX: 31.94 KG/M2 | HEIGHT: 67 IN | WEIGHT: 203.48 LBS

## 2023-07-31 DIAGNOSIS — I82.452 ACUTE DEEP VEIN THROMBOSIS (DVT) OF LEFT PERONEAL VEIN: ICD-10-CM

## 2023-07-31 PROCEDURE — 93971 EXTREMITY STUDY: CPT | Performed by: INTERNAL MEDICINE

## 2023-07-31 PROCEDURE — 93971 EXTREMITY STUDY: CPT

## 2023-08-01 LAB
BH CV LOW VAS LEFT PERONEAL VESSEL: 1
BH CV LOWER VASCULAR LEFT COMMON FEMORAL AUGMENT: NORMAL
BH CV LOWER VASCULAR LEFT COMMON FEMORAL COMPRESS: NORMAL
BH CV LOWER VASCULAR LEFT COMMON FEMORAL PHASIC: NORMAL
BH CV LOWER VASCULAR LEFT COMMON FEMORAL SPONT: NORMAL
BH CV LOWER VASCULAR LEFT DISTAL FEMORAL AUGMENT: NORMAL
BH CV LOWER VASCULAR LEFT DISTAL FEMORAL COMPRESS: NORMAL
BH CV LOWER VASCULAR LEFT DISTAL FEMORAL PHASIC: NORMAL
BH CV LOWER VASCULAR LEFT DISTAL FEMORAL SPONT: NORMAL
BH CV LOWER VASCULAR LEFT GASTRONEMIUS COMPRESS: NORMAL
BH CV LOWER VASCULAR LEFT GREATER SAPH AK COMPRESS: NORMAL
BH CV LOWER VASCULAR LEFT GREATER SAPH BK COMPRESS: NORMAL
BH CV LOWER VASCULAR LEFT LESSER SAPH COMPRESS: NORMAL
BH CV LOWER VASCULAR LEFT MID FEMORAL AUGMENT: NORMAL
BH CV LOWER VASCULAR LEFT MID FEMORAL COMPRESS: NORMAL
BH CV LOWER VASCULAR LEFT MID FEMORAL PHASIC: NORMAL
BH CV LOWER VASCULAR LEFT MID FEMORAL SPONT: NORMAL
BH CV LOWER VASCULAR LEFT PERONEAL COMPRESS: NORMAL
BH CV LOWER VASCULAR LEFT PERONEAL THROMBUS: NORMAL
BH CV LOWER VASCULAR LEFT POPLITEAL AUGMENT: NORMAL
BH CV LOWER VASCULAR LEFT POPLITEAL COMPRESS: NORMAL
BH CV LOWER VASCULAR LEFT POPLITEAL PHASIC: NORMAL
BH CV LOWER VASCULAR LEFT POPLITEAL SPONT: NORMAL
BH CV LOWER VASCULAR LEFT POSTERIOR TIBIAL COMPRESS: NORMAL
BH CV LOWER VASCULAR LEFT PROFUNDA FEMORAL PHASIC: NORMAL
BH CV LOWER VASCULAR LEFT PROFUNDA FEMORAL SPONT: NORMAL
BH CV LOWER VASCULAR LEFT PROXIMAL FEMORAL AUGMENT: NORMAL
BH CV LOWER VASCULAR LEFT PROXIMAL FEMORAL COMPRESS: NORMAL
BH CV LOWER VASCULAR LEFT PROXIMAL FEMORAL PHASIC: NORMAL
BH CV LOWER VASCULAR LEFT PROXIMAL FEMORAL SPONT: NORMAL
BH CV LOWER VASCULAR LEFT SAPHENOFEMORAL JUNCTION AUGMENT: NORMAL
BH CV LOWER VASCULAR LEFT SAPHENOFEMORAL JUNCTION COMPRESS: NORMAL
BH CV LOWER VASCULAR LEFT SAPHENOFEMORAL JUNCTION PHASIC: NORMAL
BH CV LOWER VASCULAR LEFT SAPHENOFEMORAL JUNCTION SPONT: NORMAL
BH CV LOWER VASCULAR RIGHT COMMON FEMORAL PHASIC: NORMAL
BH CV LOWER VASCULAR RIGHT COMMON FEMORAL SPONT: NORMAL

## 2023-08-03 ENCOUNTER — TELEPHONE (OUTPATIENT)
Dept: INTERNAL MEDICINE | Facility: CLINIC | Age: 53
End: 2023-08-03
Payer: COMMERCIAL

## 2023-08-03 NOTE — TELEPHONE ENCOUNTER
Call patient please.      Her recent left lower extremity ultrasound did not show any residual blood clot.  Therefore, she can stop taking her Eliquis.    However, I would recommend continuing her daily 81 mg aspirin, since she has a remote history of a stroke.

## 2023-08-03 NOTE — TELEPHONE ENCOUNTER
Called patient and unable to leave Anna MANZANO OKAY TO READ:   Call patient please.       Her recent left lower extremity ultrasound did not show any residual blood clot.  Therefore, she can stop taking her Eliquis.     However, I would recommend continuing her daily 81 mg aspirin, since she has a remote history of a stroke.

## 2023-08-04 NOTE — TELEPHONE ENCOUNTER
Called patient, 2nd attempt, and unable to leave Anna MANZANO OKAY TO READ:   Call patient please.       Her recent left lower extremity ultrasound did not show any residual blood clot.  Therefore, she can stop taking her Eliquis.     However, I would recommend continuing her daily 81 mg aspirin, since she has a remote history of a stroke.

## 2023-08-24 DIAGNOSIS — I10 ESSENTIAL HYPERTENSION: ICD-10-CM

## 2023-08-24 RX ORDER — HYDROCHLOROTHIAZIDE 25 MG/1
TABLET ORAL
Qty: 30 TABLET | Refills: 6 | OUTPATIENT
Start: 2023-08-24

## 2023-08-24 RX ORDER — HYDROCHLOROTHIAZIDE 25 MG/1
25 TABLET ORAL DAILY
Qty: 30 TABLET | Refills: 5 | Status: SHIPPED | OUTPATIENT
Start: 2023-08-24

## 2023-08-24 NOTE — TELEPHONE ENCOUNTER
Patient: Jose D Fitzpatrick     Rehab Dx/Hx: Syncope and collapse [R55]  Hypomagnesemia [E83.42]  Leg swelling [M79.89]  COPD exacerbation ([facility] [address] [J44.1]  Acute respiratory failure with hypoxia [facility]) [J96.01]   :1950  [id number]  Date of Admit: 2023   Date of Discharge: 2023    Subjective:   Order for patient discharge. Reviewed discharge summary (AVS) with patient and son including medications, physical instructions (safety) and diet. Pt in stable condition. Reconciled Medication List - Patient:   Medications were reviewed by RN at time of discharge with patient and/or family:  []  Via EMR   []  Via health information exchange  []  Verbal (e.g. in person, telephone, video conferencing)  [x]  Paper-based (e.g. fax, copies, printouts)   []  Other Methods (e.g. texting, email, CDs)    Reconciled Medication List - Subsequent provider:   [] No, current reconciled medication list not provided to the subsequent provider. [x] Yes, current reconciled medication list provided to the subsequent provider.    [x] Via EMR    [] Via health information exchange  [] Verbal (e.g. in person, telephone, video conferencing)  [x] Paper-based (e.g. fax, copies, printouts)   [] Other Methods (e.g. texting, email, CDs)    Discharge disposition:  Pt was discharged via:  [x]  Wheelchair  []  Stretcher  []  Safe ambulation and use of assistive device  []  Other:     Pt was discharged to:  [x]  Private car  []  Transportation service to next destination  []  Other:     Discharge destination:  [x]  Home  []  [facility]  []  [address]  []  Other:       Discharging Nurse Signature:  Apolinar Lorenz RN Prescription sent to the pharmacy.

## 2023-08-24 NOTE — TELEPHONE ENCOUNTER
Patient request HCTZ refill. Pt not seen by Dr. Bay since 10/18/2022 and cancelled last appt. Defer to PCP Dr. Tanner who has seen patient recently and monitoring labs.

## 2023-09-20 ENCOUNTER — OFFICE VISIT (OUTPATIENT)
Dept: INTERNAL MEDICINE | Facility: CLINIC | Age: 53
End: 2023-09-20
Payer: COMMERCIAL

## 2023-09-20 VITALS
WEIGHT: 202.25 LBS | TEMPERATURE: 97.8 F | HEART RATE: 74 BPM | SYSTOLIC BLOOD PRESSURE: 128 MMHG | RESPIRATION RATE: 16 BRPM | DIASTOLIC BLOOD PRESSURE: 82 MMHG | BODY MASS INDEX: 32.12 KG/M2

## 2023-09-20 DIAGNOSIS — M25.511 ACUTE PAIN OF RIGHT SHOULDER: Primary | ICD-10-CM

## 2023-09-20 DIAGNOSIS — M54.2 NECK PAIN: ICD-10-CM

## 2023-09-20 DIAGNOSIS — M79.605 BILATERAL LEG PAIN: ICD-10-CM

## 2023-09-20 DIAGNOSIS — M79.604 BILATERAL LEG PAIN: ICD-10-CM

## 2023-09-20 PROCEDURE — 1159F MED LIST DOCD IN RCRD: CPT | Performed by: INTERNAL MEDICINE

## 2023-09-20 PROCEDURE — 99213 OFFICE O/P EST LOW 20 MIN: CPT | Performed by: INTERNAL MEDICINE

## 2023-09-20 PROCEDURE — 3079F DIAST BP 80-89 MM HG: CPT | Performed by: INTERNAL MEDICINE

## 2023-09-20 PROCEDURE — 3051F HG A1C>EQUAL 7.0%<8.0%: CPT | Performed by: INTERNAL MEDICINE

## 2023-09-20 PROCEDURE — 3074F SYST BP LT 130 MM HG: CPT | Performed by: INTERNAL MEDICINE

## 2023-09-20 PROCEDURE — 1160F RVW MEDS BY RX/DR IN RCRD: CPT | Performed by: INTERNAL MEDICINE

## 2023-09-20 RX ORDER — DULOXETIN HYDROCHLORIDE 60 MG/1
60 CAPSULE, DELAYED RELEASE ORAL DAILY
COMMUNITY
Start: 2023-09-07

## 2023-09-20 RX ORDER — TIZANIDINE 4 MG/1
4 TABLET ORAL EVERY 8 HOURS PRN
Qty: 90 TABLET | Refills: 0 | Status: SHIPPED | OUTPATIENT
Start: 2023-09-20

## 2023-09-20 RX ORDER — GABAPENTIN 300 MG/1
300 CAPSULE ORAL 3 TIMES DAILY
Qty: 90 CAPSULE | Refills: 5 | Status: SHIPPED | OUTPATIENT
Start: 2023-09-20

## 2023-09-20 NOTE — PROGRESS NOTES
Subjective       Ernestina Epstein is a 53 y.o. female.     Chief Complaint   Patient presents with    Mass     Right Shoulder       History obtained from the patient.      History of Present Illness     The patient presents today with a mass on her right shoulder.    She woke up with a hard mass on her right shoulder on 09/15/2023 and associated limited range of motion.  She feels like it is on the bone.  She has applied ice and a compound cream prescribed by Pain Management. The cream has provided mild benefit. Her range of motion has improved and the area has decreased in size.  it is not fluctuant or mushy. She denies any drainage, redness, or warmth of the affected area. The area is painful to touch. She denies any associated fever, chills, or rash. She denies any insect bite or spider bite. She believes that the position that she sleeps in may be the cause, and she has not recently changed this. She denies any enlarged lymph nodes. She denies any neck pain or stiffness. She denies any chest pain or shortness of breath.     She is taking Gabapentin 300 mg 3 times daily. She has been taking it for 3 to 4 months. She has discontinued Diclofenac gel.    She states her Pain Management physician switched her Lexapro to Duloxetine for better pain control.    The following portions of the patient's history were reviewed and updated as appropriate: allergies, current medications, past family history, past medical history, past social history, past surgical history, and problem list.      Review of Systems   Constitutional:  Negative for chills and fever.   Respiratory:  Negative for shortness of breath.    Cardiovascular:  Negative for chest pain.   Musculoskeletal:  Negative for neck pain and neck stiffness.   Skin:  Negative for rash.   Hematological:  Negative for adenopathy.         Objective     Blood pressure 128/82, pulse 74, temperature 97.8 °F (36.6 °C), temperature source Infrared, resp. rate 16,  weight 91.7 kg (202 lb 4 oz), not currently breastfeeding.    Physical Exam  Vitals and nursing note reviewed.   Constitutional:       Appearance: She is well-developed.      Comments: BMI greater than 30.   Neck:      Comments: There is no tenderness to palpation over the cervical spine, but there is diffuse tenderness to palpation over the right cervical paraspinal muscles.  There is mild pain with neck flexion, extension, lateral bending, and turning left to right.  Cardiovascular:      Rate and Rhythm: Normal rate and regular rhythm.      Heart sounds: Normal heart sounds. No murmur heard.  Pulmonary:      Effort: Pulmonary effort is normal.      Breath sounds: Normal breath sounds.   Musculoskeletal:         General: Normal range of motion.      Cervical back: Normal range of motion and neck supple.      Comments: There is diffuse  tenderness to palpation of the right shoulder.  There is no erythema, edema, or warmth.  There is pain with abduction, adduction, external rotation, and internal rotation.  The patient is able to put both hands behind the head, both hands behind the back, and do the crossover maneuver with pain.  Good  strength bilaterally.   Skin:     Findings: No rash.   Neurological:      Mental Status: She is alert.      Motor: Motor function is intact.      Deep Tendon Reflexes: Reflexes are normal and symmetric.      Comments: Upper extremity only examined.   Psychiatric:         Mood and Affect: Mood normal.       Assessment & Plan   Diagnoses and all orders for this visit:    1. Acute pain of right shoulder (Primary)  -     tiZANidine (ZANAFLEX) 4 MG tablet; Take 1 tablet by mouth Every 8 (Eight) Hours As Needed (muscle pain).  Dispense: 90 tablet; Refill: 0   - Continue compounding cream.  - She was advised to apply heat to the affected area.  - She will call the office in 1 to 2 weeks if she has not experienced improvement.    2. Neck pain  -     tiZANidine (ZANAFLEX) 4 MG tablet; Take  1 tablet by mouth Every 8 (Eight) Hours As Needed (muscle pain).  Dispense: 90 tablet; Refill: 0    3. Bilateral leg pain  -     gabapentin (Neurontin) 300 MG capsule; Take 1 capsule by mouth 3 (Three) Times a Day. 1-3 po qhs prn pain  Dispense: 90 capsule; Refill: 5_ REFILL          Return for Next scheduled follow up.           Transcribed from ambient dictation for Stephanie Tanner MD by Ben Parham.  09/20/23   13:22 EDT    Patient or patient representative verbalized consent to the visit recording.  I have personally performed the services described in this document as transcribed by the above individual, and it is both accurate and complete.

## 2023-11-15 ENCOUNTER — TELEPHONE (OUTPATIENT)
Dept: INTERNAL MEDICINE | Facility: CLINIC | Age: 53
End: 2023-11-15
Payer: COMMERCIAL

## 2023-11-15 NOTE — TELEPHONE ENCOUNTER
Caller: Jagjit Epstein    Relationship: Self    Best call back number: [phone]    What is the best time to reach you: ANY     Who are you requesting to speak with (clinical staff, provider,  specific staff member):      Do you know the name of the person who called:      What was the call regarding: JAGJIT CALLED TO GET THE PHONE OF THE DERMATOLOGIST YOU REFERRED HER TO.    Is it okay if the provider responds through MyChart: CALL

## 2023-11-15 NOTE — TELEPHONE ENCOUNTER
Called patient and notified her that she was being referred to [facility]. P: [phone]

## 2023-11-22 NOTE — TELEPHONE ENCOUNTER
Please inform the patient that I cannot refill any controlled substances for her due to her recent urine drug screen, as we previously discussed.   Bed in lowest position, wheels locked, appropriate side rails in place/Call bell, personal items and telephone in reach/Instruct patient to call for assistance before getting out of bed or chair/Non-slip footwear when patient is out of bed/[place] to call system/Physically safe environment - no spills, clutter or unnecessary equipment/Purposeful Proactive Rounding/Room/bathroom lighting operational, light cord in reach

## 2023-12-25 DIAGNOSIS — E11.65 TYPE 2 DIABETES MELLITUS WITH HYPERGLYCEMIA, WITHOUT LONG-TERM CURRENT USE OF INSULIN: ICD-10-CM

## 2023-12-26 DIAGNOSIS — I10 ESSENTIAL HYPERTENSION: ICD-10-CM

## 2023-12-26 RX ORDER — DAPAGLIFLOZIN 10 MG/1
TABLET, FILM COATED ORAL
Qty: 30 TABLET | Refills: 5 | Status: SHIPPED | OUTPATIENT
Start: 2023-12-26

## 2023-12-28 RX ORDER — HYDROCHLOROTHIAZIDE 25 MG/1
25 TABLET ORAL DAILY
Qty: 90 TABLET | Refills: 3 | Status: SHIPPED | OUTPATIENT
Start: 2023-12-28

## 2024-01-09 DIAGNOSIS — M25.511 ACUTE PAIN OF RIGHT SHOULDER: ICD-10-CM

## 2024-01-09 DIAGNOSIS — M79.605 BILATERAL LEG PAIN: ICD-10-CM

## 2024-01-09 DIAGNOSIS — K21.9 GASTROESOPHAGEAL REFLUX DISEASE, UNSPECIFIED WHETHER ESOPHAGITIS PRESENT: ICD-10-CM

## 2024-01-09 DIAGNOSIS — M54.2 NECK PAIN: ICD-10-CM

## 2024-01-09 DIAGNOSIS — M79.604 BILATERAL LEG PAIN: ICD-10-CM

## 2024-01-09 RX ORDER — OMEPRAZOLE 40 MG/1
40 CAPSULE, DELAYED RELEASE ORAL DAILY
Qty: 90 CAPSULE | Refills: 1 | Status: SHIPPED | OUTPATIENT
Start: 2024-01-09

## 2024-01-09 RX ORDER — TIZANIDINE 4 MG/1
TABLET ORAL
Qty: 90 TABLET | Refills: 5 | Status: SHIPPED | OUTPATIENT
Start: 2024-01-09

## 2024-01-09 RX ORDER — GABAPENTIN 100 MG/1
CAPSULE ORAL
Qty: 90 CAPSULE | Refills: 5 | Status: SHIPPED | OUTPATIENT
Start: 2024-01-09 | End: 2024-01-15 | Stop reason: SDUPTHER

## 2024-01-09 NOTE — TELEPHONE ENCOUNTER
Last office visit (LOV) for chronic conditions 09/20/23  Next office visit (NOV) 01/29/24  Urine drug screen (UDS) 04/05/23  Controlled substance agreement (CSA) 09/23/2019

## 2024-01-15 ENCOUNTER — OFFICE VISIT (OUTPATIENT)
Dept: INTERNAL MEDICINE | Facility: CLINIC | Age: 54
End: 2024-01-15
Payer: COMMERCIAL

## 2024-01-15 VITALS
SYSTOLIC BLOOD PRESSURE: 132 MMHG | HEART RATE: 72 BPM | TEMPERATURE: 96 F | BODY MASS INDEX: 34.59 KG/M2 | DIASTOLIC BLOOD PRESSURE: 80 MMHG | WEIGHT: 217.8 LBS | RESPIRATION RATE: 18 BRPM

## 2024-01-15 DIAGNOSIS — N83.202 CYST OF LEFT OVARY: ICD-10-CM

## 2024-01-15 DIAGNOSIS — R20.2 PARESTHESIAS IN RIGHT HAND: ICD-10-CM

## 2024-01-15 DIAGNOSIS — K21.9 GASTROESOPHAGEAL REFLUX DISEASE, UNSPECIFIED WHETHER ESOPHAGITIS PRESENT: ICD-10-CM

## 2024-01-15 DIAGNOSIS — E03.9 ACQUIRED HYPOTHYROIDISM: ICD-10-CM

## 2024-01-15 DIAGNOSIS — E11.610 TYPE 2 DIABETES MELLITUS WITH DIABETIC NEUROPATHIC ARTHROPATHY, WITHOUT LONG-TERM CURRENT USE OF INSULIN: Primary | ICD-10-CM

## 2024-01-15 DIAGNOSIS — G89.4 CHRONIC PAIN SYNDROME: ICD-10-CM

## 2024-01-15 DIAGNOSIS — I10 PRIMARY HYPERTENSION: ICD-10-CM

## 2024-01-15 DIAGNOSIS — E78.5 DYSLIPIDEMIA: ICD-10-CM

## 2024-01-15 DIAGNOSIS — E55.9 VITAMIN D DEFICIENCY: ICD-10-CM

## 2024-01-15 DIAGNOSIS — F32.A DEPRESSION, UNSPECIFIED DEPRESSION TYPE: ICD-10-CM

## 2024-01-15 DIAGNOSIS — R20.2 PARESTHESIAS IN LEFT HAND: ICD-10-CM

## 2024-01-15 LAB
25(OH)D3 SERPL-MCNC: 17.6 NG/ML (ref 30–100)
ALBUMIN SERPL-MCNC: 4.2 G/DL (ref 3.5–5.2)
ALBUMIN/GLOB SERPL: 1.3 G/DL
ALP SERPL-CCNC: 102 U/L (ref 39–117)
ALT SERPL W P-5'-P-CCNC: 16 U/L (ref 1–33)
ANION GAP SERPL CALCULATED.3IONS-SCNC: 13.8 MMOL/L (ref 5–15)
AST SERPL-CCNC: 19 U/L (ref 1–32)
BASOPHILS # BLD AUTO: 0.03 10*3/MM3 (ref 0–0.2)
BASOPHILS NFR BLD AUTO: 0.4 % (ref 0–1.5)
BILIRUB SERPL-MCNC: 0.4 MG/DL (ref 0–1.2)
BUN SERPL-MCNC: 22 MG/DL (ref 6–20)
BUN/CREAT SERPL: 23.9 (ref 7–25)
CALCIUM SPEC-SCNC: 9.5 MG/DL (ref 8.6–10.5)
CHLORIDE SERPL-SCNC: 98 MMOL/L (ref 98–107)
CHOLEST SERPL-MCNC: 277 MG/DL (ref 0–200)
CO2 SERPL-SCNC: 29.2 MMOL/L (ref 22–29)
CREAT SERPL-MCNC: 0.92 MG/DL (ref 0.57–1)
DEPRECATED RDW RBC AUTO: 40 FL (ref 37–54)
EGFRCR SERPLBLD CKD-EPI 2021: 75.1 ML/MIN/1.73
EOSINOPHIL # BLD AUTO: 0.37 10*3/MM3 (ref 0–0.4)
EOSINOPHIL NFR BLD AUTO: 4.5 % (ref 0.3–6.2)
ERYTHROCYTE [DISTWIDTH] IN BLOOD BY AUTOMATED COUNT: 12.3 % (ref 12.3–15.4)
EXPIRATION DATE: ABNORMAL
GLOBULIN UR ELPH-MCNC: 3.3 GM/DL
GLUCOSE SERPL-MCNC: 225 MG/DL (ref 65–99)
HBA1C MFR BLD: 7.6 % (ref 4.5–5.7)
HCT VFR BLD AUTO: 42.1 % (ref 34–46.6)
HDLC SERPL-MCNC: 92 MG/DL (ref 40–60)
HGB BLD-MCNC: 14.1 G/DL (ref 12–15.9)
IMM GRANULOCYTES # BLD AUTO: 0.06 10*3/MM3 (ref 0–0.05)
IMM GRANULOCYTES NFR BLD AUTO: 0.7 % (ref 0–0.5)
LDLC SERPL CALC-MCNC: 169 MG/DL (ref 0–100)
LDLC/HDLC SERPL: 1.81 {RATIO}
LYMPHOCYTES # BLD AUTO: 2.97 10*3/MM3 (ref 0.7–3.1)
LYMPHOCYTES NFR BLD AUTO: 35.9 % (ref 19.6–45.3)
Lab: ABNORMAL
MCH RBC QN AUTO: 30.3 PG (ref 26.6–33)
MCHC RBC AUTO-ENTMCNC: 33.5 G/DL (ref 31.5–35.7)
MCV RBC AUTO: 90.5 FL (ref 79–97)
MONOCYTES # BLD AUTO: 0.64 10*3/MM3 (ref 0.1–0.9)
MONOCYTES NFR BLD AUTO: 7.7 % (ref 5–12)
NEUTROPHILS NFR BLD AUTO: 4.21 10*3/MM3 (ref 1.7–7)
NEUTROPHILS NFR BLD AUTO: 50.8 % (ref 42.7–76)
NRBC BLD AUTO-RTO: 0 /100 WBC (ref 0–0.2)
PLATELET # BLD AUTO: 184 10*3/MM3 (ref 140–450)
PMV BLD AUTO: 11.6 FL (ref 6–12)
POTASSIUM SERPL-SCNC: 3.5 MMOL/L (ref 3.5–5.2)
PROT SERPL-MCNC: 7.5 G/DL (ref 6–8.5)
RBC # BLD AUTO: 4.65 10*6/MM3 (ref 3.77–5.28)
SODIUM SERPL-SCNC: 141 MMOL/L (ref 136–145)
TRIGL SERPL-MCNC: 94 MG/DL (ref 0–150)
TSH SERPL DL<=0.05 MIU/L-ACNC: 3.04 UIU/ML (ref 0.27–4.2)
VLDLC SERPL-MCNC: 16 MG/DL (ref 5–40)
WBC NRBC COR # BLD AUTO: 8.28 10*3/MM3 (ref 3.4–10.8)

## 2024-01-15 PROCEDURE — 80050 GENERAL HEALTH PANEL: CPT | Performed by: INTERNAL MEDICINE

## 2024-01-15 PROCEDURE — 83036 HEMOGLOBIN GLYCOSYLATED A1C: CPT | Performed by: INTERNAL MEDICINE

## 2024-01-15 PROCEDURE — 1159F MED LIST DOCD IN RCRD: CPT | Performed by: INTERNAL MEDICINE

## 2024-01-15 PROCEDURE — 3079F DIAST BP 80-89 MM HG: CPT | Performed by: INTERNAL MEDICINE

## 2024-01-15 PROCEDURE — 3075F SYST BP GE 130 - 139MM HG: CPT | Performed by: INTERNAL MEDICINE

## 2024-01-15 PROCEDURE — 1160F RVW MEDS BY RX/DR IN RCRD: CPT | Performed by: INTERNAL MEDICINE

## 2024-01-15 PROCEDURE — 82306 VITAMIN D 25 HYDROXY: CPT | Performed by: INTERNAL MEDICINE

## 2024-01-15 PROCEDURE — 3051F HG A1C>EQUAL 7.0%<8.0%: CPT | Performed by: INTERNAL MEDICINE

## 2024-01-15 PROCEDURE — 80061 LIPID PANEL: CPT | Performed by: INTERNAL MEDICINE

## 2024-01-15 PROCEDURE — 99214 OFFICE O/P EST MOD 30 MIN: CPT | Performed by: INTERNAL MEDICINE

## 2024-01-15 RX ORDER — GABAPENTIN 300 MG/1
600 CAPSULE ORAL 3 TIMES DAILY
Start: 2024-01-15

## 2024-01-15 RX ORDER — ESCITALOPRAM OXALATE 20 MG/1
TABLET ORAL
COMMUNITY
Start: 2024-01-09 | End: 2024-01-15 | Stop reason: ALTCHOICE

## 2024-01-15 RX ORDER — BENZOYL PEROXIDE 50 MG/ML
LIQUID TOPICAL
COMMUNITY
Start: 2024-01-10

## 2024-01-15 RX ORDER — DULOXETIN HYDROCHLORIDE 60 MG/1
60 CAPSULE, DELAYED RELEASE ORAL 2 TIMES DAILY
Qty: 180 CAPSULE | Refills: 3 | Status: SHIPPED | OUTPATIENT
Start: 2024-01-15

## 2024-01-15 RX ORDER — SITAGLIPTIN 100 MG/1
100 TABLET, FILM COATED ORAL DAILY
Qty: 90 TABLET | Refills: 3 | Status: SHIPPED | OUTPATIENT
Start: 2024-01-15

## 2024-01-15 RX ORDER — PIOGLITAZONEHYDROCHLORIDE 45 MG/1
45 TABLET ORAL DAILY
Qty: 90 TABLET | Refills: 3 | Status: SHIPPED | OUTPATIENT
Start: 2024-01-15

## 2024-01-15 NOTE — PROGRESS NOTES
Subjective       Ernestina Epstein is a 53 y.o. female.     Chief Complaint   Patient presents with    Diabetes     Follow up      Hypertension    Hyperlipidemia    Hypothyroidism       History obtained from the patient.      History of Present Illness     The patient had a CT abdomen/pelvis on 5/30/21 and a pelvic ultrasound on 9/2021, done at ,  which showed a right adnexal cyst.  CT abdomen/pelvis at  on 3/20/2020 showed a 3.3 cm left ovarian cyst.  CT abdomen/pelvis at  on 2/20/2023 was negative with the exception of mild plaque of the abdominal aorta.  Pelvic ultrasound at  on 3/15/2023, per Dr. Lalit White GYN, showed a stable left ovarian cyst.  They recommended a repeat in 2 to 3 months.  That has not been done yet.  On 4/3/2023, the patient had a negative CT abdomen/pelvis/chest at .  She has not had a recent Pap.    The patient is here for follow-up of a Left Lower Extremity Peroneal DVT, diagnosed on 5/16/2022.  Follow-up LLE venous ultrasound was done 7/31/2023 and showed:  All veins of the left lower extremity are compressible and there is no evidence of acute DVT.  Chronic post thrombotic changes are noted in the left peroneal vein with patent flow and there is evidence of venous reflux.  No significant change compared to prior study of May 16, 2022.. She is off Eliquis and Aspirin.     Primary Care Cardiac Diagnostic Constellation: The patient is here today for a follow-up visit.  Fasting labs were done on 6/27/2023.     Her Hypertension has been stable.   Medication: Lisinopril and HCTZ.  Her Hyperlipidemia has been unstable.    LDL goal is <70.  Her last LDL was 118, TG 59.   Medication:  Atorvastatin.  Her Diabetes Mellitus Type 2 has been stable.  Medication:  Januvia, Farxiga, Actos, Atorvastatin, and Lisinopril.   Side Effects: None.     Comorbid illness: History of Cerebellar Stroke (CT 5/25/2022 showed a small remote lacunar infarct in the left cerebellum).       Procedures:  The patient had a negative cardiac stress test on 6/7/2019.        Interval Events:  Hemoglobin A1c on 6/27/2023 at  was 7.0.  The patient states she has been off her Januvia and Actos for 2 months.  She states her blood sugar at home has been 180s fasting.  She does not check postprandial.  She denies episodes of low blood sugar.  The patient's last Ophthalmology appointment was 11/6/2023 (Rastafarian Meyer OD) -bilateral mild nonproliferative retinopathy without macular edema.  She does check her feet daily.       Symptoms: Lower extremity edema resolved.  Denies chest pain (other than chronic chest wall pain), shortness of breath, MAIN, orthopnea, PND, palpitations, syncope, claudication, lightheadedness, and dizziness.  Associated Symptoms: Weight up 14 pounds in the past 6 months.  Reports stable myalgias (legs) and arthralgias (knees).  No fatigue, headache, polyuria, polydipsia, memory issues, or concentration issues.  Has stable numbness and tingling of the feet but worsening numbness/burning pain of the fingers.  She reports she cannot use her hands in the first couple hours of the morning.  Denies skin breakdown and ulcers.      Lifestyle:  She states she has a half healthy diet.  She has not been exercising.    Tobacco Use: Former smoker (0.5 ppd [id number], quit 11/6/1.  Started smoking again 8/3/2022. Quit 11/2022).     Chronic Pain Syndrome  follow-up: The patient is being seen for a routine clinic follow-up of Chronic Pain Syndrome, which is stable.  Interval Events: The patient is now being seen at  Pain Management, [facility], for Lumbar Radiculopathy and OA of the knees.  Symptoms: stable low back pain, neck pain, chest wall pain, myalgias (legs), and arthralgias (knees).  She reports joint swelling of the knees.  Medication: Neurontin 600 mg 3 times daily and Tizanidine.      Hypothyroidism Follow-Up: The patient is being seen for follow-up of Hypothyroidism, which is stable.     Interval Events: T4 and TSH were normal on 6/27/2023.    Symptoms:  Weight up 14 pounds in the past 6 months.  Stable cold intolerance.  Denies fatigue, heat intolerance, diarrhea, constipation, hair loss, and dry skin.   Associated Symptoms: Stable  myalgias (legs) and arthralgias (knees), but worsened paresthesias (hands and feet).    Medications:  Levothyroxine (Synthroid).     GERD Follow-Up: The patient is here for follow-up of Gastroesophageal Reflux Disease, which has been stable.   Procedures:The patient had an EGD on 10/6/2021 at  (reactive gastropathy) and on 11/23/2022 at [facility] (chronic active gastritis).   Interval Events: The patient was started on medication after her last EGD.    Symptoms: Denies abdominal pain, heartburn, acid reflux, nausea, vomiting, hematemesis, dysphagia, odynophagia, hematochezia, melena, early satiety, belching, and bloating.  Associated Symptoms: Denies chronic cough, sore throat, hoarseness, and wheezing.    Medication: Prilosec daily, Zofran and Reglan as needed.     Vitamin D Deficiency Follow-Up: The patient is here for follow-up of Vitamin D Deficiency, which is unstable.   Interval Events: Vitamin D level on 6/27/2023 was 18.0.  Symptoms:  Stable  myalgias (legs) and arthralgias (knees), but worsened paresthesias (hands and feet).  Has occasional loss of balance.  Denies fatigue and gait instability.    Medication: None.  Off Vitamin D 3 1000 IU daily.     Depression Follow-Up: The patient is here for follow-up of Depression, which is overall slightly worse.    Interval Events: Pain Management changed Lexapro to Duloxetine and for better pain control.    Symptoms: Slightly worsened depression.  Denies anxiety, panic attacks, insomnia, anhedonia, feelings of hopelessness, feelings of worthlessness, feelings of guilt, memory loss, and concentration issues.    Associated Symptoms: Denies suicidal ideation or thoughts of self-harm.    Medication: Cymbalta  Side  Effects: None.    Current Outpatient Medications on File Prior to Visit   Medication Sig Dispense Refill    atorvastatin (LIPITOR) 80 MG tablet Take 1 tablet by mouth Every Night. 90 tablet 3    benzoyl peroxide 5 % external wash       Blood Glucose Monitoring Suppl (FREESTYLE FREEDOM LITE) w/Device kit Use twice daily   Dx  E11.9 1 each 0    Farxiga 10 MG tablet TAKE ONE TABLET BY MOUTH DAILY 30 tablet 5    glucose blood (FREESTYLE LITE) test strip USE TO TEST BLOOD SUGAR TWO TIMES A DAY AS DIRECTED 100 each 3    hydroCHLOROthiazide (HYDRODIURIL) 25 MG tablet TAKE 1 TABLET BY MOUTH DAILY 90 tablet 3    Lancets (FREESTYLE) lancets USE  LANCET  TO TEST TWICE  DAILY 100 each 11    levothyroxine (SYNTHROID, LEVOTHROID) 100 MCG tablet TAKE ONE TABLET BY MOUTH DAILY 90 tablet 1    lisinopril (PRINIVIL,ZESTRIL) 20 MG tablet TAKE ONE TABLET BY MOUTH EVERY NIGHT AT BEDTIME 30 tablet 3    metoclopramide (REGLAN) 10 MG tablet Take 1 tablet by mouth 3 (Three) Times a Day As Needed (Nausea, vomiting, and abdominal pain). 50 tablet 1    omeprazole (priLOSEC) 40 MG capsule TAKE ONE CAPSULE BY MOUTH DAILY 90 capsule 1    ondansetron (ZOFRAN) 4 MG tablet Take 1 tablet by mouth Every 8 (Eight) Hours As Needed for Nausea or Vomiting.      tiZANidine (ZANAFLEX) 4 MG tablet TAKE ONE TABLET BY MOUTH EVERY 8 HOURS AS NEEDED FOR MUSCLE PAIN 90 tablet 5    [DISCONTINUED] DULoxetine (CYMBALTA) 60 MG capsule Take 1 capsule by mouth Daily.      [DISCONTINUED] escitalopram (LEXAPRO) 20 MG tablet       [DISCONTINUED] gabapentin (NEURONTIN) 100 MG capsule TAKE 1-3 CAPSULES BY MOUTH EVERY NIGHT AT BEDTIME AS NEEDED FOR PAIN 90 capsule 5    [DISCONTINUED] gabapentin (Neurontin) 300 MG capsule Take 1 capsule by mouth 3 (Three) Times a Day. 1-3 po qhs prn pain 90 capsule 5    [DISCONTINUED] Januvia 100 MG tablet TAKE ONE TABLET BY MOUTH DAILY 30 tablet 3    [DISCONTINUED] pioglitazone (ACTOS) 45 MG tablet Take 1 tablet by mouth Daily. 30 tablet 5     [DISCONTINUED] apixaban (Eliquis) 5 MG tablet tablet Take 1 tablet by mouth Every 12 (Twelve) Hours. (Patient not taking: Reported on 1/15/2024) 60 tablet 5    [DISCONTINUED] aspirin 81 MG chewable tablet Chew 1 tablet Daily. (Patient not taking: Reported on 1/15/2024) 90 tablet 3    [DISCONTINUED] doxycycline (VIBRAMYCIN) 100 MG capsule Take 1 capsule by mouth 2 (Two) Times a Day. (Patient not taking: Reported on 1/15/2024) 20 capsule 0     No current facility-administered medications on file prior to visit.       Current outpatient and discharge medications have been reconciled for the patient.  Reviewed by: Stephanie Tanner MD        The following portions of the patient's history were reviewed and updated as appropriate: allergies, current medications, past family history, past medical history, past social history, past surgical history, and problem list.    Review of Systems   Constitutional:  Positive for unexpected weight change. Negative for fatigue.   Eyes:  Negative for visual disturbance.   Respiratory:  Negative for cough, shortness of breath and wheezing.    Cardiovascular:  Negative for chest pain, palpitations and leg swelling.        No MAIN, orthopnea, or claudication.   Gastrointestinal:  Negative for abdominal pain, blood in stool, constipation, diarrhea, nausea and vomiting.        Denies melena.   Endocrine: Negative for polydipsia and polyuria.   Musculoskeletal:  Positive for arthralgias, back pain, joint swelling, myalgias and neck pain.   Neurological:  Positive for numbness. Negative for dizziness, syncope, light-headedness and headaches.        No memory issues.   Psychiatric/Behavioral:  Negative for decreased concentration.          Objective       Blood pressure 132/80, pulse 72, temperature 96 °F (35.6 °C), temperature source Infrared, resp. rate 18, weight 98.8 kg (217 lb 12.8 oz), not currently breastfeeding.  Body mass index is 34.59 kg/m².      Physical Exam  Vitals and nursing note  reviewed.   Constitutional:       Appearance: She is well-developed.      Comments: BMI greater than 30.   Neck:      Thyroid: No thyroid mass or thyromegaly.      Vascular: No carotid bruit.   Cardiovascular:      Rate and Rhythm: Normal rate and regular rhythm.      Pulses: Normal pulses.      Heart sounds: Normal heart sounds. No murmur heard.     No friction rub. No gallop.   Pulmonary:      Effort: Pulmonary effort is normal.      Breath sounds: Normal breath sounds.   Musculoskeletal:      Right lower leg: No edema.      Left lower leg: No edema.   Neurological:      Mental Status: She is alert.   Psychiatric:         Mood and Affect: Mood normal.       Results for orders placed or performed in visit on 01/15/24   POC Glycosylated Hemoglobin (Hb A1C)    Specimen: Blood   Result Value Ref Range    Hemoglobin A1C 7.6 (A) 4.5 - 5.7 %    Lot Number [id number]     Expiration Date 7/30/25        Assessment / Plan:  Diagnoses and all orders for this visit:    1. Type 2 diabetes mellitus with diabetic neuropathic arthropathy, without long-term current use of insulin (Primary)  -     POC Glycosylated Hemoglobin (Hb A1C)  -     Lipid Panel  -     Comprehensive Metabolic Panel  -     TSH  -     CBC & Differential  -     Januvia 100 MG tablet; Take 1 tablet by mouth Daily.  Dispense: 90 tablet; Refill: 3- REFILL  -     pioglitazone (ACTOS) 45 MG tablet; Take 1 tablet by mouth Daily.  Dispense: 90 tablet; Refill: 3- REFILL   Continue current medication(s) as noted in the history of present illness.    2. Acquired hypothyroidism  -     TSH   Continue current medication(s) as noted in the history of present illness.    3. Primary hypertension  -     Lipid Panel  -     Comprehensive Metabolic Panel  -     TSH  -     CBC & Differential   Continue current medication(s) as noted in the history of present illness.    4. Dyslipidemia  -     Lipid Panel  -     Comprehensive Metabolic Panel  -     TSH  -     CBC &  Differential   Continue current medication(s) as noted in the history of present illness.    5. Gastroesophageal reflux disease, unspecified whether esophagitis present   Continue current medication(s) as noted in the history of present illness.    6. Vitamin D deficiency  -     Vitamin D,25-Hydroxy   Continue current medication(s) as noted in the history of present illness.    7. Chronic pain syndrome  -     gabapentin (Neurontin) 300 MG capsule; Take 2 capsules by mouth 3 (Three) Times a Day- MED LIST UPDATE.  -     DULoxetine (CYMBALTA) 60 MG capsule; Take 1 capsule by mouth 2 (Two) Times a Day.  Dispense: 180 capsule; Refill: 3- INCREASED DOSE    8. Depression, unspecified depression type  -     DULoxetine (CYMBALTA) 60 MG capsule; Take 1 capsule by mouth 2 (Two) Times a Day.  Dispense: 180 capsule; Refill: 3-  INCREASED DOSE    9. Paresthesias in left hand  -     Ambulatory Referral to Hand Surgery    10. Paresthesias in right hand  -     Ambulatory Referral to Hand Surgery    11. Cyst of left ovary  -     Ambulatory Referral to Gynecology (for follow-up of left adnexal cyst and for routine Pap Smear).    The patient agrees to schedule her Mammogram.     I recommended  the COVID-19 bivalent vaccine and Shingrix (Shingles vaccine) at the pharmacy. I recommended influenza vaccination in our office (patient declined) or at the pharmacy.  The patient was informed that she can be hospitalized and die from Influenza infection.        Return in about 6 months (around 7/15/2024) for Annual physical, fasting after 7/27/23.

## 2024-01-15 NOTE — PATIENT INSTRUCTIONS
Please schedule your Mammogram, as we discussed.    I recommend the COVID-19 bivalent vaccine and Shingrix (Shingles vaccine) at the pharmacy. I recommend influenza vaccination in our office or at the pharmacy.

## 2024-01-17 ENCOUNTER — TELEPHONE (OUTPATIENT)
Dept: INTERNAL MEDICINE | Facility: CLINIC | Age: 54
End: 2024-01-17
Payer: COMMERCIAL

## 2024-01-17 DIAGNOSIS — Z86.73 HISTORY OF CEREBELLAR STROKE: ICD-10-CM

## 2024-01-17 NOTE — TELEPHONE ENCOUNTER
Call patient please.    Her Hemoglobin A1c (diabetes test) has gone up to 7.6.  This is most likely due to her being off her Actos and Januvia for the last 2 months.  I recommend she restart both those medications, as was discussed at her last visit.    In addition, her cholesterol levels were elevated.  Has she been taking her Atorvastatin?

## 2024-01-18 RX ORDER — ATORVASTATIN CALCIUM 80 MG/1
80 TABLET, FILM COATED ORAL NIGHTLY
Qty: 90 TABLET | Refills: 3 | Status: SHIPPED | OUTPATIENT
Start: 2024-01-18

## 2024-01-18 NOTE — TELEPHONE ENCOUNTER
Patient has been notified of providers message and verb good understanding. Patient stated that she will start the medication back up and she does not have any atorvastatin

## 2024-01-18 NOTE — TELEPHONE ENCOUNTER
Prescription for atorvastatin sent to the pharmacy.  Please make sure the patient takes all her medications daily and that she knows she can call us for refills if she runs out of any of her medications.

## 2024-01-19 ENCOUNTER — TELEPHONE (OUTPATIENT)
Dept: INTERNAL MEDICINE | Facility: CLINIC | Age: 54
End: 2024-01-19
Payer: COMMERCIAL

## 2024-01-19 DIAGNOSIS — E11.610 TYPE 2 DIABETES MELLITUS WITH DIABETIC NEUROPATHIC ARTHROPATHY, WITHOUT LONG-TERM CURRENT USE OF INSULIN: Primary | ICD-10-CM

## 2024-01-19 NOTE — TELEPHONE ENCOUNTER
Outcome for Dexcom G7 Reciever  Approved today  The request has been approved. The authorization is effective from 01/19/2024 to 01/18/2025, as long as the member is enrolled in their current health plan. The request was approved as submitted. A written notification letter will follow with additional details.  Authorization Expiration Date: 1/17/2025

## 2024-01-19 NOTE — TELEPHONE ENCOUNTER
Patient states she called her insurance and the machine that they will cover is Dexcom G7 will need a PA and their phone number is [phone]. She said to tell them it's urgent and they will do it in 24 hours.

## 2024-01-19 NOTE — TELEPHONE ENCOUNTER
Outcome for Dexcom G7 Sensor  Approved today  The request has been approved. The authorization is effective from 01/19/2024 to 01/18/2025, as long as the member is enrolled in their current health plan. The request was approved as submitted. A written notification letter will follow with additional details.  Authorization Expiration Date: 1/17/2025

## 2024-01-22 ENCOUNTER — OFFICE VISIT (OUTPATIENT)
Dept: ORTHOPEDIC SURGERY | Facility: CLINIC | Age: 54
End: 2024-01-22
Payer: COMMERCIAL

## 2024-01-22 VITALS
DIASTOLIC BLOOD PRESSURE: 92 MMHG | BODY MASS INDEX: 34.19 KG/M2 | HEIGHT: 67 IN | WEIGHT: 217.81 LBS | SYSTOLIC BLOOD PRESSURE: 150 MMHG

## 2024-01-22 DIAGNOSIS — M79.641 BILATERAL HAND PAIN: ICD-10-CM

## 2024-01-22 DIAGNOSIS — G56.03 BILATERAL CARPAL TUNNEL SYNDROME: Primary | ICD-10-CM

## 2024-01-22 DIAGNOSIS — M79.642 BILATERAL HAND PAIN: ICD-10-CM

## 2024-01-22 DIAGNOSIS — M18.10 ARTHRITIS OF CARPOMETACARPAL (CMC) JOINT OF THUMB: ICD-10-CM

## 2024-01-22 PROCEDURE — 99204 OFFICE O/P NEW MOD 45 MIN: CPT | Performed by: STUDENT IN AN ORGANIZED HEALTH CARE EDUCATION/TRAINING PROGRAM

## 2024-01-22 PROCEDURE — 3080F DIAST BP >= 90 MM HG: CPT | Performed by: STUDENT IN AN ORGANIZED HEALTH CARE EDUCATION/TRAINING PROGRAM

## 2024-01-22 PROCEDURE — 1160F RVW MEDS BY RX/DR IN RCRD: CPT | Performed by: STUDENT IN AN ORGANIZED HEALTH CARE EDUCATION/TRAINING PROGRAM

## 2024-01-22 PROCEDURE — 1159F MED LIST DOCD IN RCRD: CPT | Performed by: STUDENT IN AN ORGANIZED HEALTH CARE EDUCATION/TRAINING PROGRAM

## 2024-01-22 PROCEDURE — 3077F SYST BP >= 140 MM HG: CPT | Performed by: STUDENT IN AN ORGANIZED HEALTH CARE EDUCATION/TRAINING PROGRAM

## 2024-01-22 RX ORDER — ACYCLOVIR 400 MG/1
1 TABLET ORAL CONTINUOUS
Qty: 1 EACH | Refills: 0 | Status: SHIPPED | OUTPATIENT
Start: 2024-01-22

## 2024-01-22 NOTE — PROGRESS NOTES
[facility] Orthopedic     Office Visit       Date: 01/22/2024   Patient Name: Ernestina Epstein  MRN: [id number]  YOB: 1970    Referring Physician: Stephanie Tanner MD     Chief Complaint:   Chief Complaint   Patient presents with    Right Hand - Pain    Left Hand - Pain     History of Present Illness:   Ernestina Epstein is a 53 y.o. female right-hand-dominant seen to clinic with complaints of bilateral hand numbness and tingling and left basilar thumb pain.  Patient reports intermittent and tingling to bilateral hands for several years.  This is been present at night causing her to awaken and shake out her hands.  This is now become somewhat more constant throughout the day.  She has not attempted any nighttime bracing or corticosteroid injections.  She does have an EMG done in 2018 that demonstrated carpal tunnel syndrome.  She also has a history of type 2 diabetes and reports some neuropathy both in her hands and feet.  Regarding her left thumb pain, this is located at the base of the thumb and is worse with movement and pinch.  She has not attempted any bracing or corticosteroid injections.  No injury or trauma.  No other complaints or concerns.    Patient's personal history of type 2 diabetes and her last hemoglobin A1c was 7.6.    Subjective   Review of Systems:   Review of Systems   Constitutional:  Negative for chills, fever, unexpected weight gain and unexpected weight loss.   HENT:  Negative for congestion, postnasal drip and rhinorrhea.    Eyes:  Negative for blurred vision.   Respiratory:  Negative for shortness of breath.    Cardiovascular:  Negative for leg swelling.   Gastrointestinal:  Negative for abdominal pain, nausea and vomiting.   Genitourinary:  Negative for difficulty urinating.   Musculoskeletal:  Positive for arthralgias. Negative for gait problem, joint swelling and myalgias.   Skin:   "Negative for skin lesions and wound.   Neurological:  Negative for dizziness, weakness, light-headedness and numbness.   Hematological:  Does not bruise/bleed easily.   Psychiatric/Behavioral:  Negative for depressed mood.       Pertinent review of systems per HPI    I reviewed the patient's chief complaint, history of present illness, review of systems, past medical history, surgical history, family history, social history, medications and allergy list in the EMR on 01/22/2024 and agree with the findings above.    Objective    Quality Measures:   ACP:   ACP discussion was declined by the patient.      Tobacco:   Ernestina Epstein  reports that she quit smoking about 6 years ago. Her smoking use included cigarettes. She started smoking about 36 years ago. She has a 14.00 pack-year smoking history. She has been exposed to tobacco smoke. She has never used smokeless tobacco..     Vital Signs:   Vitals:    01/22/24 0826   BP: 150/92   Weight: 98.8 kg (217 lb 13 oz)   Height: 169 cm (66.54\")     BMI:      General: No acute distress. Alert and oriented.     Ortho Exam:  Examination of bilateral hands demonstrates no deformity.  No skin lesions or abrasions.  No thenar or hypothenar atrophy.  She able to make a composite fist and oppose the thumb to the small finger.  Decreased sensation throughout the median nerve distributions bilaterally and intact throughout the ulnar nerve distributions.  Positive Tinel, Durkan's, Phalen's at bilateral wrist.  Negative Tinel's at the elbow.  5/5 APB and FDI strength bilaterally.  2-point discrimination to the left long finger is 8 mm.  To point discrimination right long finger is 8 mm.  She is tender along bilateral thumb CMC joints that is worse on the left than the right.  Positive CMC grind on the left.  Nontender along the first extensor compartment.  Warm and well-perfused distally.    CTS-6 Questionnaire         Left Hand  Symptoms and History  Numbness in the median " nerve territory  3.5 (3.5)   Nocturnal numbness     4 (4)   Physical Examination  Thenar atrophy and/or weakness   0 (5)    Positive Phalen's test     5 (5)   Loss of 2-point Discrimination >5 mm  4.5 (4.5)  Positive Tinel sign     4 (4)                Total    21 (26)           Right Hand  Symptoms and History  Numbness in the median nerve territory  3.5 (3.5)   Nocturnal numbness     4 (4)   Physical Examination  Thenar atrophy and/or weakness   0 (5)    Positive Phalen's test     5 (5)   Loss of 2-point Discrimination >5 mm  4.5 (4.5)  Positive Tinel sign     4 (4)     Total    21 (26)    A patient with a score of > 12 has an 80% probability of electrodiagnostically positive carpal tunnel syndrome.     A patient with a score of > 5 has an 25% probability of electrodiagnostically positive carpal tunnel syndrome.                Imaging / Studies:    Imaging Results (Last 24 Hours)       Procedure Component Value Units Date/Time    XR Hand 3+ View Bilateral [[id number]] Resulted: 01/22/24 0832     Updated: 01/22/24 0833    Narrative:      Bilateral Hand X-Ray    Indication: Pain    Views:  PA, lateral, oblique    Comparison: None    Findings:  No fractures or dislocation. No bony lesions.Normal soft tissues.   Narrowing of the left > right thumb CMC joint space.  No evidence of STT   joint space narrowing.    Impression:   Degenerative changes at the left thumb CMC joint.             EMG nerve conduction study performed on 4/19/2018 demonstrated bilateral moderate carpal tunnel syndrome, chronic left C5-C6 radiculopathy and subacute left C7 radiculopathy.    Assessment / Plan    Assessment/Plan:   Ernestina Epstein is a 53 y.o. female bilateral carpal tunnel syndrome, left thumb CMC arthritis, peripheral neuropathy.    I discussed with the patient their clinical and radiographic findings demonstrate bilateral carpal tunnel syndrome, left thumb CMC arthritis and peripheral neuropathy secondary to type 2  diabetes.  We had a lengthy discussion regarding the pathophysiology of their diagnosis.  Her clinical exam demonstrates evidence of carpal tunnel syndrome.  She has not attempted any prior bracing or injections.  I would like to obtain an updated EMG/NCS study to evaluate for carpal tunnel and peripheral neuropathy.  The patient also has type 2 diabetes and her current hemoglobin A1c is 7.6.  I discussed there could be a component of both diagnoses contributing to her numbness and tingling.  Additionally, I discussed the need for lowering her hemoglobin A1c to allow for elective surgery.  She expressed understanding.  In the meantime I will have her use nighttime splints and instructed her on gentle nerve gliding exercises.  She will follow-up after her EMG results.  She also has some symptomatic left thumb CMC arthritis.  She has not attempted any prior treatments and therefore a CMC brace was provided today to be worn throughout the day as needed.  I also prescribed her a course of Voltaren gel and she may also take oral anti-inflammatories as needed.  If her symptoms do not improve with this we may consider corticosteroid injection in the future.  She expressed understanding of the plan.  All questions and concerns were addressed.      ICD-10-CM ICD-9-CM   1. Bilateral carpal tunnel syndrome  G56.03 354.0   2. Arthritis of carpometacarpal (CMC) joint of thumb  M18.10 716.94   3. Bilateral hand pain  M79.641 729.5    M79.642      Follow Up:   Return for Follow Up- After Testing.      Miriam Espinoza MD  [facility] [place] [facility] & Hand Surgeon

## 2024-02-01 DIAGNOSIS — G56.03 BILATERAL CARPAL TUNNEL SYNDROME: ICD-10-CM

## 2024-02-16 ENCOUNTER — TELEPHONE (OUTPATIENT)
Dept: INTERNAL MEDICINE | Facility: CLINIC | Age: 54
End: 2024-02-16
Payer: COMMERCIAL

## 2024-02-16 RX ORDER — LANCETS 28 GAUGE
EACH MISCELLANEOUS
Qty: 100 EACH | Refills: 11 | Status: CANCELLED | OUTPATIENT
Start: 2024-02-16

## 2024-02-16 RX ORDER — BLOOD-GLUCOSE METER
KIT MISCELLANEOUS
Qty: 100 EACH | Refills: 3 | Status: CANCELLED | OUTPATIENT
Start: 2024-02-16

## 2024-02-19 ENCOUNTER — CLINICAL SUPPORT (OUTPATIENT)
Dept: INTERNAL MEDICINE | Facility: CLINIC | Age: 54
End: 2024-02-19
Payer: COMMERCIAL

## 2024-02-21 DIAGNOSIS — E11.610 TYPE 2 DIABETES MELLITUS WITH DIABETIC NEUROPATHIC ARTHROPATHY, WITHOUT LONG-TERM CURRENT USE OF INSULIN: ICD-10-CM

## 2024-02-22 RX ORDER — ACYCLOVIR 400 MG/1
TABLET ORAL
Qty: 1 EACH | Refills: 0 | Status: SHIPPED | OUTPATIENT
Start: 2024-02-22

## 2024-03-08 DIAGNOSIS — E11.610 TYPE 2 DIABETES MELLITUS WITH DIABETIC NEUROPATHIC ARTHROPATHY, WITHOUT LONG-TERM CURRENT USE OF INSULIN: ICD-10-CM

## 2024-03-08 RX ORDER — ACYCLOVIR 400 MG/1
TABLET ORAL
Qty: 1 EACH | Refills: 0 | Status: SHIPPED | OUTPATIENT
Start: 2024-03-08

## 2024-03-15 ENCOUNTER — TELEPHONE (OUTPATIENT)
Dept: ORTHOPEDIC SURGERY | Facility: CLINIC | Age: 54
End: 2024-03-15
Payer: COMMERCIAL

## 2024-03-15 DIAGNOSIS — E11.610 TYPE 2 DIABETES MELLITUS WITH DIABETIC NEUROPATHIC ARTHROPATHY, WITHOUT LONG-TERM CURRENT USE OF INSULIN: ICD-10-CM

## 2024-03-15 RX ORDER — ACYCLOVIR 400 MG/1
1 TABLET ORAL WEEKLY
Qty: 12 EACH | Refills: 11 | Status: SHIPPED | OUTPATIENT
Start: 2024-03-15

## 2024-03-15 NOTE — TELEPHONE ENCOUNTER
Caller: Ernestina Epstein    Relationship to patient: Self    Best call back number: [phone] (home)     Patient is needing: PATIENT HAD EMG DONE THAT DR DOW ORDERED. PATIENT WANTS TO KNOW IF SHE WILL GET A CALL FOR RESULTS OR IF SHE NEEDS AN APPT.

## 2024-03-15 NOTE — TELEPHONE ENCOUNTER
Caller: Ernestina Epstein    Relationship: Self    Best call back number:  [phone]    Requested Prescriptions:   DEXACOM, THE ONE PATIENT PLACES ON HER ARM       Pharmacy where request should be sent: [facility] [id number] [address] [phone] [facility] [phone] FX     Last office visit with prescribing clinician: 1/15/2024   Last telemedicine visit with prescribing clinician: Visit date not found   Next office visit with prescribing clinician: 7/29/2024     Additional details provided by patient: PATIENT CHANGES THIS NEXT WED AND THIS HAS BEEN CALLED IN WEEKLY; PATIENT IS ASKING FOR THIS TO BE CALLED IN FOR  30 DAYS    Does the patient have less than a 3 day supply:  [] Yes  [x] No    Paresh Moore Rep   03/15/24 09:54 EDT

## 2024-03-20 NOTE — PROGRESS NOTES
[facility] Orthopedic     Office Visit       Date: 03/21/2024   Patient Name: Ernestina Epstein  MRN: [id number]  YOB: 1970    Referring Physician: No ref. provider found     Chief Complaint:   Chief Complaint   Patient presents with    Follow-up     2 month f/u-- Bilateral carpal tunnel syndrome     History of Present Illness:   Ernestina Epstein is a 54 y.o. female right-hand-dominant presenting to clinic for follow-up of EMG results.  She continues to endorse bilateral hand numbness and tingling which has been present for several years.  This is become more bothersome recently.  She was provided nighttime splints as well as nerve gliding exercises.  She reports no significant change in her numbness and tingling.  This affects mostly thumb index and long fingers bilaterally.  Right is equal to left.  She also endorses left basilar thumb pain.  She has been using her nighttime braces and her CMC wrap intermittently.  No other complaints or concerns today.    The patient has a personal history of type 2 diabetes.  Her last hemoglobin A1c was 7.6.    Subjective   Review of Systems:   Review of Systems   Pertinent review of systems per HPI    I reviewed the patient's chief complaint, history of present illness, review of systems, past medical history, surgical history, family history, social history, medications and allergy list in the EMR on 03/21/2024 and agree with the findings above.    Objective    Quality Measures:   ACP:   ACP discussion was declined by the patient.      Tobacco:   Ernestina Epstein  reports that she quit smoking about 6 years ago. Her smoking use included cigarettes. She started smoking about 36 years ago. She has a 14.9 pack-year smoking history. She has been exposed to tobacco smoke. She has never used smokeless tobacco.     Vital Signs:   Vitals:    03/21/24 0808   BP: (!) 164/102  "  Weight: 95.7 kg (211 lb)   Height: 169 cm (66.54\")     BMI:      General: No acute distress. Alert and oriented.     Ortho Exam:  Examination of bilateral upper extremities demonstrates no deformity.  No skin lesions or abrasions.  No significant atrophy noted at the thenar eminences.  4/5 APB strength bilaterally and 5/5 FDI strength.  She is able make composite fist and oppose the thumb to small finger.  Sensation is decreased light touch throughout the median nerve distribution and intact light touch throughout the ulnar nerve distribution.  Positive Tinel, Durkan's, Phalen's at bilateral wrist.  Negative Tinel's at bilateral elbows.  They will posterior nontender palpation without palpable catching or locking.  Warm and well-perfused distally.    Imaging / Studies:    Imaging Results (Last 24 Hours)       ** No results found for the last 24 hours. **        EMG nerve conduction study performed on 4/19/2018 demonstrated bilateral moderate carpal tunnel syndrome, chronic left C5-C6 radiculopathy and subacute left C7 radiculopathy.     EMG nerve conduction studies performed on 1/31/2024 demonstrate bilateral severe carpal tunnel syndrome and mild right cubital tunnel syndrome.  No evidence of peripheral neuropathy.    Procedure Note:  After reviewing the risks, benefits and alternatives to a steroid injection, which include but are not limited to; hypopigmentation, fat necrosis/atrophy, pain, swelling, bleeding, bruising, damage to nearby nerves/vessels, allergic reaction , transient elevation in blood glucose levels and infection a verbal consent was obtained. A time-out was then performed and the affected hand was prepped with chlorhexadine soap and ethyl chloride was used to numb the skin. The bilateral carpal tunnels were injected with 0.5cc: 0.5cc mixture of Kenalog - 40 mg/ml and Lidocaine - 1% / 2 ml. The injection was well tolerated and a sterile dressing was applied. There were no complications. I " advised the patient that they might experience some local discomfort for the next couple days and can apply ice to the site as needed.     Assessment / Plan    Assessment/Plan:   Ernestina Epstein is a 54 y.o. female with bilateral severe carpal tunnel syndrome, mild right cubital tunnel syndrome, and left thumb CMC arthritis.    I discussed with the patient her EMG findings demonstrate severe bilateral carpal tunnel syndrome and mild right cubital tunnel syndrome.  She seems most symptomatic at the carpal tunnels and nonsymptomatic at the right cubital tunnel.  She also has symptomatic left basilar thumb arthritis.  I discussed with her her options moving forward.  She would like to avoid any surgical treatment at this time and was most interested in continued bracing and corticosteroid injection in the bilateral carpal tunnels.  These were provided today and tolerated well.  I also discussed that we may consider corticosteroid injection into the left thumb in the future if needed.  She expressed understanding.  Injections were provided today in clinic and tolerated well.  I will see her back in 3 months for evaluation of her carpal tunnel.      ICD-10-CM ICD-9-CM   1. Bilateral carpal tunnel syndrome  G56.03 354.0   2. Arthritis of carpometacarpal (CMC) joint of thumb  M18.10 716.94     Follow Up:   Return in about 3 months (around 6/21/2024).      Miriam Espinoza MD  [facility] [place] [facility] Sur[facility]

## 2024-03-21 ENCOUNTER — OFFICE VISIT (OUTPATIENT)
Dept: ORTHOPEDIC SURGERY | Facility: CLINIC | Age: 54
End: 2024-03-21
Payer: COMMERCIAL

## 2024-03-21 VITALS
HEIGHT: 67 IN | DIASTOLIC BLOOD PRESSURE: 102 MMHG | WEIGHT: 211 LBS | BODY MASS INDEX: 33.12 KG/M2 | SYSTOLIC BLOOD PRESSURE: 164 MMHG

## 2024-03-21 DIAGNOSIS — G56.03 BILATERAL CARPAL TUNNEL SYNDROME: Primary | ICD-10-CM

## 2024-03-21 DIAGNOSIS — M18.10 ARTHRITIS OF CARPOMETACARPAL (CMC) JOINT OF THUMB: ICD-10-CM

## 2024-03-21 RX ORDER — TRIAMCINOLONE ACETONIDE 40 MG/ML
20 INJECTION, SUSPENSION INTRA-ARTICULAR; INTRAMUSCULAR
Status: COMPLETED | OUTPATIENT
Start: 2024-03-21 | End: 2024-03-21

## 2024-03-21 RX ORDER — ROPINIROLE 0.25 MG/1
TABLET, FILM COATED ORAL
COMMUNITY
Start: 2024-01-29

## 2024-03-21 RX ORDER — LIDOCAINE HYDROCHLORIDE 10 MG/ML
0.5 INJECTION, SOLUTION EPIDURAL; INFILTRATION; INTRACAUDAL; PERINEURAL
Status: COMPLETED | OUTPATIENT
Start: 2024-03-21 | End: 2024-03-21

## 2024-03-21 RX ADMIN — TRIAMCINOLONE ACETONIDE 20 MG: 40 INJECTION, SUSPENSION INTRA-ARTICULAR; INTRAMUSCULAR at 08:21

## 2024-03-21 RX ADMIN — LIDOCAINE HYDROCHLORIDE 0.5 ML: 10 INJECTION, SOLUTION EPIDURAL; INFILTRATION; INTRACAUDAL; PERINEURAL at 08:21

## 2024-03-21 NOTE — PROGRESS NOTES
Procedure   - Hand/Upper Extremity Injection: bilateral carpal tunnel for carpal tunnel syndrome on 3/21/2024 8:21 AM  Indications: pain  Details: 27 G needle, volar approach  Medications (Right): 0.5 mL lidocaine PF 1% 1 %; 20 mg triamcinolone acetonide 40 MG/ML  Medications (Left): 0.5 mL lidocaine PF 1% 1 %; 20 mg triamcinolone acetonide 40 MG/ML  Outcome: tolerated well, no immediate complications  Procedure, treatment alternatives, risks and benefits explained, specific risks discussed. Consent was given by the patient. Immediately prior to procedure a time out was called to verify the correct patient, procedure, equipment, support staff and site/side marked as required. Patient was prepped and draped in the usual sterile fashion.

## 2024-07-06 DIAGNOSIS — E11.65 TYPE 2 DIABETES MELLITUS WITH HYPERGLYCEMIA, WITHOUT LONG-TERM CURRENT USE OF INSULIN: ICD-10-CM

## 2024-07-08 RX ORDER — DAPAGLIFLOZIN 10 MG/1
1 TABLET, FILM COATED ORAL DAILY
Qty: 30 TABLET | Refills: 5 | Status: SHIPPED | OUTPATIENT
Start: 2024-07-08

## 2024-07-14 DIAGNOSIS — K21.9 GASTROESOPHAGEAL REFLUX DISEASE, UNSPECIFIED WHETHER ESOPHAGITIS PRESENT: ICD-10-CM

## 2024-07-15 RX ORDER — OMEPRAZOLE 40 MG/1
40 CAPSULE, DELAYED RELEASE ORAL DAILY
Qty: 90 CAPSULE | Refills: 1 | Status: SHIPPED | OUTPATIENT
Start: 2024-07-15

## 2024-07-17 ENCOUNTER — OFFICE VISIT (OUTPATIENT)
Dept: INTERNAL MEDICINE | Facility: CLINIC | Age: 54
End: 2024-07-17
Payer: COMMERCIAL

## 2024-07-17 VITALS
HEART RATE: 60 BPM | SYSTOLIC BLOOD PRESSURE: 126 MMHG | TEMPERATURE: 98.2 F | WEIGHT: 213.4 LBS | DIASTOLIC BLOOD PRESSURE: 82 MMHG | BODY MASS INDEX: 33.89 KG/M2 | RESPIRATION RATE: 12 BRPM

## 2024-07-17 DIAGNOSIS — N94.9 ADNEXAL CYST: ICD-10-CM

## 2024-07-17 DIAGNOSIS — N92.6 MENSTRUAL DISORDER: ICD-10-CM

## 2024-07-17 DIAGNOSIS — M15.9 PRIMARY OSTEOARTHRITIS INVOLVING MULTIPLE JOINTS: ICD-10-CM

## 2024-07-17 DIAGNOSIS — I82.452 ACUTE DEEP VEIN THROMBOSIS (DVT) OF LEFT PERONEAL VEIN: ICD-10-CM

## 2024-07-17 DIAGNOSIS — Z86.73 HISTORY OF CEREBELLAR STROKE: ICD-10-CM

## 2024-07-17 DIAGNOSIS — Z79.899 HIGH RISK MEDICATION USE: ICD-10-CM

## 2024-07-17 DIAGNOSIS — I10 PRIMARY HYPERTENSION: ICD-10-CM

## 2024-07-17 DIAGNOSIS — F32.A DEPRESSION, UNSPECIFIED DEPRESSION TYPE: ICD-10-CM

## 2024-07-17 DIAGNOSIS — E78.5 DYSLIPIDEMIA: ICD-10-CM

## 2024-07-17 DIAGNOSIS — E55.9 VITAMIN D DEFICIENCY: ICD-10-CM

## 2024-07-17 DIAGNOSIS — E03.9 ACQUIRED HYPOTHYROIDISM: ICD-10-CM

## 2024-07-17 DIAGNOSIS — E11.43 DIABETIC AUTONOMIC NEUROPATHY ASSOCIATED WITH TYPE 2 DIABETES MELLITUS: ICD-10-CM

## 2024-07-17 DIAGNOSIS — E11.43 TYPE 2 DIABETES MELLITUS WITH DIABETIC AUTONOMIC NEUROPATHY, WITHOUT LONG-TERM CURRENT USE OF INSULIN: Primary | ICD-10-CM

## 2024-07-17 DIAGNOSIS — K21.9 GASTROESOPHAGEAL REFLUX DISEASE, UNSPECIFIED WHETHER ESOPHAGITIS PRESENT: ICD-10-CM

## 2024-07-17 LAB
25(OH)D3 SERPL-MCNC: 19.6 NG/ML (ref 30–100)
ALBUMIN SERPL-MCNC: 4 G/DL (ref 3.5–5.2)
ALBUMIN/CREATININE RATIO, URINE: <30
ALBUMIN/GLOB SERPL: 1.2 G/DL
ALP SERPL-CCNC: 91 U/L (ref 39–117)
ALT SERPL W P-5'-P-CCNC: 6 U/L (ref 1–33)
ANION GAP SERPL CALCULATED.3IONS-SCNC: 11.4 MMOL/L (ref 5–15)
AST SERPL-CCNC: 12 U/L (ref 1–32)
BASOPHILS # BLD AUTO: 0.03 10*3/MM3 (ref 0–0.2)
BASOPHILS NFR BLD AUTO: 0.4 % (ref 0–1.5)
BILIRUB BLD-MCNC: NEGATIVE MG/DL
BILIRUB SERPL-MCNC: 0.5 MG/DL (ref 0–1.2)
BUN SERPL-MCNC: 17 MG/DL (ref 6–20)
BUN/CREAT SERPL: 18.3 (ref 7–25)
CALCIUM SPEC-SCNC: 9.5 MG/DL (ref 8.6–10.5)
CHLORIDE SERPL-SCNC: 101 MMOL/L (ref 98–107)
CHOLEST SERPL-MCNC: 227 MG/DL (ref 0–200)
CLARITY, POC: CLEAR
CO2 SERPL-SCNC: 28.6 MMOL/L (ref 22–29)
COLOR UR: YELLOW
CREAT SERPL-MCNC: 0.93 MG/DL (ref 0.57–1)
DEPRECATED RDW RBC AUTO: 40.8 FL (ref 37–54)
EGFRCR SERPLBLD CKD-EPI 2021: 73.6 ML/MIN/1.73
EOSINOPHIL # BLD AUTO: 0.31 10*3/MM3 (ref 0–0.4)
EOSINOPHIL NFR BLD AUTO: 3.7 % (ref 0.3–6.2)
ERYTHROCYTE [DISTWIDTH] IN BLOOD BY AUTOMATED COUNT: 12.5 % (ref 12.3–15.4)
EXPIRATION DATE: ABNORMAL
EXPIRATION DATE: ABNORMAL
EXPIRATION DATE: NORMAL
GLOBULIN UR ELPH-MCNC: 3.4 GM/DL
GLUCOSE SERPL-MCNC: 155 MG/DL (ref 65–99)
GLUCOSE UR STRIP-MCNC: ABNORMAL MG/DL
HBA1C MFR BLD: 8.5 % (ref 4.5–5.7)
HCT VFR BLD AUTO: 39.3 % (ref 34–46.6)
HDLC SERPL-MCNC: 79 MG/DL (ref 40–60)
HGB BLD-MCNC: 13.3 G/DL (ref 12–15.9)
IMM GRANULOCYTES # BLD AUTO: 0.05 10*3/MM3 (ref 0–0.05)
IMM GRANULOCYTES NFR BLD AUTO: 0.6 % (ref 0–0.5)
KETONES UR QL: NEGATIVE
LDLC SERPL CALC-MCNC: 136 MG/DL (ref 0–100)
LDLC/HDLC SERPL: 1.7 {RATIO}
LEUKOCYTE EST, POC: NEGATIVE
LYMPHOCYTES # BLD AUTO: 2.65 10*3/MM3 (ref 0.7–3.1)
LYMPHOCYTES NFR BLD AUTO: 31.4 % (ref 19.6–45.3)
Lab: ABNORMAL
Lab: ABNORMAL
Lab: NORMAL
MAGNESIUM SERPL-MCNC: 1.9 MG/DL (ref 1.6–2.6)
MCH RBC QN AUTO: 30.3 PG (ref 26.6–33)
MCHC RBC AUTO-ENTMCNC: 33.8 G/DL (ref 31.5–35.7)
MCV RBC AUTO: 89.5 FL (ref 79–97)
MONOCYTES # BLD AUTO: 0.63 10*3/MM3 (ref 0.1–0.9)
MONOCYTES NFR BLD AUTO: 7.5 % (ref 5–12)
NEUTROPHILS NFR BLD AUTO: 4.78 10*3/MM3 (ref 1.7–7)
NEUTROPHILS NFR BLD AUTO: 56.4 % (ref 42.7–76)
NITRITE UR-MCNC: NEGATIVE MG/ML
NRBC BLD AUTO-RTO: 0 /100 WBC (ref 0–0.2)
PH UR: 5 [PH] (ref 5–8)
PLATELET # BLD AUTO: 182 10*3/MM3 (ref 140–450)
PMV BLD AUTO: 10.8 FL (ref 6–12)
POC CREATININE URINE: 100
POC MICROALBUMIN URINE: 30
POTASSIUM SERPL-SCNC: 3.4 MMOL/L (ref 3.5–5.2)
PROT SERPL-MCNC: 7.4 G/DL (ref 6–8.5)
PROT UR STRIP-MCNC: NEGATIVE MG/DL
RBC # BLD AUTO: 4.39 10*6/MM3 (ref 3.77–5.28)
RBC # UR STRIP: NEGATIVE /UL
SODIUM SERPL-SCNC: 141 MMOL/L (ref 136–145)
SP GR UR: 1.01 (ref 1–1.03)
T4 FREE SERPL-MCNC: 1.19 NG/DL (ref 0.92–1.68)
TRIGL SERPL-MCNC: 70 MG/DL (ref 0–150)
TSH SERPL DL<=0.05 MIU/L-ACNC: 2.49 UIU/ML (ref 0.27–4.2)
UROBILINOGEN UR QL: ABNORMAL
VLDLC SERPL-MCNC: 12 MG/DL (ref 5–40)
WBC NRBC COR # BLD AUTO: 8.45 10*3/MM3 (ref 3.4–10.8)

## 2024-07-17 PROCEDURE — 84439 ASSAY OF FREE THYROXINE: CPT | Performed by: INTERNAL MEDICINE

## 2024-07-17 PROCEDURE — 99214 OFFICE O/P EST MOD 30 MIN: CPT | Performed by: INTERNAL MEDICINE

## 2024-07-17 PROCEDURE — 1125F AMNT PAIN NOTED PAIN PRSNT: CPT | Performed by: INTERNAL MEDICINE

## 2024-07-17 PROCEDURE — 83036 HEMOGLOBIN GLYCOSYLATED A1C: CPT | Performed by: INTERNAL MEDICINE

## 2024-07-17 PROCEDURE — 82044 UR ALBUMIN SEMIQUANTITATIVE: CPT | Performed by: INTERNAL MEDICINE

## 2024-07-17 PROCEDURE — 3074F SYST BP LT 130 MM HG: CPT | Performed by: INTERNAL MEDICINE

## 2024-07-17 PROCEDURE — 80061 LIPID PANEL: CPT | Performed by: INTERNAL MEDICINE

## 2024-07-17 PROCEDURE — 1159F MED LIST DOCD IN RCRD: CPT | Performed by: INTERNAL MEDICINE

## 2024-07-17 PROCEDURE — 82306 VITAMIN D 25 HYDROXY: CPT | Performed by: INTERNAL MEDICINE

## 2024-07-17 PROCEDURE — 3079F DIAST BP 80-89 MM HG: CPT | Performed by: INTERNAL MEDICINE

## 2024-07-17 PROCEDURE — 83735 ASSAY OF MAGNESIUM: CPT | Performed by: INTERNAL MEDICINE

## 2024-07-17 PROCEDURE — 80050 GENERAL HEALTH PANEL: CPT | Performed by: INTERNAL MEDICINE

## 2024-07-17 PROCEDURE — 1160F RVW MEDS BY RX/DR IN RCRD: CPT | Performed by: INTERNAL MEDICINE

## 2024-07-17 PROCEDURE — 3052F HG A1C>EQUAL 8.0%<EQUAL 9.0%: CPT | Performed by: INTERNAL MEDICINE

## 2024-07-17 RX ORDER — ERGOCALCIFEROL 1.25 MG/1
50000 CAPSULE ORAL WEEKLY
Qty: 5 CAPSULE | Refills: 3 | Status: SHIPPED | OUTPATIENT
Start: 2024-07-17

## 2024-07-17 NOTE — PROGRESS NOTES
Subjective       Ernestina Epstein is a 54 y.o. female.     Chief Complaint   Patient presents with    Diabetes     6 month follow up    Hypertension    Hyperlipidemia    Hypothyroidism       History obtained from the patient.      History of Present Illness     The patient is here for follow-up of a Left Ovarian Cyst.  She had a CT abdomen/pelvis on 5/30/21 and a pelvic ultrasound on 9/2021, done at ,  which showed a right adnexal cyst.  CT abdomen/pelvis at  on 3/20/2020 showed a 3.3 cm left ovarian cyst.  CT abdomen/pelvis at  on 2/20/2023 was negative with the exception of mild plaque of the abdominal aorta.  Pelvic ultrasound at  on 3/15/2023, per Dr. Lalit White GYN, showed a stable left ovarian cyst.  They recommended a repeat TVUS in 2 to 3 months.  That has not been done yet.  On 4/3/2023, the patient had a negative CT abdomen/pelvis/chest at .  She has not had a recent Pap.  She denies pelvic pain, vaginal bleeding, and vaginal discharge.  She denies urinary frequency, urgency, dysuria, and hematuria.     The patient is here for follow-up of a Left Lower Extremity Peroneal DVT, diagnosed on 5/16/2022.  Follow-up LLE venous ultrasound was done 7/31/2023 and showed:  All veins of the left lower extremity are compressible and there is no evidence of acute DVT.  Chronic post thrombotic changes are noted in the left peroneal vein with patent flow and there is evidence of venous reflux.  No significant change compared to prior study of May 16, 2022.. She is off Eliquis and Aspirin.     Cardiac Follow-up: The patient is here today for a follow-up visit.       Her Hypertension has been stable.   Medication: Lisinopril and HCTZ.  Her Hyperlipidemia has been unstable.    LDL goal is <70.  Her last LDL was 169, TG 94.   Medication:  Atorvastatin.  Her Diabetes Mellitus Type 2 has been unstable.  Medication:  Januvia, Farxiga, Actos, Atorvastatin, and Lisinopril.   Side Effects: None.     Comorbid  illness: History of Cerebellar Stroke (CT 5/25/2022 showed a small remote lacunar infarct in the left cerebellum).      Procedures:  The patient had a negative cardiac stress test on 6/7/2019.        Interval Events:  Hemoglobin A1c on 1/15/2024  was 7.6.  She states her blood sugar at home has been 189-250 postprandial, but she has not been checking it fasting.  She denies episodes of low blood sugar.  However, she states she just got a new arm monitor because the last one was not working.  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  She does check her feet daily.       Symptoms:  Denies chest pain (other than chronic chest wall pain), shortness of breath, MAIN, orthopnea, PND, palpitations, syncope, lower extremity edema, claudication, lightheadedness, and dizziness.  Associated Symptoms: Weight down 4 reports pounds in the past 6 months. Reports polydipsia x 3 weeks.  Reports stable myalgias (legs) and arthralgias (knees).  No fatigue, headache, polyuria, polyphagia, memory issues, or concentration issues.  Has stable burning pain, numbness, and tingling of the feet and fingers.  Denies skin breakdown and ulcers.      Lifestyle:  She states she has a healthy diet.  She has not been exercising regularly, but states she is active at work..    Tobacco Use: Former smoker (0.5 ppd [id number], quit 11/6/1.  Started smoking again 8/3/2022. Quit 11/2022).     Chronic Pain Syndrome  follow-up: The patient is being seen for a routine clinic follow-up of Chronic Pain Syndrome, which is stable.  Interval Events: The patient is now being seen at  [facility], for Lumbar Radiculopathy and OA of the knees.  Symptoms: stable low back pain, neck pain, chest wall pain, myalgias (legs), and arthralgias (knees).  She reports right hip pain x 2 weeks she reports joint swelling of the knees.  Medication:  Neurontin 600 mg 3 times daily and Tizanidine.      Hypothyroidism Follow-Up: The patient is being seen for follow-up of Hypothyroidism, which is stable.    Interval Events: On 1/15/2024, TSH was normal.    Symptoms:  Weight down 4 pounds in the past 6 months.  Reports polydipsia x 3 weeks.  Stable cold intolerance.  Denies fatigue, heat intolerance, diarrhea, constipation, hair loss, and dry skin.   Associated Symptoms: Stable myalgias (legs), arthralgias (knees), and paresthesias (hands and feet).    Medications:  Levothyroxine (Synthroid).      GERD Follow-Up: The patient is here for follow-up of Gastroesophageal Reflux Disease, which has been stable.   Procedures:The patient had an EGD on 10/6/2021 at  (reactive gastropathy) and on 11/23/2022 at [facility] (chronic active gastritis).   Interval Events: The patient was started on medication after her last EGD.    Symptoms: Denies abdominal pain, heartburn, acid reflux, nausea, vomiting, hematemesis, dysphagia, odynophagia, hematochezia, melena, early satiety, belching, and bloating.    Associated Symptoms: Denies chronic cough, sore throat, hoarseness, and wheezing.    Medication: Prilosec daily, Zofran and Reglan as needed.     Vitamin D Deficiency Follow-Up: The patient is here for follow-up of Vitamin D Deficiency, which is unstable.   Interval Events: On 1/15/2024, Vitamin D level was 17.6.  Symptoms:  Stable myalgias (legs), arthralgias (knees), and paresthesias (hands and feet).  Has occasional loss of balance.  Denies fatigue and gait instability.    Medication: Vitamin D3 daily.     Depression Follow-Up: The patient is here for follow-up of Depression, which is overall slightly worse.    Interval Events: Pain Management changed Lexapro to Duloxetine and for better pain control.  She states she has an appointment with a psychiatrist on 7/22/2024.  Symptoms: Stable depression.  Reports mild anxiety and insomnia.  Denies panic attacks, anhedonia, feelings of  hopelessness, feelings of worthlessness, feelings of guilt, memory loss, and concentration issues.    Associated Symptoms: Denies suicidal ideation or thoughts of self-harm.    Medication: Cymbalta  Side Effects: None.       Current Outpatient Medications on File Prior to Visit   Medication Sig Dispense Refill    atorvastatin (LIPITOR) 80 MG tablet Take 1 tablet by mouth Every Night. 90 tablet 3    benzoyl peroxide 5 % external wash       Blood Glucose Monitoring Suppl (FREESTYLE FREEDOM LITE) w/Device kit Use twice daily   Dx  E11.9 1 each 0    Continuous Blood Gluc  (Dexcom G7 ) device Place 1 Application on the skin as directed by provider 1 (One) Time Per Week. 12 each 11    Continuous Blood Gluc Sensor (Dexcom G7 Sensor) misc Place 1 Application on the skin as directed by provider 1 (One) Time Per Week. 12 each 11    Diclofenac Sodium (VOLTAREN) 1 % gel gel Apply 4 g topically to the appropriate area as directed 2 (Two) Times a Day. 100 g 0    DULoxetine (CYMBALTA) 60 MG capsule Take 1 capsule by mouth 2 (Two) Times a Day. 180 capsule 3    Farxiga 10 MG tablet TAKE 1 TABLET BY MOUTH DAILY 30 tablet 5    gabapentin (Neurontin) 300 MG capsule Take 2 capsules by mouth 3 (Three) Times a Day.      glucose blood (FREESTYLE LITE) test strip USE TO TEST BLOOD SUGAR TWO TIMES A DAY AS DIRECTED 100 each 3    hydroCHLOROthiazide (HYDRODIURIL) 25 MG tablet TAKE 1 TABLET BY MOUTH DAILY 90 tablet 3    Januvia 100 MG tablet Take 1 tablet by mouth Daily. 90 tablet 3    Lancets (FREESTYLE) lancets USE  LANCET  TO TEST TWICE  DAILY 100 each 11    levothyroxine (SYNTHROID, LEVOTHROID) 100 MCG tablet TAKE ONE TABLET BY MOUTH DAILY 90 tablet 1    lisinopril (PRINIVIL,ZESTRIL) 20 MG tablet TAKE ONE TABLET BY MOUTH EVERY NIGHT AT BEDTIME 30 tablet 3    metoclopramide (REGLAN) 10 MG tablet Take 1 tablet by mouth 3 (Three) Times a Day As Needed (Nausea, vomiting, and abdominal pain). 50 tablet 1    omeprazole (priLOSEC)  40 MG capsule TAKE 1 CAPSULE BY MOUTH DAILY 90 capsule 1    ondansetron (ZOFRAN) 4 MG tablet Take 1 tablet by mouth Every 8 (Eight) Hours As Needed for Nausea or Vomiting.      pioglitazone (ACTOS) 45 MG tablet Take 1 tablet by mouth Daily. 90 tablet 3    rOPINIRole (REQUIP) 0.25 MG tablet       tiZANidine (ZANAFLEX) 4 MG tablet TAKE ONE TABLET BY MOUTH EVERY 8 HOURS AS NEEDED FOR MUSCLE PAIN 90 tablet 5     No current facility-administered medications on file prior to visit.       Current outpatient and discharge medications have been reconciled for the patient.  Reviewed by: Stephanie Tanner MD        The following portions of the patient's history were reviewed and updated as appropriate: allergies, current medications, past family history, past medical history, past social history, past surgical history, and problem list.    Review of Systems   Constitutional:  Negative for fatigue and unexpected weight change.   Eyes:  Negative for visual disturbance.   Respiratory:  Negative for cough, shortness of breath and wheezing.    Cardiovascular:  Negative for chest pain, palpitations and leg swelling.        No MAIN, orthopnea, or claudication.   Gastrointestinal:  Negative for abdominal pain, blood in stool, constipation, diarrhea, nausea and vomiting.        Denies melena.   Endocrine: Positive for polydipsia. Negative for polyphagia and polyuria.   Musculoskeletal:  Positive for arthralgias and myalgias.   Neurological:  Negative for dizziness, syncope, light-headedness and headaches.        No memory issues.   Psychiatric/Behavioral:  Negative for decreased concentration.          Objective       Blood pressure 126/82, pulse 60, temperature 98.2 °F (36.8 °C), temperature source Infrared, resp. rate 12, weight 96.8 kg (213 lb 6.4 oz), not currently breastfeeding.  Body mass index is 33.89 kg/m².      Physical Exam  Vitals and nursing note reviewed.   Constitutional:       Appearance: She is well-developed.       Comments: BMI greater than 30.  Patient smelled of THC.   Neck:      Thyroid: No thyroid mass or thyromegaly.      Vascular: No carotid bruit.   Cardiovascular:      Rate and Rhythm: Normal rate and regular rhythm.      Pulses: Normal pulses.      Heart sounds: Normal heart sounds. No murmur heard.     No friction rub. No gallop.   Pulmonary:      Effort: Pulmonary effort is normal.      Breath sounds: Normal breath sounds.   Musculoskeletal:      Right lower leg: No edema.      Left lower leg: No edema.   Neurological:      Mental Status: She is alert.   Psychiatric:         Mood and Affect: Mood normal.       Results for orders placed or performed in visit on 07/17/24   POC Microalbumin    Specimen: Urine   Result Value Ref Range    Microalbumin, Urine 30     Creatinine, Urine 100     Lot Number [id number]     Expiration Date 4/30/25     Albumin/Creatinine Ratio, Urine <30    POC Urinalysis Dipstick, Automated    Specimen: Urine   Result Value Ref Range    Color Yellow Yellow, Straw, Dark Yellow, [place]    Clarity, UA Clear Clear    Specific Gravity  1.015 1.005 - 1.030    pH, Urine 5.0 5.0 - 8.0    Leukocytes Negative Negative    Nitrite, UA Negative Negative    Protein, POC Negative Negative mg/dL    Glucose, UA 1000 mg/dL (A) Negative mg/dL    Ketones, UA Negative Negative    Urobilinogen, UA 1 E.U./dL (A) Normal, 0.2 E.U./dL    Bilirubin Negative Negative    Blood, UA Negative Negative    Lot Number [id number]     Expiration Date 3/31/25    POC Glycosylated Hemoglobin (Hb A1C)    Specimen: Blood   Result Value Ref Range    Hemoglobin A1C 8.5 (A) 4.5 - 5.7 %    Lot Number [id number]     Expiration Date 3/18/26        Assessment / Plan:  Diagnoses and all orders for this visit:    1. Type 2 diabetes mellitus with diabetic autonomic neuropathy, without long-term current use of insulin (Primary)  -     POC Microalbumin  -     POC Urinalysis Dipstick, Automated  -     POC Glycosylated Hemoglobin (Hb A1C)  -     Lipid  Panel  -     Comprehensive Metabolic Panel  -     TSH  -     CBC & Differential   Continue current medication(s) as noted in the history of present illness.    2. Primary hypertension  -     POC Urinalysis Dipstick, Automated  -     Lipid Panel  -     Comprehensive Metabolic Panel  -     TSH  -     CBC & Differential   Continue current medication(s) as noted in the history of present illness.    3. Dyslipidemia  -     Lipid Panel  -     Comprehensive Metabolic Panel  -     TSH  -     CBC & Differential   Continue current medication(s) as noted in the history of present illness.    4. Acquired hypothyroidism  -     TSH  -     T4, Free   Continue current medication(s) as noted in the history of present illness.    5. Acute deep vein thrombosis (DVT) of left peroneal vein   Stable, no medication.    6. Gastroesophageal reflux disease, unspecified whether esophagitis present   Continue current medication(s) as noted in the history of present illness.    7. Vitamin D deficiency  -     Vitamin D,25-Hydroxy  -     vitamin D (ERGOCALCIFEROL) 1.25 MG (97869 UT) capsule capsule; Take 1 capsule by mouth 1 (One) Time Per Week.  Dispense: 5 capsule; Refill: 3- NEW    8. History of cerebellar stroke    9. Adnexal cyst  -     US Non-ob Transvaginal; Future    10. Menstrual disorder  -     Follicle Stimulating Hormone; Future    11. Diabetic autonomic neuropathy associated with type 2 diabetes mellitus   On Gabapentin.    12. Depression, unspecified depression type   Continue current medication(s) as noted in the history of present illness.   Has Psychiatry appointment 7/22/2024.    13. Primary osteoarthritis involving multiple joints   Continue current medication(s) as noted in the history of present illness.   Follow-up orthopedic.    14. High risk medication use  -     Magnesium; Future  -     Magnesium      The patient agrees to schedule her Mammogram.    I recommended Shingrix (Shingles vaccine) at the pharmacy.  I I also  recommended the COVID-19 bivalent vaccine at the pharmacy, September 2024.  I also recommended a yearly Influenza vaccine.        Return in about 3 months (around 10/17/2024) for Annual physical and Pap, fasting (after 10/17/24).

## 2024-07-17 NOTE — PATIENT INSTRUCTIONS
I recommend Shingrix (Shingles vaccine) at the pharmacy, as we discussed.  I I also recommend the COVID-19 bivalent vaccine at the pharmacy, September 2024, as we discussed.  I also recommend a yearly Influenza vaccine.    Please schedule your Mammogram, as we discussed.

## 2024-07-22 DIAGNOSIS — M79.604 BILATERAL LEG PAIN: ICD-10-CM

## 2024-07-22 DIAGNOSIS — M79.605 BILATERAL LEG PAIN: ICD-10-CM

## 2024-07-22 RX ORDER — GABAPENTIN 100 MG/1
CAPSULE ORAL
Qty: 90 CAPSULE | Refills: 2 | Status: SHIPPED | OUTPATIENT
Start: 2024-07-22

## 2024-07-22 NOTE — TELEPHONE ENCOUNTER
LOV 07/17/2024  NOV Not scheduled yet      Return in about 3 months (around 10/17/2024) for Annual physical and Pap, fasting (after 10/17/24).

## 2024-07-26 ENCOUNTER — TELEPHONE (OUTPATIENT)
Dept: INTERNAL MEDICINE | Facility: CLINIC | Age: 54
End: 2024-07-26
Payer: COMMERCIAL

## 2024-07-26 DIAGNOSIS — E11.610 TYPE 2 DIABETES MELLITUS WITH DIABETIC NEUROPATHIC ARTHROPATHY, WITHOUT LONG-TERM CURRENT USE OF INSULIN: ICD-10-CM

## 2024-07-26 DIAGNOSIS — E66.9 OBESITY (BMI 30.0-34.9): Primary | ICD-10-CM

## 2024-07-26 NOTE — TELEPHONE ENCOUNTER
Called patient and read providers message, good verb given. She stated that she is taking all of her medications as prescribed.

## 2024-07-26 NOTE — TELEPHONE ENCOUNTER
Call patient please.    Her Hemoglobin A1c with her recent labs was elevated.  Has she been taking all her medication as scheduled?  Please route message back to me instead of the covering physician after speaking with the patient.

## 2024-07-29 NOTE — TELEPHONE ENCOUNTER
I recommend she continue all of her medication and adding Ozempic.  If she is agreeable, I will send a prescription to the pharmacy.  I would like her to follow-up with me in 1 month fasting.  Please schedule.    I will also send a lab letter.

## 2024-07-30 ENCOUNTER — TELEPHONE (OUTPATIENT)
Dept: INTERNAL MEDICINE | Facility: CLINIC | Age: 54
End: 2024-07-30
Payer: COMMERCIAL

## 2024-07-30 DIAGNOSIS — E11.610 TYPE 2 DIABETES MELLITUS WITH DIABETIC NEUROPATHIC ARTHROPATHY, WITHOUT LONG-TERM CURRENT USE OF INSULIN: ICD-10-CM

## 2024-07-30 RX ORDER — SEMAGLUTIDE 1.34 MG/ML
0.25 INJECTION, SOLUTION SUBCUTANEOUS WEEKLY
Qty: 2 ML | Refills: 0 | Status: SHIPPED | OUTPATIENT
Start: 2024-07-30

## 2024-07-30 RX ORDER — ACYCLOVIR 400 MG/1
1 TABLET ORAL WEEKLY
Qty: 12 EACH | Refills: 11 | Status: SHIPPED | OUTPATIENT
Start: 2024-07-30

## 2024-07-30 RX ORDER — ACYCLOVIR 400 MG/1
1 TABLET ORAL WEEKLY
Qty: 12 EACH | Refills: 11 | Status: CANCELLED | OUTPATIENT
Start: 2024-07-30

## 2024-07-30 NOTE — TELEPHONE ENCOUNTER
Caller: [facility] [id number] - [facility] [address] [place] [address] - [phone]  - [phone]     Relationship: Pharmacy    Best call back number: [phone]     What was the call regarding: INSRTRUCTIONS SAY TO CHANGE EVERY 7 DAYS, THESE USUALLY ARE USED FOR 10- PLEASE VERIFY WITH PHARMACY IF THAT NEEDS TO BE CHANGED 7 OR 10

## 2024-07-30 NOTE — TELEPHONE ENCOUNTER
Spoke with patient and scheduled her for a 1 month follow up.     Patient did say that she would like to try Ozempic.

## 2024-08-02 ENCOUNTER — HOSPITAL ENCOUNTER (OUTPATIENT)
Dept: ULTRASOUND IMAGING | Facility: HOSPITAL | Age: 54
Discharge: HOME | End: 2024-08-02
Payer: COMMERCIAL

## 2024-08-02 DIAGNOSIS — N94.9 ADNEXAL CYST: ICD-10-CM

## 2024-08-02 PROCEDURE — 76830 TRANSVAGINAL US NON-OB: CPT

## 2024-08-20 ENCOUNTER — TELEPHONE (OUTPATIENT)
Dept: INTERNAL MEDICINE | Facility: CLINIC | Age: 54
End: 2024-08-20
Payer: COMMERCIAL

## 2024-08-21 NOTE — TELEPHONE ENCOUNTER
Call patient please.    Her pelvic ultrasound shows a persistent right complex ovarian cyst.  I would recommend a follow-up appointment with [facility].  If she is agreeable, I will enter an order.

## 2024-08-21 NOTE — TELEPHONE ENCOUNTER
Tried to reach patient no answer left voicemail to return call    RELAY:    Call patient please.     Her pelvic ultrasound shows a persistent right complex ovarian cyst.  I would recommend a follow-up appointment with [facility].  If she is agreeable, I will enter an order.

## 2024-08-22 NOTE — TELEPHONE ENCOUNTER
2nd attempt     Tried to reach patient no answer left voicemail to return call     RELAY:    Call patient please.     Her pelvic ultrasound shows a persistent right complex ovarian cyst.  I would recommend a follow-up appointment with [facility].  If she is agreeable, I will enter an order.

## 2024-08-23 NOTE — TELEPHONE ENCOUNTER
3rd attempt      Tried to reach patient no answer left voicemail to return call     RELAY:    Call patient please.     Her pelvic ultrasound shows a persistent right complex ovarian cyst.  I would recommend a follow-up appointment with [facility].  If she is agreeable, I will enter an order.

## 2024-08-30 DIAGNOSIS — G89.4 CHRONIC PAIN SYNDROME: ICD-10-CM

## 2024-08-30 RX ORDER — GABAPENTIN 300 MG/1
CAPSULE ORAL
Qty: 90 CAPSULE | Refills: 1 | Status: SHIPPED | OUTPATIENT
Start: 2024-08-30

## 2024-08-30 NOTE — TELEPHONE ENCOUNTER
Last office visit (LOV) for chronic conditions 07/17/24  Next office visit (NOV)  Urine drug screen (UDS) 04/05/23  Controlled substance agreement (CSA) 09/23/19

## 2024-09-22 DIAGNOSIS — E11.610 TYPE 2 DIABETES MELLITUS WITH DIABETIC NEUROPATHIC ARTHROPATHY, WITHOUT LONG-TERM CURRENT USE OF INSULIN: ICD-10-CM

## 2024-09-22 DIAGNOSIS — E66.9 OBESITY (BMI 30.0-34.9): ICD-10-CM

## 2024-09-23 RX ORDER — SEMAGLUTIDE 0.68 MG/ML
INJECTION, SOLUTION SUBCUTANEOUS
Qty: 3 ML | Refills: 2 | Status: SHIPPED | OUTPATIENT
Start: 2024-09-23

## 2024-10-17 ENCOUNTER — PRIOR AUTHORIZATION (OUTPATIENT)
Dept: INTERNAL MEDICINE | Facility: CLINIC | Age: 54
End: 2024-10-17
Payer: COMMERCIAL

## 2024-10-28 ENCOUNTER — PRIOR AUTHORIZATION (OUTPATIENT)
Dept: INTERNAL MEDICINE | Facility: CLINIC | Age: 54
End: 2024-10-28
Payer: COMMERCIAL

## 2024-10-28 NOTE — TELEPHONE ENCOUNTER
Per [employer] PA is not required    Left voicemail notifying the pharmacy that they need to reprocess with the PPS codes to override edit for DPP4 and GL1 concurrent use per insurance notification.

## 2024-10-30 ENCOUNTER — OFFICE VISIT (OUTPATIENT)
Dept: INTERNAL MEDICINE | Facility: CLINIC | Age: 54
End: 2024-10-30
Payer: COMMERCIAL

## 2024-10-30 VITALS
TEMPERATURE: 97.7 F | WEIGHT: 208 LBS | RESPIRATION RATE: 20 BRPM | DIASTOLIC BLOOD PRESSURE: 80 MMHG | BODY MASS INDEX: 33.03 KG/M2 | HEART RATE: 68 BPM | SYSTOLIC BLOOD PRESSURE: 136 MMHG

## 2024-10-30 DIAGNOSIS — I10 PRIMARY HYPERTENSION: ICD-10-CM

## 2024-10-30 DIAGNOSIS — M54.50 ACUTE BILATERAL LOW BACK PAIN WITHOUT SCIATICA: ICD-10-CM

## 2024-10-30 DIAGNOSIS — E66.811 OBESITY (BMI 30.0-34.9): ICD-10-CM

## 2024-10-30 DIAGNOSIS — R20.2 PARESTHESIAS: ICD-10-CM

## 2024-10-30 DIAGNOSIS — N92.6 MENSTRUAL DISORDER: ICD-10-CM

## 2024-10-30 DIAGNOSIS — Z23 NEED FOR HEPATITIS B VACCINATION: ICD-10-CM

## 2024-10-30 DIAGNOSIS — E78.5 DYSLIPIDEMIA: ICD-10-CM

## 2024-10-30 DIAGNOSIS — E11.65 TYPE 2 DIABETES MELLITUS WITH HYPERGLYCEMIA, WITHOUT LONG-TERM CURRENT USE OF INSULIN: Primary | ICD-10-CM

## 2024-10-30 DIAGNOSIS — E03.9 ACQUIRED HYPOTHYROIDISM: ICD-10-CM

## 2024-10-30 LAB
ALBUMIN SERPL-MCNC: 3.9 G/DL (ref 3.5–5.2)
ALBUMIN/GLOB SERPL: 1.1 G/DL
ALP SERPL-CCNC: 88 U/L (ref 39–117)
ALT SERPL W P-5'-P-CCNC: 5 U/L (ref 1–33)
ANION GAP SERPL CALCULATED.3IONS-SCNC: 8.9 MMOL/L (ref 5–15)
AST SERPL-CCNC: 12 U/L (ref 1–32)
BASOPHILS # BLD AUTO: 0.04 10*3/MM3 (ref 0–0.2)
BASOPHILS NFR BLD AUTO: 0.5 % (ref 0–1.5)
BILIRUB SERPL-MCNC: 0.5 MG/DL (ref 0–1.2)
BUN SERPL-MCNC: 16 MG/DL (ref 6–20)
BUN/CREAT SERPL: 17.8 (ref 7–25)
CALCIUM SPEC-SCNC: 9.8 MG/DL (ref 8.6–10.5)
CHLORIDE SERPL-SCNC: 101 MMOL/L (ref 98–107)
CHOLEST SERPL-MCNC: 217 MG/DL (ref 0–200)
CO2 SERPL-SCNC: 31.1 MMOL/L (ref 22–29)
CREAT SERPL-MCNC: 0.9 MG/DL (ref 0.57–1)
DEPRECATED RDW RBC AUTO: 42.1 FL (ref 37–54)
EGFRCR SERPLBLD CKD-EPI 2021: 76.6 ML/MIN/1.73
EOSINOPHIL # BLD AUTO: 0.27 10*3/MM3 (ref 0–0.4)
EOSINOPHIL NFR BLD AUTO: 3.6 % (ref 0.3–6.2)
ERYTHROCYTE [DISTWIDTH] IN BLOOD BY AUTOMATED COUNT: 12.9 % (ref 12.3–15.4)
EXPIRATION DATE: ABNORMAL
FOLATE SERPL-MCNC: 9.76 NG/ML (ref 4.78–24.2)
FSH SERPL-ACNC: 95.5 MIU/ML
GLOBULIN UR ELPH-MCNC: 3.6 GM/DL
GLUCOSE SERPL-MCNC: 114 MG/DL (ref 65–99)
HBA1C MFR BLD: 7.2 % (ref 4.5–5.7)
HCT VFR BLD AUTO: 40 % (ref 34–46.6)
HDLC SERPL-MCNC: 84 MG/DL (ref 40–60)
HGB BLD-MCNC: 13.4 G/DL (ref 12–15.9)
IMM GRANULOCYTES # BLD AUTO: 0.02 10*3/MM3 (ref 0–0.05)
IMM GRANULOCYTES NFR BLD AUTO: 0.3 % (ref 0–0.5)
LDLC SERPL CALC-MCNC: 122 MG/DL (ref 0–100)
LDLC/HDLC SERPL: 1.43 {RATIO}
LYMPHOCYTES # BLD AUTO: 2.46 10*3/MM3 (ref 0.7–3.1)
LYMPHOCYTES NFR BLD AUTO: 32.4 % (ref 19.6–45.3)
Lab: ABNORMAL
MCH RBC QN AUTO: 30 PG (ref 26.6–33)
MCHC RBC AUTO-ENTMCNC: 33.5 G/DL (ref 31.5–35.7)
MCV RBC AUTO: 89.7 FL (ref 79–97)
MONOCYTES # BLD AUTO: 0.59 10*3/MM3 (ref 0.1–0.9)
MONOCYTES NFR BLD AUTO: 7.8 % (ref 5–12)
NEUTROPHILS NFR BLD AUTO: 4.22 10*3/MM3 (ref 1.7–7)
NEUTROPHILS NFR BLD AUTO: 55.4 % (ref 42.7–76)
NRBC BLD AUTO-RTO: 0 /100 WBC (ref 0–0.2)
PLATELET # BLD AUTO: 198 10*3/MM3 (ref 140–450)
PMV BLD AUTO: 10.6 FL (ref 6–12)
POTASSIUM SERPL-SCNC: 4.3 MMOL/L (ref 3.5–5.2)
PROT SERPL-MCNC: 7.5 G/DL (ref 6–8.5)
RBC # BLD AUTO: 4.46 10*6/MM3 (ref 3.77–5.28)
SODIUM SERPL-SCNC: 141 MMOL/L (ref 136–145)
T4 FREE SERPL-MCNC: 1.12 NG/DL (ref 0.92–1.68)
TRIGL SERPL-MCNC: 64 MG/DL (ref 0–150)
TSH SERPL DL<=0.05 MIU/L-ACNC: 2.81 UIU/ML (ref 0.27–4.2)
VIT B12 BLD-MCNC: 397 PG/ML (ref 211–946)
VLDLC SERPL-MCNC: 11 MG/DL (ref 5–40)
WBC NRBC COR # BLD AUTO: 7.6 10*3/MM3 (ref 3.4–10.8)

## 2024-10-30 PROCEDURE — 83001 ASSAY OF GONADOTROPIN (FSH): CPT | Performed by: INTERNAL MEDICINE

## 2024-10-30 PROCEDURE — 80050 GENERAL HEALTH PANEL: CPT | Performed by: INTERNAL MEDICINE

## 2024-10-30 PROCEDURE — 80061 LIPID PANEL: CPT | Performed by: INTERNAL MEDICINE

## 2024-10-30 PROCEDURE — 84439 ASSAY OF FREE THYROXINE: CPT | Performed by: INTERNAL MEDICINE

## 2024-10-30 PROCEDURE — 82607 VITAMIN B-12: CPT | Performed by: INTERNAL MEDICINE

## 2024-10-30 PROCEDURE — 82746 ASSAY OF FOLIC ACID SERUM: CPT | Performed by: INTERNAL MEDICINE

## 2024-10-30 RX ORDER — MELOXICAM 15 MG/1
15 TABLET ORAL DAILY PRN
Qty: 30 TABLET | Refills: 2 | Status: SHIPPED | OUTPATIENT
Start: 2024-10-30

## 2024-10-30 RX ORDER — METHYLPREDNISOLONE 4 MG/1
TABLET ORAL
Qty: 1 EACH | Refills: 0 | Status: SHIPPED | OUTPATIENT
Start: 2024-10-30

## 2024-10-30 RX ORDER — SEMAGLUTIDE 0.68 MG/ML
0.5 INJECTION, SOLUTION SUBCUTANEOUS WEEKLY
Qty: 3 ML | Refills: 2 | Status: SHIPPED | OUTPATIENT
Start: 2024-10-30

## 2024-10-30 NOTE — PATIENT INSTRUCTIONS
For the back pain continue Tizanidine, Cymbalta, and Gabapentin.  If no better, I recommend a follow-up with your Orthopedic Surgeon.     I recommend Shingrix (Shingles vaccine) and the RSV vaccine at the pharmacy, as we discussed.    I also recommend the Influenza vaccine and the COVID-19 vaccine, in our office or at the pharmacy, as we discussed.

## 2024-10-30 NOTE — PROGRESS NOTES
Subjective       Ernestina Epstein is a 54 y.o. female.     Chief Complaint   Patient presents with    Diabetes     3 month follow up    Hypertension    Hyperlipidemia    Hypothyroidism    Back Pain     Muscle spasms in low back    Paresthesias     bilateral hand numbness x 1 week       History obtained from the patient.      History of Present Illness     FSH was ordered to determine menopausal status (menstrual irregularities) on 7/17/2024, but not done.      The patient is complaining of worsening low back pain.  She denies radiation of the pain.  She has stable bilateral foot numbness, but no lower extremity weakness.  She has seen Orthopedics for this in the past.  She is on Cymbalta, Neurontin, and Tizanidine.  She does not feel like the medication is helping.    She is complaining of a 2-week history of bilateral hand numbness and burning.  She states it is difficult for her to work due to the pain.  She denies neck pain and stiffness.    Cardiac Follow-up: The patient is here today for a follow-up visit.       Her Hypertension has been stable.   Medication: Lisinopril and HCTZ.  Her Hyperlipidemia has been unstable.    LDL goal is <70.  Her last LDL was 136, TG 70.   Medication:  Atorvastatin.  Her Diabetes Mellitus Type 2 has been unstable.  Medication: Ozempic, Januvia, Farxiga, Actos, Atorvastatin, and Lisinopril.   Side Effects: None.     Comorbid illness: History of Cerebellar Stroke (CT 5/25/2022 showed a small remote lacunar infarct in the left cerebellum).      Procedures:  The patient had a negative cardiac stress test on 6/7/2019.        Interval Events: The patient states she has been off lisinopril for 2 months.  She has not been checking her blood pressure at home.  On 7/17/2024, Hemoglobin A1c was 8.5 (up from 7.6).  Ozempic was added, but there has been no dose increase since then.  She states her blood sugar at home has been 1  fasting and < 200 postprandial.  She denies  episodes of low blood sugar.   The patient's last Ophthalmology appointment was 8/7/2024 (Nicolás Mccormack OD) -bilateral mild nonproliferative retinopathy without macular edema.  She does check her feet daily.       Symptoms:  Denies chest pain (other than chronic chest wall pain), shortness of breath, MAIN, orthopnea, PND, palpitations, syncope, lower extremity edema, claudication, lightheadedness, and dizziness.  Associated Symptoms: Weight down 5 pounds in the past 3 months.   Reports stable myalgias (legs) and arthralgias (knees).  Reports stable memory and concentration issues.  No fatigue, headache, polyuria, polydipsia, or polyphagia.  Has stable burning pain, numbness, and tingling of the feet.  Worsened numbness of the hands as above.  Denies skin breakdown and ulcers.      Lifestyle:  She states she has a healthy diet overall.  She walks daily.    Tobacco Use: Former smoker (0.5 ppd [id number], quit 11/6/1.  Started smoking again 8/3/2022. Quit 11/2022).    Hypothyroidism Follow-Up: The patient is being seen for follow-up of Hypothyroidism, which is stable.    Interval Events: On 7/17/2024, T4 and TSH were normal.  She states she has been off her Levothyroxine for 2 months.  Symptoms:  Weight down 5 pounds in the past 3 months.  Stable cold intolerance.  Denies palpitations, lower extreme edema, fatigue, heat intolerance, diarrhea, constipation, hair loss, and dry skin.   Associated Symptoms: Worsened paresthesias of hands as above, but stable feet.  Stable myalgias (legs) and arthralgias (knees).  Medications:  Levothyroxine (Synthroid).     Current Outpatient Medications on File Prior to Visit   Medication Sig Dispense Refill    atorvastatin (LIPITOR) 80 MG tablet Take 1 tablet by mouth Every Night. 90 tablet 3    benzoyl peroxide 5 % external wash       Blood Glucose Monitoring Suppl (FREESTYLE FREEDOM LITE) w/Device kit Use twice daily   Dx  E11.9 1 each 0    Continuous Blood Gluc  (Dexcom G7  ) device Place 1 Application on the skin as directed by provider 1 (One) Time Per Week. 12 each 11    Continuous Glucose Sensor (Dexcom G7 Sensor) misc Place 1 Application on the skin as directed by provider 1 (One) Time Per Week. 12 each 11    Diclofenac Sodium (VOLTAREN) 1 % gel gel Apply 4 g topically to the appropriate area as directed 2 (Two) Times a Day. 100 g 0    DULoxetine (CYMBALTA) 60 MG capsule Take 1 capsule by mouth 2 (Two) Times a Day. 180 capsule 3    Farxiga 10 MG tablet TAKE 1 TABLET BY MOUTH DAILY 30 tablet 5    gabapentin (NEURONTIN) 300 MG capsule TAKE ONE TO THREE CAPSULES BY MOUTH EVERY NIGHT AT BEDTIME AS NEEDED FOR PAIN 90 capsule 1    glucose blood (FREESTYLE LITE) test strip USE TO TEST BLOOD SUGAR TWO TIMES A DAY AS DIRECTED 100 each 3    hydroCHLOROthiazide (HYDRODIURIL) 25 MG tablet TAKE 1 TABLET BY MOUTH DAILY 90 tablet 3    Januvia 100 MG tablet Take 1 tablet by mouth Daily. 90 tablet 3    Lancets (FREESTYLE) lancets USE  LANCET  TO TEST TWICE  DAILY 100 each 11    levothyroxine (SYNTHROID, LEVOTHROID) 100 MCG tablet TAKE ONE TABLET BY MOUTH DAILY 90 tablet 1    lisinopril (PRINIVIL,ZESTRIL) 20 MG tablet TAKE ONE TABLET BY MOUTH EVERY NIGHT AT BEDTIME 30 tablet 3    metoclopramide (REGLAN) 10 MG tablet Take 1 tablet by mouth 3 (Three) Times a Day As Needed (Nausea, vomiting, and abdominal pain). 50 tablet 1    omeprazole (priLOSEC) 40 MG capsule TAKE 1 CAPSULE BY MOUTH DAILY 90 capsule 1    ondansetron (ZOFRAN) 4 MG tablet Take 1 tablet by mouth Every 8 (Eight) Hours As Needed for Nausea or Vomiting.      pioglitazone (ACTOS) 45 MG tablet Take 1 tablet by mouth Daily. 90 tablet 3    rOPINIRole (REQUIP) 0.25 MG tablet       tiZANidine (ZANAFLEX) 4 MG tablet TAKE ONE TABLET BY MOUTH EVERY 8 HOURS AS NEEDED FOR MUSCLE PAIN 90 tablet 5    vitamin D (ERGOCALCIFEROL) 1.25 MG (88736 UT) capsule capsule Take 1 capsule by mouth 1 (One) Time Per Week. 5 capsule 3     No current  facility-administered medications on file prior to visit.       Current outpatient and discharge medications have been reconciled for the patient.  Reviewed by: Stephanie Tanner MD        The following portions of the patient's history were reviewed and updated as appropriate: allergies, current medications, past family history, past medical history, past social history, past surgical history, and problem list.    Review of Systems   Constitutional:  Negative for fatigue and unexpected weight change.   Eyes:  Negative for visual disturbance.   Respiratory:  Negative for cough, shortness of breath and wheezing.    Cardiovascular:  Negative for chest pain, palpitations and leg swelling.        No MAIN, orthopnea, or claudication.   Gastrointestinal:  Negative for abdominal pain, blood in stool, constipation, diarrhea, nausea and vomiting.        Denies melena.   Endocrine: Positive for cold intolerance. Negative for heat intolerance, polydipsia, polyphagia and polyuria.   Musculoskeletal:  Positive for arthralgias, back pain and myalgias.   Neurological:  Positive for numbness. Negative for dizziness, syncope, light-headedness and headaches.        Stable memory issues.   Psychiatric/Behavioral:  Positive for decreased concentration.          Objective       Blood pressure 136/80, pulse 68, temperature 97.7 °F (36.5 °C), temperature source Infrared, resp. rate 20, weight 94.3 kg (208 lb), not currently breastfeeding.  Body mass index is 33.03 kg/m².      Physical Exam  Vitals and nursing note reviewed.   Constitutional:       Appearance: She is well-developed.      Comments: BMI greater than 30   Neck:      Thyroid: No thyroid mass or thyromegaly.      Vascular: No carotid bruit.      Comments: There is no tenderness to palpation of the cervical spine or paraspinal muscles.  Cardiovascular:      Rate and Rhythm: Normal rate and regular rhythm.      Pulses: Normal pulses.      Heart sounds: Normal heart sounds. No murmur  heard.     No friction rub. No gallop.   Pulmonary:      Effort: Pulmonary effort is normal.      Breath sounds: Normal breath sounds.   Abdominal:      General: Bowel sounds are normal. There is no distension or abdominal bruit.      Palpations: Abdomen is soft. There is no hepatomegaly, splenomegaly or mass.      Tenderness: There is no abdominal tenderness. There is no right CVA tenderness or left CVA tenderness.      Comments: No CVA tenderness.   Musculoskeletal:         General: Normal range of motion.      Cervical back: Normal range of motion and neck supple.      Right lower leg: No edema.      Left lower leg: No edema.      Comments: There is no tenderness to palpation over the lumbar or thoracic spine or paraspinal muscles.  Straight leg raise is positive bilaterally, and there is also pain with lowering of both legs.  There is pain with bilateral hip flexion, extension, abduction, and adduction.     Skin:     Findings: No rash.   Neurological:      Mental Status: She is alert.      Motor: Motor function is intact.      Gait: Gait is intact.      Deep Tendon Reflexes: Reflexes are normal and symmetric.      Comments: Positive Tinel's and Phalen sign (increased numbness but no pain)   Psychiatric:         Mood and Affect: Mood normal.       Results for orders placed or performed in visit on 10/30/24   POC Glycosylated Hemoglobin (Hb A1C)    Collection Time: 10/30/24  1:53 PM    Specimen: Blood   Result Value Ref Range    Hemoglobin A1C 7.2 (A) 4.5 - 5.7 %    Lot Number [id number]     Expiration Date 07/04/2026      *Note: Due to a large number of results and/or encounters for the requested time period, some results have not been displayed. A complete set of results can be found in Results Review.       Assessment / Plan:  Diagnoses and all orders for this visit:    1. Type 2 diabetes mellitus with hyperglycemia, without long-term current use of insulin (Primary)  -     POC Glycosylated Hemoglobin (Hb  A1C)  -     Semaglutide,0.25 or 0.5MG/DOS, (Ozempic, 0.25 or 0.5 MG/DOSE,) 2 MG/3ML solution pen-injector; Inject 0.5 mg under the skin into the appropriate area as directed 1 (One) Time Per Week.  Dispense: 3 mL; Refill: 2- INCREASED DOSE  -     TSH  -     CBC & Differential  -     Lipid Panel  -     Comprehensive Metabolic Panel   Continue current medication(s) as noted in the history of present illness.    2. Primary hypertension  -     TSH  -     CBC & Differential  -     Lipid Panel  -     Comprehensive Metabolic Panel   Continue current medication(s) as noted in the history of present illness.   The patient was instructed to restart Lisinopril.    3. Dyslipidemia  -     TSH  -     CBC & Differential  -     Lipid Panel  -     Comprehensive Metabolic Panel   Continue current medication(s) as noted in the history of present illness.    4. Acquired hypothyroidism  -     T4, Free  -     TSH   The patient was instructed to restart Levothyroxine.    5. Obesity (BMI 30.0-34.9)  -     Semaglutide,0.25 or 0.5MG/DOS, (Ozempic, 0.25 or 0.5 MG/DOSE,) 2 MG/3ML solution pen-injector; Inject 0.5 mg under the skin into the appropriate area as directed 1 (One) Time Per Week.  Dispense: 3 mL; Refill: 2 - INCREASED DOSE    6. Acute bilateral low back pain without sciatica  -     meloxicam (MOBIC) 15 MG tablet; Take 1 tablet by mouth Daily As Needed for Moderate Pain.  Dispense: 30 tablet; Refill: 2- NEW  -     methylPREDNISolone (MEDROL) 4 MG dose pack; Take as directed on package instructions.  Dispense: 1 each; Refill: 0- NEW   Continue Tizanidine, Cymbalta, and Gabapentin.  If no better, I recommended a follow-up with her Orthopedic Surgeon.    7. Paresthesias  -     Folate  -     Vitamin B12   May need EMG    8. Menstrual disorder  -     Follicle Stimulating Hormone    9. Need for hepatitis B vaccination  -     Hepatitis B Vaccine Adult IM      I recommended Shingrix (Shingles vaccine) and the RSV vaccine at the  pharmacy.    I also recommend the Influenza vaccine and the COVID-19 vaccine, in our office or at the pharmacy.  The patient declined both vaccinations today.  She was informed she can be hospitalized and die from Influenza and/or COVID-19 infection.             Return in about 3 months (around 1/30/2025) for Annual physical, fasting after 1/30/2025.

## 2024-10-30 NOTE — LETTER
[facility]  Vaccine Consent Form    Patient Name:  Ernestina Epstein  Patient :  1970     Vaccine(s) Ordered    Hepatitis B Vaccine Adult IM        Screening Checklist  The following questions should be completed prior to vaccination. If you answer “yes” to any question, it does not necessarily mean you should not be vaccinated. It just means we may need to clarify or ask more questions. If a question is unclear, please ask your healthcare provider to explain it.    Yes No   Any fever or moderate to severe illness today (mild illness and/or antibiotic treatment are not contraindications)?     Do you have a history of a serious reaction to any previous vaccinations, such as anaphylaxis, encephalopathy within 7 days, Guillain-Ashland syndrome within 6 weeks, seizure?     Have you received any live vaccine(s) (e.g MMR, CLIF) or any other vaccines in the last month (to ensure duplicate doses aren't given)?     Do you have an anaphylactic allergy to latex (DTaP, DTaP-IPV, Hep A, Hep B, MenB, RV, Td, Tdap), baker’s yeast (Hep B, HPV), polysorbates (RSV, nirsevimab, PCV 20, Rotavirrus, Tdap, Shingrix), or gelatin (CLIF, MMR)?     Do you have an anaphylactic allergy to neomycin (Rabies, CLIF, MMR, IPV, Hep A), polymyxin B (IPV), or streptomycin (IPV)?      Any cancer, leukemia, AIDS, or other immune system disorder? (CLIF, MMR, RV)     Do you have a parent, brother, or sister with an immune system problem (if immune competence of vaccine recipient clinically verified, can proceed)? (MMR, CLIF)     Any recent steroid treatments for >2 weeks, chemotherapy, or radiation treatment? (CLIF, MMR)     Have you received antibody-containing blood transfusions or IVIG in the past 11 months (recommended interval is dependent on product)? (MMR, CLIF)     Have you taken antiviral drugs (acyclovir, famciclovir, valacyclovir for CLIF) in the last 24 or 48 hours, respectively?      Are you pregnant or planning to become pregnant  "within 1 month? (CLIF, MMR, HPV, IPV, MenB, Abrexvy; For Hep B- refer to Engerix-B; For RSV - Abrysvo is indicated for 32-36 weeks of pregnancy from September to January)     For infants, have you ever been told your child has had intussusception or a medical emergency involving obstruction of the intestine (Rotavirus)? If not for an infant, can skip this question.         *Ordering Physicians/APC should be consulted if \"yes\" is checked by the patient or guardian above.  I have received, read, and understand the Vaccine Information Statement (VIS) for each vaccine ordered.  I have considered my or my child's health status as well as the health status of my close contacts.  I have taken the opportunity to discuss my vaccine questions with my or my child's health care provider.   I have requested that the ordered vaccine(s) be given to me or my child.  I understand the benefits and risks of the vaccines.  I understand that I should remain in the clinic for 15 minutes after receiving the vaccine(s).  _________________________________________________________  Signature of Patient or Parent/Legal Guardian ____________________  Date     " 16yo M with Type 1 DM with recent h/o DKA with AMS in October 2021, now presenting as a transfer from [facility] ED with vomiting x1 day and elevated BG, found to be in severe DKA with hyperkalemia and EKG changes, MARILU  s/p PICU now transferred to Peds floor    Transitioned to SQ insulin yesterday - unable to transition to pump as no parent at bedside .  Today, at 1PM was planning to transition to pump if parent is at bedside and all criteria met (see below).  However, parents are not at bedside, consent not signed.      Patient is feeling much better today - slight nausea this morning, no chest pain no palpitations   Still on IVFs as ketones are moderate   BGs on the higher end --> already has very tight I;C ratio of 1:5; can consider strengthening the ISF or basal if continues to have high BGs.     1) Check BGs premeals, pre-bed and 2 AM   2) CANNOT Transition patient to pump today once again as No parent at bedside to sign consent for pump therapy. Parent at bedside is also needed to help with pump management as nursing staff does not manage the pump.   Also require 3 site changes at bedside.      Continue with SQ regimen    Basal 40 units to be given at 1 PM (unless placed on pump);  I:C 1:5, ISF: 1:30, Target 120   3) Continue IVFs NS at 1.5 maintenance; UA: Qvoid   4) BMP today to evaluation creatinine (overall trending down s/p MARILU)   4) Appreciate cardio f/u --> planning to repeat troponin tomorrow   5) The rest of the management as per PICU/hospitalist team     I spoke to mother this afternoon over the phone and update her on Domingo's status.   I explained to her once again that I could not transition Domingo to his pump today again   However, tomorrow at 13:00 can transition to pump if family wishes if  1) Consent is signed by guardian  2) 3 extra pump sets at bedside  3) Guardian is able to stay with Domingo throughout hospitalization as nurses cannot operate the pump   Mother expressed understanding     Pari Coffman MD  Pediatric Endocrionology   [phone]

## 2024-10-31 DIAGNOSIS — E03.9 ACQUIRED HYPOTHYROIDISM: ICD-10-CM

## 2024-10-31 DIAGNOSIS — I10 ESSENTIAL HYPERTENSION: ICD-10-CM

## 2024-11-01 NOTE — TELEPHONE ENCOUNTER
Last office visit (LOV) for chronic condition 10/30/2024  Next office visit (NOV)   Tried calling Ernestina to schedule physical fasting  appointment after 1/30/2025  No voicemail set up . Unable to leave a message

## 2024-11-03 ENCOUNTER — TELEPHONE (OUTPATIENT)
Dept: INTERNAL MEDICINE | Facility: CLINIC | Age: 54
End: 2024-11-03
Payer: COMMERCIAL

## 2024-11-03 DIAGNOSIS — M79.642 BILATERAL HAND PAIN: ICD-10-CM

## 2024-11-03 DIAGNOSIS — M54.50 ACUTE BILATERAL LOW BACK PAIN WITHOUT SCIATICA: Primary | ICD-10-CM

## 2024-11-03 DIAGNOSIS — M79.641 BILATERAL HAND PAIN: ICD-10-CM

## 2024-11-03 DIAGNOSIS — R20.2 PARESTHESIAS: ICD-10-CM

## 2024-11-03 NOTE — TELEPHONE ENCOUNTER
Call patient please.    Her labs did not show any cause for the numbness in her hands.  I would recommend scheduling a bilateral EMG nerve test to further evaluate this problem.  If she is agreeable, please send the message back to me and I will order the test.    Please also make sure she has restarted her Levothyroxine and Lisinopril.    I will also send a lab letter.

## 2024-11-04 NOTE — TELEPHONE ENCOUNTER
Pt notified, she would like to schedule a EMG.      Pt states she did not restart the Levothryroxine

## 2024-11-05 RX ORDER — LISINOPRIL 20 MG/1
TABLET ORAL
Qty: 30 TABLET | Refills: 3 | Status: SHIPPED | OUTPATIENT
Start: 2024-11-05

## 2024-11-05 RX ORDER — LEVOTHYROXINE SODIUM 100 UG/1
TABLET ORAL
Qty: 90 TABLET | Refills: 1 | Status: SHIPPED | OUTPATIENT
Start: 2024-11-05

## 2024-11-21 ENCOUNTER — PRIOR AUTHORIZATION (OUTPATIENT)
Dept: INTERNAL MEDICINE | Facility: CLINIC | Age: 54
End: 2024-11-21
Payer: COMMERCIAL

## 2024-11-22 NOTE — TELEPHONE ENCOUNTER
Ozempic 2mg/3ml-PA not required    Left voicemail notifying pharmacy that a PA is not required.       5

## 2024-12-28 DIAGNOSIS — M54.2 NECK PAIN: ICD-10-CM

## 2024-12-28 DIAGNOSIS — M25.511 ACUTE PAIN OF RIGHT SHOULDER: ICD-10-CM

## 2024-12-28 DIAGNOSIS — G89.4 CHRONIC PAIN SYNDROME: ICD-10-CM

## 2024-12-30 RX ORDER — GABAPENTIN 300 MG/1
CAPSULE ORAL
Qty: 90 CAPSULE | Refills: 0 | Status: SHIPPED | OUTPATIENT
Start: 2024-12-30

## 2024-12-30 NOTE — TELEPHONE ENCOUNTER
E-rx'd.    Please call the patient to schedule her physical, fasting, due anytime after 1/30/2025.

## 2025-01-07 ENCOUNTER — PRIOR AUTHORIZATION (OUTPATIENT)
Dept: INTERNAL MEDICINE | Facility: CLINIC | Age: 55
End: 2025-01-07
Payer: COMMERCIAL

## 2025-01-07 NOTE — TELEPHONE ENCOUNTER
Outcome  Approved today by [place] [employer] 2017  The request has been approved. The authorization is effective for a maximum of 12 fills from 01/07/2025 to 01/07/2026, as long as the member is enrolled in their current health plan. A written notification letter will follow with additional details.  Authorization Expiration Date: 1/6/2026

## 2025-01-25 DIAGNOSIS — I10 ESSENTIAL HYPERTENSION: ICD-10-CM

## 2025-01-26 DIAGNOSIS — E11.610 TYPE 2 DIABETES MELLITUS WITH DIABETIC NEUROPATHIC ARTHROPATHY, WITHOUT LONG-TERM CURRENT USE OF INSULIN: ICD-10-CM

## 2025-01-26 DIAGNOSIS — F32.A DEPRESSION, UNSPECIFIED DEPRESSION TYPE: ICD-10-CM

## 2025-01-26 DIAGNOSIS — G89.4 CHRONIC PAIN SYNDROME: ICD-10-CM

## 2025-01-26 DIAGNOSIS — K21.9 GASTROESOPHAGEAL REFLUX DISEASE, UNSPECIFIED WHETHER ESOPHAGITIS PRESENT: ICD-10-CM

## 2025-01-27 RX ORDER — SITAGLIPTIN 100 MG/1
100 TABLET, FILM COATED ORAL DAILY
Qty: 90 TABLET | Refills: 3 | Status: SHIPPED | OUTPATIENT
Start: 2025-01-27

## 2025-01-27 RX ORDER — OMEPRAZOLE 40 MG/1
40 CAPSULE, DELAYED RELEASE ORAL DAILY
Qty: 90 CAPSULE | Refills: 1 | Status: SHIPPED | OUTPATIENT
Start: 2025-01-27

## 2025-01-27 RX ORDER — DULOXETIN HYDROCHLORIDE 60 MG/1
60 CAPSULE, DELAYED RELEASE ORAL 2 TIMES DAILY
Qty: 180 CAPSULE | Refills: 3 | Status: SHIPPED | OUTPATIENT
Start: 2025-01-27

## 2025-01-27 RX ORDER — HYDROCHLOROTHIAZIDE 25 MG/1
25 TABLET ORAL DAILY
Qty: 90 TABLET | Refills: 3 | Status: SHIPPED | OUTPATIENT
Start: 2025-01-27

## 2025-02-05 DIAGNOSIS — G89.4 CHRONIC PAIN SYNDROME: ICD-10-CM

## 2025-02-05 NOTE — TELEPHONE ENCOUNTER
LOV 10/30/2024  NOV Not scheduled yet        Return in about 3 months (around 1/30/2025) for Annual physical, fasting after 1/30/2025.

## 2025-02-06 RX ORDER — GABAPENTIN 300 MG/1
CAPSULE ORAL
Qty: 90 CAPSULE | Refills: 0 | Status: SHIPPED | OUTPATIENT
Start: 2025-02-06

## 2025-02-10 NOTE — TELEPHONE ENCOUNTER
IM Progress Note    Admit Date:  1/30/2025  11    Interval history:  Influenza A and Acute respiratory failure   Was on bipap in iCU and improved, transferred to [facility]      Has NG placed for Ileus  , he pulled out NG and likely has aspirated became hypoxic and transferred to ICU     Seizure like event 2/7,   Remains confused  MRI brain incomplete     Subjective:  Mr. Early is fully oriented today  On 4 L of O2  Tachycardic       Objective:   BP (!) 163/78   Pulse (!) 122   Temp 98 °F (36.7 °C) (Oral)   Resp 20   Ht 1.702 m (5' 7.01\")   Wt 82.6 kg (182 lb 1.6 oz)   SpO2 98%   BMI 28.51 kg/m²     Intake/Output Summary (Last 24 hours) at 2/10/2025 0751  Last data filed at 2/10/2025 0336  Gross per 24 hour   Intake 667.7 ml   Output 1300 ml   Net -632.3 ml       Physical Exam:        General:  elderly obese male,oriented   NG in place  Mucous Membranes:  Pink , anicteric  Neck: No JVD, no carotid bruit, no thyromegaly  Chest:  Clear to auscultation bilaterally diminished in bases with occasional wheeze  Cardiovascular:  RRR S1S2 heard, no murmurs or gallops  Abdomen:  full,  obese +distended, mild diffuse tenderness   tender, no organomegaly, BS absent  Extremities: No edema or cyanosis. Distal pulses well felt  Neurological :alert,oriented   Moving all ext    Medications:   Scheduled Medications:    pantoprazole (PROTONIX) 40 mg in sodium chloride (PF) 0.9 % 10 mL injection  40 mg IntraVENous Daily    albuterol  2.5 mg Nebulization TID RT    levETIRAcetam  1,000 mg IntraVENous BID    metoprolol  5 mg IntraVENous Q6H    [Held by provider] metoprolol succinate  50 mg Oral BID    sodium chloride flush  5-40 mL IntraVENous 2 times per day    enoxaparin  40 mg SubCUTAneous Daily    insulin lispro  0-4 Units SubCUTAneous 4x Daily AC & HS    gabapentin  800 mg Oral 4x Daily    dilTIAZem  180 mg Oral Daily    allopurinol  300 mg Oral Daily    vitamin D3  5,000 Units Oral Daily    [Held by provider] furosemide  20 mg  Left message notifying pharmacy of approval   bases with mucous plugging in the left   lower lobe bronchi. This may be related to aspiration.   5. Cirrhosis.   6. Aorto bi-iliac graft is patent.         XR ABDOMEN (KUB) (SINGLE AP VIEW)   Final Result   Worsening small bowel obstruction.         XR ABDOMEN (2 VIEWS)   Final Result   Gastric distension with a few mildly dilated loops of small bowel in the left   upper abdominal quadrant.  Findings could represent gastroenteritis or   partial small bowel obstruction.         XR CHEST PORTABLE   Final Result   No acute process.         XR ABDOMEN (2 VIEWS)    (Results Pending)     Cultures  Blood- NGTD  Sputum - pending    Assessment & Plan:         Acute on chronic hypoxic and hypercarbic resp failure   Sec  to Influenza A and COPD exacerbation  Placed on BiPAP and admitted to the ICU  Used BiPAP on admission and improved some  Currently on 4 L of oxygen  Pred taper -completed   On tamiflu   Developed more resp distress after possible aspiration and now back in ICU - imaging with no new infiltrates, hold off abx   Pulmonary following      Acute COPD exacerbation  Given steroids, inhaled bronchodilators  Completed  azithromycin  Sputum cultures remain neg.  Stable on home o2 of 4 L     Partial SBO versus ileus   NPO , NG suction  IVF   Surgical consulted and plans for serial imaging  If not improved  , plans for surgery next week  Enema and suppository with no benefit     Influenza A infection   Continue Tamiflu- finished   Mucinex, tessalon for cough    Acute metabolic encephalopathy   - likely combination of recent steroids, flu infection and hypoxia  - supportive care. Ct head neg, see below  -resolved      Focal seizure involving LUE   Neuro consulted  Seizure precautions  CT head non contrast negative  MRI brain incomplete with motion artifact  EEG with mild gen slowing  Started keppra by neuro     Type 2 diabetes with neuropathy.  Continue Lantus insulin sliding scale insulin  Stable.  -continue gabapentin

## 2025-02-12 ENCOUNTER — OFFICE VISIT (OUTPATIENT)
Dept: INTERNAL MEDICINE | Facility: CLINIC | Age: 55
End: 2025-02-12
Payer: COMMERCIAL

## 2025-02-12 VITALS
OXYGEN SATURATION: 94 % | HEART RATE: 64 BPM | RESPIRATION RATE: 20 BRPM | DIASTOLIC BLOOD PRESSURE: 86 MMHG | WEIGHT: 215.4 LBS | SYSTOLIC BLOOD PRESSURE: 134 MMHG | HEIGHT: 67 IN | BODY MASS INDEX: 33.81 KG/M2 | TEMPERATURE: 98.1 F

## 2025-02-12 DIAGNOSIS — E11.43 TYPE 2 DIABETES MELLITUS WITH DIABETIC AUTONOMIC NEUROPATHY, WITHOUT LONG-TERM CURRENT USE OF INSULIN: ICD-10-CM

## 2025-02-12 DIAGNOSIS — E55.9 VITAMIN D DEFICIENCY: ICD-10-CM

## 2025-02-12 DIAGNOSIS — D22.5 ATYPICAL NEVUS OF BACK: ICD-10-CM

## 2025-02-12 DIAGNOSIS — I10 PRIMARY HYPERTENSION: ICD-10-CM

## 2025-02-12 DIAGNOSIS — Z23 NEED FOR HEPATITIS B VACCINATION: ICD-10-CM

## 2025-02-12 DIAGNOSIS — E78.5 DYSLIPIDEMIA: ICD-10-CM

## 2025-02-12 DIAGNOSIS — G89.4 CHRONIC PAIN SYNDROME: ICD-10-CM

## 2025-02-12 DIAGNOSIS — Z78.0 MENOPAUSE: ICD-10-CM

## 2025-02-12 DIAGNOSIS — E03.9 ACQUIRED HYPOTHYROIDISM: ICD-10-CM

## 2025-02-12 DIAGNOSIS — N83.202 CYST OF LEFT OVARY: ICD-10-CM

## 2025-02-12 DIAGNOSIS — F51.01 PRIMARY INSOMNIA: ICD-10-CM

## 2025-02-12 DIAGNOSIS — F32.A DEPRESSION, UNSPECIFIED DEPRESSION TYPE: ICD-10-CM

## 2025-02-12 DIAGNOSIS — Z00.00 ENCOUNTER FOR HEALTH MAINTENANCE EXAMINATION IN ADULT: Primary | ICD-10-CM

## 2025-02-12 DIAGNOSIS — J30.2 SEASONAL ALLERGIC RHINITIS, UNSPECIFIED TRIGGER: ICD-10-CM

## 2025-02-12 DIAGNOSIS — K21.9 GASTROESOPHAGEAL REFLUX DISEASE, UNSPECIFIED WHETHER ESOPHAGITIS PRESENT: ICD-10-CM

## 2025-02-12 DIAGNOSIS — E66.811 OBESITY (BMI 30.0-34.9): ICD-10-CM

## 2025-02-12 DIAGNOSIS — Z12.31 ENCOUNTER FOR SCREENING MAMMOGRAM FOR BREAST CANCER: ICD-10-CM

## 2025-02-12 LAB
25(OH)D3 SERPL-MCNC: 21 NG/ML (ref 30–100)
ALBUMIN SERPL-MCNC: 4.3 G/DL (ref 3.5–5.2)
ALBUMIN/GLOB SERPL: 1.4 G/DL
ALP SERPL-CCNC: 104 U/L (ref 39–117)
ALT SERPL W P-5'-P-CCNC: 12 U/L (ref 1–33)
ANION GAP SERPL CALCULATED.3IONS-SCNC: 9 MMOL/L (ref 5–15)
AST SERPL-CCNC: 12 U/L (ref 1–32)
BASOPHILS # BLD AUTO: 0.03 10*3/MM3 (ref 0–0.2)
BASOPHILS NFR BLD AUTO: 0.3 % (ref 0–1.5)
BILIRUB SERPL-MCNC: 0.6 MG/DL (ref 0–1.2)
BUN SERPL-MCNC: 19 MG/DL (ref 6–20)
BUN/CREAT SERPL: 21.3 (ref 7–25)
CALCIUM SPEC-SCNC: 9.7 MG/DL (ref 8.6–10.5)
CHLORIDE SERPL-SCNC: 102 MMOL/L (ref 98–107)
CHOLEST SERPL-MCNC: 244 MG/DL (ref 0–200)
CO2 SERPL-SCNC: 28 MMOL/L (ref 22–29)
CREAT SERPL-MCNC: 0.89 MG/DL (ref 0.57–1)
DEPRECATED RDW RBC AUTO: 45.5 FL (ref 37–54)
EGFRCR SERPLBLD CKD-EPI 2021: 77.6 ML/MIN/1.73
EOSINOPHIL # BLD AUTO: 0.31 10*3/MM3 (ref 0–0.4)
EOSINOPHIL NFR BLD AUTO: 3 % (ref 0.3–6.2)
ERYTHROCYTE [DISTWIDTH] IN BLOOD BY AUTOMATED COUNT: 13.4 % (ref 12.3–15.4)
EXPIRATION DATE: ABNORMAL
GLOBULIN UR ELPH-MCNC: 3 GM/DL
GLUCOSE SERPL-MCNC: 122 MG/DL (ref 65–99)
HBA1C MFR BLD: 7.6 % (ref 4.5–5.7)
HCT VFR BLD AUTO: 41.2 % (ref 34–46.6)
HDLC SERPL-MCNC: 93 MG/DL (ref 40–60)
HGB BLD-MCNC: 13.2 G/DL (ref 12–15.9)
IMM GRANULOCYTES # BLD AUTO: 0.05 10*3/MM3 (ref 0–0.05)
IMM GRANULOCYTES NFR BLD AUTO: 0.5 % (ref 0–0.5)
LDLC SERPL CALC-MCNC: 142 MG/DL (ref 0–100)
LDLC/HDLC SERPL: 1.51 {RATIO}
LYMPHOCYTES # BLD AUTO: 2.6 10*3/MM3 (ref 0.7–3.1)
LYMPHOCYTES NFR BLD AUTO: 24.9 % (ref 19.6–45.3)
Lab: ABNORMAL
MCH RBC QN AUTO: 29.6 PG (ref 26.6–33)
MCHC RBC AUTO-ENTMCNC: 32 G/DL (ref 31.5–35.7)
MCV RBC AUTO: 92.4 FL (ref 79–97)
MONOCYTES # BLD AUTO: 0.91 10*3/MM3 (ref 0.1–0.9)
MONOCYTES NFR BLD AUTO: 8.7 % (ref 5–12)
NEUTROPHILS NFR BLD AUTO: 6.55 10*3/MM3 (ref 1.7–7)
NEUTROPHILS NFR BLD AUTO: 62.6 % (ref 42.7–76)
NRBC BLD AUTO-RTO: 0 /100 WBC (ref 0–0.2)
PLATELET # BLD AUTO: 188 10*3/MM3 (ref 140–450)
PMV BLD AUTO: 11.4 FL (ref 6–12)
POTASSIUM SERPL-SCNC: 3.9 MMOL/L (ref 3.5–5.2)
PROT SERPL-MCNC: 7.3 G/DL (ref 6–8.5)
RBC # BLD AUTO: 4.46 10*6/MM3 (ref 3.77–5.28)
SODIUM SERPL-SCNC: 139 MMOL/L (ref 136–145)
TRIGL SERPL-MCNC: 53 MG/DL (ref 0–150)
TSH SERPL DL<=0.05 MIU/L-ACNC: 1.02 UIU/ML (ref 0.27–4.2)
VLDLC SERPL-MCNC: 9 MG/DL (ref 5–40)
WBC NRBC COR # BLD AUTO: 10.45 10*3/MM3 (ref 3.4–10.8)

## 2025-02-12 PROCEDURE — 82306 VITAMIN D 25 HYDROXY: CPT | Performed by: INTERNAL MEDICINE

## 2025-02-12 PROCEDURE — 90746 HEPB VACCINE 3 DOSE ADULT IM: CPT | Performed by: INTERNAL MEDICINE

## 2025-02-12 PROCEDURE — 1125F AMNT PAIN NOTED PAIN PRSNT: CPT | Performed by: INTERNAL MEDICINE

## 2025-02-12 PROCEDURE — 90471 IMMUNIZATION ADMIN: CPT | Performed by: INTERNAL MEDICINE

## 2025-02-12 PROCEDURE — 1160F RVW MEDS BY RX/DR IN RCRD: CPT | Performed by: INTERNAL MEDICINE

## 2025-02-12 PROCEDURE — 83036 HEMOGLOBIN GLYCOSYLATED A1C: CPT | Performed by: INTERNAL MEDICINE

## 2025-02-12 PROCEDURE — 99396 PREV VISIT EST AGE 40-64: CPT | Performed by: INTERNAL MEDICINE

## 2025-02-12 PROCEDURE — 3079F DIAST BP 80-89 MM HG: CPT | Performed by: INTERNAL MEDICINE

## 2025-02-12 PROCEDURE — 1159F MED LIST DOCD IN RCRD: CPT | Performed by: INTERNAL MEDICINE

## 2025-02-12 PROCEDURE — 3051F HG A1C>EQUAL 7.0%<8.0%: CPT | Performed by: INTERNAL MEDICINE

## 2025-02-12 PROCEDURE — 80061 LIPID PANEL: CPT | Performed by: INTERNAL MEDICINE

## 2025-02-12 PROCEDURE — 3075F SYST BP GE 130 - 139MM HG: CPT | Performed by: INTERNAL MEDICINE

## 2025-02-12 PROCEDURE — 80050 GENERAL HEALTH PANEL: CPT | Performed by: INTERNAL MEDICINE

## 2025-02-12 RX ORDER — FLUTICASONE PROPIONATE 50 MCG
SPRAY, SUSPENSION (ML) NASAL
COMMUNITY
Start: 2025-01-21

## 2025-02-12 RX ORDER — AMANTADINE HYDROCHLORIDE 100 MG/1
TABLET ORAL
COMMUNITY
Start: 2025-01-28

## 2025-02-12 NOTE — PATIENT INSTRUCTIONS
I recommend the RSV vaccine and the Shingrix (Shingles vaccine) at the pharmacy, as we discussed.    I recommend the Influenza vaccine, the COVID-19 bivalent vaccine, and the Prevnar 20 (Pneumococcal) vaccine, in our office or at the pharmacy, as we discussed.    Health Maintenance for Postmenopausal Women  Menopause is a normal process in which your ability to get pregnant comes to an end. This process happens slowly over many months or years, usually between the ages of 48 and 55. Menopause is complete when you have missed your menstrual period for 12 months.  It is important to talk with your health care provider about some of the most common conditions that affect women after menopause (postmenopausal women). These include heart disease, cancer, and bone loss (osteoporosis). Adopting a healthy lifestyle and getting preventive care can help to promote your health and wellness. The actions you take can also lower your chances of developing some of these common conditions.  What are the signs and symptoms of menopause?  During menopause, you may have the following symptoms:  Hot flashes. These can be moderate or severe.  Night sweats.  Decrease in sex drive.  Mood swings.  Headaches.  Tiredness (fatigue).  Irritability.  Memory problems.  Problems falling asleep or staying asleep.  Talk with your health care provider about treatment options for your symptoms.  Do I need hormone replacement therapy?  Hormone replacement therapy is effective in treating symptoms that are caused by menopause, such as hot flashes and night sweats.  Hormone replacement carries certain risks, especially as you become older. If you are thinking about using estrogen or estrogen with progestin, discuss the benefits and risks with your health care provider.  How can I reduce my risk for heart disease and stroke?  The risk of heart disease, heart attack, and stroke increases as you age. One of the causes may be a change in the body's hormones  during menopause. This can affect how your body uses dietary fats, triglycerides, and cholesterol. Heart attack and stroke are medical emergencies. There are many things that you can do to help prevent heart disease and stroke.  Watch your blood pressure  High blood pressure causes heart disease and increases the risk of stroke. This is more likely to develop in people who have high blood pressure readings or are overweight.  Have your blood pressure checked:  Every 3-5 years if you are 18-39 years of age.  Every year if you are 40 years old or older.  Eat a healthy diet    Eat a diet that includes plenty of vegetables, fruits, low-fat dairy products, and lean protein.  Do not eat a lot of foods that are high in solid fats, added sugars, or sodium.  Get regular exercise  Get regular exercise. This is one of the most important things you can do for your health. Most adults should:  Try to exercise for at least 150 minutes each week. The exercise should increase your heart rate and make you sweat (moderate-intensity exercise).  Try to do strengthening exercises at least twice each week. Do these in addition to the moderate-intensity exercise.  Spend less time sitting. Even light physical activity can be beneficial.  Other tips  Work with your health care provider to achieve or maintain a healthy weight.  Do not use any products that contain nicotine or tobacco. These products include cigarettes, chewing tobacco, and vaping devices, such as e-cigarettes. If you need help quitting, ask your health care provider.  Know your numbers. Ask your health care provider to check your cholesterol and your blood sugar (glucose). Continue to have your blood tested as directed by your health care provider.  Do I need screening for cancer?  Depending on your health history and family history, you may need to have cancer screenings at different stages of your life. This may include screening for:  Breast cancer.  Cervical  cancer.  Lung cancer.  Colorectal cancer.  What is my risk for osteoporosis?  After menopause, you may be at increased risk for osteoporosis. Osteoporosis is a condition in which bone destruction happens more quickly than new bone creation. To help prevent osteoporosis or the bone fractures that can happen because of osteoporosis, you may take the following actions:  If you are 19-50 years old, get at least 1,000 mg of calcium and at least 600 international units (IU) of vitamin D per day.  If you are older than age 50 but younger than age 70, get at least 1,200 mg of calcium and at least 600 international units (IU) of vitamin D per day.  If you are older than age 70, get at least 1,200 mg of calcium and at least 800 international units (IU) of vitamin D per day.  Smoking and drinking excessive alcohol increase the risk of osteoporosis. Eat foods that are rich in calcium and vitamin D, and do weight-bearing exercises several times each week as directed by your health care provider.  How does menopause affect my mental health?  Depression may occur at any age, but it is more common as you become older. Common symptoms of depression include:  Feeling depressed.  Changes in sleep patterns.  Changes in appetite or eating patterns.  Feeling an overall lack of motivation or enjoyment of activities that you previously enjoyed.  Frequent crying spells.  Talk with your health care provider if you think that you are experiencing any of these symptoms.  General instructions  See your health care provider for regular wellness exams and vaccines. This may include:  Scheduling regular health, dental, and eye exams.  Getting and maintaining your vaccines. These include:  Influenza vaccine. Get this vaccine each year before the flu season begins.  Pneumonia vaccine.  Shingles vaccine.  Tetanus, diphtheria, and pertussis (Tdap) booster vaccine.  Your health care provider may also recommend other immunizations.  Tell your health  care provider if you have ever been abused or do not feel safe at home.  Summary  Menopause is a normal process in which your ability to get pregnant comes to an end.  This condition causes hot flashes, night sweats, decreased interest in sex, mood swings, headaches, or lack of sleep.  Treatment for this condition may include hormone replacement therapy.  Take actions to keep yourself healthy, including exercising regularly, eating a healthy diet, watching your weight, and checking your blood pressure and blood sugar levels.  Get screened for cancer and depression. Make sure that you are up to date with all your vaccines.  This information is not intended to replace advice given to you by your health care provider. Make sure you discuss any questions you have with your health care provider.  Document Revised: 05/09/2022 Document Reviewed: 05/09/2022  [employer] Patient Education © 2024 [employer]    MyPlate from [employer]    MyPlate is an outline of a general healthy diet based on the Dietary Guidelines for Americans, 8890-1119, from the [place] Department of Agriculture (USDA). It sets guidelines for how much food you should eat from each food group based on your age, sex, and level of physical activity.  What are tips for following MyPlate?  To follow MyPlate recommendations:  Eat a wide variety of fruits and vegetables, grains, and protein foods.  Serve smaller portions and eat less food throughout the day.  Limit portion sizes to avoid overeating.  Enjoy your food.  Get at least 150 minutes of exercise every week. This is about 30 minutes each day, 5 or more days per week.  It can be difficult to have every meal look like MyPlate. Think about MyPlate as eating guidelines for an entire day, rather than each individual meal.  Fruits and vegetables  Make one half of your plate fruits and vegetables.  Eat many different colors of fruits and vegetables each day.  For a 2,000-calorie daily food plan, eat:  2½ cups of  vegetables every day.  2 cups of fruit every day.  1 cup is equal to:  1 cup raw or cooked vegetables.  1 cup raw fruit.  1 medium-sized orange, apple, or banana.  1 cup 100% fruit or vegetable juice.  2 cups raw leafy greens, such as lettuce, spinach, or kale.  ½ cup dried fruit.  Grains  One fourth of your plate should be grains.  Make at least half of the grains you eat each day whole grains.  For a 2,000-calorie daily food plan, eat 6 oz of grains every day.  1 oz is equal to:  1 slice bread.  1 cup cereal.  ½ cup cooked rice, cereal, or pasta.  Protein  One fourth of your plate should be protein.  Eat a wide variety of protein foods, including meat, poultry, fish, eggs, beans, nuts, and tofu.  For a 2,000-calorie daily food plan, eat 5½ oz of protein every day.  1 oz is equal to:  1 oz meat, poultry, or fish.  ¼ cup cooked beans.  1 egg.  ½ oz nuts or seeds.  1 Tbsp peanut butter.  Dairy  Drink fat-free or low-fat (1%) milk.  Eat or drink dairy as a side to meals.  For a 2,000-calorie daily food plan, eat or drink 3 cups of dairy every day.  1 cup is equal to:  1 cup milk, yogurt, cottage cheese, or soy milk (soy beverage).  2 oz processed cheese.  1½ oz natural cheese.  Fats, oils, salt, and sugars  Only small amounts of oils are recommended.  Avoid foods that are high in calories and low in nutritional value (empty calories), like foods high in fat or added sugars.  Choose foods that are low in salt (sodium). Choose foods that have less than 140 milligrams (mg) of sodium per serving.  Drink water instead of sugary drinks. Drink enough fluid to keep your urine pale yellow.  Where to find support  Work with your health care provider or a dietitian to develop a customized eating plan that is right for you.  Download an raven (mobile application) to help you track your daily food intake.  Where to find more information  USDA: ChooseMyPlate.gov  Summary  MyPlate is a general guideline for healthy eating from the  USDA. It is based on the Dietary Guidelines for Americans, 5571-4499.  In general, fruits and vegetables should take up one half of your plate, grains should take up one fourth of your plate, and protein should take up one fourth of your plate.  This information is not intended to replace advice given to you by your health care provider. Make sure you discuss any questions you have with your health care provider.  Document Revised: 11/08/2021 Document Reviewed: 11/08/2021  [employer] Patient Education © 2024 [employer]    Exercising to Lose Weight  Getting regular exercise is important for everyone. It is especially important if you are overweight. Being overweight increases your risk of heart disease, stroke, diabetes, high blood pressure, and several types of cancer. Exercising, and reducing the calories you consume, can help you lose weight and improve fitness and health.  Exercise can be moderate or vigorous intensity. To lose weight, most people need to do a certain amount of moderate or vigorous-intensity exercise each week.  How can exercise affect me?  You lose weight when you exercise enough to burn more calories than you eat. Exercise also reduces body fat and builds muscle. The more muscle you have, the more calories you burn. Exercise also:  Improves mood.  Reduces stress and tension.  Improves your overall fitness, flexibility, and endurance.  Increases bone strength.  Moderate-intensity exercise    Moderate-intensity exercise is any activity that gets you moving enough to burn at least three times more energy (calories) than if you were sitting.  Examples of moderate exercise include:  Walking a mile in 15 minutes.  Doing light yard work.  Biking at an easy pace.  Most people should get at least 150 minutes of moderate-intensity exercise a week to maintain their body weight.  Vigorous-intensity exercise  Vigorous-intensity exercise is any activity that gets you moving enough to burn at least six times  more calories than if you were sitting. When you exercise at this intensity, you should be working hard enough that you are not able to carry on a conversation.  Examples of vigorous exercise include:  Running.  Playing a team sport, such as football, basketball, and soccer.  Jumping rope.  Most people should get at least 75 minutes a week of vigorous exercise to maintain their body weight.  What actions can I take to lose weight?  The amount of exercise you need to lose weight depends on:  Your age.  The type of exercise.  Any health conditions you have.  Your overall physical ability.  Talk to your health care provider about how much exercise you need and what types of activities are safe for you.  Nutrition    Make changes to your diet as told by your health care provider or diet and nutrition specialist (dietitian). This may include:  Eating fewer calories.  Eating more protein.  Eating less unhealthy fats.  Eating a diet that includes fresh fruits and vegetables, whole grains, low-fat dairy products, and lean protein.  Avoiding foods with added fat, salt, and sugar.  Drink plenty of water while you exercise to prevent dehydration or heat stroke.  Activity  Choose an activity that you enjoy and set realistic goals. Your health care provider can help you make an exercise plan that works for you.  Exercise at a moderate or vigorous intensity most days of the week.  The intensity of exercise may vary from person to person. You can tell how intense a workout is for you by paying attention to your breathing and heartbeat. Most people will notice their breathing and heartbeat get faster with more intense exercise.  Do resistance training twice each week, such as:  Push-ups.  Sit-ups.  Lifting weights.  Using resistance bands.  Getting short amounts of exercise can be just as helpful as long, structured periods of exercise. If you have trouble finding time to exercise, try doing these things as part of your daily  routine:  Get up, stretch, and walk around every 30 minutes throughout the day.  Go for a walk during your lunch break.  Park your car farther away from your destination.  If you take public transportation, get off one stop early and walk the rest of the way.  Make phone calls while standing up and walking around.  Take the stairs instead of elevators or escalators.  Wear comfortable clothes and shoes with good support.  Do not exercise so much that you hurt yourself, feel dizzy, or get very short of breath.  Where to find more information  [place] Department of Health and Human Services: www.hhs.gov  Centers for Disease Control and Prevention: www.cdc.gov  Contact a health care provider:  Before starting a new exercise program.  If you have questions or concerns about your weight.  If you have a medical problem that keeps you from exercising.  Get help right away if:  You have any of the following while exercising:  Injury.  Dizziness.  Difficulty breathing or shortness of breath that does not go away when you stop exercising.  Chest pain.  Rapid heartbeat.  These symptoms may represent a serious problem that is an emergency. Do not wait to see if the symptoms will go away. Get medical help right away. Call your local emergency services (911 in the [place] Do not drive yourself to the hospital.  Summary  Getting regular exercise is especially important if you are overweight.  Being overweight increases your risk of heart disease, stroke, diabetes, high blood pressure, and several types of cancer.  Losing weight happens when you burn more calories than you eat.  Reducing the amount of calories you eat, and getting regular moderate or vigorous exercise each week, helps you lose weight.  This information is not intended to replace advice given to you by your health care provider. Make sure you discuss any questions you have with your health care provider.  Document Revised: 02/13/2022 Document Reviewed:  02/13/2022  [employer] Patient Education © 2024 [employer]

## 2025-02-12 NOTE — LETTER
[facility]  Vaccine Consent Form    Patient Name:  Ernestina Epstein  Patient :  1970     Vaccine(s) Ordered    Hepatitis B Vaccine Adult IM        Screening Checklist  The following questions should be completed prior to vaccination. If you answer “yes” to any question, it does not necessarily mean you should not be vaccinated. It just means we may need to clarify or ask more questions. If a question is unclear, please ask your healthcare provider to explain it.    Yes No   Any fever or moderate to severe illness today (mild illness and/or antibiotic treatment are not contraindications)?     Do you have a history of a serious reaction to any previous vaccinations, such as anaphylaxis, encephalopathy within 7 days, Guillain-[place] syndrome within 6 weeks, seizure?     Have you received any live vaccine(s) (e.g MMR, CLIF) or any other vaccines in the last month (to ensure duplicate doses aren't given)?     Do you have an anaphylactic allergy to latex (DTaP, DTaP-IPV, Hep A, Hep B, MenB, RV, Td, Tdap), baker’s yeast (Hep B, HPV), polysorbates (RSV, nirsevimab, PCV 20, Rotavirrus, Tdap, Shingrix), or gelatin (CLIF, MMR)?     Do you have an anaphylactic allergy to neomycin (Rabies, CLIF, MMR, IPV, Hep A), polymyxin B (IPV), or streptomycin (IPV)?      Any cancer, leukemia, AIDS, or other immune system disorder? (CLIF, MMR, RV)     Do you have a parent, brother, or sister with an immune system problem (if immune competence of vaccine recipient clinically verified, can proceed)? (MMR, CLIF)     Any recent steroid treatments for >2 weeks, chemotherapy, or radiation treatment? (CLIF, MMR)     Have you received antibody-containing blood transfusions or IVIG in the past 11 months (recommended interval is dependent on product)? (MMR, CLIF)     Have you taken antiviral drugs (acyclovir, famciclovir, valacyclovir for CLIF) in the last 24 or 48 hours, respectively?      Are you pregnant or planning to become pregnant  "within 1 month? (CLIF, MMR, HPV, IPV, MenB, Abrexvy; For Hep B- refer to Engerix-B; For RSV - Abrysvo is indicated for 32-36 weeks of pregnancy from September to January)     For infants, have you ever been told your child has had intussusception or a medical emergency involving obstruction of the intestine (Rotavirus)? If not for an infant, can skip this question.         *Ordering Physicians/APC should be consulted if \"yes\" is checked by the patient or guardian above.  I have received, read, and understand the Vaccine Information Statement (VIS) for each vaccine ordered.  I have considered my or my child's health status as well as the health status of my close contacts.  I have taken the opportunity to discuss my vaccine questions with my or my child's health care provider.   I have requested that the ordered vaccine(s) be given to me or my child.  I understand the benefits and risks of the vaccines.  I understand that I should remain in the clinic for 15 minutes after receiving the vaccine(s).  _________________________________________________________  Signature of Patient or Parent/Legal Guardian ____________________  Date     "

## 2025-02-12 NOTE — PROGRESS NOTES
Subjective     Chief Complaint:  Physical Exam.    Chief Complaint   Patient presents with    Annual Exam    Diabetes     6 month follow up    Hypertension    Hyperlipidemia       History of Present Illness    History obtained from the patient.    The patient reports a cough for the past 3 weeks.  She is on Flonase daily and has added Mucinex and TheraFlu with minimal relief.  She denies hemoptysis and other allergy symptoms.    The patient is here for follow-up of a Left Ovarian Cyst.  She had a CT abdomen/pelvis on 5/30/21 and a pelvic ultrasound on 9/2021, done at ,  which showed a right adnexal cyst.  CT abdomen/pelvis at  on 3/20/2020 showed a 3.3 cm left ovarian cyst.  CT abdomen/pelvis at  on 2/20/2023 was negative with the exception of mild plaque of the abdominal aorta.  Pelvic ultrasound at  on 3/15/2023, per Dr. Lalit White GYN, showed a stable left ovarian cyst.  They recommended a repeat TVUS in 2 to 3 months.  That has not been done yet.  On 4/3/2023, the patient had a negative CT abdomen/pelvis/chest at .  She has not had a recent Pap (DEYSI).  She denies pelvic pain, vaginal bleeding, and vaginal discharge.  She denies urinary frequency, urgency, dysuria, and hematuria.     Cardiac Follow-up: The patient is here today for a follow-up visit.       Her Hypertension has been stable.   Medication: Lisinopril and HCTZ.  Her Hyperlipidemia has been unstable.    LDL goal is <70.  Her last LDL was 122, TG 64.   Medication:  Atorvastatin.  Her Diabetes Mellitus Type 2 has been unstable.  Medication: Ozempic, Januvia, Farxiga, Actos, Atorvastatin, and Lisinopril.   Side Effects: None.     Comorbid illness: History of Cerebellar Stroke (CT 5/25/2022 showed a small remote lacunar infarct in the left cerebellum).      Procedures:  The patient had a negative cardiac stress test on 6/7/2019.        Interval Events:   On 7/17/2024, Hemoglobin A1c was 8.5 (up from 7.6).  Ozempic was added.  There  was no dose increase until 10/30/2024 (increased to 0.5 mg weekly).  On 10/30/2024, Hemoglobin A1c was 7.2.  There has been no Ozempic dose increase since then, but the patient states she has been taking it weekly.  She states her blood sugar at home has been 120's fasting and 230- 240 postprandial.  She denies episodes of low blood sugar.   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  She does check her feet daily.  She has not been checking her blood pressure at home.     Symptoms:  Denies chest pain (other than chronic chest wall pain), shortness of breath, MAIN, orthopnea, PND, palpitations, syncope, lower extremity edema, claudication, lightheadedness, and dizziness.  Associated Symptoms: Weight up 7 pounds since 10/30/2024 and up 2 pounds since 7/17/2024.   Reports stable myalgias (legs) and arthralgias (knees).  Reports stable memory and concentration issues.  No fatigue, headache, polyuria, polydipsia, or polyphagia.  Has stable burning pain, numbness, and tingling of the feet.  Stable numbness of the hands.  Phil helps.  Denies skin breakdown and ulcers.      Lifestyle:  She states she has a healthy diet overall.  She had been walking daily, but that decreased due to her back pain.    Tobacco Use: Former smoker (0.5 ppd [id number], quit 11/6/2021.  Started smoking again 8/3/2022. Quit 11/2022).     Hypothyroidism Follow-Up: The patient is being seen for follow-up of Hypothyroidism, which is stable.    Interval Events: On 10/30/2024, T4 and TSH were normal.    Symptoms:  Weight up 7 pounds since 10/30/2024 and up 2 pounds since 7/17/2024.  Stable cold intolerance.  Denies palpitations, lower extreme edema, fatigue, heat intolerance, diarrhea, constipation, hair loss, and dry skin.   Associated Symptoms: Stable myalgias (legs), arthralgias (knees), and paresthesias.  Medications:  Levothyroxine (Synthroid).       Chronic Pain Syndrome Follow-up: The patient is being seen for a routine clinic follow-up of Chronic Pain Syndrome, which is stable.  Interval Events: The patient is now being seen at  [facility], [facility], for Lumbar Radiculopathy and OA of the knees.  She has had a recent MRI of her C-spine.  Symptoms: stable low back pain, neck pain, chest wall pain, myalgias (legs), and arthralgias (knees).    Medication: Neurontin 600 mg 3 times daily and Tizanidine.  She is also on Cymbalta.     GERD Follow-Up: The patient is here for follow-up of Gastroesophageal Reflux Disease, which has been stable.   Procedures:The patient had an EGD on 10/6/2021 at  (reactive gastropathy) and on 11/23/2022 at [facility] (chronic active gastritis).   Interval Events: The patient was started on medication after her last EGD.    Symptoms: Denies abdominal pain, heartburn, acid reflux, nausea, vomiting, hematemesis, dysphagia, odynophagia, hematochezia, melena, early satiety, belching, and bloating.    Associated Symptoms: Has acute cough for the past 3 weeks.  Denies chronic cough, sore throat, hoarseness, and wheezing.    Medication: Prilosec daily.  Zofran and Reglan as needed.     Vitamin D Deficiency Follow-Up: The patient is here for follow-up of Vitamin D Deficiency, which is unstable.   Interval Events: On 10/30/2024, Vitamin D level was 19.6.  Symptoms:  Stable myalgias (legs), arthralgias (knees), and paresthesias (hands and feet).  Has occasional loss of balance.  Denies fatigue and gait instability.    Medication: Vitamin D3 daily.     Depression Follow-Up: The patient is here for follow-up of Depression, which is overall stable.    Interval Events: None.    Symptoms: Stable depression.  Reports mild anxiety and insomnia.  Denies panic attacks, anhedonia, feelings of hopelessness, feelings of worthlessness, feelings of guilt, memory loss, and concentration issues.    Associated Symptoms: Denies suicidal ideation  or thoughts of self-harm.    Medication: Cymbalta  Side Effects: None.      Ernestina Epstein is a 54 y.o. female who presents for an Annual Physical.      PMH, PSH, SocHx, FamHx, Allergies, and Medications: Reviewed and updated.    Outpatient Medications Prior to Visit   Medication Sig Dispense Refill    amantadine (SYMMETREL) 100 MG tablet       atorvastatin (LIPITOR) 80 MG tablet Take 1 tablet by mouth Every Night. 90 tablet 3    benzoyl peroxide 5 % external wash       Blood Glucose Monitoring Suppl (FREESTYLE FREEDOM LITE) w/Device kit Use twice daily   Dx  E11.9 1 each 0    Continuous Blood Gluc  (Dexcom G7 ) device Place 1 Application on the skin as directed by provider 1 (One) Time Per Week. 12 each 11    Continuous Glucose Sensor (Dexcom G7 Sensor) misc Place 1 Application on the skin as directed by provider 1 (One) Time Per Week. 12 each 11    Diclofenac Sodium (VOLTAREN) 1 % gel gel Apply 4 g topically to the appropriate area as directed 2 (Two) Times a Day. 100 g 0    DULoxetine (CYMBALTA) 60 MG capsule TAKE 1 CAPSULE BY MOUTH TWICE A  capsule 3    Farxiga 10 MG tablet TAKE 1 TABLET BY MOUTH DAILY 30 tablet 5    fluticasone (FLONASE) 50 MCG/ACT nasal spray       gabapentin (NEURONTIN) 300 MG capsule TAKE ONE TO THREE CAPSULES BY MOUTH EVERY NIGHT AT BEDTIME AS NEEDED FOR PAIN 90 capsule 0    glucose blood (FREESTYLE LITE) test strip USE TO TEST BLOOD SUGAR TWO TIMES A DAY AS DIRECTED 100 each 3    hydroCHLOROthiazide 25 MG tablet TAKE 1 TABLET BY MOUTH DAILY 90 tablet 3    Januvia 100 MG tablet TAKE 1 TABLET BY MOUTH DAILY 90 tablet 3    Lancets (FREESTYLE) lancets USE  LANCET  TO TEST TWICE  DAILY 100 each 11    levothyroxine (SYNTHROID, LEVOTHROID) 100 MCG tablet TAKE ONE TABLET BY MOUTH DAILY 90 tablet 1    lisinopril (PRINIVIL,ZESTRIL) 20 MG tablet TAKE ONE TABLET BY MOUTH EVERY NIGHT AT BEDTIME 30 tablet 3    meloxicam (MOBIC) 15 MG tablet Take 1 tablet by mouth Daily  As Needed for Moderate Pain. 30 tablet 2    metoclopramide (REGLAN) 10 MG tablet Take 1 tablet by mouth 3 (Three) Times a Day As Needed (Nausea, vomiting, and abdominal pain). 50 tablet 1    omeprazole (priLOSEC) 40 MG capsule TAKE 1 CAPSULE BY MOUTH DAILY 90 capsule 1    ondansetron (ZOFRAN) 4 MG tablet Take 1 tablet by mouth Every 8 (Eight) Hours As Needed for Nausea or Vomiting.      pioglitazone (ACTOS) 45 MG tablet Take 1 tablet by mouth Daily. 90 tablet 3    rOPINIRole (REQUIP) 0.25 MG tablet       vitamin D (ERGOCALCIFEROL) 1.25 MG (85474 UT) capsule capsule Take 1 capsule by mouth 1 (One) Time Per Week. 5 capsule 3    methylPREDNISolone (MEDROL) 4 MG dose pack Take as directed on package instructions. 1 each 0    Semaglutide,0.25 or 0.5MG/DOS, (Ozempic, 0.25 or 0.5 MG/DOSE,) 2 MG/3ML solution pen-injector Inject 0.5 mg under the skin into the appropriate area as directed 1 (One) Time Per Week. 3 mL 2    tiZANidine (ZANAFLEX) 4 MG tablet TAKE ONE TABLET BY MOUTH EVERY 8 HOURS AS NEEDED FOR MUSCLE PAIN 90 tablet 2     No facility-administered medications prior to visit.       Immunization History   Administered Date(s) Administered    COVID-19 (MODERNA) 1st,2nd,3rd Dose Monovalent 05/07/2021, 05/21/2021, 06/14/2021    Fluzone (or Fluarix & Flulaval for VFC) >6mos 01/13/2020    Fluzone Quad >6mos (Multi-dose) 01/13/2020    Hepatitis A 03/18/2019, 09/23/2019    Hepatitis B Adult/Adolescent IM 10/30/2024, 02/12/2025    Pneumococcal Conjugate 13-Valent (PCV13) 01/13/2020    Pneumococcal Polysaccharide (PPSV23) 04/19/2017    Tdap 02/25/2017         Patient Active Problem List   Diagnosis    Chronic pain syndrome    Depression    Hemorrhoid    Hypertension    Hypothyroidism    Insomnia    Arthralgia of hip    Arthralgia of shoulder    Degenerative disc disease, cervical    Cervical facet arthropathy/cervical spondylosis without myelopathy    Diabetic autonomic neuropathy    Hx of fusion of cervical spine    Obesity  (BMI 30.0-34.9)    Neuroforaminal stenosis of spine    Bilateral carpal tunnel syndrome    Type 2 diabetes mellitus, without long-term current use of insulin    GERD (gastroesophageal reflux disease)    Costochondral chest pain    Intercostal neuralgia    Hyperlipidemia    Cervical post-laminectomy syndrome    Vitamin D deficiency    Renal angiomyolipoma    Duplicated left renal collecting system    Conjunctival melanosis of left eye    Myopia of both eyes    Acute deep vein thrombosis (DVT) of left lower extremity    History of DVT (deep vein thrombosis)    Tetrahydrocannabinol (THC) use disorder, mild, abuse    History of cerebellar stroke    Primary osteoarthritis of both knees    Presbyopia of both eyes    Regular astigmatism of both eyes    Arthritis of carpometacarpal (CMC) joint of thumb    Menopause       Health Habits:  Dental Exam. up to date  Eye Exam. up to date  Hearing Loss:  No  Exercise: 0 times/week.  Current exercise activities include: none  Diet: Healthy  Multivitamin: No    Safe Driving:  Yes  Seat Belt:  Yes  Bike Helmet:  N/A  Skin Screening:  Yes  Sunscreen: Yes  SBE / CARMITA: Yes  Sexual Activity:  Not currently  Birth Control:  Menopause  STD Prevention:  N/A    Last Pap: N/A (DEYSI)  Last Mammogram: 2022, category 1.  Last DEXA Scan: None.  Last Colonoscopy: 2022, diverticulosis and hemorrhoids.  Last PSA: N/A.    Social:    Social History     Socioeconomic History    Marital status:     Number of children: 3   Tobacco Use    Smoking status: Some Days     Current packs/day: 0.00     Average packs/day: 0.5 packs/day for 29.8 years (14.9 ttl pk-yrs)     Types: Cigarettes     Start date:      Last attempt to quit: 2017     Years since quittin.2     Passive exposure: Past (spouse)    Smokeless tobacco: Never    Tobacco comments:     started smoking 8/3/2022. Quit 2022   Vaping Use    Vaping status: Never Used   Substance and Sexual Activity    Alcohol use: Not  "Currently    Drug use: Yes     Types: Marijuana     Comment: gummies 3 times per week since early 2023    Sexual activity: Not Currently     Partners: Male         Current Medical Providers:    Stephanie Tanner MD (Internal Medicine / Pediatrics)    The [facility] providers who are involved in the care of this patient are listed above.         Review of Systems   Constitutional:  Negative for fatigue and unexpected weight change.   Eyes:  Negative for visual disturbance.   Respiratory:  Positive for cough. Negative for shortness of breath and wheezing.    Cardiovascular:  Negative for chest pain, palpitations and leg swelling.        No MAIN, orthopnea, or claudication.   Gastrointestinal:  Negative for abdominal pain, blood in stool, constipation, diarrhea, nausea and vomiting.        Denies melena.   Endocrine: Positive for cold intolerance. Negative for heat intolerance, polydipsia, polyphagia and polyuria.   Musculoskeletal:  Positive for arthralgias, back pain, myalgias and neck pain.   Neurological:  Positive for numbness. Negative for dizziness, syncope, light-headedness and headaches.        Stable memory issues.   Psychiatric/Behavioral:  Positive for decreased concentration and sleep disturbance. Negative for self-injury and suicidal ideas. The patient is nervous/anxious.         Stable depression           Objective     Vitals:    02/12/25 0925   BP: 134/86   Pulse: 64   Resp: 20   Temp: 98.1 °F (36.7 °C)   SpO2: 94%   Weight: 97.7 kg (215 lb 6.4 oz)   Height: 169 cm (66.54\")   PainSc:   8   PainLoc: Back       Body mass index is 34.21 kg/m².    Physical Exam  Vitals and nursing note reviewed.   Constitutional:       Appearance: Normal appearance. She is well-developed.      Comments: BMI greater than 30   HENT:      Head: Normocephalic and atraumatic.      Right Ear: Tympanic membrane, ear canal and external ear normal.      Left Ear: Tympanic membrane, ear canal and external ear normal.      " Mouth/Throat:      Mouth: Mucous membranes are moist. No oral lesions.      Pharynx: Oropharynx is clear.      Comments: Tonsils normal.  Eyes:      Extraocular Movements: Extraocular movements intact.      Conjunctiva/sclera: Conjunctivae normal.      Pupils: Pupils are equal, round, and reactive to light.      Comments: Fundi normal bilaterally.   Neck:      Thyroid: No thyromegaly.      Vascular: No carotid bruit.   Cardiovascular:      Rate and Rhythm: Normal rate and regular rhythm.      Pulses: Normal pulses.      Heart sounds: Normal heart sounds. No murmur heard.     No friction rub. No gallop.   Pulmonary:      Effort: Pulmonary effort is normal.      Breath sounds: Normal breath sounds.   Chest:   Breasts:     Right: No inverted nipple, mass, nipple discharge, skin change or tenderness.      Left: No inverted nipple, mass, nipple discharge, skin change or tenderness.   Abdominal:      General: Bowel sounds are normal. There is no distension or abdominal bruit.      Palpations: Abdomen is soft. There is no hepatomegaly, splenomegaly or mass.      Tenderness: There is no abdominal tenderness.   Genitourinary:     Comments:  and rectal exam deferred.  Musculoskeletal:         General: Normal range of motion.      Cervical back: Normal range of motion and neck supple.      Right lower leg: No edema.      Left lower leg: No edema.   Feet:      Right foot:      Skin integrity: Dry skin present. No ulcer, blister, skin breakdown or callus.      Left foot:      Skin integrity: Dry skin present. No ulcer, blister, skin breakdown or callus.   Lymphadenopathy:      Cervical: No cervical adenopathy.      Upper Body:      Right upper body: No supraclavicular or axillary adenopathy.      Left upper body: No supraclavicular or axillary adenopathy.   Skin:     Findings: Lesion (black slightly papular and slightly irregular nevus left upper back) present. No rash.      Comments: No atypical skin lesions.   Neurological:       Mental Status: She is alert.      Cranial Nerves: No cranial nerve deficit.      Motor: Motor function is intact. No abnormal muscle tone.      Coordination: Coordination is intact.      Gait: Gait is intact.      Deep Tendon Reflexes: Reflexes are normal and symmetric.   Psychiatric:         Mood and Affect: Mood normal.         PHQ-2 Depression Screening  Little interest or pleasure in doing things? Not at all   Feeling down, depressed, or hopeless? Not at all   PHQ-2 Total Score 0         Counseling was given to patient for the following topics: appropriate exercise, healthy eating habits, disease prevention, risk factors for cancer, importance of self breast exam and breast health, importance of immunizations, including risks and benefits, sun safety, seatbelt use, safe driving, and safe sex. Also discussed the importance of regular dental and vision care, as well recommendation for a yearly screening skin exam after age 40.  Written information provided to patient on these topics and other health maintenance issues.    Results for orders placed or performed in visit on 02/12/25   POC Glycosylated Hemoglobin (Hb A1C)    Collection Time: 02/12/25 11:06 AM    Specimen: Blood   Result Value Ref Range    Hemoglobin A1C 7.6 (A) 4.5 - 5.7 %    Lot Number [id number]     Expiration Date 10 18 2026          Diagnoses and all orders for this visit:    1. Encounter for health maintenance examination in adult (Primary)    2. Type 2 diabetes mellitus with diabetic autonomic neuropathy, without long-term current use of insulin  -     POC Glycosylated Hemoglobin (Hb A1C)  -     Lipid Panel  -     Comprehensive Metabolic Panel  -     TSH  -     CBC & Differential  -     Semaglutide, 1 MG/DOSE, (Ozempic, 1 MG/DOSE,) 4 MG/3ML solution pen-injector; Inject 1 mg under the skin into the appropriate area as directed 1 (One) Time Per Week.  Dispense: 3 mL; Refill: 0- INCREASED DOSE   Continue current medication(s) as noted in the  history of present illness.    3. Primary hypertension  -     Lipid Panel  -     Comprehensive Metabolic Panel  -     TSH  -     CBC & Differential   Continue current medication(s) as noted in the history of present illness.    4. Dyslipidemia  -     Lipid Panel  -     Comprehensive Metabolic Panel  -     TSH  -     CBC & Differential   Continue current medication(s) as noted in the history of present illness.    5. Acquired hypothyroidism  -     TSH   Continue current medication(s) as noted in the history of present illness.    6. Obesity (BMI 30.0-34.9)  -     Semaglutide, 1 MG/DOSE, (Ozempic, 1 MG/DOSE,) 4 MG/3ML solution pen-injector; Inject 1 mg under the skin into the appropriate area as directed 1 (One) Time Per Week.  Dispense: 3 mL; Refill: 0- INCREASED DOSE    7. Chronic pain syndrome  -     gabapentin (NEURONTIN) 600 MG tablet; Take 1 tablet by mouth 3 (Three) Times a Day.  Dispense: 90 tablet; Refill: 5- REFILL  -     tiZANidine (ZANAFLEX) 4 MG tablet; Take 1 tablet by mouth Every 8 (Eight) Hours As Needed for Muscle Spasms.  Dispense: 90 tablet; Refill: 5- REFILL   Continue current medication(s) as noted in the history of present illness.  Follow up per Pain Management    8. Gastroesophageal reflux disease, unspecified whether esophagitis present   Continue current medication(s) as noted in the history of present illness.    9. Vitamin D deficiency  -     Vitamin D,25-Hydroxy   Continue Vitamin D supplementation.    10. Primary insomnia   Stable, no medication.    11. Depression, unspecified depression type   Continue current medication(s) as noted in the history of present illness.    12. Cyst of left ovary  -     Ambulatory Referral to Gynecology    13. Atypical nevus of back  -     Ambulatory Referral to Dermatology    14. Seasonal allergic rhinitis, unspecified trigger  -     levocetirizine (XYZAL) 5 MG tablet; Take 1 tablet by mouth At Night As Needed for Allergies.  Dispense: 30 tablet; Refill:  5    15. Menopause  -     DEXA Bone Density Axial; Future    16. Encounter for screening mammogram for breast cancer  -     Mammo Screening Digital Tomosynthesis Bilateral With CAD; Future    17. Need for hepatitis B vaccination  -     Hepatitis B Vaccine Adult IM           I recommended the RSV vaccine and the Shingrix (Shingles vaccine) at the pharmacy.    I recommended the Influenza vaccine, the COVID-19 bivalent vaccine, and the Prevnar 20 (Pneumococcal) vaccine, in our office or at the pharmacy. The patient declined Prevnar 20, COVID-19 bivalent vaccine, and Influenza vaccine in office today.  The patient was informed she could be hospitalized and die from Influenza, Pneumococcal, and/or COVID-19 infection.              Return in about 6 months (around 8/12/2025) for Recheck Diabetes, fasting.

## 2025-02-13 PROBLEM — Z78.0 MENOPAUSE: Status: ACTIVE | Noted: 2025-02-13

## 2025-02-13 RX ORDER — SEMAGLUTIDE 1.34 MG/ML
1 INJECTION, SOLUTION SUBCUTANEOUS WEEKLY
Qty: 3 ML | Refills: 0 | Status: SHIPPED | OUTPATIENT
Start: 2025-02-13

## 2025-02-13 RX ORDER — LEVOCETIRIZINE DIHYDROCHLORIDE 5 MG/1
5 TABLET, FILM COATED ORAL NIGHTLY PRN
Qty: 30 TABLET | Refills: 5 | Status: SHIPPED | OUTPATIENT
Start: 2025-02-13

## 2025-02-13 RX ORDER — GABAPENTIN 600 MG/1
600 TABLET ORAL 3 TIMES DAILY
Qty: 90 TABLET | Refills: 5 | Status: SHIPPED | OUTPATIENT
Start: 2025-02-13

## 2025-03-10 DIAGNOSIS — I10 ESSENTIAL HYPERTENSION: ICD-10-CM

## 2025-03-11 RX ORDER — LISINOPRIL 20 MG/1
20 TABLET ORAL
Qty: 90 TABLET | Refills: 1 | Status: SHIPPED | OUTPATIENT
Start: 2025-03-11

## 2025-03-12 ENCOUNTER — PRIOR AUTHORIZATION (OUTPATIENT)
Dept: INTERNAL MEDICINE | Facility: CLINIC | Age: 55
End: 2025-03-12
Payer: COMMERCIAL

## 2025-03-12 NOTE — TELEPHONE ENCOUNTER
Outcome  Approved today by [place] [employer] 2017  The request has been approved. The authorization is effective for a maximum of 12 fills from 03/12/2025 to 03/12/2026, as long as the member is enrolled in their current health plan. A written notification letter will follow with additional details.  Effective Date: 3/12/2025  Authorization Expiration Date: 3/12/2026

## 2025-04-01 DIAGNOSIS — I10 ESSENTIAL HYPERTENSION: ICD-10-CM

## 2025-04-01 RX ORDER — HYDROCHLOROTHIAZIDE 25 MG/1
25 TABLET ORAL DAILY
Qty: 90 TABLET | Refills: 3 | Status: SHIPPED | OUTPATIENT
Start: 2025-04-01

## 2025-04-01 NOTE — TELEPHONE ENCOUNTER
Caller: Ernestina Epstein    Relationship: Self    Best call back number:   Telephone Information:   Mobile [phone]       Requested Prescriptions:   Requested Prescriptions     Pending Prescriptions Disp Refills    hydroCHLOROthiazide 25 MG tablet 90 tablet 3     Sig: Take 1 tablet by mouth Daily.        Pharmacy where request should be sent: [facility] [id number] [address] [phone] [facility] [phone]      Last office visit with prescribing clinician: 2/12/2025   Last telemedicine visit with prescribing clinician: Visit date not found   Next office visit with prescribing clinician: 8/13/2025     Additional details provided by patient: PATIENT STATES HER PHARMACY TOLD HER THIS REFILL WAS PREVIOUSLY DENIED. PATIENT IS OUT OF HER MEDICATION AND UNSURE WHY IT WOULD HAVE BEEN DENIED. PLEASE CALL PATIENT TO LET HER KNOW THE REASON FOR THE DENIAL, OR TO LET HER KNOW IT HAS BEEN SENT TO THE PHARMACY.    Does the patient have less than a 3 day supply:  [x] Yes  [] No      Paresh Johnson Rep   04/01/25 15:59 EDT

## 2025-05-28 ENCOUNTER — HOSPITAL ENCOUNTER (OUTPATIENT)
Dept: MAMMOGRAPHY | Facility: HOSPITAL | Age: 55
Discharge: HOME | End: 2025-05-28
Payer: COMMERCIAL

## 2025-05-28 ENCOUNTER — HOSPITAL ENCOUNTER (OUTPATIENT)
Dept: BONE DENSITY | Facility: HOSPITAL | Age: 55
Discharge: HOME | End: 2025-05-28
Payer: COMMERCIAL

## 2025-05-28 DIAGNOSIS — Z12.31 ENCOUNTER FOR SCREENING MAMMOGRAM FOR BREAST CANCER: ICD-10-CM

## 2025-05-28 DIAGNOSIS — Z78.0 MENOPAUSE: ICD-10-CM

## 2025-05-28 PROCEDURE — 77067 SCR MAMMO BI INCL CAD: CPT

## 2025-05-28 PROCEDURE — 77080 DXA BONE DENSITY AXIAL: CPT

## 2025-05-28 PROCEDURE — 77063 BREAST TOMOSYNTHESIS BI: CPT | Performed by: RADIOLOGY

## 2025-05-28 PROCEDURE — 77067 SCR MAMMO BI INCL CAD: CPT | Performed by: RADIOLOGY

## 2025-05-28 PROCEDURE — 77063 BREAST TOMOSYNTHESIS BI: CPT

## 2025-06-17 PROBLEM — M85.851 OSTEOPENIA OF RIGHT HIP: Status: ACTIVE | Noted: 2025-06-17

## 2025-06-27 ENCOUNTER — PRIOR AUTHORIZATION (OUTPATIENT)
Dept: INTERNAL MEDICINE | Facility: CLINIC | Age: 55
End: 2025-06-27
Payer: COMMERCIAL

## 2025-06-27 NOTE — TELEPHONE ENCOUNTER
This request has been approved.     The authorization is effective from 06/27/2025 to 06/27/2026.    Pt informed

## 2025-07-28 DIAGNOSIS — E11.65 TYPE 2 DIABETES MELLITUS WITH HYPERGLYCEMIA, WITHOUT LONG-TERM CURRENT USE OF INSULIN: ICD-10-CM

## 2025-07-28 RX ORDER — DAPAGLIFLOZIN 10 MG/1
1 TABLET, FILM COATED ORAL DAILY
Qty: 30 TABLET | Refills: 5 | Status: SHIPPED | OUTPATIENT
Start: 2025-07-28

## 2025-08-04 DIAGNOSIS — E66.811 OBESITY (BMI 30.0-34.9): ICD-10-CM

## 2025-08-04 DIAGNOSIS — K21.9 GASTROESOPHAGEAL REFLUX DISEASE, UNSPECIFIED WHETHER ESOPHAGITIS PRESENT: ICD-10-CM

## 2025-08-04 DIAGNOSIS — E11.43 TYPE 2 DIABETES MELLITUS WITH DIABETIC AUTONOMIC NEUROPATHY, WITHOUT LONG-TERM CURRENT USE OF INSULIN: ICD-10-CM

## 2025-08-04 DIAGNOSIS — E11.610 TYPE 2 DIABETES MELLITUS WITH DIABETIC NEUROPATHIC ARTHROPATHY, WITHOUT LONG-TERM CURRENT USE OF INSULIN: ICD-10-CM

## 2025-08-04 RX ORDER — ACYCLOVIR 400 MG/1
TABLET ORAL
Qty: 3 EACH | Refills: 0 | Status: SHIPPED | OUTPATIENT
Start: 2025-08-04

## 2025-08-04 RX ORDER — OMEPRAZOLE 40 MG/1
40 CAPSULE, DELAYED RELEASE ORAL DAILY
Qty: 90 CAPSULE | Refills: 1 | Status: SHIPPED | OUTPATIENT
Start: 2025-08-04

## 2025-08-04 RX ORDER — SEMAGLUTIDE 2.68 MG/ML
2 INJECTION, SOLUTION SUBCUTANEOUS WEEKLY
Qty: 3 ML | Refills: 5 | Status: SHIPPED | OUTPATIENT
Start: 2025-08-04

## 2025-08-13 ENCOUNTER — OFFICE VISIT (OUTPATIENT)
Dept: INTERNAL MEDICINE | Facility: CLINIC | Age: 55
End: 2025-08-13
Payer: COMMERCIAL

## 2025-08-13 VITALS
SYSTOLIC BLOOD PRESSURE: 122 MMHG | HEART RATE: 72 BPM | BODY MASS INDEX: 33.07 KG/M2 | RESPIRATION RATE: 18 BRPM | TEMPERATURE: 97.3 F | WEIGHT: 208.2 LBS | DIASTOLIC BLOOD PRESSURE: 74 MMHG

## 2025-08-13 DIAGNOSIS — E78.5 DYSLIPIDEMIA: ICD-10-CM

## 2025-08-13 DIAGNOSIS — K21.9 GASTROESOPHAGEAL REFLUX DISEASE, UNSPECIFIED WHETHER ESOPHAGITIS PRESENT: ICD-10-CM

## 2025-08-13 DIAGNOSIS — M48.02 CERVICAL SPINAL STENOSIS: ICD-10-CM

## 2025-08-13 DIAGNOSIS — I10 PRIMARY HYPERTENSION: ICD-10-CM

## 2025-08-13 DIAGNOSIS — R82.998 LEUKOCYTES IN URINE: ICD-10-CM

## 2025-08-13 DIAGNOSIS — R20.2 PARESTHESIAS: ICD-10-CM

## 2025-08-13 DIAGNOSIS — M85.851 OSTEOPENIA OF RIGHT HIP: ICD-10-CM

## 2025-08-13 DIAGNOSIS — Z23 NEED FOR HEPATITIS B VACCINATION: ICD-10-CM

## 2025-08-13 DIAGNOSIS — M51.362 DEGENERATION OF INTERVERTEBRAL DISC OF LUMBAR REGION WITH DISCOGENIC BACK PAIN AND LOWER EXTREMITY PAIN: ICD-10-CM

## 2025-08-13 DIAGNOSIS — M50.30 DEGENERATIVE DISC DISEASE, CERVICAL: ICD-10-CM

## 2025-08-13 DIAGNOSIS — E55.9 VITAMIN D DEFICIENCY: ICD-10-CM

## 2025-08-13 DIAGNOSIS — E03.9 ACQUIRED HYPOTHYROIDISM: ICD-10-CM

## 2025-08-13 DIAGNOSIS — G89.4 CHRONIC PAIN SYNDROME: ICD-10-CM

## 2025-08-13 DIAGNOSIS — E11.43 TYPE 2 DIABETES MELLITUS WITH DIABETIC AUTONOMIC NEUROPATHY, WITHOUT LONG-TERM CURRENT USE OF INSULIN: Primary | ICD-10-CM

## 2025-08-13 DIAGNOSIS — E66.811 OBESITY (BMI 30.0-34.9): ICD-10-CM

## 2025-08-13 DIAGNOSIS — F32.A DEPRESSION, UNSPECIFIED DEPRESSION TYPE: ICD-10-CM

## 2025-08-13 DIAGNOSIS — M48.062 SPINAL STENOSIS OF LUMBAR REGION WITH NEUROGENIC CLAUDICATION: ICD-10-CM

## 2025-08-13 LAB
ALBUMIN SERPL-MCNC: 4.2 G/DL (ref 3.5–5.2)
ALBUMIN/GLOB SERPL: 1.3 G/DL
ALP SERPL-CCNC: 100 U/L (ref 39–117)
ALT SERPL W P-5'-P-CCNC: 9 U/L (ref 1–33)
ANION GAP SERPL CALCULATED.3IONS-SCNC: 13.4 MMOL/L (ref 5–15)
AST SERPL-CCNC: 15 U/L (ref 1–32)
BASOPHILS # BLD AUTO: 0.03 10*3/MM3 (ref 0–0.2)
BASOPHILS NFR BLD AUTO: 0.4 % (ref 0–1.5)
BILIRUB BLD-MCNC: NEGATIVE MG/DL
BILIRUB SERPL-MCNC: 0.4 MG/DL (ref 0–1.2)
BUN SERPL-MCNC: 25 MG/DL (ref 6–20)
BUN/CREAT SERPL: 24 (ref 7–25)
CALCIUM SPEC-SCNC: 9.8 MG/DL (ref 8.6–10.5)
CHLORIDE SERPL-SCNC: 101 MMOL/L (ref 98–107)
CHOLEST SERPL-MCNC: 235 MG/DL (ref 0–200)
CLARITY, POC: CLEAR
CO2 SERPL-SCNC: 28.6 MMOL/L (ref 22–29)
COLOR UR: YELLOW
CREAT SERPL-MCNC: 1.04 MG/DL (ref 0.57–1)
DEPRECATED RDW RBC AUTO: 45 FL (ref 37–54)
EGFRCR SERPLBLD CKD-EPI 2021: 64 ML/MIN/1.73
EOSINOPHIL # BLD AUTO: 0.38 10*3/MM3 (ref 0–0.4)
EOSINOPHIL NFR BLD AUTO: 4.5 % (ref 0.3–6.2)
ERYTHROCYTE [DISTWIDTH] IN BLOOD BY AUTOMATED COUNT: 13.3 % (ref 12.3–15.4)
EXPIRATION DATE: ABNORMAL
EXPIRATION DATE: ABNORMAL
EXPIRATION DATE: NORMAL
GLOBULIN UR ELPH-MCNC: 3.3 GM/DL
GLUCOSE SERPL-MCNC: 132 MG/DL (ref 65–99)
GLUCOSE UR STRIP-MCNC: ABNORMAL MG/DL
HBA1C MFR BLD: 7 % (ref 4.5–5.7)
HCT VFR BLD AUTO: 39.2 % (ref 34–46.6)
HDLC SERPL-MCNC: 75 MG/DL (ref 40–60)
HGB BLD-MCNC: 12.9 G/DL (ref 12–15.9)
IMM GRANULOCYTES # BLD AUTO: 0.05 10*3/MM3 (ref 0–0.05)
IMM GRANULOCYTES NFR BLD AUTO: 0.6 % (ref 0–0.5)
KETONES UR QL: NEGATIVE
LDLC SERPL CALC-MCNC: 147 MG/DL (ref 0–100)
LDLC/HDLC SERPL: 1.93 {RATIO}
LEUKOCYTE EST, POC: ABNORMAL
LYMPHOCYTES # BLD AUTO: 3.15 10*3/MM3 (ref 0.7–3.1)
LYMPHOCYTES NFR BLD AUTO: 37.5 % (ref 19.6–45.3)
Lab: ABNORMAL
Lab: ABNORMAL
Lab: NORMAL
MCH RBC QN AUTO: 30.6 PG (ref 26.6–33)
MCHC RBC AUTO-ENTMCNC: 32.9 G/DL (ref 31.5–35.7)
MCV RBC AUTO: 92.9 FL (ref 79–97)
MONOCYTES # BLD AUTO: 0.7 10*3/MM3 (ref 0.1–0.9)
MONOCYTES NFR BLD AUTO: 8.3 % (ref 5–12)
NEUTROPHILS NFR BLD AUTO: 4.1 10*3/MM3 (ref 1.7–7)
NEUTROPHILS NFR BLD AUTO: 48.7 % (ref 42.7–76)
NITRITE UR-MCNC: NEGATIVE MG/ML
NRBC BLD AUTO-RTO: 0 /100 WBC (ref 0–0.2)
PH UR: 6 [PH] (ref 5–8)
PLATELET # BLD AUTO: 188 10*3/MM3 (ref 140–450)
PMV BLD AUTO: 11.2 FL (ref 6–12)
POC ALBUMIN, URINE: 10 MG/L
POC CREATININE, URINE: 100 MG/DL
POC URINE ALB/CREA RATIO: <30
POTASSIUM SERPL-SCNC: 3.9 MMOL/L (ref 3.5–5.2)
PROT SERPL-MCNC: 7.5 G/DL (ref 6–8.5)
PROT UR STRIP-MCNC: NEGATIVE MG/DL
RBC # BLD AUTO: 4.22 10*6/MM3 (ref 3.77–5.28)
RBC # UR STRIP: NEGATIVE /UL
SODIUM SERPL-SCNC: 143 MMOL/L (ref 136–145)
SP GR UR: 1.01 (ref 1–1.03)
TRIGL SERPL-MCNC: 76 MG/DL (ref 0–150)
TSH SERPL DL<=0.05 MIU/L-ACNC: 1.71 UIU/ML (ref 0.27–4.2)
UROBILINOGEN UR QL: NORMAL
VLDLC SERPL-MCNC: 13 MG/DL (ref 5–40)
WBC NRBC COR # BLD AUTO: 8.41 10*3/MM3 (ref 3.4–10.8)

## 2025-08-13 PROCEDURE — 87086 URINE CULTURE/COLONY COUNT: CPT | Performed by: INTERNAL MEDICINE

## 2025-08-13 PROCEDURE — 80061 LIPID PANEL: CPT | Performed by: INTERNAL MEDICINE

## 2025-08-13 PROCEDURE — 80050 GENERAL HEALTH PANEL: CPT | Performed by: INTERNAL MEDICINE

## 2025-08-13 RX ORDER — LIDOCAINE 50 MG/G
PATCH TOPICAL
COMMUNITY
Start: 2025-08-07

## 2025-08-13 RX ORDER — BETAMETHASONE DIPROPIONATE 0.5 MG/G
CREAM TOPICAL
COMMUNITY
Start: 2025-04-23

## 2025-08-13 RX ORDER — SEMAGLUTIDE 2.68 MG/ML
2 INJECTION, SOLUTION SUBCUTANEOUS WEEKLY
Qty: 3 ML | Refills: 5 | Status: SHIPPED | OUTPATIENT
Start: 2025-08-13

## 2025-08-13 RX ORDER — LORATADINE 10 MG/1
TABLET ORAL
COMMUNITY
Start: 2025-04-23

## 2025-08-13 RX ORDER — METRONIDAZOLE TOPICAL 7.5 MG/G
GEL TOPICAL
COMMUNITY
Start: 2025-04-23

## 2025-08-13 RX ORDER — TRIAMCINOLONE ACETONIDE 1 MG/G
CREAM TOPICAL
COMMUNITY
Start: 2025-04-23

## 2025-08-15 ENCOUNTER — RESULTS FOLLOW-UP (OUTPATIENT)
Dept: INTERNAL MEDICINE | Facility: CLINIC | Age: 55
End: 2025-08-15
Payer: COMMERCIAL

## 2025-08-15 LAB — BACTERIA SPEC AEROBE CULT: NORMAL

## (undated) DEVICE — ANTIBACTERIAL UNDYED BRAIDED (POLYGLACTIN 910), SYNTHETIC ABSORBABLE SUTURE: Brand: COATED VICRYL

## (undated) DEVICE — SYR LUERLOK 30CC

## (undated) DEVICE — CONTAINER,SPECIMEN,OR STERILE,4OZ: Brand: MEDLINE

## (undated) DEVICE — SYR EPILOR LOR LL 7ML LF

## (undated) DEVICE — INSTRUMENT 875-088 14MM DSTRCT PIN STRL: Brand: MEDTRONIC REUSABLE INSTRUMENT

## (undated) DEVICE — ST EXT IV TBG W SECUR LK 20IN

## (undated) DEVICE — 3M™ IOBAN™ 2 ANTIMICROBIAL INCISE DRAPE 6650EZ: Brand: IOBAN™ 2

## (undated) DEVICE — NDL HYPO PRECISIONGLIDE REG 27G 1 1/4

## (undated) DEVICE — GLV SURG SENSICARE W/ALOE PF LF 7 STRL

## (undated) DEVICE — GLV SURG SIGNATURE TOUCH PF LTX 8 STRL BX/50

## (undated) DEVICE — SYR LUERLOK 5CC

## (undated) DEVICE — 3M™ STERI-DRAPE™ INSTRUMENT POUCH 1018: Brand: STERI-DRAPE™

## (undated) DEVICE — ENDOCUT SCISSOR TIP, DISPOSABLE: Brand: RENEW

## (undated) DEVICE — APPL CHLORAPREP W/TINT 26ML BLU

## (undated) DEVICE — DRSNG WND BORDR/ADHS NONADHR/GZ LF 4X4IN STRL

## (undated) DEVICE — PK NEURO DISC 10

## (undated) DEVICE — ADHS LIQ MASTISOL 2/3ML

## (undated) DEVICE — VISUALIZATION SYSTEM: Brand: CLEARIFY

## (undated) DEVICE — C-ARM: Brand: UNBRANDED

## (undated) DEVICE — NDL EPID 14G 6IN

## (undated) DEVICE — RUBBERBAND LF STRL PK/2

## (undated) DEVICE — ELECTRD BLD EDGE/INSUL1P 2.4X5.1MM STRL

## (undated) DEVICE — AIRWY 90MM NO9

## (undated) DEVICE — COVER,LIGHT HANDLE,FLX,1/PK: Brand: MEDLINE INDUSTRIES, INC.

## (undated) DEVICE — 3M™ STERI-STRIP™ REINFORCED ADHESIVE SKIN CLOSURES, R1547, 1/2 IN X 4 IN (12 MM X 100 MM), 6 STRIPS/ENVELOPE: Brand: 3M™ STERI-STRIP™

## (undated) DEVICE — GAUZE,SPONGE,4"X4",16PLY,XRAY,STRL,LF: Brand: MEDLINE

## (undated) DEVICE — SUT SILK 2/0 TIES 18IN A185H

## (undated) DEVICE — SUCTION CANISTER, 2500CC, RIGID: Brand: DEROYAL

## (undated) DEVICE — NDL HYPO ECLPS SFTY 18G 1 1/2IN

## (undated) DEVICE — LIMB HOLDERS: Brand: DEROYAL

## (undated) DEVICE — ENCORE® LATEX MICRO SIZE 8, STERILE LATEX POWDER-FREE SURGICAL GLOVE: Brand: ENCORE

## (undated) DEVICE — COVADERM PLUS: Brand: DEROYAL

## (undated) DEVICE — MEDI-VAC NON-CONDUCTIVE SUCTION TUBING: Brand: CARDINAL HEALTH

## (undated) DEVICE — ENDOPATH XCEL BLADELESS TROCARS WITH STABILITY SLEEVES: Brand: ENDOPATH XCEL

## (undated) DEVICE — ENDOPATH XCEL UNIVERSAL TROCAR STABLILITY SLEEVES: Brand: ENDOPATH XCEL

## (undated) DEVICE — APPL DURAPREP IODOPHOR APL 26ML

## (undated) DEVICE — CABL EXT SPINE TRIAL M/LD 3013

## (undated) DEVICE — DRSNG WND BORDR/ADHS NONADHR/GZ LF 4X10IN STRL

## (undated) DEVICE — 3M™ DURAPORE™ SURGICAL TAPE 1538-3, 3 INCH X 10 YARD (7,5CM X 9,1M), 4 ROLLS/BOX: Brand: 3M™ DURAPORE™

## (undated) DEVICE — PK ANGIO OR 10

## (undated) DEVICE — DRSNG TELFA PAD NONADH STR 1S 3X8IN

## (undated) DEVICE — ST EXT IV SMARTSITE 2VLV SP M LL 5ML IV1

## (undated) DEVICE — LUER-LOK 360°: Brand: CONNECTA, LUER-LOK

## (undated) DEVICE — SPNG GZ WOVN 4X4IN 12PLY 10/BX STRL

## (undated) DEVICE — SKYLINE ANTERIOR CERVICAL PLATE SYSTEM DRILL 2.2 X 12MM: Brand: SKYLINE

## (undated) DEVICE — COVADERM: Brand: DEROYAL

## (undated) DEVICE — NDL SPINE 20G 3 1/2 YEL STRL 1P/U

## (undated) DEVICE — SNAP KOVER: Brand: UNBRANDED

## (undated) DEVICE — KT HDR ACCSR EPG

## (undated) DEVICE — DRAPE,REIN 53X77,STERILE: Brand: MEDLINE

## (undated) DEVICE — MEDI-VAC YANKAUER SUCTION HANDLE W/BULBOUS TIP: Brand: CARDINAL HEALTH

## (undated) DEVICE — CANNULA,OXY,ADULT,SUPERSOFT,W/7'TUB,UC: Brand: MEDLINE

## (undated) DEVICE — NDL SPINE 22G 31/2IN BLK

## (undated) DEVICE — MAGNETIC DRAPE: Brand: DEVON

## (undated) DEVICE — GLV SURG SENSICARE W/ALOE PF LF 7.5 STRL

## (undated) DEVICE — 2963 MEDIPORE SOFT CLOTH TAPE 3 IN X 10 YD 12 RLS/CS: Brand: 3M™ MEDIPORE™

## (undated) DEVICE — SYR LUERLOK 20CC

## (undated) DEVICE — SYR CONTRL LUERLOK 10CC

## (undated) DEVICE — PAD GRND REM POLYHESIVE A/ DISP

## (undated) DEVICE — KITTNER SPONGE: Brand: DEROYAL

## (undated) DEVICE — 3M™ STERI-STRIP™ ANTIMICROBIAL SKIN CLOSURES 1/2 INCH X 4 INCHES 50/CARTON 4 CARTONS/CASE A1847: Brand: 3M™ STERI-STRIP™

## (undated) DEVICE — GLV SURG BIOGEL LTX PF 8

## (undated) DEVICE — PK LAP LASR CHOLE 10

## (undated) DEVICE — DISPOSABLE BIPOLAR FORCEPS 7 3/4" (19.7CM) SCOVILLE BAYONET, INSULATED, 1.5MM TIP AND 12 FT. (3.6M) CABLE: Brand: KIRWAN

## (undated) DEVICE — SYR LL TP 10ML STRL

## (undated) DEVICE — [HIGH FLOW HEATED INSUFFLATOR TUBING,  DO NOT USE IF PACKAGE IS DAMAGED]

## (undated) DEVICE — Device

## (undated) DEVICE — ENDOPOUCH RETRIEVER SPECIMEN RETRIEVAL BAGS: Brand: ENDOPOUCH RETRIEVER

## (undated) DEVICE — NDL HYPO ECLPS SFTY 22G 1 1/2IN

## (undated) DEVICE — DRP MICROSCOP ELIMINATES GLAS COVR

## (undated) DEVICE — COLR CERV FM 3IN LG

## (undated) DEVICE — DRP SLUSH WARM STRL 44X44IN

## (undated) DEVICE — LARYNG GLIDESCOPE COBALT/RANGER GVL3ST

## (undated) DEVICE — NDL HYPO ECLPS SFTY 25G 1 1/2IN

## (undated) DEVICE — SUT VIC 0 UR6 27IN VCP603H

## (undated) DEVICE — HOLDER: Brand: DEROYAL

## (undated) DEVICE — TOOL 14MH30 LEGEND 14CM 3MM: Brand: MIDAS REX ™

## (undated) DEVICE — 3M™ TEGADERM™ IV TRANSPARENT FILM DRESSING WITH BORDER 1610: Brand: 3M™ TEGADERM™

## (undated) DEVICE — GLOW 'N TELL 30CM TAPE (20 STRIPS): Brand: VASCUTAPE RADIOPAQUE TAPE